# Patient Record
Sex: MALE | Race: WHITE | Employment: OTHER | URBAN - METROPOLITAN AREA
[De-identification: names, ages, dates, MRNs, and addresses within clinical notes are randomized per-mention and may not be internally consistent; named-entity substitution may affect disease eponyms.]

---

## 2019-01-18 ENCOUNTER — OFFICE VISIT (OUTPATIENT)
Dept: FAMILY MEDICINE | Facility: CLINIC | Age: 49
End: 2019-01-18
Payer: COMMERCIAL

## 2019-01-18 VITALS
OXYGEN SATURATION: 97 % | SYSTOLIC BLOOD PRESSURE: 106 MMHG | TEMPERATURE: 97.8 F | HEART RATE: 92 BPM | DIASTOLIC BLOOD PRESSURE: 58 MMHG | WEIGHT: 158 LBS

## 2019-01-18 DIAGNOSIS — Z23 NEED FOR PROPHYLACTIC VACCINATION AND INOCULATION AGAINST INFLUENZA: ICD-10-CM

## 2019-01-18 DIAGNOSIS — D64.9 ANEMIA, UNSPECIFIED TYPE: ICD-10-CM

## 2019-01-18 DIAGNOSIS — E10.69 TYPE 1 DIABETES MELLITUS WITH OTHER SPECIFIED COMPLICATION (H): Primary | ICD-10-CM

## 2019-01-18 DIAGNOSIS — I25.10 CORONARY ARTERY DISEASE INVOLVING NATIVE CORONARY ARTERY OF NATIVE HEART WITHOUT ANGINA PECTORIS: ICD-10-CM

## 2019-01-18 PROBLEM — E10.621: Status: ACTIVE | Noted: 2018-10-29

## 2019-01-18 PROBLEM — Z95.5 HISTORY OF CORONARY ARTERY STENT PLACEMENT: Status: ACTIVE | Noted: 2017-08-21

## 2019-01-18 PROBLEM — E11.8 DIABETIC FOOT (H): Status: ACTIVE | Noted: 2018-10-29

## 2019-01-18 PROBLEM — L97.529: Status: ACTIVE | Noted: 2018-10-29

## 2019-01-18 PROBLEM — E11.649 SEVERE DIABETIC HYPOGLYCEMIA (H): Status: ACTIVE | Noted: 2018-05-11

## 2019-01-18 LAB
ALBUMIN SERPL-MCNC: 3.1 G/DL (ref 3.4–5)
ALP SERPL-CCNC: 116 U/L (ref 40–150)
ALT SERPL W P-5'-P-CCNC: 22 U/L (ref 0–70)
ANION GAP SERPL CALCULATED.3IONS-SCNC: 6 MMOL/L (ref 3–14)
AST SERPL W P-5'-P-CCNC: 20 U/L (ref 0–45)
BASOPHILS # BLD AUTO: 0 10E9/L (ref 0–0.2)
BASOPHILS NFR BLD AUTO: 0.1 %
BILIRUB SERPL-MCNC: 0.5 MG/DL (ref 0.2–1.3)
BUN SERPL-MCNC: 28 MG/DL (ref 7–30)
CALCIUM SERPL-MCNC: 9.4 MG/DL (ref 8.5–10.1)
CHLORIDE SERPL-SCNC: 100 MMOL/L (ref 94–109)
CO2 SERPL-SCNC: 28 MMOL/L (ref 20–32)
CREAT SERPL-MCNC: 1.63 MG/DL (ref 0.66–1.25)
DIFFERENTIAL METHOD BLD: ABNORMAL
EOSINOPHIL # BLD AUTO: 0.2 10E9/L (ref 0–0.7)
EOSINOPHIL NFR BLD AUTO: 1.3 %
ERYTHROCYTE [DISTWIDTH] IN BLOOD BY AUTOMATED COUNT: 12.7 % (ref 10–15)
FERRITIN SERPL-MCNC: 258 NG/ML (ref 26–388)
FOLATE SERPL-MCNC: 19.2 NG/ML
GFR SERPL CREATININE-BSD FRML MDRD: 50 ML/MIN/{1.73_M2}
GLUCOSE SERPL-MCNC: 317 MG/DL (ref 70–99)
HCT VFR BLD AUTO: 33.2 % (ref 40–53)
HGB BLD-MCNC: 10.8 G/DL (ref 13.3–17.7)
IRON SATN MFR SERPL: 16 % (ref 15–46)
IRON SERPL-MCNC: 27 UG/DL (ref 35–180)
LYMPHOCYTES # BLD AUTO: 1.1 10E9/L (ref 0.8–5.3)
LYMPHOCYTES NFR BLD AUTO: 7.9 %
MCH RBC QN AUTO: 27.2 PG (ref 26.5–33)
MCHC RBC AUTO-ENTMCNC: 32.5 G/DL (ref 31.5–36.5)
MCV RBC AUTO: 84 FL (ref 78–100)
MONOCYTES # BLD AUTO: 0.8 10E9/L (ref 0–1.3)
MONOCYTES NFR BLD AUTO: 6.1 %
NEUTROPHILS # BLD AUTO: 11.6 10E9/L (ref 1.6–8.3)
NEUTROPHILS NFR BLD AUTO: 84.6 %
PLATELET # BLD AUTO: 367 10E9/L (ref 150–450)
POTASSIUM SERPL-SCNC: 4.9 MMOL/L (ref 3.4–5.3)
PROT SERPL-MCNC: 8.1 G/DL (ref 6.8–8.8)
RBC # BLD AUTO: 3.97 10E12/L (ref 4.4–5.9)
RETICS # AUTO: 30.6 10E9/L (ref 25–95)
RETICS/RBC NFR AUTO: 0.8 % (ref 0.5–2)
SODIUM SERPL-SCNC: 134 MMOL/L (ref 133–144)
T4 FREE SERPL-MCNC: 1.38 NG/DL (ref 0.76–1.46)
TIBC SERPL-MCNC: 166 UG/DL (ref 240–430)
TRANSFERRIN SERPL-MCNC: 140 MG/DL (ref 210–360)
TSH SERPL DL<=0.005 MIU/L-ACNC: 0.24 MU/L (ref 0.4–4)
VIT B12 SERPL-MCNC: 414 PG/ML (ref 193–986)
WBC # BLD AUTO: 13.7 10E9/L (ref 4–11)

## 2019-01-18 PROCEDURE — 85045 AUTOMATED RETICULOCYTE COUNT: CPT | Performed by: FAMILY MEDICINE

## 2019-01-18 PROCEDURE — 85025 COMPLETE CBC W/AUTO DIFF WBC: CPT | Performed by: FAMILY MEDICINE

## 2019-01-18 PROCEDURE — 80053 COMPREHEN METABOLIC PANEL: CPT | Performed by: FAMILY MEDICINE

## 2019-01-18 PROCEDURE — 82728 ASSAY OF FERRITIN: CPT | Performed by: FAMILY MEDICINE

## 2019-01-18 PROCEDURE — 84439 ASSAY OF FREE THYROXINE: CPT | Performed by: FAMILY MEDICINE

## 2019-01-18 PROCEDURE — 84466 ASSAY OF TRANSFERRIN: CPT | Performed by: FAMILY MEDICINE

## 2019-01-18 PROCEDURE — 90471 IMMUNIZATION ADMIN: CPT | Performed by: FAMILY MEDICINE

## 2019-01-18 PROCEDURE — 83540 ASSAY OF IRON: CPT | Performed by: FAMILY MEDICINE

## 2019-01-18 PROCEDURE — 82746 ASSAY OF FOLIC ACID SERUM: CPT | Performed by: FAMILY MEDICINE

## 2019-01-18 PROCEDURE — 90686 IIV4 VACC NO PRSV 0.5 ML IM: CPT | Performed by: FAMILY MEDICINE

## 2019-01-18 PROCEDURE — 36415 COLL VENOUS BLD VENIPUNCTURE: CPT | Performed by: FAMILY MEDICINE

## 2019-01-18 PROCEDURE — 82607 VITAMIN B-12: CPT | Performed by: FAMILY MEDICINE

## 2019-01-18 PROCEDURE — 84443 ASSAY THYROID STIM HORMONE: CPT | Performed by: FAMILY MEDICINE

## 2019-01-18 PROCEDURE — 99203 OFFICE O/P NEW LOW 30 MIN: CPT | Mod: 25 | Performed by: FAMILY MEDICINE

## 2019-01-18 RX ORDER — INSULIN DEGLUDEC 100 U/ML
28 INJECTION, SOLUTION SUBCUTANEOUS EVERY MORNING
Refills: 5 | Status: ON HOLD | COMMUNITY
Start: 2019-01-18 | End: 2019-02-23

## 2019-01-18 RX ORDER — INSULIN LISPRO 100 [IU]/ML
INJECTION, SOLUTION INTRAVENOUS; SUBCUTANEOUS
Status: ON HOLD | COMMUNITY
Start: 2018-12-31 | End: 2019-03-04

## 2019-01-18 RX ORDER — FLASH GLUCOSE SENSOR
KIT MISCELLANEOUS
Refills: 3 | COMMUNITY
Start: 2019-01-09 | End: 2019-09-06

## 2019-01-18 RX ORDER — INSULIN DEGLUDEC 100 U/ML
INJECTION, SOLUTION SUBCUTANEOUS
Refills: 5 | COMMUNITY
Start: 2018-12-26 | End: 2019-01-18

## 2019-01-18 RX ORDER — PREGABALIN 100 MG
CAPSULE ORAL
Refills: 1 | Status: ON HOLD | COMMUNITY
Start: 2018-12-26 | End: 2019-02-23

## 2019-01-24 DIAGNOSIS — E05.90 SUBCLINICAL HYPERTHYROIDISM: ICD-10-CM

## 2019-01-24 DIAGNOSIS — D50.9 IRON DEFICIENCY ANEMIA, UNSPECIFIED IRON DEFICIENCY ANEMIA TYPE: Primary | ICD-10-CM

## 2019-02-01 ENCOUNTER — OFFICE VISIT (OUTPATIENT)
Dept: PODIATRY | Facility: CLINIC | Age: 49
End: 2019-02-01
Payer: COMMERCIAL

## 2019-02-01 VITALS
BODY MASS INDEX: 23.4 KG/M2 | WEIGHT: 158 LBS | DIASTOLIC BLOOD PRESSURE: 78 MMHG | HEIGHT: 69 IN | SYSTOLIC BLOOD PRESSURE: 126 MMHG

## 2019-02-01 DIAGNOSIS — L97.522 SKIN ULCER OF LEFT GREAT TOE WITH FAT LAYER EXPOSED (H): ICD-10-CM

## 2019-02-01 DIAGNOSIS — L97.422 SKIN ULCER OF LEFT HEEL WITH FAT LAYER EXPOSED (H): ICD-10-CM

## 2019-02-01 DIAGNOSIS — E10.40 TYPE 1 DIABETES MELLITUS WITH DIABETIC NEUROPATHY (H): ICD-10-CM

## 2019-02-01 DIAGNOSIS — I73.9 PAD (PERIPHERAL ARTERY DISEASE) (H): Primary | ICD-10-CM

## 2019-02-01 PROCEDURE — 99205 OFFICE O/P NEW HI 60 MIN: CPT | Performed by: PODIATRIST

## 2019-02-01 ASSESSMENT — MIFFLIN-ST. JEOR: SCORE: 1587.06

## 2019-02-01 NOTE — LETTER
"    2/1/2019         RE: Eduardo Walton  [address] [facility] [id number]        Dear Colleague,    Thank you for referring your patient, Eduardo Walton, to the [facility]. Please see a copy of my visit note below.    PATIENT HISTORY: Eduardo Walton is a 48 year old male who presents to clinic for wounds to the left great toe and heel. Her with signficant other. Was being seen at [facility] and states that he didn't like it over there as they did not have a wound care plan for his foot. Notes he has the ulcer to his right great toe for last year. States he did 6 weeks of IV antibiotics and was told \"the infection is gone\". Notes his left heel ulceration started about 3 weeks ago. Concerned about \"the smell\". Will get pain intermittently. Has been diabetic for 40 years. Pain can be 4/10 at its worst. Has been putting betadine on the heel and silvadene cream on the toe. Wondering what can be done to heal it.     Review of Systems:  Patient denies fever, chills, rash,  stiffness, limping,  weakness, heart burn, blood in stool, chest pain with activity, calf pain when walking, shortness of breath with activity, chronic cough, easy bleeding/bruising, swelling of ankles, excessive thirst, fatigue, depression, anxiety.  Patient admits to numbness, wound.     PAST MEDICAL HISTORY: Diabetes with neuropathy, CAD     PAST SURGICAL HISTORY:   Past Surgical History:   Procedure Laterality Date     STENT 2010        MEDICATIONS:   Current Outpatient Medications:      Continuous Blood Gluc Sensor (FREESTYLE COLIN 14 DAY SENSOR) [facility], USE WITH 14 DAY COLIN SYSTEM. CHANGE SENSOR EVERY 14 DAYS., Disp: , Rfl: 3     insulin aspart (NOVOLOG PEN) 100 UNIT/ML pen, Inject 5 Units Subcutaneous, Disp: , Rfl:      insulin lispro (HUMALOG KWIKPEN) 100 UNIT/ML pen, Inject 1 unit/2 grams carb, total daily dose up to 80 units  Indications: Diabetes Mellitus, Disp: , Rfl:      LYRICA 100 MG capsule, TAKE 1 CAPSULE BY " "MOUTH THREE TIMES A DAY, Disp: , Rfl: 1     TRESIBA FLEXTOUCH 100 UNIT/ML pen, 28 Units daily, Disp: , Rfl: 5     ALLERGIES:    Allergies   Allergen Reactions     Fish-Derived Products Rash        SOCIAL HISTORY:   Social History     Socioeconomic History     Marital status:      Spouse name: Not on file     Number of children: Not on file     Years of education: Not on file     Highest education level: Not on file   Social Needs     Financial resource strain: Not on file     Food insecurity - worry: Not on file     Food insecurity - inability: Not on file     Transportation needs - medical: Not on file     Transportation needs - non-medical: Not on file   Occupational History     Not on file   Tobacco Use     Smoking status: Never Smoker     Smokeless tobacco: Never Used   Substance and Sexual Activity     Alcohol use: Yes     Comment: occ     Drug use: Not on file     Sexual activity: Yes   Other Topics Concern     Not on file   Social History Narrative     Not on file        FAMILY HISTORY:   Family History   Problem Relation Age of Onset     Ovarian Cancer Maternal Grandmother      Colon Cancer Maternal Grandmother      Prostate Cancer Maternal Grandfather      Ovarian Cancer Paternal Grandmother         EXAM:Vitals:/78   Ht 1.753 m (5' 9\")   Wt 71.7 kg (158 lb)   BMI 23.33 kg/m       A1C: 9.6 (10/2018)    General appearance: Patient is alert and fully cooperative with history & exam.  No sign of distress is noted during the visit.     Psychiatric: Affect is pleasant & appropriate.  Patient appears motivated to improve health.     Respiratory: Breathing is regular & unlabored while sitting.     HEENT: Hearing is intact to spoken word.  Speech is clear.  No gross evidence of visual impairment that would impact ambulation.     Dermatologic: large ischemic eschar to the posterior left heel. This measure 6cm x 6cm. Malodorous. No purulent drainage noted on clinical exam. eshcar to the left great toe " measuring 2cm x 1.0cm x 0.3cm after debridement.      Vascular: DP & PT pulses are not palpable to left foot or ankle.   edema but no varicosities noted.  CFT and skin temperature is normal to both lower extremities.     Neurologic: Lower extremity sensation is absent to feet.      Musculoskeletal: Patient is ambulatory without assistive device or brace.  No gross ankle deformity noted.  No foot or ankle joint effusion is noted.    Radiographs: left foot xray (11/2018) from [facility] - There is new destruction of the distal tuft of the great toe, consistent with acute osteomyelitis.    From the notes, it seems like they tried to schedule angiogram but was cancelled as the patient would not get back to him.      ASSESSMENT:    PAD (peripheral artery disease) (H)  Skin ulcer of left heel with fat layer exposed (H)  Skin ulcer of left great toe with fat layer exposed (H)  Type 1 diabetes mellitus with diabetic neuropathy (H)     PLAN:  Reviewed patient's chart in epic. Had a long discussion with patient. Discussed that I am very concerned about his foot. Discussed that there is lack of blood flow to the area and there is likely underlying bone infection. Discussed he is high risk of amputation of leg or worse, sepsis. Recommend admission to [facility] as they have a vascular service and since I can not palpate pulses, feel he needs a vascular work up as well as MRI's of the left foot and ankle to assess for bone infection. Discussed that with bone infection, it needs to be surgically removed, which usually results in amputation. Patient does not want to be admitted today. Would like to get MRI's and angiogram outpatient. Again discussed that severity of the leg with the lack of blood flow. He said he will call if it gets worse or go to the ED at [facility]. Will call with test results once we receive them. He will apply iodine to the ulcers daily with dry gauze. Tried get patient offloading shoe but states  he would not wear it cause he doesn't like them.     Over 60 minutes was spent counseling and coordinating care.        Kay Pleitez DPM, Podiatry/Foot and Ankle Surgery        Patient to follow up with Primary Care provider regarding elevated blood pressure.        Again, thank you for allowing me to participate in the care of your patient.        Sincerely,        Kay Pleitez DPM, Podiatry/Foot and Ankle Surgery

## 2019-02-01 NOTE — PATIENT INSTRUCTIONS
"3 week follow up      Please call to schedule your MRI/CT/Ultrasound/Arthrogram appointment.  The number is [phone].          Thank you for choosing [facility]!    DR. [facility] SCHEDULE  MONDAY AM - [place] TUESDAY - [place]   [address] [address] [address] [place] [address]   [phone] / [phone] / [phone]       WEDNESDAY - [place] FRIDAY AM - WOUND CENTER   [address]   [place] [address] [place] [address]   [phone] / [phone]       FRIDAY PM - [place] SCHEDULE SURGERY: [phone]   [address] BILLING QUESTIONS: [phone]   [place] [address] AFTER HOURS: [phone]   [phone] / [phone] APPOINTMENTS: [phone]     [employer] (CPL) [phone]       DIABETES AND YOUR FEET  Diabetes can result in several problems in the feet including ulcers (open sores) and amputations. Two of the most important reasons why people develop foot problems when they have diabetes is : 1. Neuropathy (loss of feeling)  2. Vascular disease (loss or decrease of blood flow).    Neuropathy is a term used to describe a loss of nerve function.  Patients with diabetes are at risk of developing neuropathy if their sugars continue to run high and are above the normal value. One theory for neuropathy is that the \"extra\" sugar in the body enters the nerves and is broken down. These by-products build up in the nerve causing it to swell and impairing nerve function. Often times, this can be prevented by controlling your sugars, dieting and exercise.    When a person develops neuropathy, they usually begin to feel numbness or tingling in their feet and sometime in their legs.  Other symptoms may include painful burning or hot feet, tingling or feeling like insects or ants are crawling on your feet or legs.  If the diabetes is sever and the " sugars run high for long periods of time, neuropathy can also occur in the hands.    Vascular disease  is a term used to describe a loss or decrease in circulation (blood flow). There is a problem in getting blood and oxygen to areas that need it. Similar to neuropathy, sugars can build up in the walls of the arteries (blood vessels) and cause them to become swollen, thickened and hardened. This decreases the amount of blood that can go to an area that needs it. Though this is common in the legs of diabetic patients, it can also affect other arteries (blood vessels) in the body such as in the heart and eyes.    In the legs, vascular disease usually results in cramping. Patients who develop leg cramps after walking the same distance every time (i.e. One block, half a mile, ect.) need to let their doctors know so that their circulation may be checked. Cramps causing severe pain in the feet and/or legs while sleeping and the cramps go away when you stand or hang your legs off the side of the bed, may also be a sign of poor blood circulation.  Occasional cramping in cold weather or on rare occasions with activity may not be due to poor circulation, but you should inform your doctor.    PREVENTION OF THESE DISEASES  The key to prevention is good blood sugar control. Poor blood sugar control is a big reason many of these problems start. Physical activity (exercise) is a very good way to help decrease your blood sugars. Exercise can lower your blood sugar, blood pressure, and cholesterol. It also reduces your risk for heart disease and stroke, relieves stress, and strengthens your heart, muscles and bones.  In addition, regular activity helps insulin work better, improves your blood circulation, and keeps your joints flexible. If you're trying to lose weight, a combination of exercise and wise food choices can help you reach your target weight and maintain it.      PAIN MANAGEMENT  1.Blood Sugar Control - Most  important  2. Medications such as:  Amytriptylline, duloxetine, gabapentin, lyrica, tramadol  3. Nutritional therapy:  Vitamin B6 (100mg daily), Vitamin B12 (75mcg daily), Vitamin D 2000 IU daily), Alpha-Lipoic Acid (600-1800mg daily), Acetyl-L-Carnitine (500-1000mg TID, L-methyl folate (1500mcg daily)    ** Metformin can block Vitamin B6 and B12 so it is important to supplement**    FOOT CARE RECOMMENDATIONS   1. Wash your feet with lukewarm water and a mild soap and then dry them thoroughly, especially between the toes.     2. Examine your feet daily looking for cuts, corns, blisters, cracks, ect, especially after wearing new shoes. Make sure to look between your toes. If you cannot see the bottom of your feet, set a mirror on the floor and hold your foot over it, or ask a spouse, friend or family member to examine your feet for you. Contact your doctor immediately if new problems are noted or if sores are not healing.     3. Immediately apply moisturizer to the tops and bottoms of your feet, avoiding areas between the toes. Hand lotion (Intesive Care, Margareth, Eucerin, Neutrogena, Curel, ect) is sufficient unless your doctor prescribes a medicated lotion. Apply sunscreen to your feet when going swimming outside.     4. Use clean comfortable shoes, wear white socks (if you have any bleeding or drainage, you will see it on white socks). Socks should not have thick seams or cut off the circulation around the leg. Break in new shoes slowly and rotate with older shoes until broken in. Check the inside of your shoes with your hand to look for areas of irritation or objects that may have fallen into your shoes.       5. Keep slippers by the side of your bed for use during the night.     6.  Shoes should be fitted by a professional and should not cause areas of irritation.  Check your feet regularly when wearing a new pair of shoes and replace them as needed.     7.  Talk to your doctor about proper exercise. Exercise and  stretching stimulate blood flow to your feet and maintain proper glucose levels.     8.  Monitor your blood glucose level as instructed by your doctor. Notify your doctor immediately if your blood sugar is abnormally high or low.    9. Cut your nails straight across, but then gently round any sharp edges with a cardboard nail file. If you have neuropathy, peripheral vascular disease or cannot see that well to trim your own toenails contact [employer] ([phone]) or [employer] ([phone]).      THINGS TO AVOID DOING   1.  Do not soak your feet if you have an open sore. Use only lukewarm water and always check the temperature with your hand as hot water can easily burn your feet.       2.  Never use a hot water bottle or heating pad on your feet. Also do not apply cold compresses to your feet. With decreased sensation, you could burn or freeze your feet.       3.  Do not apply any of these to your feet:    -  Over the counter medicine for corns or warts    -  Harsh chemicals like boric acid    -  Do not self-treat corns, cuts, blisters or infections. Always consult your doctor.       4.  Do not wear sandals, slippers or walk barefoot, especially on hot sand or concrete or other harsh surfaces.     5.  If you smoke, stop!!!          FOOT ULCER (WOUND) EDUCATION  Ulceration ofthe foot involves a break or hole in the skin. Skin is our best protection against infection. Skin is quite durable, however, the underlying tissues are fragile. For this reason, the wound is likely to deepen rapidly. Deep wounds usually get infected and require amputation. Prompt healing is therefore essential to avoid limb loss.     Foot ulcers do not heal without intervention. Walking on the foot and living your normal life is not typically compatible with healing the sore. Successful healing will require several months of significant alteration of your daily activities.   Ulcer complications frequently develop. This primarily includes  infection of skin, which then spreads deep into your joints, bones and tendons. Spreading infection may travel up your leg and into other parts of your body. Deep infection is usually treated with amputation ofpart ofyour foot or your leg. Signs of infection include fever, chills, nausea, vomiting, erratic blood sugars, local redness, pus, strong odor and localized warmth. Signs of infection should be taken seriously. Prompt evaluation in the clinic or hospital emergency room is required.   Ulcer treatment requires debridement or surgical removal of devitalized tissue. Your doctor will trim away callused, moistened, unhealthy tissue from the wound surface and margin. This helps to clean the wound and allows proper inspection. Debridement also stimulates healing even though the wound originally appears larger. Expect some bleeding with each debridement. You will be given instruction regarding wound bandaging. This often includes ointment and gauze. Avoid tape directly on the skin. Hand washing is essential since most infections will come from your fingertips. Ulcer care requires a no touch technique. Your fingers should not touch the margin or base of the wound.    HELPFUL HEALING TIPS:  1. Debridement: Getting rid of bad tissue makes way for good tissue to promote healing  2. Addressing Foot Deformities: Hammertoes and bunions can cause increased pressure  3. Pressure Reduction: If pressure remains to the wound, it won't heal  4. Good Pulses: If bloodflow is not getting to the foot, the ulcer will not heal  5. Good Nutrition: If you are not getting proper nutrition your body can't heal.Protein!  6. Infection Control: Keep the ulcer clean with wound cleanser. DO NOT SOAK IT!  7. Moisture Control: Keep edema down and make sure that drainage is getting pulled away from the ulcer    IMPORTANCE OF DEBRIDEMENT    Reduces bioburden to help control or reduce infection. Even if an ulcer is not  infected,  the bacterial  bioburden causes increased local inflammation.    Allows more accurate visualization of the wound base and edges, which allows for more precise staging.    Removes necrotic/non-viable tissue, which impedes wound healing, causes protein loss and can be a nidus for infection.    Stimulates new circulation (angiogenesis) and allows adequate oxygen delivery to the wound.    Removes undermining and tunneling, and may help reduce abscess formation.    Releases healing growth factors at the edge of the wound.    Prepares the wound bed by leaving only tissues that are capable of regenerating.        Body Mass Index (BMI)  Many things can cause foot and ankle problems. Foot structure, activity level, foot mechanics and injuries are common causes of pain.  One very important issue that often goes unmentioned, is body weight. Extra weight can cause increased stress on muscles, ligaments, bones and tendons.  Sometimes just a few extra pounds is all it takes to put one over her/his threshold. Without reducing that stress, it can be difficult to alleviate pain. Some people are uncomfortable addressing this issue, but we feel it is important for you to think about it. As Foot &  Ankle specialists, our job is addressing the lower extremity problem and possible causes. Regarding extra body weight, we encourage patients to discuss diet and weight management plans with their primary care doctors. It is this team approach that gives you the best opportunity for pain relief and getting you back on your feet.

## 2019-02-01 NOTE — PROGRESS NOTES
"PATIENT HISTORY: Eduardo Walton is a 48 year old male who presents to clinic for wounds to the left great toe and heel. Her with signficant other. Was being seen at [facility] and states that he didn't like it over there as they did not have a wound care plan for his foot. Notes he has the ulcer to his right great toe for last year. States he did 6 weeks of IV antibiotics and was told \"the infection is gone\". Notes his left heel ulceration started about 3 weeks ago. Concerned about \"the smell\". Will get pain intermittently. Has been diabetic for 40 years. Pain can be 4/10 at its worst. Has been putting betadine on the heel and silvadene cream on the toe. Wondering what can be done to heal it.     Review of Systems:  Patient denies fever, chills, rash,  stiffness, limping,  weakness, heart burn, blood in stool, chest pain with activity, calf pain when walking, shortness of breath with activity, chronic cough, easy bleeding/bruising, swelling of ankles, excessive thirst, fatigue, depression, anxiety.  Patient admits to numbness, wound.     PAST MEDICAL HISTORY: Diabetes with neuropathy, CAD     PAST SURGICAL HISTORY:   Past Surgical History:   Procedure Laterality Date     STENT 2010        MEDICATIONS:   Current Outpatient Medications:      Continuous Blood Gluc Sensor (TolerxYLE COLIN 14 DAY SENSOR) [facility] [place] [place], USE WITH 14 DAY COLIN SYSTEM. CHANGE SENSOR EVERY 14 DAYS., Disp: , Rfl: 3     insulin aspart (NOVOLOG PEN) 100 UNIT/ML pen, Inject 5 Units Subcutaneous, Disp: , Rfl:      insulin lispro (HUMALOG KWIKPEN) 100 UNIT/ML pen, Inject 1 unit/2 grams carb, total daily dose up to 80 units  Indications: Diabetes Mellitus, Disp: , Rfl:      LYRICA 100 MG capsule, TAKE 1 CAPSULE BY MOUTH THREE TIMES A DAY, Disp: , Rfl: 1     TRESIBA FLEXTOUCH 100 UNIT/ML pen, 28 Units daily, Disp: , Rfl: 5     ALLERGIES:    Allergies   Allergen Reactions     Fish-Derived Products Rash        SOCIAL HISTORY:   Social History " "    Socioeconomic History     Marital status:      Spouse name: Not on file     Number of children: Not on file     Years of education: Not on file     Highest education level: Not on file   Social Needs     Financial resource strain: Not on file     Food insecurity - worry: Not on file     Food insecurity - inability: Not on file     Transportation needs - medical: Not on file     Transportation needs - non-medical: Not on file   Occupational History     Not on file   Tobacco Use     Smoking status: Never Smoker     Smokeless tobacco: Never Used   Substance and Sexual Activity     Alcohol use: Yes     Comment: occ     Drug use: Not on file     Sexual activity: Yes   Other Topics Concern     Not on file   Social History Narrative     Not on file        FAMILY HISTORY:   Family History   Problem Relation Age of Onset     Ovarian Cancer Maternal Grandmother      Colon Cancer Maternal Grandmother      Prostate Cancer Maternal Grandfather      Ovarian Cancer Paternal Grandmother         EXAM:Vitals:/78   Ht 1.753 m (5' 9\")   Wt 71.7 kg (158 lb)   BMI 23.33 kg/m      A1C: 9.6 (10/2018)    General appearance: Patient is alert and fully cooperative with history & exam.  No sign of distress is noted during the visit.     Psychiatric: Affect is pleasant & appropriate.  Patient appears motivated to improve health.     Respiratory: Breathing is regular & unlabored while sitting.     HEENT: Hearing is intact to spoken word.  Speech is clear.  No gross evidence of visual impairment that would impact ambulation.     Dermatologic: large ischemic eschar to the posterior left heel. This measure 6cm x 6cm. Malodorous. No purulent drainage noted on clinical exam. eshcar to the left great toe measuring 2cm x 1.0cm x 0.3cm after debridement.      Vascular: DP & PT pulses are not palpable to left foot or ankle.   edema but no varicosities noted.  CFT and skin temperature is normal to both lower extremities.   "   Neurologic: Lower extremity sensation is absent to feet.      Musculoskeletal: Patient is ambulatory without assistive device or brace.  No gross ankle deformity noted.  No foot or ankle joint effusion is noted.    Radiographs: left foot xray (11/2018) from [facility] - There is new destruction of the distal tuft of the great toe, consistent with acute osteomyelitis.    From the notes, it seems like they tried to schedule angiogram but was cancelled as the patient would not get back to him.      ASSESSMENT:    PAD (peripheral artery disease) (H)  Skin ulcer of left heel with fat layer exposed (H)  Skin ulcer of left great toe with fat layer exposed (H)  Type 1 diabetes mellitus with diabetic neuropathy (H)     PLAN:  Reviewed patient's chart in epic. Had a long discussion with patient. Discussed that I am very concerned about his foot. Discussed that there is lack of blood flow to the area and there is likely underlying bone infection. Discussed he is high risk of amputation of leg or worse, sepsis. Recommend admission to [facility] as they have a vascular service and since I can not palpate pulses, feel he needs a vascular work up as well as MRI's of the left foot and ankle to assess for bone infection. Discussed that with bone infection, it needs to be surgically removed, which usually results in amputation. Patient does not want to be admitted today. Would like to get MRI's and angiogram outpatient. Again discussed that severity of the leg with the lack of blood flow. He said he will call if it gets worse or go to the ED at [facility]. Will call with test results once we receive them. He will apply iodine to the ulcers daily with dry gauze. Tried get patient offloading shoe but states he would not wear it cause he doesn't like them.     Over 60 minutes was spent counseling and coordinating care.        Kay Pleitez DPM, Podiatry/Foot and Ankle Surgery        Patient to follow up with Primary Care  provider regarding elevated blood pressure.

## 2019-02-04 ENCOUNTER — HOSPITAL ENCOUNTER (INPATIENT)
Facility: CLINIC | Age: 49
LOS: 5 days | Discharge: HOME HEALTH | End: 2019-02-10
Attending: EMERGENCY MEDICINE | Admitting: INTERNAL MEDICINE
Payer: COMMERCIAL

## 2019-02-04 ENCOUNTER — APPOINTMENT (OUTPATIENT)
Dept: GENERAL RADIOLOGY | Facility: CLINIC | Age: 49
End: 2019-02-04
Payer: COMMERCIAL

## 2019-02-04 DIAGNOSIS — E10.621 DIABETIC ULCER OF LEFT HEEL ASSOCIATED WITH TYPE 1 DIABETES MELLITUS, UNSPECIFIED ULCER STAGE (H): ICD-10-CM

## 2019-02-04 DIAGNOSIS — L08.9 TYPE 1 DIABETES MELLITUS WITH DIABETIC FOOT INFECTION (H): ICD-10-CM

## 2019-02-04 DIAGNOSIS — A41.9 SEPSIS, DUE TO UNSPECIFIED ORGANISM: ICD-10-CM

## 2019-02-04 DIAGNOSIS — L97.429 DIABETIC ULCER OF LEFT HEEL ASSOCIATED WITH TYPE 1 DIABETES MELLITUS, UNSPECIFIED ULCER STAGE (H): ICD-10-CM

## 2019-02-04 DIAGNOSIS — L97.523 DIABETIC ULCER OF TOE OF LEFT FOOT ASSOCIATED WITH TYPE 1 DIABETES MELLITUS, WITH NECROSIS OF MUSCLE (H): ICD-10-CM

## 2019-02-04 DIAGNOSIS — E10.628 TYPE 1 DIABETES MELLITUS WITH DIABETIC FOOT INFECTION (H): ICD-10-CM

## 2019-02-04 DIAGNOSIS — E10.621 DIABETIC ULCER OF TOE OF LEFT FOOT ASSOCIATED WITH TYPE 1 DIABETES MELLITUS, WITH NECROSIS OF MUSCLE (H): ICD-10-CM

## 2019-02-04 DIAGNOSIS — L08.9 LEFT FOOT INFECTION: Primary | ICD-10-CM

## 2019-02-04 LAB
ALBUMIN SERPL-MCNC: 2.3 G/DL (ref 3.4–5)
ALP SERPL-CCNC: 130 U/L (ref 40–150)
ALT SERPL W P-5'-P-CCNC: 18 U/L (ref 0–70)
ANION GAP SERPL CALCULATED.3IONS-SCNC: 7 MMOL/L (ref 3–14)
AST SERPL W P-5'-P-CCNC: 21 U/L (ref 0–45)
BASOPHILS # BLD AUTO: 0 10E9/L (ref 0–0.2)
BASOPHILS NFR BLD AUTO: 0.1 %
BILIRUB SERPL-MCNC: 0.7 MG/DL (ref 0.2–1.3)
BUN SERPL-MCNC: 35 MG/DL (ref 7–30)
CALCIUM SERPL-MCNC: 9 MG/DL (ref 8.5–10.1)
CHLORIDE SERPL-SCNC: 99 MMOL/L (ref 94–109)
CO2 SERPL-SCNC: 30 MMOL/L (ref 20–32)
CREAT SERPL-MCNC: 1.42 MG/DL (ref 0.66–1.25)
CRP SERPL-MCNC: 253 MG/L (ref 0–8)
DIFFERENTIAL METHOD BLD: ABNORMAL
EOSINOPHIL # BLD AUTO: 0 10E9/L (ref 0–0.7)
EOSINOPHIL NFR BLD AUTO: 0 %
ERYTHROCYTE [DISTWIDTH] IN BLOOD BY AUTOMATED COUNT: 13.3 % (ref 10–15)
ERYTHROCYTE [SEDIMENTATION RATE] IN BLOOD BY WESTERGREN METHOD: 78 MM/H (ref 0–15)
GFR SERPL CREATININE-BSD FRML MDRD: 59 ML/MIN/{1.73_M2}
GLUCOSE SERPL-MCNC: 165 MG/DL (ref 70–99)
HCT VFR BLD AUTO: 30.4 % (ref 40–53)
HGB BLD-MCNC: 10.1 G/DL (ref 13.3–17.7)
IMM GRANULOCYTES # BLD: 0.1 10E9/L (ref 0–0.4)
IMM GRANULOCYTES NFR BLD: 0.4 %
LACTATE BLD-SCNC: 1.6 MMOL/L (ref 0.7–2)
LYMPHOCYTES # BLD AUTO: 1.4 10E9/L (ref 0.8–5.3)
LYMPHOCYTES NFR BLD AUTO: 5.7 %
MCH RBC QN AUTO: 27 PG (ref 26.5–33)
MCHC RBC AUTO-ENTMCNC: 33.2 G/DL (ref 31.5–36.5)
MCV RBC AUTO: 81 FL (ref 78–100)
MONOCYTES # BLD AUTO: 2.1 10E9/L (ref 0–1.3)
MONOCYTES NFR BLD AUTO: 8.3 %
NEUTROPHILS # BLD AUTO: 21.2 10E9/L (ref 1.6–8.3)
NEUTROPHILS NFR BLD AUTO: 85.5 %
NRBC # BLD AUTO: 0 10*3/UL
NRBC BLD AUTO-RTO: 0 /100
PLATELET # BLD AUTO: 396 10E9/L (ref 150–450)
POTASSIUM SERPL-SCNC: 4.3 MMOL/L (ref 3.4–5.3)
PROT SERPL-MCNC: 7.4 G/DL (ref 6.8–8.8)
RBC # BLD AUTO: 3.74 10E12/L (ref 4.4–5.9)
SODIUM SERPL-SCNC: 136 MMOL/L (ref 133–144)
WBC # BLD AUTO: 24.7 10E9/L (ref 4–11)

## 2019-02-04 PROCEDURE — 86140 C-REACTIVE PROTEIN: CPT | Performed by: EMERGENCY MEDICINE

## 2019-02-04 PROCEDURE — 87040 BLOOD CULTURE FOR BACTERIA: CPT | Performed by: EMERGENCY MEDICINE

## 2019-02-04 PROCEDURE — 25000128 H RX IP 250 OP 636: Performed by: EMERGENCY MEDICINE

## 2019-02-04 PROCEDURE — 85652 RBC SED RATE AUTOMATED: CPT | Performed by: EMERGENCY MEDICINE

## 2019-02-04 PROCEDURE — 25000132 ZZH RX MED GY IP 250 OP 250 PS 637: Performed by: EMERGENCY MEDICINE

## 2019-02-04 PROCEDURE — 83605 ASSAY OF LACTIC ACID: CPT | Performed by: EMERGENCY MEDICINE

## 2019-02-04 PROCEDURE — 85025 COMPLETE CBC W/AUTO DIFF WBC: CPT | Performed by: EMERGENCY MEDICINE

## 2019-02-04 PROCEDURE — 99285 EMERGENCY DEPT VISIT HI MDM: CPT

## 2019-02-04 PROCEDURE — 80053 COMPREHEN METABOLIC PANEL: CPT | Performed by: EMERGENCY MEDICINE

## 2019-02-04 PROCEDURE — 96375 TX/PRO/DX INJ NEW DRUG ADDON: CPT

## 2019-02-04 PROCEDURE — 96365 THER/PROPH/DIAG IV INF INIT: CPT

## 2019-02-04 PROCEDURE — 96361 HYDRATE IV INFUSION ADD-ON: CPT

## 2019-02-04 PROCEDURE — 73630 X-RAY EXAM OF FOOT: CPT | Mod: LT

## 2019-02-04 RX ORDER — ERTAPENEM 1 G/1
1 INJECTION, POWDER, LYOPHILIZED, FOR SOLUTION INTRAMUSCULAR; INTRAVENOUS ONCE
Status: COMPLETED | OUTPATIENT
Start: 2019-02-04 | End: 2019-02-05

## 2019-02-04 RX ORDER — ACETAMINOPHEN 500 MG
1000 TABLET ORAL ONCE
Status: COMPLETED | OUTPATIENT
Start: 2019-02-04 | End: 2019-02-04

## 2019-02-04 RX ORDER — SODIUM CHLORIDE 9 MG/ML
1000 INJECTION, SOLUTION INTRAVENOUS CONTINUOUS
Status: DISCONTINUED | OUTPATIENT
Start: 2019-02-04 | End: 2019-02-04

## 2019-02-04 RX ORDER — ONDANSETRON 2 MG/ML
4 INJECTION INTRAMUSCULAR; INTRAVENOUS ONCE
Status: COMPLETED | OUTPATIENT
Start: 2019-02-04 | End: 2019-02-04

## 2019-02-04 RX ADMIN — ACETAMINOPHEN 1000 MG: 500 TABLET, FILM COATED ORAL at 23:31

## 2019-02-04 RX ADMIN — ONDANSETRON 4 MG: 2 INJECTION INTRAMUSCULAR; INTRAVENOUS at 21:36

## 2019-02-04 RX ADMIN — SODIUM CHLORIDE 1000 ML: 9 INJECTION, SOLUTION INTRAVENOUS at 21:36

## 2019-02-04 RX ADMIN — ERTAPENEM SODIUM 1 G: 1 INJECTION, POWDER, LYOPHILIZED, FOR SOLUTION INTRAMUSCULAR; INTRAVENOUS at 23:58

## 2019-02-04 RX ADMIN — SODIUM CHLORIDE 1000 ML: 9 INJECTION, SOLUTION INTRAVENOUS at 23:31

## 2019-02-04 ASSESSMENT — ENCOUNTER SYMPTOMS
WOUND: 1
FEVER: 1
VOMITING: 0
SORE THROAT: 0
APPETITE CHANGE: 1
DIARRHEA: 1
COLOR CHANGE: 1
CHILLS: 1
NAUSEA: 1
COUGH: 1

## 2019-02-04 ASSESSMENT — MIFFLIN-ST. JEOR: SCORE: 1573.46

## 2019-02-04 NOTE — LETTER
Transition Communication Hand-off for Care Transitions to Next Level of Care Provider    Name: Eduardo Walton  : 1970  MRN #: [id number]  Primary Care Provider: Efren Conway     Primary Clinic: [address] [place] [address]  [facility] [address]     Reason for Hospitalization:  Type 1 diabetes mellitus with diabetic foot infection (H) [E10.628, L08.9]  Sepsis, due to unspecified organism (H) [A41.9]  Diabetic ulcer of left heel associated with type 1 diabetes mellitus, unspecified ulcer stage (H) [E10.621, L97.429]  Admit Date/Time: 2019  9:40 PM  Discharge Date: 2/10/2019  Payor Source: Payor: [facility] / Plan: [employer] [place] / Product Type: HMO /     Readmission Assessment Measure (JB) Risk Score/category: low           Reason for Communication Hand-off Referral: Difficulty understanding plan of care    Discharge Plan: Discharge to home with [facility] PT/OT/RN.  Discharge with rolling scooter       Concern for non-adherence with plan of care:   Y/N Y  Discharge Needs Assessment:  Needs      Most Recent Value   # of Referrals Placed by [facility]  Homecare, Durable Medical Equipment (DME), Scheduled Follow-up appointments          Already enrolled in Tele-monitoring program and name of program:  N  Follow-up specialty is recommended: Yes    Follow-up plan:    Future Appointments   Date Time Provider Department Center   2019  1:00 PM Kay Pleitez DPM, Podiatry/Foot and Ankle Surgery [facility]       Any outstanding tests or procedures:        Referrals     Future Labs/Procedures    Home care nursing referral     Comments:    RN extended hours visit. RN to assess wound, vitals    Your provider has ordered home care nursing services. If you have not been contacted within 2 days of your discharge please call the inpatient department phone number at [phone] .    Home Care OT Referral for Hospital Discharge     Comments:    OT to eval and treat    Your provider has ordered home care - occupational therapy. If you have not  been contacted within 2 days of your discharge please call the department phone number listed on the top of this document.    Home Care PT Referral for Hospital Discharge     Comments:    PT to eval and treat    Your provider has ordered home care - physical therapy. If you have not been contacted within 2 days of your discharge please call the department phone number listed on the top of this document.            Key Recommendations:  Pt was recommended to have BKA and declined. Extensive wounds after I/D.  Homecare set up.  Poorly controlled diabetes.  Needs close follow up.  Wishes to make own PCP appointment.      Maia Elise    AVS/Discharge Summary is the source of truth; this is a helpful guide for improved communication of patient story

## 2019-02-05 ENCOUNTER — ANESTHESIA (OUTPATIENT)
Dept: SURGERY | Facility: CLINIC | Age: 49
End: 2019-02-05
Payer: COMMERCIAL

## 2019-02-05 ENCOUNTER — APPOINTMENT (OUTPATIENT)
Dept: CT IMAGING | Facility: CLINIC | Age: 49
End: 2019-02-05
Payer: COMMERCIAL

## 2019-02-05 ENCOUNTER — ANESTHESIA EVENT (OUTPATIENT)
Dept: SURGERY | Facility: CLINIC | Age: 49
End: 2019-02-05
Payer: COMMERCIAL

## 2019-02-05 PROBLEM — L08.9 LEFT FOOT INFECTION: Status: ACTIVE | Noted: 2019-02-05

## 2019-02-05 LAB
ANION GAP SERPL CALCULATED.3IONS-SCNC: 6 MMOL/L (ref 3–14)
BUN SERPL-MCNC: 33 MG/DL (ref 7–30)
CALCIUM SERPL-MCNC: 9 MG/DL (ref 8.5–10.1)
CHLORIDE SERPL-SCNC: 104 MMOL/L (ref 94–109)
CO2 SERPL-SCNC: 30 MMOL/L (ref 20–32)
CREAT SERPL-MCNC: 1.31 MG/DL (ref 0.66–1.25)
ERYTHROCYTE [DISTWIDTH] IN BLOOD BY AUTOMATED COUNT: 13.8 % (ref 10–15)
GFR SERPL CREATININE-BSD FRML MDRD: 65 ML/MIN/{1.73_M2}
GLUCOSE BLDC GLUCOMTR-MCNC: 130 MG/DL (ref 70–99)
GLUCOSE BLDC GLUCOMTR-MCNC: 89 MG/DL (ref 70–99)
GLUCOSE SERPL-MCNC: 198 MG/DL (ref 70–99)
GRAM STN SPEC: ABNORMAL
HBA1C MFR BLD: 10.9 % (ref 0–5.6)
HCT VFR BLD AUTO: 32.1 % (ref 40–53)
HGB BLD-MCNC: 10.4 G/DL (ref 13.3–17.7)
Lab: ABNORMAL
MCH RBC QN AUTO: 26.7 PG (ref 26.5–33)
MCHC RBC AUTO-ENTMCNC: 32.4 G/DL (ref 31.5–36.5)
MCV RBC AUTO: 83 FL (ref 78–100)
PLATELET # BLD AUTO: 446 10E9/L (ref 150–450)
POTASSIUM SERPL-SCNC: 4.3 MMOL/L (ref 3.4–5.3)
RBC # BLD AUTO: 3.89 10E12/L (ref 4.4–5.9)
SODIUM SERPL-SCNC: 140 MMOL/L (ref 133–144)
SPECIMEN SOURCE: ABNORMAL
SPECIMEN SOURCE: ABNORMAL
WBC # BLD AUTO: 29.3 10E9/L (ref 4–11)

## 2019-02-05 PROCEDURE — 25000125 ZZHC RX 250: Performed by: NURSE ANESTHETIST, CERTIFIED REGISTERED

## 2019-02-05 PROCEDURE — 88305 TISSUE EXAM BY PATHOLOGIST: CPT | Performed by: PODIATRIST

## 2019-02-05 PROCEDURE — 36415 COLL VENOUS BLD VENIPUNCTURE: CPT | Performed by: INTERNAL MEDICINE

## 2019-02-05 PROCEDURE — 37000008 ZZH ANESTHESIA TECHNICAL FEE, 1ST 30 MIN: Performed by: PODIATRIST

## 2019-02-05 PROCEDURE — 0JBR0ZZ EXCISION OF LEFT FOOT SUBCUTANEOUS TISSUE AND FASCIA, OPEN APPROACH: ICD-10-PCS | Performed by: PODIATRIST

## 2019-02-05 PROCEDURE — 25000128 H RX IP 250 OP 636: Performed by: INTERNAL MEDICINE

## 2019-02-05 PROCEDURE — 87186 SC STD MICRODIL/AGAR DIL: CPT | Performed by: PODIATRIST

## 2019-02-05 PROCEDURE — 99223 1ST HOSP IP/OBS HIGH 75: CPT | Mod: AI | Performed by: INTERNAL MEDICINE

## 2019-02-05 PROCEDURE — 88305 TISSUE EXAM BY PATHOLOGIST: CPT | Mod: 26 | Performed by: PODIATRIST

## 2019-02-05 PROCEDURE — 87070 CULTURE OTHR SPECIMN AEROBIC: CPT | Performed by: PODIATRIST

## 2019-02-05 PROCEDURE — 71000012 ZZH RECOVERY PHASE 1 LEVEL 1 FIRST HR: Performed by: PODIATRIST

## 2019-02-05 PROCEDURE — 12000000 ZZH R&B MED SURG/OB

## 2019-02-05 PROCEDURE — 87176 TISSUE HOMOGENIZATION CULTR: CPT | Performed by: PODIATRIST

## 2019-02-05 PROCEDURE — 83036 HEMOGLOBIN GLYCOSYLATED A1C: CPT | Performed by: INTERNAL MEDICINE

## 2019-02-05 PROCEDURE — 36000058 ZZH SURGERY LEVEL 3 EA 15 ADDTL MIN: Performed by: PODIATRIST

## 2019-02-05 PROCEDURE — 87075 CULTR BACTERIA EXCEPT BLOOD: CPT | Performed by: PODIATRIST

## 2019-02-05 PROCEDURE — 25000132 ZZH RX MED GY IP 250 OP 250 PS 637: Performed by: INTERNAL MEDICINE

## 2019-02-05 PROCEDURE — 96367 TX/PROPH/DG ADDL SEQ IV INF: CPT

## 2019-02-05 PROCEDURE — 25000132 ZZH RX MED GY IP 250 OP 250 PS 637: Performed by: PODIATRIST

## 2019-02-05 PROCEDURE — 87077 CULTURE AEROBIC IDENTIFY: CPT | Performed by: PODIATRIST

## 2019-02-05 PROCEDURE — 87205 SMEAR GRAM STAIN: CPT | Performed by: PODIATRIST

## 2019-02-05 PROCEDURE — 27210794 ZZH OR GENERAL SUPPLY STERILE: Performed by: PODIATRIST

## 2019-02-05 PROCEDURE — 25000128 H RX IP 250 OP 636: Performed by: EMERGENCY MEDICINE

## 2019-02-05 PROCEDURE — 25000128 H RX IP 250 OP 636: Performed by: PODIATRIST

## 2019-02-05 PROCEDURE — 25000128 H RX IP 250 OP 636: Performed by: NURSE ANESTHETIST, CERTIFIED REGISTERED

## 2019-02-05 PROCEDURE — 36000060 ZZH SURGERY LEVEL 3 W FLUORO 1ST 30 MIN: Performed by: PODIATRIST

## 2019-02-05 PROCEDURE — 73700 CT LOWER EXTREMITY W/O DYE: CPT | Mod: LT

## 2019-02-05 PROCEDURE — 00000146 ZZHCL STATISTIC GLUCOSE BY METER IP

## 2019-02-05 PROCEDURE — 40000169 ZZH STATISTIC PRE-PROCEDURE ASSESSMENT I: Performed by: PODIATRIST

## 2019-02-05 PROCEDURE — 85027 COMPLETE CBC AUTOMATED: CPT | Performed by: INTERNAL MEDICINE

## 2019-02-05 PROCEDURE — 96366 THER/PROPH/DIAG IV INF ADDON: CPT

## 2019-02-05 PROCEDURE — 87076 CULTURE ANAEROBE IDENT EACH: CPT | Performed by: PODIATRIST

## 2019-02-05 PROCEDURE — 80048 BASIC METABOLIC PNL TOTAL CA: CPT | Performed by: INTERNAL MEDICINE

## 2019-02-05 PROCEDURE — 37000009 ZZH ANESTHESIA TECHNICAL FEE, EACH ADDTL 15 MIN: Performed by: PODIATRIST

## 2019-02-05 RX ORDER — AMOXICILLIN 250 MG
1 CAPSULE ORAL 2 TIMES DAILY PRN
Status: DISCONTINUED | OUTPATIENT
Start: 2019-02-05 | End: 2019-02-10 | Stop reason: HOSPADM

## 2019-02-05 RX ORDER — ONDANSETRON 4 MG/1
4 TABLET, ORALLY DISINTEGRATING ORAL EVERY 30 MIN PRN
Status: DISCONTINUED | OUTPATIENT
Start: 2019-02-05 | End: 2019-02-05 | Stop reason: HOSPADM

## 2019-02-05 RX ORDER — HYDRALAZINE HYDROCHLORIDE 20 MG/ML
2.5-5 INJECTION INTRAMUSCULAR; INTRAVENOUS EVERY 10 MIN PRN
Status: DISCONTINUED | OUTPATIENT
Start: 2019-02-05 | End: 2019-02-05 | Stop reason: HOSPADM

## 2019-02-05 RX ORDER — OXYCODONE HYDROCHLORIDE 5 MG/1
5-10 TABLET ORAL
Status: DISCONTINUED | OUTPATIENT
Start: 2019-02-05 | End: 2019-02-10 | Stop reason: HOSPADM

## 2019-02-05 RX ORDER — HEPARIN SODIUM 5000 [USP'U]/.5ML
5000 INJECTION, SOLUTION INTRAVENOUS; SUBCUTANEOUS EVERY 12 HOURS
Status: DISCONTINUED | OUTPATIENT
Start: 2019-02-05 | End: 2019-02-05

## 2019-02-05 RX ORDER — ERTAPENEM 1 G/1
1 INJECTION, POWDER, LYOPHILIZED, FOR SOLUTION INTRAMUSCULAR; INTRAVENOUS EVERY 24 HOURS
Status: DISCONTINUED | OUTPATIENT
Start: 2019-02-06 | End: 2019-02-06

## 2019-02-05 RX ORDER — PREGABALIN 100 MG/1
100 CAPSULE ORAL 3 TIMES DAILY
Status: DISCONTINUED | OUTPATIENT
Start: 2019-02-05 | End: 2019-02-10 | Stop reason: HOSPADM

## 2019-02-05 RX ORDER — ONDANSETRON 4 MG/1
4 TABLET, ORALLY DISINTEGRATING ORAL EVERY 6 HOURS PRN
Status: DISCONTINUED | OUTPATIENT
Start: 2019-02-05 | End: 2019-02-10 | Stop reason: HOSPADM

## 2019-02-05 RX ORDER — AMOXICILLIN 250 MG
1 CAPSULE ORAL 2 TIMES DAILY
Status: DISCONTINUED | OUTPATIENT
Start: 2019-02-05 | End: 2019-02-10 | Stop reason: HOSPADM

## 2019-02-05 RX ORDER — ASPIRIN 81 MG/1
81 TABLET ORAL DAILY
Status: DISCONTINUED | OUTPATIENT
Start: 2019-02-05 | End: 2019-02-10 | Stop reason: HOSPADM

## 2019-02-05 RX ORDER — SODIUM CHLORIDE 9 MG/ML
INJECTION, SOLUTION INTRAVENOUS CONTINUOUS PRN
Status: DISCONTINUED | OUTPATIENT
Start: 2019-02-05 | End: 2019-02-05

## 2019-02-05 RX ORDER — AMOXICILLIN 250 MG
2 CAPSULE ORAL 2 TIMES DAILY
Status: DISCONTINUED | OUTPATIENT
Start: 2019-02-05 | End: 2019-02-05

## 2019-02-05 RX ORDER — BISACODYL 10 MG
10 SUPPOSITORY, RECTAL RECTAL DAILY PRN
Status: DISCONTINUED | OUTPATIENT
Start: 2019-02-05 | End: 2019-02-10 | Stop reason: HOSPADM

## 2019-02-05 RX ORDER — DEXTROSE MONOHYDRATE 25 G/50ML
25-50 INJECTION, SOLUTION INTRAVENOUS
Status: DISCONTINUED | OUTPATIENT
Start: 2019-02-05 | End: 2019-02-10 | Stop reason: HOSPADM

## 2019-02-05 RX ORDER — POLYETHYLENE GLYCOL 3350 17 G/17G
17 POWDER, FOR SOLUTION ORAL DAILY PRN
Status: DISCONTINUED | OUTPATIENT
Start: 2019-02-05 | End: 2019-02-10 | Stop reason: HOSPADM

## 2019-02-05 RX ORDER — NALOXONE HYDROCHLORIDE 0.4 MG/ML
.1-.4 INJECTION, SOLUTION INTRAMUSCULAR; INTRAVENOUS; SUBCUTANEOUS
Status: DISCONTINUED | OUTPATIENT
Start: 2019-02-05 | End: 2019-02-05

## 2019-02-05 RX ORDER — LIDOCAINE 40 MG/G
CREAM TOPICAL
Status: DISCONTINUED | OUTPATIENT
Start: 2019-02-05 | End: 2019-02-10 | Stop reason: HOSPADM

## 2019-02-05 RX ORDER — AMOXICILLIN 250 MG
2 CAPSULE ORAL 2 TIMES DAILY
Status: DISCONTINUED | OUTPATIENT
Start: 2019-02-05 | End: 2019-02-10 | Stop reason: HOSPADM

## 2019-02-05 RX ORDER — HYDROMORPHONE HYDROCHLORIDE 1 MG/ML
.3-.5 INJECTION, SOLUTION INTRAMUSCULAR; INTRAVENOUS; SUBCUTANEOUS
Status: DISCONTINUED | OUTPATIENT
Start: 2019-02-05 | End: 2019-02-05

## 2019-02-05 RX ORDER — ONDANSETRON 2 MG/ML
4 INJECTION INTRAMUSCULAR; INTRAVENOUS EVERY 30 MIN PRN
Status: DISCONTINUED | OUTPATIENT
Start: 2019-02-05 | End: 2019-02-05 | Stop reason: HOSPADM

## 2019-02-05 RX ORDER — ACETAMINOPHEN 325 MG/1
650 TABLET ORAL EVERY 4 HOURS PRN
Status: DISCONTINUED | OUTPATIENT
Start: 2019-02-08 | End: 2019-02-10 | Stop reason: HOSPADM

## 2019-02-05 RX ORDER — PROPOFOL 10 MG/ML
INJECTION, EMULSION INTRAVENOUS CONTINUOUS PRN
Status: DISCONTINUED | OUTPATIENT
Start: 2019-02-05 | End: 2019-02-05

## 2019-02-05 RX ORDER — ACETAMINOPHEN 325 MG/1
650 TABLET ORAL EVERY 4 HOURS PRN
Status: DISCONTINUED | OUTPATIENT
Start: 2019-02-05 | End: 2019-02-05

## 2019-02-05 RX ORDER — HYDROMORPHONE HYDROCHLORIDE 1 MG/ML
.3-.5 INJECTION, SOLUTION INTRAMUSCULAR; INTRAVENOUS; SUBCUTANEOUS EVERY 5 MIN PRN
Status: DISCONTINUED | OUTPATIENT
Start: 2019-02-05 | End: 2019-02-05 | Stop reason: HOSPADM

## 2019-02-05 RX ORDER — ONDANSETRON 2 MG/ML
4 INJECTION INTRAMUSCULAR; INTRAVENOUS EVERY 6 HOURS PRN
Status: DISCONTINUED | OUTPATIENT
Start: 2019-02-05 | End: 2019-02-10 | Stop reason: HOSPADM

## 2019-02-05 RX ORDER — NICOTINE POLACRILEX 4 MG
15-30 LOZENGE BUCCAL
Status: DISCONTINUED | OUTPATIENT
Start: 2019-02-05 | End: 2019-02-10 | Stop reason: HOSPADM

## 2019-02-05 RX ORDER — FENTANYL CITRATE 50 UG/ML
25-50 INJECTION, SOLUTION INTRAMUSCULAR; INTRAVENOUS
Status: DISCONTINUED | OUTPATIENT
Start: 2019-02-05 | End: 2019-02-05 | Stop reason: HOSPADM

## 2019-02-05 RX ORDER — ACETAMINOPHEN 325 MG/1
975 TABLET ORAL EVERY 8 HOURS
Status: COMPLETED | OUTPATIENT
Start: 2019-02-05 | End: 2019-02-08

## 2019-02-05 RX ORDER — SODIUM CHLORIDE, SODIUM LACTATE, POTASSIUM CHLORIDE, CALCIUM CHLORIDE 600; 310; 30; 20 MG/100ML; MG/100ML; MG/100ML; MG/100ML
INJECTION, SOLUTION INTRAVENOUS CONTINUOUS
Status: DISCONTINUED | OUTPATIENT
Start: 2019-02-05 | End: 2019-02-05

## 2019-02-05 RX ORDER — FENTANYL CITRATE 50 UG/ML
INJECTION, SOLUTION INTRAMUSCULAR; INTRAVENOUS PRN
Status: DISCONTINUED | OUTPATIENT
Start: 2019-02-05 | End: 2019-02-05

## 2019-02-05 RX ORDER — CEFAZOLIN SODIUM 1 G/3ML
1 INJECTION, POWDER, FOR SOLUTION INTRAMUSCULAR; INTRAVENOUS SEE ADMIN INSTRUCTIONS
Status: DISCONTINUED | OUTPATIENT
Start: 2019-02-05 | End: 2019-02-05 | Stop reason: HOSPADM

## 2019-02-05 RX ORDER — CEFAZOLIN SODIUM 2 G/100ML
2 INJECTION, SOLUTION INTRAVENOUS
Status: DISCONTINUED | OUTPATIENT
Start: 2019-02-05 | End: 2019-02-05 | Stop reason: HOSPADM

## 2019-02-05 RX ORDER — NICOTINE POLACRILEX 4 MG
15-30 LOZENGE BUCCAL
Status: DISCONTINUED | OUTPATIENT
Start: 2019-02-05 | End: 2019-02-05

## 2019-02-05 RX ORDER — HYDROMORPHONE HYDROCHLORIDE 1 MG/ML
0.2 INJECTION, SOLUTION INTRAMUSCULAR; INTRAVENOUS; SUBCUTANEOUS
Status: DISCONTINUED | OUTPATIENT
Start: 2019-02-05 | End: 2019-02-10 | Stop reason: HOSPADM

## 2019-02-05 RX ORDER — DEXTROSE MONOHYDRATE 25 G/50ML
25-50 INJECTION, SOLUTION INTRAVENOUS
Status: DISCONTINUED | OUTPATIENT
Start: 2019-02-05 | End: 2019-02-05

## 2019-02-05 RX ORDER — AMOXICILLIN 250 MG
1 CAPSULE ORAL 2 TIMES DAILY
Status: DISCONTINUED | OUTPATIENT
Start: 2019-02-05 | End: 2019-02-05

## 2019-02-05 RX ORDER — ALBUTEROL SULFATE 0.83 MG/ML
2.5 SOLUTION RESPIRATORY (INHALATION) EVERY 4 HOURS PRN
Status: DISCONTINUED | OUTPATIENT
Start: 2019-02-05 | End: 2019-02-05 | Stop reason: HOSPADM

## 2019-02-05 RX ORDER — NALOXONE HYDROCHLORIDE 0.4 MG/ML
.1-.4 INJECTION, SOLUTION INTRAMUSCULAR; INTRAVENOUS; SUBCUTANEOUS
Status: DISCONTINUED | OUTPATIENT
Start: 2019-02-05 | End: 2019-02-10 | Stop reason: HOSPADM

## 2019-02-05 RX ORDER — HYDROCODONE BITARTRATE AND ACETAMINOPHEN 5; 325 MG/1; MG/1
1-2 TABLET ORAL EVERY 4 HOURS PRN
Status: DISCONTINUED | OUTPATIENT
Start: 2019-02-05 | End: 2019-02-10 | Stop reason: HOSPADM

## 2019-02-05 RX ORDER — AMOXICILLIN 250 MG
2 CAPSULE ORAL 2 TIMES DAILY PRN
Status: DISCONTINUED | OUTPATIENT
Start: 2019-02-05 | End: 2019-02-10 | Stop reason: HOSPADM

## 2019-02-05 RX ORDER — ONDANSETRON 2 MG/ML
4 INJECTION INTRAMUSCULAR; INTRAVENOUS EVERY 6 HOURS PRN
Status: DISCONTINUED | OUTPATIENT
Start: 2019-02-05 | End: 2019-02-05

## 2019-02-05 RX ORDER — ROPIVACAINE HYDROCHLORIDE 5 MG/ML
INJECTION, SOLUTION EPIDURAL; INFILTRATION; PERINEURAL PRN
Status: DISCONTINUED | OUTPATIENT
Start: 2019-02-05 | End: 2019-02-05 | Stop reason: HOSPADM

## 2019-02-05 RX ORDER — ONDANSETRON 4 MG/1
4 TABLET, ORALLY DISINTEGRATING ORAL EVERY 6 HOURS PRN
Status: DISCONTINUED | OUTPATIENT
Start: 2019-02-05 | End: 2019-02-05

## 2019-02-05 RX ORDER — SODIUM CHLORIDE, SODIUM LACTATE, POTASSIUM CHLORIDE, CALCIUM CHLORIDE 600; 310; 30; 20 MG/100ML; MG/100ML; MG/100ML; MG/100ML
INJECTION, SOLUTION INTRAVENOUS CONTINUOUS
Status: DISCONTINUED | OUTPATIENT
Start: 2019-02-05 | End: 2019-02-05 | Stop reason: HOSPADM

## 2019-02-05 RX ADMIN — SODIUM CHLORIDE, POTASSIUM CHLORIDE, SODIUM LACTATE AND CALCIUM CHLORIDE: 600; 310; 30; 20 INJECTION, SOLUTION INTRAVENOUS at 08:25

## 2019-02-05 RX ADMIN — ONDANSETRON 4 MG: 2 INJECTION INTRAMUSCULAR; INTRAVENOUS at 18:33

## 2019-02-05 RX ADMIN — MIDAZOLAM 1 MG: 1 INJECTION INTRAMUSCULAR; INTRAVENOUS at 18:15

## 2019-02-05 RX ADMIN — VANCOMYCIN HYDROCHLORIDE 1500 MG: 5 INJECTION, POWDER, LYOPHILIZED, FOR SOLUTION INTRAVENOUS at 12:19

## 2019-02-05 RX ADMIN — PREGABALIN 100 MG: 100 CAPSULE ORAL at 16:45

## 2019-02-05 RX ADMIN — FENTANYL CITRATE 50 MCG: 50 INJECTION, SOLUTION INTRAMUSCULAR; INTRAVENOUS at 18:21

## 2019-02-05 RX ADMIN — PROPOFOL 125 MCG/KG/MIN: 10 INJECTION, EMULSION INTRAVENOUS at 18:21

## 2019-02-05 RX ADMIN — DEXTROSE AND SODIUM CHLORIDE: 5; 900 INJECTION, SOLUTION INTRAVENOUS at 15:03

## 2019-02-05 RX ADMIN — ACETAMINOPHEN 975 MG: 325 TABLET, FILM COATED ORAL at 21:53

## 2019-02-05 RX ADMIN — INSULIN DEGLUDEC INJECTION 28 UNITS: 100 INJECTION, SOLUTION SUBCUTANEOUS at 08:24

## 2019-02-05 RX ADMIN — PREGABALIN 100 MG: 100 CAPSULE ORAL at 10:20

## 2019-02-05 RX ADMIN — VANCOMYCIN HYDROCHLORIDE 1500 MG: 5 INJECTION, POWDER, LYOPHILIZED, FOR SOLUTION INTRAVENOUS at 00:31

## 2019-02-05 RX ADMIN — SODIUM CHLORIDE: 9 INJECTION, SOLUTION INTRAVENOUS at 18:17

## 2019-02-05 RX ADMIN — SODIUM CHLORIDE, POTASSIUM CHLORIDE, SODIUM LACTATE AND CALCIUM CHLORIDE: 600; 310; 30; 20 INJECTION, SOLUTION INTRAVENOUS at 12:19

## 2019-02-05 RX ADMIN — SODIUM CHLORIDE, POTASSIUM CHLORIDE, SODIUM LACTATE AND CALCIUM CHLORIDE: 600; 310; 30; 20 INJECTION, SOLUTION INTRAVENOUS at 03:19

## 2019-02-05 RX ADMIN — MIDAZOLAM 1 MG: 1 INJECTION INTRAMUSCULAR; INTRAVENOUS at 18:21

## 2019-02-05 RX ADMIN — FENTANYL CITRATE 25 MCG: 50 INJECTION, SOLUTION INTRAMUSCULAR; INTRAVENOUS at 18:51

## 2019-02-05 RX ADMIN — FENTANYL CITRATE 25 MCG: 50 INJECTION, SOLUTION INTRAMUSCULAR; INTRAVENOUS at 18:35

## 2019-02-05 RX ADMIN — PREGABALIN 100 MG: 100 CAPSULE ORAL at 21:53

## 2019-02-05 RX ADMIN — DEXTROSE AND SODIUM CHLORIDE: 5; 900 INJECTION, SOLUTION INTRAVENOUS at 21:55

## 2019-02-05 ASSESSMENT — ACTIVITIES OF DAILY LIVING (ADL)
RETIRED_COMMUNICATION: 0-->UNDERSTANDS/COMMUNICATES WITHOUT DIFFICULTY
FALL_HISTORY_WITHIN_LAST_SIX_MONTHS: NO
SWALLOWING: 0-->SWALLOWS FOODS/LIQUIDS WITHOUT DIFFICULTY
ADLS_ACUITY_SCORE: 12
AMBULATION: 0-->INDEPENDENT
ADLS_ACUITY_SCORE: 12
ADLS_ACUITY_SCORE: 10
TRANSFERRING: 0-->INDEPENDENT
COGNITION: 0 - NO COGNITION ISSUES REPORTED
DRESS: 0-->INDEPENDENT
RETIRED_EATING: 0-->INDEPENDENT
TOILETING: 0-->INDEPENDENT
BATHING: 0-->INDEPENDENT
ADLS_ACUITY_SCORE: 12
ADLS_ACUITY_SCORE: 12

## 2019-02-05 ASSESSMENT — LIFESTYLE VARIABLES: TOBACCO_USE: 0

## 2019-02-05 ASSESSMENT — ENCOUNTER SYMPTOMS
SEIZURES: 0
DYSURIA: 0
DYSRHYTHMIAS: 0

## 2019-02-05 NOTE — ANESTHESIA PREPROCEDURE EVALUATION
Anesthesia Pre-Procedure Evaluation    Patient: Eduardo Walton   MRN: [id number] : 1970          Preoperative Diagnosis: LEFT FOOT INFECTION.    Procedure(s):  PARTIAL LEFT FOOT AMPUTATION.    No past medical history on file.  Past Surgical History:   Procedure Laterality Date     2010     Allergies   Allergen Reactions     Fish-Derived Products Rash     Prior to Admission medications    Medication Sig Start Date End Date Taking? Authorizing Provider   LYRICA 100 MG capsule TAKE 1 CAPSULE BY MOUTH THREE TIMES A DAY 18  Yes Reported, Patient   TRESIBA FLEXTOUCH 100 UNIT/ML pen 28 Units daily 19  Yes Efren Conway DO   Continuous Blood Gluc Sensor (XsilonYLE COLIN 14 DAY SENSOR) MISC USE WITH 14 DAY COLIN SYSTEM. CHANGE SENSOR EVERY 14 DAYS. 19   Reported, Patient   insulin lispro (HUMALOG KWIKPEN) 100 UNIT/ML pen Inject 1 unit/2 grams carb, total daily dose up to 80 units  Indications: Diabetes Mellitus 18   Reported, Patient     RECENT LABS:   ECG:   ECHO:   CXR:        Anesthesia Evaluation     .             ROS/MED HX    ENT/Pulmonary:      (-) tobacco use, asthma and sleep apnea   Neurologic:     (+)neuropathy    (-) seizures, CVA and migraines   Cardiovascular:     (+) Dyslipidemia, hypertension--CAD, --stent,Drug Eluting Stent .. : . . . :. .      (-) RICH, arrhythmias and valvular problems/murmurs   METS/Exercise Tolerance:  >4 METS   Hematologic:        (-) history of blood clots, anemia and other hematologic disorder   Musculoskeletal:        (-) arthritis   GI/Hepatic:     (+) GERD      (-) liver disease   Renal/Genitourinary:     (+) chronic renal disease, type: CRI,    (-) nephrolithiasis   Endo:     (+) type I DM, Diabetic complications: nephropathy neuropathy retinopathy cardiac problems, .      Psychiatric:        (-) psychiatric history   Infectious Disease:         Malignancy:         Other:                          Physical Exam  Normal systems: cardiovascular,  "pulmonary and dental    Airway   Mallampati: II  Neck ROM: full    Dental     Cardiovascular   Rhythm and rate: regular and normal  (-) no murmur    Pulmonary    breath sounds clear to auscultation            Lab Results   Component Value Date    WBC 29.3 (H) 02/05/2019    HGB 10.4 (L) 02/05/2019    HCT 32.1 (L) 02/05/2019     02/05/2019    .0 (H) 02/04/2019    SED 78 (H) 02/04/2019     02/05/2019    POTASSIUM 4.3 02/05/2019    CHLORIDE 104 02/05/2019    CO2 30 02/05/2019    BUN 33 (H) 02/05/2019    CR 1.31 (H) 02/05/2019     (H) 02/05/2019    GABRIELA 9.0 02/05/2019    ALBUMIN 2.3 (L) 02/04/2019    PROTTOTAL 7.4 02/04/2019    ALT 18 02/04/2019    AST 21 02/04/2019    ALKPHOS 130 02/04/2019    BILITOTAL 0.7 02/04/2019    LIPASE 21 05/30/2010    INR 0.92 06/03/2010    TSH 0.24 (L) 01/18/2019    T4 1.38 01/18/2019       Preop Vitals  BP Readings from Last 3 Encounters:   02/05/19 129/62   02/01/19 126/78   01/18/19 106/58    Pulse Readings from Last 3 Encounters:   02/04/19 92   01/18/19 92      Resp Readings from Last 3 Encounters:   02/05/19 18    SpO2 Readings from Last 3 Encounters:   02/05/19 96%   01/18/19 97%      Temp Readings from Last 1 Encounters:   02/05/19 37.1  C (98.7  F) (Oral)    Ht Readings from Last 1 Encounters:   02/04/19 1.753 m (5' 9\")      Wt Readings from Last 1 Encounters:   02/04/19 70.3 kg (155 lb)    Estimated body mass index is 22.89 kg/m  as calculated from the following:    Height as of this encounter: 1.753 m (5' 9\").    Weight as of this encounter: 70.3 kg (155 lb).       Anesthesia Plan      History & Physical Review      ASA Status:  3 .    NPO Status:  > 8 hours    Plan for MAC Reason for MAC:  Deep or markedly invasive procedure (G8)  PONV prophylaxis:  Ondansetron (or other 5HT-3)  Propofol infusion      Postoperative Care  Postoperative pain management:  Multi-modal analgesia.      Consents  Anesthetic plan, risks, benefits and alternatives discussed with:  " Patient..                 Vivi Jimenes MD

## 2019-02-05 NOTE — PHARMACY-ADMISSION MEDICATION HISTORY
Admission medication history interview status for the 2/4/2019  admission is complete. See EPIC admission navigator for prior to admission medications     Medication history source reliability:Good    Actions taken by pharmacist (provider contacted, etc):None     Additional medication history information not noted on PTA med list :None    Medication reconciliation/reorder completed by provider prior to medication history? No    Time spent in this activity:  15 minutes    Prior to Admission medications    Medication Sig Last Dose Taking? Auth Provider   LYRICA 100 MG capsule TAKE 1 CAPSULE BY MOUTH THREE TIMES A DAY 2/4/2019 at pm Yes Reported, Patient   TRESIBA FLEXTOUCH 100 UNIT/ML pen 28 Units daily 2/4/2019 at am Yes Efren Conway, DO   Continuous Blood Gluc Sensor (TroopSwapYLE COLIN 14 DAY SENSOR) [facility] USE WITH 14 DAY COLIN SYSTEM. CHANGE SENSOR EVERY 14 DAYS. Unknown at Unknown time  Reported, Patient   insulin lispro (HUMALOG KWIKPEN) 100 UNIT/ML pen Inject 1 unit/2 grams carb, total daily dose up to 80 units  Indications: Diabetes Mellitus 2/2/2019 at Unknown time  Reported, Patient     Nadia Christianson   Pharm D. Student

## 2019-02-05 NOTE — ED PROVIDER NOTES
"  History     Chief Complaint:  Fever     The history is provided by the patient and the spouse.      Eduardo Walton is a 48 year old male with a history of type I diabetes mellitus with complications including CAD, peripheral neuropathy, retinopathy, and nephropathy who presents with wife for fever. Most notably, patient has chronic left foot ulcers. He was treated for 6 weeks for osteomyelitis of the left great toe with ertapenem and vancomycin completing in mid December. He was seen by podiatry 3 days ago and had debridement of this toe. It was recommended he be admitted given wound with concurrent PAD, but patient wanted an outpatient workup. He also has a chronic wound of the left heel. His wife changes dressings daily. However, today patient developed fever (TMax 101F at home) and chills with concurrent anorexia, nausea without vomiting, and a single episode of diarrhea. Wife reports that the heel wound has new erythema and edema that was not present at last dressing change.     His blood sugar was 212 when last checked earlier today which he attributes to recent steroids as he was seen at Urgent Care 1/28/19 and given 5 days of prednisone for \"inflammation\" (per his report) after he presented for cough. Patient denies current sore throat and URI symptoms. Denies dysuria.    Allergies:  NKDA      Medications:    insulin aspart (NOVOLOG PEN) 100 UNIT/ML pen  insulin lispro (HUMALOG KWIKPEN) 100 UNIT/ML pen  LYRICA 100 MG capsule  TRESIBA FLEXTOUCH 100 UNIT/ML pen    Past Medical History:    Type I diabetes mellitus   Diabetic ulcer of toe of left foot associated with type I diabetes mellitus   CAD   Retinopathy   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   Nephritis and nephropathy 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   Peripheral artery disease     Past Surgical History:    Cardiac stent     Family History:    History reviewed. No pertinent family history.     Social History:  Tobacco use:    Never smoker " "  Alcohol use:    Positive, occasionally   Marital status:       Accompanied to ED by:  Wife      Review of Systems   Constitutional: Positive for appetite change (reduced), chills and fever.   HENT: Negative for sore throat.    Respiratory: Positive for cough.    Gastrointestinal: Positive for diarrhea and nausea. Negative for vomiting.   Genitourinary: Negative for dysuria.   Skin: Positive for color change (redness, left foot) and wound (left foot ulcers).   All other systems reviewed and are negative.    Physical Exam     Patient Vitals for the past 24 hrs:   BP Temp Temp src Pulse Heart Rate Resp SpO2 Height Weight   02/05/19 0037 130/61 -- -- -- 85 -- 95 % -- --   02/05/19 0017 -- 100  F (37.8  C) Oral -- -- -- -- -- --   02/04/19 2324 -- 101.5  F (38.6  C) Oral -- -- -- -- -- --   02/04/19 2255 -- -- -- -- -- -- 95 % -- --   02/04/19 2202 -- -- -- -- -- -- (!) 84 % -- --   02/04/19 2114 98/43 100.6  F (38.1  C) Oral 92 -- 16 98 % 1.753 m (5' 9\") 70.3 kg (155 lb)        Physical Exam  General: Well-developed and well-nourished. Well appearing middle aged  man. Cooperative.  Head:  Atraumatic.  Eyes:  Conjunctivae, lids, and sclerae are normal.  ENT:    Normal nose. Moist mucous membranes.  Neck:  Supple. Normal range of motion.  CV:  Regular rate and rhythm. Normal heart sounds with no murmurs, rubs, or gallops detected.  Resp:  No respiratory distress. Clear to auscultation bilaterally without decreased breath sounds, wheezing, rales, or rhonchi.  GI:  Soft. Non-distended. Non-tender.    MS:  Normal ROM. Mild edema of the left distal lower leg and foot laterally.  Skin:  Warm. Non-diaphoretic. No pallor. Chronic appearing wound on the distal aspect of the left great toe without surrounding erythema or exudative discharge. Wound of the left heel with erythema, warmth, and edema extending from wound proximally most notable on the lateral aspect without troy discharge. No crepitus. Photo " below.  Neuro:  Awake. A&Ox3. Normal strength. Poor sensation to light touch to bilateral feet.  Psych: Normal mood and affect. Normal speech.  Vitals reviewed.          Emergency Department Course     Imaging:  Radiographic findings were communicated with the patient and family who voiced understanding of the findings.    Foot XR, Left:  IMPRESSION: No acute fracture or dislocation. There is destruction of the distal phalanx of the great toe consistent with osteomyelitis. There is gas in the soft tissues at the base of the heel consistent with ulcer. No bone destruction on the calcaneus to suggest Osteomyelitis.  Preliminary report per radiology.     Laboratory:  CBC: WBC 24.7 high, HGB 10.1 low, o/w WNL ()  CMP: Glucose 165 high, BUN 35 high, Creatinine 1.42 high, GFR Estimate 59 low, Albumin 2.3 low, o/w WNL    CRP inflammation: 253.0 high   Erythrocyte sedimentation rate auto: 78 high   Lactic acid: 1.6   Blood culture: Pending x2   Influenza A/B antigen: Refused     Interventions:  2136 Zofran 4 mg IV   2136 NS 1,000 mL IV   2331 NS 1,000 mL IV   2331 Tylenol 1,000 mg PO   2358 Invanz 1 g IV   0031 Vancomycin 1,500 mg IV     Emergency Department Course:  Nursing notes and vitals reviewed.  2252: I performed an exam of the patient as documented above.     The patient refused influenza swab.     0022: I spoke with Dr. Luque of the hospitalist service regarding patient's presentation, findings, and plan of care.     0056: I updated and reassessed the patient.     Findings and plan explained to the patient and spouse who consent to admission. Discussed the patient with Dr. Luque, who will admit the patient to a inpatient bed for further monitoring, evaluation, and treatment.     Impression & Plan      Medical Decision Making:  Eduardo is a 48-year-old insulin-dependent type 1 diabetic with recent history of osteomyelitis of the left great toe who presents with wife for fever.  Patient but has been off of  "antibiotics for approximately two months but did start to feel unwell recently.  He visited urgent care and was given prednisone for \"inflammation\" 1 week ago.  He was seen by podiatry 3 days ago and had great toe debridement and refused admission for further workup and treatment. In addition to fever development today, he notes anorexia, nausea, and chills.  His wife changes dressings to his chronic left great toe wound as well as left heel ulcer and reports that there is new redness and swelling of the heel wound. The remainder of the patient's exam is reassuring aside from this evidence of cellulitis extending from the left heel ulcer and a fever to 101.5  F.  Blood cultures were obtained given evidence of sepsis with fever and leukocytosis to 24.7.  Fortunately, there is no evidence of hypoperfusion or severe sepsis with a normal lactic acid.  He has baseline normocytic anemia to 10.1 which I suspect is anemia of chronic disease.  His creatinine is near his baseline at 1.42 with last known value 1.63.  As expected, CRP is significantly elevated at 253 and ESR is elevated at 78.  Foot x-ray reveals changes consistent with osteomyelitis of the distal phalanx of the great toe which is a known, chronic issue as well as gas in soft tissues of the heel consistent with ulcer without bony destruction which is an acute on chronic issue.  I do not believe this gas to represent necrotizing fasciitis as there is no severe, progressive nature to the cellulitis and there is no crepitus.  Patient was empirically treated with vancomycin and ertapenem as this was how his distal toe osteomyelitis was treated at prior hospitalization.  Patient was given IV fluids and Tylenol with appropriate defervescence.  Nausea was controlled with Zofran and patient did not require any analgesics given that he has significant diabetic peripheral neuropathy.  I did order influenza testing to rule out concurrent infection given fever, diarrhea, " and cough but patient refused testing. My suspicion is overall low and I will not start Tamiflu. Patient has not had significant URI symptoms in the ER. Clearly, patient requires admission for further IV antibiotics and treatment of this diabetic foot infection resulting in sepsis. I suspect he will require operative intervention. I have discussed the results and this plan with the patient and his wife and answered all their questions.  They verbalized understanding and are amenable.  I discussed patient's case with Dr. Luque, hospitalist, who accepts admission and has no further orders.    Diagnosis:    ICD-10-CM   1. Type 1 diabetes mellitus with diabetic foot infection (H) E10.628    L08.9   2. Sepsis, due to unspecified organism (H) A41.9   3. Diabetic ulcer of left heel associated with type 1 diabetes mellitus, unspecified ulcer stage (H) E10.621    L97.429     Disposition:  Admitted to Dr. Luque.       I, Eladio Alberto, am serving as a scribe at 10:52 PM on 2/4/2019 to document services personally performed by Dr. Jovita Nolan, based on my observations and the provider's statements to me.       EMERGENCY DEPARTMENT       Jovita Nolan MD  02/05/19 3599

## 2019-02-05 NOTE — H&P
Admitted:     02/04/2019      PRIMARY CARE PHYSICIAN:  Efren Conway DO      PRIMARY PODIATRIST:  Dr. Kay Pleitez.      HISTORY OF PRESENT ILLNESS:  Eduardo Walton is a very pleasant 48-year-old  male with type 1 diabetes, peripheral arterial disease, diabetic nephropathy and chronic diabetic ulcer of his left foot, who presented to [facility] with fever.  The patient was seen by Dr. Pleitez in Podiatry at [facility] just a few days ago.  At that time, they were not able to palpate pulses.  The patient has had this chronic wound of his heel and right great toe.  The patient was recommended to go to the hospital for vascular workup as well as MRI, looking for a bone infection evaluation.  The patient at that time preferred to have outpatient workup and was to follow up later on this week.  However, on the day of admission, the patient started having fevers, chills and nausea and 1 episode of diarrhea, temperature as high as 101.  He had poor appetite and, according to his wife who accompanies him, the left foot now is swollen and red.  Lastly, the patient did have some recent congestion, rhinorrhea and went to urgent care where they had given him some prednisone.  Of note, the patient was treated with 6 weeks of ertapenem and vancomycin for osteomyelitis of the left foot and great toe that was completed around mid December.  Due to his fever the patient came to [facility] for further assessment.      In the Emergency Department, the patient was seen by Dr. Jovita Nolan.  The patient had a fever of 101.5.  Vital signs were stable otherwise.  Blood work revealed normal electrolytes, BUN was elevated at 35, creatinine was 1.42, which is about his typical baseline.  His GFR is 59.  Calcium is 9, albumin is 2.3.  LFTs are otherwise unremarkable.  CRP is severely elevated at 253.  Glucose is 165.  White count was elevated at 24,700 with a left shift with absolute neutrophil count  21,200, hemoglobin is 10.1, platelets of 396.  Sed rate is elevated at 78.  Blood cultures were obtained.  Lactic acid was 1.6.  The patient was given 1 gram of Tylenol and 2 liters of saline boluses.  Based on his most recent antibiotic treatment course, he was continued on the same regimen which includes vancomycin and ertapenem.  The patient will be admitted inpatient for further infectious and vascular workup.      PAST MEDICAL HISTORY:   1.  Type 1 diabetes with diabetic complications including peripheral neuropathy, ophthalmic complications, diabetes related foot ulcers.   2.  ASCVD with history of stent placement.   3.  Retinopathy.   4.  History of chronic kidney disease, stage 3, with macroalbuminuria.   5.  Peripheral neuropathy.   6.  Chronic left toe and ankle wounds.   7.  Suspected peripheral arterial disease.      PAST SURGICAL HISTORY:  Coronary stent placement.      SOCIAL HISTORY:  No tobacco, occasional alcohol, is , presents with his wife.      ALLERGIES:  NO KNOWN DRUG ALLERGIES.      CURRENT MEDICATIONS:     1.  NovoLog per sliding scale.   2.  Lyrica 100 mg 3 times a day.   3.  Tresiba 28 units once a day.      FAMILY HISTORY:  Reviewed and noncontributory.      REVIEW OF SYSTEMS:  Ten-point systems reviewed.  Positive for fevers, chills, poor appetite, recent congestion and rhinorrhea and cough, 1 episode of diarrhea and nausea, increased left foot redness.  Otherwise, 10-point review of systems was reviewed and are otherwise unremarkable.      PHYSICAL EXAMINATION:   VITAL SIGNS:  Temperature 101.5, heart rate 92, respiratory 18, blood pressure 130/65, sats 98% on room air.   GENERAL:  The patient is a 48-year-old chronically ill-appearing  gentleman.   HEENT:  Unremarkable.  Head is atraumatic.  Pupils are equal.  Sclerae are anicteric.  Oropharynx without lesions.  Mucous membranes are moist.   NECK:  No cervical lymphadenopathy, no JVP elevation.   PULMONARY:  Lungs are  clear to auscultation.   CARDIOVASCULAR:  S1, S2, regular rate and rhythm, no murmurs.   ABDOMEN:  Soft, nontender, normoactive bowel sounds.   MUSCULOSKELETAL:  The patient has a chronic wound of his left great toe as well as left foot heel.  There appears to be an eschar.  The left foot is erythematous, warm and swollen.  He has got decreased sensation of both feet.     NEUROLOGIC:  Otherwise, cranial nerves appear to be grossly intact.   SKIN:  Warm, dry, well perfused.      LABORATORY DATA:  As dictated in the history of present illness.       ASSESSMENT:  Eduardo Walton is a 48-year-old who is now admitted with diabetic foot infection and possible sepsis in the setting of nonhealing diabetic foot wounds as well as suspected peripheral arterial disease being admitted for further treatment.      PLAN:   1.  Diabetic left foot infection.  The patient has blood cultures obtained.  We will obtain consultations from Podiatry and Vascular Surgery.  Meanwhile, start the patient on vancomycin and ertapenem.  will proceed with imaging studies in the form of CT looking for osteomyelitis.  The patient will also need vascular evaluations as well.  The patient will likely need an angiogram.  We will hold off on ABIs as we suspect he is at high risk for peripheral arterial disease.   2.  Atherosclerotic cardiovascular disease.  The patient has a cardiac stent.  He decided to stop taking all of his cardiac medications unfortunately.  We will continue the patient on an aspirin.  The patient at one time was on beta blockers but this diminished his hypoglycemic response and because of that the patient unfortunately has stopped all his cardiac medications.  Will hold off on beta blockers but put him on aspirin.   3.  Diabetic neuropathy.  The patient will be continued on Lyrica.   4.  Type 1 diabetes.  The patient will be continued on Tresiba 28 units.  Will also do Accu-Cheks 4 times a day and cover him with moderate sliding scale.   The patient is in the process of transferring to [facility] due to poorly controlled diabetes.  I believe his last A1c was around 9, would repeat and check an A1c again.   5.  Deep venous thrombosis prophylaxis.  The patient will be on subcutaneous heparin.      CODE STATUS:  Full.         BLAZE ROBBINS MD             D: 2019   T: 2019   MT: FADI      Name:     LILLY SAL   MRN:      [id number]        Account:      [id number]   :      1970        Admitted:     2019                   Document: [id number]       cc: [facility]        Kay Pleitez DPM

## 2019-02-05 NOTE — PROGRESS NOTES
Events of this hospitalization noted.  The patient is currently in the operating room undergoing urgent partial left foot amputation for gas gangrene of the heel.  Reportedly he had nonpalpable pedal pulses.  He is a type I diabetic greater than 30 years.  He has history of a coronary stent placement in 2010.    I will review the operative findings and attempt to see the patient tomorrow morning.    Jhonny Mclean MD  Pager [phone]

## 2019-02-05 NOTE — ED NOTES
"[facility]  ED Nurse Handoff Report    ED Chief complaint: Fever (pt. reports fever for an unknown length of time\" currently treating a diabetic foot ulcer on the left heel.)      ED Diagnosis:   Final diagnoses:   Type 1 diabetes mellitus with diabetic foot infection (H)   Sepsis, due to unspecified organism (H)   Diabetic ulcer of left heel associated with type 1 diabetes mellitus, unspecified ulcer stage (H)       Code Status: Full Code    Allergies:   Allergies   Allergen Reactions     Fish-Derived Products Rash       Activity level - Baseline/Home:  Independent    Activity Level - Current:   Unable to Assess     Needed?: No    Isolation: No  Infection: Not Applicable  Bariatric?: No    Vital Signs:   Vitals:    02/04/19 2202 02/04/19 2255 02/04/19 2324 02/05/19 0017   BP:       Pulse:       Resp:       Temp:   101.5  F (38.6  C) 100  F (37.8  C)   TempSrc:   Oral Oral   SpO2: (!) 84% 95%     Weight:       Height:           Cardiac Rhythm: ,        Pain level:      Is this patient confused?: No   Does this patient have a guardian?  No         If yes, is there guardianship documents in the Epic \"Code/ACP\" activity?  N/A         Guardian Notified?  N/A  [place] - Suicide Severity Rating Scale Completed?  Yes  If yes, what color did the patient score?  White    Patient Report: Initial Complaint: Patient presented with fever/chills and a worsening pain to his diabetic foot ulcer on the left foot.  Focused Assessment: The patient has some new redness to the diabetic foot ulcer on the left heel.  Patient reports fever/chills and nausea \"for a few days.\"  Patient pale in pallor, reports this as normal.  Patient [place]. Patient worried about his BG \"dropping over night\", provided a courtesy meal while in the ED.    Tests Performed: blood labs, blood cultures, xray  Abnormal Results:   Treatments provided: IV fluids, IV ABX, tylenol and anti nausea meds via IV    Family Comments: wife at " bedside    OBS brochure/video discussed/provided to patient/family: No              Name of person given brochure if not patient:               Relationship to patient:     ED Medications:   Medications   ertapenem (INVanz) 1 g vial to attach to  mL bag (1 g Intravenous New Bag 2/4/19 2358)   vancomycin (VANCOCIN) 1,500 mg in sodium chloride 0.9 % 250 mL intermittent infusion (not administered)   ondansetron (ZOFRAN) injection 4 mg (4 mg Intravenous Given 2/4/19 2136)   0.9% sodium chloride BOLUS (0 mLs Intravenous Stopped 2/5/19 0018)   0.9% sodium chloride BOLUS (1,000 mLs Intravenous New Bag 2/4/19 2331)   acetaminophen (TYLENOL) tablet 1,000 mg (1,000 mg Oral Given 2/4/19 2331)       Drips infusing?:  Yes    For the majority of the shift this patient was [place].   Interventions performed were .    Severe Sepsis OR Septic Shock Diagnosis Present: No    To be done/followed up on inpatient unit:      ED NURSE PHONE NUMBER: *[phone]

## 2019-02-05 NOTE — PROGRESS NOTES
"[facility]  Medicine Progress Note - Hospitalist Service       Date of Admission:  2/4/2019  Assessment & Plan   Mr. Walton is a 49 y/o male with poorly controlled DM and hx of reccurent left foot infections who presented with quickly worsening left foot swelling with fevers.  He had recently received some prednisone to outpatient to help with \"Inflammation.\"   Imaging suggests osteomyelitis and gas gangrene.    Left diabetic foot infection  Suspect osteomyelitis:  -  Ertapenem + vancomycin IV  -  Podiatry plans surgery this evening, appreciate their assistance.  -  Patient doesn't want surgery but now understands serious nature of his infection and is willing to undergo debridement today.  I did explain inadequate treatment puts him at risk for life threatening sepsis and he expressed understanding.  He does not want an amputation as he in the past \"got better with just IV antibiotics.\"  I will ask ID to see him tomorrow.    Poorly controlled DM (A1C > 10) with reported neuropathy and retinopathy:  -  Patient was in the process of transferring care to endocrinology at the [place].  For today he is NPO so I placed him on NPO sliding scale insulin and continued his Tresiba.  His glu was trending down so I started some dextrose this afternoon.  Post-op I recommend stopping dextrose quickly and moving back to more aggressive insulin dosing once eating tomorrow.  He likely needs increased tresiba, meal carb count novolog + sliding scale insulin.      CAD, prior stent:  -  Self stopped all meds.  He felt the metoprolol took away his hypoglycemic awareness and that the other meds didn't make him feel well.  He did not want an ASA though after we talked he decided an 81 mg daily ASA would be acceptable post-op.          Diet: NPO per Anesthesia Guidelines for Procedure/Surgery Except for: Meds    DVT Prophylaxis: Patient refused heparin subcut that was advised  Martinez Catheter: not present  Code Status: " Full Code      Disposition Plan   Expected discharge: 2 - 3 days, recommended to prior living arrangement once infection treated/improving.  Unclear if he will need a second surgery at this point..  Entered: Al Garg DO 02/05/2019, 4:51 PM       The patient's care was discussed with the Patient and his wife.    Al Garg DO  Hospitalist Service  [facility]    ______________________________________________________________________    Interval History   Assumed care, history reviewed.  Mr. Walton denied any major pain.  No chest pain, sob, nausea.  He was frustrated he couldn't eat yet.  He acknowledges his DM is not adequately controlled and wants to do more to improve it.    Data reviewed today: I reviewed all medications, new labs and imaging results over the last 24 hours. I personally reviewed no images or EKG's today.    Physical Exam   Vital Signs: Temp: 98.7  F (37.1  C) Temp src: Oral BP: 129/62 Pulse: 92 Heart Rate: 74 Resp: 18 SpO2: 96 % O2 Device: None (Room air) Oxygen Delivery: 2 LPM  Weight: 155 lbs 0 oz  GEN:  Alert, oriented x 3, appears ill but comfortable.  HEENT:  Normocephalic/atraumatic, no scleral icterus, no nasal discharge, mouth moist.  CV:  Regular rate and rhythm, distant.  No loud murmur or rub.  LUNGS:  Clear to auscultation bilaterally without rales/rhonchi/wheezing/retractions.  Symmetric chest rise on inhalation noted.  ABD:  Active bowel sounds, soft, non-tender/non-distended.  No rebound/guarding/rigidity.  EXT:  Left foot swollen, no spreading erythema up leg.  Trace edema elsewhere.  SKIN:  Dry to touch, no exanthems noted in the visualized areas.      Data   Recent Labs   Lab 02/05/19  0850 02/04/19  2125   WBC 29.3* 24.7*   HGB 10.4* 10.1*   MCV 83 81    396    136   POTASSIUM 4.3 4.3   CHLORIDE 104 99   CO2 30 30   BUN 33* 35*   CR 1.31* 1.42*   ANIONGAP 6 7   GABRIELA 9.0 9.0   * 165*   ALBUMIN  --  2.3*   PROTTOTAL  --  7.4    BILITOTAL  --  0.7   ALKPHOS  --  130   ALT  --  18   AST  --  21     Recent Labs   Lab 02/04/19  2345 02/04/19  2333   CULT No growth after 5 hours No growth after 5 hours     Recent Labs   Lab 02/05/19  1644 02/05/19  0850 02/04/19  2125   GLC  --  198* 165*   *  --   --        Recent Results (from the past 24 hour(s))   Foot XR, G/E 3 views, left    Narrative    XR FOOT LT G/E 3 VW  2/4/2019 11:40 PM     HISTORY: Heel wound with new erythema, chronic great toe wound without  redness. Evaluate for subcutaneous air, osteo, etc.    COMPARISON: None.       Impression    IMPRESSION: No acute fracture or dislocation. There is destruction of  the distal phalanx of the great toe consistent with osteomyelitis.  There is gas in the soft tissues at the base of the heel consistent  with ulcer. No bone destruction on the calcaneus to suggest  osteomyelitis.    MELANIE SY MD   CT Foot Left w/o Contrast    Narrative    CT LEFT FOOT WITHOUT CONTRAST  2/5/2019 4:06 AM    HISTORY: Diabetic patient with heel pad and great toe ulcers.    TECHNIQUE: Helical axial scans with sagittal and coronal  reconstructions. Radiation dose for this scan was reduced using  automated exposure control, adjustment of the mA and/or kV according  to patient size, or iterative reconstruction technique.    COMPARISON: Plain films 2/4/2019.    FINDINGS: Soft tissue swelling of the great toe consistent with known  inflammatory process. There is near complete destruction of the  terminal tuft of the distal phalanx of the great toe consistent with  osteomyelitis. This correlates with the recent plain film.    There is a relatively large amount of gas within the heel pad fat.  Some of this gas extends more superiorly and posteriorly, especially  on the lateral side. There is also subcutaneous edema throughout the  heel pad region. It is difficult to appreciate the reported ulcer  which may be very shallow. No definite fluid collections are seen.  No  destructive changes, periosteal reaction or other CT signs for  osteomyelitis in the adjacent plantar calcaneus.    The remainder of the bony structures show no acute abnormality or  evidence for neuropathic changes. Small corticated ossicle anterior  inferior to the lateral malleolus is likely related to old injury.  Vascular calcifications are seen.      Impression    IMPRESSION:  1. Osteomyelitis terminal tuft of the distal phalanx of the great toe.  Associated soft tissue swelling of the distal great toe.  2. Extensive scattered gas within the heel pad subcutaneous fat. No  definite fluid collections and no CT evidence for osteomyelitis in  this region.  3. Vascular calcifications.    MILAN LANCASTER MD     Medications     dextrose 5% and 0.9% NaCl 75 mL/hr at 02/05/19 1503     no pre procedure antibiotic needed         aspirin  81 mg Oral Daily     [START ON 2/6/2019] ertapenem (INVanz) IV  1 g Intravenous Q24H     insulin aspart  1-6 Units Subcutaneous Q4H     insulin degludec  28 Units Subcutaneous Daily     pregabalin  100 mg Oral TID     senna-docusate  1 tablet Oral BID    Or     senna-docusate  2 tablet Oral BID     vancomycin (VANCOCIN) IV  1,500 mg Intravenous Q12H

## 2019-02-05 NOTE — PROGRESS NOTES
"Foot & Ankle Surgery  February 5, 2019    Consult for left foot wound/infection.  Xrays and CT scan indicate osteomyelitis of the great toe distal phalanx, as well as gas in the soft tissue of the heel wound without underlying bony destruction.    To OR this afternoon @ 1630 for \"partial left foot amputation\", including partial great toe amputation and emergent heel wound debridement for gas gangrene.    NPO now.    Chetan Potter DPM FACFAS FACFAOM  Podiatric Foot & Ankle Surgeon  [facility]  [phone]      "

## 2019-02-05 NOTE — PLAN OF CARE
Pt A&Ox4, VSS on RA. Up with stand by assist, GB. Pt prefers to wt bear on L foot only if wrapped. NPO except meds and ice chips. CT done overnight. Denies pain. Pt has baseline neuropathy. L foot heel and big toe ulcerated, black with some serosanguineous drainage. Podiatry and vascular consulted. LR running at 100. On IV invanz and vanco. Will continue to monitor.

## 2019-02-05 NOTE — PROGRESS NOTES
"Foot & Ankle Surgery  February 5, 2019    I called and spoke with the patient.  He would like to proceed with the heel debridement, but would like us to \"leave the toe alone\" for now.  I offered biopsy of the bone to confirm osteo that is suspected by imaging, but he declined.    Chetan Potter DPM FACFAS FACFAOM  Podiatric Foot & Ankle Surgeon  [facility]  [phone]      "

## 2019-02-05 NOTE — PROGRESS NOTES
Admission    Patient arrives to room 637-1 via cart from ED.  Care plan note: completed    Inpatient nursing criteria listed below were met:    PCD's Documented: yes  Skin issues/needs documented :yes  Isolation education started/completed n/a  Patient allergies verified with patient: yes  Verified completion of [place] Risk Assessment Tool:  Yes  Verified completion of Guardianship screening tool: yes  Fall Prevention: Care plan updated, Education given and documented yes  Care Plan initiated: yes  Home medications documented in belongings flowsheet: yes  Patient belongings documented in belongings flowsheet: yes  Reminder note (belongings/ medications) placed in discharge instructions: n/a  Admission profile/ required documentation complete: yes

## 2019-02-06 ENCOUNTER — APPOINTMENT (OUTPATIENT)
Dept: MRI IMAGING | Facility: CLINIC | Age: 49
End: 2019-02-06
Attending: PODIATRIST
Payer: COMMERCIAL

## 2019-02-06 ENCOUNTER — APPOINTMENT (OUTPATIENT)
Dept: ULTRASOUND IMAGING | Facility: CLINIC | Age: 49
End: 2019-02-06
Attending: SURGERY
Payer: COMMERCIAL

## 2019-02-06 LAB
ANION GAP SERPL CALCULATED.3IONS-SCNC: 7 MMOL/L (ref 3–14)
BUN SERPL-MCNC: 26 MG/DL (ref 7–30)
CALCIUM SERPL-MCNC: 8 MG/DL (ref 8.5–10.1)
CHLORIDE SERPL-SCNC: 104 MMOL/L (ref 94–109)
CO2 SERPL-SCNC: 27 MMOL/L (ref 20–32)
CREAT SERPL-MCNC: 1.28 MG/DL (ref 0.66–1.25)
CREAT SERPL-MCNC: 1.29 MG/DL (ref 0.66–1.25)
CRP SERPL-MCNC: 209 MG/L (ref 0–8)
ERYTHROCYTE [DISTWIDTH] IN BLOOD BY AUTOMATED COUNT: 13.7 % (ref 10–15)
GFR SERPL CREATININE-BSD FRML MDRD: 66 ML/MIN/{1.73_M2}
GFR SERPL CREATININE-BSD FRML MDRD: 66 ML/MIN/{1.73_M2}
GLUCOSE BLDC GLUCOMTR-MCNC: 118 MG/DL (ref 70–99)
GLUCOSE BLDC GLUCOMTR-MCNC: 121 MG/DL (ref 70–99)
GLUCOSE BLDC GLUCOMTR-MCNC: 128 MG/DL (ref 70–99)
GLUCOSE BLDC GLUCOMTR-MCNC: 204 MG/DL (ref 70–99)
GLUCOSE SERPL-MCNC: 108 MG/DL (ref 70–99)
HCT VFR BLD AUTO: 27 % (ref 40–53)
HGB BLD-MCNC: 8.8 G/DL (ref 13.3–17.7)
MCH RBC QN AUTO: 27.1 PG (ref 26.5–33)
MCHC RBC AUTO-ENTMCNC: 32.6 G/DL (ref 31.5–36.5)
MCV RBC AUTO: 83 FL (ref 78–100)
PLATELET # BLD AUTO: 366 10E9/L (ref 150–450)
POTASSIUM SERPL-SCNC: 3.6 MMOL/L (ref 3.4–5.3)
RBC # BLD AUTO: 3.25 10E12/L (ref 4.4–5.9)
SODIUM SERPL-SCNC: 138 MMOL/L (ref 133–144)
VANCOMYCIN SERPL-MCNC: 22.5 MG/L
WBC # BLD AUTO: 22.9 10E9/L (ref 4–11)

## 2019-02-06 PROCEDURE — 99233 SBSQ HOSP IP/OBS HIGH 50: CPT | Performed by: PODIATRIST

## 2019-02-06 PROCEDURE — 82565 ASSAY OF CREATININE: CPT | Performed by: PODIATRIST

## 2019-02-06 PROCEDURE — 12000000 ZZH R&B MED SURG/OB

## 2019-02-06 PROCEDURE — 25000128 H RX IP 250 OP 636: Performed by: INTERNAL MEDICINE

## 2019-02-06 PROCEDURE — 25000132 ZZH RX MED GY IP 250 OP 250 PS 637: Performed by: PODIATRIST

## 2019-02-06 PROCEDURE — 00000146 ZZHCL STATISTIC GLUCOSE BY METER IP

## 2019-02-06 PROCEDURE — 11046 DBRDMT MUSC&/FSCA EA ADDL: CPT | Performed by: PODIATRIST

## 2019-02-06 PROCEDURE — 85027 COMPLETE CBC AUTOMATED: CPT | Performed by: SURGERY

## 2019-02-06 PROCEDURE — 36415 COLL VENOUS BLD VENIPUNCTURE: CPT | Performed by: PODIATRIST

## 2019-02-06 PROCEDURE — 86140 C-REACTIVE PROTEIN: CPT | Performed by: PODIATRIST

## 2019-02-06 PROCEDURE — 93922 UPR/L XTREMITY ART 2 LEVELS: CPT

## 2019-02-06 PROCEDURE — 25000132 ZZH RX MED GY IP 250 OP 250 PS 637: Performed by: INTERNAL MEDICINE

## 2019-02-06 PROCEDURE — 25000131 ZZH RX MED GY IP 250 OP 636 PS 637: Performed by: INTERNAL MEDICINE

## 2019-02-06 PROCEDURE — 11043 DBRDMT MUSC&/FSCA 1ST 20/<: CPT | Performed by: PODIATRIST

## 2019-02-06 PROCEDURE — 99232 SBSQ HOSP IP/OBS MODERATE 35: CPT | Performed by: INTERNAL MEDICINE

## 2019-02-06 PROCEDURE — 25000125 ZZHC RX 250: Performed by: INTERNAL MEDICINE

## 2019-02-06 PROCEDURE — 80202 ASSAY OF VANCOMYCIN: CPT | Performed by: INTERNAL MEDICINE

## 2019-02-06 PROCEDURE — 36415 COLL VENOUS BLD VENIPUNCTURE: CPT | Performed by: INTERNAL MEDICINE

## 2019-02-06 PROCEDURE — 73721 MRI JNT OF LWR EXTRE W/O DYE: CPT | Mod: LT

## 2019-02-06 PROCEDURE — 80048 BASIC METABOLIC PNL TOTAL CA: CPT | Performed by: SURGERY

## 2019-02-06 RX ORDER — PIPERACILLIN SODIUM, TAZOBACTAM SODIUM 3; .375 G/15ML; G/15ML
3.38 INJECTION, POWDER, LYOPHILIZED, FOR SOLUTION INTRAVENOUS EVERY 6 HOURS
Status: DISCONTINUED | OUTPATIENT
Start: 2019-02-06 | End: 2019-02-10 | Stop reason: HOSPADM

## 2019-02-06 RX ADMIN — PREGABALIN 100 MG: 100 CAPSULE ORAL at 16:02

## 2019-02-06 RX ADMIN — PREGABALIN 100 MG: 100 CAPSULE ORAL at 21:26

## 2019-02-06 RX ADMIN — ASPIRIN 81 MG: 81 TABLET, COATED ORAL at 09:56

## 2019-02-06 RX ADMIN — PREGABALIN 100 MG: 100 CAPSULE ORAL at 09:55

## 2019-02-06 RX ADMIN — ACETAMINOPHEN 975 MG: 325 TABLET, FILM COATED ORAL at 20:10

## 2019-02-06 RX ADMIN — ACETAMINOPHEN 975 MG: 325 TABLET, FILM COATED ORAL at 05:59

## 2019-02-06 RX ADMIN — LIDOCAINE HYDROCHLORIDE 10 ML: 20 JELLY TOPICAL at 16:03

## 2019-02-06 RX ADMIN — PIPERACILLIN SODIUM,TAZOBACTAM SODIUM 3.38 G: 3; .375 INJECTION, POWDER, FOR SOLUTION INTRAVENOUS at 21:26

## 2019-02-06 RX ADMIN — INSULIN ASPART 2 UNITS: 100 INJECTION, SOLUTION INTRAVENOUS; SUBCUTANEOUS at 01:15

## 2019-02-06 RX ADMIN — ERTAPENEM SODIUM 1 G: 1 INJECTION, POWDER, LYOPHILIZED, FOR SOLUTION INTRAMUSCULAR; INTRAVENOUS at 00:20

## 2019-02-06 RX ADMIN — VANCOMYCIN HYDROCHLORIDE 1500 MG: 5 INJECTION, POWDER, LYOPHILIZED, FOR SOLUTION INTRAVENOUS at 01:10

## 2019-02-06 RX ADMIN — ACETAMINOPHEN 975 MG: 325 TABLET, FILM COATED ORAL at 12:06

## 2019-02-06 RX ADMIN — INSULIN DEGLUDEC INJECTION 28 UNITS: 100 INJECTION, SOLUTION SUBCUTANEOUS at 10:02

## 2019-02-06 RX ADMIN — DEXTROSE AND SODIUM CHLORIDE: 5; 900 INJECTION, SOLUTION INTRAVENOUS at 07:07

## 2019-02-06 RX ADMIN — PIPERACILLIN SODIUM,TAZOBACTAM SODIUM 3.38 G: 3; .375 INJECTION, POWDER, FOR SOLUTION INTRAVENOUS at 16:26

## 2019-02-06 ASSESSMENT — ACTIVITIES OF DAILY LIVING (ADL)
ADLS_ACUITY_SCORE: 12

## 2019-02-06 ASSESSMENT — MIFFLIN-ST. JEOR: SCORE: 1570.39

## 2019-02-06 NOTE — BRIEF OP NOTE
[facility]    Brief Operative Note    Pre-operative diagnosis: LEFT FOOT INFECTION.  Post-operative diagnosis sp I&D left heel necrotic ulcer  Procedure: Procedure(s):  PARTIAL LEFT FOOT AMPUTATION.  Surgeon: Surgeon(s) and Role:     * Chetan Potter DPM - Primary  Anesthesia: General   Estimated blood loss: Less than 10 ml  Drains: None  Specimens:   ID Type Source Tests Collected by Time Destination   1 : left heel tissue Fluid Foot, Left ANAEROBIC BACTERIAL CULTURE, FLUID CULTURE AEROBIC BACTERIAL, GRAM STAIN Chetan Potter DPM 2/5/2019  6:48 PM    A : left heel tissue Tissue Foot, Left ANAEROBIC BACTERIAL CULTURE, GRAM STAIN, TISSUE CULTURE AEROBIC BACTERIAL, SURGICAL PATHOLOGY EXAM Chetan Potter DPM 2/5/2019  6:47 PM      Findings:   deep wound with necrotic tissue down to/including deep fascia.  calcaneus not exposed but soft tissue coverage was minimal. .  Complications: None.  Implants: None.

## 2019-02-06 NOTE — PLAN OF CARE
"Pt A/O#4, anxious at times, bedrest tonight, s/p Lt heel I & D-dressing D/I, CMS WNL, denies pain, T-100.0, pt c/o some chills, Tylenol given, continues on IVF/Abx, Capno-44/8, bedrest tonight, urinal at bedside, ID/Pod/vascular following, MRI Lt foot to be done tonight or tomorrow, Bg 148 at hs, pt on continues bg monitoring, will monitor.  2310. Pt refuses Capno/continious O2 monitoring, unhappy about \"all these tubings and noise\", stating he \"had enough for today\". Writer explain the risks of not having O2 level monitored. Pt agreed for spot checks overnight.  "

## 2019-02-06 NOTE — ANESTHESIA CARE TRANSFER NOTE
Patient: Eduardo Walton    Procedure(s):  PARTIAL LEFT FOOT AMPUTATION.    Diagnosis: LEFT FOOT INFECTION.  Diagnosis Additional Information: No value filed.    Anesthesia Type:   MAC     Note:  Airway :Face Mask  Patient transferred to:PACU  Comments: At end of procedure, spontaneous respirations, patient alert to voice, able to follow commands. Oxygen via facemask at 8 liters per minute to PACU. Oxygen tubing connected to wall O2 in PACU, SpO2, NiBP, and EKG monitors and alarms on and functioning, Ike Hugger warmer connected to patient gown, report on patient's clinical status given to PACU RN, RN questions answered.Handoff Report: Identifed the Patient, Identified the Reponsible Provider, Reviewed the pertinent medical history, Discussed the surgical course, Reviewed Intra-OP anesthesia mangement and issues during anesthesia, Set expectations for post-procedure period and Allowed opportunity for questions and acknowledgement of understanding      Vitals: (Last set prior to Anesthesia Care Transfer)    CRNA VITALS  2/5/2019 1826 - 2/5/2019 1901      2/5/2019             Pulse:  72    SpO2:  99 %    Resp Rate (set):  10                Electronically Signed By: NATASHA Toribio CRNA  February 5, 2019  7:01 PM

## 2019-02-06 NOTE — CONSULTS
[facility]    Infectious Disease Consultation     Date of Admission:  2/4/2019  Date of Consult (When I saw the patient): 02/06/19    Assessment & Plan   Eduardo Walton is a 48 year old male who was admitted on 2/4/2019.     Impression:  1. 48 y.o male with type 1 diabetes.   2. Neuropathy.   3. CKD stage 3.   4. History of osteo in the left big toe last year treated with 6 weeks of IV antibiotics, no surgery.   5. Admitted this occasion with left foot ulcer possible osteo on the calcaneous.   6. S/P I and D and partial left foot amputation, OR note pending.   7. Cultures positive for group B strep.   8. No history of MRSA.   9. On vanco and ertapenem currently.     Recommendations:   Iv zosyn, stop vanco and ertapenem. Group B strep in the cultures, no history of MRSA and CKD.   Follow up on podiatry`s plan.     Yeimy Chavez MD    Reason for Consult   Reason for consult: I was asked by Dr. Garg  to evaluate this patient for osteomyelitis.    Primary Care Physician   Efren Conway    Chief Complaint   Chronic non healing foot wound     History is obtained from the patient and medical records    History of Present Illness   Eduardo Walton is a 48 year old male with Type 1 Diabetes, neuropathy, CKD Stage 3, chronic left foot ulcer who presented earlier this week with worsening signs of infection.  He was noted to have fevers and chills and decreased appetite on admission.  His WBC was 25 --> 29 and patient was seen by podiatry who took the patient for a partial left foot amputation and debridement yesterday due to gas gangrene. He is telling me he was at another hospital last fall where he was treated with IV antibiotics for osteo in the great toe. He does not have any history of osteo.         Past Medical History   I have reviewed this patient's medical history and updated it with pertinent information if needed.   No past medical history on file.    Past Surgical History   I have reviewed  this patient's surgical history and updated it with pertinent information if needed.  Past Surgical History:   Procedure Laterality Date     AMPUTATE FOOT Left 2019    Procedure: PARTIAL LEFT FOOT AMPUTATION.;  Surgeon: Chetan Potter DPM;  Location: [facility] OR     [facility]         Prior to Admission Medications   Prior to Admission Medications   Prescriptions Last Dose Informant Patient Reported? Taking?   Continuous Blood Gluc Sensor (FREESTYLE COLIN 14 DAY SENSOR) MISC Unknown at Unknown time  Yes No   Sig: USE WITH 14 DAY COLIN SYSTEM. CHANGE SENSOR EVERY 14 DAYS.   LYRICA 100 MG capsule 2019 at pm  Yes Yes   Sig: TAKE 1 CAPSULE BY MOUTH THREE TIMES A DAY   TRESIBA FLEXTOUCH 100 UNIT/ML pen 2019 at am  Yes Yes   Si Units daily   insulin lispro (HUMALOG KWIKPEN) 100 UNIT/ML pen 2019 at Unknown time  Yes No   Sig: Inject 1 unit/2 grams carb, total daily dose up to 80 units  Indications: Diabetes Mellitus      Facility-Administered Medications: None     Allergies   Allergies   Allergen Reactions     Fish-Derived Products Rash       Immunization History   Immunization History   Administered Date(s) Administered     Flu, Unspecified 2009     Influenza Vaccine IM 3yrs+ 4 Valent IIV4 2019     Pneumococcal 23 valent 2002     Tdap (Adult) Unspecified Formulation 10/23/2009       Social History   I have reviewed this patient's social history and updated it with pertinent information if needed. Eduardo Walton  reports that  has never smoked. he has never used smokeless tobacco. He reports that he drinks alcohol.    Family History   I have reviewed this patient's family history and updated it with pertinent information if needed.   Family History   Problem Relation Age of Onset     Ovarian Cancer Maternal Grandmother      Colon Cancer Maternal Grandmother      Prostate Cancer Maternal Grandfather      Ovarian Cancer Paternal Grandmother        Review of Systems   The 10 point Review  of Systems is negative other than noted in the HPI or here.     Physical Exam   Temp: 98.3  F (36.8  C) Temp src: Oral BP: 146/70 Pulse: 77 Heart Rate: 77 Resp: 16 SpO2: 97 % O2 Device: None (Room air) Oxygen Delivery: 3 LPM  Vital Signs with Ranges  Temp:  [97.7  F (36.5  C)-100  F (37.8  C)] 98.3  F (36.8  C)  Pulse:  [] 77  Heart Rate:  [68-98] 77  Resp:  [12-18] 16  BP: (109-175)/(56-75) 146/70  SpO2:  [95 %-100 %] 97 %  154 lbs 5.19 oz  Body mass index is 22.79 kg/m .    GENERAL APPEARANCE:  alert and no distress  EYES: Eyes grossly normal to inspection, PERRL and conjunctivae and sclerae normal  HENT: ear canals and TM's normal and nose and mouth without ulcers or lesions  NECK: no adenopathy, no asymmetry, masses, or scars and thyroid normal to palpation  RESP: lungs clear to auscultation - no rales, rhonchi or wheezes  CV: regular rates and rhythm, normal S1 S2, no S3 or S4 and no murmur, click or rub  LYMPHATICS: normal ant/post cervical and supraclavicular nodes  ABDOMEN: soft, nontender, without hepatosplenomegaly or masses and bowel sounds normal  MS: dressing on the left foot   SKIN: no suspicious lesions or rashes      Data   Lab Results   Component Value Date    WBC 22.9 (H) 02/06/2019    HGB 8.8 (L) 02/06/2019    HCT 27.0 (L) 02/06/2019     02/06/2019     02/06/2019    POTASSIUM 3.6 02/06/2019    CHLORIDE 104 02/06/2019    CO2 27 02/06/2019    BUN 26 02/06/2019    CR 1.28 (H) 02/06/2019    CR 1.29 (H) 02/06/2019     (H) 02/06/2019    SED 78 (H) 02/04/2019    DD 0.4 05/30/2010    TROPI 0.369 (HH) 06/04/2010    AST 21 02/04/2019    ALT 18 02/04/2019    ALKPHOS 130 02/04/2019    BILITOTAL 0.7 02/04/2019    INR 0.92 06/03/2010     Recent Labs   Lab 02/05/19  1848 02/05/19  1847 02/04/19  2345 02/04/19  2333   CULT Culture in progress  Culture negative monitoring continues Culture in progress  Culture negative monitoring continues No growth after 1 day No growth after 1 day      Recent Labs   Lab Test 02/05/19  1848 02/05/19  1847 02/04/19  2345 02/04/19  2333   CULT Culture in progress  Culture negative monitoring continues Culture in progress  Culture negative monitoring continues No growth after 1 day No growth after 1 day

## 2019-02-06 NOTE — PLAN OF CARE
"PT: PT orders received, eval attempted. Long discussion with pt regarding NWB status, possible equipment needs, and assessing safe disch plan. Pt declines NWB status, poor adherence noted during transfer from BSC to bed upon arrival, pt firmly believes \"there's got to be a boot I can wear to make me non weight bearing\", despite max education regarding NWB orders and promoting wound healing, pt firmly declines participation at this time. Pt declines therapy returning until after his next I&D tomorrow, per pt report. Will continue to follow.  "

## 2019-02-06 NOTE — PHARMACY-VANCOMYCIN DOSING SERVICE
Pharmacy Vancomycin Note  Date of Service 2019  Patient's  1970   48 year old, male    Indication: Bone and Joint Infection and Osteomyelitis  Goal Trough Level: 15-20 mg/L  Day of Therapy: 2  Current Vancomycin regimen:  1500 mg IV q12h    Current estimated CrCl = Estimated Creatinine Clearance: 71.4 mL/min (A) (based on SCr of 1.28 mg/dL (H)).    Creatinine for last 3 days  2019:  9:25 PM Creatinine 1.42 mg/dL  2019:  8:50 AM Creatinine 1.31 mg/dL  2019:  9:25 AM Creatinine 1.28 mg/dL;  9:25 AM Creatinine 1.29 mg/dL    Recent Vancomycin Levels (past 3 days)  2019: 12:05 PM Vancomycin Level 22.5 mg/L    Vancomycin IV Administrations (past 72 hours)                   vancomycin (VANCOCIN) 1,500 mg in sodium chloride 0.9 % 250 mL intermittent infusion (mg) 1,500 mg New Bag 19 0110     1,500 mg New Bag 19 1219    vancomycin (VANCOCIN) 1,500 mg in sodium chloride 0.9 % 250 mL intermittent infusion (mg) 1,500 mg New Bag 19 0031                Nephrotoxins and other renal medications (From now, onward)    Start     Dose/Rate Route Frequency Ordered Stop    19 1200  vancomycin (VANCOCIN) 1,750 mg in sodium chloride 0.9 % 500 mL intermittent infusion      1,750 mg  over 2 Hours Intravenous EVERY 24 HOURS 19 1332               Contrast Orders - past 72 hours (72h ago, onward)    None          Interpretation of levels and current regimen:  Trough level is  Supratherapeutic    Has serum creatinine changed > 50% in last 72 hours: No    Urine output:  unable to determine    Renal Function: Improving    Plan:  1.  Decrease Dose to 1750mg IV Q24hr   2.  Pharmacy will check trough levels as appropriate in 1-3 Days.    3. Serum creatinine levels will be ordered daily for the first week of therapy and at least twice weekly for subsequent weeks.      Nat Roland        .

## 2019-02-06 NOTE — PLAN OF CARE
Pt A&Ox4, VSS on RA. Bedrest, with L leg elevated. Dressing CDI. Controlled carb diet. Denies pain, on scheduled Tylenol. On BG checks q4, 0100 , 2 units insulin given, 0500 . D5 0.9 NS running at 75. On IV invanz and vanco. Podiatry, ID and vascular following. MRI to be done today. Will continue to monitor.

## 2019-02-06 NOTE — PROGRESS NOTES
Pt is refusing to be straight cath'ed to drain bladder. After being unable to void, multiple attempts were given to pt for him to void on own per his request. PVR was 636, pt voided 125 ml, upon entering to perform straight cath, pt refused to have straight cath done. Pt was educated on the risks on not being able to void and potential for infection. Pt states that he understands the risks and will continue to try on his own to void. Pt states that if he feels the pressure and discomfort he will notify nursing staff to proceed with straight cath.

## 2019-02-06 NOTE — PLAN OF CARE
A&Ox4. Pt expressed heightened emotions based on today's conversation with his wife and podiatry, with further conversations with patient he shared that he is frustrated and emotional.  CMS baseline numbness and tingling in bilateral upper and lower extremities. Bowel sounds audible, +flatus. Pt unable to void adequately, PVR for 636 and is refusing straight cath-provider aware. VS-afebrile this shift. Dressing CDI to left foot, elevated on two pillows. Up with assist x2 with gb, NWB to left lower extremity. C/o mild pain in left foot, decreased with scheduled tylenol. Blood sugar checks done by pt's implanted device. Plan pending ability to void on own throughout the night, if unable provider directed pt to be straight cath'ed. No I&D scheduled for tomorrow, podiatry will follow up with patient tomorrow.

## 2019-02-06 NOTE — CONSULTS
Vascular Surgery Consult    Reason for consult: foot wound    HPI:   49 yo male with history of Type 1 DM, neuropathy, CKD Stage 3, chronic left foot ulcer who presented earlier this week with worsening signs of infection.  He was noted to have fevers and chills and decreased appetite on admission.  His WBC was 25 --> 29 and patient was seen by podiatry who took the patient for a partial left foot amputation and debridement yesterday due to gas gangrene.    Patient seen this morning, he is quite opinionated that he does not have vascular disease and is resistant to any intervention at this time.  He says his heel wound started about a month ago but has seen a few different podiatrists and a vascular surgeon from outside hospital.      PMH: as above    PSH:   Past Surgical History:   Procedure Laterality Date     STENT  2010       MEDS:   Current Facility-Administered Medications   Medication     [START ON 2/8/2019] acetaminophen (TYLENOL) tablet 650 mg     acetaminophen (TYLENOL) tablet 975 mg     aspirin EC tablet 81 mg     bisacodyl (DULCOLAX) Suppository 10 mg     dextrose 5% and 0.9% NaCl infusion     glucose gel 15-30 g    Or     dextrose 50 % injection 25-50 mL    Or     glucagon injection 1 mg     ertapenem (INVanz) 1 g vial to attach to  mL bag     HYDROcodone-acetaminophen (NORCO) 5-325 MG per tablet 1-2 tablet     HYDROmorphone (PF) (DILAUDID) injection 0.2 mg     insulin aspart (NovoLOG) inj (RAPID ACTING)     insulin degludec (TRESIBA) 100 UNIT/ML injection 28 Units     lidocaine (LMX4) cream     lidocaine 1 % 0.1-1 mL     melatonin tablet 1 mg     naloxone (NARCAN) injection 0.1-0.4 mg     ondansetron (ZOFRAN-ODT) ODT tab 4 mg    Or     ondansetron (ZOFRAN) injection 4 mg     oxyCODONE (ROXICODONE) tablet 5-10 mg     polyethylene glycol (MIRALAX/GLYCOLAX) Packet 17 g     pregabalin (LYRICA) capsule 100 mg     senna-docusate (SENOKOT-S/PERICOLACE) 8.6-50 MG per tablet 1 tablet    Or      senna-docusate (SENOKOT-S/PERICOLACE) 8.6-50 MG per tablet 2 tablet     senna-docusate (SENOKOT-S/PERICOLACE) 8.6-50 MG per tablet 1 tablet    Or     senna-docusate (SENOKOT-S/PERICOLACE) 8.6-50 MG per tablet 2 tablet     sodium chloride (PF) 0.9% PF flush 3 mL     sodium chloride (PF) 0.9% PF flush 3 mL     vancomycin (VANCOCIN) 1,500 mg in sodium chloride 0.9 % 250 mL intermittent infusion       ALLG:    Allergies   Allergen Reactions     Fish-Derived Products Rash       FH:   Family History   Problem Relation Age of Onset     Ovarian Cancer Maternal Grandmother      Colon Cancer Maternal Grandmother      Prostate Cancer Maternal Grandfather      Ovarian Cancer Paternal Grandmother        SH:   Social History     Socioeconomic History     Marital status:      Spouse name: Not on file     Number of children: Not on file     Years of education: Not on file     Highest education level: Not on file   Social Needs     Financial resource strain: Not on file     Food insecurity - worry: Not on file     Food insecurity - inability: Not on file     Transportation needs - medical: Not on file     Transportation needs - non-medical: Not on file   Occupational History     Not on file   Tobacco Use     Smoking status: Never Smoker     Smokeless tobacco: Never Used   Substance and Sexual Activity     Alcohol use: Yes     Comment: occ     Drug use: Not on file     Sexual activity: Yes   Other Topics Concern     Not on file   Social History Narrative     Not on file       REVIEW OF SYSTEMS:  GENERAL: + fevers, chills, no weight gain or loss  HEENT: No vision or hearing difficulties, no nasal symptoms  RESPIRATORY: No cough wheeze, or hemoptosis  CARDIAC: No chest pain, orthopnea, lower extremity edema, irregular heartbeat  GI: No abdominal pain, difficulty swallowing, nausea or vomiting  : no dysuria, urgency, frequency or hematuria  HEME/ONC: no abnormal bleeding or bruising, no hypercoaguable state  NEURO: + numbness,  no weakness, dizziness or loss of consciousness  MUSCULOSKELETAL: No Joint or back pain  SKIN: + left foot wound, no rashes, growths, sores, itching or hair loss    PHYSICAL EXAM:  Vitals: B/P: 146/70, T: 98.3, P: 77, R: 16  Gen: Alert oriented no acute distress  HEENT: Mucous membranes moist  Resp: Clear to auscultation bilaterally  CV: Regular rate rhythm, no murmurs, rubs or gallops  Abd: Soft, non-tender, non-distended no guarding or rigidity  Neuro:  + neuropathy with decreased pedal sensation, moves all extremities 4/4 strength equal bilaterally  Ext: Left foot dressing intact, palpable femoral pulse, right possibly slightly diminished compared to left  Palpable left popliteal pulse  Multiphasic left AT, decreased amplitude of left PT  Decreased signal right DP/PT.    Labs: Most recent labs reviewed    CBC RESULTS:   Recent Labs   Lab Test 02/05/19  0850   WBC 29.3*   RBC 3.89*   HGB 10.4*   HCT 32.1*   MCV 83   MCH 26.7   MCHC 32.4   RDW 13.8        INR   Date Value Ref Range Status   06/03/2010 0.92 0.86 - 1.14 Final      Last Comprehensive Metabolic Panel:  Sodium   Date Value Ref Range Status   02/05/2019 140 133 - 144 mmol/L Final     Potassium   Date Value Ref Range Status   02/05/2019 4.3 3.4 - 5.3 mmol/L Final     Chloride   Date Value Ref Range Status   02/05/2019 104 94 - 109 mmol/L Final     Carbon Dioxide   Date Value Ref Range Status   02/05/2019 30 20 - 32 mmol/L Final     Anion Gap   Date Value Ref Range Status   02/05/2019 6 3 - 14 mmol/L Final     Glucose   Date Value Ref Range Status   02/05/2019 198 (H) 70 - 99 mg/dL Final     Urea Nitrogen   Date Value Ref Range Status   02/05/2019 33 (H) 7 - 30 mg/dL Final     Creatinine   Date Value Ref Range Status   02/05/2019 1.31 (H) 0.66 - 1.25 mg/dL Final     GFR Estimate   Date Value Ref Range Status   02/05/2019 65 >60 mL/min/[1.73_m2] Final     Comment:     Non  GFR Calc  Starting 12/18/2018, serum creatinine based  estimated GFR (eGFR) will be   calculated using the Chronic Kidney Disease Epidemiology Collaboration   (CKD-EPI) equation.       Calcium   Date Value Ref Range Status   02/05/2019 9.0 8.5 - 10.1 mg/dL Final         IMAGING:   CT and MRI of left foot reviewed - osteomyelitis great toe and gas gangrene, no discrete fluid collection      A/P:  47 yo male with type 1 DM, CKD, and foot wound with wet gangrene s/p partial left foot amputation yesterday with podiatry.    Ordered JADEN and arterial US of LLE  Patient currently refusing angiogram a this time, extensive time spent explaining concerns and reasons for potential further vascular surgery work up depending on results of above.  Patient aware that he needs to have better control of his diabetes.  Continue antibiotics - currently vancochristianaanz  Cultures growing group B strep, ID involved, WBC improved down to 23 today.  Local care with podiatry, possible further debridement tomorrow.  Continue aspirin, recommend statin.  Further recommendations to follow.    Ale Flores MD  Vascular Surgery Fellow

## 2019-02-06 NOTE — ANESTHESIA POSTPROCEDURE EVALUATION
Patient: Eduardo Walton    Procedure(s):  PARTIAL LEFT FOOT AMPUTATION.    Diagnosis:LEFT FOOT INFECTION.  Diagnosis Additional Information: No value filed.    Anesthesia Type:  MAC    Note:  Anesthesia Post Evaluation    Patient location during evaluation: PACU  Patient participation: Able to fully participate in evaluation  Level of consciousness: awake and alert  Pain management: adequate  Airway patency: patent  Cardiovascular status: acceptable  Respiratory status: acceptable  Hydration status: acceptable  PONV: none     Anesthetic complications: None          Last vitals:  Vitals:    02/05/19 1900 02/05/19 1910 02/05/19 1920   BP: 109/58 129/64 142/73   Pulse: 68 81 91   Resp: 12 17 16   Temp: 36.6  C (97.9  F) 37  C (98.6  F) 37.3  C (99.1  F)   SpO2: 100% 97% 99%         Electronically Signed By: Vivi Jimenes MD  February 5, 2019  7:35 PM

## 2019-02-06 NOTE — PROVIDER NOTIFICATION
MD Notification    Notified Person: MD Luque     Notified Person Name:    Notification Date/Time: 2/6/19 1130    Notification Interaction: text page     Purpose of Notification:     Pt is refusing our q 4 blood sugar checks and would like those d/c'ed, he does have an implanted device please advise.     Thank you!!!     Orders Received:    Comments:

## 2019-02-06 NOTE — PROGRESS NOTES
"[facility]  Hospitalist Progress Note  Charles Luque MD  02/06/2019    Assessment & Plan   Mr. Walton is a 49 y/o male with poorly controlled DM and hx of reccurent left foot infections who presented with quickly worsening left foot swelling with fevers.  He had recently received some prednisone to outpatient to help with \"Inflammation.\"   Imaging suggests osteomyelitis and gas gangrene.     Left diabetic foot infection  Suspect osteomyelitis: s/p I and D of necrotic heel ulcer  -  Ertapenem + vancomycin IV change to zosyn with group B strep on micro  -  Podiatry following  -  currently undergoing vascular workup, JADEN or ok.     Poorly controlled DM (A1C > 10) with reported neuropathy and retinopathy:  -  continue tresiba, meal carb count novolog + sliding scale insulin.    -  Discontinue acchecks and use recordings from implant.  -  Discontinue IVF with dextrose.     CAD, prior stent:  -  Self stopped all meds.  He felt the metoprolol took away his hypoglycemic awareness and that the other meds didn't make him feel well.  He did not want an ASA though after we talked he decided an 81 mg daily ASA would be acceptable post-op.          Diet:   -  Mod CHO diet  -  SL IVF    DVT Prophylaxis: Patient refused heparin subcut that was advised    Martinez Catheter: not present    Code Status: Full Code          Disposition Plan     Expected discharge: > 2 days, recommended to prior living arrangement once infection treated/improving.          Interval History   -- chart reviewed  -- discussed with RN    -Data reviewed today: I reviewed all new labs and imaging over the last 24 hours. I personally reviewed no images or EKG's today.    Physical Exam   Heart Rate: 77, Blood pressure 146/70, pulse 77, temperature 98.3  F (36.8  C), temperature source Oral, resp. rate 16, height 1.753 m (5' 9\"), weight 70 kg (154 lb 5.2 oz), SpO2 97 %.  Vitals:    02/04/19 2114 02/06/19 0540   Weight: 70.3 kg (155 lb) 70 kg " (154 lb 5.2 oz)     Vital Signs with Ranges  Temp:  [97.7  F (36.5  C)-100  F (37.8  C)] 98.3  F (36.8  C)  Pulse:  [] 77  Heart Rate:  [68-98] 77  Resp:  [12-18] 16  BP: (109-175)/(56-75) 146/70  SpO2:  [95 %-100 %] 97 %  I/O's Last 24 hours  I/O last 3 completed shifts:  In: 400 [I.V.:400]  Out: 10 [Blood:10]    Constitutional: Awake, alert, cooperative, no apparent distress  Respiratory: Clear to auscultation bilaterally, no crackles or wheezing  Cardiovascular: Regular rate and rhythm, normal S1 and S2, and no murmur noted  GI: Normal bowel sounds, soft, non-distended, non-tender  Skin/Integumen: No rashes, no cyanosis, no edema  Other:      Medications   All medications were reviewed.      acetaminophen  975 mg Oral Q8H     aspirin  81 mg Oral Daily     ertapenem (INVanz) IV  1 g Intravenous Q24H     insulin aspart  1-6 Units Subcutaneous Q4H     insulin degludec  28 Units Subcutaneous Daily     pregabalin  100 mg Oral TID     senna-docusate  1 tablet Oral BID    Or     senna-docusate  2 tablet Oral BID     sodium chloride (PF)  3 mL Intracatheter Q8H     vancomycin (VANCOCIN) IV  1,500 mg Intravenous Q12H        Data   Recent Labs   Lab 02/06/19  0925 02/05/19  0850 02/04/19  2125   WBC 22.9* 29.3* 24.7*   HGB 8.8* 10.4* 10.1*   MCV 83 83 81    446 396    140 136   POTASSIUM 3.6 4.3 4.3   CHLORIDE 104 104 99   CO2 27 30 30   BUN 26 33* 35*   CR 1.29*  1.28* 1.31* 1.42*   ANIONGAP 7 6 7   GABRIELA 8.0* 9.0 9.0   * 198* 165*   ALBUMIN  --   --  2.3*   PROTTOTAL  --   --  7.4   BILITOTAL  --   --  0.7   ALKPHOS  --   --  130   ALT  --   --  18   AST  --   --  21       Recent Results (from the past 24 hour(s))   MR Ankle Left w/o Contrast    Narrative    MR ANKLE LEFT WITHOUT CONTRAST   2/6/2019 9:09 AM     HISTORY:  See the Clinical Information for Interpreting Provider;  large necrotic wound left heel with gas in soft tissue. CT negative  for osteo, evaluate for calcaneal  osteomyelitis.    TECHNIQUE:  Sagittal and coronal T1 and inversion recovery, and   transverse proton density and T2 weighted images.    COMPARISON: None.    FINDINGS:  Plantar Fascia:  Unremarkable, with no findings to suggest active  plantar fasciitis.     Osseous and Cartilaginous Structures:  There is a very large ulcer  extending down to the calcaneal tuberosity. Prominent bone marrow  edema in the posterolateral aspect of the calcaneal tuberosity  consistent with osteomyelitis as seen on coronal series 501 image 21.  Small amount of edema involving the navicular tuberosity of uncertain  etiology perhaps related to stress reaction. No linear fracture seen.     Posterior Tibial and Flexor Tendons:  No tear or tendinosis of the  posterior tibial tendon, flexor digitorum longus tendon, or flexor  hallucis longus tendon.     Peroneal Tendons:  No tear, tendinosis, or apparent longitudinal  splitting of the peroneus brevis tendon or peroneus longus tendon. No  tendon subluxation.     Achilles Tendon:  The Achilles tendon is immediately adjacent to the  large ulcer but appears intact with normal signal.     Extensor Tendons:  No tear tendinosis of the anterior tibial tendon  extensor hallux longus tendons or extensor digitorum longus tendons.     Lateral Ligaments:  The anterior talofibular ligament appears intact.  The calcaneofibular, posterior talofibular, and anterior tibiofibular  ligaments appear intact.     Medial Deltoid Ligamentous Complex:  Intact.     Joint space: No tibiotalar or subtalar joint effusion.    Additional Findings:  No retrocalcaneal bursitis. No mass within the  tarsal tunnel. The sinus Tarsi is unremarkable.      Impression    IMPRESSION: Large ulcer adjacent to the calcaneal tuberosity. There is  prominent focus of bone marrow edema in the posterolateral-most aspect  of the calcaneus as seen on coronal series 501 image 21 consistent  with osteomyelitis.       Charles Luque MD  Text Page   (7am to 6pm)

## 2019-02-07 LAB
ANION GAP SERPL CALCULATED.3IONS-SCNC: 4 MMOL/L (ref 3–14)
BUN SERPL-MCNC: 31 MG/DL (ref 7–30)
CALCIUM SERPL-MCNC: 8.1 MG/DL (ref 8.5–10.1)
CHLORIDE SERPL-SCNC: 105 MMOL/L (ref 94–109)
CO2 SERPL-SCNC: 29 MMOL/L (ref 20–32)
CREAT SERPL-MCNC: 1.51 MG/DL (ref 0.66–1.25)
CRP SERPL-MCNC: 213 MG/L (ref 0–8)
ERYTHROCYTE [DISTWIDTH] IN BLOOD BY AUTOMATED COUNT: 13.9 % (ref 10–15)
GFR SERPL CREATININE-BSD FRML MDRD: 54 ML/MIN/{1.73_M2}
GLUCOSE BLDC GLUCOMTR-MCNC: 120 MG/DL (ref 70–99)
GLUCOSE BLDC GLUCOMTR-MCNC: 124 MG/DL (ref 70–99)
GLUCOSE BLDC GLUCOMTR-MCNC: 138 MG/DL (ref 70–99)
GLUCOSE BLDC GLUCOMTR-MCNC: 143 MG/DL (ref 70–99)
GLUCOSE BLDC GLUCOMTR-MCNC: 160 MG/DL (ref 70–99)
GLUCOSE BLDC GLUCOMTR-MCNC: 31 MG/DL (ref 70–99)
GLUCOSE SERPL-MCNC: 123 MG/DL (ref 70–99)
HCT VFR BLD AUTO: 29.7 % (ref 40–53)
HGB BLD-MCNC: 9.5 G/DL (ref 13.3–17.7)
MCH RBC QN AUTO: 26.7 PG (ref 26.5–33)
MCHC RBC AUTO-ENTMCNC: 32 G/DL (ref 31.5–36.5)
MCV RBC AUTO: 83 FL (ref 78–100)
PLATELET # BLD AUTO: 447 10E9/L (ref 150–450)
POTASSIUM SERPL-SCNC: 4 MMOL/L (ref 3.4–5.3)
RBC # BLD AUTO: 3.56 10E12/L (ref 4.4–5.9)
SODIUM SERPL-SCNC: 138 MMOL/L (ref 133–144)
WBC # BLD AUTO: 27.7 10E9/L (ref 4–11)

## 2019-02-07 PROCEDURE — 25000132 ZZH RX MED GY IP 250 OP 250 PS 637: Performed by: PODIATRIST

## 2019-02-07 PROCEDURE — 99233 SBSQ HOSP IP/OBS HIGH 50: CPT | Performed by: INTERNAL MEDICINE

## 2019-02-07 PROCEDURE — 25000132 ZZH RX MED GY IP 250 OP 250 PS 637: Performed by: INTERNAL MEDICINE

## 2019-02-07 PROCEDURE — 00000146 ZZHCL STATISTIC GLUCOSE BY METER IP

## 2019-02-07 PROCEDURE — 99233 SBSQ HOSP IP/OBS HIGH 50: CPT | Performed by: PODIATRIST

## 2019-02-07 PROCEDURE — 25000128 H RX IP 250 OP 636: Performed by: INTERNAL MEDICINE

## 2019-02-07 PROCEDURE — 12000000 ZZH R&B MED SURG/OB

## 2019-02-07 PROCEDURE — 86140 C-REACTIVE PROTEIN: CPT | Performed by: INTERNAL MEDICINE

## 2019-02-07 PROCEDURE — 36415 COLL VENOUS BLD VENIPUNCTURE: CPT | Performed by: INTERNAL MEDICINE

## 2019-02-07 PROCEDURE — 85027 COMPLETE CBC AUTOMATED: CPT | Performed by: INTERNAL MEDICINE

## 2019-02-07 PROCEDURE — 80048 BASIC METABOLIC PNL TOTAL CA: CPT | Performed by: INTERNAL MEDICINE

## 2019-02-07 PROCEDURE — 25800025 ZZH RX 258: Performed by: INTERNAL MEDICINE

## 2019-02-07 RX ORDER — NALOXONE HYDROCHLORIDE 0.4 MG/ML
.1-.4 INJECTION, SOLUTION INTRAMUSCULAR; INTRAVENOUS; SUBCUTANEOUS
Status: DISCONTINUED | OUTPATIENT
Start: 2019-02-07 | End: 2019-02-07

## 2019-02-07 RX ADMIN — PIPERACILLIN SODIUM,TAZOBACTAM SODIUM 3.38 G: 3; .375 INJECTION, POWDER, FOR SOLUTION INTRAVENOUS at 04:23

## 2019-02-07 RX ADMIN — ACETAMINOPHEN 975 MG: 325 TABLET, FILM COATED ORAL at 20:56

## 2019-02-07 RX ADMIN — DEXTROSE MONOHYDRATE 50 ML: 500 INJECTION PARENTERAL at 07:53

## 2019-02-07 RX ADMIN — PIPERACILLIN SODIUM,TAZOBACTAM SODIUM 3.38 G: 3; .375 INJECTION, POWDER, FOR SOLUTION INTRAVENOUS at 16:23

## 2019-02-07 RX ADMIN — ACETAMINOPHEN 975 MG: 325 TABLET, FILM COATED ORAL at 04:23

## 2019-02-07 RX ADMIN — ASPIRIN 81 MG: 81 TABLET, COATED ORAL at 08:14

## 2019-02-07 RX ADMIN — ACETAMINOPHEN 975 MG: 325 TABLET, FILM COATED ORAL at 13:16

## 2019-02-07 RX ADMIN — PREGABALIN 100 MG: 100 CAPSULE ORAL at 08:14

## 2019-02-07 RX ADMIN — PREGABALIN 100 MG: 100 CAPSULE ORAL at 22:02

## 2019-02-07 RX ADMIN — PREGABALIN 100 MG: 100 CAPSULE ORAL at 16:23

## 2019-02-07 RX ADMIN — INSULIN DEGLUDEC INJECTION 10 UNITS: 100 INJECTION, SOLUTION SUBCUTANEOUS at 11:01

## 2019-02-07 RX ADMIN — PIPERACILLIN SODIUM,TAZOBACTAM SODIUM 3.38 G: 3; .375 INJECTION, POWDER, FOR SOLUTION INTRAVENOUS at 10:33

## 2019-02-07 RX ADMIN — PIPERACILLIN SODIUM,TAZOBACTAM SODIUM 3.38 G: 3; .375 INJECTION, POWDER, FOR SOLUTION INTRAVENOUS at 22:02

## 2019-02-07 RX ADMIN — OXYCODONE HYDROCHLORIDE 5 MG: 5 TABLET ORAL at 04:31

## 2019-02-07 ASSESSMENT — ACTIVITIES OF DAILY LIVING (ADL)
ADLS_ACUITY_SCORE: 14
ADLS_ACUITY_SCORE: 15
ADLS_ACUITY_SCORE: 12
ADLS_ACUITY_SCORE: 14
ADLS_ACUITY_SCORE: 12
ADLS_ACUITY_SCORE: 12

## 2019-02-07 ASSESSMENT — MIFFLIN-ST. JEOR: SCORE: 1550.38

## 2019-02-07 NOTE — PLAN OF CARE
A&Ox4. VSS on RA. Pain in L foot controlled with scheduled tylenol and PRN oxycodone x1. Dressing to L foot CDI. Numbness and tingling in L foot. NWB on L foot. Ax2 to sit on commode. Unable to void during day shift, able to void 800cc in bathroom on evening shift. Low carb diet. IV SL in between IV abx. Podiatry recommending partial amputation, pt refusing surgery.

## 2019-02-07 NOTE — PROGRESS NOTES
[facility]    Infectious Disease Progress Note    Date of Service (when I saw the patient): 02/07/2019     Assessment & Plan   Eduardo Walton is a 48 year old male who was admitted on 2/4/2019.     Impression:  1. 48 y.o male with type 1 diabetes.   2. Neuropathy.   3. CKD stage 3.   4. History of osteo in the left big toe last year treated with 6 weeks of IV antibiotics, no surgery.   5. Admitted this occasion with left foot ulcer MRI suggesting osteo on the calcaneous.   6. S/P I and D of the soft tissue.   7. Cultures positive for group B strep.   8. No history of MRSA.   9. More surgery is being recommended by podiatry with vascular work up, but patient is reluctant. He has osteomyelitis of the left hallux and gas gangrene posterior left heel with underlying osteo.       Recommendations:   He is on IV zosyn. Group B strep in the cultures.   I had a long discussion with patient today, he is very resistant to any more surgeries on the foot, I explained hospital antibiotics alone, IV or oral would not be enough to cure osteomyelitis.   Patient wants to take a second opinion with Yarsani, he tells me that last year the osteomyelitis was treated with IV antibiotics, and that he can achieve excellent wound care with more persistent care.   Will still recommend some surgical intervention. His CRP is 213 and Sedrate was 78, I explained based on MRI, Podiatry`s assessment and inflammatory markers this is osteomyelitis, but refuses to believe he has a bone infection.......        Yeimy Chavez MD    Interval History   Afebrile     Physical Exam   Temp: 97.4  F (36.3  C) Temp src: Oral BP: 107/53 Pulse: 62 Heart Rate: 66 Resp: 16 SpO2: 94 % O2 Device: None (Room air)    Vitals:    02/04/19 2114 02/06/19 0540 02/07/19 0623   Weight: 70.3 kg (155 lb) 70 kg (154 lb 5.2 oz) 68 kg (149 lb 14.6 oz)     Vital Signs with Ranges  Temp:  [97.4  F (36.3  C)-98.7  F (37.1  C)] 97.4  F (36.3  C)  Pulse:  [62]  62  Heart Rate:  [66-72] 66  Resp:  [16] 16  BP: (107-122)/(53-58) 107/53  SpO2:  [94 %] 94 %    Constitutional: Awake, alert, cooperative, no apparent distress  Lungs: Clear to auscultation bilaterally, no crackles or wheezing  Cardiovascular: Regular rate and rhythm, normal S1 and S2, and no murmur noted  Abdomen: Normal bowel sounds, soft, non-distended, non-tender  Skin: No rashes, no cyanosis, no edema  Other:    Medications       acetaminophen  975 mg Oral Q8H     aspirin  81 mg Oral Daily     insulin aspart  1-6 Units Subcutaneous TID w/meals     insulin degludec  10 Units Subcutaneous Daily     piperacillin-tazobactam  3.375 g Intravenous Q6H     pregabalin  100 mg Oral TID     senna-docusate  1 tablet Oral BID    Or     senna-docusate  2 tablet Oral BID     sodium chloride (PF)  3 mL Intracatheter Q8H       Data   All microbiology laboratory data reviewed.  Recent Labs   Lab Test 02/07/19  0945 02/06/19  0925 02/05/19  0850   WBC 27.7* 22.9* 29.3*   HGB 9.5* 8.8* 10.4*   HCT 29.7* 27.0* 32.1*   MCV 83 83 83    366 446     Recent Labs   Lab Test 02/07/19  0945 02/06/19  0925 02/05/19  0850   CR 1.51* 1.29*  1.28* 1.31*     Recent Labs   Lab Test 02/04/19  2125   SED 78*     Recent Labs   Lab Test 02/05/19  1848 02/05/19  1847 02/04/19  2345 02/04/19  2333   CULT Moderate growth  Streptococcus agalactiae sero group B  *  Culture in progress  Culture negative monitoring continues Heavy growth  Streptococcus agalactiae sero group B  Susceptibility testing in progress  *  Culture in progress  Culture negative monitoring continues No growth after 2 days No growth after 2 days

## 2019-02-07 NOTE — PROGRESS NOTES
[facility]  February 7, 2019    A/P:  48-year old male with uncontrolled type 1 DM with osteomyelitis of the left hallux and gas gangrene posterior left heel status post radical debridement of the soft tissues.  MRI consistent with calcaneal osteomyelitis.     -Discussed condition and treatment options including pros and cons.  -I agree with Dr Marsh's detailed plan/discussion yesterday.  These points were reiterated to the pt.  -I think BKA is the best option for him.  Radical partial calcanectomy and hallux amputation would at best leave him with a minimally functional foot and a large open wound, high chance of nonhealing wound/infection, need for long term bracing even if it does heal.  Discussed risk of sepsis, death.  -Concern for microvascular disease.  I would like Vascular's opinion on this before considering further foot surgery.  -Foot looks stable today, no need for urgent repeat debridement.  -Pt remains very resistant to further intervention.  He asked about skin grafting, of which there are no good options for his heel wound.  This was explained.  Loss of local tissue is extensive.  I also explained the rationale behind BKA as an optimal amputation level from a functional standpoint due to prosthetic options.  -Pt remains very resistant to any further surgery at this point, but did agree to discuss the option of BKA with Ortho.  Discussed with hospitalist Dr. Luque, who will be placing this consult.  -Dr. Pleitez will f/u on plan tomorrow.    Jorge Alberto Cornell DPM, FACFAS  Pager: [phone]    Billing based on time today (>35 minutes) with >50% spent on counseling and coordination of care, including floor time.    S:  Pt seen at bedside.  Reports some foot pain overnight, now resolved.  Pt is very reluctant to proceed with further surgery.  Remains resistant to amputation.    O:  /53 (BP Location: Left arm)   Pulse 62   Temp 97.4  F (36.3  C) (Oral)   Resp 16   " Ht 1.753 m (5' 9\")   Wt 68 kg (149 lb 14.6 oz)   SpO2 94%   BMI 22.14 kg/m    Gen:  NAD.    Vascular:  I did not readily palpate his pedal pulses, left foot.  ABIs normal and triphasic waveforms distally.     Neuro: Light touch sensation is profoundly diminished in is feet.     Derm:   1) left hallux:  The distal 1/3 of the toe is dry, hyperkeratotic eschar.  No erythema or edema.   Stable.      2) Left rearfoot:  There is an extensive open wound on the posteroplantar left heel with a large portion of the calcaneal tuber exposed with dry periosteum. No malodor. No purulence. Some residual erythema and stable  necrotic tissue around the margins of the wound.      MRI Left Ankle:  IMPRESSION: Large ulcer adjacent to the calcaneal tuberosity. There is  prominent focus of bone marrow edema in the posterolateral-most aspect  of the calcaneus as seen on coronal series 501 image 21 consistent  with osteomyelitis.              "

## 2019-02-07 NOTE — PROGRESS NOTES
"[facility] P[facility]IAT[facility]/FOOT & ANKLE SURGERY      Assessment:   48-year old male with uncontrolled type 1 DM with osteomyelitis of the left hallux and gas gangrene posterior left heel status post radical debridement of the soft tissues.  MRI consistent with calcaneal osteomyelitis.     Plan:  Vascular surgery plan noted: Patient is refusing angiography at this time.    I had a long discussion with the patient and his wife regarding his left hallux and the posterior heel area.  I explained that we recommend amputation of the hallux.  I showed him the XR images and discussed the level of amputation.  However, work at this level is really pointless unless the posterior calcaneus is addressed.    I explained that the more concerning problem is his heel.  The MRI is consistent with osteomyelitis.  He has a large portion of the posterior calcaneus exposed, perhaps covered by dry periosteum. I explained that additional debridement and partial calcanectomy is recommended. This would leave a very large open wound that will take months to heel. The surgery will likely compromise the attachment of his Achilles tendon and permanently alter the function of the foot.  If he is able to heal the wound, he will need a brace for life.  I did offer bone biopsy for more evidence of calcaneal osteomyelitis.      Given the extensive loss of soft tissue, posterior left heel, a below knee amputation is a very reasonable option.  This would likely have a better chance to heal and be most functional.  I explained this to them.      Although he did ask good questions and listen to my opinion regarding surgical intervention, he is not interested at this time. He said he has to leave the hospital within 7 days, \"I will be maxed out.\"  He does not want to wait around for a bone biopsy.  He explained that his plan is to consider removing non viable tissue and treating with IV antibiotics.  It was explained that IV antibiotics alone are not enough.  He " also has CKD and acknowledges the impact antibiotics have on his kidneys.     I encouraged him to consider the above discussion, consider a orthopedic surgery consultation to learn more about BKA, to discuss antibiotic treatment with infectious disease and his thoughts about options with the hospitalist.      Dr. Cornell will follow up for our team tomorrow and provide his opinion.  We are reluctant to perform any radical heel surgery without the patient following the advice of vascular surgery.     I feel that I informed the patient of his condition and options thoroughly, to help him make informed decisions.    Greater than 50 min was spent on floor time involving face-to-face counseling and coordination of care, review of pertinent results, the above discussion and documentation.  .      Yang Marsh, DONALD, FACFAS, MS  [place] Department of Podiatry/Foot & Ankle Surgery  [facility], and [facility]  Pager:  [phone]    _______________________________________________________________________      Subjective:  No complaints.  Is expressing wish to avoid surgical intervention.    Exam:    B/P: 146/70, T: 98.3, P: 77, R: 16    Vascular:  I did not readily palpate his pedal pulses, left foot.  ABIs normal and triphasic waveforms distally.    Neuro: light touch sensation is profoundly diminished in is feet.    Derm:   1) left hallux:  The distal 1/3 of the toe is dry, hyperkeratotic eschar.  No erythema or edema.   Stable.     2) Left rearfoot:  There is an extensive open wound on the posteroplantar left heel with a large portion of the calcaneal tuber exposed with dry periosteum. No malodor. No purulence. Some residual erythema and  necrotic tissue around the margins of the wound.     MRI Left Ankle:  IMPRESSION: Large ulcer adjacent to the calcaneal tuberosity. There is  prominent focus of bone marrow edema in the posterolateral-most aspect  of the calcaneus as seen on coronal series 501 image 21  consistent  with osteomyelitis.     LENNY FISHER MD    HgbA1C:  10.9  ESR:  78    2/4  CRP:  253.0  2/4    Lab Results   Component Value Date    WBC 22.9 02/06/2019     Lab Results   Component Value Date    RBC 3.25 02/06/2019     Lab Results   Component Value Date    HGB 8.8 02/06/2019     Lab Results   Component Value Date    HCT 27.0 02/06/2019     No components found for: MCT  Lab Results   Component Value Date    MCV 83 02/06/2019     Lab Results   Component Value Date    MCH 27.1 02/06/2019     Lab Results   Component Value Date    MCHC 32.6 02/06/2019     Lab Results   Component Value Date    RDW 13.7 02/06/2019     Lab Results   Component Value Date     02/06/2019       All cultures:  Recent Labs   Lab 02/05/19  1848 02/05/19  1847 02/04/19  2345 02/04/19  2333   CULT Moderate growth  Streptococcus agalactiae sero group B  *  Culture in progress  Culture negative monitoring continues Heavy growth  Streptococcus agalactiae sero group B  *  Culture in progress  Culture negative monitoring continues No growth after 1 day No growth after 1 day       Hemoglobin   Date Value Ref Range Status   02/06/2019 8.8 (L) 13.3 - 17.7 g/dL Final

## 2019-02-07 NOTE — PROGRESS NOTES
"[facility]  Hospitalist Progress Note  Charles Luque MD  02/07/2019    Assessment & Plan   Mr. Walton is a 47 y/o male with poorly controlled DM and hx of reccurent left foot infections who presented with quickly worsening left foot swelling with fevers.  He had recently received some prednisone to outpatient to help with \"Inflammation.\"   Imaging suggests osteomyelitis and gas gangrene.     Left diabetic foot infection  Osteomyelitis of calcaneus and 1st great toe  s/p I and D of necrotic heel ulcer  -  continue on zosyn for group B strep on micro, appreciate ID consult  -  Podiatry recommending BKA,   -  patient \"thinks\" he can heel the wound would good blood flow, wound care  - orthopedics consulted for BKA informational meeting only      Poorly controlled DM (A1C > 10) with reported neuropathy and retinopathy:  - on tresiba, meal carb count novolog + sliding scale insulin.    - hypolgycemic this AM  - decrease tresiba from 30 to 10 units     CAD, prior stent:  -  Self stopped all meds.  He felt the metoprolol took away his hypoglycemic awareness and that the other meds didn't make him feel well.  He did not want an ASA though after we talked he decided an 81 mg daily ASA would be acceptable post-op.       Diet:   -  Mod CHO diet  -  SL IVF    DVT Prophylaxis: Patient refused heparin subcut that was advised    Martinez Catheter: not present    Code Status: Full Code          Disposition Plan     Expected discharge: > 2 days, recommended to prior living arrangement once infection treated/improving.          Interval History   -- hypoglycemic this AM  -- podiatry notes reviewed extensively  -- discussed with Dr Cornell and patient    -Data reviewed today: I reviewed all new labs and imaging over the last 24 hours. I personally reviewed no images or EKG's today.    Physical Exam   Heart Rate: 66, Blood pressure 107/53, pulse 62, temperature 97.4  F (36.3  C), temperature source Oral, resp. rate " "16, height 1.753 m (5' 9\"), weight 68 kg (149 lb 14.6 oz), SpO2 94 %.  Vitals:    02/04/19 2114 02/06/19 0540 02/07/19 0623   Weight: 70.3 kg (155 lb) 70 kg (154 lb 5.2 oz) 68 kg (149 lb 14.6 oz)     Vital Signs with Ranges  Temp:  [97.4  F (36.3  C)-98.7  F (37.1  C)] 97.4  F (36.3  C)  Pulse:  [62] 62  Heart Rate:  [66-72] 66  Resp:  [16] 16  BP: (107-122)/(53-58) 107/53  SpO2:  [94 %] 94 %  I/O's Last 24 hours  I/O last 3 completed shifts:  In: 910 [I.V.:910]  Out: 935 [Urine:935]    Constitutional: Awake, alert, cooperative, no apparent distress  Respiratory: Clear to auscultation bilaterally, no crackles or wheezing  Cardiovascular: Regular rate and rhythm, normal S1 and S2, and no murmur noted  GI: Normal bowel sounds, soft, non-distended, non-tender  Skin/Integumen: No rashes, no cyanosis, no edema, foot in dressing  Other:      Medications   All medications were reviewed.      acetaminophen  975 mg Oral Q8H     aspirin  81 mg Oral Daily     insulin aspart  1-6 Units Subcutaneous TID w/meals     insulin degludec  10 Units Subcutaneous Daily     piperacillin-tazobactam  3.375 g Intravenous Q6H     pregabalin  100 mg Oral TID     senna-docusate  1 tablet Oral BID    Or     senna-docusate  2 tablet Oral BID     sodium chloride (PF)  3 mL Intracatheter Q8H        Data   Recent Labs   Lab 02/07/19  0945 02/06/19  0925 02/05/19  0850 02/04/19  2125   WBC 27.7* 22.9* 29.3* 24.7*   HGB 9.5* 8.8* 10.4* 10.1*   MCV 83 83 83 81    366 446 396    138 140 136   POTASSIUM 4.0 3.6 4.3 4.3   CHLORIDE 105 104 104 99   CO2 29 27 30 30   BUN 31* 26 33* 35*   CR 1.51* 1.29*  1.28* 1.31* 1.42*   ANIONGAP 4 7 6 7   GABRIELA 8.1* 8.0* 9.0 9.0   * 108* 198* 165*   ALBUMIN  --   --   --  2.3*   PROTTOTAL  --   --   --  7.4   BILITOTAL  --   --   --  0.7   ALKPHOS  --   --   --  130   ALT  --   --   --  18   AST  --   --   --  21       Recent Results (from the past 24 hour(s))   US JADEN Doppler No Exercise    Narrative "    US JADEN DOPPLER NO EXERCISE, 1-2 LEVELS,??? BILAT   2/6/2019 1:20 PM     HISTORY: Left foot wound    COMPARISON: None.    FINDINGS:  Right JADEN: 1.4.  Left JADEN: 1.31.    Waveforms: Triphasic bilaterally      Impression    IMPRESSION: Normal ABIs bilaterally without evidence of arterial  insufficiency.    JADEN CRITERIA:  >0.95 Normal  0.90 - 0.94 Mild  0.5 - 0.89 Moderate  0.2 - 0.49 Severe  <0.2 Critical    DO Charles GUILLEN MD  Text Page  (7am to 6pm)

## 2019-02-07 NOTE — OP NOTE
Procedure Date: 02/05/2019      SURGEON:  Chetan Potter DPM      PREOPERATIVE DIAGNOSES:   1.  Poorly controlled diabetes mellitus type 1.   2.  Peripheral arterial disease.   3.  Gas gangrene, left heel.   4.  Osteomyelitis, left hallux, distal phalanx.      POSTOPERATIVE DIAGNOSES:   1.  Poorly controlled diabetes mellitus type 1.   2.  Peripheral arterial disease.   3.  Gas gangrene, left heel.   4.  Osteomyelitis, left hallux, distal phalanx.      PROCEDURE:     1.  Irrigation and debridement, left heel, 6.5 x 7.0 cm down to and including deep fascia.   2.  Advised left hallux partial amputation, but patient refused.      PATHOLOGY:  Tissue was sent off for aerobic and anaerobic cultures and Gram stain.      ANESTHESIA:  MAC with local.      HEMOSTASIS:  None.      ESTIMATED BLOOD LOSS:  10 mL.      MATERIALS:  None.      INJECTABLES:  10 mL of 0.5% ropivacaine plain.      COMPLICATIONS:  None apparent.      INDICATIONS FOR PROCEDURE:  The patient is a 48-year-old neuropathic, poorly controlled type 1 diabetic male with peripheral arterial disease who presented with a gangrenous left heel wound and a chronic left hallux ulcer with x-ray/CT consistent with gas gangrene of the left heel and osteomyelitis of the distal phalanx.  We talked about I and D of the heel and partial left hallux amputation.  The patient indicated he had been on 6 weeks of IV antibiotics and believed the osteomyelitis was resolved and so refused intervention on the left great toe.  I offered a biopsy of the toe to assess for osteomyelitis, but he declined.      DESCRIPTION OF PROCEDURE:  After obtaining written consent, the patient was transferred to the operating room and placed in supine position on the operating table.  IV sedation was initiated.  The foot was anesthetized with preoperative local.  It was then prepped and draped in normal aseptic fashion.  No tourniquet was used.  The patient, procedure, and site were correctly  identified by OR staff.      Attention was directed to the left heel.  The patient had a very large necrotic malodorous eschar.  The incision was carried down full thickness and the eschar was excised, 6.5 x 7 cm in greatest dimensions.  The necrotic nonviable tissue was sharply debrided utilizing a #15 blade and a rongeur, excising all nonviable tissue of the above dimensions down to and including deep fascia.  The calcaneus was not exposed, but the soft tissue coverage was limited.  I also noticed that for the size of the wound, there was sparse bleeding from the surgical sites.  After adequate hemostasis was achieved, the wound was flushed with copious amounts of double antibiotic-impregnated normal saline.      A dry sterile dressing was applied to the patient's left heel.  He appeared to tolerate the procedure and anesthesia well and was transferred to the PACU with vital signs stable and vascular status intact to remaining portions of the foot.  The patient will be readmitted to the floor for IV antibiotics and monitoring.  I did order an MRI of the left ankle to assess for osteomyelitis.         PAUL GUSMAN DPM             D: 2019   T: 2019   MT: NIMCO      Name:     LILLY SAL   MRN:      [id number]        Account:        [id number]   :      1970           Procedure Date: 2019      Document: [id number]

## 2019-02-07 NOTE — PROGRESS NOTES
MD Notification    Notified Person: MD    Notified Person Name: Dr. Luque    Notification Date/Time: 2/7/19@ 1014hrs    Notification Interaction:  Web page    Purpose of Notification: BG this am, 31, D50 given, now 120. Is it ok to give Tresiba 28u.     Orders Received: Give 10u of Tresiba    Comments:

## 2019-02-07 NOTE — PLAN OF CARE
PT: Chart reviewed, discussed with pt. After long discussion and much education regarding mobility implications with being NWB, equipment options to promote IND and safety at home, and adhering to NWB to promote wound healing if able without surgical intervention, pt eventually agreeable to participate in PT prior to disch to determine most appropriate equipment needs, pt requesting therapy return tomorrow morning.

## 2019-02-07 NOTE — PROGRESS NOTES
Chart checked and discussed option of BKA  At this time patient is weighing options. He is aware that this is inevitable but would like to take time and learn as much about his options as possible.    I will have one of our prosthesis experts come out and discuss prosthesis options and course of treatment after surgery.    I will also ask orthopedic surgeon to see patient as time permits    Yodit Barron PAC

## 2019-02-07 NOTE — PLAN OF CARE
A/OX4. VSS on RA. L foot w/ drsg, CDI, elevated on pillows, NWB. Pt noncompliant at times w/ NWB.Pain managed w/ jacob Tylenol. AX1 to sit on the commode and stands to void when in the bathroom. Pt uses his Glucometer to check BG, staff checks BG at times.

## 2019-02-08 ENCOUNTER — APPOINTMENT (OUTPATIENT)
Dept: ULTRASOUND IMAGING | Facility: CLINIC | Age: 49
End: 2019-02-08
Attending: INTERNAL MEDICINE
Payer: COMMERCIAL

## 2019-02-08 LAB
ALBUMIN UR-MCNC: 10 MG/DL
ANION GAP SERPL CALCULATED.3IONS-SCNC: 4 MMOL/L (ref 3–14)
APPEARANCE UR: CLEAR
BACTERIA SPEC CULT: ABNORMAL
BILIRUB UR QL STRIP: NEGATIVE
BUN SERPL-MCNC: 35 MG/DL (ref 7–30)
CALCIUM SERPL-MCNC: 8.3 MG/DL (ref 8.5–10.1)
CHLORIDE SERPL-SCNC: 103 MMOL/L (ref 94–109)
CO2 SERPL-SCNC: 29 MMOL/L (ref 20–32)
COLOR UR AUTO: YELLOW
COPATH REPORT: NORMAL
CREAT SERPL-MCNC: 1.73 MG/DL (ref 0.66–1.25)
CREAT UR-MCNC: 109 MG/DL
CRP SERPL-MCNC: 250 MG/L (ref 0–8)
ERYTHROCYTE [DISTWIDTH] IN BLOOD BY AUTOMATED COUNT: 14.1 % (ref 10–15)
GFR SERPL CREATININE-BSD FRML MDRD: 46 ML/MIN/{1.73_M2}
GLUCOSE BLDC GLUCOMTR-MCNC: 72 MG/DL (ref 70–99)
GLUCOSE SERPL-MCNC: 78 MG/DL (ref 70–99)
GLUCOSE UR STRIP-MCNC: NEGATIVE MG/DL
HCT VFR BLD AUTO: 29.4 % (ref 40–53)
HGB BLD-MCNC: 9.6 G/DL (ref 13.3–17.7)
HGB UR QL STRIP: NEGATIVE
KETONES UR STRIP-MCNC: NEGATIVE MG/DL
LEUKOCYTE ESTERASE UR QL STRIP: NEGATIVE
MCH RBC QN AUTO: 26.8 PG (ref 26.5–33)
MCHC RBC AUTO-ENTMCNC: 32.7 G/DL (ref 31.5–36.5)
MCV RBC AUTO: 82 FL (ref 78–100)
MUCOUS THREADS #/AREA URNS LPF: PRESENT /LPF
NITRATE UR QL: NEGATIVE
PH UR STRIP: 5 PH (ref 5–7)
PLATELET # BLD AUTO: 455 10E9/L (ref 150–450)
POTASSIUM SERPL-SCNC: 4.4 MMOL/L (ref 3.4–5.3)
PROT UR-MCNC: 0.35 G/L
PROT/CREAT 24H UR: 0.32 G/G CR (ref 0–0.2)
RBC # BLD AUTO: 3.58 10E12/L (ref 4.4–5.9)
RBC #/AREA URNS AUTO: 1 /HPF (ref 0–2)
SODIUM SERPL-SCNC: 136 MMOL/L (ref 133–144)
SOURCE: ABNORMAL
SP GR UR STRIP: 1.02 (ref 1–1.03)
SPECIMEN SOURCE: ABNORMAL
URATE CRY #/AREA URNS HPF: ABNORMAL /HPF
UROBILINOGEN UR STRIP-MCNC: NORMAL MG/DL (ref 0–2)
WBC # BLD AUTO: 25.9 10E9/L (ref 4–11)
WBC #/AREA URNS AUTO: 3 /HPF (ref 0–5)

## 2019-02-08 PROCEDURE — 81001 URINALYSIS AUTO W/SCOPE: CPT | Performed by: INTERNAL MEDICINE

## 2019-02-08 PROCEDURE — 25000132 ZZH RX MED GY IP 250 OP 250 PS 637: Performed by: PODIATRIST

## 2019-02-08 PROCEDURE — 25000132 ZZH RX MED GY IP 250 OP 250 PS 637: Performed by: INTERNAL MEDICINE

## 2019-02-08 PROCEDURE — 40000902 ZZH STATISTIC WOC PT EDUCATION, 16-30 MIN

## 2019-02-08 PROCEDURE — 76770 US EXAM ABDO BACK WALL COMP: CPT

## 2019-02-08 PROCEDURE — 86140 C-REACTIVE PROTEIN: CPT | Performed by: INTERNAL MEDICINE

## 2019-02-08 PROCEDURE — 12000000 ZZH R&B MED SURG/OB

## 2019-02-08 PROCEDURE — 99233 SBSQ HOSP IP/OBS HIGH 50: CPT | Performed by: INTERNAL MEDICINE

## 2019-02-08 PROCEDURE — G0463 HOSPITAL OUTPT CLINIC VISIT: HCPCS

## 2019-02-08 PROCEDURE — 25000128 H RX IP 250 OP 636: Performed by: INTERNAL MEDICINE

## 2019-02-08 PROCEDURE — 82565 ASSAY OF CREATININE: CPT | Performed by: INTERNAL MEDICINE

## 2019-02-08 PROCEDURE — 00000146 ZZHCL STATISTIC GLUCOSE BY METER IP

## 2019-02-08 PROCEDURE — 97602 WOUND(S) CARE NON-SELECTIVE: CPT

## 2019-02-08 PROCEDURE — 40000893 ZZH STATISTIC PT IP EVAL DEFER: Performed by: PHYSICAL THERAPIST

## 2019-02-08 PROCEDURE — 85027 COMPLETE CBC AUTOMATED: CPT | Performed by: INTERNAL MEDICINE

## 2019-02-08 PROCEDURE — 84156 ASSAY OF PROTEIN URINE: CPT | Performed by: INTERNAL MEDICINE

## 2019-02-08 PROCEDURE — 36415 COLL VENOUS BLD VENIPUNCTURE: CPT | Performed by: INTERNAL MEDICINE

## 2019-02-08 PROCEDURE — 80048 BASIC METABOLIC PNL TOTAL CA: CPT | Performed by: INTERNAL MEDICINE

## 2019-02-08 RX ADMIN — ACETAMINOPHEN 975 MG: 325 TABLET, FILM COATED ORAL at 05:49

## 2019-02-08 RX ADMIN — PREGABALIN 100 MG: 100 CAPSULE ORAL at 21:30

## 2019-02-08 RX ADMIN — PIPERACILLIN SODIUM,TAZOBACTAM SODIUM 3.38 G: 3; .375 INJECTION, POWDER, FOR SOLUTION INTRAVENOUS at 05:46

## 2019-02-08 RX ADMIN — PREGABALIN 100 MG: 100 CAPSULE ORAL at 17:33

## 2019-02-08 RX ADMIN — ACETAMINOPHEN 975 MG: 325 TABLET, FILM COATED ORAL at 14:26

## 2019-02-08 RX ADMIN — INSULIN DEGLUDEC INJECTION 10 UNITS: 100 INJECTION, SOLUTION SUBCUTANEOUS at 08:37

## 2019-02-08 RX ADMIN — PIPERACILLIN SODIUM,TAZOBACTAM SODIUM 3.38 G: 3; .375 INJECTION, POWDER, FOR SOLUTION INTRAVENOUS at 10:07

## 2019-02-08 RX ADMIN — PREGABALIN 100 MG: 100 CAPSULE ORAL at 08:37

## 2019-02-08 RX ADMIN — ASPIRIN 81 MG: 81 TABLET, COATED ORAL at 08:37

## 2019-02-08 RX ADMIN — ACETAMINOPHEN 650 MG: 325 TABLET, FILM COATED ORAL at 20:11

## 2019-02-08 RX ADMIN — PIPERACILLIN SODIUM,TAZOBACTAM SODIUM 3.38 G: 3; .375 INJECTION, POWDER, FOR SOLUTION INTRAVENOUS at 17:39

## 2019-02-08 ASSESSMENT — ACTIVITIES OF DAILY LIVING (ADL)
ADLS_ACUITY_SCORE: 14
ADLS_ACUITY_SCORE: 15
ADLS_ACUITY_SCORE: 15
ADLS_ACUITY_SCORE: 14
ADLS_ACUITY_SCORE: 15
ADLS_ACUITY_SCORE: 14

## 2019-02-08 ASSESSMENT — MIFFLIN-ST. JEOR: SCORE: 1547.38

## 2019-02-08 NOTE — PROGRESS NOTES
[facility]    Infectious Disease Progress Note    Date of Service (when I saw the patient): 02/08/2019     Assessment & Plan   Eduardo Walton is a 48 year old male who was admitted on 2/4/2019.     Impression:  1. 48 y.o male with type 1 diabetes.   2. Neuropathy.   3. CKD stage 3.   4. History of osteo in the left big toe last year treated with 6 weeks of IV antibiotics, no surgery.   5. Admitted this occasion with left foot ulcer MRI suggesting osteo on the calcaneous.   6. S/P I and D of the soft tissue.   7. Cultures positive for group B strep.   8. No history of MRSA.   9. More surgery is being recommended by podiatry with vascular work up, but patient is reluctant. He has osteomyelitis of the left hallux and gas gangrene posterior left heel with underlying osteo.       Recommendations:   Bone cultures now positive for staph aureus, staph lugdunensis and bacteroids along with group b strep. Zosyn for now follow up on the staph aureus MARCO.       Yeimy Chavez MD    Interval History   Afebrile   Creat continues to rise   WBC still elevated     Physical Exam   Temp: 99.4  F (37.4  C) Temp src: Axillary BP: 119/55   Heart Rate: 72 Resp: 18 SpO2: 96 % O2 Device: None (Room air)    Vitals:    02/06/19 0540 02/07/19 0623 02/08/19 0628   Weight: 70 kg (154 lb 5.2 oz) 68 kg (149 lb 14.6 oz) 67.7 kg (149 lb 4 oz)     Vital Signs with Ranges  Temp:  [97.3  F (36.3  C)-99.4  F (37.4  C)] 99.4  F (37.4  C)  Heart Rate:  [66-76] 72  Resp:  [16-18] 18  BP: (100-134)/(52-64) 119/55  SpO2:  [94 %-96 %] 96 %    Patient not seen today, did discuss care with Dr. Luque and patient`s wife.     Medications       acetaminophen  975 mg Oral Q8H     aspirin  81 mg Oral Daily     insulin aspart  1-6 Units Subcutaneous TID w/meals     insulin degludec  10 Units Subcutaneous Daily     piperacillin-tazobactam  3.375 g Intravenous Q6H     pregabalin  100 mg Oral TID     senna-docusate  1 tablet Oral BID    Or      senna-docusate  2 tablet Oral BID     sodium chloride (PF)  3 mL Intracatheter Q8H       Data   All microbiology laboratory data reviewed.  Recent Labs   Lab Test 02/08/19  0835 02/07/19  0945 02/06/19  0925   WBC 25.9* 27.7* 22.9*   HGB 9.6* 9.5* 8.8*   HCT 29.4* 29.7* 27.0*   MCV 82 83 83   * 447 366     Recent Labs   Lab Test 02/08/19  0835 02/07/19  0945 02/06/19  0925   CR 1.73* 1.51* 1.29*  1.28*     Recent Labs   Lab Test 02/04/19  2125   SED 78*     Recent Labs   Lab Test 02/05/19  1848 02/05/19  1847 02/04/19  2345 02/04/19  2333   CULT Moderate growth  Streptococcus agalactiae sero group B  Susceptibility testing done on previous specimen  *  Moderate growth  Staphylococcus aureus  Susceptibility testing in progress  *  Light growth  Staphylococcus lugdunensis  *  Culture in progress  Moderate growth  Bacteroides fragilis  Susceptibility testing not routinely done  *  Culture in progress Heavy growth  Streptococcus agalactiae sero group B  *  Culture in progress  Heavy growth  Bacteroides fragilis  Susceptibility testing not routinely done  * No growth after 3 days No growth after 3 days

## 2019-02-08 NOTE — PROGRESS NOTES
A&Ox4. VSS on RA. Left foot w/ dressing, CDI, elevated on pillows, non-weight bearing. Pt transfers to bathroom via sit on commode Ax1 and stands to void in bathroom. Pt scheduled for PT tomorrow morning to try out walker or scooter for at home. Continue zosyn q6hrs. Pt has BG sensor in right arm and checks periodically, orders to check BG q4hrs. Bedtime B. Pt denies pain, given scheduled tylenol. Followed by ID, podiatry, and ortho.

## 2019-02-08 NOTE — PLAN OF CARE
Patient alert/orient X4, up with sba/NWB to LLE.  Pt has dressing to LLE/wound nurse to see for dressing change.  Lungs clear on RA.  Trace edema to LLE.  BGM's taken from his insulin devise, 71/124.  Vascular consulted to see pt.  Getting IV antibiotics.  Vss, denied pain.

## 2019-02-08 NOTE — PROGRESS NOTES
Vascular Surgery Progress Note    JADEN reviewed, good waveform at ankle.  Chart reviewed and case discussed with Dr. Cornell of Podiatry.  Extensive foot wounds that would likely leave the foot to be nonfunctional and with a large open wound for extended period of time.  Likely small vessel disease given lack of blood flow at wound edges.  Current recommendations for BKA.  Patient has adequate blood flow to heal a BKA.  Orthopedics involved.     Vascular surgery will sign off, case discussed with Dr. Mclean.  Please call if any further questions or concerns.    Ale Flores MD  Vascular Surgery Fellow    I agree with the above.  Jhonny Mclean MD

## 2019-02-08 NOTE — PROGRESS NOTES
A/Ox4. VSS. RA. Ax1. CHO diet.NWB left foot. Left foot wrapped: CDI; elevated. BG 76, 72, 106. IV abx. Scheduled tylenol. PT, ortho, podiatry following. Discharge in 2+ days.

## 2019-02-08 NOTE — CONSULTS
[facility]    Nephrology Consultation     Date of Admission:  2/4/2019    Assessment & Plan      Eduardo Walton is a 48 year old male who was admitted on 2/4/2019.     1) Longstanding Type 1 DM with complications.    2) Diabetic Peripheral Neuropathy.    3) Diabetic Retinopathy.    4) CKD 3 at baseline.  Likely related to DM, though very low grade proteinuria.    5) YESSICA:  Cr up from 1.28 to 1.73.  He has been in this range before, but it should be improving in hospital with hydration etc. It may well be that his kidney function has suffered due to the presence of osteomyelitis in his foot.      6) Osteomyelitis of L foot in two different places.      Discussion:    I have told him that I think his kidney function may continue to decline in presence of uncontrolled infection.  I suspect, but cannot guarantee that his kidney function will improve if this infection is cured.  It appears that there is no cure but a L BKA.  If that is the case then I think there is a reason to believe that a L BKA will help preserve kidney function.          Everardo Whaley MD  [facility] - Nephrology  [phone]    Reason for Consult      I was asked to see the patient for A/CKD.    Primary Care Physician      Efren Conway    Chief Complaint     I am scared about my creatinine.      History is obtained from the patient and chart review.      History of Present Illness   Eduardo Walton is a 48 year old male who presents with acute on chronic renal failure.      He was admitted via ED on 2/4/19 after presenting with fever.      He has been battling LLE foot infection for some time.  He has been found to have osteomyelitis of the left hallux and osteomyelitis of L calcaneum.  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       He has had both of these areas debrided but they are now considered not salvageable.  He has had L BKA recommended, but he has been reluctant.      His kidney function has declined in  the last few days.  This has him very concerned.  Cr is up from 1.3 to 1.7.      U P/C ratio is 0.32 g/g    Renal US is normal.    CRP is up 250.    Review of past kidney function tests shows cr varying in the last couple of years from 1.06 to 1.67 with most falling in 1.3-1.6 range.      His DM has not been well controlled for some time.      Past Medical History    Type 1 DM since age 8  Diabetic peripheral neuropathy  Diabetic retinopathy  Proteinuria  Diabetic foot ulcer  H/O YESSICA  CAD with stent placement      Past Surgical History   I have reviewed this patient's surgical history and updated it with pertinent information if needed.  Past Surgical History:   Procedure Laterality Date     AMPUTATE FOOT Left 2019    Procedure: PARTIAL LEFT FOOT AMPUTATION.;  Surgeon: Chetan Potter DPM;  Location: [facility] OR     STENT     pan retinal laser  photocoagulation.      Prior to Admission Medications   Prior to Admission Medications   Prescriptions Last Dose Informant Patient Reported? Taking?   Continuous Blood Gluc Sensor (The Old ReaderYLE COLIN 14 DAY SENSOR) MISC Unknown at Unknown time  Yes No   Sig: USE WITH 14 DAY COLIN SYSTEM. CHANGE SENSOR EVERY 14 DAYS.   LYRICA 100 MG capsule 2019 at pm  Yes Yes   Sig: TAKE 1 CAPSULE BY MOUTH THREE TIMES A DAY   TRESIBA FLEXTOUCH 100 UNIT/ML pen 2019 at am  Yes Yes   Si Units daily   insulin lispro (HUMALOG KWIKPEN) 100 UNIT/ML pen 2019 at Unknown time  Yes No   Sig: Inject 1 unit/2 grams carb, total daily dose up to 80 units  Indications: Diabetes Mellitus      Facility-Administered Medications: None     Allergies   Allergies   Allergen Reactions     Fish-Derived Products Rash       Social History   I have reviewed this patient's social history and updated it with pertinent information if needed. Eduardo LIANG Anton  reports that  has never smoked. he has never used smokeless tobacco. He reports that he drinks alcohol.    Family History   I have reviewed this  patient's family history and updated it with pertinent information if needed.   Family History   Problem Relation Age of Onset     Ovarian Cancer Maternal Grandmother      Colon Cancer Maternal Grandmother      Prostate Cancer Maternal Grandfather      Ovarian Cancer Paternal Grandmother        Review of Systems   The 10 point Review of Systems is negative other than noted in the HPI.      Physical Exam   Temp: 98.3  F (36.8  C) Temp src: Oral BP: 125/57   Heart Rate: 72 Resp: 18 SpO2: 94 % O2 Device: None (Room air)    Vital Signs with Ranges  Temp:  [98.3  F (36.8  C)-99.4  F (37.4  C)] 98.3  F (36.8  C)  Heart Rate:  [72-76] 72  Resp:  [16-18] 18  BP: (119-134)/(55-64) 125/57  SpO2:  [94 %-96 %] 94 %  149 lbs 4.02 oz    GENERAL:  alert, NAD  HEENT:  Normocephalic. No gross abnormalities.  Pupils equal.  MMM.  Dentition is ok.  CV: RRR, soft systole M, no clicks, gallops, or rubs, trace edema, no carotid bruits  RESP: Clear bilaterally with good efforts  GI: Abdomen soft/nt/nd, BS normal. No masses, organomegaly  MUSCULOSKELETAL: L foot wrapped.  Photos of heel wound and great toe wound reviewed.  Atrophy of thenar muscles and interosseus muscles.    SKIN: no lesions with exception of L foot.    NEURO:  Weakness and numbness peripherally related to neuropathy.    PSYCH: a little angry  LYMPH: No palpable ant/post cervical and supraclavicular adenopathy    Data   BMP  Recent Labs   Lab 02/08/19  0835 02/07/19  0945 02/06/19  0925 02/05/19  0850    138 138 140   POTASSIUM 4.4 4.0 3.6 4.3   CHLORIDE 103 105 104 104   GABRIELA 8.3* 8.1* 8.0* 9.0   CO2 29 29 27 30   BUN 35* 31* 26 33*   CR 1.73* 1.51* 1.29*  1.28* 1.31*   GLC 78 123* 108* 198*     Phos@LABRCNTIPR(phos:4)  CBC)  Recent Labs   Lab 02/08/19  0835 02/07/19  0945 02/06/19  0925 02/05/19  0850   WBC 25.9* 27.7* 22.9* 29.3*   HGB 9.6* 9.5* 8.8* 10.4*   HCT 29.4* 29.7* 27.0* 32.1*   MCV 82 83 83 83   * 447 366 446     Recent Labs   Lab 02/04/19  9973    AST 21   ALT 18   ALKPHOS 130   BILITOTAL 0.7     No lab results found in last 7 days.  No results found for: D2VIT, D3VIT, DTOT  Recent Labs   Lab 02/08/19  0835   HGB 9.6*   HCT 29.4*   MCV 82     No results for input(s): PTHI in the last 168 hours.

## 2019-02-08 NOTE — PROGRESS NOTES
Prosthesis/TCO will be by this AM between 930 and 11am  Please ensure that patient in room and available    Yodit PENA

## 2019-02-08 NOTE — PROGRESS NOTES
"[facility]  Hospitalist Progress Note  Charles Luque MD  02/08/2019    Assessment & Plan   Mr. Walton is a 49 y/o male with poorly controlled DM and hx of reccurent left foot infections who presented with quickly worsening left foot swelling with fevers.  He had recently received some prednisone to outpatient to help with \"Inflammation.\"   Imaging suggests osteomyelitis and gas gangrene.     Left diabetic foot infection  Osteomyelitis of calcaneus and 1st great toe  s/p I and D of necrotic heel ulcer  -  continue on zosyn for group B strep on micro, appreciate ID consult  -  Podiatry recommending BKA,   - JADEN normal, suspect diabetic microvascular disease, appreciate vascular surgery consultation.  -  patient resistant to surgery but has shown more willingness in the past 24 hours, big part having to do with his kidney function.  - orthopedics consulted   - white count and crp continue to be elevated although no fevers and blood cultures are negative.     Poorly controlled DM (A1C  10.9) with reported neuropathy and retinopathy:  - on tresiba, meal carb count novolog + sliding scale insulin.    - hypolgycemic past 2 days, less so today  - decreased tresiba from 30 to 10 units yesterday, will decreased to 8 units.     CAD, prior stent:  -  Self stopped all meds.  He felt the metoprolol took away his hypoglycemic awareness and that the other meds didn't make him feel well.  He did not want an ASA though after we talked he decided an 81 mg daily ASA would be acceptable post-op.       CKD- acute on chronic  -- suspect baseline stage III kidney disease   -- 3 doses of vancomycin, last dose 0100 2/6  -- will check UA for protein  -- discussed with nephrology  -- patient extremely concerned for risk of ERSD and requiring HD and seems to be willings to be more open     Diet:   -  Mod CHO diet  -  SL IVF    DVT Prophylaxis: Patient refused heparin subcut that was advised    Martinez Catheter: not " "present    Code Status: Full Code          Disposition Plan     Expected discharge: > 2 days, recommended to prior living arrangement once infection treated/improving.        Interval History   -- hypoglycemic this AM  -- podiatry notes reviewed    -- discussed with RN, wife  -- had 30 minute conversation again about his current clinical situation.    -Data reviewed today: I reviewed all new labs and imaging over the last 24 hours. I personally reviewed no images or EKG's today.    Physical Exam   Heart Rate: 72, Blood pressure 119/55, pulse 62, temperature 99.4  F (37.4  C), temperature source Axillary, resp. rate 18, height 1.753 m (5' 9\"), weight 67.7 kg (149 lb 4 oz), SpO2 96 %.  Vitals:    02/06/19 0540 02/07/19 0623 02/08/19 0628   Weight: 70 kg (154 lb 5.2 oz) 68 kg (149 lb 14.6 oz) 67.7 kg (149 lb 4 oz)     Vital Signs with Ranges  Temp:  [97.3  F (36.3  C)-99.4  F (37.4  C)] 99.4  F (37.4  C)  Heart Rate:  [66-76] 72  Resp:  [16-18] 18  BP: (100-134)/(52-64) 119/55  SpO2:  [94 %-96 %] 96 %  I/O's Last 24 hours  I/O last 3 completed shifts:  In: 1580 [P.O.:1280; I.V.:300]  Out: 900 [Urine:900]    Constitutional: Awake, alert, cooperative, no apparent distress  Respiratory: Clear to auscultation bilaterally, no crackles or wheezing  Cardiovascular: Regular rate and rhythm, normal S1 and S2   GI: Normal bowel sounds, soft, non-distended, non-tender  Skin/Integumen: No rashes, no cyanosis, no edema, foot in dressing  Other:      Medications   All medications were reviewed.      acetaminophen  975 mg Oral Q8H     aspirin  81 mg Oral Daily     insulin aspart  1-6 Units Subcutaneous TID w/meals     insulin degludec  10 Units Subcutaneous Daily     piperacillin-tazobactam  3.375 g Intravenous Q6H     pregabalin  100 mg Oral TID     senna-docusate  1 tablet Oral BID    Or     senna-docusate  2 tablet Oral BID     sodium chloride (PF)  3 mL Intracatheter Q8H        Data   Recent Labs   Lab 02/08/19  0835 " 02/07/19  0945 02/06/19  0925  02/04/19  2125   WBC 25.9* 27.7* 22.9*   < > 24.7*   HGB 9.6* 9.5* 8.8*   < > 10.1*   MCV 82 83 83   < > 81   * 447 366   < > 396    138 138   < > 136   POTASSIUM 4.4 4.0 3.6   < > 4.3   CHLORIDE 103 105 104   < > 99   CO2 29 29 27   < > 30   BUN 35* 31* 26   < > 35*   CR 1.73* 1.51* 1.29*  1.28*   < > 1.42*   ANIONGAP 4 4 7   < > 7   GABRIELA 8.3* 8.1* 8.0*   < > 9.0   GLC 78 123* 108*   < > 165*   ALBUMIN  --   --   --   --  2.3*   PROTTOTAL  --   --   --   --  7.4   BILITOTAL  --   --   --   --  0.7   ALKPHOS  --   --   --   --  130   ALT  --   --   --   --  18   AST  --   --   --   --  21    < > = values in this interval not displayed.       No results found for this or any previous visit (from the past 24 hour(s)).    Charles Luque MD  Text Page  (7am to 6pm)

## 2019-02-08 NOTE — PLAN OF CARE
"PT: Third attempt to see pt for PT/mobility assessment. Pt seen 2/7 and had requested PT return 2/8 at 0830 to trial mobility/equipment. PT arrived at 0830 this AM. Pt still requesting a \"boot\" to wear on LLE. Re-educated pt on recommendations for NWB to allow for any potential healing if able without surgical intervention (BKA currently recommended); eventually pt appears to understand that a boot will not allow him to be NWB on LLE. Further described and provided demonstration with both FWW and knee scooter, however, pt refusing trial of any equipment or mobility. Pt reports he plans to \"crawl\" on the main floor, scoot on his bottom up his 31 stairs, and use a knee scooter on the upper level. Discussed that pt will need DME order from MD for knee scooter and recommended pt trial with PT before disch, however, pt adamantly refusing any participation at this time despite max education on safety and equipment recommendations and max encouragement. Pt reports he plans to obtain any DME needs on his own. Discussed with pt that PT will sign off as pt is declining need at this time and refusing participation. Discussed with RN. PT orders completed.  "

## 2019-02-08 NOTE — PROGRESS NOTES
"Podiatry / Foot and Ankle Surgery Progress Note    February 8, 2019    Subject: Patient was seen at bedside.  Wife at bedside. Admant about no further surgery.     Objective:  Vitals: /55 (BP Location: Left arm)   Pulse 62   Temp 99.4  F (37.4  C) (Axillary)   Resp 18   Ht 1.753 m (5' 9\")   Wt 67.7 kg (149 lb 4 oz)   SpO2 96%   BMI 22.04 kg/m    BMI= Body mass index is 22.04 kg/m .    General:  Patient is alert and orientated.  NAD  Vascular:  I did not readily palpate his pedal pulses, left foot.  ABIs normal and triphasic waveforms distally.     Neuro: Light touch sensation is profoundly diminished in is feet.     Derm:   1) left hallux:  The distal 1/3 of the toe is dry, hyperkeratotic eschar.  No erythema or edema.   Stable.      2) Left rearfoot:  There is an extensive open wound on the posteroplantar left heel with a large portion of the calcaneal tuber exposed with dry periosteum. No malodor. No purulence. Some residual erythema and stable  necrotic tissue around the margins of the wound.      MRI Left Ankle:  IMPRESSION: Large ulcer adjacent to the calcaneal tuberosity. There is  prominent focus of bone marrow edema in the posterolateral-most aspect  of the calcaneus as seen on coronal series 501 image 21 consistent  with osteomyelitis.     Assessment:  48-year old male with uncontrolled type 1 DM with osteomyelitis of the left hallux and gas gangrene posterior left heel status post radical debridement of the soft tissues.  MRI consistent with calcaneal osteomyelitis.      Plan:   - Again discussed condition and treatment options including pros and cons. I also agree with previous doctors that BKA is the best option.  again reiterated risk of sepsis, death.   -Concern for microvascular disease.  I would like Vascular's opinion on this before considering further foot surgery.  -Pt remains very resistant to any further surgery at this point.    -Will put in wound care consult for dressing " recommendations.  -Did discuss possible hyperbaric oxygen and was given information in discharge instructions if he would like to see if he is a candidate.   -put order for knee roller in chart.   -Given that there is nothing else that we have to offer patient, podiatry will sign off.       Kay Pleitez DPM, Podiatry/Foot and Ankle Surgery  12:03 PM

## 2019-02-09 LAB
ANION GAP SERPL CALCULATED.3IONS-SCNC: 6 MMOL/L (ref 3–14)
BUN SERPL-MCNC: 35 MG/DL (ref 7–30)
CALCIUM SERPL-MCNC: 7.9 MG/DL (ref 8.5–10.1)
CHLORIDE SERPL-SCNC: 106 MMOL/L (ref 94–109)
CO2 SERPL-SCNC: 25 MMOL/L (ref 20–32)
CREAT SERPL-MCNC: 1.58 MG/DL (ref 0.66–1.25)
ERYTHROCYTE [DISTWIDTH] IN BLOOD BY AUTOMATED COUNT: 14 % (ref 10–15)
GFR SERPL CREATININE-BSD FRML MDRD: 51 ML/MIN/{1.73_M2}
GLUCOSE SERPL-MCNC: 155 MG/DL (ref 70–99)
HCT VFR BLD AUTO: 28.2 % (ref 40–53)
HGB BLD-MCNC: 9.3 G/DL (ref 13.3–17.7)
MCH RBC QN AUTO: 26.7 PG (ref 26.5–33)
MCHC RBC AUTO-ENTMCNC: 33 G/DL (ref 31.5–36.5)
MCV RBC AUTO: 81 FL (ref 78–100)
PLATELET # BLD AUTO: 513 10E9/L (ref 150–450)
POTASSIUM SERPL-SCNC: 5.5 MMOL/L (ref 3.4–5.3)
RBC # BLD AUTO: 3.48 10E12/L (ref 4.4–5.9)
SODIUM SERPL-SCNC: 137 MMOL/L (ref 133–144)
WBC # BLD AUTO: 26.7 10E9/L (ref 4–11)

## 2019-02-09 PROCEDURE — 25000132 ZZH RX MED GY IP 250 OP 250 PS 637: Performed by: INTERNAL MEDICINE

## 2019-02-09 PROCEDURE — 99207 ZZC CDG-MDM COMPONENT: MEETS LOW - DOWN CODED: CPT | Performed by: INTERNAL MEDICINE

## 2019-02-09 PROCEDURE — 99232 SBSQ HOSP IP/OBS MODERATE 35: CPT | Performed by: INTERNAL MEDICINE

## 2019-02-09 PROCEDURE — 80048 BASIC METABOLIC PNL TOTAL CA: CPT | Performed by: INTERNAL MEDICINE

## 2019-02-09 PROCEDURE — 12000000 ZZH R&B MED SURG/OB

## 2019-02-09 PROCEDURE — 85027 COMPLETE CBC AUTOMATED: CPT | Performed by: INTERNAL MEDICINE

## 2019-02-09 PROCEDURE — 25000128 H RX IP 250 OP 636: Performed by: INTERNAL MEDICINE

## 2019-02-09 PROCEDURE — 25000132 ZZH RX MED GY IP 250 OP 250 PS 637: Performed by: PODIATRIST

## 2019-02-09 PROCEDURE — 36415 COLL VENOUS BLD VENIPUNCTURE: CPT | Performed by: INTERNAL MEDICINE

## 2019-02-09 RX ADMIN — PREGABALIN 100 MG: 100 CAPSULE ORAL at 08:46

## 2019-02-09 RX ADMIN — PREGABALIN 100 MG: 100 CAPSULE ORAL at 15:39

## 2019-02-09 RX ADMIN — ACETAMINOPHEN 650 MG: 325 TABLET, FILM COATED ORAL at 15:41

## 2019-02-09 RX ADMIN — ACETAMINOPHEN 650 MG: 325 TABLET, FILM COATED ORAL at 12:03

## 2019-02-09 RX ADMIN — OXYCODONE HYDROCHLORIDE 5 MG: 5 TABLET ORAL at 15:41

## 2019-02-09 RX ADMIN — PIPERACILLIN SODIUM,TAZOBACTAM SODIUM 3.38 G: 3; .375 INJECTION, POWDER, FOR SOLUTION INTRAVENOUS at 17:25

## 2019-02-09 RX ADMIN — PIPERACILLIN SODIUM,TAZOBACTAM SODIUM 3.38 G: 3; .375 INJECTION, POWDER, FOR SOLUTION INTRAVENOUS at 12:03

## 2019-02-09 RX ADMIN — PIPERACILLIN SODIUM,TAZOBACTAM SODIUM 3.38 G: 3; .375 INJECTION, POWDER, FOR SOLUTION INTRAVENOUS at 00:00

## 2019-02-09 RX ADMIN — SENNOSIDES AND DOCUSATE SODIUM 2 TABLET: 8.6; 5 TABLET ORAL at 21:38

## 2019-02-09 RX ADMIN — PREGABALIN 100 MG: 100 CAPSULE ORAL at 21:38

## 2019-02-09 RX ADMIN — PIPERACILLIN SODIUM,TAZOBACTAM SODIUM 3.38 G: 3; .375 INJECTION, POWDER, FOR SOLUTION INTRAVENOUS at 06:37

## 2019-02-09 ASSESSMENT — MIFFLIN-ST. JEOR: SCORE: 1655.38

## 2019-02-09 ASSESSMENT — ACTIVITIES OF DAILY LIVING (ADL)
ADLS_ACUITY_SCORE: 14

## 2019-02-09 NOTE — PROGRESS NOTES
[facility]    Infectious Disease Progress Note    Date of Service (when I saw the patient): 02/09/2019     Assessment & Plan   Eduardo Walton is a 48 year old male who was admitted on 2/4/2019.     Impression:  1. 48 y.o male with type 1 diabetes.   2. Neuropathy.   3. CKD stage 3.   4. History of osteo in the left big toe last year treated with 6 weeks of IV antibiotics, no surgery.   5. Admitted this occasion with left foot ulcer MRI suggesting osteo on the calcaneous.   6. S/P I and D of the soft tissue.   7. Cultures are polymicrobial.   8. No history of MRSA.   9. More surgery is being recommended by podiatry with vascular work up, but patient is reluctant. He has osteomyelitis of the left hallux and gas gangrene posterior left heel with underlying osteo.       Recommendations:   Bone cultures now positive for MSSA, staph lugdunensis and bacteroids along with group b strep. Zosyn to continue.   Various surgical specialities involved in surgical decision making will follow.   Antibiotics alone very unlikely to cure the osteomyelitis at two different areas in the left foot.     Discussed with significant other, IM.       Yeimy Chavez MD    Interval History   Afebrile   Creat continues to rise   WBC still elevated     Physical Exam   Temp: 98.8  F (37.1  C) Temp src: Oral BP: 146/67   Heart Rate: 80 Resp: 18 SpO2: 93 % O2 Device: None (Room air)    Vitals:    02/07/19 0623 02/08/19 0628 02/09/19 0300   Weight: 68 kg (149 lb 14.6 oz) 67.7 kg (149 lb 4 oz) 78.5 kg (173 lb 1 oz)     Vital Signs with Ranges  Temp:  [98.2  F (36.8  C)-98.8  F (37.1  C)] 98.8  F (37.1  C)  Heart Rate:  [72-80] 80  Resp:  [16-18] 18  BP: (125-146)/(50-67) 146/67  SpO2:  [93 %-95 %] 93 %  Constitutional: Awake, alert, cooperative, no apparent distress  Lungs: Clear to auscultation bilaterally, no crackles or wheezing  Cardiovascular: Regular rate and rhythm, normal S1 and S2, and no murmur noted  Abdomen: Normal bowel  sounds, soft, non-distended, non-tender  Skin: No rashes, no cyanosis, no edema  Other:               Medications       aspirin  81 mg Oral Daily     insulin aspart  1-6 Units Subcutaneous TID w/meals     insulin degludec  8 Units Subcutaneous Daily     piperacillin-tazobactam  3.375 g Intravenous Q6H     pregabalin  100 mg Oral TID     senna-docusate  1 tablet Oral BID    Or     senna-docusate  2 tablet Oral BID     sodium chloride (PF)  3 mL Intracatheter Q8H       Data   All microbiology laboratory data reviewed.  Recent Labs   Lab Test 02/08/19  0835 02/07/19  0945 02/06/19  0925   WBC 25.9* 27.7* 22.9*   HGB 9.6* 9.5* 8.8*   HCT 29.4* 29.7* 27.0*   MCV 82 83 83   * 447 366     Recent Labs   Lab Test 02/08/19  0835 02/07/19  0945 02/06/19  0925   CR 1.73* 1.51* 1.29*  1.28*     Recent Labs   Lab Test 02/04/19  2125   SED 78*     Recent Labs   Lab Test 02/05/19  1848 02/05/19  1847 02/04/19  2345 02/04/19  2333   CULT Moderate growth  Streptococcus agalactiae sero group B  Susceptibility testing done on previous specimen  *  Moderate growth  Staphylococcus aureus  *  Light growth  Staphylococcus lugdunensis  Susceptibility testing in progress  *  Moderate growth  Bacteroides fragilis  Susceptibility testing not routinely done  *  Culture in progress Heavy growth  Streptococcus agalactiae sero group B  *  Light growth  Coagulase negative Staphylococcus  Susceptibility testing not routinely done  *  These bacteria are part of normal skin michaela, but on occasion, may be true pathogens.    Clinical correlation must be applied to interpreting this microbiology result.  *  Heavy growth  Bacteroides fragilis  Susceptibility testing not routinely done  * No growth after 4 days No growth after 4 days

## 2019-02-09 NOTE — PLAN OF CARE
A&O x4. SBA and pivot to commode. Non wt bearing on LLE. Pt foot dressing done per shift. CMS intact. VSS. Lungs clear. Tolerating diet. Long acting insulin given after pt found out tresiba dose had been cut. Voiding without difficulty. Pt having care conference with Dr. Luque right now.  PT presented with options for likely L BKA. IV zosyn for infection. Creat improved.

## 2019-02-09 NOTE — PLAN OF CARE
Patient alert/orient X4. C/o intermittent shooting pain in LLE, scheduled tylenol given. Pt refused VS overnight despite education and was agitated with interrupted sleep. Up with Ax1/NWB to LLE.  Dressing on LLE CDI.

## 2019-02-09 NOTE — CONSULTS
[facility]    Orthopedics Consultation    Date of Admission:  2/4/2019    Assessment & Plan   Eduardo Walton is a 48 year old male who was admitted on 2/4/2019 with large left heel wound, calcaneus osteomyelitis, poorly controlled Type 1 DM (Hgb A1C >10), CKD, decreased peripheral signal.  Left foot not salvagable, I have recommended an amputation.  He has met with prosthetist.    Rec:  Transcutaneous O2 measurements bilateral midfoot, bk level, ak level to help determine safe amputation level.  Amputation when Eduardo ready to move forward  Wound cares  Abx      Torsten Joseph MD    Code Status    Full Code    Reason for Consult   Reason for consult: I was asked by Dr. Luque to evaluate this patient for left calcaneous osteomyelitis and large ulcer.    Primary Care Physician   Efren Conway    History of Present Illness   Eduardo Walton is a 48 year old male who presents with left large heel ulcer and calcaneus osteomyelitis.  History of Type 1 DM poorly controlled Hgb A1C >10, neuropathy, CKD Stage 3, chronic left foot and great toe ulcers.  Presented earlier this week with fevers and elevated wbc, and inflammatory marker.  Pt quite reluctant to consider amputation.        MEDS:   Current Outpatient Medications   Medication Sig Dispense Refill     order for DME Equipment being ordered: knee roller, will need for 4 months. Non weight bearing left foot. 1 Device 0       PAST MEDICAL HISTORY: No past medical history on file.    PAST SURGICAL HISTORY:   Past Surgical History:   Procedure Laterality Date     AMPUTATE FOOT Left 2/5/2019    Procedure: PARTIAL LEFT FOOT AMPUTATION.;  Surgeon: Chetan Potter DPM;  Location:  OR     [facility]  2010       FAMILY HISTORY:   Family History   Problem Relation Age of Onset     Ovarian Cancer Maternal Grandmother      Colon Cancer Maternal Grandmother      Prostate Cancer Maternal Grandfather      Ovarian Cancer Paternal Grandmother        SOCIAL  HISTORY:   Social History     Tobacco Use     Smoking status: Never Smoker     Smokeless tobacco: Never Used   Substance Use Topics     Alcohol use: Yes     Comment: occ       ALLERGIES:    Allergies   Allergen Reactions     Fish-Derived Products Rash       ROS:  10 point ROS neg other than the symptoms noted above in the HPI.      Physical Exam   Temp: 98.8  F (37.1  C) Temp src: Oral BP: 146/67   Heart Rate: 80 Resp: 18 SpO2: 93 % O2 Device: None (Room air)    Vital Signs with Ranges  Temp:  [98.2  F (36.8  C)-98.8  F (37.1  C)] 98.8  F (37.1  C)  Heart Rate:  [72-80] 80  Resp:  [16-18] 18  BP: (125-146)/(50-67) 146/67  SpO2:  [93 %-95 %] 93 %  173 lbs .98 oz    Constitutional: Pleasant, alert, appropriate, following commands.  HEENT: Head atraumatic normocephalic. Pupils equal round and reactive to light.  Respiratory: Unlabored breathing no audible wheeze  Cardiovascular: Regular rate and rhythm  GI: Abdomen soft nontender nondistended.  Lymph/Hematologic: No lymphadenopathy in areas examined  Genitourinary:  No alcaraz  Skin: No rashes, no cyanosis, no edema.  Musculoskeletal: Left foot large ulcer of heel with exposed calcaneous.  Ulcer on great toe.  Neuropathy with decreased sensation.  Foot warm.  Palpable popliteal pulse.    Neurologic: normal without focal findings, mental status, speech normal, alert and oriented x iii, NATALIA    X-rays and MRI left foot show calcaneous osteomyelitis.        Data   Results for orders placed or performed during the hospital encounter of 02/04/19 (from the past 24 hour(s))   Nephrology IP Consult: Patient to be seen: Routine - within 24 hours; CKD, worsening creatinine, has diabetic foot wound; Consultant may enter orders: Yes    Narrative    Everardo Whaley MD     2/8/2019  5:27 PM  [facility]    Nephrology Consultation     Date of Admission:  2/4/2019    Assessment & Plan      Eduardo Walton is a 48 year old male who was admitted on   2/4/2019.     1)  Longstanding Type 1 DM with complications.    2) Diabetic Peripheral Neuropathy.    3) Diabetic Retinopathy.    4) CKD 3 at baseline.  Likely related to DM, though very low   grade proteinuria.    5) YESSICA:  Cr up from 1.28 to 1.73.  He has been in this range   before, but it should be improving in hospital with hydration   etc. It may well be that his kidney function has suffered due to   the presence of osteomyelitis in his foot.      6) Osteomyelitis of L foot in two different places.      Discussion:    I have told him that I think his kidney function may continue to   decline in presence of uncontrolled infection.  I suspect, but   cannot guarantee that his kidney function will improve if this   infection is cured.  It appears that there is no cure but a L   BKA.  If that is the case then I think there is a reason to believe   that a L BKA will help preserve kidney function.          Everardo Whaley MD  [facility] - Nephrology  [phone]    Reason for Consult      I was asked to see the patient for A/CKD.    Primary Care Physician      Efren Conway    Chief Complaint     I am scared about my creatinine.      History is obtained from the patient and chart review.      History of Present Illness   Eduardo Walton is a 48 year old male who presents with acute on   chronic renal failure.      He was admitted via ED on 2/4/19 after presenting with fever.      He has been battling LLE foot infection for some time.  He has been found to have osteomyelitis of the left hallux and   osteomyelitis of L calcaneum.  He has had gas gangrene to L heel   as well.       He has had both of these areas debrided but they are now   considered not salvageable.  He has had L BKA recommended, but he   has been reluctant.      His kidney function has declined in the last few days.  This has   him very concerned.  Cr is up from 1.3 to 1.7.      U P/C ratio is 0.32 g/g    Renal US is normal.    CRP is up 250.    Review of  past kidney function tests shows cr varying in the last   couple of years from 1.06 to 1.67 with most falling in 1.3-1.6   range.      His DM has not been well controlled for some time.      Past Medical History    Type 1 DM since age 8  Diabetic peripheral neuropathy  Diabetic retinopathy  Proteinuria  Diabetic foot ulcer  H/O YESSICA  CAD with stent placement      Past Surgical History   I have reviewed this patient's surgical history and updated it   with pertinent information if needed.  Past Surgical History:   Procedure Laterality Date     AMPUTATE FOOT Left 2019    Procedure: PARTIAL LEFT FOOT AMPUTATION.;  Surgeon: Chetan Potter DPM;  Location: [facility] OR     STENT     pan retinal laser  photocoagulation.      Prior to Admission Medications   Prior to Admission Medications   Prescriptions Last Dose Informant Patient Reported? Taking?   Continuous Blood Gluc Sensor (CiiNOWYLE COLIN 14 DAY SENSOR) MISC   Unknown at Unknown time  Yes No   Sig: USE WITH 14 DAY COLIN SYSTEM. CHANGE SENSOR EVERY 14 DAYS.   LYRICA 100 MG capsule 2019 at pm  Yes Yes   Sig: TAKE 1 CAPSULE BY MOUTH THREE TIMES A DAY   TRESIBA FLEXTOUCH 100 UNIT/ML pen 2019 at am  Yes Yes   Si Units daily   insulin lispro (HUMALOG KWIKPEN) 100 UNIT/ML pen 2019 at   Unknown time  Yes No   Sig: Inject 1 unit/2 grams carb, total daily dose up to 80 units    Indications: Diabetes Mellitus      Facility-Administered Medications: None     Allergies   Allergies   Allergen Reactions     Fish-Derived Products Rash       Social History   I have reviewed this patient's social history and updated it with   pertinent information if needed. Eduardo Franco  reports that    has never smoked. he has never used smokeless tobacco. He reports   that he drinks alcohol.    Family History   I have reviewed this patient's family history and updated it with   pertinent information if needed.   Family History   Problem Relation Age of Onset     Ovarian  Cancer Maternal Grandmother      Colon Cancer Maternal Grandmother      Prostate Cancer Maternal Grandfather      Ovarian Cancer Paternal Grandmother        Review of Systems   The 10 point Review of Systems is negative other than noted in   the HPI.      Physical Exam   Temp: 98.3  F (36.8  C) Temp src: Oral BP: 125/57   Heart Rate:   72 Resp: 18 SpO2: 94 % O2 Device: None (Room air)    Vital Signs with Ranges  Temp:  [98.3  F (36.8  C)-99.4  F (37.4  C)] 98.3  F (36.8  C)  Heart Rate:  [72-76] 72  Resp:  [16-18] 18  BP: (119-134)/(55-64) 125/57  SpO2:  [94 %-96 %] 94 %  149 lbs 4.02 oz    GENERAL:  alert, NAD  HEENT:  Normocephalic. No gross abnormalities.  Pupils equal.    MMM.  Dentition is ok.  CV: RRR, soft systole M, no clicks, gallops, or rubs, trace   edema, no carotid bruits  RESP: Clear bilaterally with good efforts  GI: Abdomen soft/nt/nd, BS normal. No masses, organomegaly  MUSCULOSKELETAL: L foot wrapped.  Photos of heel wound and great   toe wound reviewed.  Atrophy of thenar muscles and interosseus   muscles.    SKIN: no lesions with exception of L foot.    NEURO:  Weakness and numbness peripherally related to neuropathy.      PSYCH: a little angry  LYMPH: No palpable ant/post cervical and supraclavicular   adenopathy    Data   BMP  Recent Labs   Lab 02/08/19  0835 02/07/19  0945 02/06/19  0925 02/05/19  0850    138 138 140   POTASSIUM 4.4 4.0 3.6 4.3   CHLORIDE 103 105 104 104   GABRIELA 8.3* 8.1* 8.0* 9.0   CO2 29 29 27 30   BUN 35* 31* 26 33*   CR 1.73* 1.51* 1.29*  1.28* 1.31*   GLC 78 123* 108* 198*     Phos@LABRCNTIPR(phos:4)  CBC)  Recent Labs   Lab 02/08/19  0835 02/07/19  0945 02/06/19  0925 02/05/19  0850   WBC 25.9* 27.7* 22.9* 29.3*   HGB 9.6* 9.5* 8.8* 10.4*   HCT 29.4* 29.7* 27.0* 32.1*   MCV 82 83 83 83   * 447 366 446     Recent Labs   Lab 02/04/19  2125   AST 21   ALT 18   ALKPHOS 130   BILITOTAL 0.7     No lab results found in last 7 days.  No results found for: D2VIT,  D3VIT, DTOT  Recent Labs   Lab 02/08/19  0835   HGB 9.6*   HCT 29.4*   MCV 82     No results for input(s): PTHI in the last 168 hours.   US Renal Complete    Narrative    US RENAL COMPLETE  2/8/2019  3:02 PM    HISTORY: 48-year-old patient with chronic kidney disease.    COMPARISON: None    FINDINGS: The right kidney is 10.4 x 5.6 x 4.2 cm with AP cortical  thickness of 1.3 cm. The left kidney is 10.9 x 5 x 5.2 cm with AP  cortical thickness of 1.3 cm. No hydronephrosis or obvious  nephrolithiasis. Bladder is mildly distended and otherwise  unremarkable. Low-level echoes noted within the bladder, perhaps  suggesting complex urine or any number of various reasons.      Impression    IMPRESSION: Normal ultrasound appearance of both kidneys.    DELIO GUZMAN MD   UA with Microscopic reflex to Culture   Result Value Ref Range    Color Urine Yellow     Appearance Urine Clear     Glucose Urine Negative NEG^Negative mg/dL    Bilirubin Urine Negative NEG^Negative    Ketones Urine Negative NEG^Negative mg/dL    Specific Gravity Urine 1.017 1.003 - 1.035    Blood Urine Negative NEG^Negative    pH Urine 5.0 5.0 - 7.0 pH    Protein Albumin Urine 10 (A) NEG^Negative mg/dL    Urobilinogen mg/dL Normal 0.0 - 2.0 mg/dL    Nitrite Urine Negative NEG^Negative    Leukocyte Esterase Urine Negative NEG^Negative    Source Midstream Urine     WBC Urine 3 0 - 5 /HPF    RBC Urine 1 0 - 2 /HPF    Mucous Urine Present (A) NEG^Negative /LPF    Uric Acid Crystals Few (A) NEG^Negative /HPF   Protein  random urine with Creat Ratio   Result Value Ref Range    Protein Random Urine 0.35 g/L    Protein Total Urine g/gr Creatinine 0.32 (H) 0 - 0.2 g/g Cr   Creatinine urine calculation only   Result Value Ref Range    Creatinine Urine 109 mg/dL   Basic metabolic panel   Result Value Ref Range    Sodium 137 133 - 144 mmol/L    Potassium 5.5 (H) 3.4 - 5.3 mmol/L    Chloride 106 94 - 109 mmol/L    Carbon Dioxide 25 20 - 32 mmol/L    Anion Gap 6 3 - 14  mmol/L    Glucose 155 (H) 70 - 99 mg/dL    Urea Nitrogen 35 (H) 7 - 30 mg/dL    Creatinine 1.58 (H) 0.66 - 1.25 mg/dL    GFR Estimate 51 (L) >60 mL/min/[1.73_m2]    GFR Estimate If Black 60 (L) >60 mL/min/[1.73_m2]    Calcium 7.9 (L) 8.5 - 10.1 mg/dL   CBC with platelets   Result Value Ref Range    WBC 26.7 (H) 4.0 - 11.0 10e9/L    RBC Count 3.48 (L) 4.4 - 5.9 10e12/L    Hemoglobin 9.3 (L) 13.3 - 17.7 g/dL    Hematocrit 28.2 (L) 40.0 - 53.0 %    MCV 81 78 - 100 fl    MCH 26.7 26.5 - 33.0 pg    MCHC 33.0 31.5 - 36.5 g/dL    RDW 14.0 10.0 - 15.0 %    Platelet Count 513 (H) 150 - 450 10e9/L

## 2019-02-09 NOTE — PROGRESS NOTES
"[facility]  Hospitalist Progress Note  Charles Luque MD  02/09/2019    Assessment & Plan   Mr. Walton is a 47 y/o male with poorly controlled DM and hx of reccurent left foot infections who presented with quickly worsening left foot swelling with fevers.  He had recently received some prednisone to outpatient to help with \"Inflammation.\"   Imaging suggests osteomyelitis and gas gangrene.     Left diabetic foot infection  Osteomyelitis of calcaneus and 1st great toe  s/p I and D of necrotic heel ulcer  -  continue on zosyn for group B strep, staph, bacteroides on micro, appreciate ID consult  -  Podiatry recommending BKA,   - JADEN normal, suspect diabetic microvascular disease, appreciate vascular surgery consultation.  -  patient resistant to surgery but has shown more willingness, but he wants it done in a few weeks.  - family conference today  - orthopedics consulted   - white count and crp continue to be elevated although no fevers and blood cultures are negative.     Poorly controlled DM (A1C  10.9) with reported neuropathy and retinopathy:  - on tresiba, meal carb count novolog + sliding scale insulin.    - hypolgycemic episodes in AM  - decreased tresiba from 30 to 10 units      CAD, prior stent:  -  Self stopped all meds.  He felt the metoprolol took away his hypoglycemic awareness and that the other meds didn't make him feel well.  He did not want an ASA though after we talked he decided an 81 mg daily ASA would be acceptable post-op.       CKD- acute on chronic  -- suspect baseline stage III kidney disease   -- 3 doses of vancomycin, last dose 0100 2/6  -- nephrology following  -- creatinine improved from 1.7 to 1.5, suspecting vancomycin being the culprit    Diet:   -  Mod CHO diet    DVT Prophylaxis: Patient refused heparin subcut that was advised    Martinez Catheter: not present    Code Status: Full Code          Disposition Plan     Expected discharge: > 2 days, awaiting surgical " "decision by patient, to prior living arrangement once infection treated/improving.        Interval History   -- no acute events, he was thinking he can have surgery in a few weeks with interval wound care and antibiotics.  -- discussed with RN, wife  -- had 30 minute conversation again about his current clinical situation.    -Data reviewed today: I reviewed all new labs and imaging over the last 24 hours. I personally reviewed no images or EKG's today.    Physical Exam   Heart Rate: 80, Blood pressure 146/67, pulse 62, temperature 98.8  F (37.1  C), temperature source Oral, resp. rate 18, height 1.753 m (5' 9\"), weight 78.5 kg (173 lb 1 oz), SpO2 93 %.  Vitals:    02/07/19 0623 02/08/19 0628 02/09/19 0300   Weight: 68 kg (149 lb 14.6 oz) 67.7 kg (149 lb 4 oz) 78.5 kg (173 lb 1 oz)     Vital Signs with Ranges  Temp:  [98.2  F (36.8  C)-98.8  F (37.1  C)] 98.8  F (37.1  C)  Heart Rate:  [72-80] 80  Resp:  [16-18] 18  BP: (125-146)/(50-67) 146/67  SpO2:  [93 %-95 %] 93 %  I/O's Last 24 hours  I/O last 3 completed shifts:  In: 240 [P.O.:240]  Out: 600 [Urine:600]    Constitutional: Awake, alert, cooperative, no apparent distress  Respiratory: Clear to auscultation bilaterally, no crackles or wheezing  Cardiovascular: Regular rate and rhythm, normal S1 and S2   GI: Normal bowel sounds, soft, non-distended, non-tender  Skin/Integumen: No rashes, no cyanosis, no edema, foot in dressing  Other:      Medications   All medications were reviewed.      aspirin  81 mg Oral Daily     insulin aspart  1-6 Units Subcutaneous TID w/meals     insulin degludec  8 Units Subcutaneous Daily     piperacillin-tazobactam  3.375 g Intravenous Q6H     pregabalin  100 mg Oral TID     senna-docusate  1 tablet Oral BID    Or     senna-docusate  2 tablet Oral BID     sodium chloride (PF)  3 mL Intracatheter Q8H        Data   Recent Labs   Lab 02/09/19  1021 02/08/19  0835 02/07/19  0945  02/04/19  2125   WBC 26.7* 25.9* 27.7*   < > 24.7*   HGB " 9.3* 9.6* 9.5*   < > 10.1*   MCV 81 82 83   < > 81   * 455* 447   < > 396    136 138   < > 136   POTASSIUM 5.5* 4.4 4.0   < > 4.3   CHLORIDE 106 103 105   < > 99   CO2 25 29 29   < > 30   BUN 35* 35* 31*   < > 35*   CR 1.58* 1.73* 1.51*   < > 1.42*   ANIONGAP 6 4 4   < > 7   GABRIELA 7.9* 8.3* 8.1*   < > 9.0   * 78 123*   < > 165*   ALBUMIN  --   --   --   --  2.3*   PROTTOTAL  --   --   --   --  7.4   BILITOTAL  --   --   --   --  0.7   ALKPHOS  --   --   --   --  130   ALT  --   --   --   --  18   AST  --   --   --   --  21    < > = values in this interval not displayed.       Recent Results (from the past 24 hour(s))   US Renal Complete    Narrative    US RENAL COMPLETE  2/8/2019  3:02 PM    HISTORY: 48-year-old patient with chronic kidney disease.    COMPARISON: None    FINDINGS: The right kidney is 10.4 x 5.6 x 4.2 cm with AP cortical  thickness of 1.3 cm. The left kidney is 10.9 x 5 x 5.2 cm with AP  cortical thickness of 1.3 cm. No hydronephrosis or obvious  nephrolithiasis. Bladder is mildly distended and otherwise  unremarkable. Low-level echoes noted within the bladder, perhaps  suggesting complex urine or any number of various reasons.      Impression    IMPRESSION: Normal ultrasound appearance of both kidneys.    MD Charles OBRIEN MD  Text Page  (7am to 6pm)

## 2019-02-09 NOTE — PLAN OF CARE
Patient is alert and oriented x 4. NWB to LLE, up SBA. Dressing to LLE CDI, changed by WOC RN. Blood Glucose monitored from his insulin device: 195 (2 Units administered with dinner). VSS on room air. IV is saline locked. Receiving IV antibiotics. Denies pain. Will continue to monitor.

## 2019-02-09 NOTE — PROGRESS NOTES
[facility] Nurse Inpatient Wound Assessment      Assessment of wound(s) on pt's:   Lt heel and Lt         Data:   Patient History:     Eduardo Walton is a 48 year old male who was admitted on 2/4/2019.      1) Longstanding Type 1 DM with complications.     2) Diabetic Peripheral Neuropathy.     3) Diabetic Retinopathy.     4) CKD 3 at baseline.  Likely related to DM, though very low grade proteinuria.     5) YESSICA:  Cr up from 1.28 to 1.73.  He has been in this range before, but it should be improving in hospital with hydration etc. It may well be that his kidney function has suffered due to the presence of osteomyelitis in his foot.       6) Osteomyelitis of L foot in two different places.       Kane Risk Assessment  Sensory Perception: 4-->no impairment    Moisture: 4-->rarely moist   Activity: 3-->walks occasionally     Mobility: 3-->slightly limited   Nutrition: 4-->excellent   Kane Score: 21        Positioning: independent    Mattress: atmos air        Current Diet / Nutrition:           Combination Diet 0238-6250 Calories: Low Consistent CHO (3-5 CHO units/meal)      Labs:   Recent Labs   Lab Test 02/08/19  0835  02/05/19  0850 02/04/19  2125   ALBUMIN  --   --   --  2.3*   HGB 9.6*   < > 10.4* 10.1*   WBC 25.9*   < > 29.3* 24.7*   A1C  --   --  10.9*  --    .0*   < >  --  253.0*    < > = values in this interval not displayed.         Wound History  Met with patient and wife. Current tx plan @ home is Silvadene. Lengthy discussion re:  topical wound care.  Acknowledged that surgery was a difficult decision.  Attempted to explain that it is not what is put on the wound topically but what is happening inside his body heals wound: adequate circulation, nutrition  Controlled blood sugars, off loading and no infection.    Per DR HAGEN:  48-year old male with uncontrolled type 1 DM with osteomyelitis of the left hallux and gas gangrene posterior left heel status post radical debridement  of the soft tissues.  MRI consistent with calcaneal osteomyelitis.            #1:Lt hallux.     #x        Specific Dimensions (length x width x depth, in cm) :  4.0cm x 3.0cm x 100% dried eschar  Tunneling / Undermining: NA  Palpation of the wound bed: Unable to determine  Slough appearance:  None  Eschar appearance: 100% dried hard eschar    Periwound Skin:  No erythema,    Temperature  warm    Drainage:  none    Odor: none     #2: Lt heel                Specific Dimensions (length x width x depth, in cm) :  12.0cm x 8.0cm with exposed bone  Tunneling / Undermining: NA  Palpation of the wound bed:dried with exposed bone  Slough appearance:  None  Eschar appearance: 100% dried hard eschar    Periwound Skin:  erythema,    Temperature  warm    Drainage:  none    Odor: none            Intervention:     Patient's chart evaluated.      Wound(s) assessed    Wound Care: completed    Orders  In Epic    Supplies  IN pt room    Discussed plan of care with patient and wife             All patient / family questions answered:  Yes          Assessment:   Per Dr Pleitez: osteo of Lt heel and hallux. Rec Lt BKA. Pt is resistant to surgery        Plan:     Nursing to notify the Provider(s) and re-consult the WOC Nurse if wound(s) deteriorate(s) or if the wound care plan needs reevaluation.           Lt heel and Hallux: change dressings on even days and prn          1. Clean wound with MicroKlenz          2.  Swab Lt hallux with Betadine and let dry          3. Apply Iodosorb to adaptic and apply to Lt heel. Press down dressing into wound bed          4. Cover with ABD          5. Secure Kerlix and ace wrap    WOC Nurse will return: weekly if pt remains in hospital

## 2019-02-10 VITALS
WEIGHT: 171.52 LBS | HEIGHT: 69 IN | TEMPERATURE: 98.4 F | DIASTOLIC BLOOD PRESSURE: 67 MMHG | HEART RATE: 77 BPM | OXYGEN SATURATION: 96 % | RESPIRATION RATE: 16 BRPM | BODY MASS INDEX: 25.4 KG/M2 | SYSTOLIC BLOOD PRESSURE: 139 MMHG

## 2019-02-10 LAB
ANION GAP SERPL CALCULATED.3IONS-SCNC: 4 MMOL/L (ref 3–14)
BACTERIA SPEC CULT: ABNORMAL
BUN SERPL-MCNC: 35 MG/DL (ref 7–30)
CALCIUM SERPL-MCNC: 8.3 MG/DL (ref 8.5–10.1)
CHLORIDE SERPL-SCNC: 101 MMOL/L (ref 94–109)
CO2 SERPL-SCNC: 29 MMOL/L (ref 20–32)
CREAT SERPL-MCNC: 1.6 MG/DL (ref 0.66–1.25)
ERYTHROCYTE [DISTWIDTH] IN BLOOD BY AUTOMATED COUNT: 14.1 % (ref 10–15)
GFR SERPL CREATININE-BSD FRML MDRD: 51 ML/MIN/{1.73_M2}
GLUCOSE SERPL-MCNC: 277 MG/DL (ref 70–99)
HCT VFR BLD AUTO: 28.6 % (ref 40–53)
HGB BLD-MCNC: 9.2 G/DL (ref 13.3–17.7)
Lab: ABNORMAL
MCH RBC QN AUTO: 26.6 PG (ref 26.5–33)
MCHC RBC AUTO-ENTMCNC: 32.2 G/DL (ref 31.5–36.5)
MCV RBC AUTO: 83 FL (ref 78–100)
PLATELET # BLD AUTO: 516 10E9/L (ref 150–450)
POTASSIUM SERPL-SCNC: 4.9 MMOL/L (ref 3.4–5.3)
RBC # BLD AUTO: 3.46 10E12/L (ref 4.4–5.9)
SODIUM SERPL-SCNC: 134 MMOL/L (ref 133–144)
SPECIMEN SOURCE: ABNORMAL
WBC # BLD AUTO: 21 10E9/L (ref 4–11)

## 2019-02-10 PROCEDURE — 25000132 ZZH RX MED GY IP 250 OP 250 PS 637: Performed by: PODIATRIST

## 2019-02-10 PROCEDURE — 85027 COMPLETE CBC AUTOMATED: CPT | Performed by: INTERNAL MEDICINE

## 2019-02-10 PROCEDURE — 99239 HOSP IP/OBS DSCHRG MGMT >30: CPT | Performed by: INTERNAL MEDICINE

## 2019-02-10 PROCEDURE — 80048 BASIC METABOLIC PNL TOTAL CA: CPT | Performed by: INTERNAL MEDICINE

## 2019-02-10 PROCEDURE — 25000128 H RX IP 250 OP 636: Performed by: INTERNAL MEDICINE

## 2019-02-10 PROCEDURE — 25000132 ZZH RX MED GY IP 250 OP 250 PS 637: Performed by: INTERNAL MEDICINE

## 2019-02-10 PROCEDURE — 36415 COLL VENOUS BLD VENIPUNCTURE: CPT | Performed by: INTERNAL MEDICINE

## 2019-02-10 RX ADMIN — OXYCODONE HYDROCHLORIDE 5 MG: 5 TABLET ORAL at 01:23

## 2019-02-10 RX ADMIN — PREGABALIN 100 MG: 100 CAPSULE ORAL at 17:17

## 2019-02-10 RX ADMIN — PREGABALIN 100 MG: 100 CAPSULE ORAL at 08:12

## 2019-02-10 RX ADMIN — PIPERACILLIN SODIUM,TAZOBACTAM SODIUM 3.38 G: 3; .375 INJECTION, POWDER, FOR SOLUTION INTRAVENOUS at 06:46

## 2019-02-10 RX ADMIN — ACETAMINOPHEN 650 MG: 325 TABLET, FILM COATED ORAL at 01:23

## 2019-02-10 RX ADMIN — PIPERACILLIN SODIUM,TAZOBACTAM SODIUM 3.38 G: 3; .375 INJECTION, POWDER, FOR SOLUTION INTRAVENOUS at 00:46

## 2019-02-10 RX ADMIN — PIPERACILLIN SODIUM,TAZOBACTAM SODIUM 3.38 G: 3; .375 INJECTION, POWDER, FOR SOLUTION INTRAVENOUS at 12:29

## 2019-02-10 ASSESSMENT — MIFFLIN-ST. JEOR: SCORE: 1648.38

## 2019-02-10 ASSESSMENT — ACTIVITIES OF DAILY LIVING (ADL)
ADLS_ACUITY_SCORE: 12
ADLS_ACUITY_SCORE: 14
ADLS_ACUITY_SCORE: 12

## 2019-02-10 NOTE — CONSULTS
Care Transition Initial Assessment - RN        Met with: Patient and Family.  DATA   Active Problems:    Left foot infection       Cognitive Status: awake, alert and oriented.        Contact information and PCP information verified: Yes  Lives With: spouse                     Insurance concerns: No Insurance issues identified  ASSESSMENT  Patient currently receives the following services:  none        Identified issues/concerns regarding health management: Patient was recommended to have BKA, declining at this time. Plan to discharge home with Home PT/OT/RN.  Pt had declined to work with PT at hospital but now is open to working with them and would like rolling scooter.  Script done by MD.  Met with patient and family.  Wish to use [facility].  Referral sent and AVS updated.  Spoke with [facility] medical and they can get rolling scooter here this afternoon.  Information faxed to [phone].  Received return call that scooter should arrive between 5-6pm.  Pt is to follow up with PCP.  Pt wishes to make own appt.  Podiatry appts on AVS.    PLAN  Financial costs for the patient include TBD .  Patient given options and choices for discharge yes .  Patient/family is agreeable to the plan?  Yes:   Patient anticipates discharging to home .        Patient anticipates needs for home equipment: Yes  Rolling walker  Plan/Disposition: Home   Appointments: see AVS.  Pt wishes to make own PCP.      Care  (CTS) will continue to follow as needed.

## 2019-02-10 NOTE — PLAN OF CARE
A&Ox4. VSS on RA. Pivots with SB from bed to commode and back. Diabetic diet. Reports minimal pain, declines intervention. LLE dressing CDI. Baseline numbness and tingling all extremities. Plan to discharge home today with PO antibiotics.

## 2019-02-10 NOTE — DISCHARGE INSTRUCTIONS
Your doctor has ordered home care to help you after your hospital stay.  They will contact you regarding your 1st visit.  The service will be provided by [facility].  If you have not received a call within 48hrs of discharge, please call them at [phone]

## 2019-02-10 NOTE — DISCHARGE SUMMARY
"[facility]  Discharge Summary        dEuardo Walton MRN# [id number]   YOB: 1970 Age: 48 year old     Date of Admission:  2/4/2019  Date of Discharge:  2/10/2019  Admitting Physician:  Charles Luque MD  Discharge Physician: Charles Luque MD  Discharging Service: Hospitalist     Primary Provider:  Efren Conway  Primary Care Physician Phone Number: [phone]         Discharge Diagnoses/Problem Oriented Hospital Course (Providers):    Eduardo Walton was admitted on 2/4/2019 by Charles Luque MD and I would refer you to their history and physical.  The following problems were addressed during his hospitalization:    Mr. Walton is a 47 y/o male with poorly controlled DM and hx of reccurent left foot infections who presented with quickly worsening left foot swelling with fevers.  He had recently received some prednisone to outpatient to help with \"Inflammation.\"   Imaging suggests osteomyelitis and gas gangrene.     Left diabetic foot infection  Osteomyelitis of calcaneus and 1st great toe  s/p I and D of necrotic heel ulcer  -  continue on zosyn for group B strep, staph, bacteroides on micro, appreciate ID consult  -  Podiatry recommending BKA,   - JADEN normal, suspect diabetic microvascular disease, appreciate vascular surgery consultation.  -  patient resistant to surgery but has shown more willingness, but he wants it done in a few weeks.  - despite family conference and persuasion, patient unmoved   - orthopedics consulted   - white count and crp continue to be elevated although no fevers and blood cultures are negative.  - discussed with podiatry, ID, vascular medicine, everyone including family in agreement for BKA except the patient  - plan with augmentin for hopeful supprresion, local wound care.  He will return to PCP for follow up.     Poorly controlled DM (A1C  10.9) with reported neuropathy and retinopathy:  - on tresiba, meal carb count novolog + sliding " scale insulin.    - hypolgycemic episodes in AM  - decreased tresiba from 30 to 10 units, can go back cautioslu     CAD, prior stent:  -  Self stopped all meds.  He felt the metoprolol took away his hypoglycemic awareness and that the other meds didn't make him feel well.  He did not want an ASA though after we talked he decided an 81 mg daily ASA would be acceptable post-op.       CKD- acute on chronic  -- suspect baseline stage III kidney disease   -- 3 doses of vancomycin, last dose 0100 2/6  -- nephrology following  -- creatinine improved from 1.7 to 1.5, suspecting vancomycin being the culprit     Diet:   -  Mod CHO diet     DVT Prophylaxis: Patient refused heparin subcut that was advised     Martinez Catheter: not present                Code Status:      Full Code         Important Results:      NAD  LUNGS CTA  CV RRR  ABD SOFT +BS  EXT          Pending Results:        Unresulted Labs Ordered in the Past 30 Days of this Admission     Date and Time Order Name Status Description    2/5/2019 1848 Anaerobic bacterial culture Preliminary     2/5/2019 1848 Anaerobic bacterial culture Preliminary     2/4/2019 2313 Blood culture Preliminary     2/4/2019 2313 Blood culture Preliminary     1/18/2019 0911 T4 FREE In process                Discharge Instructions and Follow-Up:      Follow-up Appointments     Follow-up and recommended labs and tests       Follow up with PCP within 1 week.                  Discharge Disposition:      Discharged to home with home care and home ass         Discharge Medications:        Current Discharge Medication List      START taking these medications    Details   amoxicillin-clavulanate (AUGMENTIN) 875-125 MG tablet Take 1 tablet by mouth 2 times daily  Qty: 60 tablet, Refills: 0    Associated Diagnoses: Left foot infection      order for DME Equipment being ordered: knee roller, will need for 4 months. Non weight bearing left foot.  Qty: 1 Device, Refills: 0    Associated Diagnoses:  Diabetic ulcer of left heel associated with type 1 diabetes mellitus, unspecified ulcer stage (H); Left foot infection; Diabetic ulcer of toe of left foot associated with type 1 diabetes mellitus, with necrosis of muscle (H)         CONTINUE these medications which have NOT CHANGED    Details   LYRICA 100 MG capsule TAKE 1 CAPSULE BY MOUTH THREE TIMES A DAY  Refills: 1      TRESIBA FLEXTOUCH 100 UNIT/ML pen 28 Units daily  Refills: 5    Associated Diagnoses: Type 1 diabetes mellitus with other specified complication (H)      Continuous Blood Gluc Sensor (FREESTYLE COLIN 14 DAY SENSOR) [facility] USE WITH 14 DAY COLIN SYSTEM. CHANGE SENSOR EVERY 14 DAYS.  Refills: 3      insulin lispro (HUMALOG KWIKPEN) 100 UNIT/ML pen Inject 1 unit/2 grams carb, total daily dose up to 80 units  Indications: Diabetes Mellitus                  Allergies:         Allergies   Allergen Reactions     Fish-Derived Products Rash            Consultations This Hospital Stay:      Vascular medicine, ID, nephrology, orthopedics          Discharge Orders       After Care Instructions     Activity      Your activity upon discharge: activity as tolerated.  Not weight bearing of LLE         Diet      Follow this diet upon discharge: Orders Placed This Encounter      Combination Diet [id number] Calories: Low Consistent CHO (3-5 CHO units/meal)                    Rehab orders for Skilled Facility (from Discharge Orders):      Referrals     Future Labs/Procedures    Home care nursing referral     Comments:    RN extended hours visit. RN to assess wound, vitals    Your provider has ordered home care nursing services. If you have not been   contacted within 2 days of your discharge please call the inpatient   department phone number at [phone] .    Home Care OT Referral for Hospital Discharge     Comments:    OT to eval and treat    Your provider has ordered home care - occupational therapy. If you have   not been contacted within 2 days of your discharge  please call the   department phone number listed on the top of this document.    Home Care PT Referral for Hospital Discharge     Comments:    PT to eval and treat    Your provider has ordered home care - physical therapy. If you have not   been contacted within 2 days of your discharge please call the department   phone number listed on the top of this document.             Discharge Time:      Greater than 30 minutes.        Image Results From This Hospital Stay (For Non-EPIC Providers):        Results for orders placed or performed during the hospital encounter of 02/04/19   Foot XR, G/E 3 views, left    Narrative    XR FOOT LT G/E 3 VW  2/4/2019 11:40 PM     HISTORY: Heel wound with new erythema, chronic great toe wound without  redness. Evaluate for subcutaneous air, osteo, etc.    COMPARISON: None.       Impression    IMPRESSION: No acute fracture or dislocation. There is destruction of  the distal phalanx of the great toe consistent with osteomyelitis.  There is gas in the soft tissues at the base of the heel consistent  with ulcer. No bone destruction on the calcaneus to suggest  osteomyelitis.    MELANIE SY MD   CT Foot Left w/o Contrast    Narrative    CT LEFT FOOT WITHOUT CONTRAST  2/5/2019 4:06 AM    HISTORY: Diabetic patient with heel pad and great toe ulcers.    TECHNIQUE: Helical axial scans with sagittal and coronal  reconstructions. Radiation dose for this scan was reduced using  automated exposure control, adjustment of the mA and/or kV according  to patient size, or iterative reconstruction technique.    COMPARISON: Plain films 2/4/2019.    FINDINGS: Soft tissue swelling of the great toe consistent with known  inflammatory process. There is near complete destruction of the  terminal tuft of the distal phalanx of the great toe consistent with  osteomyelitis. This correlates with the recent plain film.    There is a relatively large amount of gas within the heel pad fat.  Some of this gas  extends more superiorly and posteriorly, especially  on the lateral side. There is also subcutaneous edema throughout the  heel pad region. It is difficult to appreciate the reported ulcer  which may be very shallow. No definite fluid collections are seen. No  destructive changes, periosteal reaction or other CT signs for  osteomyelitis in the adjacent plantar calcaneus.    The remainder of the bony structures show no acute abnormality or  evidence for neuropathic changes. Small corticated ossicle anterior  inferior to the lateral malleolus is likely related to old injury.  Vascular calcifications are seen.      Impression    IMPRESSION:  1. Osteomyelitis terminal tuft of the distal phalanx of the great toe.  Associated soft tissue swelling of the distal great toe.  2. Extensive scattered gas within the heel pad subcutaneous fat. No  definite fluid collections and no CT evidence for osteomyelitis in  this region.  3. Vascular calcifications.    MILAN LANCASTER MD   MR Ankle Left w/o Contrast    Narrative    MR ANKLE LEFT WITHOUT CONTRAST   2/6/2019 9:09 AM     HISTORY:  See the Clinical Information for Interpreting Provider;  large necrotic wound left heel with gas in soft tissue. CT negative  for osteo, evaluate for calcaneal osteomyelitis.    TECHNIQUE:  Sagittal and coronal T1 and inversion recovery, and   transverse proton density and T2 weighted images.    COMPARISON: None.    FINDINGS:  Plantar Fascia:  Unremarkable, with no findings to suggest active  plantar fasciitis.     Osseous and Cartilaginous Structures:  There is a very large ulcer  extending down to the calcaneal tuberosity. Prominent bone marrow  edema in the posterolateral aspect of the calcaneal tuberosity  consistent with osteomyelitis as seen on coronal series 501 image 21.  Small amount of edema involving the navicular tuberosity of uncertain  etiology perhaps related to stress reaction. No linear fracture seen.     Posterior Tibial and Flexor  Tendons:  No tear or tendinosis of the  posterior tibial tendon, flexor digitorum longus tendon, or flexor  hallucis longus tendon.     Peroneal Tendons:  No tear, tendinosis, or apparent longitudinal  splitting of the peroneus brevis tendon or peroneus longus tendon. No  tendon subluxation.     Achilles Tendon:  The Achilles tendon is immediately adjacent to the  large ulcer but appears intact with normal signal.     Extensor Tendons:  No tear tendinosis of the anterior tibial tendon  extensor hallux longus tendons or extensor digitorum longus tendons.     Lateral Ligaments:  The anterior talofibular ligament appears intact.  The calcaneofibular, posterior talofibular, and anterior tibiofibular  ligaments appear intact.     Medial Deltoid Ligamentous Complex:  Intact.     Joint space: No tibiotalar or subtalar joint effusion.    Additional Findings:  No retrocalcaneal bursitis. No mass within the  tarsal tunnel. The sinus Tarsi is unremarkable.      Impression    IMPRESSION: Large ulcer adjacent to the calcaneal tuberosity. There is  prominent focus of bone marrow edema in the posterolateral-most aspect  of the calcaneus as seen on coronal series 501 image 21 consistent  with osteomyelitis.    LENNY FISHER MD   US JADEN Doppler No Exercise    Narrative    US JADEN DOPPLER NO EXERCISE, 1-2 LEVELS,??? BILAT   2/6/2019 1:20 PM     HISTORY: Left foot wound    COMPARISON: None.    FINDINGS:  Right JADEN: 1.4.  Left JADEN: 1.31.    Waveforms: Triphasic bilaterally      Impression    IMPRESSION: Normal ABIs bilaterally without evidence of arterial  insufficiency.    JADEN CRITERIA:  >0.95 Normal  0.90 - 0.94 Mild  0.5 - 0.89 Moderate  0.2 - 0.49 Severe  <0.2 Critical    ИВАН SCHULTE DO   US Renal Complete    Narrative    US RENAL COMPLETE  2/8/2019  3:02 PM    HISTORY: 48-year-old patient with chronic kidney disease.    COMPARISON: None    FINDINGS: The right kidney is 10.4 x 5.6 x 4.2 cm with AP cortical  thickness of 1.3  cm. The left kidney is 10.9 x 5 x 5.2 cm with AP  cortical thickness of 1.3 cm. No hydronephrosis or obvious  nephrolithiasis. Bladder is mildly distended and otherwise  unremarkable. Low-level echoes noted within the bladder, perhaps  suggesting complex urine or any number of various reasons.      Impression    IMPRESSION: Normal ultrasound appearance of both kidneys.    DELIO GUZMAN MD           Most Recent Lab Results In Epic    Most Recent 3 CBC's:  Recent Labs   Lab Test 02/10/19  1038 02/09/19  1021 02/08/19  0835   WBC 21.0* 26.7* 25.9*   HGB 9.2* 9.3* 9.6*   MCV 83 81 82   * 513* 455*      Most Recent 3 BMP's:  Recent Labs   Lab Test 02/10/19  1038 02/09/19  1021 02/08/19  0835    137 136   POTASSIUM 4.9 5.5* 4.4   CHLORIDE 101 106 103   CO2 29 25 29   BUN 35* 35* 35*   CR 1.60* 1.58* 1.73*   ANIONGAP 4 6 4   GABRIELA 8.3* 7.9* 8.3*   * 155* 78     Most Recent 3 Troponin's:No lab results found.    Invalid input(s): TROP, TROPONINIES  Most Recent 3 INR's:No lab results found.  Most Recent 2 LFT's:  Recent Labs   Lab Test 02/04/19 2125 01/18/19  0911   AST 21 20   ALT 18 22   ALKPHOS 130 116   BILITOTAL 0.7 0.5     Most Recent Cholesterol Panel:No lab results found.  Most Recent 6 Bacteria Isolates From Any Culture (See EPIC Reports for Culture Details):  Recent Labs   Lab Test 02/05/19  1848 02/05/19  1847 02/04/19  2345 02/04/19  2333   CULT Moderate growth  Streptococcus agalactiae sero group B  Susceptibility testing done on previous specimen  *  Moderate growth  Staphylococcus aureus  *  Light growth  Staphylococcus lugdunensis  *  Moderate growth  Bacteroides fragilis  Susceptibility testing not routinely done  *  Moderate growth  Actinomyces neuii  Susceptibility testing not routinely done  * Heavy growth  Bacteroides fragilis  Susceptibility testing not routinely done  *  Moderate growth  Actinomyces neuii  Susceptibility testing not routinely done  *  Heavy  growth  Streptococcus agalactiae sero group B  *  Light growth  Coagulase negative Staphylococcus  Susceptibility testing not routinely done  *  These bacteria are part of normal skin michaela, but on occasion, may be true pathogens.    Clinical correlation must be applied to interpreting this microbiology result.  * No growth after 5 days No growth after 5 days     Most Recent TSH, T4 and HgbA1c:  Recent Labs   Lab Test 02/05/19  0850 01/18/19  0911   TSH  --  0.24*   T4  --  1.38   A1C 10.9*  --

## 2019-02-10 NOTE — PROGRESS NOTES
Patient still refusing surgery, even though has been recommended amputation.   I have had long discussion with patient and spouse every day, I have emphasized the need for surgery for osteomyelitis and told them antibiotics IV or oral are not enough to cure extensive osteomyelitis. Patient is still refusing medical advise.     He is on zosyn.     He can leave on oral Augmentin in hopes of suppression but no guarantees were given to the patient. He might still get sick on antibiotics. The infection might still progress on antibiotics.     I have no more recommendations. Please see my notes throughout this hospitalization.   I will sign off.   I discussed with Dr. Luque.     Yeimy Chavez MD   Infectious disease

## 2019-02-10 NOTE — PLAN OF CARE
A&O x4. SBA and pivot to commode. Non wt bearing on LLE. Pt foot dressing done per shift. Numbness and tingling in extremities due to neuropathy. VSS. Lungs clear. Tolerating diet. Voiding without difficulty. Had care conference with family and MD. Pt hesitant about L BKA. IV zosyn for infection in meantime. Creat improved. Awaiting surgical decision from pt.

## 2019-02-10 NOTE — PLAN OF CARE
Pt A/Ox4, Vss on RA, oxycodone for pain, numbness present bilaterally, non weight bearing, will continue to monitor

## 2019-02-11 ENCOUNTER — PATIENT OUTREACH (OUTPATIENT)
Dept: CARE COORDINATION | Facility: CLINIC | Age: 49
End: 2019-02-11

## 2019-02-11 LAB
BACTERIA SPEC CULT: NO GROWTH
BACTERIA SPEC CULT: NO GROWTH
Lab: NORMAL
Lab: NORMAL
SPECIMEN SOURCE: NORMAL
SPECIMEN SOURCE: NORMAL

## 2019-02-11 NOTE — PROGRESS NOTES
Clinic Care Coordination Contact  Care Coordination Communication    Clinical Data: Patient was hospitalized at [facility] from 02/04/2019 to 02/10/2019 with diagnosis of DM - Type 1with diabetic foot infection, sepsis and diabetic ulcer of LEFT heel associated with DM -Type 1.     Home Care Contact:              Home Care Agency: [facility]               Contact name () and phone number: Blanca Long RN CM               Care Coordination contacted home care: No - Reviewed patient's status through Homecare Advisor PROD application.                Anticipated start of care date:  02/11/2019      [facility]/Voicemail     Clinical Data: Care Coordinator Outreach -  RNCC engaged in AIDET communications during encounter.     Outreach attempted x 1.  Left message on voicemail with call back information and requested return call.    Plan:     Care Coordinator will try to reach patient again in 1-2 business days.     RN/SW Care Coordinator will await notification from home care staff informing RN/SW Care Coordinator of patients discharge plans/needs.     RN/SW Care Coordinator will review chart and outreach to home care every 4 weeks and as needed.      ENROLLMENT STATUS:   Potential    CARE COORDINATOR STATUS: Care team updated.     Melissa Mckeon, BSN, RN   [facility]  Clinic Care Coordinator - [place], [facility] Locations   Direct:  [phone] (voicemail available)   (Today's Date: 02/11/2019)

## 2019-02-11 NOTE — PROGRESS NOTES
Discharge instructions reviewed with patient and family (mother, father and wife) at the bedside; understanding verbalized.     Addendum: Pt discharged at 1900 with colleen.

## 2019-02-12 ENCOUNTER — DOCUMENTATION ONLY (OUTPATIENT)
Dept: FAMILY MEDICINE | Facility: CLINIC | Age: 49
End: 2019-02-12

## 2019-02-12 ENCOUNTER — MEDICAL CORRESPONDENCE (OUTPATIENT)
Dept: HEALTH INFORMATION MANAGEMENT | Facility: CLINIC | Age: 49
End: 2019-02-12

## 2019-02-12 LAB
BACTERIA SPEC CULT: ABNORMAL
Lab: ABNORMAL
Lab: ABNORMAL
SPECIMEN SOURCE: ABNORMAL
SPECIMEN SOURCE: ABNORMAL

## 2019-02-12 NOTE — PROGRESS NOTES
Clinic Care Coordination Contact  Care Coordination Communication    Referral Source: IP Handoff    RNCC engaged in AIDET communications during encounter.     Home Care Contact:              Home Care Agency: [facility] # [phone]              Care Coordination contacted home care: Yes - spoke with josef Daly.               Anticipated start of care date:  Patient is scheduled to have SN visit TODAY 02/12/2019.      Plan:   RN/SW Care Coordinator will await notification from home care staff informing RN/SW Care Coordinator of patients discharge plans/needs. RN/SW Care Coordinator will review chart and outreach to home care every 4 weeks and as needed.      ENROLLMENT STATUS:   Potential    CARE COORDINATOR STATUS: Care team current    Melissa Mons, BSN, RN   [facility]  Clinic Care Coordinator - [facility] Locations   Direct:  [phone] (voicemail available)   (Today's Date: 02/12/2019)

## 2019-02-12 NOTE — PROGRESS NOTES
[facility] admission completed 2/12/19  Patient is insistent on staying at home during visit  O2 88 percent  Temp 99.7 on 1000mg tylneol, and BID augmentin  /70  Pulse 81  Loose stools    Left great toe is dry with betadine  Left heel is very wet, mushy with necrotic tissue    Requesting verbal  home care orders for the following  RN 3w2, 3prn  PT/OT to eval and treat    Need wound care orders, from podietry or PMD per WOCN eval in hospital  Left toe, betadine and cover with gauze  Left heel, clean with wound cleanser, apply iodosorbe to adaptic, mold into wound, cover with abd/secure with kerlix, wrap with ace wrap.  Orders are currently e/o day, but would like daily thru Friday 2/15/19 for close assessment.    Thank you  Reema Malik RN, BSN  [email or web address]  [phone]

## 2019-02-12 NOTE — PROGRESS NOTES
Clinic Care Coordination Contact  Care Team Conversations    RNCC received a VM update from April, RN with [facility] ([phone]).    Similar updates to those documented in 02/12/2019 Documentation Only - Home Care / Hospice entry.       She reports that patient does not answer his phone.  [facility] team members have been going through his wife, Huma.   Patient with multitude of s/s including fever and hyperglycemia but refuses to go to ED for further evaluation.    She is requesting orders for daily visits this week for wound cares and monitoring (from either PCP or Podiatry), as requested in above noted entry.  She reviewed risks, including sepsis and leading up to death.   Patient is unmoved in his decision to seek emergent medical care even with these risks being reviewed with him by [facility] RN.     PLAN:  Forwarding to PCP for review.   Okay for orders as requested by [facility] RN?      Melissa Mckeon, BSN, RN   [facility]  Clinic Care Coordinator - [facility] [place] Locations   Direct:  [phone] (voicemail available)   (Today's Date: 02/12/2019)

## 2019-02-13 NOTE — PROGRESS NOTES
I called April back and approved home care orders per RN protocol. She will have nurse further assess foot today and review further with Dr. Pleitez. Aida Friend R.N.

## 2019-02-14 ENCOUNTER — TELEPHONE (OUTPATIENT)
Dept: FAMILY MEDICINE | Facility: CLINIC | Age: 49
End: 2019-02-14

## 2019-02-14 NOTE — TELEPHONE ENCOUNTER
Date Forms was received: February 14, 2019    Forms received by: Fax    Purpose of Form:  [employer]    When the form is due:  ASAP    How the form needs to be returned for patient:  Fax    Form currently placed  SW inbox

## 2019-02-16 ENCOUNTER — HOSPITAL ENCOUNTER (INPATIENT)
Facility: CLINIC | Age: 49
LOS: 9 days | Discharge: NURSING, REHAB OR LONG-TERM CARE | End: 2019-02-25
Attending: EMERGENCY MEDICINE | Admitting: INTERNAL MEDICINE
Payer: COMMERCIAL

## 2019-02-16 ENCOUNTER — APPOINTMENT (OUTPATIENT)
Dept: GENERAL RADIOLOGY | Facility: CLINIC | Age: 49
End: 2019-02-16
Attending: EMERGENCY MEDICINE
Payer: COMMERCIAL

## 2019-02-16 DIAGNOSIS — M86.9 OSTEOMYELITIS OF LEFT FOOT, UNSPECIFIED TYPE (H): ICD-10-CM

## 2019-02-16 DIAGNOSIS — S88.112A AMPUTATION OF LEFT LOWER EXTREMITY BELOW KNEE (H): Primary | ICD-10-CM

## 2019-02-16 DIAGNOSIS — I10 BENIGN ESSENTIAL HYPERTENSION: ICD-10-CM

## 2019-02-16 DIAGNOSIS — E10.621 DIABETIC ULCER OF TOE OF LEFT FOOT ASSOCIATED WITH TYPE 1 DIABETES MELLITUS, WITH NECROSIS OF BONE (H): ICD-10-CM

## 2019-02-16 DIAGNOSIS — L97.524 DIABETIC ULCER OF TOE OF LEFT FOOT ASSOCIATED WITH TYPE 1 DIABETES MELLITUS, WITH NECROSIS OF BONE (H): ICD-10-CM

## 2019-02-16 LAB
ANION GAP SERPL CALCULATED.3IONS-SCNC: 5 MMOL/L (ref 3–14)
BASOPHILS # BLD AUTO: 0 10E9/L (ref 0–0.2)
BASOPHILS NFR BLD AUTO: 0.1 %
BUN SERPL-MCNC: 30 MG/DL (ref 7–30)
CALCIUM SERPL-MCNC: 8.5 MG/DL (ref 8.5–10.1)
CHLORIDE SERPL-SCNC: 97 MMOL/L (ref 94–109)
CO2 SERPL-SCNC: 32 MMOL/L (ref 20–32)
CREAT SERPL-MCNC: 1.51 MG/DL (ref 0.66–1.25)
CRP SERPL-MCNC: 230 MG/L (ref 0–8)
DIFFERENTIAL METHOD BLD: ABNORMAL
EOSINOPHIL # BLD AUTO: 0.4 10E9/L (ref 0–0.7)
EOSINOPHIL NFR BLD AUTO: 1.6 %
ERYTHROCYTE [DISTWIDTH] IN BLOOD BY AUTOMATED COUNT: 13.7 % (ref 10–15)
GFR SERPL CREATININE-BSD FRML MDRD: 54 ML/MIN/{1.73_M2}
GLUCOSE SERPL-MCNC: 228 MG/DL (ref 70–99)
HCT VFR BLD AUTO: 27.4 % (ref 40–53)
HGB BLD-MCNC: 9 G/DL (ref 13.3–17.7)
IMM GRANULOCYTES # BLD: 0.1 10E9/L (ref 0–0.4)
IMM GRANULOCYTES NFR BLD: 0.4 %
LACTATE BLD-SCNC: 1.2 MMOL/L (ref 0.7–2)
LYMPHOCYTES # BLD AUTO: 1.6 10E9/L (ref 0.8–5.3)
LYMPHOCYTES NFR BLD AUTO: 6.7 %
MCH RBC QN AUTO: 26.9 PG (ref 26.5–33)
MCHC RBC AUTO-ENTMCNC: 32.8 G/DL (ref 31.5–36.5)
MCV RBC AUTO: 82 FL (ref 78–100)
MONOCYTES # BLD AUTO: 1.3 10E9/L (ref 0–1.3)
MONOCYTES NFR BLD AUTO: 5.6 %
NEUTROPHILS # BLD AUTO: 20.1 10E9/L (ref 1.6–8.3)
NEUTROPHILS NFR BLD AUTO: 85.6 %
NRBC # BLD AUTO: 0 10*3/UL
NRBC BLD AUTO-RTO: 0 /100
PLATELET # BLD AUTO: 747 10E9/L (ref 150–450)
POTASSIUM SERPL-SCNC: 4.4 MMOL/L (ref 3.4–5.3)
RBC # BLD AUTO: 3.35 10E12/L (ref 4.4–5.9)
SODIUM SERPL-SCNC: 134 MMOL/L (ref 133–144)
WBC # BLD AUTO: 23.5 10E9/L (ref 4–11)

## 2019-02-16 PROCEDURE — 25000131 ZZH RX MED GY IP 250 OP 636 PS 637: Performed by: INTERNAL MEDICINE

## 2019-02-16 PROCEDURE — 25000128 H RX IP 250 OP 636: Performed by: EMERGENCY MEDICINE

## 2019-02-16 PROCEDURE — 36415 COLL VENOUS BLD VENIPUNCTURE: CPT

## 2019-02-16 PROCEDURE — 25800030 ZZH RX IP 258 OP 636: Performed by: INTERNAL MEDICINE

## 2019-02-16 PROCEDURE — 25000128 H RX IP 250 OP 636: Performed by: INTERNAL MEDICINE

## 2019-02-16 PROCEDURE — 73630 X-RAY EXAM OF FOOT: CPT | Mod: LT

## 2019-02-16 PROCEDURE — 25000132 ZZH RX MED GY IP 250 OP 250 PS 637: Performed by: INTERNAL MEDICINE

## 2019-02-16 PROCEDURE — 87040 BLOOD CULTURE FOR BACTERIA: CPT | Performed by: EMERGENCY MEDICINE

## 2019-02-16 PROCEDURE — 86140 C-REACTIVE PROTEIN: CPT | Performed by: EMERGENCY MEDICINE

## 2019-02-16 PROCEDURE — 80048 BASIC METABOLIC PNL TOTAL CA: CPT | Performed by: EMERGENCY MEDICINE

## 2019-02-16 PROCEDURE — 12000000 ZZH R&B MED SURG/OB

## 2019-02-16 PROCEDURE — 25800030 ZZH RX IP 258 OP 636: Performed by: EMERGENCY MEDICINE

## 2019-02-16 PROCEDURE — 25000125 ZZHC RX 250: Performed by: EMERGENCY MEDICINE

## 2019-02-16 PROCEDURE — 83605 ASSAY OF LACTIC ACID: CPT | Performed by: EMERGENCY MEDICINE

## 2019-02-16 PROCEDURE — 99285 EMERGENCY DEPT VISIT HI MDM: CPT

## 2019-02-16 PROCEDURE — 85025 COMPLETE CBC W/AUTO DIFF WBC: CPT | Performed by: EMERGENCY MEDICINE

## 2019-02-16 PROCEDURE — 25000125 ZZHC RX 250: Performed by: INTERNAL MEDICINE

## 2019-02-16 PROCEDURE — 99223 1ST HOSP IP/OBS HIGH 75: CPT | Mod: AI | Performed by: INTERNAL MEDICINE

## 2019-02-16 RX ORDER — ONDANSETRON 4 MG/1
4 TABLET, ORALLY DISINTEGRATING ORAL EVERY 6 HOURS PRN
Status: DISCONTINUED | OUTPATIENT
Start: 2019-02-16 | End: 2019-02-18

## 2019-02-16 RX ORDER — NALOXONE HYDROCHLORIDE 0.4 MG/ML
.1-.4 INJECTION, SOLUTION INTRAMUSCULAR; INTRAVENOUS; SUBCUTANEOUS
Status: DISCONTINUED | OUTPATIENT
Start: 2019-02-16 | End: 2019-02-18

## 2019-02-16 RX ORDER — SODIUM CHLORIDE, SODIUM LACTATE, POTASSIUM CHLORIDE, CALCIUM CHLORIDE 600; 310; 30; 20 MG/100ML; MG/100ML; MG/100ML; MG/100ML
INJECTION, SOLUTION INTRAVENOUS CONTINUOUS
Status: DISCONTINUED | OUTPATIENT
Start: 2019-02-16 | End: 2019-02-16

## 2019-02-16 RX ORDER — PIPERACILLIN SODIUM, TAZOBACTAM SODIUM 4; .5 G/20ML; G/20ML
4.5 INJECTION, POWDER, LYOPHILIZED, FOR SOLUTION INTRAVENOUS ONCE
Status: COMPLETED | OUTPATIENT
Start: 2019-02-16 | End: 2019-02-16

## 2019-02-16 RX ORDER — PROCHLORPERAZINE 25 MG
25 SUPPOSITORY, RECTAL RECTAL EVERY 12 HOURS PRN
Status: DISCONTINUED | OUTPATIENT
Start: 2019-02-16 | End: 2019-02-25 | Stop reason: HOSPADM

## 2019-02-16 RX ORDER — DEXTROSE MONOHYDRATE 25 G/50ML
25-50 INJECTION, SOLUTION INTRAVENOUS
Status: DISCONTINUED | OUTPATIENT
Start: 2019-02-16 | End: 2019-02-25 | Stop reason: HOSPADM

## 2019-02-16 RX ORDER — NICOTINE POLACRILEX 4 MG
15-30 LOZENGE BUCCAL
Status: DISCONTINUED | OUTPATIENT
Start: 2019-02-16 | End: 2019-02-25 | Stop reason: HOSPADM

## 2019-02-16 RX ORDER — ACETAMINOPHEN 325 MG/1
650 TABLET ORAL EVERY 4 HOURS PRN
Status: DISCONTINUED | OUTPATIENT
Start: 2019-02-16 | End: 2019-02-18

## 2019-02-16 RX ORDER — PREGABALIN 100 MG/1
100 CAPSULE ORAL 3 TIMES DAILY
Status: DISCONTINUED | OUTPATIENT
Start: 2019-02-16 | End: 2019-02-25 | Stop reason: HOSPADM

## 2019-02-16 RX ORDER — CLINDAMYCIN PHOSPHATE 900 MG/50ML
900 INJECTION, SOLUTION INTRAVENOUS ONCE
Status: COMPLETED | OUTPATIENT
Start: 2019-02-16 | End: 2019-02-16

## 2019-02-16 RX ORDER — SODIUM CHLORIDE 9 MG/ML
INJECTION, SOLUTION INTRAVENOUS CONTINUOUS
Status: DISCONTINUED | OUTPATIENT
Start: 2019-02-16 | End: 2019-02-19

## 2019-02-16 RX ORDER — HYDROMORPHONE HYDROCHLORIDE 1 MG/ML
.3-.5 INJECTION, SOLUTION INTRAMUSCULAR; INTRAVENOUS; SUBCUTANEOUS
Status: DISCONTINUED | OUTPATIENT
Start: 2019-02-16 | End: 2019-02-18

## 2019-02-16 RX ORDER — POLYETHYLENE GLYCOL 3350 17 G/17G
17 POWDER, FOR SOLUTION ORAL DAILY PRN
Status: DISCONTINUED | OUTPATIENT
Start: 2019-02-16 | End: 2019-02-25 | Stop reason: HOSPADM

## 2019-02-16 RX ORDER — PROCHLORPERAZINE MALEATE 10 MG
10 TABLET ORAL EVERY 6 HOURS PRN
Status: DISCONTINUED | OUTPATIENT
Start: 2019-02-16 | End: 2019-02-25 | Stop reason: HOSPADM

## 2019-02-16 RX ORDER — ONDANSETRON 2 MG/ML
4 INJECTION INTRAMUSCULAR; INTRAVENOUS EVERY 6 HOURS PRN
Status: DISCONTINUED | OUTPATIENT
Start: 2019-02-16 | End: 2019-02-18

## 2019-02-16 RX ORDER — OXYCODONE HYDROCHLORIDE 5 MG/1
5-10 TABLET ORAL
Status: DISCONTINUED | OUTPATIENT
Start: 2019-02-16 | End: 2019-02-18

## 2019-02-16 RX ORDER — PIPERACILLIN SODIUM, TAZOBACTAM SODIUM 3; .375 G/15ML; G/15ML
3.38 INJECTION, POWDER, LYOPHILIZED, FOR SOLUTION INTRAVENOUS EVERY 6 HOURS
Status: DISCONTINUED | OUTPATIENT
Start: 2019-02-16 | End: 2019-02-21

## 2019-02-16 RX ORDER — BISACODYL 10 MG
10 SUPPOSITORY, RECTAL RECTAL DAILY PRN
Status: DISCONTINUED | OUTPATIENT
Start: 2019-02-16 | End: 2019-02-25 | Stop reason: HOSPADM

## 2019-02-16 RX ADMIN — FAMOTIDINE 20 MG: 10 INJECTION, SOLUTION INTRAVENOUS at 20:55

## 2019-02-16 RX ADMIN — INSULIN ASPART 1 UNITS: 100 INJECTION, SOLUTION INTRAVENOUS; SUBCUTANEOUS at 20:55

## 2019-02-16 RX ADMIN — SODIUM CHLORIDE, POTASSIUM CHLORIDE, SODIUM LACTATE AND CALCIUM CHLORIDE 1000 ML: 600; 310; 30; 20 INJECTION, SOLUTION INTRAVENOUS at 15:47

## 2019-02-16 RX ADMIN — OXYCODONE HYDROCHLORIDE 10 MG: 5 TABLET ORAL at 19:25

## 2019-02-16 RX ADMIN — SODIUM CHLORIDE, POTASSIUM CHLORIDE, SODIUM LACTATE AND CALCIUM CHLORIDE: 600; 310; 30; 20 INJECTION, SOLUTION INTRAVENOUS at 18:00

## 2019-02-16 RX ADMIN — PIPERACILLIN AND TAZOBACTAM 4.5 G: 4; .5 INJECTION, POWDER, FOR SOLUTION INTRAVENOUS at 16:26

## 2019-02-16 RX ADMIN — SODIUM CHLORIDE: 9 INJECTION, SOLUTION INTRAVENOUS at 19:28

## 2019-02-16 RX ADMIN — PIPERACILLIN SODIUM,TAZOBACTAM SODIUM 3.38 G: 3; .375 INJECTION, POWDER, FOR SOLUTION INTRAVENOUS at 22:03

## 2019-02-16 RX ADMIN — PREGABALIN 100 MG: 100 CAPSULE ORAL at 22:03

## 2019-02-16 RX ADMIN — CLINDAMYCIN PHOSPHATE 900 MG: 18 INJECTION, SOLUTION INTRAVENOUS at 17:16

## 2019-02-16 ASSESSMENT — ENCOUNTER SYMPTOMS
FEVER: 1
WOUND: 1
COLOR CHANGE: 1
VOMITING: 0
NAUSEA: 0
DIAPHORESIS: 1

## 2019-02-16 ASSESSMENT — ACTIVITIES OF DAILY LIVING (ADL)
TRANSFERRING: 1-->ASSISTIVE EQUIPMENT
ADLS_ACUITY_SCORE: 14
FALL_HISTORY_WITHIN_LAST_SIX_MONTHS: NO
SWALLOWING: 0-->SWALLOWS FOODS/LIQUIDS WITHOUT DIFFICULTY
TOILETING: 0-->INDEPENDENT
DRESS: 0-->INDEPENDENT
COGNITION: 0 - NO COGNITION ISSUES REPORTED
BATHING: 0-->INDEPENDENT
RETIRED_COMMUNICATION: 0-->UNDERSTANDS/COMMUNICATES WITHOUT DIFFICULTY
RETIRED_EATING: 0-->INDEPENDENT
AMBULATION: 1-->ASSISTIVE EQUIPMENT

## 2019-02-16 ASSESSMENT — COLUMBIA-SUICIDE SEVERITY RATING SCALE - C-SSRS
1. IN THE PAST MONTH, HAVE YOU WISHED YOU WERE DEAD OR WISHED YOU COULD GO TO SLEEP AND NOT WAKE UP?: NO
2. HAVE YOU ACTUALLY HAD ANY THOUGHTS OF KILLING YOURSELF IN THE PAST MONTH?: NO

## 2019-02-16 ASSESSMENT — MIFFLIN-ST. JEOR: SCORE: 1571.18

## 2019-02-16 NOTE — ED NOTES
"[facility]  ED Nurse Handoff Report    ED Chief complaint: Wound Check (here with an infection of the foot. Told to come through the ER when he was ready to have an amputation, would be admitted. )      ED Diagnosis:   Final diagnoses:   Osteomyelitis of left foot, unspecified type (H)       Code Status: Full Code    Allergies:   Allergies   Allergen Reactions     Fish-Derived Products Rash       Activity level - Baseline/Home:  Independent    Activity Level - Current:   Unable to Assess Pt states that he walks independently at home. Pt was wheeled from triage to ED room d/t increased left foot pain.     Needed?: No    Isolation: No  Infection: Not Applicable  Bariatric?: No    Vital Signs:   Vitals:    02/16/19 1504   BP: 118/69   Resp: 16   Temp: 97.7  F (36.5  C)   TempSrc: Oral   SpO2: 100%   Weight: 71.7 kg (158 lb)   Height: 1.727 m (5' 8\")       Cardiac Rhythm: ,        Pain level:      Is this patient confused?: No   Does this patient have a guardian?  No         If yes, is there guardianship documents in the Epic \"Code/ACP\" activity?  No         Guardian Notified?  N/A  [place] - Suicide Severity Rating Scale Completed?  Yes  If yes, what color did the patient score?  White    Patient Report: Initial Complaint: Wound check/ Left foot Pain.  Focused Assessment: Pt here with increased left foot pain since last week and intermittent fevers at home. Pt states that he was admitted here recently for wound on left foot and heel. Pt states that there was recommendation for amputation while he was admitted but he declined and was discharged. Pt states that he thinks he is ready for the procedure (Left foot amputation). Pt has a deep partial thickness wound on left foot that is draining moderate serosanguineous fluid. Pt denies any sensation to foot but has occasional tingling sensation shooting up his leg.    Tests Performed: Labs; X-ray.  Abnormal Results:   Abnormal Labs Reviewed   CBC " WITH PLATELETS DIFFERENTIAL - Abnormal; Notable for the following components:       Result Value    WBC 23.5 (*)     RBC Count 3.35 (*)     Hemoglobin 9.0 (*)     Hematocrit 27.4 (*)     Platelet Count 747 (*)     Absolute Neutrophil 20.1 (*)     All other components within normal limits   BASIC METABOLIC PANEL - Abnormal; Notable for the following components:    Glucose 228 (*)     Creatinine 1.51 (*)     GFR Estimate 54 (*)     All other components within normal limits   CRP INFLAMMATION - Abnormal; Notable for the following components:    CRP Inflammation 230.0 (*)     All other components within normal limits       Treatments provided: Please see below under ED Medications.    Family Comments: Spouse and Parent (Father) at bedside.    OBS brochure/video discussed/provided to patient/family: N/A              Name of person given brochure if not patient: N/A              Relationship to patient: N/A    ED Medications:   Medications   lactated ringers infusion (not administered)   piperacillin-tazobactam (ZOSYN) 4.5 g vial to attach to  mL bag (4.5 g Intravenous New Bag 2/16/19 1626)   clindamycin (CLEOCIN) infusion 900 mg (not administered)   lactated ringers BOLUS 2,151 mL (1,000 mLs Intravenous New Bag 2/16/19 1547)       Drips infusing?:  No    For the majority of the shift this patient was Green.   Interventions performed were Rounding for comfort.    Severe Sepsis OR Septic Shock Diagnosis Present: No    To be done/followed up on inpatient unit:  None    ED NURSE PHONE NUMBER: [phone]

## 2019-02-16 NOTE — PLAN OF CARE
.RECEIVING UNIT ED HANDOFF REVIEW    ED Nurse Handoff Report was reviewed by: Jesus Alberto Gandhi on February 16, 2019 at 5:40 PM

## 2019-02-16 NOTE — PHARMACY-ADMISSION MEDICATION HISTORY
"Admission medication history interview status for the 2/16/2019  admission is complete. See EPIC admission navigator for prior to admission medications     Medication history source reliability:Moderate    Actions taken by pharmacist (provider contacted, etc):None     Additional medication history information not noted on PTA med list :None    Medication reconciliation/reorder completed by provider prior to medication history? No    Time spent in this activity: 10 minutes    Patient requests using Humalog, not Lovolog (because \"Lovolog doesn't work for me\")    Prior to Admission medications    Medication Sig Last Dose Taking? Auth Provider   amoxicillin-clavulanate (AUGMENTIN) 875-125 MG tablet Take 1 tablet by mouth 2 times daily 2/15/2019 at pm Yes Charles Luque MD   insulin lispro (HUMALOG KWIKPEN) 100 UNIT/ML pen Inject 1 unit/2 grams carb, total daily dose up to 80 units. also sliding scale - 1 unit for BG every 50 over 200 2/16/2019 at am Yes Reported, Patient   LYRICA 100 MG capsule TAKE 1 CAPSULE BY MOUTH THREE TIMES A DAY 2/16/2019 at am Yes Reported, Patient   TRESIBA FLEXTOUCH 100 UNIT/ML pen Inject 28 Units Subcutaneous every morning  2/16/2019 at am Yes Efren Conway, DO   Continuous Blood Gluc Sensor (FREESTYLE COLIN 14 DAY SENSOR) [facility] USE WITH 14 DAY COLIN SYSTEM. CHANGE SENSOR EVERY 14 DAYS.   Reported, Patient   order for DME Equipment being ordered: knee roller, will need for 4 months. Non weight bearing left foot. Unknown at Unknown time  Kay Pleitez DPM, Podiatry/Foot and Ankle Surgery       "

## 2019-02-16 NOTE — ED PROVIDER NOTES
History     Chief Complaint:  Wound Check    HPI   Eduardo Walton is a 48 year old diabetic male who presents with his wife and father to the emergency department for evaluation of a wound check on his left foot. Patient states he was discharged from the hospital on 2/10/19 after being treated for osteomyelitis diagnosed by advanced imaging. He had the foot debrided extensively. The patient was advised on amputation of the entire foot. However he states that he was not ready to go through with this and wished to be discharged. This was completed and he was given a course of Augmentin to take as an out patient while he weighed his options. He states that since being discharged he developed low grade fevers and increased episodes of sweating. He gets occasional shooting pain through his foot that he notes is quite severe. Patient and wife note that the wound appears to be worse looking today then yesterday. He has been taking tylenol to help with hs symptoms with some relief. Patient denies vomiting, or nausea.     Allergies:  Fish-Derived Products     Medications:  amoxicillin-clavulanate (AUGMENTIN) 875-125 MG tablet  insulin lispro (HUMALOG KWIKPEN) 100 UNIT/ML pen  LYRICA 100 MG capsule    Past Medical History:    Past medical history reviewed. No pertinent history.     Past Surgical History:    STENT     Family History:    Maternal grandmother- ovarian caner, colon caner  Maternal grandfather- prostate cancer  Paternal grandmother- ovarian cancer     Social History:  Marital Status:   [2]  Social History     Tobacco Use     Smoking status: Never Smoker     Smokeless tobacco: Never Used   Substance Use Topics     Alcohol use: Yes     Comment: occ     Drug use: Not on file        Review of Systems   Constitutional: Positive for diaphoresis and fever.   Gastrointestinal: Negative for nausea and vomiting.   Skin: Positive for color change and wound.   All other systems reviewed and are negative.    Physical  "Exam   First Vitals:  BP: 118/69  Pulse: 86  Heart Rate: 80  Temp: 97.7  F (36.5  C)  Resp: 16  Height: 172.7 cm (5' 8\")  Weight: 71.7 kg (158 lb)  SpO2: 100 %      Physical Exam  VS: Reviewed per above  HENT: Mucous membranes moist  EYES: sclera anicteric  CV: Rate as noted, regular rhythm.   RESP: Effort normal. Breath sounds are normal bilaterally.  GI: no tenderness, not distended.  NEURO: Alert, moving all extremities  MSK: Debrided left calcaneal wound now appears to have purulence, there is also a necrotic appearing wound over the dorsal lateral surface of the left foot.  Chronic appearing left great toe wound.  Generalized erythema about the left foot.  SKIN: Warm and dry      Emergency Department Course   Imaging:  Radiology findings were communicated with the patient who voiced understanding of the findings.      Foot XR, G/E 3 views, left  Preliminary Result  IMPRESSION:  1. Irregularity distal aspect of the great toe is again noted and may  be slightly progressed since the prior study dated 2/4/2019. This  could represent osteomyelitis.   2. Large soft tissue defect surrounding the calcaneus is likely  postoperative. No definite cortical irregularity of the calcaneus is  seen.  3. There is new gas in the soft tissues along lateral aspects mid  forefoot concerning for extension of infection in the soft tissues in  this region.  4. Arterial calcifications are again noted corresponding with possible  history of diabetes mellitus.    Readings are preliminary at time of note     I discussed the findings of new gas in the soft tissues along lateral  aspect of the midfoot with Dr. Javier on 2/16/2019 at 4:10 PM. We  also discussed the irregularity of the distal phalanx of the great toe  and the large postoperative soft tissue defect around the calcaneus.      Laboratory:  Laboratory findings were communicated with the patient who voiced understanding of the findings.    Blood culture: pending x2  Lactic " acid: 1.2  CBC: WBC 23.5 (H), HGB 9 (L),  (H)  BMP: glucose 228 (H), GFR 54 (L), o/w WNL (Creatinine 1.51 (H))  CRP: 230 (H)    Interventions:  Lactated ringers 2151 mls IV  Zosyn 4.5 g IV     Emergency Department Course:  Nursing notes and vitals reviewed.  I performed an exam of the patient as documented above.   1609 consult with Dr. Berman  I discussed the treatment plan with the patient. They expressed understanding of this plan and consented to admission. I discussed the patient with Dr. Lueavno, who will admit the patient to a monitored bed for further evaluation and treatment.     I personally reviewed the imaging and labs results with the Patient and answered all related questions prior to admission.  Impression & Plan      Medical Decision Making:  Patient presents to the ER for evaluation of left foot wound.  Initial vital signs are unremarkable.  Exam is notable for generalized erythema about the left foot, purulent left calcaneal wound, necrotic appearing left lateral dorsal foot wound.  After discussion with family, these wounds appear to have worsened in appearance over the past day or so.  There is no evidence of severe sepsis here in the ER although he has markedly elevated white blood cell count and CRP.  I reviewed the chart and see that ID had recommended Zosyn while he was in the hospital.  This was ordered.  X-ray today revealed new area of gas in the lateral mid forefoot.  I suspect this correlates with area of necrosis on exam.  Clindamycin was also added.  I discussed the case with both podiatry and orthopedics.  Plan to admit to the hospital for further antibiotics and likely foot amputation.    Diagnosis:    ICD-10-CM    1. Osteomyelitis of left foot, unspecified type (H) M86.9 Blood culture     CRP inflammation     Disposition:   Admission    Scribe Disclosure:  Toni MEI, am serving as a scribe at 4:09 PM on 2/16/2019 to document services personally performed by Concepcion  MD Jose F, based on my observations and the provider's statements to me.     EMERGENCY DEPARTMENT       Jose F Javier MD  02/16/19 3398

## 2019-02-17 ENCOUNTER — APPOINTMENT (OUTPATIENT)
Dept: GENERAL RADIOLOGY | Facility: CLINIC | Age: 49
End: 2019-02-17
Attending: ORTHOPAEDIC SURGERY
Payer: COMMERCIAL

## 2019-02-17 LAB
ANION GAP SERPL CALCULATED.3IONS-SCNC: 7 MMOL/L (ref 3–14)
BUN SERPL-MCNC: 31 MG/DL (ref 7–30)
CALCIUM SERPL-MCNC: 7.8 MG/DL (ref 8.5–10.1)
CHLORIDE SERPL-SCNC: 100 MMOL/L (ref 94–109)
CO2 SERPL-SCNC: 29 MMOL/L (ref 20–32)
CREAT SERPL-MCNC: 1.6 MG/DL (ref 0.66–1.25)
ERYTHROCYTE [DISTWIDTH] IN BLOOD BY AUTOMATED COUNT: 14 % (ref 10–15)
GFR SERPL CREATININE-BSD FRML MDRD: 51 ML/MIN/{1.73_M2}
GLUCOSE SERPL-MCNC: 99 MG/DL (ref 70–99)
HCT VFR BLD AUTO: 24.2 % (ref 40–53)
HGB BLD-MCNC: 8 G/DL (ref 13.3–17.7)
MCH RBC QN AUTO: 26.8 PG (ref 26.5–33)
MCHC RBC AUTO-ENTMCNC: 33.1 G/DL (ref 31.5–36.5)
MCV RBC AUTO: 81 FL (ref 78–100)
PLATELET # BLD AUTO: 621 10E9/L (ref 150–450)
POTASSIUM SERPL-SCNC: 4.1 MMOL/L (ref 3.4–5.3)
RBC # BLD AUTO: 2.98 10E12/L (ref 4.4–5.9)
SODIUM SERPL-SCNC: 136 MMOL/L (ref 133–144)
WBC # BLD AUTO: 19.1 10E9/L (ref 4–11)

## 2019-02-17 PROCEDURE — 25000132 ZZH RX MED GY IP 250 OP 250 PS 637: Performed by: INTERNAL MEDICINE

## 2019-02-17 PROCEDURE — 85027 COMPLETE CBC AUTOMATED: CPT | Performed by: INTERNAL MEDICINE

## 2019-02-17 PROCEDURE — 12000000 ZZH R&B MED SURG/OB

## 2019-02-17 PROCEDURE — 25800030 ZZH RX IP 258 OP 636: Performed by: INTERNAL MEDICINE

## 2019-02-17 PROCEDURE — 99233 SBSQ HOSP IP/OBS HIGH 50: CPT | Performed by: INTERNAL MEDICINE

## 2019-02-17 PROCEDURE — 80048 BASIC METABOLIC PNL TOTAL CA: CPT | Performed by: INTERNAL MEDICINE

## 2019-02-17 PROCEDURE — 25000125 ZZHC RX 250: Performed by: INTERNAL MEDICINE

## 2019-02-17 PROCEDURE — 25000128 H RX IP 250 OP 636: Performed by: INTERNAL MEDICINE

## 2019-02-17 PROCEDURE — 36415 COLL VENOUS BLD VENIPUNCTURE: CPT | Performed by: INTERNAL MEDICINE

## 2019-02-17 PROCEDURE — 73590 X-RAY EXAM OF LOWER LEG: CPT | Mod: LT

## 2019-02-17 RX ORDER — DEXTROSE MONOHYDRATE 25 G/50ML
25-50 INJECTION, SOLUTION INTRAVENOUS
Status: DISCONTINUED | OUTPATIENT
Start: 2019-02-17 | End: 2019-02-17

## 2019-02-17 RX ORDER — ASPIRIN 81 MG/1
81 TABLET ORAL DAILY
Status: DISCONTINUED | OUTPATIENT
Start: 2019-02-18 | End: 2019-02-25 | Stop reason: HOSPADM

## 2019-02-17 RX ORDER — NICOTINE POLACRILEX 4 MG
15-30 LOZENGE BUCCAL
Status: DISCONTINUED | OUTPATIENT
Start: 2019-02-17 | End: 2019-02-17

## 2019-02-17 RX ADMIN — FAMOTIDINE 20 MG: 10 INJECTION, SOLUTION INTRAVENOUS at 22:03

## 2019-02-17 RX ADMIN — DEXTROSE 30 G: 15 GEL ORAL at 09:37

## 2019-02-17 RX ADMIN — ASPIRIN 325 MG: 325 TABLET, DELAYED RELEASE ORAL at 11:00

## 2019-02-17 RX ADMIN — OXYCODONE HYDROCHLORIDE 5 MG: 5 TABLET ORAL at 22:04

## 2019-02-17 RX ADMIN — PREGABALIN 100 MG: 100 CAPSULE ORAL at 17:43

## 2019-02-17 RX ADMIN — PREGABALIN 100 MG: 100 CAPSULE ORAL at 22:05

## 2019-02-17 RX ADMIN — FAMOTIDINE 20 MG: 10 INJECTION, SOLUTION INTRAVENOUS at 11:00

## 2019-02-17 RX ADMIN — PIPERACILLIN SODIUM,TAZOBACTAM SODIUM 3.38 G: 3; .375 INJECTION, POWDER, FOR SOLUTION INTRAVENOUS at 09:36

## 2019-02-17 RX ADMIN — PIPERACILLIN SODIUM,TAZOBACTAM SODIUM 3.38 G: 3; .375 INJECTION, POWDER, FOR SOLUTION INTRAVENOUS at 17:43

## 2019-02-17 RX ADMIN — SODIUM CHLORIDE: 9 INJECTION, SOLUTION INTRAVENOUS at 15:48

## 2019-02-17 RX ADMIN — PIPERACILLIN SODIUM,TAZOBACTAM SODIUM 3.38 G: 3; .375 INJECTION, POWDER, FOR SOLUTION INTRAVENOUS at 22:07

## 2019-02-17 RX ADMIN — PIPERACILLIN SODIUM,TAZOBACTAM SODIUM 3.38 G: 3; .375 INJECTION, POWDER, FOR SOLUTION INTRAVENOUS at 04:19

## 2019-02-17 RX ADMIN — PREGABALIN 100 MG: 100 CAPSULE ORAL at 09:37

## 2019-02-17 RX ADMIN — INSULIN DEGLUDEC INJECTION 20 UNITS: 100 INJECTION, SOLUTION SUBCUTANEOUS at 12:14

## 2019-02-17 ASSESSMENT — ACTIVITIES OF DAILY LIVING (ADL)
ADLS_ACUITY_SCORE: 14

## 2019-02-17 NOTE — H&P
Admitted:     02/16/2019      PRIMARY CARE PHYSICIAN:  Dr. Efren Conway MD.      PRIMARY PODIATRIST:  Dr. Kay Pleitez.      HISTORY OF PRESENT ILLNESS:  Mr. Eduardo Walton is a pleasant 48-year-old  male with type 1 diabetes mellitus, diabetic nephropathy and chronic diabetic ulcer of the left foot.  He was recently hospitalized with a left foot necrotic ulcer and osteomyelitis of the calcaneus and first great toe, and he underwent I and D of the necrotic heel ulcer.  JADEN studies were done and they were normal.  It was suspected that he has diabetic microvascular disease.  Vascular Surgery consulted.  Podiatry consulted and Podiatry recommended a below-knee amputation.  But during that hospitalization, the patient was not ready for the surgery, so he got discharged on oral Augmentin.  Since discharge, the patient has been having intermittent low-grade fevers up to 101 degrees Fahrenheit and his left foot is looking worse today.  Also, he was sweating a lot.  He had 3 episodes of sweating in the last 24 hours overnight, so he presented into the Emergency Room and at this time he thinks he is ready to have the surgery.  In the Emergency Room, he was evaluated by Dr. Javier.  He was given 2 liters of IV Ringer's lactate and a dose of IV Zosyn and clindamycin was given.  During the last hospitalization, the patient's creatinine got a little worse from a baseline of 1.2-1.7, thought to be secondary to vancomycin.  So vancomycin was discontinued.  So no further doses of vancomycin were given today.  He does have a history of coronary artery disease and had a stent placed in 2010.  He denies having any chest pain or shortness of breath with exertion.  No other complaints today.      PAST MEDICAL HISTORY:  Significant for:   1.  Type 1 diabetes mellitus, poorly controlled, with diabetic complications, with peripheral neuropathy, ophthalmic complications and foot ulcers.  His most recent hemoglobin A1c was 10.9  on 02/05/2019.   2.  Chronic kidney disease with baseline creatinine of 1.29 and most recently had a creatinine up to 1.5.   3.  Peripheral neuropathy.   4.  Retinopathy.   5.  Coronary artery disease with history of stent placement in 2010.  The patient was intolerant of beta blockers, which masked his hypoglycemia, so he was not taking the cardiac medications.  He did not like how the other medications made him feel.   6.  Chronic left toe and ankle wounds with recent admission from 02/04/2019 to 02/10/2019 with a left-sided necrotic ulcer with gas gangrene, status post I and D of the necrotic heel ulcer.      ALLERGIES:  NO KNOWN DRUG ALLERGIES PER PATIENT.      SOCIAL HISTORY:  Not a smoker, no alcohol use, no recreational drug use.  He lives in [place].  Currently on disability.      FAMILY HISTORY:  Both parents are alive and healthy.      REVIEW OF SYSTEMS:  Ten-point review of systems was done and is negative except as in HPI.      PHYSICAL EXAMINATION:   VITAL SIGNS:  His temperature is 97.7 degrees Fahrenheit, heart rate is 83-85, blood pressure 154/63, respiratory rate is 11-16.  He is satting 95% on room air.   GENERAL:  He is an alert and oriented, ill-looking male lying comfortably in bed, not in any acute distress.   HEENT:  Head is atraumatic, normocephalic.  Pupils equal, round and reactive to light.   HEART:  S1, S2 heard, no murmurs, rubs or gallops.   LUNGS:  Clear to auscultation.  No rales, rhonchi or wheezing.   ABDOMEN:  Soft, nontender, nondistended, no hepatosplenomegaly.     EXTREMITIES:  On left foot exam, there is extensive swelling and necrotic area on the lateral side of the left foot, consistent with gas gangrene, and necrotic ulceration.  And there is a large area of ulceration on the heel, which was debrided and active cellulitis-looking, with gas gangrene appearance.   NEUROLOGIC:  No focal weakness.   PSYCHIATRIC:  Mood and affect are normal.      LABORATORY AND RADIOLOGICAL  INVESTIGATIONS:  An x-ray of the left foot was done and it showed irregularity to the distal aspect of the great toe is again noted and may be slightly progressed since the prior study dated 02/04/2019.  This could represent osteomyelitis.  Large soft tissue defect surrounding the calcaneus is likely postoperative.  No definite cortical irregularity of the calcaneus is seen.  There is no gas in the soft tissue along the lateral aspect of the mid forefoot concerning for extension of the infection into the soft tissues in this region.  Arterial calcifications are again noted, corresponding with possible history of diabetes mellitus.        His other lab work showed the WBC count is elevated at 23.5, hemoglobin at 9, platelet count is elevated at 747.  Sodium 134, potassium 4.4, chloride 97, bicarb 32, BUN 30, creatinine at 1.51.  CRP is elevated to 30.  Lactate is at 1.2.      ASSESSMENT AND PLAN:     1.  A 48-year-old male with left foot infection with necrotic ulceration and gas gangrene which is progressively worsening and osteomyelitis.  He will be hospitalized.  We will continue with Zosyn IV and request Orthopedic and Vascular Surgery consultations regarding a below-knee amputation procedure and he will be placed on n.p.o. after midnight.  Gentle hydration will be provided to him.   2.  Peripheral neuropathy.  Will continue Lyrica.   3.  Diabetes mellitus.  Will reduce the Tresiba from 28 units subcutaneously every morning to 14 units subcutaneously every morning, as the patient is going to be n.p.o. and he will be placed on high-dose sliding scale insulin.  Will hold the prandial insulin as patient is going to be n.p.o. for now.   4.  Deep venous thrombosis prophylaxis.  PCDs in anticipation of surgery.   5.  Chronic kidney disease.  Follow BMP closely and urine output and inputs closely.  Avoid nephrotoxins.   6.  History of coronary artery disease.  The patient denies any symptoms of angina or congestive  heart failure currently.     7.  The benefit of surgery outweighs the risk, so okay to proceed with surgery.  No contraindication to surgery currently.     8.  CODE STATUS:  Full code.  Discussed with the patient.     9.  He will be admitted as an inpatient, as I am anticipating more than 2-night stay.         AMANDA PRASAD MD             D: 2019   T: 2019   MT: NANDA      Name:     LILLY SAL   MRN:      [id number]        Account:      [id number]   :      1970        Admitted:     2019                   Document: [id number]       cc: Efren Conway DO

## 2019-02-17 NOTE — PLAN OF CARE
Pt is A/Ox4. VSS. CMS intact. L foot wound dressing intact. Denies pain. SBA. NPO maintained. Ortho/Vas consult pending.

## 2019-02-17 NOTE — PLAN OF CARE
A&O x 4, VSS on RA, no C/O pain, drsg CDI, CMS intact per baseline, up independently, voiding adequately in bathroom, IV infusing, labile emotions and refuses medications and cares, continue to monitor.'

## 2019-02-17 NOTE — PLAN OF CARE
VSS. A/O. SBA. L foot wound new dressing applied per pt request, small purulent drainage. Large wound on L heel and scab on big toe. Oxy given. Tolerating diet. NPO midnight. Ortho/Vas consult pending. PCD refused.

## 2019-02-17 NOTE — PROGRESS NOTES
[facility]    Medicine Progress Note - Hospitalist Service       Date of Admission:  2/16/2019  Assessment & Plan   Eduardo Walton is a 48 year old Male with history of diabetes mellitus type 1 with complications of neuropathy, diabetic foot ulcers, nephropathy and retinopathy, chronic kidney disease stage II, recent hospitalization for diabetic ulcer with recommendation for BKA which patient declined who is admitted 2/16/2019 with progression of left lower extremity ulcer.     Diabetic foot infection with gas gangrene   Presents with low grade fevers and worsened appearance of left lower extremity ulcer. Foot XR demonstrated progression of gas in soft tissues with potentially slightly progressed findings for osteomyelitis. CRP markedly elevated at 230 with WBC of 23.5 (21.0 at time of discharge). He is not septic / toxic appearing at this time. Vascular surgery and orthopedic surgery were involved last hospitalization, with orthopedic surgery recommending BKA and vascular surgery initially recommending angiogram, which patient refused, and ultimately it was felt he had adequate blood supply to heal a BKA.   - Orthopedic surgery consulted. Discussed with orthopedic surgery who will evaluate patient, but he is presently added to schedule for 2/18/19 and okay for diet 2/17/2019. NPO at midnight ordered.   - Vascular surgery consulted on admission; discussed case with vascular surgery team and there is nothing further to add from their assessment last hospitalization, okay to proceed with BKA from vascular standpoint. Vascular surgery consult cancelled.   - Continue piperacillin-tazobactam IV  - Blood cultures NGTD  - Add vancomycin if signs of worsening infection / sepsis, or positive blood cultures  - WBC improving, afebrile  - Does not appear infection is rapidly progressing on exam     Diabetes mellitus, type 1, with complications of neuropathy, diabetic foot ulcers, nephropathy and  retinopathy  Poorly controlled with HgbA1c 10.9% 2/5/19. Prior to admission on degludec 28 units in the morning and lispro 1 unit/2 grams of carbohydrate + 1 unit for every 50 over 200 sliding scale insulin, total daily dose up to 80 units  - Continue prior to admission pregabalin  - Moderate consistent carbohydrate diet ordered   - Add aspart 1 unit per 5 grams of carbohydrate, medium sliding scale insulin   - Blood sugar borderline low 2/17/19  - Adjust degludec to 20 units daily     Chronic kidney disease, stage II  Baseline creatinine 1.5-1.6.   - Currently at baseline  - Continue IVF  - Avoid nephrotoxins including NSAIDs    Thrombocytosis  Recent Labs   Lab 02/17/19  0627 02/16/19  1549 02/10/19  1038   * 747* 516*     - Likely reactive in setting of infection   - Treat infection as above  - Monitor CBC    Coronary artery disease with history of NSTEMI s/p SUMANTH to pLAD  Stenting in 2010. Poor compliance with medications, currently off of beta-blocker, statin, aspirin.   - Discussed appropriate medical management of CAD and prior stent. He is presently refusing to resume statin or beta blocker (would not immediately re-start beta blocker in lizette-operative period regardless), although eventually agreed to see cardiology as an outpatient to discuss medical management further  - He is agreeable to resume aspirin. Aspirin 325 mg x1 and 81 mg daily starting 2/18/19  - Very troy discussion held with patient with his wife present about his increased risk of MI and death in absence of optimal medical management, especially in lizette-operative period. He expressed understanding, though did not change his mind.     Lizette-operative risk assessment   History of IDDM, CAD. High-risk surgery planned. Estimated 5.4 percent rate of cardiac death, nonfatal myocardial infarction, and nonfatal cardiac arrest according to the number of predictors.  - Would classify surgery as urgent given progression despite medical  management, risk of progression to sepsis. He is not having any active nor recent cardiopulmonary symptoms, therefore would proceed with surgery without further testing  - Optimizing CAD management as above, to the extent patient agrees to treatment    Diet: NPO per Anesthesia Guidelines for Procedure/Surgery Except for: Meds    DVT Prophylaxis: Pneumatic Compression Devices, post-operatively per surgical services  Martinez Catheter: not present  Code Status: Full Code      Disposition Plan   Expected discharge: Unclear, pending surgery and post-operative course   Entered: Gabriel Pace MD 02/17/2019, 8:32 AM       The patient's care was discussed with the Patient and Orthopedic Surgery  Team.    Time Spent on this Encounter   I spent 40 minutes on the unit/floor managing the care of Eduardo Walton. Over 50% of my time was spent counseling the patient and/or coordinating care regarding services listed in this note.      Gabriel Pace MD  Hospitalist Service  [facility]    ______________________________________________________________________    Interval History   No acute events overnight. No significant change in symptoms in foot. He denies any chest pain or shortness of breath, either now or in the recent past. He has been able to walk up stairs without chest pain or dyspnea. Denies nausea, vomiting, abdominal pain.     Data reviewed today: I reviewed all medications, new labs and imaging results over the last 24 hours. I personally reviewed no images or EKG's today.    Physical Exam   Vital Signs: Temp: 99.2  F (37.3  C) Temp src: Oral BP: 133/65 Pulse: 63 Heart Rate: 63 Resp: 16 SpO2: 98 % O2 Device: None (Room air)    Weight: 158 lbs 0 oz    Constitutional: NAD, non-toxic appearing   Respiratory: Clear to auscultation bilaterally, good air movement bilaterally  Cardiovascular: RRR, no m/r/g. No peripheral edema.  GI: Soft, non-tender, non-distended. BS normoactive.   Skin/Integumen: Warm,  dry. Left foot with necrotic ulceration of left heal with erythema lateral aspect of left foot. Erythema does not appear to be progressing above the ankle.  Other:     Data   Recent Labs   Lab 02/17/19  0627 02/16/19  1549 02/10/19  1038   WBC 19.1* 23.5* 21.0*   HGB 8.0* 9.0* 9.2*   MCV 81 82 83   * 747* 516*    134 134   POTASSIUM 4.1 4.4 4.9   CHLORIDE 100 97 101   CO2 29 32 29   BUN 31* 30 35*   CR 1.60* 1.51* 1.60*   ANIONGAP 7 5 4   GABRIELA 7.8* 8.5 8.3*   GLC 99 228* 277*       Recent Results (from the past 24 hour(s))   Foot XR, G/E 3 views, left    Narrative    LEFT FOOT THREE OR MORE VIEWS   2/16/2019 4:04 PM     HISTORY: Acute on chronic wounds left foot.    COMPARISON: Left foot x-rays dated 2/4/2019, CT dated 2/5/2019 and MRI  dated 2/6/2019.     FINDINGS: There is a large soft tissue posterior defect posteriorly in  left foot surrounding calcaneus likely postoperative in etiology.  There is now gas in the soft tissues around the lateral aspect of the  mid left foot just distal to the base of the fifth metatarsal. This  could represent gas-forming organism extending into this region. There  is destruction of the distal aspect of the distal phalanx of left  great toe which appears to have slightly progressed since the prior  study dated 2/4/2019. No acute fractures identified. No cortical  irregularity of the fifth metatarsal.      Impression    IMPRESSION:  1. Irregularity distal aspect of the great toe is again noted and may  be slightly progressed since the prior study dated 2/4/2019. This  could represent osteomyelitis.   2. Large soft tissue defect surrounding the calcaneus is likely  postoperative. No definite cortical irregularity of the calcaneus is  seen.  3. There is new gas in the soft tissues along lateral aspects mid  forefoot concerning for extension of infection in the soft tissues in  this region.  4. Arterial calcifications are again noted corresponding with possible  history of  diabetes mellitus.    I discussed the findings of new gas in the soft tissues along lateral  aspect of the midfoot with Dr. Javier on 2/16/2019 at 4:10 PM. We  also discussed the irregularity of the distal phalanx of the great toe  and the large postoperative soft tissue defect around the calcaneus.      RACHID GONZALES MD     Medications     sodium chloride 60 mL/hr at 02/16/19 1928       famotidine  20 mg Intravenous Q12H     insulin degludec  14 Units Subcutaneous QAM     insulin lispro  1 units/carb unit Subcutaneous TID AC     insulin lispro  8 Units Subcutaneous TID AC     piperacillin-tazobactam  3.375 g Intravenous Q6H     pregabalin  100 mg Oral TID

## 2019-02-18 ENCOUNTER — ANESTHESIA EVENT (OUTPATIENT)
Dept: SURGERY | Facility: CLINIC | Age: 49
End: 2019-02-18
Payer: COMMERCIAL

## 2019-02-18 ENCOUNTER — ANESTHESIA (OUTPATIENT)
Dept: SURGERY | Facility: CLINIC | Age: 49
End: 2019-02-18
Payer: COMMERCIAL

## 2019-02-18 LAB
ABO + RH BLD: NORMAL
ABO + RH BLD: NORMAL
ANION GAP SERPL CALCULATED.3IONS-SCNC: 6 MMOL/L (ref 3–14)
BLD GP AB SCN SERPL QL: NORMAL
BLOOD BANK CMNT PATIENT-IMP: NORMAL
BUN SERPL-MCNC: 26 MG/DL (ref 7–30)
CALCIUM SERPL-MCNC: 8 MG/DL (ref 8.5–10.1)
CHLORIDE SERPL-SCNC: 102 MMOL/L (ref 94–109)
CO2 SERPL-SCNC: 27 MMOL/L (ref 20–32)
CREAT SERPL-MCNC: 1.54 MG/DL (ref 0.66–1.25)
ERYTHROCYTE [DISTWIDTH] IN BLOOD BY AUTOMATED COUNT: 13.9 % (ref 10–15)
GFR SERPL CREATININE-BSD FRML MDRD: 53 ML/MIN/{1.73_M2}
GLUCOSE BLDC GLUCOMTR-MCNC: 167 MG/DL (ref 70–99)
GLUCOSE BLDC GLUCOMTR-MCNC: 197 MG/DL (ref 70–99)
GLUCOSE SERPL-MCNC: 222 MG/DL (ref 70–99)
HCT VFR BLD AUTO: 23.2 % (ref 40–53)
HGB BLD-MCNC: 7.6 G/DL (ref 13.3–17.7)
MCH RBC QN AUTO: 26.6 PG (ref 26.5–33)
MCHC RBC AUTO-ENTMCNC: 32.8 G/DL (ref 31.5–36.5)
MCV RBC AUTO: 81 FL (ref 78–100)
PLATELET # BLD AUTO: 595 10E9/L (ref 150–450)
POTASSIUM SERPL-SCNC: 4.3 MMOL/L (ref 3.4–5.3)
RBC # BLD AUTO: 2.86 10E12/L (ref 4.4–5.9)
SODIUM SERPL-SCNC: 135 MMOL/L (ref 133–144)
SPECIMEN EXP DATE BLD: NORMAL
WBC # BLD AUTO: 15.4 10E9/L (ref 4–11)

## 2019-02-18 PROCEDURE — 37000009 ZZH ANESTHESIA TECHNICAL FEE, EACH ADDTL 15 MIN: Performed by: ORTHOPAEDIC SURGERY

## 2019-02-18 PROCEDURE — 36415 COLL VENOUS BLD VENIPUNCTURE: CPT | Performed by: INTERNAL MEDICINE

## 2019-02-18 PROCEDURE — 71000013 ZZH RECOVERY PHASE 1 LEVEL 1 EA ADDTL HR: Performed by: ORTHOPAEDIC SURGERY

## 2019-02-18 PROCEDURE — 40000170 ZZH STATISTIC PRE-PROCEDURE ASSESSMENT II: Performed by: ORTHOPAEDIC SURGERY

## 2019-02-18 PROCEDURE — 25000128 H RX IP 250 OP 636: Performed by: NURSE ANESTHETIST, CERTIFIED REGISTERED

## 2019-02-18 PROCEDURE — 25800030 ZZH RX IP 258 OP 636: Performed by: ANESTHESIOLOGY

## 2019-02-18 PROCEDURE — 25000125 ZZHC RX 250: Performed by: ORTHOPAEDIC SURGERY

## 2019-02-18 PROCEDURE — 25000131 ZZH RX MED GY IP 250 OP 636 PS 637: Performed by: NURSE ANESTHETIST, CERTIFIED REGISTERED

## 2019-02-18 PROCEDURE — 25000128 H RX IP 250 OP 636: Performed by: ANESTHESIOLOGY

## 2019-02-18 PROCEDURE — 0Y6J0Z2 DETACHMENT AT LEFT LOWER LEG, MID, OPEN APPROACH: ICD-10-PCS | Performed by: ORTHOPAEDIC SURGERY

## 2019-02-18 PROCEDURE — 36000056 ZZH SURGERY LEVEL 3 1ST 30 MIN: Performed by: ORTHOPAEDIC SURGERY

## 2019-02-18 PROCEDURE — 00000146 ZZHCL STATISTIC GLUCOSE BY METER IP

## 2019-02-18 PROCEDURE — 25000132 ZZH RX MED GY IP 250 OP 250 PS 637: Performed by: PHYSICIAN ASSISTANT

## 2019-02-18 PROCEDURE — 25000125 ZZHC RX 250: Performed by: NURSE ANESTHETIST, CERTIFIED REGISTERED

## 2019-02-18 PROCEDURE — 86901 BLOOD TYPING SEROLOGIC RH(D): CPT | Performed by: ANESTHESIOLOGY

## 2019-02-18 PROCEDURE — 25000128 H RX IP 250 OP 636: Performed by: INTERNAL MEDICINE

## 2019-02-18 PROCEDURE — 80048 BASIC METABOLIC PNL TOTAL CA: CPT | Performed by: INTERNAL MEDICINE

## 2019-02-18 PROCEDURE — 12000000 ZZH R&B MED SURG/OB

## 2019-02-18 PROCEDURE — 25800030 ZZH RX IP 258 OP 636: Performed by: INTERNAL MEDICINE

## 2019-02-18 PROCEDURE — 88300 SURGICAL PATH GROSS: CPT | Performed by: ORTHOPAEDIC SURGERY

## 2019-02-18 PROCEDURE — 25800030 ZZH RX IP 258 OP 636: Performed by: NURSE ANESTHETIST, CERTIFIED REGISTERED

## 2019-02-18 PROCEDURE — 99233 SBSQ HOSP IP/OBS HIGH 50: CPT | Performed by: INTERNAL MEDICINE

## 2019-02-18 PROCEDURE — 25000128 H RX IP 250 OP 636: Performed by: ORTHOPAEDIC SURGERY

## 2019-02-18 PROCEDURE — 88300 SURGICAL PATH GROSS: CPT | Mod: 26 | Performed by: ORTHOPAEDIC SURGERY

## 2019-02-18 PROCEDURE — 25000132 ZZH RX MED GY IP 250 OP 250 PS 637: Performed by: INTERNAL MEDICINE

## 2019-02-18 PROCEDURE — 86850 RBC ANTIBODY SCREEN: CPT | Performed by: ANESTHESIOLOGY

## 2019-02-18 PROCEDURE — 25000128 H RX IP 250 OP 636: Performed by: PHYSICIAN ASSISTANT

## 2019-02-18 PROCEDURE — 85027 COMPLETE CBC AUTOMATED: CPT | Performed by: INTERNAL MEDICINE

## 2019-02-18 PROCEDURE — 71000012 ZZH RECOVERY PHASE 1 LEVEL 1 FIRST HR: Performed by: ORTHOPAEDIC SURGERY

## 2019-02-18 PROCEDURE — 86900 BLOOD TYPING SEROLOGIC ABO: CPT | Performed by: ANESTHESIOLOGY

## 2019-02-18 PROCEDURE — 27210794 ZZH OR GENERAL SUPPLY STERILE: Performed by: ORTHOPAEDIC SURGERY

## 2019-02-18 PROCEDURE — 36000058 ZZH SURGERY LEVEL 3 EA 15 ADDTL MIN: Performed by: ORTHOPAEDIC SURGERY

## 2019-02-18 PROCEDURE — 25000566 ZZH SEVOFLURANE, EA 15 MIN: Performed by: ORTHOPAEDIC SURGERY

## 2019-02-18 PROCEDURE — 37000008 ZZH ANESTHESIA TECHNICAL FEE, 1ST 30 MIN: Performed by: ORTHOPAEDIC SURGERY

## 2019-02-18 RX ORDER — ONDANSETRON 2 MG/ML
INJECTION INTRAMUSCULAR; INTRAVENOUS PRN
Status: DISCONTINUED | OUTPATIENT
Start: 2019-02-18 | End: 2019-02-18

## 2019-02-18 RX ORDER — HYDRALAZINE HYDROCHLORIDE 20 MG/ML
2.5-5 INJECTION INTRAMUSCULAR; INTRAVENOUS EVERY 10 MIN PRN
Status: CANCELLED | OUTPATIENT
Start: 2019-02-18

## 2019-02-18 RX ORDER — SODIUM CHLORIDE, SODIUM LACTATE, POTASSIUM CHLORIDE, CALCIUM CHLORIDE 600; 310; 30; 20 MG/100ML; MG/100ML; MG/100ML; MG/100ML
INJECTION, SOLUTION INTRAVENOUS CONTINUOUS
Status: CANCELLED | OUTPATIENT
Start: 2019-02-18

## 2019-02-18 RX ORDER — MEPERIDINE HYDROCHLORIDE 25 MG/ML
12.5 INJECTION INTRAMUSCULAR; INTRAVENOUS; SUBCUTANEOUS
Status: DISCONTINUED | OUTPATIENT
Start: 2019-02-18 | End: 2019-02-18 | Stop reason: HOSPADM

## 2019-02-18 RX ORDER — NALOXONE HYDROCHLORIDE 0.4 MG/ML
.1-.4 INJECTION, SOLUTION INTRAMUSCULAR; INTRAVENOUS; SUBCUTANEOUS
Status: CANCELLED | OUTPATIENT
Start: 2019-02-18 | End: 2019-02-19

## 2019-02-18 RX ORDER — ACETAMINOPHEN 325 MG/1
650 TABLET ORAL EVERY 4 HOURS PRN
Status: DISCONTINUED | OUTPATIENT
Start: 2019-02-21 | End: 2019-02-25 | Stop reason: HOSPADM

## 2019-02-18 RX ORDER — ONDANSETRON 4 MG/1
4 TABLET, ORALLY DISINTEGRATING ORAL EVERY 6 HOURS PRN
Status: DISCONTINUED | OUTPATIENT
Start: 2019-02-18 | End: 2019-02-25 | Stop reason: HOSPADM

## 2019-02-18 RX ORDER — PROPOFOL 10 MG/ML
INJECTION, EMULSION INTRAVENOUS PRN
Status: DISCONTINUED | OUTPATIENT
Start: 2019-02-18 | End: 2019-02-18

## 2019-02-18 RX ORDER — OXYCODONE HYDROCHLORIDE 5 MG/1
5-10 TABLET ORAL
Status: DISCONTINUED | OUTPATIENT
Start: 2019-02-18 | End: 2019-02-25 | Stop reason: HOSPADM

## 2019-02-18 RX ORDER — CEFAZOLIN SODIUM 2 G/100ML
2 INJECTION, SOLUTION INTRAVENOUS
Status: COMPLETED | OUTPATIENT
Start: 2019-02-18 | End: 2019-02-18

## 2019-02-18 RX ORDER — NICOTINE POLACRILEX 4 MG
15-30 LOZENGE BUCCAL
Status: DISCONTINUED | OUTPATIENT
Start: 2019-02-18 | End: 2019-02-18

## 2019-02-18 RX ORDER — ONDANSETRON 4 MG/1
4 TABLET, ORALLY DISINTEGRATING ORAL EVERY 30 MIN PRN
Status: DISCONTINUED | OUTPATIENT
Start: 2019-02-18 | End: 2019-02-18 | Stop reason: HOSPADM

## 2019-02-18 RX ORDER — DEXAMETHASONE SODIUM PHOSPHATE 4 MG/ML
4 INJECTION, SOLUTION INTRA-ARTICULAR; INTRALESIONAL; INTRAMUSCULAR; INTRAVENOUS; SOFT TISSUE
Status: CANCELLED | OUTPATIENT
Start: 2019-02-18

## 2019-02-18 RX ORDER — HYDROMORPHONE HYDROCHLORIDE 1 MG/ML
.3-.5 INJECTION, SOLUTION INTRAMUSCULAR; INTRAVENOUS; SUBCUTANEOUS EVERY 5 MIN PRN
Status: CANCELLED | OUTPATIENT
Start: 2019-02-18

## 2019-02-18 RX ORDER — GLYCOPYRROLATE 0.2 MG/ML
INJECTION, SOLUTION INTRAMUSCULAR; INTRAVENOUS PRN
Status: DISCONTINUED | OUTPATIENT
Start: 2019-02-18 | End: 2019-02-18

## 2019-02-18 RX ORDER — CEFAZOLIN SODIUM 1 G/3ML
1 INJECTION, POWDER, FOR SOLUTION INTRAMUSCULAR; INTRAVENOUS SEE ADMIN INSTRUCTIONS
Status: DISCONTINUED | OUTPATIENT
Start: 2019-02-18 | End: 2019-02-18 | Stop reason: HOSPADM

## 2019-02-18 RX ORDER — NALOXONE HYDROCHLORIDE 0.4 MG/ML
.1-.4 INJECTION, SOLUTION INTRAMUSCULAR; INTRAVENOUS; SUBCUTANEOUS
Status: DISCONTINUED | OUTPATIENT
Start: 2019-02-18 | End: 2019-02-25 | Stop reason: HOSPADM

## 2019-02-18 RX ORDER — LIDOCAINE 40 MG/G
CREAM TOPICAL
Status: DISCONTINUED | OUTPATIENT
Start: 2019-02-18 | End: 2019-02-25 | Stop reason: HOSPADM

## 2019-02-18 RX ORDER — FENTANYL CITRATE 50 UG/ML
INJECTION, SOLUTION INTRAMUSCULAR; INTRAVENOUS PRN
Status: DISCONTINUED | OUTPATIENT
Start: 2019-02-18 | End: 2019-02-18

## 2019-02-18 RX ORDER — ONDANSETRON 4 MG/1
4 TABLET, ORALLY DISINTEGRATING ORAL EVERY 30 MIN PRN
Status: CANCELLED | OUTPATIENT
Start: 2019-02-18

## 2019-02-18 RX ORDER — SODIUM CHLORIDE, SODIUM LACTATE, POTASSIUM CHLORIDE, CALCIUM CHLORIDE 600; 310; 30; 20 MG/100ML; MG/100ML; MG/100ML; MG/100ML
INJECTION, SOLUTION INTRAVENOUS CONTINUOUS
Status: DISCONTINUED | OUTPATIENT
Start: 2019-02-18 | End: 2019-02-19

## 2019-02-18 RX ORDER — SODIUM CHLORIDE, SODIUM LACTATE, POTASSIUM CHLORIDE, CALCIUM CHLORIDE 600; 310; 30; 20 MG/100ML; MG/100ML; MG/100ML; MG/100ML
INJECTION, SOLUTION INTRAVENOUS CONTINUOUS
Status: DISCONTINUED | OUTPATIENT
Start: 2019-02-18 | End: 2019-02-18 | Stop reason: HOSPADM

## 2019-02-18 RX ORDER — AMOXICILLIN 250 MG
2 CAPSULE ORAL 2 TIMES DAILY
Status: DISCONTINUED | OUTPATIENT
Start: 2019-02-18 | End: 2019-02-21

## 2019-02-18 RX ORDER — NALOXONE HYDROCHLORIDE 0.4 MG/ML
.1-.4 INJECTION, SOLUTION INTRAMUSCULAR; INTRAVENOUS; SUBCUTANEOUS
Status: DISCONTINUED | OUTPATIENT
Start: 2019-02-18 | End: 2019-02-18 | Stop reason: HOSPADM

## 2019-02-18 RX ORDER — ONDANSETRON 2 MG/ML
4 INJECTION INTRAMUSCULAR; INTRAVENOUS EVERY 6 HOURS PRN
Status: DISCONTINUED | OUTPATIENT
Start: 2019-02-18 | End: 2019-02-25 | Stop reason: HOSPADM

## 2019-02-18 RX ORDER — FENTANYL CITRATE 50 UG/ML
25-50 INJECTION, SOLUTION INTRAMUSCULAR; INTRAVENOUS
Status: CANCELLED | OUTPATIENT
Start: 2019-02-18

## 2019-02-18 RX ORDER — LABETALOL HYDROCHLORIDE 5 MG/ML
10 INJECTION, SOLUTION INTRAVENOUS
Status: CANCELLED | OUTPATIENT
Start: 2019-02-18

## 2019-02-18 RX ORDER — ONDANSETRON 2 MG/ML
4 INJECTION INTRAMUSCULAR; INTRAVENOUS EVERY 30 MIN PRN
Status: DISCONTINUED | OUTPATIENT
Start: 2019-02-18 | End: 2019-02-18 | Stop reason: HOSPADM

## 2019-02-18 RX ORDER — ONDANSETRON 2 MG/ML
4 INJECTION INTRAMUSCULAR; INTRAVENOUS EVERY 30 MIN PRN
Status: CANCELLED | OUTPATIENT
Start: 2019-02-18

## 2019-02-18 RX ORDER — ACETAMINOPHEN 325 MG/1
975 TABLET ORAL EVERY 8 HOURS
Status: DISPENSED | OUTPATIENT
Start: 2019-02-18 | End: 2019-02-21

## 2019-02-18 RX ORDER — AMOXICILLIN 250 MG
1 CAPSULE ORAL 2 TIMES DAILY
Status: DISCONTINUED | OUTPATIENT
Start: 2019-02-18 | End: 2019-02-21

## 2019-02-18 RX ORDER — HYDROMORPHONE HYDROCHLORIDE 1 MG/ML
.3-.5 INJECTION, SOLUTION INTRAMUSCULAR; INTRAVENOUS; SUBCUTANEOUS
Status: DISCONTINUED | OUTPATIENT
Start: 2019-02-18 | End: 2019-02-25 | Stop reason: HOSPADM

## 2019-02-18 RX ORDER — DEXTROSE MONOHYDRATE 25 G/50ML
25-50 INJECTION, SOLUTION INTRAVENOUS
Status: DISCONTINUED | OUTPATIENT
Start: 2019-02-18 | End: 2019-02-18

## 2019-02-18 RX ADMIN — ONDANSETRON 4 MG: 2 INJECTION INTRAMUSCULAR; INTRAVENOUS at 13:25

## 2019-02-18 RX ADMIN — INSULIN HUMAN 2 UNITS: 100 INJECTION, SOLUTION PARENTERAL at 14:03

## 2019-02-18 RX ADMIN — MIDAZOLAM 2 MG: 1 INJECTION INTRAMUSCULAR; INTRAVENOUS at 13:17

## 2019-02-18 RX ADMIN — ASPIRIN 81 MG: 81 TABLET, COATED ORAL at 09:26

## 2019-02-18 RX ADMIN — PIPERACILLIN SODIUM,TAZOBACTAM SODIUM 3.38 G: 3; .375 INJECTION, POWDER, FOR SOLUTION INTRAVENOUS at 02:11

## 2019-02-18 RX ADMIN — PREGABALIN 100 MG: 100 CAPSULE ORAL at 21:13

## 2019-02-18 RX ADMIN — PIPERACILLIN SODIUM,TAZOBACTAM SODIUM 3.38 G: 3; .375 INJECTION, POWDER, FOR SOLUTION INTRAVENOUS at 09:26

## 2019-02-18 RX ADMIN — ACETAMINOPHEN 975 MG: 325 TABLET, FILM COATED ORAL at 19:09

## 2019-02-18 RX ADMIN — PHENYLEPHRINE HYDROCHLORIDE 100 MCG: 10 INJECTION, SOLUTION INTRAMUSCULAR; INTRAVENOUS; SUBCUTANEOUS at 14:40

## 2019-02-18 RX ADMIN — PIPERACILLIN SODIUM,TAZOBACTAM SODIUM 3.38 G: 3; .375 INJECTION, POWDER, FOR SOLUTION INTRAVENOUS at 22:32

## 2019-02-18 RX ADMIN — SODIUM CHLORIDE, POTASSIUM CHLORIDE, SODIUM LACTATE AND CALCIUM CHLORIDE: 600; 310; 30; 20 INJECTION, SOLUTION INTRAVENOUS at 15:53

## 2019-02-18 RX ADMIN — SODIUM CHLORIDE, POTASSIUM CHLORIDE, SODIUM LACTATE AND CALCIUM CHLORIDE: 600; 310; 30; 20 INJECTION, SOLUTION INTRAVENOUS at 12:38

## 2019-02-18 RX ADMIN — GLYCOPYRROLATE 0.1 MG: 0.2 INJECTION, SOLUTION INTRAMUSCULAR; INTRAVENOUS at 13:50

## 2019-02-18 RX ADMIN — PHENYLEPHRINE HYDROCHLORIDE 100 MCG: 10 INJECTION, SOLUTION INTRAMUSCULAR; INTRAVENOUS; SUBCUTANEOUS at 14:24

## 2019-02-18 RX ADMIN — PHENYLEPHRINE HYDROCHLORIDE 100 MCG: 10 INJECTION, SOLUTION INTRAMUSCULAR; INTRAVENOUS; SUBCUTANEOUS at 14:35

## 2019-02-18 RX ADMIN — SODIUM CHLORIDE: 9 INJECTION, SOLUTION INTRAVENOUS at 09:26

## 2019-02-18 RX ADMIN — PIPERACILLIN SODIUM,TAZOBACTAM SODIUM 3.38 G: 3; .375 INJECTION, POWDER, FOR SOLUTION INTRAVENOUS at 17:38

## 2019-02-18 RX ADMIN — PHENYLEPHRINE HYDROCHLORIDE 100 MCG: 10 INJECTION, SOLUTION INTRAMUSCULAR; INTRAVENOUS; SUBCUTANEOUS at 14:05

## 2019-02-18 RX ADMIN — GLYCOPYRROLATE 0.1 MG: 0.2 INJECTION, SOLUTION INTRAMUSCULAR; INTRAVENOUS at 14:24

## 2019-02-18 RX ADMIN — PREGABALIN 100 MG: 100 CAPSULE ORAL at 09:26

## 2019-02-18 RX ADMIN — FENTANYL CITRATE 100 MCG: 50 INJECTION, SOLUTION INTRAMUSCULAR; INTRAVENOUS at 13:19

## 2019-02-18 RX ADMIN — PROPOFOL 120 MG: 10 INJECTION, EMULSION INTRAVENOUS at 13:19

## 2019-02-18 RX ADMIN — OXYCODONE HYDROCHLORIDE 10 MG: 5 TABLET ORAL at 19:09

## 2019-02-18 RX ADMIN — INSULIN DEGLUDEC INJECTION 20 UNITS: 100 INJECTION, SOLUTION SUBCUTANEOUS at 09:26

## 2019-02-18 RX ADMIN — HYDROMORPHONE HYDROCHLORIDE 0.5 MG: 1 INJECTION, SOLUTION INTRAMUSCULAR; INTRAVENOUS; SUBCUTANEOUS at 15:55

## 2019-02-18 RX ADMIN — PHENYLEPHRINE HYDROCHLORIDE 100 MCG: 10 INJECTION, SOLUTION INTRAMUSCULAR; INTRAVENOUS; SUBCUTANEOUS at 13:56

## 2019-02-18 RX ADMIN — CEFAZOLIN SODIUM 2 G: 2 INJECTION, SOLUTION INTRAVENOUS at 13:30

## 2019-02-18 ASSESSMENT — ACTIVITIES OF DAILY LIVING (ADL)
ADLS_ACUITY_SCORE: 14

## 2019-02-18 NOTE — PLAN OF CARE
AOx4, VSS on r/a, denies pain.  Lyrica for pain management.  To OR for left foot surgery.   Refusing short-acting insulin, despite conversations, education.  IV antibiotics given.  Up independently prior to surgery.  NPO until further notice.  , per patient's own device, chart in flow sheets.

## 2019-02-18 NOTE — PROGRESS NOTES
Cross Cover Note    Called re changed from Novolog to Humalog.  Patient would like to bring in his own supply.  Appears he may have spoke with Dr. Pace today about this. Will continue with Novolog this evening (tolerated during last admission a week ago) and can review with Dr. Pace tomorrow. Patient plans to bring in the medication in hopes he can use his home medication.      Discussed with RN Allyosn So PA-C

## 2019-02-18 NOTE — ANESTHESIA CARE TRANSFER NOTE
Patient: Eduardo Walton    Procedure(s):  LEFT BELOW THE KNEE AMPUTATION    Diagnosis: Left Necrotic Foot & Osteomyletis  Diagnosis Additional Information: No value filed.    Anesthesia Type:   General, LMA     Note:  Airway :Face Mask  Patient transferred to:PACU  Comments: At end of procedure, spontaneous respirations, adequate tidal volumes, followed commands to voice, LMA removed atraumatically, oropharynx suctioned, airway patent after LMA removal. Oxygen via facemask at 8 liters per minute to PACU. Oxygen tubing connected to wall O2 in PACU, SpO2, NiBP, and EKG monitors and alarms on and functioning, Ike Hugger warmer connected to patient gown, report on patient's clinical status given to PACU RN.  Handoff Report: Identifed the Patient, Identified the Reponsible Provider, Reviewed the pertinent medical history, Discussed the surgical course, Reviewed Intra-OP anesthesia mangement and issues during anesthesia, Set expectations for post-procedure period and Allowed opportunity for questions and acknowledgement of understanding      Vitals: (Last set prior to Anesthesia Care Transfer)    CRNA VITALS  2/18/2019 1425 - 2/18/2019 1458      2/18/2019             Pulse:  68    SpO2:  99 %    Resp Rate (observed):  11    Resp Rate (set):  10                Electronically Signed By: NATASHA Duran CRNA  February 18, 2019  2:58 PM

## 2019-02-18 NOTE — PLAN OF CARE
A&O x 4, VSS on RA, C/O pain, Taking Oxycodone,  drsg CDI, CMS intact per baseline, up independently, voiding adequately in bathroom, IV infusing, labile emotions and refuses  to take Novalog sliding scale. Patient takes Humalog at home. Called Hospitalist see note. Address the Humalog in the morning with  rounding Hospitalist. Patient will be NPO for surgery tomorrow. He wants his BGM better under control before surgery tomorrow. Parents will bring his humalog pen to the Hospital.

## 2019-02-18 NOTE — PLAN OF CARE
Pt is A+OX4. VSS on RA. CMS at baseline (no sensation L foot). NS at 60/hr. Complains of slight LLE pain but declined PRN medication overnight. Intermittent IV abx. Blood sugar 189 (per pts own glucometer). Plan for possible BKA today.

## 2019-02-18 NOTE — PROVIDER NOTIFICATION
MD Notification    Notified Person: MD    Notified Person Name:SHALA Connelly  Notification Date/Time:2/17/19 @2020    Notification Interaction:Patient will bring in his own humalog from home. He doesn't want to use the Novalog ordered.     Purpose of Notification: Patient will bring in his own Humalog tomorrow and Pharmacy said they can verify for him to use it. SHALA So  Is ok with patient bringing the medication in and have [facility] order the Humalog in the morning and discuss the Humalog Slinging scale with the patient. Patient expresses that the Novalog  is like water and doesn't work . He wants to take humalog which he takes at home and discussed this with someone on admission.    Orders Received:  No new orders received.  Comments:

## 2019-02-18 NOTE — CONSULTS
[facility]    Orthopedic Consultation    Eduardo Walton MRN# [id number]   Age: 48 year old YOB: 1970     Date of Admission:  2/16/2019    Reason for consult: L foot infection       Requesting physician: Dr. Luevano       Level of consult: Consult, follow and place orders           Assessment and Plan:   Assessment:   L foot osteomyelitis and unsalvageable foot        Plan:   Recommend L BKA. Discussed it in detail with the patient and unfortinately he has no other salvage options for his foot  - NPO after midnight  - BKA in am  - medically optimize  - cont IV abx in meantime per hospitalist/ID           Chief Complaint:   L nonsalvageable foot infection         History of Present Illness:   This patient is a 48 year old male who presents with the following condition requiring a hospital admission:  49 yo M with type 1 DM, diabetic neuropathy and a chronic diabetic foot ulcer who presented to the ED yesterday with worsening pain, swelling, drainage from his L foot. He has a foot ulcer over the calcaneus and has been debrided by podiatry. He also has a new ulcer over the base of the 5th metatarsal and over the 1st toe. He has minimal sensation over the foot itself. No injuries. He states that his ulcers started to develop in October and have progressed significantly since then to the point they are in now. He was admitted to the hospital last week and a discussion was had about a BKA, however the patient was not prepared to proceed at that time and has since been discharged. He has been complaining of fevers, pain, and new drainage in his L foot since then and is now ready for his BKA.             Past Medical History:   History reviewed. No pertinent past medical history.          Past Surgical History:     Past Surgical History:   Procedure Laterality Date     AMPUTATE FOOT Left 2/5/2019    Procedure: PARTIAL LEFT FOOT AMPUTATION.;  Surgeon: Chetan Potter DPM;  Location:  OR      SOWMYA  2010             Social History:     Social History     Tobacco Use     Smoking status: Never Smoker     Smokeless tobacco: Never Used   Substance Use Topics     Alcohol use: Yes     Comment: occ             Family History:     Family History   Problem Relation Age of Onset     Ovarian Cancer Maternal Grandmother      Colon Cancer Maternal Grandmother      Prostate Cancer Maternal Grandfather      Ovarian Cancer Paternal Grandmother              Immunizations:     VACCINE/DOSE   Diptheria   DPT   DTAP   HBIG   Hepatitis A   Hepatitis B   HIB   Influenza   Measles   Meningococcal   MMR   Mumps   Pneumococcal   Polio   Rubella   Small Pox   TDAP   Varicella   Zoster             Allergies:     Allergies   Allergen Reactions     Fish-Derived Products Rash             Medications:     Current Facility-Administered Medications   Medication     acetaminophen (TYLENOL) tablet 650 mg     [START ON 2/18/2019] aspirin EC tablet 81 mg     bisacodyl (DULCOLAX) Suppository 10 mg     glucose gel 15-30 g    Or     dextrose 50 % injection 25-50 mL    Or     glucagon injection 1 mg     famotidine (PEPCID) injection 20 mg     HYDROmorphone (PF) (DILAUDID) injection 0.3-0.5 mg     insulin aspart (NovoLOG) inj (RAPID ACTING)     insulin aspart (NovoLOG) inj (RAPID ACTING)     insulin aspart (NovoLOG) inj (RAPID ACTING)     insulin degludec (TRESIBA) 100 UNIT/ML injection 20 Units     melatonin tablet 1 mg     naloxone (NARCAN) injection 0.1-0.4 mg     ondansetron (ZOFRAN-ODT) ODT tab 4 mg    Or     ondansetron (ZOFRAN) injection 4 mg     oxyCODONE (ROXICODONE) tablet 5-10 mg     piperacillin-tazobactam (ZOSYN) 3.375 g vial to attach to  mL bag     polyethylene glycol (MIRALAX/GLYCOLAX) Packet 17 g     pregabalin (LYRICA) capsule 100 mg     prochlorperazine (COMPAZINE) injection 10 mg    Or     prochlorperazine (COMPAZINE) tablet 10 mg    Or     prochlorperazine (COMPAZINE) Suppository 25 mg     sodium chloride 0.9% infusion              Review of Systems:    All review of systems was performed and was negative except as per hpi    .       Physical Exam:   All vitals have been reviewed  Patient Vitals for the past 24 hrs:   BP Temp Temp src Pulse Heart Rate Resp SpO2   02/17/19 1917 137/60 99.9  F (37.7  C) Oral -- 77 16 97 %   02/17/19 1620 128/78 98.8  F (37.1  C) Oral -- 74 16 97 %   02/17/19 0758 133/65 99.2  F (37.3  C) -- 63 63 16 98 %   02/17/19 0424 131/64 99.2  F (37.3  C) Oral 70 69 18 93 %   02/17/19 0015 130/63 99.4  F (37.4  C) Oral -- 76 18 95 %   02/16/19 2020 -- 98.7  F (37.1  C) -- -- -- -- --       Intake/Output Summary (Last 24 hours) at 2/17/2019 2003  Last data filed at 2/17/2019 1900  Gross per 24 hour   Intake 470 ml   Output --   Net 470 ml     NAD  nonlabored breathing  No peripheral edema  Skin intact except as below  White sclera  LLE:  + large calcaneal wound down to the bone with some purulent drainage  + new developing ulcer with fluctuance over the base of the 5th metatarsal  +ulcer over tip of the great toe  No sensation in the lower extremity  Able to wiggle toes              Data:   All laboratory data reviewed  Results for orders placed or performed during the hospital encounter of 02/16/19   Foot XR, G/E 3 views, left    Narrative    LEFT FOOT THREE OR MORE VIEWS   2/16/2019 4:04 PM     HISTORY: Acute on chronic wounds left foot.    COMPARISON: Left foot x-rays dated 2/4/2019, CT dated 2/5/2019 and MRI  dated 2/6/2019.     FINDINGS: There is a large soft tissue posterior defect posteriorly in  left foot surrounding calcaneus likely postoperative in etiology.  There is now gas in the soft tissues around the lateral aspect of the  mid left foot just distal to the base of the fifth metatarsal. This  could represent gas-forming organism extending into this region. There  is destruction of the distal aspect of the distal phalanx of left  great toe which appears to have slightly progressed since the  prior  study dated 2/4/2019. No acute fractures identified. No cortical  irregularity of the fifth metatarsal.      Impression    IMPRESSION:  1. Irregularity distal aspect of the great toe is again noted and may  be slightly progressed since the prior study dated 2/4/2019. This  could represent osteomyelitis.   2. Large soft tissue defect surrounding the calcaneus is likely  postoperative. No definite cortical irregularity of the calcaneus is  seen.  3. There is new gas in the soft tissues along lateral aspects mid  forefoot concerning for extension of infection in the soft tissues in  this region.  4. Arterial calcifications are again noted corresponding with possible  history of diabetes mellitus.    I discussed the findings of new gas in the soft tissues along lateral  aspect of the midfoot with Dr. Javier on 2/16/2019 at 4:10 PM. We  also discussed the irregularity of the distal phalanx of the great toe  and the large postoperative soft tissue defect around the calcaneus.      RACHID GONZALES MD   XR Tibia & Fibula Left 2 Views    Narrative    TIBIA FIBULA LEFT  TWO-THREE  VIEWS 2/17/2019 2:44 PM     HISTORY: L foot infection    COMPARISON: None.    FINDINGS: There is normal osseous alignment.  No fractures are  identified.      Impression    IMPRESSION: Negative, osseous structures appear intact.    MERLY MOORE MD   CBC with platelets differential   Result Value Ref Range    WBC 23.5 (H) 4.0 - 11.0 10e9/L    RBC Count 3.35 (L) 4.4 - 5.9 10e12/L    Hemoglobin 9.0 (L) 13.3 - 17.7 g/dL    Hematocrit 27.4 (L) 40.0 - 53.0 %    MCV 82 78 - 100 fl    MCH 26.9 26.5 - 33.0 pg    MCHC 32.8 31.5 - 36.5 g/dL    RDW 13.7 10.0 - 15.0 %    Platelet Count 747 (H) 150 - 450 10e9/L    Diff Method Automated Method     % Neutrophils 85.6 %    % Lymphocytes 6.7 %    % Monocytes 5.6 %    % Eosinophils 1.6 %    % Basophils 0.1 %    % Immature Granulocytes 0.4 %    Nucleated RBCs 0 0 /100    Absolute Neutrophil 20.1 (H) 1.6 - 8.3  10e9/L    Absolute Lymphocytes 1.6 0.8 - 5.3 10e9/L    Absolute Monocytes 1.3 0.0 - 1.3 10e9/L    Absolute Eosinophils 0.4 0.0 - 0.7 10e9/L    Absolute Basophils 0.0 0.0 - 0.2 10e9/L    Abs Immature Granulocytes 0.1 0 - 0.4 10e9/L    Absolute Nucleated RBC 0.0    Lactic acid whole blood   Result Value Ref Range    Lactic Acid 1.2 0.7 - 2.0 mmol/L   Basic metabolic panel   Result Value Ref Range    Sodium 134 133 - 144 mmol/L    Potassium 4.4 3.4 - 5.3 mmol/L    Chloride 97 94 - 109 mmol/L    Carbon Dioxide 32 20 - 32 mmol/L    Anion Gap 5 3 - 14 mmol/L    Glucose 228 (H) 70 - 99 mg/dL    Urea Nitrogen 30 7 - 30 mg/dL    Creatinine 1.51 (H) 0.66 - 1.25 mg/dL    GFR Estimate 54 (L) >60 mL/min/[1.73_m2]    GFR Estimate If Black 63 >60 mL/min/[1.73_m2]    Calcium 8.5 8.5 - 10.1 mg/dL   CRP inflammation   Result Value Ref Range    CRP Inflammation 230.0 (H) 0.0 - 8.0 mg/L   Basic metabolic panel   Result Value Ref Range    Sodium 136 133 - 144 mmol/L    Potassium 4.1 3.4 - 5.3 mmol/L    Chloride 100 94 - 109 mmol/L    Carbon Dioxide 29 20 - 32 mmol/L    Anion Gap 7 3 - 14 mmol/L    Glucose 99 70 - 99 mg/dL    Urea Nitrogen 31 (H) 7 - 30 mg/dL    Creatinine 1.60 (H) 0.66 - 1.25 mg/dL    GFR Estimate 51 (L) >60 mL/min/[1.73_m2]    GFR Estimate If Black 59 (L) >60 mL/min/[1.73_m2]    Calcium 7.8 (L) 8.5 - 10.1 mg/dL   CBC with platelets   Result Value Ref Range    WBC 19.1 (H) 4.0 - 11.0 10e9/L    RBC Count 2.98 (L) 4.4 - 5.9 10e12/L    Hemoglobin 8.0 (L) 13.3 - 17.7 g/dL    Hematocrit 24.2 (L) 40.0 - 53.0 %    MCV 81 78 - 100 fl    MCH 26.8 26.5 - 33.0 pg    MCHC 33.1 31.5 - 36.5 g/dL    RDW 14.0 10.0 - 15.0 %    Platelet Count 621 (H) 150 - 450 10e9/L   Blood culture   Result Value Ref Range    Specimen Description Blood Left Arm     Special Requests Aerobic and anaerobic bottles received     Culture Micro No growth after 19 hours    Blood culture   Result Value Ref Range    Specimen Description Blood Left Arm     Special  Requests Aerobic and anaerobic bottles received     Culture Micro No growth after 19 hours           Attestation:  Amount of time performed on this consult: 30 minutes.    Kvng Berman MD

## 2019-02-18 NOTE — PROGRESS NOTES
[facility]    Medicine Progress Note - Hospitalist Service       Date of Admission:  2/16/2019  Assessment & Plan   Eduardo Walton is a 48 year old Male with history of diabetes mellitus type 1 with complications of neuropathy, diabetic foot ulcers, nephropathy and retinopathy, chronic kidney disease stage II, recent hospitalization for diabetic ulcer with recommendation for BKA which patient declined who is admitted 2/16/2019 with progression of left lower extremity ulcer.     Diabetic foot infection with gas gangrene   Presents with low grade fevers and worsened appearance of left lower extremity ulcer. Foot XR demonstrated progression of gas in soft tissues with potentially slightly progressed findings for osteomyelitis. CRP markedly elevated at 230 with WBC of 23.5 (21.0 at time of discharge). He is not septic / toxic appearing at this time. Vascular surgery and orthopedic surgery were involved last hospitalization, with orthopedic surgery recommending BKA and vascular surgery initially recommending angiogram, which patient refused, and ultimately it was felt he had adequate blood supply to heal a BKA.   - Orthopedic surgery consulted, plan for BKA 2/18/19  - Vascular surgery consulted on admission; discussed case with vascular surgery team and nothing further to add from last admission (okay to proceed with BKA from vascular standpoint)   - Continue piperacillin-tazobactam IV  - Blood cultures 2/16 NGTD  - Add vancomycin if signs of worsening infection / sepsis, or positive blood cultures  - WBC 2/18/19 pending, afebrile    Diabetes mellitus, type 1, with complications of neuropathy, diabetic foot ulcers, nephropathy and retinopathy  Poorly controlled with HgbA1c 10.9% 2/5/19. Prior to admission on degludec 28 units in the morning and lispro 1 unit/2 grams of carbohydrate + 1 unit for every 50 over 200 sliding scale insulin, total daily dose up to 80 units  - Continue prior to admission  pregabalin  - Continue reduced dose of degludec 20 units daily (should receive prior to surgery), increase as diet advanced post-op  - Patient refusing Novolog, okay to substitute to Humolog. Discussed with pharmacy.   - Changed sliding scale insulin to NPO protocol   - Patient refusing monitoring with hospital POCT, following with patient's needleless monitor  - Monitor blood sugars closely lizette and intra-operatively, consider insulin gtt if needed    Chronic kidney disease, stage II  Baseline creatinine 1.5-1.6.   - BMP 2/18/19 pending   - Continue IVF  - Avoid nephrotoxins including NSAIDs    Thrombocytosis  Recent Labs   Lab 02/17/19  0627 02/16/19  1549   * 747*     - Likely reactive in setting of infection   - Treat infection as above  - CBC 2/18/19 pending     Coronary artery disease with history of NSTEMI s/p SUMANTH to pLAD  Stenting in 2010. Poor compliance with medications, currently off of beta-blocker, statin, aspirin. Discussed appropriate medical management of CAD and prior stent with patient at length 2/17/19, see previous notes. He refuses to start statin, beta blocker  - Continue aspirin 81 mg daily  - He is willing to discuss medication management with cardiology as an outpatient    Lizette-operative risk assessment   History of IDDM, CAD. High-risk surgery planned. Estimated 5.4 percent rate of cardiac death, nonfatal myocardial infarction, and nonfatal cardiac arrest according to the number of predictors.  - Optimizing CAD management as above, to the extent patient agrees to treatment. Have discussed higher potential for cardiac complications lizette-operatively based on his refusal of certain treatments in presence of his wife, and he expressed understanding.   - Would classify surgery as urgent given progression despite medical management, risk of progression to sepsis. He is not having any active nor recent cardiopulmonary symptoms, therefore would proceed with surgery without further testing  (pending no new major abnormalities on BMP, CBC for 2/18/2019).    Diet: NPO per Anesthesia Guidelines for Procedure/Surgery Except for: Meds    DVT Prophylaxis: Pneumatic Compression Devices, post-operatively per surgical services  Martinez Catheter: not present  Code Status: Full Code      Disposition Plan   Expected discharge: Unclear, pending surgery and post-operative course   Entered: Gabriel Pace MD 02/18/2019, 8:19 AM       The patient's care was discussed with the Bedside Nurse, Patient and Pharmacy team.        Gabriel Pace MD  Hospitalist Service  [facility]    ______________________________________________________________________    Interval History   No acute events overnight. Slept poorly due to anticipation of surgery. Denies any chest pain or shortness of breath.  Foot feels about the same.  Had bowel movement yesterday.  Very particular about insulin given and point-of-care testing for glucose.  No other new complaints.    Data reviewed today: I reviewed all medications, new labs and imaging results over the last 24 hours. I personally reviewed no images or EKG's today.    Physical Exam   Vital Signs: Temp: 99  F (37.2  C) Temp src: Oral BP: 146/65 Pulse: 69 Heart Rate: 68 Resp: 18 SpO2: 94 % O2 Device: None (Room air)    Weight: 158 lbs 0 oz    Constitutional: NAD, non-toxic appearing   Respiratory: Clear to auscultation bilaterally, good air movement bilaterally  Cardiovascular: RRR, no m/r/g.   GI: Soft, non-tender, non-distended. BS normoactive.   Skin/Integumen: Warm, dry.  Left foot wrapped, not re-examined given pending surgery.  Other:     Data   Recent Labs   Lab 02/17/19  0627 02/16/19  1549   WBC 19.1* 23.5*   HGB 8.0* 9.0*   MCV 81 82   * 747*    134   POTASSIUM 4.1 4.4   CHLORIDE 100 97   CO2 29 32   BUN 31* 30   CR 1.60* 1.51*   ANIONGAP 7 5   GABRIELA 7.8* 8.5   GLC 99 228*       Recent Results (from the past 24 hour(s))   XR Tibia & Fibula Left 2 Views     Narrative    TIBIA FIBULA LEFT  TWO-THREE  VIEWS 2/17/2019 2:44 PM     HISTORY: L foot infection    COMPARISON: None.    FINDINGS: There is normal osseous alignment.  No fractures are  identified.      Impression    IMPRESSION: Negative, osseous structures appear intact.    MERLY MOORE MD     Medications     sodium chloride 60 mL/hr at 02/17/19 2213       aspirin  81 mg Oral Daily     famotidine  20 mg Intravenous Q12H     insulin degludec  20 Units Subcutaneous QAM     insulin lispro  1-15 Units Subcutaneous TID AC     piperacillin-tazobactam  3.375 g Intravenous Q6H     pregabalin  100 mg Oral TID

## 2019-02-18 NOTE — BRIEF OP NOTE
[facility]    Brief Operative Note    Pre-operative diagnosis: Left Necrotic Foot & Osteomyletis  Post-operative diagnosis same  Procedure: Procedure(s):  LEFT BELOW THE KNEE AMPUTATION  Surgeon: Surgeon(s) and Role:     * Kvng Berman MD - Primary     * Jeanna Cornell PA-C - Assisting  Anesthesia: General   Estimated blood loss: 30 mL  Drains: Hemovac  Specimens:   ID Type Source Tests Collected by Time Destination   A : LEFT BELOW THE KNEE AMPUTATION Tissue Leg Lower, Left SURGICAL PATHOLOGY EXAM Kvng Berman MD 2/18/2019  2:09 PM      Findings:   see full operative note.  Complications: None.  Implants: None.    Plan:  NWB to LLE   Elevate LLE  DVT prophylaxis - SCDs & Lovenox   Ancef x 24 hours  PT/OT  Keep splint C/D/I   Remove drain POD#1  Stump protector on POD#2-3      F/U at clinic in 2 weeks

## 2019-02-18 NOTE — ANESTHESIA POSTPROCEDURE EVALUATION
Patient: Eduardo Walton    Procedure(s):  LEFT BELOW THE KNEE AMPUTATION    Diagnosis:Left Necrotic Foot & Osteomyletis  Diagnosis Additional Information: No value filed.    Anesthesia Type:  General, LMA    Note:  Anesthesia Post Evaluation    Patient location during evaluation: PACU  Patient participation: Able to fully participate in evaluation  Level of consciousness: awake and alert  Pain management: adequate  Airway patency: patent  Cardiovascular status: acceptable  Respiratory status: acceptable and unassisted  Hydration status: acceptable  PONV: none             Last vitals:  Vitals:    02/18/19 1645 02/18/19 1700 02/18/19 1715   BP: 148/69  138/70   Pulse: 61  60   Resp: 14 14 17   Temp: 36.6  C (97.8  F)     SpO2: 97%  98%         Electronically Signed By: Avinash Makcey MD  February 18, 2019  5:30 PM

## 2019-02-19 ENCOUNTER — APPOINTMENT (OUTPATIENT)
Dept: OCCUPATIONAL THERAPY | Facility: CLINIC | Age: 49
End: 2019-02-19
Attending: PHYSICIAN ASSISTANT
Payer: COMMERCIAL

## 2019-02-19 ENCOUNTER — APPOINTMENT (OUTPATIENT)
Dept: PHYSICAL THERAPY | Facility: CLINIC | Age: 49
End: 2019-02-19
Attending: PHYSICIAN ASSISTANT
Payer: COMMERCIAL

## 2019-02-19 LAB
COPATH REPORT: NORMAL
GLUCOSE SERPL-MCNC: 131 MG/DL (ref 70–99)
HGB BLD-MCNC: 7.7 G/DL (ref 13.3–17.7)
WBC # BLD AUTO: 14.8 10E9/L (ref 4–11)

## 2019-02-19 PROCEDURE — 25800030 ZZH RX IP 258 OP 636: Performed by: PHYSICIAN ASSISTANT

## 2019-02-19 PROCEDURE — 82947 ASSAY GLUCOSE BLOOD QUANT: CPT | Performed by: PHYSICIAN ASSISTANT

## 2019-02-19 PROCEDURE — 99232 SBSQ HOSP IP/OBS MODERATE 35: CPT | Performed by: INTERNAL MEDICINE

## 2019-02-19 PROCEDURE — 97165 OT EVAL LOW COMPLEX 30 MIN: CPT | Mod: GO

## 2019-02-19 PROCEDURE — 85018 HEMOGLOBIN: CPT | Performed by: PHYSICIAN ASSISTANT

## 2019-02-19 PROCEDURE — 25000132 ZZH RX MED GY IP 250 OP 250 PS 637: Performed by: INTERNAL MEDICINE

## 2019-02-19 PROCEDURE — 25000128 H RX IP 250 OP 636: Performed by: PHYSICIAN ASSISTANT

## 2019-02-19 PROCEDURE — 25000132 ZZH RX MED GY IP 250 OP 250 PS 637: Performed by: PHYSICIAN ASSISTANT

## 2019-02-19 PROCEDURE — 97530 THERAPEUTIC ACTIVITIES: CPT | Mod: GP | Performed by: PHYSICAL THERAPIST

## 2019-02-19 PROCEDURE — 12000000 ZZH R&B MED SURG/OB

## 2019-02-19 PROCEDURE — 25000128 H RX IP 250 OP 636: Performed by: INTERNAL MEDICINE

## 2019-02-19 PROCEDURE — 36415 COLL VENOUS BLD VENIPUNCTURE: CPT | Performed by: PHYSICIAN ASSISTANT

## 2019-02-19 PROCEDURE — 97535 SELF CARE MNGMENT TRAINING: CPT | Mod: GO

## 2019-02-19 PROCEDURE — 97162 PT EVAL MOD COMPLEX 30 MIN: CPT | Mod: GP | Performed by: PHYSICAL THERAPIST

## 2019-02-19 PROCEDURE — 85048 AUTOMATED LEUKOCYTE COUNT: CPT | Performed by: PHYSICIAN ASSISTANT

## 2019-02-19 RX ADMIN — ASPIRIN 81 MG: 81 TABLET, COATED ORAL at 08:41

## 2019-02-19 RX ADMIN — ACETAMINOPHEN 975 MG: 325 TABLET, FILM COATED ORAL at 08:40

## 2019-02-19 RX ADMIN — PIPERACILLIN SODIUM,TAZOBACTAM SODIUM 3.38 G: 3; .375 INJECTION, POWDER, FOR SOLUTION INTRAVENOUS at 16:38

## 2019-02-19 RX ADMIN — ACETAMINOPHEN 975 MG: 325 TABLET, FILM COATED ORAL at 16:38

## 2019-02-19 RX ADMIN — PREGABALIN 100 MG: 100 CAPSULE ORAL at 15:29

## 2019-02-19 RX ADMIN — ACETAMINOPHEN 975 MG: 325 TABLET, FILM COATED ORAL at 00:11

## 2019-02-19 RX ADMIN — SODIUM CHLORIDE, POTASSIUM CHLORIDE, SODIUM LACTATE AND CALCIUM CHLORIDE: 600; 310; 30; 20 INJECTION, SOLUTION INTRAVENOUS at 03:28

## 2019-02-19 RX ADMIN — PIPERACILLIN SODIUM,TAZOBACTAM SODIUM 3.38 G: 3; .375 INJECTION, POWDER, FOR SOLUTION INTRAVENOUS at 09:48

## 2019-02-19 RX ADMIN — OXYCODONE HYDROCHLORIDE 10 MG: 5 TABLET ORAL at 00:11

## 2019-02-19 RX ADMIN — OXYCODONE HYDROCHLORIDE 10 MG: 5 TABLET ORAL at 18:38

## 2019-02-19 RX ADMIN — OXYCODONE HYDROCHLORIDE 10 MG: 5 TABLET ORAL at 03:28

## 2019-02-19 RX ADMIN — PIPERACILLIN SODIUM,TAZOBACTAM SODIUM 3.38 G: 3; .375 INJECTION, POWDER, FOR SOLUTION INTRAVENOUS at 03:33

## 2019-02-19 RX ADMIN — PREGABALIN 100 MG: 100 CAPSULE ORAL at 08:41

## 2019-02-19 RX ADMIN — OXYCODONE HYDROCHLORIDE 10 MG: 5 TABLET ORAL at 21:28

## 2019-02-19 RX ADMIN — ENOXAPARIN SODIUM 40 MG: 40 INJECTION SUBCUTANEOUS at 08:43

## 2019-02-19 RX ADMIN — PREGABALIN 100 MG: 100 CAPSULE ORAL at 21:28

## 2019-02-19 RX ADMIN — SENNOSIDES AND DOCUSATE SODIUM 1 TABLET: 8.6; 5 TABLET ORAL at 08:42

## 2019-02-19 RX ADMIN — PIPERACILLIN SODIUM,TAZOBACTAM SODIUM 3.38 G: 3; .375 INJECTION, POWDER, FOR SOLUTION INTRAVENOUS at 21:34

## 2019-02-19 RX ADMIN — INSULIN DEGLUDEC INJECTION 22 UNITS: 100 INJECTION, SOLUTION SUBCUTANEOUS at 09:49

## 2019-02-19 ASSESSMENT — ACTIVITIES OF DAILY LIVING (ADL)
ADLS_ACUITY_SCORE: 14
ADLS_ACUITY_SCORE: 12
ADLS_ACUITY_SCORE: 14
ADLS_ACUITY_SCORE: 14

## 2019-02-19 NOTE — PLAN OF CARE
"A&Ox4. VSS on RA. CMS intact. Dressing CDI. Pt is due to void, but refusing to have bladder scan. Stating, \"I know my body best and I have not been drinking much water since yesterday. I probably won't urine until tomorrow.\" Hemovac in place. Pt self blood sugar check via his BGM on his RUE. Taking oxycodone for pain. Continue to monitor.   "

## 2019-02-19 NOTE — PLAN OF CARE
Came from PACU at 1645.  A&O.  VSS, RA. CMS intact.  Left stump dressing CDI, elevated on pillow.  Pain managed with oxy and lyrica.  BG check 106 and 134, pt checks his own BG.  Insulin per order.  Dangle on side of bed with assist of 1.  Sat on side of bed to void, DTV, refused bladder scanned.  Pt will call when he is ready.  Refused capno.  Continue to monitor.

## 2019-02-19 NOTE — PLAN OF CARE
"Discharge Planner PT   Patient plan for discharge: agreeable to rehab  Current status: PT eval completed and treatment initiated.  Pt s/p L BKA, NWB. Pt preiously I at home with spouse with multiple stairs to enter town home.  Pt very talkative and difficult to get pertinent information from, reports he feels [place] today.  Has difficulty answering questions, but continues on about his concerns for the future.  Needing frequent redirect.  Pt able to complete bed mob with use of rail and SBA, sit to stand with min A x 2 and FWW.  Able to maintain NWB but not effectively clear R foot, small hopping/scooting steps to turn to chair with FWW and A x 2 to stabilze.    Barriers to return to prior living situation: level of A for transfers, unable to amb, NWB, fall risk, cog concerns  Recommendations for discharge: ARU  Rationale for recommendations: Pt will need multi discipline intensive therapy to improve strength, balance and progress with BKA rehab to improve functional mob I and safety.  Pt previously I with mob and ADL's.       Entered by: EVARISTO HANKINS 02/19/2019 12:15 PM     PT PM pt declines PT this pm despite encouragment to participate,  Stating he had been through enough today and his brain was \"fried\"  "

## 2019-02-19 NOTE — PROGRESS NOTES
[facility]    Medicine Progress Note - Hospitalist Service       Date of Admission:  2/16/2019  Assessment & Plan   Eduardo Walton is a 48 year old Male with history of diabetes mellitus type 1 with complications of neuropathy, diabetic foot ulcers, nephropathy and retinopathy, chronic kidney disease stage II, recent hospitalization for diabetic ulcer with recommendation for BKA which patient declined who is admitted 2/16/2019 with progression of left lower extremity ulcer.     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 Foot XR demonstrated progression of gas in soft tissues with potentially slightly progressed findings for osteomyelitis. CRP markedly elevated at 230 with WBC of 23.5 (21.0 at time of discharge). Not septic / toxic appearing on admission. Vascular surgery involved last hospitalization, at which time it was felt he had adequate blood supply to heal a BKA.   - Routine postoperative cares per orthopedic surgery service  - Blood cultures 2/16 NGTD  - Continue piperacillin-tazobactam IV through 2/19, consider discontinuation 2/20 if cultures negative and no concerns regarding incision from surgery as source now controlled  - WBC trending down, afebrile    Diabetes mellitus, type 1, with complications of neuropathy, diabetic foot ulcers, nephropathy and retinopathy  Poorly controlled with HgbA1c 10.9% 2/5/19. Prior to admission on degludec 28 units in the morning and lispro 1 unit/2 grams of carbohydrate + 1 unit for every 50 over 200 sliding scale insulin, total daily dose up to 80 units  - Continue prior to admission pregabalin  - Increase degludec to 20 units daily (should receive prior to surgery), increase as diet advanced post-op  - Patient on Humolog as refused Novolog, changed sliding scale to PO protocol. Continue carbohydrate counting insulin 1 unit per 5 grams of carbohydrate    - Patient refusing monitoring with hospital POCT, following with patient's needleless monitor    Chronic kidney disease, stage II  Baseline creatinine 1.5-1.6.   - Stable and at baseline  - Discontinue IVF as diet advancing   - Avoid nephrotoxins including NSAIDs  - BMP 2/20/19     Thrombocytosis  Recent Labs   Lab 02/18/19  1021 02/17/19  0627 02/16/19  1549   * 621* 747*     - Likely reactive in setting of infection   - Treat infection as above  - CBC in AM    Coronary artery disease with history of NSTEMI s/p SUMANTH to pLAD  Stenting in 2010. Poor compliance with medications, currently off of beta-blocker, statin, aspirin. Discussed appropriate medical management of CAD and prior stent with patient at length 2/17/19, see previous notes. He refuses to start statin, beta blocker.  - Continue aspirin 81 mg daily  - He is willing to discuss medication management with cardiology as an outpatient    Normocytic anemia  Baseline hemoglobin around 10 g/dl. Has declined since and including last hospitalization in early 2/2019, likely due to infection, blood draws and no surgery.   - Monitor CBC    Diet: Moderate Consistent CHO Diet    DVT Prophylaxis: Enoxaparin subcutaneous per orthopedic surgery   Martinez Catheter: not present  Code Status: Full Code      Disposition Plan   Expected discharge: Pending post-operative course, orthopedic surgery clearance   Entered: Gabriel Pace MD 02/19/2019, 8:37 AM       The patient's care was discussed with the Bedside Nurse and Patient.        Gabriel Pace MD  Hospitalist Service  [facility]    ______________________________________________________________________    Interval History   No acute events overnight. Feeling some stiffness after surgery.  Denies any chest pain or shortness of breath.  Eating and drinking well.    Data reviewed today: I reviewed all medications, new labs and imaging results over the last 24 hours. I personally reviewed no images  or EKG's today.    Physical Exam   Vital Signs: Temp: 98.3  F (36.8  C) Temp src: Oral BP: 129/65 Pulse: 68 Heart Rate: 63 Resp: 16 SpO2: 98 % O2 Device: None (Room air) Oxygen Delivery: 2 LPM  Weight: 158 lbs 0 oz    Constitutional: NAD, non-toxic appearing   Respiratory: Clear to auscultation bilaterally, good air movement bilaterally  Cardiovascular: RRR, no m/r/g.   GI: Soft, non-tender, non-distended. BS normoactive.   Skin/Integumen: Warm, dry.  Left leg wrapped, defer exam to surgery team   Other:     Data   Recent Labs   Lab 02/19/19  0640 02/18/19  1021 02/17/19  0627 02/16/19  1549   WBC 14.8* 15.4* 19.1* 23.5*   HGB 7.7* 7.6* 8.0* 9.0*   MCV  --  81 81 82   PLT  --  595* 621* 747*   NA  --  135 136 134   POTASSIUM  --  4.3 4.1 4.4   CHLORIDE  --  102 100 97   CO2  --  27 29 32   BUN  --  26 31* 30   CR  --  1.54* 1.60* 1.51*   ANIONGAP  --  6 7 5   GABRIELA  --  8.0* 7.8* 8.5   * 222* 99 228*       No results found for this or any previous visit (from the past 24 hour(s)).  Medications     sodium chloride 60 mL/hr at 02/18/19 0926       acetaminophen  975 mg Oral Q8H     aspirin  81 mg Oral Daily     enoxaparin  40 mg Subcutaneous Q24H     insulin degludec  20 Units Subcutaneous QAM     insulin lispro  1-6 Units Subcutaneous TID AC     piperacillin-tazobactam  3.375 g Intravenous Q6H     pregabalin  100 mg Oral TID     senna-docusate  1 tablet Oral BID    Or     senna-docusate  2 tablet Oral BID     sodium chloride (PF)  3 mL Intracatheter Q8H

## 2019-02-19 NOTE — PLAN OF CARE
Discharge Planner OT   Patient plan for discharge: reports he is open to rehab  Current status: OT eval/tx initiated today. Pt is a 48 yr old male admitted s/p L BKA. Pt lives w/ his wife in a home w/ multiple stairs to enter. Pt reports he was indep w/ BADL's and his wife assists w/ driving, cooking. cleaning, laundry. Pt indep w/ setting up medications. Pt w/ tangential speech, required frequent redirection. Pt reports he feels [place] today, but this is normally not a problem for him.     Pt completed SLUMS cognitive screen, which assesses orientation, memory, executive function, and attention. Pt scored 25/30 on SLUMS, indicating pt may have a mild cognitive impairment. Pt had the most difficulty w/ the executive function categories.     Pt w/ tangential speech and hard to redirect when asking subjective info questions. Despite lengthy discussion regarding importance of participating in OOB mobility, pt refusing to complete any bed mobility or functional transfers. Pt's biggest reasoning was the hard surface of the floors, reporting he would try if it was carpet and wants his family to bring in rugs to practice on. Extensive education on pt needing to work w/ both OT/PT daily and importance of getting to the chair multiple times throughout the day regardless of the floor surface. Pt did agree that he would be willing to try tomorrow morning, but not today.  Barriers to return to prior living situation: Level of A, fall risk, cognition/psych concerns  Recommendations for discharge: ARU  Rationale for recommendations: Pt will require multi disciplinary intensive therapy to improve strength, balance and progress with BKA rehab to improve functional mob I and safety.  Pt previously I with mob and ADL's       Entered by: Sakina Muro 02/19/2019 4:24 PM

## 2019-02-19 NOTE — OP NOTE
Procedure Date: 02/18/2019      PREOPERATIVE DIAGNOSIS:  Left necrotic foot and osteomyelitis.         POSTOPERATIVE DIAGNOSIS:  Left necrotic foot and osteomyelitis.         PROCEDURES PERFORMED:     1.   Left below-the-knee amputation.      2.  Application of long leg splint.         ANESTHESIA:  General.         ESTIMATED BLOOD LOSS:  30 mL.      DRAINS:   One.         TOURNIQUET TIME:  25 minutes.         COMPLICATIONS:  None apparent.         INDICATIONS FOR PROCEDURE:  The patient is a 48-year-old male with type 1 diabetes who in October developed an ulcer on his left foot.  This was progressively treated with antibiotics as well as all Podiatry debridements.  He was in and out of the hospital several times, most recently approximately a week ago at which point he developed a severe calcaneal wound that was debrided by Podiatry and he was offered a below-knee amputation.  At that time, he was not ready for that.  He was discharged and subsequently redeveloped a new ulcer over the fifth metatarsal and was readmitted to the hospital with fevers and a newly found osteomyelitis as well as a necrosing foot.  At this time, he was ready for a below-knee amputation and Orthopedics was consulted for further evaluation.  After discussion of the treatment options with the patient, we did decide that a below-knee amputation would be the most reasonable and best salvage procedure for him and he agreed to proceed.         DESCRIPTION OF PROCEDURE:  On the date of the procedure, the patient was met in the preoperative area by the surgeon and anesthesia team.  His left lower extremity was marked by the operative surgeon and informed consent was obtained.  Risks and benefits of the surgery were discussed with the patient including risk of bleeding, infection, damage to neurovascular structures, stiffness, pain and neuroma formation as well as wound healing issues.  He understood and agreed to proceed.  He was then brought to  the operating room and placed on the operating room table in supine position and general anesthesia was administered.  A thigh tourniquet was placed.  The left lower extremity was then prepped and draped in the usual sterile fashion.  Preoperative antibiotics were given and preoperative timeout was performed.  We began the procedure by elevating the extremity and inflating the tourniquet to 250 mmHg.  We then made a posterior flap type of outline over his tibia approximately 12 cm distal to the tibial tubercle using the 1/3-2/3 rule, and making a large healthy posterior myocutaneous flap.  We then proceeded with dissection.  Sharp dissection was carried down through the skin and subcutaneous tissues.  The bone was immediately visible, and we elevated the periosteum.  We made a circumferential skin incision around the whole leg and then used electrocautery in order to Bovie through the muscles and muscle compartments.  The gastrocsoleus complex was protected posteriorly and the deep posterior compartment as well as the lateral compartment and the anterior compartment were incised.  Neurovascular structures were noted.  Vessels were tied and major nerves were put on stretch and cut proximally to prevent a neuroma formation.  The bone was then cut approximately 1.5 to 2 cm proximal to the skin incision on the tibia and then 1 cm proximal to that on the fibula.  We then bevelled the bone anteriorly to prevent any sort of sharp edges and used a rasp in order to get down all of the sharp edges and make a nice smooth transition.  The posterior myocutaneous flap was well preserved and was adjusted in order to make a nice very well fitting posterior flap for repair.  The leg was then discarded and sent to Pathology.  The tourniquet was deflated and we had excellent hemostasis.  We then turned our attention to closure.  We used 0 Vicryls in order to reapproximate the gastrocsoleus complex back to the anterior fascia and  periosteum and then used 2-0 Vicryl for deep dermals, followed by 2-0 nylons in mattress fashion for the skin.        A sterile dressing was then applied.  The patient was then placed into a long leg splint in order to keep the knee in full extension and prevent a flexion contracture.  He was then awakened from anesthesia and brought to the PACU in stable condition.         SERG FLEMING MD             D: 2019   T: 2019   MT: MICHELA      Name:     LILLY SAL   MRN:      [id number]        Account:        [id number]   :      1970           Procedure Date: 2019      Document: [id number]

## 2019-02-19 NOTE — PROGRESS NOTES
[facility]  Patient is currently open to home care services with [facility]. The patient is currently receiving Skilled Nursing services. [facility]  and team have been notified of patient admission. [facility] liaison will continue to follow patient during stay. If appropriate provide orders to resume home care at time of discharge.

## 2019-02-19 NOTE — PROGRESS NOTES
Orthopedic Surgery  Eduardo Walton  2019  Admit Date:  2019  POD 1 Day Post-Op  S/P Procedure(s):  LEFT BELOW THE KNEE AMPUTATION    Patient resting comfortably in bed.  Appears very pale, ashen  Pain controlled.  Denies any phantom pain  Tolerating oral intake.    Denies nausea or vomiting  Denies chest pain or shortness of breath  No events overnight.     Alert and orient to person, place, and time.  Vital Sign Ranges  Temperature Temp  Av.1  F (36.2  C)  Min: 96.4  F (35.8  C)  Max: 98.6  F (37  C)   Blood pressure Systolic (24hrs), Av , Min:117 , Max:159        Diastolic (24hrs), Av, Min:53, Max:84      Pulse Pulse  Av.9  Min: 58  Max: 68   Respirations Resp  Av  Min: 12  Max: 28   Pulse oximetry SpO2  Av.6 %  Min: 96 %  Max: 100 %       Left LE dressing clean, dry and intact  Sensation intact in distal stump    Drain intact, minimal output     Labs:  Recent Labs   Lab Test 19  1021 19  0627 19  1549   POTASSIUM 4.3 4.1 4.4     Recent Labs   Lab Test 19  0640 19  1021 19  0627   HGB 7.7* 7.6* 8.0*     No results for input(s): INR in the last 86243 hours.  Recent Labs   Lab Test 19  1021 19  0627 19  1549   * 621* 747*       A/P  1. Plan:   Continue Lovenox for DVT prophylaxis.     Mobilize with PT/OT    Non-WB Left LE.     Continue current pain regiment.   Leave dressing intact - Dressing removal tomorrow with drain and Flotech fitting    2. Disposition   Anticipate d/c to TCU/ARU vs home in 2-3 more days - pending progress with PT and placement as well as medical clearance    Yodit Barron PA-C

## 2019-02-19 NOTE — PROGRESS NOTES
02/19/19 1300   Quick Adds   Type of Visit Initial Occupational Therapy Evaluation   Living Environment   Lives With spouse   Living Arrangements house   Living Environment Comment Pt reports 31 stairs from garage all the way to bed/bathroom.    Functional Level   Ambulation 0-->independent   Transferring 0-->independent   Toileting 0-->independent   Bathing 0-->independent   Dressing 0-->independent   Eating 0-->independent   Communication 0-->understands/communicates without difficulty   Swallowing 0-->swallows foods/liquids without difficulty   Cognition 0 - no cognition issues reported   Fall history within last six months no   Prior Functional Level Comment Pt's wife assists w/ driving, cooking. cleaning, laundry. Pt indep w/ setting up medications.    General Information   Onset of Illness/Injury or Date of Surgery - Date 02/16/19   Referring Physician Jeanna Cornell PA-C   Additional Occupational Profile Info/Pertinent History of Current Problem Pt s/p L BKA, NWB   Precautions/Limitations fall precautions   General Info Comments Pt is Sam[place], hears better on R side   Cognitive Status Examination   Orientation orientation to person, place and time   Level of Consciousness alert   Visual Perception   Visual Perception Comments WFL   Pain Assessment   Patient Currently in Pain No   Range of Motion (ROM)   ROM Comment BUE AROM WNL   Strength   Strength Comments 4/5    Hand Strength   Hand Strength Comments Decreased in L hand, espec thenar eminence   Coordination   Coordination Comments slightly impaired in R hand, pt reports he is in the beginning stages of carpal tunnel synrome   Mobility   Bed Mobility Comments SBA   Transfer Skill: Bed to Chair/Chair to Bed   Level of [place]: Bed to Chair minimum assist (75% patients effort)   Physical Assist/Nonphysical Assist: Bed to Chair 2 persons   Transfer Skill: Sit to Stand   Level of [place]: Sit/Stand minimum assist (75% patients effort)  "  Physical Assist/Nonphysical Assist: Sit/Stand 2 persons   Bathing   Level of [place] moderate assist (50% patients effort)   Upper Body Dressing   Physical Assist/Nonphysical Assist: Dress Upper Body set-up required   Lower Body Dressing   Level of [place]: Dress Lower Body moderate assist (50% patients effort)   Toileting   Level of [place]: Toilet moderate assist (50% patients effort)   Grooming   Physical Assist/Nonphysical Assist: Grooming set-up required  (seated)   Eating/Self Feeding   Level of [place]: Eating independent   General Therapy Interventions   Planned Therapy Interventions ADL retraining;strengthening;transfer training;progressive activity/exercise   Clinical Impression   Criteria for Skilled Therapeutic Interventions Met yes, treatment indicated   OT Diagnosis Decreased ADL's and IADL's   Influenced by the following impairments s/p L BKA, decreased balance, act tolerance, functional transfers/mobility   Assessment of Occupational Performance 1-3 Performance Deficits   Identified Performance Deficits Decreased ADL's and IADL's   Clinical Decision Making (Complexity) Low complexity   Therapy Frequency daily   Predicted Duration of Therapy Intervention (days/wks) 7 days   Anticipated Discharge Disposition Acute Rehabilitation Facility   Risks and Benefits of Treatment have been explained. Yes   Patient, Family & other staff in agreement with plan of care Yes   [facility] TM \"6 Clicks\"   2016, Trustees of [facility], under license to [employer].  All rights reserved.   6 Clicks Short Forms Daily Activity Inpatient Short Form   [facility]  \"6 Clicks\" Daily Activity Inpatient Short Form   1. Putting on and taking off regular lower body clothing? 2 - A Lot   2. Bathing (including washing, rinsing, drying)? 2 - A Lot   3. Toileting, which includes using toilet, bedpan or urinal? 2 - A Lot   4. Putting on and taking off regular upper body " clothing? 3 - A Little   5. Taking care of personal grooming such as brushing teeth? 3 - A Little   6. Eating meals? 4 - None   Daily Activity Raw Score (Score out of 24.Lower scores equate to lower levels of function) 16   Total Evaluation Time   Total Evaluation Time (Minutes) 8

## 2019-02-19 NOTE — PLAN OF CARE
Alert and oriented x4. VSS. Denies pain and has only had scheduled tylenol for pain. Blood glucose 113, 102, 98. Hemovac not putting out anything. Voided a little bit but did not make it into the hat. Refusing bladder scan.  Up wih one to two. Refused 2nd PT. Md notified of OT and PT request for psych consult, but MD thinks this is unnecessary, and pt would likely become more uncooperative. Dressing CDI, to be changed tomorrow. Will continue to monitor.

## 2019-02-19 NOTE — PROGRESS NOTES
"   02/19/19 1100   Quick Adds   Type of Visit Initial PT Evaluation   Living Environment   Lives With spouse   Living Arrangements house  (town house)   Home Accessibility stairs to enter home;stairs within home   Number of Stairs, Main Entrance other (see comments)  (26 steps to get to bed and bath)   Stair Railings, Main Entrance railing on right side (ascending);railings safe and in good condition   Number of Stairs, Within Home, Primary other (see comments)   Living Environment Comment pt difficult to get accurate information from, tangental but also stating [place].  Does not answer questions directly   Functional Level Prior   Ambulation 0-->independent   Transferring 0-->independent   Toileting 0-->independent   Bathing 0-->independent   Communication 0-->understands/communicates without difficulty   Swallowing 0-->swallows foods/liquids without difficulty   Cognition 0 - no cognition issues reported   Fall history within last six months no   Which of the above functional risks had a recent onset or change? none   General Information   Onset of Illness/Injury or Date of Surgery - Date 02/18/19   Referring Physician Kvng Berman   Patient/Family Goals Statement uncertain of dischsarge plans, open to rehab   Pertinent History of Current Problem (include personal factors and/or comorbidities that impact the POC) s/p BKA   Precautions/Limitations fall precautions   Weight-Bearing Status - LUE full weight-bearing   Weight-Bearing Status - RUE full weight-bearing   Weight-Bearing Status - LLE nonweight-bearing   Weight-Bearing Status - RLE full weight-bearing   General Info Comments Appears anxious with progression of activity, wants to abort activity \"stating It's not going to work\" but not listening to commands   Cognitive Status Examination   Orientation orientation to person, place and time   Level of Consciousness alert   Follows Commands and Answers Questions other (see comments)  (does not answer questions " "directly)   Cognitive Comment Appears to have cog deficits, difficutly following conversation and answering questions, provides long irrelavent anwers to simple questions, c/o [place] also but does not explain extra info pt provides   Pain Assessment   Patient Currently in Pain Yes, see Vital Sign flowsheet   Integumentary/Edema   Integumentary/Edema Comments L LE per surgery, stump wrapped   Range of Motion (ROM)   ROM Comment limited L LE s/p surgery   Strength   Strength Comments decreased strength B UE, unable to lift self in FWW. decreased mm endurance B LE   Bed Mobility   Bed Mobility Comments bed mob with use of rail and SBA   Transfer Skills   Transfer Comments sit to stand with min A x 2 and FWW   Gait   Gait Comments unable   Balance   Balance Comments impaired standing static and dynamic balance requires B UE support and A   General Therapy Interventions   Planned Therapy Interventions bed mobility training;gait training;ROM;strengthening;stretching;transfer training   Clinical Impression   Criteria for Skilled Therapeutic Intervention yes, treatment indicated   PT Diagnosis difficulty walking   Influenced by the following impairments post op pain, decreased ROM, strength, balance, [place], possible cog deficits, anxiety   Functional limitations due to impairments impaired functional mob I and safety   Clinical Presentation Evolving/Changing   Clinical Presentation Rationale 3-5 deficits   Clinical Decision Making (Complexity) Moderate complexity   Therapy Frequency` 2 times/day   Predicted Duration of Therapy Intervention (days/wks) 3 days   Anticipated Discharge Disposition Acute Rehabilitation Facility   Risk & Benefits of therapy have been explained Yes   Patient, Family & other staff in agreement with plan of care Yes   [facility] AM-PAC TM \"6 Clicks\"   2016, Trustees of [facility], under license to [employer].  All rights reserved.   6 Clicks Short Forms Basic Mobility Inpatient Short Form " "  [facility] AM-[facility]AC  \"6 Clicks\" V.2 Basic Mobility Inpatient Short Form   1. Turning from your back to your side while in a flat bed without using bedrails? 4 - None   2. Moving from lying on your back to sitting on the side of a flat bed without using bedrails? 3 - A Little   3. Moving to and from a bed to a chair (including a wheelchair)? 2 - A Lot   4. Standing up from a chair using your arms (e.g., wheelchair, or bedside chair)? 2 - A Lot   5. To walk in hospital room? 1 - Total   6. Climbing 3-5 steps with a railing? 1 - Total   Basic Mobility Raw Score (Score out of 24.Lower scores equate to lower levels of function) 13   Total Evaluation Time   Total Evaluation Time (Minutes) 15     "

## 2019-02-20 ENCOUNTER — APPOINTMENT (OUTPATIENT)
Dept: PHYSICAL THERAPY | Facility: CLINIC | Age: 49
End: 2019-02-20
Payer: COMMERCIAL

## 2019-02-20 ENCOUNTER — APPOINTMENT (OUTPATIENT)
Dept: OCCUPATIONAL THERAPY | Facility: CLINIC | Age: 49
End: 2019-02-20
Payer: COMMERCIAL

## 2019-02-20 LAB
ANION GAP SERPL CALCULATED.3IONS-SCNC: 8 MMOL/L (ref 3–14)
BUN SERPL-MCNC: 25 MG/DL (ref 7–30)
CALCIUM SERPL-MCNC: 8.1 MG/DL (ref 8.5–10.1)
CHLORIDE SERPL-SCNC: 103 MMOL/L (ref 94–109)
CO2 SERPL-SCNC: 28 MMOL/L (ref 20–32)
CREAT SERPL-MCNC: 1.77 MG/DL (ref 0.66–1.25)
ERYTHROCYTE [DISTWIDTH] IN BLOOD BY AUTOMATED COUNT: 14.4 % (ref 10–15)
GFR SERPL CREATININE-BSD FRML MDRD: 45 ML/MIN/{1.73_M2}
GLUCOSE BLDC GLUCOMTR-MCNC: 106 MG/DL (ref 70–99)
GLUCOSE BLDC GLUCOMTR-MCNC: 153 MG/DL (ref 70–99)
GLUCOSE BLDC GLUCOMTR-MCNC: 161 MG/DL (ref 70–99)
GLUCOSE BLDC GLUCOMTR-MCNC: 187 MG/DL (ref 70–99)
GLUCOSE BLDC GLUCOMTR-MCNC: 66 MG/DL (ref 70–99)
GLUCOSE BLDC GLUCOMTR-MCNC: 75 MG/DL (ref 70–99)
GLUCOSE BLDC GLUCOMTR-MCNC: 76 MG/DL (ref 70–99)
GLUCOSE BLDC GLUCOMTR-MCNC: 85 MG/DL (ref 70–99)
GLUCOSE SERPL-MCNC: 104 MG/DL (ref 70–99)
HCT VFR BLD AUTO: 26 % (ref 40–53)
HGB BLD-MCNC: 8.2 G/DL (ref 13.3–17.7)
MCH RBC QN AUTO: 26.5 PG (ref 26.5–33)
MCHC RBC AUTO-ENTMCNC: 31.5 G/DL (ref 31.5–36.5)
MCV RBC AUTO: 84 FL (ref 78–100)
PLATELET # BLD AUTO: 809 10E9/L (ref 150–450)
POTASSIUM SERPL-SCNC: 4 MMOL/L (ref 3.4–5.3)
RBC # BLD AUTO: 3.09 10E12/L (ref 4.4–5.9)
SODIUM SERPL-SCNC: 139 MMOL/L (ref 133–144)
WBC # BLD AUTO: 14.7 10E9/L (ref 4–11)

## 2019-02-20 PROCEDURE — 97530 THERAPEUTIC ACTIVITIES: CPT | Mod: GP | Performed by: PHYSICAL THERAPIST

## 2019-02-20 PROCEDURE — L5688 BK WAIST BELT WEBBING: HCPCS

## 2019-02-20 PROCEDURE — 25800030 ZZH RX IP 258 OP 636: Performed by: INTERNAL MEDICINE

## 2019-02-20 PROCEDURE — 25000132 ZZH RX MED GY IP 250 OP 250 PS 637: Performed by: INTERNAL MEDICINE

## 2019-02-20 PROCEDURE — 25000128 H RX IP 250 OP 636: Performed by: PHYSICIAN ASSISTANT

## 2019-02-20 PROCEDURE — 97110 THERAPEUTIC EXERCISES: CPT | Mod: GO | Performed by: OCCUPATIONAL THERAPY ASSISTANT

## 2019-02-20 PROCEDURE — L8440 SHRINKER BELOW KNEE: HCPCS

## 2019-02-20 PROCEDURE — 99233 SBSQ HOSP IP/OBS HIGH 50: CPT | Performed by: INTERNAL MEDICINE

## 2019-02-20 PROCEDURE — 97110 THERAPEUTIC EXERCISES: CPT | Mod: GP | Performed by: PHYSICAL THERAPIST

## 2019-02-20 PROCEDURE — 12000000 ZZH R&B MED SURG/OB

## 2019-02-20 PROCEDURE — 25000128 H RX IP 250 OP 636: Performed by: INTERNAL MEDICINE

## 2019-02-20 PROCEDURE — 25000132 ZZH RX MED GY IP 250 OP 250 PS 637: Performed by: PHYSICIAN ASSISTANT

## 2019-02-20 PROCEDURE — 99207 ZZC NON-BILLABLE SERV PER CHARTING: CPT | Performed by: NURSE PRACTITIONER

## 2019-02-20 PROCEDURE — L8420 PROSTHETIC SOCK MULTI PLY BK: HCPCS

## 2019-02-20 PROCEDURE — 85027 COMPLETE CBC AUTOMATED: CPT | Performed by: INTERNAL MEDICINE

## 2019-02-20 PROCEDURE — 36415 COLL VENOUS BLD VENIPUNCTURE: CPT | Performed by: INTERNAL MEDICINE

## 2019-02-20 PROCEDURE — L5999 LOWR EXTREMITY PROSTHES NOS: HCPCS

## 2019-02-20 PROCEDURE — L5450 POSTOP APP NON-WGT BEAR DSG: HCPCS

## 2019-02-20 PROCEDURE — 80048 BASIC METABOLIC PNL TOTAL CA: CPT | Performed by: INTERNAL MEDICINE

## 2019-02-20 PROCEDURE — 00000146 ZZHCL STATISTIC GLUCOSE BY METER IP

## 2019-02-20 RX ORDER — ACETAMINOPHEN 325 MG/1
650 TABLET ORAL EVERY 4 HOURS PRN
Qty: 40 TABLET | Refills: 0 | Status: SHIPPED | OUTPATIENT
Start: 2019-02-21 | End: 2019-04-01

## 2019-02-20 RX ORDER — SODIUM CHLORIDE 9 MG/ML
INJECTION, SOLUTION INTRAVENOUS CONTINUOUS
Status: DISCONTINUED | OUTPATIENT
Start: 2019-02-20 | End: 2019-02-21

## 2019-02-20 RX ORDER — ONDANSETRON 4 MG/1
4 TABLET, ORALLY DISINTEGRATING ORAL EVERY 6 HOURS PRN
Qty: 10 TABLET | Refills: 0 | Status: ON HOLD | OUTPATIENT
Start: 2019-02-20 | End: 2019-03-04

## 2019-02-20 RX ORDER — AMOXICILLIN 250 MG
2 CAPSULE ORAL 2 TIMES DAILY
Qty: 30 TABLET | Refills: 0 | Status: ON HOLD | OUTPATIENT
Start: 2019-02-20 | End: 2019-03-04

## 2019-02-20 RX ORDER — OXYCODONE HYDROCHLORIDE 5 MG/1
5-10 TABLET ORAL EVERY 4 HOURS PRN
Qty: 30 TABLET | Refills: 0 | Status: ON HOLD | OUTPATIENT
Start: 2019-02-20 | End: 2019-03-04

## 2019-02-20 RX ADMIN — PIPERACILLIN SODIUM,TAZOBACTAM SODIUM 3.38 G: 3; .375 INJECTION, POWDER, FOR SOLUTION INTRAVENOUS at 10:16

## 2019-02-20 RX ADMIN — PREGABALIN 100 MG: 100 CAPSULE ORAL at 21:36

## 2019-02-20 RX ADMIN — PREGABALIN 100 MG: 100 CAPSULE ORAL at 17:03

## 2019-02-20 RX ADMIN — PREGABALIN 100 MG: 100 CAPSULE ORAL at 08:37

## 2019-02-20 RX ADMIN — OXYCODONE HYDROCHLORIDE 10 MG: 5 TABLET ORAL at 08:37

## 2019-02-20 RX ADMIN — ACETAMINOPHEN 975 MG: 325 TABLET, FILM COATED ORAL at 17:03

## 2019-02-20 RX ADMIN — PIPERACILLIN SODIUM,TAZOBACTAM SODIUM 3.38 G: 3; .375 INJECTION, POWDER, FOR SOLUTION INTRAVENOUS at 17:03

## 2019-02-20 RX ADMIN — ENOXAPARIN SODIUM 40 MG: 40 INJECTION SUBCUTANEOUS at 08:38

## 2019-02-20 RX ADMIN — ASPIRIN 81 MG: 81 TABLET, COATED ORAL at 08:37

## 2019-02-20 RX ADMIN — ACETAMINOPHEN 975 MG: 325 TABLET, FILM COATED ORAL at 23:43

## 2019-02-20 RX ADMIN — PIPERACILLIN SODIUM,TAZOBACTAM SODIUM 3.38 G: 3; .375 INJECTION, POWDER, FOR SOLUTION INTRAVENOUS at 21:57

## 2019-02-20 RX ADMIN — SODIUM CHLORIDE: 9 INJECTION, SOLUTION INTRAVENOUS at 10:15

## 2019-02-20 RX ADMIN — ACETAMINOPHEN 975 MG: 325 TABLET, FILM COATED ORAL at 08:37

## 2019-02-20 RX ADMIN — SODIUM CHLORIDE: 9 INJECTION, SOLUTION INTRAVENOUS at 21:35

## 2019-02-20 RX ADMIN — PIPERACILLIN SODIUM,TAZOBACTAM SODIUM 3.38 G: 3; .375 INJECTION, POWDER, FOR SOLUTION INTRAVENOUS at 05:44

## 2019-02-20 RX ADMIN — OXYCODONE HYDROCHLORIDE 10 MG: 5 TABLET ORAL at 14:20

## 2019-02-20 ASSESSMENT — ACTIVITIES OF DAILY LIVING (ADL)
ADLS_ACUITY_SCORE: 12

## 2019-02-20 NOTE — PROGRESS NOTES
Orthopedic Surgery  Eduardo Walton  2019  Admit Date:  2019  POD 2 Days Post-Op  S/P Procedure(s):  LEFT BELOW THE KNEE AMPUTATION    Patient resting comfortably in chair on my arrival - sleepy but awakens. Appearance is still ashen    Pain controlled.  Tolerating oral intake.    Denies nausea or vomiting  Denies chest pain or shortness of breath  No events overnight.     Alert and orient to person, place, and time.  Vital Sign Ranges  Temperature Temp  Av.3  F (36.8  C)  Min: 97.9  F (36.6  C)  Max: 98.8  F (37.1  C)   Blood pressure Systolic (24hrs), Av , Min:107 , Max:144        Diastolic (24hrs), Av, Min:54, Max:67      Pulse No Data Recorded   Respirations Resp  Av  Min: 16  Max: 16   Pulse oximetry SpO2  Av.3 %  Min: 95 %  Max: 98 %         Left LE dressing clean, dry and intact  Sensation intact in distal stump     Drain intact, minimal output       Labs:  Recent Labs   Lab Test 19  0656 19  1021 19  0627   POTASSIUM 4.0 4.3 4.1     Recent Labs   Lab Test 19  0656 19  0640 19  1021   HGB 8.2* 7.7* 7.6*     No results for input(s): INR in the last 68334 hours.  Recent Labs   Lab Test 19  0656 19  1021 19  0627   * 595* 621*       A/P  1. Plan:   Continue Lovenox for DVT prophylaxis.     Mobilize with PT/OT    Non-WB Left stump.     Continue current pain regiment.   Dressing/Splint to be removed today and flotech placed   Ok to discontinue drain today    2. Disposition   Anticipate d/c to TCU vs ARU based on progress with PT as well as medical clearance.    Yodit Barron PA-C

## 2019-02-20 NOTE — PROGRESS NOTES
Pt dressing removed from left BKA- site  Stitches intact,  Incision approximated, no noted drainage. No noted s/s of infection. Hemovac removed, no drainage. Covered w/ gauze and tape.   Fitted and applied prosthetic protective dressing.

## 2019-02-20 NOTE — PROGRESS NOTES
S: Pt seen bedside nurse and aid present. O: I see the EPIC Order for the Manju marissa post op RT BKA. A: The post op original dressing was removed and the shape looked very good. I applied the interface sock and the rigid Manju marissa BK post op protector ( size 18-21) to help reduce knee flexion contracture and to help protect in case of a fall. The  socks are for shaping the residual limb once OK'd by the surgeon. He was given 3 post op socks to use as interface between his skin and the plastic Manju Marissa. They should be hand washed and air dried when blood stained. ( They should not be thrown away). He was also given two size 2 BK  socks and donning tube to use when Surgeon OK's use. They can be worn next to the skin with interface sock over the top inside the Manju marissa. The [employer] information teal bag was given. P: FU PRN. G: The goal is to aid in maintaining reduced knee flexion contracture and help protect incase of a fall.   Electronically signed Srinivasa Esquivel CPO, LPO.

## 2019-02-20 NOTE — PLAN OF CARE
Pt / co of in in LLE improved w/ oxycodone. Pt tolerating ortho brace well. Pt doing tranfers pivot to commode w/ A-2. Refusing carb coverage insulin. Blood sugars stable. Following creatinine, elevated at 1.77,  NS infusing.  Possible discharge tomorrow. IS done at bedside.  Continue to monitor.

## 2019-02-20 NOTE — PLAN OF CARE
Pt A&O. Baseline numbness. VSS. Up w/ A1-2 to BSC. Taking oxy for pain. Pt reports normal low urine output, refuses scan, MD aware. Continue to monitor.

## 2019-02-20 NOTE — PLAN OF CARE
Discharge Planner PT   Patient plan for discharge: Pt is open to rehab.   Current status: Pt is supine upon arrival of therapist with report of L limb pain of 8/10 at rest. Pt performed supine strengthening/ROM exercises with continuous cues for technique and to focus attention on exercises as pt is very tangential during session. Patient performed supine-sit with SBA. Patient required max encouragement to trial OOB mobility as pt initially refusing due to patient's room not having carpet. Educated pt several times that it is safe to trail transfers/mobility in room with use of walker and assist of 2. Pt eventually agreeable to complete sit <> stand with use of FWW and CGA. Pt amb a couple steps towards bedside recliner with use of FWW and Bekah. Pt required continuous 1-step cues for technique.   Barriers to return to prior living situation: NWB restriction, Level of assist, Fall risk, Anxiety/Hesitancy with OOB mobility   Recommendations for discharge: ARC  Rationale for recommendations: Pt was independence prior to current hospitalization and will benefit from intensive therapy in order to increase independence and safety with mobility.        Entered by: Huma Thomson 02/20/2019 12:19 PM

## 2019-02-20 NOTE — PROGRESS NOTES
[facility]    Medicine Progress Note - Hospitalist Service       Date of Admission:  2/16/2019  Assessment & Plan   Eduardo Walton is a 48 year old Male with history of diabetes mellitus type 1 with complications of neuropathy, diabetic foot ulcers, nephropathy and retinopathy, chronic kidney disease stage II, recent hospitalization for diabetic ulcer with recommendation for BKA which patient declined who is admitted 2/16/2019 with progression of left lower extremity ulcer.     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 Foot XR demonstrated progression of gas in soft tissues with potentially slightly progressed findings for osteomyelitis. CRP markedly elevated at 230 with WBC of 23.5 (21.0 at time of discharge). Not septic / toxic appearing on admission. Vascular surgery involved last hospitalization, at which time it was felt he had adequate blood supply to heal a BKA.   - Routine postoperative cares per orthopedic surgery service  - Blood cultures 2/16 NGTD  - Continues piperacillin-tazobactam IV; if no concerns about wound on dressing change / orthopedic surgery exam plan to discontinue  - WBC trending down (slightly), afebrile    Diabetes mellitus, type 1, with complications of neuropathy, diabetic foot ulcers, nephropathy and retinopathy  Poorly controlled with HgbA1c 10.9% 2/5/19. Prior to admission on degludec 28 units in the morning and lispro 1 unit/2 grams of carbohydrate + 1 unit for every 50 over 200 sliding scale insulin, total daily dose up to 80 units.  - Continue prior to admission pregabalin  - Mild hypoglycemia 2/19/19, has not been receiving any mealtime insulin (he has been refusing, discussed with patient reasoning for ordering this). Decreased degludec to 18 units. Carbohydrate counting insulin remains ordered as well as sliding scale insulin. Insulin ordered as Humolog as he  has been refusing to receive any Novolog.   - Patient refusing monitoring with hospital POCT, following with patient's needleless monitor    Chronic kidney disease, stage II  Baseline creatinine 1.5-1.6.   - Creatinine increasing, UOP has been low, will restart IVF  - He has been refusing bladder scans. Have discussed with him risks of bladder and kidney injury with unrecognized retention, he has expressed understanding and continues to refuse  - Plan to discontinue antibiotics if orthopedic surgery agrees, as above  - Avoid nephrotoxins including NSAIDs  - BMP 2/21/19     Oppositional behavior  Very particular about cares, medications with refusal of some therapies/interventions. Therapy services inquired to RN about psychiatry consult. Unlikely this would be of benefit, he has been able to state risks of non-compliance and would not be able to force patient to comply with any therapies or non-psychiatric medications.  - Continue to encourage cares, offer patient choices and control over situation as best as able     Thrombocytosis  Recent Labs   Lab 02/20/19  0656 02/18/19  1021 02/17/19  0627 02/16/19  1549   * 595* 621* 747*     - Suspected reactive in setting of infection, although now increasing despite amputation   - CBC in AM  - Consider hematology/oncology consult if continues to increase    Coronary artery disease with history of NSTEMI s/p SUMANTH to pLAD  Stenting in 2010. Poor compliance with medications, currently off of beta-blocker, statin, aspirin. Discussed appropriate medical management of CAD and prior stent with patient at length 2/17/19, see previous notes. He refuses to start statin, beta blocker.  - Continue aspirin 81 mg daily  - He is willing to discuss medication management with cardiology as an outpatient    Normocytic anemia  Baseline hemoglobin around 10 g/dl. Has declined since and including last hospitalization in early 2/2019, likely due to infection, blood draws and no surgery.    Recent Labs   Lab 02/20/19  0656 02/19/19  0640 02/18/19  1021 02/17/19  0627 02/16/19  1549   HGB 8.2* 7.7* 7.6* 8.0* 9.0*      - Monitor CBC    Diet: Moderate Consistent CHO Diet    DVT Prophylaxis: Enoxaparin subcutaneous per orthopedic surgery   Martinez Catheter: not present  Code Status: Full Code      Disposition Plan   Expected discharge: Pending post-operative course, orthopedic surgery clearance   Entered: Gabriel Pace MD 02/20/2019, 8:37 AM       The patient's care was discussed with the Bedside Nurse and Patient.        Gabriel Pace MD  Hospitalist Service  [facility]    ______________________________________________________________________    Interval History   No acute events overnight. Feeling stiffness in his amputated leg. Denies any chest pain or shortness of breath. Was able to urinate this morning, denies feeling any pressure with urination. He feels he is eating and drinking normally.     Data reviewed today: I reviewed all medications, new labs and imaging results over the last 24 hours. I personally reviewed no images or EKG's today.    Physical Exam   Vital Signs: Temp: 98.8  F (37.1  C) Temp src: Oral BP: 159/72   Heart Rate: 90 Resp: 16 SpO2: 93 % O2 Device: None (Room air)    Weight: 158 lbs 0 oz    Constitutional: NAD, non-toxic appearing   Respiratory: Clear to auscultation bilaterally, good air movement bilaterally  Cardiovascular: RRR, no m/r/g.   GI: Soft, non-tender, non-distended. BS normoactive.   Skin/Integumen: Warm, dry.  Left leg wrapped, defer exam to surgery team   Other:     Data   Recent Labs   Lab 02/20/19  0656 02/19/19  0640 02/18/19  1021 02/17/19  0627   WBC 14.7* 14.8* 15.4* 19.1*   HGB 8.2* 7.7* 7.6* 8.0*   MCV 84  --  81 81   *  --  595* 621*     --  135 136   POTASSIUM 4.0  --  4.3 4.1   CHLORIDE 103  --  102 100   CO2 28  --  27 29   BUN 25  --  26 31*   CR 1.77*  --  1.54* 1.60*   ANIONGAP 8  --  6 7   GABRIELA 8.1*  --  8.0* 7.8*    * 131* 222* 99       No results found for this or any previous visit (from the past 24 hour(s)).  Medications     sodium chloride         acetaminophen  975 mg Oral Q8H     aspirin  81 mg Oral Daily     enoxaparin  40 mg Subcutaneous Q24H     insulin degludec  20 Units Subcutaneous QAM     insulin lispro  1-10 Units Subcutaneous TID AC     insulin lispro  1-6 Units Subcutaneous 4x Daily w/meals     piperacillin-tazobactam  3.375 g Intravenous Q6H     pregabalin  100 mg Oral TID     senna-docusate  1 tablet Oral BID    Or     senna-docusate  2 tablet Oral BID     sodium chloride (PF)  3 mL Intracatheter Q8H

## 2019-02-21 ENCOUNTER — APPOINTMENT (OUTPATIENT)
Dept: PHYSICAL THERAPY | Facility: CLINIC | Age: 49
End: 2019-02-21
Payer: COMMERCIAL

## 2019-02-21 LAB
ANION GAP SERPL CALCULATED.3IONS-SCNC: 5 MMOL/L (ref 3–14)
BUN SERPL-MCNC: 27 MG/DL (ref 7–30)
CALCIUM SERPL-MCNC: 8 MG/DL (ref 8.5–10.1)
CHLORIDE SERPL-SCNC: 104 MMOL/L (ref 94–109)
CO2 SERPL-SCNC: 29 MMOL/L (ref 20–32)
CREAT SERPL-MCNC: 1.68 MG/DL (ref 0.66–1.25)
ERYTHROCYTE [DISTWIDTH] IN BLOOD BY AUTOMATED COUNT: 14.5 % (ref 10–15)
GFR SERPL CREATININE-BSD FRML MDRD: 48 ML/MIN/{1.73_M2}
GLUCOSE BLDC GLUCOMTR-MCNC: 185 MG/DL (ref 70–99)
GLUCOSE SERPL-MCNC: 194 MG/DL (ref 70–99)
HCT VFR BLD AUTO: 22 % (ref 40–53)
HGB BLD-MCNC: 7.1 G/DL (ref 13.3–17.7)
MCH RBC QN AUTO: 27.1 PG (ref 26.5–33)
MCHC RBC AUTO-ENTMCNC: 32.3 G/DL (ref 31.5–36.5)
MCV RBC AUTO: 84 FL (ref 78–100)
PLATELET # BLD AUTO: 690 10E9/L (ref 150–450)
POTASSIUM SERPL-SCNC: 4.3 MMOL/L (ref 3.4–5.3)
RBC # BLD AUTO: 2.62 10E12/L (ref 4.4–5.9)
SODIUM SERPL-SCNC: 138 MMOL/L (ref 133–144)
WBC # BLD AUTO: 12 10E9/L (ref 4–11)

## 2019-02-21 PROCEDURE — 12000000 ZZH R&B MED SURG/OB

## 2019-02-21 PROCEDURE — 25000132 ZZH RX MED GY IP 250 OP 250 PS 637: Performed by: INTERNAL MEDICINE

## 2019-02-21 PROCEDURE — 99233 SBSQ HOSP IP/OBS HIGH 50: CPT | Performed by: INTERNAL MEDICINE

## 2019-02-21 PROCEDURE — 97530 THERAPEUTIC ACTIVITIES: CPT | Mod: GP

## 2019-02-21 PROCEDURE — 80048 BASIC METABOLIC PNL TOTAL CA: CPT | Performed by: INTERNAL MEDICINE

## 2019-02-21 PROCEDURE — 25000132 ZZH RX MED GY IP 250 OP 250 PS 637: Performed by: PHYSICIAN ASSISTANT

## 2019-02-21 PROCEDURE — 25000128 H RX IP 250 OP 636: Performed by: PHYSICIAN ASSISTANT

## 2019-02-21 PROCEDURE — 25000131 ZZH RX MED GY IP 250 OP 636 PS 637: Performed by: INTERNAL MEDICINE

## 2019-02-21 PROCEDURE — 36415 COLL VENOUS BLD VENIPUNCTURE: CPT | Performed by: INTERNAL MEDICINE

## 2019-02-21 PROCEDURE — 85027 COMPLETE CBC AUTOMATED: CPT | Performed by: INTERNAL MEDICINE

## 2019-02-21 PROCEDURE — 97110 THERAPEUTIC EXERCISES: CPT | Mod: GP

## 2019-02-21 PROCEDURE — 00000146 ZZHCL STATISTIC GLUCOSE BY METER IP

## 2019-02-21 PROCEDURE — 25000128 H RX IP 250 OP 636: Performed by: INTERNAL MEDICINE

## 2019-02-21 RX ORDER — LOPERAMIDE HCL 2 MG
2 CAPSULE ORAL 4 TIMES DAILY PRN
Status: DISCONTINUED | OUTPATIENT
Start: 2019-02-21 | End: 2019-02-25 | Stop reason: HOSPADM

## 2019-02-21 RX ADMIN — LOPERAMIDE HYDROCHLORIDE 2 MG: 2 CAPSULE ORAL at 15:32

## 2019-02-21 RX ADMIN — ASPIRIN 81 MG: 81 TABLET, COATED ORAL at 08:00

## 2019-02-21 RX ADMIN — INSULIN LISPRO 4 UNITS: 100 INJECTION, SOLUTION INTRAVENOUS; SUBCUTANEOUS at 13:28

## 2019-02-21 RX ADMIN — LOPERAMIDE HYDROCHLORIDE 2 MG: 2 CAPSULE ORAL at 09:26

## 2019-02-21 RX ADMIN — PREGABALIN 100 MG: 100 CAPSULE ORAL at 21:02

## 2019-02-21 RX ADMIN — PREGABALIN 100 MG: 100 CAPSULE ORAL at 18:08

## 2019-02-21 RX ADMIN — INSULIN LISPRO 3 UNITS: 100 INJECTION, SOLUTION INTRAVENOUS; SUBCUTANEOUS at 17:56

## 2019-02-21 RX ADMIN — PIPERACILLIN SODIUM,TAZOBACTAM SODIUM 3.38 G: 3; .375 INJECTION, POWDER, FOR SOLUTION INTRAVENOUS at 03:54

## 2019-02-21 RX ADMIN — OXYCODONE HYDROCHLORIDE 10 MG: 5 TABLET ORAL at 09:26

## 2019-02-21 RX ADMIN — INSULIN LISPRO 1 UNITS: 100 INJECTION, SOLUTION INTRAVENOUS; SUBCUTANEOUS at 08:06

## 2019-02-21 RX ADMIN — OXYCODONE HYDROCHLORIDE 10 MG: 5 TABLET ORAL at 15:35

## 2019-02-21 RX ADMIN — PREGABALIN 100 MG: 100 CAPSULE ORAL at 08:01

## 2019-02-21 RX ADMIN — ENOXAPARIN SODIUM 40 MG: 40 INJECTION SUBCUTANEOUS at 08:00

## 2019-02-21 RX ADMIN — ACETAMINOPHEN 975 MG: 325 TABLET, FILM COATED ORAL at 08:00

## 2019-02-21 ASSESSMENT — ACTIVITIES OF DAILY LIVING (ADL)
ADLS_ACUITY_SCORE: 14
ADLS_ACUITY_SCORE: 13
ADLS_ACUITY_SCORE: 15
ADLS_ACUITY_SCORE: 13
ADLS_ACUITY_SCORE: 13
ADLS_ACUITY_SCORE: 15

## 2019-02-21 NOTE — PLAN OF CARE
Discharge Planner PT   Patient plan for discharge: Home or Rehab  Current status: Pt refused any transfers or ambulation with therapy due to brock. Pt stated that he feels like the floor is very slippery and that he won't ambulate unless its a carpeted surface. Pt also reported not wanting to use a RW or crutches. Attempted to assure pt on safety, wearing hospital socks to prevent slipping, and using a RW for support. However pt continues to refuse. Pt did agreed to sitting at EOB and participating in LE there ex with PT' guidance.  Barriers to return to prior living situation: Fear of slipping and falling, anxiety, multiple steps to enter home, Level of assistance needed, decreased strength, Amputee.   Recommendations for discharge: ARU  Rationale for recommendations: Pt was independence prior to current hospitalization and will benefit from intensive therapy in order to increase independence and safety with mobility       Entered by: Luna Hsieh 02/21/2019 1:04 PM

## 2019-02-21 NOTE — PROGRESS NOTES
Orthopedic Surgery  Eduardo Walton  2019  Admit Date:  2019  POD 3 Days Post-Op  S/P Procedure(s):  LEFT BELOW THE KNEE AMPUTATION    Patient resting comfortably in bed.    Pain controlled.  Tolerating oral intake.    Denies nausea or vomiting  Denies chest pain or shortness of breath  No events overnight.     Alert and orient to person, place, and time.  Vital Sign Ranges  Temperature Temp  Av.1  F (36.7  C)  Min: 97.4  F (36.3  C)  Max: 98.9  F (37.2  C)   Blood pressure Systolic (24hrs), Av , Min:123 , Max:157        Diastolic (24hrs), Av, Min:60, Max:68      Pulse Pulse  Av  Min: 77  Max: 77   Respirations Resp  Av.3  Min: 16  Max: 18   Pulse oximetry SpO2  Av.9 %  Min: 91 %  Max: 99 %       Flotech brace in place  Able to SLR    Labs:  Recent Labs   Lab Test 19  0654 19  0656 19  1021   POTASSIUM 4.3 4.0 4.3     Recent Labs   Lab Test 19  0654 19  0656 19  0640   HGB 7.1* 8.2* 7.7*     No results for input(s): INR in the last 91489 hours.  Recent Labs   Lab Test 19  0654 19  0656 19  1021   * 809* 595*       A/P  1. Plan:   Continue lovenox for DVT prophylaxis.     Mobilize with PT/OT    Non-WB left Stump.     Continue current pain regiment.   Dressing change every other day     2. Disposition   Anticipate discharge TCU vs ARU based on progress and medical clearance    Yodit Barron PA-C

## 2019-02-21 NOTE — PLAN OF CARE
"OT: pt refused all attempts to engage pt in OOB activity. Educated on importance of working with therapy to progress strength/independence with ADLS. Pt continues to refuse participation stating \"I will do anything in bed but I will not get up\". Nursing informed of pt refusal.   "

## 2019-02-21 NOTE — PLAN OF CARE
A&O.  VSS, RA.  CMS intact.  Left stump dressing CDI.  Pain managed with oxy and lyrica.  Up with 2 and walker.  Voiding adequate amount in BSC.  1 large BM this shift.  RRT called due to hypoglycemia and lethargy, see note.  BG drop to 50, stable now, encourage oral intake.  Continue to monitor.

## 2019-02-21 NOTE — PLAN OF CARE
Pt A&O. CMS intact. VSS. Blood sugar 159 and 189 overnight. Up w/ 1 to BSC. Taking oxy and lyrica for pain. Voiding adequately on BSC. Continue to monitor.

## 2019-02-21 NOTE — PLAN OF CARE
A&Ox4. VSS. Pt. c/o 8/10 left leg phantom pain. Oxycodone given x1 for relief. Pt. denies nausea. CMS intact. Left BKA, dressing CDI. Good appetite. BG's covered with sliding scale insulin. Pt. frustrated at the beginning of shift about PIV and diarrhea caused from diarrhea. Imodium ordered and given x1. Two loose BM's this shift. Continuous IV fluids discontinued. Voiding spontaneously. Up with assist of 2 to bedside commode. No acute events. Pt. rested between cares. Will continue POC and notify MD with changes.

## 2019-02-21 NOTE — PROGRESS NOTES
MD Notification    Notified Person: MD    Notified Person Name: Katelynn    Notification Date/Time: 2/21 0756    Notification Interaction: page    Purpose of Notification: Can we dc IVF? Pt refusing them.    Orders Received:    Comments:

## 2019-02-21 NOTE — CODE/RAPID RESPONSE
Brief house [facility] note:    Responded to rapid response called for altered mental status.  Patient has type 1 diabetes, and was hypoglycemic with a blood glucose of 66.  Patient refused glucose administration, nursing staff gave patient juice to correct blood glucose.  When nursing staff reevaluated patient, he was lethargic and diaphoretic.  On my initial exam patient was alert and oriented, slightly pale, and slightly diaphoretic.  Patient endorses this is common after he is hypoglycemic when he rebounds he becomes diaphoretic and his lethargy improves.  Patient was complaining of urge to have bowel movement and was more fixated on this. Repeat blood glucose was > 150.    Nursing staff to monitor glucose frequently, and intervene if necessary.  Encourage p.o. intake of complex carbohydrates.    No acute interventions needed.  Please page me with additional concerns tonight.    NATASHA Guido, CNP  Hospitalist - House [facility]  Text Page  [phone])

## 2019-02-21 NOTE — PROGRESS NOTES
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 Foot XR demonstrated progression of gas in soft tissues with potentially slightly progressed findings for osteomyelitis. CRP markedly elevated at 230 with WBC of 23.5 (21.0 at time of discharge). Not septic / toxic appearing on admission. Vascular surgery involved last hospitalization, at which time it was felt he had adequate blood supply to heal a BKA.   - Routine postoperative cares per orthopedic surgery service  - Blood cultures 2/16 NGTD  - Discontinue piperacillin-tazobactam IV  - WBC trending down, afebrile    Diarrhea  Likely antibiotic associated from piperacillin-tazobactam IV. No fevers, abdominal pain and reports typically one bowel movement daily so C difficile less likely.   - Discontinue piperacillin-tazobactam IV   - Loperamide PRN  - Check C difficile PCR if stools increasing in frequency, abdominal pain or fevers  - Discontinued scheduled senna (had not been receiving)     Diabetes mellitus, type 1, with complications of neuropathy, diabetic foot ulcers, nephropathy and retinopathy  Poorly controlled with HgbA1c 10.9% 2/5/19. Prior to admission on degludec 28 units in the morning and lispro 1 unit/2 grams of carbohydrate + 1 unit for every 50 over 200 sliding scale insulin, total daily dose up to 80 units.  - Continue prior to admission pregabalin  - Mild  hypoglycemia 2/20/19, will reduce degludec to 14 units daily. Continued to encourage patient to utilize short-acting insulin to achieve better balance between long and short-acting insulin to improve glycemic control. Insulin ordered as Humolog as he has been refusing to receive any Novolog.   - Patient refusing monitoring with hospital POCT, following with patient's needleless monitor    Chronic kidney disease, stage II  Baseline creatinine 1.5-1.6.   - Creatinine improving, will discontinue IVF. Encourage PO intake  - Avoid nephrotoxins including NSAIDs  - BMP 2/22/19     Oppositional behavior  Very particular about cares, medications with refusal of some therapies/interventions. Therapy services inquired to RN about psychiatry consult. Unlikely this would be of benefit, he has been able to state risks of non-compliance and would not be able to force patient to comply with any therapies or non-psychiatric medications.  - Continue to encourage cares, offer patient choices and control over situation as best as able     Thrombocytosis  Recent Labs   Lab 02/21/19  0654 02/20/19  0656 02/18/19  1021 02/17/19  0627 02/16/19  1549   * 809* 595* 621* 747*     - Suspected reactive in setting of infection, although remains elevated despite amputation   - CBC in AM  - Consider hematology/oncology consult if continues to increase    Coronary artery disease with history of NSTEMI s/p SUMANTH to pLAD  Stenting in 2010. Poor compliance with medications, currently off of beta-blocker, statin, aspirin. Discussed appropriate medical management of CAD and prior stent with patient at length 2/17/19, see previous notes. He refuses to start statin, beta blocker.  - Continue aspirin 81 mg daily  - He is willing to discuss medication management with cardiology as an outpatient    Normocytic anemia  Baseline hemoglobin around 10 g/dl. Has declined since and including last hospitalization in early 2/2019, likely due to infection, blood  draws and now surgery.   Recent Labs   Lab 02/21/19  0654 02/20/19  0656 02/19/19  0640 02/18/19  1021 02/17/19  0627   HGB 7.1* 8.2* 7.7* 7.6* 8.0*      - No signs of blood loss   - No symptoms at rest, unable to assess for exertional symptoms as has been refusing to work with therapies. Consider transfusion if symptomatic with activity given underlying CAD otherwise if Hgb < 7 g/dl  - CBC in AM    Physical deconditioning  - PT/OT consulted. ARU has been recommended if he is more participatory with therapies. Discussed options for discharge with patient at length 2/21/19. Encouraged participation with therapies, if not participating, will not be a candidate for TCU or ARU and will therefore discharge home.     Diet: Moderate Consistent CHO Diet  Advance Diet as Tolerated    DVT Prophylaxis: Enoxaparin subcutaneous per orthopedic surgery   Martinez Catheter: not present  Code Status: Full Code      Disposition Plan   Expected discharge: Expected discharge tomorrow pending stable hemoglobin; location TBD, ARU versus TCU versus home pending his participation with therapies  Entered: Gabriel Pace MD 02/21/2019, 9:02 AM       The patient's care was discussed with the Care Coordinator/ and Patient.        Gabriel Pace MD  Hospitalist Service  [facility]    ______________________________________________________________________    Interval History   Noted to have mild hypoglycemia, although apparently symptomatic. Reports watery stools occurring once nightly, associated with some urgency. Denies any abdominal pain, f/c. Denies any chest pain or shortness of breath. He continues to refuse to work with therapies as he feels more comfortable walking on carpet. No chest pain, shortness of breath or light-headedness with limited activity he is doing.     Data reviewed today: I reviewed all medications, new labs and imaging results over the last 24 hours. I personally reviewed no images or  EKG's today.    Physical Exam   Vital Signs: Temp: 98.2  F (36.8  C) Temp src: Oral BP: 157/66 Pulse: 77 Heart Rate: 78 Resp: 16 SpO2: 97 % O2 Device: None (Room air) Oxygen Delivery: 2 LPM  Weight: 158 lbs 0 oz    Constitutional: NAD, non-toxic appearing   Respiratory: Clear to auscultation bilaterally, good air movement bilaterally  Cardiovascular: RRR, no m/r/g.   GI: Soft, non-tender, non-distended. BS normoactive.   Skin/Integumen: Warm, dry.  Left leg braced, defer exam to surgery team   Other:     Data   Recent Labs   Lab 02/21/19  0654 02/20/19  0656 02/19/19  0640 02/18/19  1021   WBC 12.0* 14.7* 14.8* 15.4*   HGB 7.1* 8.2* 7.7* 7.6*   MCV 84 84  --  81   * 809*  --  595*    139  --  135   POTASSIUM 4.3 4.0  --  4.3   CHLORIDE 104 103  --  102   CO2 29 28  --  27   BUN 27 25  --  26   CR 1.68* 1.77*  --  1.54*   ANIONGAP 5 8  --  6   GABRIELA 8.0* 8.1*  --  8.0*   * 104* 131* 222*       No results found for this or any previous visit (from the past 24 hour(s)).  Medications       acetaminophen  975 mg Oral Q8H     aspirin  81 mg Oral Daily     enoxaparin  40 mg Subcutaneous Q24H     insulin degludec  14 Units Subcutaneous QAM     insulin lispro  1-10 Units Subcutaneous TID AC     insulin lispro  1-6 Units Subcutaneous 4x Daily w/meals     pregabalin  100 mg Oral TID     senna-docusate  1 tablet Oral BID    Or     senna-docusate  2 tablet Oral BID     sodium chloride (PF)  3 mL Intracatheter Q8H

## 2019-02-22 ENCOUNTER — APPOINTMENT (OUTPATIENT)
Dept: CARDIOLOGY | Facility: CLINIC | Age: 49
End: 2019-02-22
Attending: INTERNAL MEDICINE
Payer: COMMERCIAL

## 2019-02-22 ENCOUNTER — APPOINTMENT (OUTPATIENT)
Dept: PHYSICAL THERAPY | Facility: CLINIC | Age: 49
End: 2019-02-22
Payer: COMMERCIAL

## 2019-02-22 LAB
ABO + RH BLD: NORMAL
ABO + RH BLD: NORMAL
ANION GAP SERPL CALCULATED.3IONS-SCNC: 2 MMOL/L (ref 3–14)
BACTERIA SPEC CULT: NO GROWTH
BACTERIA SPEC CULT: NO GROWTH
BLD GP AB SCN SERPL QL: NORMAL
BLD PROD TYP BPU: NORMAL
BLD UNIT ID BPU: 0
BLD UNIT ID BPU: 0
BLOOD BANK CMNT PATIENT-IMP: NORMAL
BLOOD PRODUCT CODE: NORMAL
BLOOD PRODUCT CODE: NORMAL
BPU ID: NORMAL
BPU ID: NORMAL
BUN SERPL-MCNC: 30 MG/DL (ref 7–30)
CALCIUM SERPL-MCNC: 8.3 MG/DL (ref 8.5–10.1)
CHLORIDE SERPL-SCNC: 104 MMOL/L (ref 94–109)
CO2 SERPL-SCNC: 32 MMOL/L (ref 20–32)
CREAT SERPL-MCNC: 1.43 MG/DL (ref 0.66–1.25)
ERYTHROCYTE [DISTWIDTH] IN BLOOD BY AUTOMATED COUNT: 14.6 % (ref 10–15)
GFR SERPL CREATININE-BSD FRML MDRD: 58 ML/MIN/{1.73_M2}
GLUCOSE SERPL-MCNC: 167 MG/DL (ref 70–99)
HCT VFR BLD AUTO: 21.8 % (ref 40–53)
HGB BLD-MCNC: 6.9 G/DL (ref 13.3–17.7)
Lab: NORMAL
Lab: NORMAL
MCH RBC QN AUTO: 26.4 PG (ref 26.5–33)
MCHC RBC AUTO-ENTMCNC: 31.7 G/DL (ref 31.5–36.5)
MCV RBC AUTO: 84 FL (ref 78–100)
NUM BPU REQUESTED: 2
PLATELET # BLD AUTO: 785 10E9/L (ref 150–450)
POTASSIUM SERPL-SCNC: 4.4 MMOL/L (ref 3.4–5.3)
RBC # BLD AUTO: 2.61 10E12/L (ref 4.4–5.9)
SODIUM SERPL-SCNC: 138 MMOL/L (ref 133–144)
SPECIMEN EXP DATE BLD: NORMAL
SPECIMEN SOURCE: NORMAL
SPECIMEN SOURCE: NORMAL
TRANSFUSION STATUS PATIENT QL: NORMAL
WBC # BLD AUTO: 12.4 10E9/L (ref 4–11)

## 2019-02-22 PROCEDURE — 86850 RBC ANTIBODY SCREEN: CPT | Performed by: INTERNAL MEDICINE

## 2019-02-22 PROCEDURE — 80048 BASIC METABOLIC PNL TOTAL CA: CPT | Performed by: INTERNAL MEDICINE

## 2019-02-22 PROCEDURE — 25000132 ZZH RX MED GY IP 250 OP 250 PS 637: Performed by: INTERNAL MEDICINE

## 2019-02-22 PROCEDURE — 97530 THERAPEUTIC ACTIVITIES: CPT | Mod: GP | Performed by: PHYSICAL THERAPIST

## 2019-02-22 PROCEDURE — 93306 TTE W/DOPPLER COMPLETE: CPT

## 2019-02-22 PROCEDURE — 86923 COMPATIBILITY TEST ELECTRIC: CPT | Performed by: INTERNAL MEDICINE

## 2019-02-22 PROCEDURE — 36415 COLL VENOUS BLD VENIPUNCTURE: CPT | Performed by: INTERNAL MEDICINE

## 2019-02-22 PROCEDURE — 85027 COMPLETE CBC AUTOMATED: CPT | Performed by: INTERNAL MEDICINE

## 2019-02-22 PROCEDURE — 25000132 ZZH RX MED GY IP 250 OP 250 PS 637: Performed by: PHYSICIAN ASSISTANT

## 2019-02-22 PROCEDURE — P9016 RBC LEUKOCYTES REDUCED: HCPCS | Performed by: INTERNAL MEDICINE

## 2019-02-22 PROCEDURE — 97110 THERAPEUTIC EXERCISES: CPT | Mod: GP

## 2019-02-22 PROCEDURE — 97110 THERAPEUTIC EXERCISES: CPT | Mod: GP | Performed by: PHYSICAL THERAPIST

## 2019-02-22 PROCEDURE — 12000000 ZZH R&B MED SURG/OB

## 2019-02-22 PROCEDURE — 86900 BLOOD TYPING SEROLOGIC ABO: CPT | Performed by: INTERNAL MEDICINE

## 2019-02-22 PROCEDURE — 86901 BLOOD TYPING SEROLOGIC RH(D): CPT | Performed by: INTERNAL MEDICINE

## 2019-02-22 PROCEDURE — 25000128 H RX IP 250 OP 636: Performed by: PHYSICIAN ASSISTANT

## 2019-02-22 PROCEDURE — 93306 TTE W/DOPPLER COMPLETE: CPT | Mod: 26 | Performed by: INTERNAL MEDICINE

## 2019-02-22 PROCEDURE — 99233 SBSQ HOSP IP/OBS HIGH 50: CPT | Performed by: INTERNAL MEDICINE

## 2019-02-22 RX ORDER — LISINOPRIL 10 MG/1
10 TABLET ORAL DAILY
Status: DISCONTINUED | OUTPATIENT
Start: 2019-02-22 | End: 2019-02-22

## 2019-02-22 RX ORDER — MULTIPLE VITAMINS W/ MINERALS TAB 9MG-400MCG
1 TAB ORAL DAILY
Status: DISCONTINUED | OUTPATIENT
Start: 2019-02-22 | End: 2019-02-25 | Stop reason: HOSPADM

## 2019-02-22 RX ORDER — LABETALOL 20 MG/4 ML (5 MG/ML) INTRAVENOUS SYRINGE
10
Status: DISCONTINUED | OUTPATIENT
Start: 2019-02-22 | End: 2019-02-25 | Stop reason: HOSPADM

## 2019-02-22 RX ORDER — LABETALOL 100 MG/1
100 TABLET, FILM COATED ORAL EVERY 12 HOURS SCHEDULED
Status: DISCONTINUED | OUTPATIENT
Start: 2019-02-22 | End: 2019-02-22

## 2019-02-22 RX ADMIN — OXYCODONE HYDROCHLORIDE 10 MG: 5 TABLET ORAL at 15:32

## 2019-02-22 RX ADMIN — PREGABALIN 100 MG: 100 CAPSULE ORAL at 21:03

## 2019-02-22 RX ADMIN — OXYCODONE HYDROCHLORIDE 10 MG: 5 TABLET ORAL at 18:59

## 2019-02-22 RX ADMIN — ENOXAPARIN SODIUM 40 MG: 40 INJECTION SUBCUTANEOUS at 08:38

## 2019-02-22 RX ADMIN — INSULIN LISPRO 6 UNITS: 100 INJECTION, SOLUTION INTRAVENOUS; SUBCUTANEOUS at 19:39

## 2019-02-22 RX ADMIN — LOPERAMIDE HYDROCHLORIDE 2 MG: 2 CAPSULE ORAL at 21:03

## 2019-02-22 RX ADMIN — ASPIRIN 81 MG: 81 TABLET, COATED ORAL at 08:38

## 2019-02-22 RX ADMIN — LOPERAMIDE HYDROCHLORIDE 2 MG: 2 CAPSULE ORAL at 08:38

## 2019-02-22 RX ADMIN — INSULIN LISPRO 3 UNITS: 100 INJECTION, SOLUTION INTRAVENOUS; SUBCUTANEOUS at 14:42

## 2019-02-22 RX ADMIN — INSULIN LISPRO 3 UNITS: 100 INJECTION, SOLUTION INTRAVENOUS; SUBCUTANEOUS at 14:40

## 2019-02-22 RX ADMIN — LOPERAMIDE HYDROCHLORIDE 2 MG: 2 CAPSULE ORAL at 13:29

## 2019-02-22 RX ADMIN — INSULIN LISPRO 3 UNITS: 100 INJECTION, SOLUTION INTRAVENOUS; SUBCUTANEOUS at 08:21

## 2019-02-22 RX ADMIN — PREGABALIN 100 MG: 100 CAPSULE ORAL at 08:38

## 2019-02-22 RX ADMIN — OXYCODONE HYDROCHLORIDE 10 MG: 5 TABLET ORAL at 22:23

## 2019-02-22 ASSESSMENT — ACTIVITIES OF DAILY LIVING (ADL)
ADLS_ACUITY_SCORE: 14
ADLS_ACUITY_SCORE: 13
ADLS_ACUITY_SCORE: 14
ADLS_ACUITY_SCORE: 14
ADLS_ACUITY_SCORE: 13
ADLS_ACUITY_SCORE: 14

## 2019-02-22 NOTE — PROGRESS NOTES
X cover [id number]    Called for Hb 6.9 down from 7.1 yesterday    - I put in conditional transfusion order to transfuse for Hb <7

## 2019-02-22 NOTE — PROGRESS NOTES
"CLINICAL NUTRITION SERVICES  -  ASSESSMENT NOTE      Recommendations Ordered by Registered Dietitian (RD):   Medical Food Supplement - Will send Boost Glucose Control between meals.  Add Multivitamin   Malnutrition:   % Weight Loss:  Unable to assess - requesting daily weight checks  % Intake:  <75% for > 7 days (non-severe malnutrition)  Subcutaneous Fat Loss:  None observed  Muscle Loss:  None observed  Fluid Retention:  None noted    Malnutrition Diagnosis: Patient does not meet two of the above criteria necessary for diagnosing malnutrition     REASON FOR ASSESSMENT  Eduardo Walton is a 48 year old male seen by Registered Dietitian for LOS    NUTRITION HISTORY  - Unable to obtain nutrition history - Attempted to obtain history from pt, but pt very \"out of it\".  RN confirms pt has been groggy all morning.       2/18: BKA    CURRENT NUTRITION ORDERS  Diet Order:     Mod Consistent Carbohydrate     Current Intake/Tolerance:  Per Flowsheet, pt intake of % of 1-2 meals per day.  RN states pt received breakfast but barely ate a few bites. Per RN notes, pt denies N/V, but noted some diarrhea.     PHYSICAL FINDINGS  Observed  No nutrition-related physical findings observed  Obtained from Chart/Interdisciplinary Team  Per MD note, pale appearing and fatigued    ANTHROPOMETRICS  Height: 5' 8\"  Weight: 158 lbs 0 oz (71.7 kg)  Body mass index is 24.02 kg/m .  Weight Status:  Normal BMI  IBW: 154# (70 kg)  % IBW: 102%  Weight History: Unable to determine wt trend based on wt data. Wt appears to be self reported Requesting daily wts.   Wt Readings from Last 10 Encounters:   02/16/19 71.7 kg (158 lb)   02/10/19 77.8 kg (171 lb 8.3 oz)   02/01/19 71.7 kg (158 lb)   01/18/19 71.7 kg (158 lb)     LABS  2/5: A1C = 10.9  BG=     MEDICATIONS  Medications reviewed    ASSESSED NUTRITION NEEDS PER APPROVED PRACTICE GUIDELINES:    Dosing Weight 71.7 kg  Estimated Energy Needs: 9104-6930 kcals (25-30 Kcal/Kg)  Justification: " maintenance  Estimated Protein Needs:  grams protein (1.2-1.5 g pro/Kg)  Justification: post-op and wound healing    MALNUTRITION:  % Weight Loss:  Unable to assess - requesting daily weight checks  % Intake:  <75% for > 7 days (non-severe malnutrition)  Subcutaneous Fat Loss:  None observed  Muscle Loss:  None observed  Fluid Retention:  None noted    Malnutrition Diagnosis: Patient does not meet two of the above criteria necessary for diagnosing malnutrition    NUTRITION DIAGNOSIS:  Altered nutrition-related lab value related to poorly managed T1DM as evidenced by A1C = 10.9 (2/5).    NUTRITION INTERVENTIONS  Recommendations / Nutrition Prescription  Continue Mod CHO diet    Implementation  Nutrition education: Per Provider order if indicated   Medical Food Supplement - Will send Boost Glucose Control between meals.  Add Multivitamin    Nutrition Goals  Pt to consume 75% of meals and supplements.     MONITORING AND EVALUATION:  Progress towards goals will be monitored and evaluated per protocol and Practice Guidelines    Keara Maxwell  Dietetic Intern

## 2019-02-22 NOTE — PLAN OF CARE
Vitals stable. BP elevated but refusing lisinopril. Denies pain. Voiding on commode. Has had 2 diarrheal stools. Upset about the continual diarrhea even though antibx dc'd. Wants things done his way. Stable.

## 2019-02-22 NOTE — PLAN OF CARE
Pain managed with Oxycodone, patient requested Imodium for diarrhea, up with assist 1/walker/GB to [facility], patient refused the ordered amount of insulin.

## 2019-02-22 NOTE — PROGRESS NOTES
[facility]    Medicine Progress Note - Hospitalist Service       Date of Admission:  2/16/2019  Assessment & Plan   Eduardo Walton is a 48 year old Male with history of diabetes mellitus type 1 with complications of neuropathy, diabetic foot ulcers, nephropathy and retinopathy, chronic kidney disease stage II, recent hospitalization for diabetic ulcer with recommendation for BKA which patient declined who is admitted 2/16/2019 with progression of left lower extremity ulcer.     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 Foot XR demonstrated progression of gas in soft tissues with potentially slightly progressed findings for osteomyelitis. CRP markedly elevated at 230 with WBC of 23.5 (21.0 at time of discharge). Not septic / toxic appearing on admission. Vascular surgery involved last hospitalization, at which time it was felt he had adequate blood supply to heal a BKA.   - Routine postoperative cares per orthopedic surgery service  - Blood cultures 2/16 NGTD  Stopped  Zosyn on February 21 of 2019  - WBC trending down (slightly), afebrile    Diabetes mellitus, type 1, with complications of neuropathy, diabetic foot ulcers, nephropathy and retinopathy  Poorly controlled with HgbA1c 10.9% 2/5/19. Prior to admission on degludec 28 units in the morning and lispro 1 unit/2 grams of carbohydrate + 1 unit for every 50 over 200 sliding scale insulin, total daily dose up to 80 units.  - Continue prior to admission pregabalin  - Mild hypoglycemia 2/19/19, has not been receiving any mealtime insulin (he has been refusing, discussed with patient reasoning for ordering this). Decreased degludec to 18 units. Carbohydrate counting insulin remains ordered as well as sliding scale insulin. Insulin ordered as Humolog as he has been refusing to receive any Novolog.   - Patient refusing monitoring with hospital POCT,  following with patient's needleless monitor    Chronic kidney disease, stage II  Baseline creatinine 1.5-1.6.   - Creatinine increasing, UOP has been low, will restart IVF  - He has been refusing bladder scans. Have discussed with him risks of bladder and kidney injury with unrecognized retention, he has expressed understanding and continues to refuse  - Plan to discontinue antibiotics if orthopedic surgery agrees, as above  - Avoid nephrotoxins including NSAIDs  - BMP 2/21/19   Acute blood loss anemia secondary to surgery;  Patient's hemoglobin is down to 6.9 today  He is very symptomatic with fatigue and inability to participate with therapy so we will transfuse 2 units of PRBCs today  Also has 2+ murmur on exam .  will order transthoracic echocardiogram to assess valve function    Oppositional behavior  Very particular about cares, medications with refusal of some therapies/interventions. Therapy services inquired to RN about psychiatry consult. Unlikely this would be of benefit, he has been able to state risks of non-compliance and would not be able to force patient to comply with any therapies or non-psychiatric medications.  - Continue to encourage cares, offer patient choices and control over situation as best as able     Thrombocytosis  Recent Labs   Lab 02/22/19  0608 02/21/19  0654 02/20/19  0656 02/18/19  1021 02/17/19  0627   * 690* 809* 595* 621*     - Suspected reactive in setting of infection, although now increasing despite amputation   - CBC in AM  - Consider hematology/oncology consult if continues to increase    Coronary artery disease with history of NSTEMI s/p SUMANTH to pLAD  Stenting in 2010. Poor compliance with medications, currently off of beta-blocker, statin, aspirin. Discussed appropriate medical management of CAD and prior stent with patient at length 2/17/19, see previous notes. He refuses to start statin, beta blocker.  - Continue aspirin 81 mg daily  - He is willing to discuss  medication management with cardiology as an outpatient  2+ murmur on exam so an echocardiogram is ordered    Normocytic anemia  Baseline hemoglobin around 10 g/dl. Has declined since and including last hospitalization in early 2/2019, likely due to infection, blood draws and no surgery.   Recent Labs   Lab 02/22/19  0608 02/21/19  0654 02/20/19  0656 02/19/19  0640 02/18/19  1021   HGB 6.9* 7.1* 8.2* 7.7* 7.6*      - Monitor CBC    Diet: Moderate Consistent CHO Diet  Advance Diet as Tolerated    DVT Prophylaxis: Enoxaparin subcutaneous per orthopedic surgery   Martinez Catheter: not present  Code Status: Full Code      Disposition Plan   Expected discharge: Pending post-operative course, orthopedic surgery clearance   Entered: Codi Luevano MD 02/22/2019, 11:04 AM       The patient's care was discussed with the Bedside Nurse and Patient.        Codi Luevano MD  Hospitalist Service  [facility]    ______________________________________________________________________    Interval History   Hemoglobin is down to 6.9 today.  He complains of feeling fatigued and the inability to participate with therapy effectively.  2 units PRBCs transfusion will be given today.  denies any chest pain or shortness of breath.Data reviewed today: I reviewed all medications, new labs and imaging results over the last 24 hours.   I personally reviewed no images or EKG's today.    Physical Exam   Vital Signs: Temp: 98.5  F (36.9  C) Temp src: Oral BP: 155/68 Pulse: 76 Heart Rate: 76 Resp: 16 SpO2: 93 % O2 Device: None (Room air)    Weight: 158 lbs 0 oz    Constitutional: NAD, non-toxic appearing, pale appearing and fatigued  Respiratory: Clear to auscultation bilaterally, good air movement bilaterally  Cardiovascular: RRR, 2+ murmur present  GI: Soft, non-tender, non-distended. BS normoactive.   Skin/Integumen: Warm, dry.  Left leg wrapped, defer exam to surgery team   Other:     Data   Recent Labs   Lab 02/22/19  0608  02/21/19  0654 02/20/19  0656   WBC 12.4* 12.0* 14.7*   HGB 6.9* 7.1* 8.2*   MCV 84 84 84   * 690* 809*    138 139   POTASSIUM 4.4 4.3 4.0   CHLORIDE 104 104 103   CO2 32 29 28   BUN 30 27 25   CR 1.43* 1.68* 1.77*   ANIONGAP 2* 5 8   GABRIELA 8.3* 8.0* 8.1*   * 194* 104*       No results found for this or any previous visit (from the past 24 hour(s)).  Medications       aspirin  81 mg Oral Daily     enoxaparin  40 mg Subcutaneous Q24H     insulin degludec  14 Units Subcutaneous QAM     insulin lispro  1-10 Units Subcutaneous TID AC     insulin lispro  1-6 Units Subcutaneous 4x Daily w/meals     pregabalin  100 mg Oral TID     sodium chloride (PF)  3 mL Intracatheter Q8H

## 2019-02-22 NOTE — PROGRESS NOTES
Orthopedic Surgery  Eduardo Walton  2019  Admit Date:  2019  POD 4 Days Post-Op  S/P Procedure(s):  LEFT BELOW THE KNEE AMPUTATION    Patient resting comfortably in bed.  Still appearing ashen and pale  Pain controlled.  Tolerating oral intake.    Denies nausea or vomiting  Denies chest pain or shortness of breath  Hgb dropped from 7.1 to 6.9, awaiting transfusion    Alert and orient to person, place, and time.  Vital Sign Ranges  Temperature Temp  Av.2  F (36.8  C)  Min: 98.1  F (36.7  C)  Max: 98.2  F (36.8  C)   Blood pressure Systolic (24hrs), Av , Min:145 , Max:170        Diastolic (24hrs), Av, Min:67, Max:74      Pulse Pulse  Av  Min: 83  Max: 83   Respirations Resp  Avg: 15.8  Min: 15  Max: 16   Pulse oximetry SpO2  Av %  Min: 94 %  Max: 98 %       Flotech brace in place and appropriately worn  Able to SLR   Sensation intact  Denies phantom pain    Labs:  Recent Labs   Lab Test 19  0608 19  0654 19  0656   POTASSIUM 4.4 4.3 4.0     Recent Labs   Lab Test 19  0608 19  0654 19  0656   HGB 6.9* 7.1* 8.2*     No results for input(s): INR in the last 98274 hours.  Recent Labs   Lab Test 19  0608 19  0654 19  0656   * 690* 809*       A/P  1. Plan:   Continue Lovenox for DVT prophylaxis.     Mobilize with PT/OT    Non-WB left LE.     Continue current pain regiment.   Continue dressing changes and brace   Transfusion per hospitalist    2. Disposition   Anticipate d/c to ARU based on bed placement and progress with PT as well as medical clearance.    Yodit Barron PA-C

## 2019-02-22 NOTE — PROGRESS NOTES
Pt's echo showed LVH mild. With Dm and HTN systolics in the 160s offered him to be started on lisinopril 10mg Po daily and Pt refused to be started on BP medication. Said his BP usually in the 120s PTA    Codi Luevano MD

## 2019-02-22 NOTE — PLAN OF CARE
OT: Attempted treatment session. Pt reporting increase in fatigue with lower hemoglobin and requesting to hold session until after transfusion.

## 2019-02-22 NOTE — PLAN OF CARE
Discharge Planner PT   Patient plan for discharge: None stated this session.  Current status: Pt is supine upon arrival of therapist. Deferred OOB mobility d/t hemoglobin of 6.9 and pt report of fatigue. Pt agreeable to supine strengthening exercises after much encouragement. Provided much encouragement and education on role of PT during hospitalization, pt initially resistant to therapist's discussion of progression of PT and goals, although patient eventually agreeable to discuss.   Barriers to return to prior living situation: Level of assist, Decreased activity tolerance, Fall risk  Recommendations for discharge: ARC  Rationale for recommendations: Pt will benefit from continued skilled PT intervention at [facility] in order to improve activity tolerance, strength, and independence with mobility.       Entered by: Huma Thomson 02/22/2019 12:28 PM

## 2019-02-23 ENCOUNTER — APPOINTMENT (OUTPATIENT)
Dept: PHYSICAL THERAPY | Facility: CLINIC | Age: 49
End: 2019-02-23
Payer: COMMERCIAL

## 2019-02-23 ENCOUNTER — APPOINTMENT (OUTPATIENT)
Dept: OCCUPATIONAL THERAPY | Facility: CLINIC | Age: 49
End: 2019-02-23
Payer: COMMERCIAL

## 2019-02-23 LAB
ANION GAP SERPL CALCULATED.3IONS-SCNC: 6 MMOL/L (ref 3–14)
BUN SERPL-MCNC: 27 MG/DL (ref 7–30)
CALCIUM SERPL-MCNC: 8.5 MG/DL (ref 8.5–10.1)
CHLORIDE SERPL-SCNC: 104 MMOL/L (ref 94–109)
CO2 SERPL-SCNC: 28 MMOL/L (ref 20–32)
CREAT SERPL-MCNC: 1.18 MG/DL (ref 0.66–1.25)
ERYTHROCYTE [DISTWIDTH] IN BLOOD BY AUTOMATED COUNT: 15.3 % (ref 10–15)
GFR SERPL CREATININE-BSD FRML MDRD: 73 ML/MIN/{1.73_M2}
GLUCOSE BLDC GLUCOMTR-MCNC: 100 MG/DL (ref 70–99)
GLUCOSE BLDC GLUCOMTR-MCNC: 61 MG/DL (ref 70–99)
GLUCOSE SERPL-MCNC: 167 MG/DL (ref 70–99)
HCT VFR BLD AUTO: 28.8 % (ref 40–53)
HGB BLD-MCNC: 9.3 G/DL (ref 13.3–17.7)
MCH RBC QN AUTO: 27.4 PG (ref 26.5–33)
MCHC RBC AUTO-ENTMCNC: 32.3 G/DL (ref 31.5–36.5)
MCV RBC AUTO: 85 FL (ref 78–100)
PLATELET # BLD AUTO: 749 10E9/L (ref 150–450)
POTASSIUM SERPL-SCNC: 4.6 MMOL/L (ref 3.4–5.3)
RBC # BLD AUTO: 3.39 10E12/L (ref 4.4–5.9)
SODIUM SERPL-SCNC: 138 MMOL/L (ref 133–144)
WBC # BLD AUTO: 10.1 10E9/L (ref 4–11)

## 2019-02-23 PROCEDURE — 12000000 ZZH R&B MED SURG/OB

## 2019-02-23 PROCEDURE — 97530 THERAPEUTIC ACTIVITIES: CPT | Mod: GP | Performed by: PHYSICAL THERAPIST

## 2019-02-23 PROCEDURE — 25000132 ZZH RX MED GY IP 250 OP 250 PS 637: Performed by: INTERNAL MEDICINE

## 2019-02-23 PROCEDURE — 99232 SBSQ HOSP IP/OBS MODERATE 35: CPT | Performed by: INTERNAL MEDICINE

## 2019-02-23 PROCEDURE — 00000146 ZZHCL STATISTIC GLUCOSE BY METER IP

## 2019-02-23 PROCEDURE — 97110 THERAPEUTIC EXERCISES: CPT | Mod: GP | Performed by: PHYSICAL THERAPIST

## 2019-02-23 PROCEDURE — 85027 COMPLETE CBC AUTOMATED: CPT | Performed by: INTERNAL MEDICINE

## 2019-02-23 PROCEDURE — 25000132 ZZH RX MED GY IP 250 OP 250 PS 637: Performed by: PHYSICIAN ASSISTANT

## 2019-02-23 PROCEDURE — 97535 SELF CARE MNGMENT TRAINING: CPT | Mod: GO

## 2019-02-23 PROCEDURE — 36415 COLL VENOUS BLD VENIPUNCTURE: CPT | Performed by: INTERNAL MEDICINE

## 2019-02-23 PROCEDURE — 97116 GAIT TRAINING THERAPY: CPT | Mod: GP | Performed by: PHYSICAL THERAPIST

## 2019-02-23 PROCEDURE — 25000128 H RX IP 250 OP 636: Performed by: PHYSICIAN ASSISTANT

## 2019-02-23 PROCEDURE — 97110 THERAPEUTIC EXERCISES: CPT | Mod: GO

## 2019-02-23 PROCEDURE — 80048 BASIC METABOLIC PNL TOTAL CA: CPT | Performed by: INTERNAL MEDICINE

## 2019-02-23 PROCEDURE — 99207 ZZC CDG-MDM COMPONENT: MEETS LOW - DOWN CODED: CPT | Performed by: INTERNAL MEDICINE

## 2019-02-23 RX ORDER — PREGABALIN 100 MG/1
100 CAPSULE ORAL 3 TIMES DAILY
Qty: 90 CAPSULE | Refills: 0 | Status: SHIPPED | DISCHARGE
Start: 2019-02-23 | End: 2019-10-21

## 2019-02-23 RX ORDER — LISINOPRIL 10 MG/1
10 TABLET ORAL DAILY
Qty: 30 TABLET | Refills: 0 | DISCHARGE
Start: 2019-02-24 | End: 2019-02-25

## 2019-02-23 RX ORDER — LOPERAMIDE HCL 2 MG
2 CAPSULE ORAL 4 TIMES DAILY PRN
DISCHARGE
Start: 2019-02-23 | End: 2019-04-01

## 2019-02-23 RX ORDER — LISINOPRIL 10 MG/1
10 TABLET ORAL DAILY
Status: DISCONTINUED | OUTPATIENT
Start: 2019-02-23 | End: 2019-02-24

## 2019-02-23 RX ADMIN — PREGABALIN 100 MG: 100 CAPSULE ORAL at 09:14

## 2019-02-23 RX ADMIN — OXYCODONE HYDROCHLORIDE 10 MG: 5 TABLET ORAL at 09:14

## 2019-02-23 RX ADMIN — OXYCODONE HYDROCHLORIDE 10 MG: 5 TABLET ORAL at 23:24

## 2019-02-23 RX ADMIN — ASPIRIN 81 MG: 81 TABLET, COATED ORAL at 09:14

## 2019-02-23 RX ADMIN — LOPERAMIDE HYDROCHLORIDE 2 MG: 2 CAPSULE ORAL at 09:14

## 2019-02-23 RX ADMIN — OXYCODONE HYDROCHLORIDE 10 MG: 5 TABLET ORAL at 02:37

## 2019-02-23 RX ADMIN — PREGABALIN 100 MG: 100 CAPSULE ORAL at 23:17

## 2019-02-23 RX ADMIN — LISINOPRIL 10 MG: 10 TABLET ORAL at 10:21

## 2019-02-23 RX ADMIN — LOPERAMIDE HYDROCHLORIDE 2 MG: 2 CAPSULE ORAL at 23:24

## 2019-02-23 RX ADMIN — ENOXAPARIN SODIUM 40 MG: 40 INJECTION SUBCUTANEOUS at 09:14

## 2019-02-23 RX ADMIN — PREGABALIN 100 MG: 100 CAPSULE ORAL at 16:08

## 2019-02-23 RX ADMIN — LOPERAMIDE HYDROCHLORIDE 2 MG: 2 CAPSULE ORAL at 16:29

## 2019-02-23 RX ADMIN — OXYCODONE HYDROCHLORIDE 10 MG: 5 TABLET ORAL at 16:16

## 2019-02-23 RX ADMIN — MULTIPLE VITAMINS W/ MINERALS TAB 1 TABLET: TAB at 09:14

## 2019-02-23 RX ADMIN — INSULIN LISPRO 7 UNITS: 100 INJECTION, SOLUTION INTRAVENOUS; SUBCUTANEOUS at 09:13

## 2019-02-23 ASSESSMENT — ACTIVITIES OF DAILY LIVING (ADL)
ADLS_ACUITY_SCORE: 13

## 2019-02-23 NOTE — PLAN OF CARE
Patient A&Ox4, VSS on RA. Except BP slightly elevated. Baseline neuropathy. Up with 1 & walker to BSC. Dressing CDI. Void & had x1 Loose stool. Oxycodone for pain. Blood glucose self check. No sliding coverage needed for tonight. Iv saline locked.  Will continue monitoring.

## 2019-02-23 NOTE — PROGRESS NOTES
Orthopedic Surgery  Eduardo Walton  2019  Admit Date:  2019  POD 5 Days Post-Op  S/P Procedure(s):  LEFT BELOW THE KNEE AMPUTATION    Patient resting comfortably in bed. Has multiple questions about stump protector and the plan for prosthesis fitting in the future. Pain is controlled, mild phantom pain. 2 units PRBC transfused overnight, Hgb up to 9.3 this AM. No acute concerns from ortho standpoint.     Alert and orient to person, place, and time.  Vital Sign Ranges  Temperature Temp  Av.2  F (36.8  C)  Min: 98.1  F (36.7  C)  Max: 98.2  F (36.8  C)   Blood pressure Systolic (24hrs), Av , Min:145 , Max:170        Diastolic (24hrs), Av, Min:67, Max:74      Pulse Pulse  Av  Min: 83  Max: 83   Respirations Resp  Avg: 15.8  Min: 15  Max: 16   Pulse oximetry SpO2  Av %  Min: 94 %  Max: 98 %       Flotech brace in place and appropriately worn  Able to SLR   Sensation intact    Labs:  Recent Labs   Lab Test 19  0608 19  0654 19  0656   POTASSIUM 4.4 4.3 4.0     Recent Labs   Lab Test 19  0608 19  0654 19  0656   HGB 6.9* 7.1* 8.2*     No results for input(s): INR in the last 00943 hours.  Recent Labs   Lab Test 19  0608 19  0654 19  0656   * 690* 809*       A/P  1. Plan:   Continue Lovenox for DVT prophylaxis.     Mobilize with PT/OT    Non-WB left LE.     Continue current pain regimen.   Continue dressing changes and brace   I will reach out to [facility] to see patient for further discussion of flotech use and other questions.     2. Disposition   Anticipate d/c to ARU based on bed placement and progress with PT as well as medical clearance. Ortho discontinue orders have been placed.     Milagros Soliz 9:49 AM 19

## 2019-02-23 NOTE — CONSULTS
Care Transition Initial Assessment -      Met with: Patient and Family  (wife)  Active Problems:    Left foot infection       DATA  Lives With: spouse   Living Arrangements: house  Quality of Family Relationships: supportive, involved  Description of Support System: Supportive, Involved  Who is your support system?: Wife  Support Assessment: Adequate family and caregiver support.   Identified issues/concerns regarding health management:       Quality of Family Relationships: supportive, involved     Per social work consult for discharge planning.  Patient was admitted on 2-16-19 with a left foot infection.  The tentative date of discharge is 2-25-19.  Reviewed chart and spoke with patient and wife regarding discharge plans.  Per patient and family report, they live in a townhouse with 31 steps inside to get to the bedroom and bathroom.  Patient is independent at baseline.  Patient's wife assists as needed.  Reviewed the therapy discharge recommendations of acute rehab on discharge and patient and wife are in agreement.  Referral sent, via discharge on the double, to check bed availability.  Received a call back from [facility].  They state that patient would be unable to come until Monday as there need to get pre-auth from his insurance.  Patient has also been refusing meds and therapy.  He would need to be participating before they can accept him.  They will continue to follow patient's progress before they pursue pre-auth from insurance on Monday.  Patient is asking for transport to be arranged when discharge is known.    ASSESSMENT  Cognitive Status:  awake, alert and oriented  Concerns to be addressed: discharge planning, acute rehab on discharge.     PLAN  Financial costs for the patient includes N/A.  Patient given options and choices for discharge acute rehab on discharge.  Patient is in agreement with a referral to be made to [facility].  Patient/family is agreeable to the plan?  Yes  Patient Goals and  Preferences: [facility].  Patient anticipates discharging to:  [facility].    Will confirm a bed, continue to follow, and assist with a safe discharge plan.    ANTONIO Cho, [facility]  [phone]

## 2019-02-23 NOTE — PROGRESS NOTES
Vitals stable. Tolerated 2 units of blood. Up to commode to void. Has had 3 diarrheal stools today. Taking oxycodone for pain. Refused lisinopril and multivitamin. Stable.

## 2019-02-23 NOTE — PLAN OF CARE
Discharge Planner OT   Patient plan for discharge: ARU  Current status: Pt completed BSC transfer with CGA and verbal cues. Pt needing increased edu on strategies for clothing management while standing with UE support. Pt exhibiting anxiety throughout session, needing things to be very particular for set-up; edu on importance of OT and PT and how they work together to help regain ind.   Pt compelted 5 UBE sitting EOB with green theraband. 10 reps of each: tricep punches, tricep push down, bicep curls, external rotation. Pt edu to complete 12-15 reps 3x/day. Demonstrated understanding, handout left in room.   Barriers to return to prior living situation: Level of assist, Decreased activity tolerance, Fall risk  Recommendations for discharge: ARC  Rationale for recommendations: pt needing increased asst for ADLs and functional mobility, will benefit from intensive OT/PT to regain strength and ind prior to disch home       Entered by: Ailyn Meza 02/23/2019 11:19 AM

## 2019-02-23 NOTE — PROGRESS NOTES
[facility]    Medicine Progress Note - Hospitalist Service       Date of Admission:  2/16/2019  Assessment & Plan   Eduardo Walton is a 48 year old Male with history of diabetes mellitus type 1 with complications of neuropathy, diabetic foot ulcers, nephropathy and retinopathy, chronic kidney disease stage II, recent hospitalization for diabetic ulcer with recommendation for BKA which patient declined who is admitted 2/16/2019 with progression of left lower extremity ulcer.     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 Foot XR demonstrated progression of gas in soft tissues with potentially slightly progressed findings for osteomyelitis. CRP markedly elevated at 230 with WBC of 23.5 (21.0 at time of discharge). Not septic / toxic appearing on admission. Vascular surgery involved last hospitalization, at which time it was felt he had adequate blood supply to heal a BKA.   - Routine postoperative cares per orthopedic surgery service  - Blood cultures 2/16 NGTD  Stopped  Zosyn on February 21 of 2019      Diabetes mellitus, type 1, with complications of neuropathy, diabetic foot ulcers, nephropathy and retinopathy  Poorly controlled with HgbA1c 10.9% 2/5/19. Prior to admission on degludec 28 units in the morning and lispro 1 unit/2 grams of carbohydrate + 1 unit for every 50 over 200 sliding scale insulin, total daily dose up to 80 units.  - Continue prior to admission pregabalin  - Mild hypoglycemia 2/19/19, has not been receiving any mealtime insulin (he has been refusing, discussed with patient reasoning for ordering this). Decreased degludec to 18 units. Carbohydrate counting insulin remains ordered as well as sliding scale insulin. Insulin ordered as Humolog as he has been refusing to receive any Novolog.   - Patient refusing monitoring with hospital POCT, following with patient's needleless  monitor    Chronic kidney disease, stage II  Baseline creatinine 1.5-1.6.   - Creatinine increasing, UOP has been low, will restart IVF  - He has been refusing bladder scans. Have discussed with him risks of bladder and kidney injury with unrecognized retention, he has expressed understanding and continues to refuse  - Plan to discontinue antibiotics if orthopedic surgery agrees, as above  - Avoid nephrotoxins including NSAIDs  - BMP 2/21/19   Acute blood loss anemia secondary to surgery;  Patient's hemoglobin is down to 6.9 today  He is very symptomatic with fatigue and inability to participate with therapy so was transfused 2 units of PRBCs on 2/22  hgb stable at 9.9 today   Mild LVH on echo with HTN:  Started on lisinopril 10mg PO daily       Oppositional behavior  Very particular about cares, medications with refusal of some therapies/interventions. Therapy services inquired to RN about psychiatry consult. Unlikely this would be of benefit, he has been able to state risks of non-compliance and would not be able to force patient to comply with any therapies or non-psychiatric medications.  - Continue to encourage cares, offer patient choices and control over situation as best as able     Thrombocytosis  Recent Labs   Lab 02/23/19  0628 02/22/19  0608 02/21/19  0654 02/20/19  0656 02/18/19  1021   * 785* 690* 809* 595*     - Suspected reactive in setting of infection, although now increasing despite amputation   - CBC in AM  - Consider hematology/oncology consult if continues to increase    Coronary artery disease with history of NSTEMI s/p SUMANTH to pLAD  Stenting in 2010. Poor compliance with medications, currently off of beta-blocker, statin, aspirin. Discussed appropriate medical management of CAD and prior stent with patient at length 2/17/19, see previous notes. He refuses to start statin, beta blocker.  - Continue aspirin 81 mg daily  - He is willing to discuss medication management with cardiology as  an outpatient  Echo showed mild LVH EF 60%   Started on lisinopril as above     Normocytic anemia  Baseline hemoglobin around 10 g/dl. Has declined since and including last hospitalization in early 2/2019, likely due to infection, blood draws and no surgery.   Recent Labs   Lab 02/23/19  0628 02/22/19  0608 02/21/19  0654 02/20/19  0656 02/19/19  0640   HGB 9.3* 6.9* 7.1* 8.2* 7.7*      - Monitor CBC    Diet: Moderate Consistent CHO Diet  Advance Diet as Tolerated  Snacks/Supplements Adult: Boost Glucose Control; Between Meals    DVT Prophylaxis: Enoxaparin subcutaneous per orthopedic surgery   Martinez Catheter: not present  Code Status: Full Code      Disposition Plan   Expected discharge: to ARU when bed is available   Entered: Codi Luevano MD 02/23/2019, 1:35 PM       The patient's care was discussed with the Bedside Nurse and Patient.        Codi Luevano MD  Hospitalist Service  [facility]    ______________________________________________________________________    Interval History   Feels well. Pain well controlled   I personally reviewed no images or EKG's today.    Physical Exam   Vital Signs: Temp: 97.8  F (36.6  C) Temp src: Oral BP: 166/77 Pulse: 82 Heart Rate: 70 Resp: 16 SpO2: 96 % O2 Device: None (Room air)    Weight: 158 lbs 0 oz    Constitutional: NAD, non-toxic appearing, pale appearing and fatigued  Respiratory: Clear to auscultation bilaterally, good air movement bilaterally  Cardiovascular: RRR, 2+ murmur present  GI: Soft, non-tender, non-distended. BS normoactive.   Skin/Integumen: Warm, dry.  Left leg wrapped, defer exam to surgery team   Other:     Data   Recent Labs   Lab 02/23/19  0628 02/22/19  0608 02/21/19  0654   WBC 10.1 12.4* 12.0*   HGB 9.3* 6.9* 7.1*   MCV 85 84 84   * 785* 690*    138 138   POTASSIUM 4.6 4.4 4.3   CHLORIDE 104 104 104   CO2 28 32 29   BUN 27 30 27   CR 1.18 1.43* 1.68*   ANIONGAP 6 2* 5   GABRIELA 8.5 8.3* 8.0*   * 167* 194*       No  results found for this or any previous visit (from the past 24 hour(s)).  Medications       aspirin  81 mg Oral Daily     enoxaparin  40 mg Subcutaneous Q24H     insulin degludec  14 Units Subcutaneous QAM     insulin lispro  1-10 Units Subcutaneous TID AC     insulin lispro  1-6 Units Subcutaneous 4x Daily w/meals     lisinopril  10 mg Oral Daily     multivitamin w/minerals  1 tablet Oral Daily     pregabalin  100 mg Oral TID     sodium chloride (PF)  3 mL Intracatheter Q8H

## 2019-02-23 NOTE — PLAN OF CARE
Discharge Planner PT   Patient plan for discharge: None stated this session.   Current status: Pt in supine upon arrival of therapist, noted patient in better spirits and motivated to participate in PT session. Patient performed supine-sit, SBA. Sit <> stand and ambulation of 5 feet forward/backwards x 2 repetitions with FWW and Bekah. Pt sitting up in chair at end of session.   Barriers to return to prior living situation: Level of assist, Decreased activity tolerance, Pain  Recommendations for discharge: ARC  Rationale for recommendations: Pt will benefit from continued skilled PT intervention at [facility] in order to increase independence and safety with mobility. Pt is motivated to participate and return to prior living environment.        Entered by: Huma Thomson 02/23/2019 1:23 PM

## 2019-02-23 NOTE — PLAN OF CARE
A&Ox4. VSS ex hypertensive, started lisinopril, bp decreased.  Room air.  C/o phantom/LLE pain, utilizing PRN oxycodone.  Has chronic pain, comfortable at 4/10.  Numbness/tingling to extremities, baseline.  Swelling to right hand, scrotum and penis.  Instructed and demonstrated patient and spouse to use towel to elevated scrotum and penis when in bed with rolled towel, monitor swelling, hospitalist aware.  Patient does self blood sugar scans, sliding scale and CHO coverage utilized.  Bgm did drop to 61 at lunch time, patient refused all interventions and requested to eat chocolate spouse had provided and orange juice that was requested, now @ 100.  Hgb 9.3.  Dressing to left BKA changed.  NWB to LLE, stump protector on.  Had BM today.  Plan to discharge tomorrow to ARU.

## 2019-02-24 ENCOUNTER — APPOINTMENT (OUTPATIENT)
Dept: PHYSICAL THERAPY | Facility: CLINIC | Age: 49
End: 2019-02-24
Payer: COMMERCIAL

## 2019-02-24 ENCOUNTER — APPOINTMENT (OUTPATIENT)
Dept: OCCUPATIONAL THERAPY | Facility: CLINIC | Age: 49
End: 2019-02-24
Payer: COMMERCIAL

## 2019-02-24 LAB
CREAT SERPL-MCNC: 1.23 MG/DL (ref 0.66–1.25)
GFR SERPL CREATININE-BSD FRML MDRD: 70 ML/MIN/{1.73_M2}
GLUCOSE BLDC GLUCOMTR-MCNC: 244 MG/DL (ref 70–99)
GLUCOSE BLDC GLUCOMTR-MCNC: 368 MG/DL (ref 70–99)
PLATELET # BLD AUTO: 753 10E9/L (ref 150–450)

## 2019-02-24 PROCEDURE — 25000132 ZZH RX MED GY IP 250 OP 250 PS 637: Performed by: INTERNAL MEDICINE

## 2019-02-24 PROCEDURE — 97530 THERAPEUTIC ACTIVITIES: CPT | Mod: GO

## 2019-02-24 PROCEDURE — 97116 GAIT TRAINING THERAPY: CPT | Mod: GP | Performed by: PHYSICAL THERAPIST

## 2019-02-24 PROCEDURE — 36415 COLL VENOUS BLD VENIPUNCTURE: CPT | Performed by: PHYSICIAN ASSISTANT

## 2019-02-24 PROCEDURE — 97110 THERAPEUTIC EXERCISES: CPT | Mod: GP | Performed by: PHYSICAL THERAPIST

## 2019-02-24 PROCEDURE — 99232 SBSQ HOSP IP/OBS MODERATE 35: CPT | Performed by: INTERNAL MEDICINE

## 2019-02-24 PROCEDURE — 82565 ASSAY OF CREATININE: CPT | Performed by: PHYSICIAN ASSISTANT

## 2019-02-24 PROCEDURE — 12000000 ZZH R&B MED SURG/OB

## 2019-02-24 PROCEDURE — 85049 AUTOMATED PLATELET COUNT: CPT | Performed by: PHYSICIAN ASSISTANT

## 2019-02-24 PROCEDURE — 25000128 H RX IP 250 OP 636: Performed by: PHYSICIAN ASSISTANT

## 2019-02-24 PROCEDURE — 97110 THERAPEUTIC EXERCISES: CPT | Mod: GO

## 2019-02-24 PROCEDURE — 99207 ZZC CDG-MDM COMPONENT: MEETS LOW - DOWN CODED: CPT | Performed by: INTERNAL MEDICINE

## 2019-02-24 PROCEDURE — 00000146 ZZHCL STATISTIC GLUCOSE BY METER IP

## 2019-02-24 PROCEDURE — 97530 THERAPEUTIC ACTIVITIES: CPT | Mod: GP | Performed by: PHYSICAL THERAPIST

## 2019-02-24 PROCEDURE — 25000132 ZZH RX MED GY IP 250 OP 250 PS 637: Performed by: PHYSICIAN ASSISTANT

## 2019-02-24 RX ORDER — LISINOPRIL 10 MG/1
10 TABLET ORAL ONCE
Status: COMPLETED | OUTPATIENT
Start: 2019-02-24 | End: 2019-02-24

## 2019-02-24 RX ORDER — LISINOPRIL 20 MG/1
20 TABLET ORAL DAILY
Status: DISCONTINUED | OUTPATIENT
Start: 2019-02-25 | End: 2019-02-25

## 2019-02-24 RX ADMIN — INSULIN LISPRO 2 UNITS: 100 INJECTION, SOLUTION INTRAVENOUS; SUBCUTANEOUS at 19:00

## 2019-02-24 RX ADMIN — OXYCODONE HYDROCHLORIDE 10 MG: 5 TABLET ORAL at 04:42

## 2019-02-24 RX ADMIN — PREGABALIN 100 MG: 100 CAPSULE ORAL at 21:44

## 2019-02-24 RX ADMIN — ASPIRIN 81 MG: 81 TABLET, COATED ORAL at 08:17

## 2019-02-24 RX ADMIN — PREGABALIN 100 MG: 100 CAPSULE ORAL at 08:16

## 2019-02-24 RX ADMIN — PREGABALIN 100 MG: 100 CAPSULE ORAL at 16:14

## 2019-02-24 RX ADMIN — INSULIN LISPRO 1 UNITS: 100 INJECTION, SOLUTION INTRAVENOUS; SUBCUTANEOUS at 08:23

## 2019-02-24 RX ADMIN — LISINOPRIL 10 MG: 10 TABLET ORAL at 11:15

## 2019-02-24 RX ADMIN — OXYCODONE HYDROCHLORIDE 5 MG: 5 TABLET ORAL at 14:23

## 2019-02-24 RX ADMIN — INSULIN LISPRO 1 UNITS: 100 INJECTION, SOLUTION INTRAVENOUS; SUBCUTANEOUS at 11:48

## 2019-02-24 RX ADMIN — OXYCODONE HYDROCHLORIDE 10 MG: 5 TABLET ORAL at 18:01

## 2019-02-24 RX ADMIN — OXYCODONE HYDROCHLORIDE 10 MG: 5 TABLET ORAL at 08:17

## 2019-02-24 RX ADMIN — ENOXAPARIN SODIUM 40 MG: 40 INJECTION SUBCUTANEOUS at 08:17

## 2019-02-24 RX ADMIN — MULTIPLE VITAMINS W/ MINERALS TAB 1 TABLET: TAB at 08:16

## 2019-02-24 RX ADMIN — LISINOPRIL 10 MG: 10 TABLET ORAL at 08:16

## 2019-02-24 ASSESSMENT — ACTIVITIES OF DAILY LIVING (ADL)
ADLS_ACUITY_SCORE: 13

## 2019-02-24 NOTE — PLAN OF CARE
Patient progressing per plan of care, oxycodone for pain, voids adequate, scrotum edema improving.

## 2019-02-24 NOTE — PLAN OF CARE
Alert and oriented x 4. Up with assist of 1. Dressing cdi, cms unchanged. Voids in urinal. Oxycodone for pain.  Towel under scrotum and penis for swelling.

## 2019-02-24 NOTE — PLAN OF CARE
Pt remains A/O. Up with A2 walker GB. IvSL. Oxycodone for pain. +2 scrotal edema. Mod cho diet-type I diabetes. Continue to monitor.

## 2019-02-24 NOTE — PROGRESS NOTES
[facility]    Medicine Progress Note - Hospitalist Service       Date of Admission:  2/16/2019  Assessment & Plan   Eduardo Walton is a 48 year old Male with history of diabetes mellitus type 1 with complications of neuropathy, diabetic foot ulcers, nephropathy and retinopathy, chronic kidney disease stage II, recent hospitalization for diabetic ulcer with recommendation for BKA which patient declined who is admitted 2/16/2019 with progression of left lower extremity ulcer.     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 Foot XR demonstrated progression of gas in soft tissues with potentially slightly progressed findings for osteomyelitis. CRP markedly elevated at 230 with WBC of 23.5 (21.0 at time of discharge). Not septic / toxic appearing on admission. Vascular surgery involved last hospitalization, at which time it was felt he had adequate blood supply to heal a BKA.   - Routine postoperative cares per orthopedic surgery service  - Blood cultures 2/16 NGTD  Stopped  Zosyn on February 21 of 2019      Diabetes mellitus, type 1, with complications of neuropathy, diabetic foot ulcers, nephropathy and retinopathy  Poorly controlled with HgbA1c 10.9% 2/5/19. Prior to admission on degludec 28 units in the morning and lispro 1 unit/2 grams of carbohydrate + 1 unit for every 50 over 200 sliding scale insulin, total daily dose up to 80 units.  - Continue prior to admission pregabalin  - Mild hypoglycemia 2/19/19, has not been receiving any mealtime insulin (he has been refusing, discussed with patient reasoning for ordering this). Decreased degludec to 18 units. Carbohydrate counting insulin remains ordered as well as sliding scale insulin. Insulin ordered as Humolog as he has been refusing to receive any Novolog.   - Patient refusing monitoring with hospital POCT, following with patient's needleless  monitor    Chronic kidney disease, stage II  Baseline creatinine 1.5-1.6.   - Creatinine increasing, UOP has been low, will restart IVF  - He has been refusing bladder scans. Have discussed with him risks of bladder and kidney injury with unrecognized retention, he has expressed understanding and continues to refuse  - Plan to discontinue antibiotics if orthopedic surgery agrees, as above  - Avoid nephrotoxins including NSAIDs    Acute blood loss anemia secondary to surgery;  Patient's hemoglobin is down to 6.9 today  He is very symptomatic with fatigue and inability to participate with therapy so was transfused 2 units of PRBCs on 2/22  hgb stable at 9.9    Mild LVH on echo with HTN:  Started on lisinopril 10mg PO daily   bp still high in the 150s today increase lsinopril to 20mg PO daily       Oppositional behavior  Very particular about cares, medications with refusal of some therapies/interventions. Therapy services inquired to RN about psychiatry consult. Unlikely this would be of benefit, he has been able to state risks of non-compliance and would not be able to force patient to comply with any therapies or non-psychiatric medications.  - Continue to encourage cares, offer patient choices and control over situation as best as able     Thrombocytosis  Recent Labs   Lab 02/24/19  0650 02/23/19  0628 02/22/19  0608 02/21/19  0654 02/20/19  0656   * 749* 785* 690* 809*     - Suspected reactive in setting of infection, although now increasing despite amputation   - CBC in AM  - Consider hematology/oncology consult if continues to increase    Coronary artery disease with history of NSTEMI s/p SUMANTH to pLAD  Stenting in 2010. Poor compliance with medications, currently off of beta-blocker, statin, aspirin. Discussed appropriate medical management of CAD and prior stent with patient at length 2/17/19, see previous notes. He refuses to start statin, beta blocker.  - Continue aspirin 81 mg daily  - He is willing to  discuss medication management with cardiology as an outpatient  Echo showed mild LVH EF 60%   Started on lisinopril as above     Normocytic anemia  Baseline hemoglobin around 10 g/dl. Has declined since and including last hospitalization in early 2/2019, likely due to infection, blood draws and no surgery.   Recent Labs   Lab 02/23/19  0628 02/22/19  0608 02/21/19  0654 02/20/19  0656 02/19/19  0640   HGB 9.3* 6.9* 7.1* 8.2* 7.7*      - Monitor CBC    Diet: Moderate Consistent CHO Diet  Advance Diet as Tolerated  Snacks/Supplements Adult: Boost Glucose Control; Between Meals    DVT Prophylaxis: Enoxaparin subcutaneous per orthopedic surgery   Martinez Catheter: not present  Code Status: Full Code      Disposition Plan   Expected discharge: to ARU when bed is available   Entered: Codi Luevano MD 02/24/2019, 12:49 PM       The patient's care was discussed with the Bedside Nurse and Patient.        Codi Luevano MD  Hospitalist Service  [facility]    ______________________________________________________________________    Interval History   Feels well. Pain well controlled   I personally reviewed no images or EKG's today.    Physical Exam   Vital Signs: Temp: 98  F (36.7  C) Temp src: Oral BP: 158/75 Pulse: 82 Heart Rate: 75 Resp: 16 SpO2: 95 % O2 Device: None (Room air)    Weight: 158 lbs 0 oz    Constitutional: NAD, non-toxic appearing  Respiratory: Clear to auscultation bilaterally, good air movement bilaterally  Cardiovascular: RRR, 2+ murmur present  GI: Soft, non-tender, non-distended. BS normoactive.   Skin/Integumen: Warm, dry.  Left leg wrapped, defer exam to surgery team   Other:     Data   Recent Labs   Lab 02/24/19  0650 02/23/19  0628 02/22/19  0608 02/21/19  0654   WBC  --  10.1 12.4* 12.0*   HGB  --  9.3* 6.9* 7.1*   MCV  --  85 84 84   * 749* 785* 690*   NA  --  138 138 138   POTASSIUM  --  4.6 4.4 4.3   CHLORIDE  --  104 104 104   CO2  --  28 32 29   BUN  --  27 30 27   CR 1.23  1.18 1.43* 1.68*   ANIONGAP  --  6 2* 5   GABRIELA  --  8.5 8.3* 8.0*   GLC  --  167* 167* 194*       No results found for this or any previous visit (from the past 24 hour(s)).  Medications       aspirin  81 mg Oral Daily     enoxaparin  40 mg Subcutaneous Q24H     insulin degludec  14 Units Subcutaneous QAM     insulin lispro  1-10 Units Subcutaneous TID AC     insulin lispro  1-6 Units Subcutaneous 4x Daily w/meals     [START ON 2/25/2019] lisinopril  20 mg Oral Daily     multivitamin w/minerals  1 tablet Oral Daily     pregabalin  100 mg Oral TID     sodium chloride (PF)  3 mL Intracatheter Q8H

## 2019-02-24 NOTE — PLAN OF CARE
Discharge Planner OT   Patient plan for discharge: ARF  Current status: Pt supine in bed at start of session on phone with mother, agreeable to OT session. Requiring extensive edu on role of Ot, POC, and goals of therapy. Pt needing a lot of encouragement to engage with therapy; refusing OOB activity this date as pt was tired and sore in RLE and wanting to save energy for PT appt.   Pt completed R LB exercises with gait belt for support, single leg raises and abduction to help stretch hamstring and improve mobility in RLE. Completed 4 UB exercises with green theraband, 10 reps each. Pt with moderate carry over from last session, requiring verbal and visual cues for proper form.  Barriers to return to prior living situation: decreased ind, pain, decreased functional mobility, level of asst  Recommendations for discharge: ARF  Rationale for recommendations: Pt is below baseline in I/ADLs and functional mobility and will benefit from intensive therapy to regain strength and mobility for eventual return home.       Entered by: Ailyn Meza 02/24/2019 9:49 AM

## 2019-02-24 NOTE — PLAN OF CARE
Discharge Planner PT   Patient plan for discharge: ARC  Current status: Pt in supine upon arrival of therapist, pt continues to demonstrate motivation to participate in PT session and progress towards independence. Pt performed supine-sit with SBA. Sit <> stand with FWW and CGA-Bekah, noted pt demonstrates increased difficulty standing from lower surfaces. Pt ambulated 8 feet with FWW and CGA, hop-to gait pattern with frequent standing rest breaks secondary to fatigue. Pt sitting up in chair at end of session.   Barriers to return to prior living situation: Decreased activity tolerance, Level of assist  Recommendations for discharge: ARC  Rationale for recommendations: Pt will benefit from continued therapy in order to increase independence and safety with mobility.        Entered by: Huma Thomson 02/24/2019 1:11 PM

## 2019-02-25 ENCOUNTER — APPOINTMENT (OUTPATIENT)
Dept: OCCUPATIONAL THERAPY | Facility: CLINIC | Age: 49
End: 2019-02-25
Payer: COMMERCIAL

## 2019-02-25 ENCOUNTER — APPOINTMENT (OUTPATIENT)
Dept: PHYSICAL THERAPY | Facility: CLINIC | Age: 49
End: 2019-02-25
Payer: COMMERCIAL

## 2019-02-25 ENCOUNTER — HOSPITAL ENCOUNTER (INPATIENT)
Facility: CLINIC | Age: 49
LOS: 7 days | Discharge: HOME HEALTH | End: 2019-03-04
Attending: PHYSICAL MEDICINE & REHABILITATION | Admitting: PHYSICAL MEDICINE & REHABILITATION
Payer: COMMERCIAL

## 2019-02-25 VITALS
BODY MASS INDEX: 23.95 KG/M2 | WEIGHT: 158 LBS | HEART RATE: 75 BPM | OXYGEN SATURATION: 94 % | SYSTOLIC BLOOD PRESSURE: 155 MMHG | RESPIRATION RATE: 16 BRPM | DIASTOLIC BLOOD PRESSURE: 71 MMHG | TEMPERATURE: 98.7 F | HEIGHT: 68 IN

## 2019-02-25 DIAGNOSIS — S88.112A AMPUTATION OF LEFT LOWER EXTREMITY BELOW KNEE (H): ICD-10-CM

## 2019-02-25 DIAGNOSIS — M86.9 OSTEOMYELITIS OF LEFT FOOT, UNSPECIFIED TYPE (H): ICD-10-CM

## 2019-02-25 DIAGNOSIS — Z89.512 HX OF BKA, LEFT (H): Primary | ICD-10-CM

## 2019-02-25 DIAGNOSIS — E10.42 TYPE 1 DIABETES MELLITUS WITH DIABETIC POLYNEUROPATHY (H): ICD-10-CM

## 2019-02-25 LAB
ANION GAP SERPL CALCULATED.3IONS-SCNC: 7 MMOL/L (ref 3–14)
BUN SERPL-MCNC: 28 MG/DL (ref 7–30)
CALCIUM SERPL-MCNC: 9 MG/DL (ref 8.5–10.1)
CHLORIDE SERPL-SCNC: 101 MMOL/L (ref 94–109)
CO2 SERPL-SCNC: 30 MMOL/L (ref 20–32)
CREAT SERPL-MCNC: 1.23 MG/DL (ref 0.66–1.25)
ERYTHROCYTE [DISTWIDTH] IN BLOOD BY AUTOMATED COUNT: 15.7 % (ref 10–15)
GFR SERPL CREATININE-BSD FRML MDRD: 70 ML/MIN/{1.73_M2}
GLUCOSE BLDC GLUCOMTR-MCNC: 109 MG/DL (ref 70–99)
GLUCOSE BLDC GLUCOMTR-MCNC: 118 MG/DL (ref 70–99)
GLUCOSE BLDC GLUCOMTR-MCNC: 193 MG/DL (ref 70–99)
GLUCOSE SERPL-MCNC: 246 MG/DL (ref 70–99)
HCT VFR BLD AUTO: 30.4 % (ref 40–53)
HGB BLD-MCNC: 9.6 G/DL (ref 13.3–17.7)
MCH RBC QN AUTO: 27.4 PG (ref 26.5–33)
MCHC RBC AUTO-ENTMCNC: 31.6 G/DL (ref 31.5–36.5)
MCV RBC AUTO: 87 FL (ref 78–100)
PLATELET # BLD AUTO: 847 10E9/L (ref 150–450)
POTASSIUM SERPL-SCNC: 4.4 MMOL/L (ref 3.4–5.3)
RBC # BLD AUTO: 3.5 10E12/L (ref 4.4–5.9)
SODIUM SERPL-SCNC: 138 MMOL/L (ref 133–144)
WBC # BLD AUTO: 12.4 10E9/L (ref 4–11)

## 2019-02-25 PROCEDURE — 12800006 ZZH R&B REHAB

## 2019-02-25 PROCEDURE — 25000132 ZZH RX MED GY IP 250 OP 250 PS 637: Performed by: INTERNAL MEDICINE

## 2019-02-25 PROCEDURE — 97116 GAIT TRAINING THERAPY: CPT | Mod: GP | Performed by: PHYSICAL THERAPIST

## 2019-02-25 PROCEDURE — 25000131 ZZH RX MED GY IP 250 OP 636 PS 637: Performed by: STUDENT IN AN ORGANIZED HEALTH CARE EDUCATION/TRAINING PROGRAM

## 2019-02-25 PROCEDURE — 25000132 ZZH RX MED GY IP 250 OP 250 PS 637: Performed by: PHYSICAL MEDICINE & REHABILITATION

## 2019-02-25 PROCEDURE — 80048 BASIC METABOLIC PNL TOTAL CA: CPT | Performed by: INTERNAL MEDICINE

## 2019-02-25 PROCEDURE — 25000128 H RX IP 250 OP 636: Performed by: PHYSICIAN ASSISTANT

## 2019-02-25 PROCEDURE — 00000146 ZZHCL STATISTIC GLUCOSE BY METER IP

## 2019-02-25 PROCEDURE — 36415 COLL VENOUS BLD VENIPUNCTURE: CPT | Performed by: INTERNAL MEDICINE

## 2019-02-25 PROCEDURE — 85027 COMPLETE CBC AUTOMATED: CPT | Performed by: INTERNAL MEDICINE

## 2019-02-25 PROCEDURE — 25000132 ZZH RX MED GY IP 250 OP 250 PS 637: Performed by: PHYSICIAN ASSISTANT

## 2019-02-25 PROCEDURE — 99239 HOSP IP/OBS DSCHRG MGMT >30: CPT | Performed by: INTERNAL MEDICINE

## 2019-02-25 PROCEDURE — 97110 THERAPEUTIC EXERCISES: CPT | Mod: GO | Performed by: OCCUPATIONAL THERAPY ASSISTANT

## 2019-02-25 RX ORDER — NALOXONE HYDROCHLORIDE 0.4 MG/ML
.1-.4 INJECTION, SOLUTION INTRAMUSCULAR; INTRAVENOUS; SUBCUTANEOUS
Status: DISCONTINUED | OUTPATIENT
Start: 2019-02-25 | End: 2019-03-04 | Stop reason: HOSPADM

## 2019-02-25 RX ORDER — DEXTROSE MONOHYDRATE 25 G/50ML
25-50 INJECTION, SOLUTION INTRAVENOUS
Status: DISCONTINUED | OUTPATIENT
Start: 2019-02-25 | End: 2019-02-28

## 2019-02-25 RX ORDER — DOCUSATE SODIUM 100 MG/1
100 CAPSULE, LIQUID FILLED ORAL 2 TIMES DAILY PRN
Status: DISCONTINUED | OUTPATIENT
Start: 2019-02-25 | End: 2019-03-04 | Stop reason: HOSPADM

## 2019-02-25 RX ORDER — AMLODIPINE BESYLATE 5 MG/1
5 TABLET ORAL DAILY
Qty: 30 TABLET | Refills: 0 | Status: ON HOLD | DISCHARGE
Start: 2019-02-25 | End: 2019-03-04

## 2019-02-25 RX ORDER — DEXTROSE MONOHYDRATE 25 G/50ML
25-50 INJECTION, SOLUTION INTRAVENOUS
Status: DISCONTINUED | OUTPATIENT
Start: 2019-02-25 | End: 2019-03-04 | Stop reason: HOSPADM

## 2019-02-25 RX ORDER — LISINOPRIL 5 MG/1
5 TABLET ORAL DAILY
Qty: 30 TABLET | Refills: 0 | Status: ON HOLD | DISCHARGE
Start: 2019-02-25 | End: 2019-03-04

## 2019-02-25 RX ORDER — NICOTINE POLACRILEX 4 MG
15-30 LOZENGE BUCCAL
Status: DISCONTINUED | OUTPATIENT
Start: 2019-02-25 | End: 2019-02-28

## 2019-02-25 RX ORDER — NICOTINE POLACRILEX 4 MG
15-30 LOZENGE BUCCAL
Status: DISCONTINUED | OUTPATIENT
Start: 2019-02-25 | End: 2019-03-04 | Stop reason: HOSPADM

## 2019-02-25 RX ORDER — ACETAMINOPHEN 325 MG/1
650 TABLET ORAL EVERY 4 HOURS PRN
Status: DISCONTINUED | OUTPATIENT
Start: 2019-02-25 | End: 2019-03-04 | Stop reason: HOSPADM

## 2019-02-25 RX ORDER — ASPIRIN 81 MG/1
81 TABLET ORAL DAILY
Status: DISCONTINUED | OUTPATIENT
Start: 2019-02-26 | End: 2019-03-04 | Stop reason: HOSPADM

## 2019-02-25 RX ORDER — BISACODYL 10 MG
10 SUPPOSITORY, RECTAL RECTAL DAILY PRN
Status: DISCONTINUED | OUTPATIENT
Start: 2019-02-25 | End: 2019-03-04 | Stop reason: HOSPADM

## 2019-02-25 RX ORDER — ONDANSETRON 4 MG/1
4 TABLET, ORALLY DISINTEGRATING ORAL EVERY 6 HOURS PRN
Status: DISCONTINUED | OUTPATIENT
Start: 2019-02-25 | End: 2019-03-04 | Stop reason: HOSPADM

## 2019-02-25 RX ORDER — LISINOPRIL 5 MG/1
5 TABLET ORAL DAILY
Status: DISCONTINUED | OUTPATIENT
Start: 2019-02-26 | End: 2019-03-04 | Stop reason: HOSPADM

## 2019-02-25 RX ORDER — LISINOPRIL 5 MG/1
5 TABLET ORAL DAILY
Status: DISCONTINUED | OUTPATIENT
Start: 2019-02-25 | End: 2019-02-25 | Stop reason: HOSPADM

## 2019-02-25 RX ORDER — AMLODIPINE BESYLATE 5 MG/1
5 TABLET ORAL DAILY
Status: DISCONTINUED | OUTPATIENT
Start: 2019-02-25 | End: 2019-02-25 | Stop reason: HOSPADM

## 2019-02-25 RX ORDER — LOPERAMIDE HCL 2 MG
2 CAPSULE ORAL 4 TIMES DAILY PRN
Status: DISCONTINUED | OUTPATIENT
Start: 2019-02-25 | End: 2019-03-04 | Stop reason: HOSPADM

## 2019-02-25 RX ORDER — PREGABALIN 100 MG/1
100 CAPSULE ORAL 3 TIMES DAILY
Status: DISCONTINUED | OUTPATIENT
Start: 2019-02-25 | End: 2019-03-04 | Stop reason: HOSPADM

## 2019-02-25 RX ORDER — OXYCODONE HYDROCHLORIDE 5 MG/1
5-10 TABLET ORAL EVERY 4 HOURS PRN
Status: DISCONTINUED | OUTPATIENT
Start: 2019-02-25 | End: 2019-03-04 | Stop reason: HOSPADM

## 2019-02-25 RX ORDER — AMLODIPINE BESYLATE 5 MG/1
5 TABLET ORAL DAILY
Status: DISCONTINUED | OUTPATIENT
Start: 2019-02-26 | End: 2019-02-26

## 2019-02-25 RX ORDER — SENNOSIDES 8.6 MG
2 TABLET ORAL DAILY PRN
Status: DISCONTINUED | OUTPATIENT
Start: 2019-02-25 | End: 2019-03-04 | Stop reason: HOSPADM

## 2019-02-25 RX ADMIN — MULTIPLE VITAMINS W/ MINERALS TAB 1 TABLET: TAB at 08:37

## 2019-02-25 RX ADMIN — OXYCODONE HYDROCHLORIDE 10 MG: 5 TABLET ORAL at 08:37

## 2019-02-25 RX ADMIN — INSULIN LISPRO 6 UNITS: 100 INJECTION, SOLUTION INTRAVENOUS; SUBCUTANEOUS at 08:45

## 2019-02-25 RX ADMIN — OXYCODONE HYDROCHLORIDE 10 MG: 5 TABLET ORAL at 13:54

## 2019-02-25 RX ADMIN — PREGABALIN 100 MG: 100 CAPSULE ORAL at 08:37

## 2019-02-25 RX ADMIN — LISINOPRIL 5 MG: 5 TABLET ORAL at 13:18

## 2019-02-25 RX ADMIN — PREGABALIN 100 MG: 100 CAPSULE ORAL at 21:42

## 2019-02-25 RX ADMIN — INSULIN LISPRO 8 UNITS: 100 INJECTION, SOLUTION INTRAVENOUS; SUBCUTANEOUS at 19:46

## 2019-02-25 RX ADMIN — LOPERAMIDE HYDROCHLORIDE 2 MG: 2 CAPSULE ORAL at 08:48

## 2019-02-25 RX ADMIN — PREGABALIN 100 MG: 100 CAPSULE ORAL at 18:13

## 2019-02-25 RX ADMIN — ASPIRIN 81 MG: 81 TABLET, COATED ORAL at 08:37

## 2019-02-25 RX ADMIN — ENOXAPARIN SODIUM 40 MG: 40 INJECTION SUBCUTANEOUS at 08:40

## 2019-02-25 RX ADMIN — INSULIN LISPRO 5 UNITS: 100 INJECTION, SOLUTION INTRAVENOUS; SUBCUTANEOUS at 12:17

## 2019-02-25 RX ADMIN — OXYCODONE HYDROCHLORIDE 10 MG: 5 TABLET ORAL at 21:42

## 2019-02-25 RX ADMIN — INSULIN DEGLUDEC INJECTION 6 UNITS: 100 INJECTION, SOLUTION SUBCUTANEOUS at 12:12

## 2019-02-25 ASSESSMENT — ACTIVITIES OF DAILY LIVING (ADL)
ADLS_ACUITY_SCORE: 13
FALL_HISTORY_WITHIN_LAST_SIX_MONTHS: NO
COGNITION: 0 - NO COGNITION ISSUES REPORTED
SWALLOWING: 0-->SWALLOWS FOODS/LIQUIDS WITHOUT DIFFICULTY
TOILETING: 0-->INDEPENDENT
AMBULATION: 0-->INDEPENDENT
BATHING: 0-->INDEPENDENT
ADLS_ACUITY_SCORE: 13
WHICH_OF_THE_ABOVE_FUNCTIONAL_RISKS_HAD_A_RECENT_ONSET_OR_CHANGE?: AMBULATION;TRANSFERRING;TOILETING;BATHING;DRESSING
DRESS: 0-->INDEPENDENT
ADLS_ACUITY_SCORE: 13
ADLS_ACUITY_SCORE: 13
RETIRED_COMMUNICATION: 0-->UNDERSTANDS/COMMUNICATES WITHOUT DIFFICULTY
TRANSFERRING: 0-->INDEPENDENT
RETIRED_EATING: 0-->INDEPENDENT

## 2019-02-25 ASSESSMENT — MIFFLIN-ST. JEOR: SCORE: 1668.71

## 2019-02-25 NOTE — PROGRESS NOTES
KATIANA  D: Received call from [facility]U-they were able to get insurance auth. They are requesting ride be arranged in hopes they can take him today. KATIANA called [facility] and set-up transport via w/c at 1400. KATIANA will wait to hear if pt is cleared for discharge today.     Mala ALVAREZ, [facility]     ADDENDUM @[id number]: ARU can take pt for today. Pt will be leaving via  w/c at 1400. Discharge orders are complete. Pt aware and in agreement with discharge plan.

## 2019-02-25 NOTE — DISCHARGE SUMMARY
[facility]    Discharge Summary  Hospitalist    Date of Admission:  2/16/2019  Date of Discharge:  2/25/2019  Discharging Provider: Jorge Headley MD    Discharge Diagnoses      Diabetic foot infection with gas gangrene   S/P Procedure(s):  LEFT BELOW THE KNEE AMPUTATION      History of Present Illness   Mr. Eduardo Walton is a pleasant 48-year-old  male with type 1 diabetes mellitus, diabetic nephropathy and chronic diabetic ulcer of the left foot.  He was recently hospitalized with a left foot necrotic ulcer and osteomyelitis of the calcaneus and first great toe, and he underwent I and D of the necrotic heel ulcer.  JADEN studies were done and they were normal.  It was suspected that he has diabetic microvascular disease.  Vascular Surgery consulted.  Podiatry consulted and Podiatry recommended a below-knee amputation.  But during that hospitalization, the patient was not ready for the surgery, so he got discharged on oral Augmentin.  Since discharge, the patient has been having intermittent low-grade fevers up to 101 degrees Fahrenheit and his left foot is looking worse today.  Also, he was sweating a lot.  He had 3 episodes of sweating in the last 24 hours overnight, so he presented into the Emergency Room and at this time he thinks he is ready to have the surgery.  In the Emergency Room, he was evaluated by Dr. Javier.  He was given 2 liters of IV Ringer's lactate and a dose of IV Zosyn and clindamycin was given.  During the last hospitalization, the patient's creatinine got a little worse from a baseline of 1.2-1.7, thought to be secondary to vancomycin.  So vancomycin was discontinued.  So no further doses of vancomycin were given today.  He does have a history of coronary artery disease and had a stent placed in 2010.  He denies having any chest pain or shortness of breath with exertion.  No other complaints today.         Hospital Course   Eduardo Walton was admitted on  2/16/2019.  The following problems were addressed during his hospitalization:    Active Problems:    Left foot infection    Eduardo Walton is a 48 year old Male with history of diabetes mellitus type 1 with complications of neuropathy, diabetic foot ulcers, nephropathy and retinopathy, chronic kidney disease stage II, recent hospitalization for diabetic ulcer with recommendation for BKA which patient declined who is admitted 2/16/2019 with progression of left lower extremity ulcer.      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 Foot XR demonstrated progression of gas in soft tissues with potentially slightly progressed findings for osteomyelitis. CRP markedly elevated at 230 with WBC of 23.5 (21.0 at time of discharge). Not septic / toxic appearing on admission. Vascular surgery involved last hospitalization, at which time it was felt he had adequate blood supply to heal a BKA.   - Routine postoperative cares per orthopedic surgery service  - Blood cultures 2/16 NGTD  Stopped  Zosyn on February 21 of 2019  -doing well per surgery.   - surgery follow up  - discharge to ARU  -per ortho surgery on day of discharge:   Continue Lovenox for DVT prophylaxis.                Mobilize with PT/OT               Non-WB left LE with flotech on at all times.                Continue current pain regimen              Change dressing every other day            Diabetes mellitus, type 1, with complications of neuropathy, diabetic foot ulcers, nephropathy and retinopathy  Poorly controlled with HgbA1c 10.9% 2/5/19. Prior to admission on degludec 28 units in the morning and lispro 1 unit/2 grams of carbohydrate + 1 unit for every 50 over 200 sliding scale insulin, total daily dose up to 80 units.  - Continue prior to admission pregabalin  - Mild hypoglycemia 2/19/19, has not been receiving any mealtime insulin (he has been refusing,  discussed with patient reasoning for ordering this). Decreased degludec to 18 units. Carbohydrate counting insulin remains ordered as well as sliding scale insulin. Insulin ordered as Humolog as he has been refusing to receive any Novolog.   - Patient refusing monitoring with hospital POCT, following with patient's needleless monitor  -at time of discharge pt to discharge on ISS with 1 unit per 2 grams and ISS along with Tresiba 20 units qam.  range on 14 units tresiba. Increased to 20 units day of discharge.  Adjust at ARU     Chronic kidney disease, stage II  Baseline creatinine 1.5-1.6.   - Creatinine increasing, UOP has been low, will restart IVF  - He has been refusing bladder scans. Have discussed with him risks of bladder and kidney injury with unrecognized retention, he has expressed understanding and continues to refuse  - Plan to discontinue antibiotics if orthopedic surgery agrees, as above  - Avoid nephrotoxins including NSAIDs     Acute blood loss anemia secondary to surgery;  Patient's hemoglobin is down to 6.9  He is very symptomatic with fatigue and inability to participate with therapy so was transfused 2 units of PRBCs on 2/22  hgb stable in 9 range since blood transfusion 2/22  No bleeding       Thrombocytopenia  Elevated with infection and surgery.  Iron defic/blood loss anemia may be contributing factor as well.   Non focal neuro exam. No blurry vision day of discharge- suspect 2/2 oxycodone      Mild LVH on echo with HTN:       Pt started on lisinopril in hospital. Not taking at home since 2011. Thought to need 20mg mg every day at least but pt thinking may be causing sense of mild blurry vision- oxycodone more likely.   - pt agrees to lisinopril 5mg every day and reassess daily at ARU. Will start norvasc 5mg every day. To be given prior to discharge. Denies dizziness       Oppositional behavior  Very particular about cares, medications with refusal of some therapies/interventions.  Therapy services inquired to RN about psychiatry consult. Unlikely this would be of benefit, he has been able to state risks of non-compliance and would not be able to force patient to comply with any therapies or non-psychiatric medications.  - Continue to encourage cares, offer patient choices and control over situation as best as able      Thrombocytosis          Recent Labs   Lab 02/24/19  0650 02/23/19  0628 02/22/19  0608 02/21/19  0654 02/20/19  0656   * 749* 785* 690* 809*      - Suspected reactive in setting of infection, although now increasing despite amputation   - CBC in AM  - Consider hematology/oncology consult if continues to increase.  Follow cbc with plat count at ARU.  Likely reactionary     Coronary artery disease with history of NSTEMI s/p SUMANTH to pLAD  Stenting in 2010. Poor compliance with medications, currently off of beta-blocker, statin, aspirin. Discussed appropriate medical management of CAD and prior stent with patient at length 2/17/19, see previous notes. He refuses to start statin, beta blocker.  - Continue aspirin 81 mg daily  -no cardiopulmonary symptoms.   - He is willing to discuss medication management with cardiology as an outpatient  Echo showed mild LVH EF 60%   Pt started on lisinopril in hospital. Not taking at home since 2011. Thought to need 20mg mg every day at least but pt thinking may be causing sense of mild blurry vision- oxycodone more likely.   - pt agrees to lisinopril 5mg every day and reassess daily at ARU. Will start norvasc 5mg every day. To be given prior to discharge. Denies dizziness     Normocytic anemia  Baseline hemoglobin around 10 g/dl. Has declined since and including last hospitalization in early 2/2019, likely due to infection, blood draws and no surgery.           Recent Labs   Lab 02/23/19  0628 02/22/19  0608 02/21/19  0654 02/20/19  0656 02/19/19  0640   HGB 9.3* 6.9* 7.1* 8.2* 7.7*      - Monitor CBC, wbc 12 day of discharge. Pt  afebrile     Diet: Moderate Consistent CHO Diet  Advance Diet as Tolerated  Snacks/Supplements Adult: Boost Glucose Control; Between Meals    DVT Prophylaxis: Enoxaparin subcutaneous per orthopedic surgery. Cont at ARU  Martinez Catheter: not present  Code Status: Full Code         Disposition Plan. Discharge to ARU     Jorge Headley MD    Significant Results and Procedures   See hospital course    Pending Results   These results will be followed up by hospitalist  Unresulted Labs Ordered in the Past 30 Days of this Admission     Date and Time Order Name Status Description    1/18/2019 0911 T4 FREE In process           Code Status   Full Code       Primary Care Physician   Efren Conway    Physical Exam   Temp: 98.7  F (37.1  C) Temp src: Oral BP: 161/79 Pulse: 75 Heart Rate: 74 Resp: 16 SpO2: 94 % O2 Device: None (Room air)    Vitals:    02/16/19 1504   Weight: 71.7 kg (158 lb)     Vital Signs with Ranges  Temp:  [98  F (36.7  C)-98.7  F (37.1  C)] 98.7  F (37.1  C)  Pulse:  [68-75] 75  Heart Rate:  [68-75] 74  Resp:  [16] 16  BP: (136-161)/(63-79) 161/79  SpO2:  [93 %-97 %] 94 %  I/O last 3 completed shifts:  In: -   Out: 875.5 [Urine:875.5]    Constitutional: nad, nontoxic, appears comfortable  Eyes: nl sclera, eomi, perrla  ENT: nl op  Respiratory: CTAB  Cardiovascular: RRR no r/g/m  GI: nl BS, soft, nt, nd  Skin: no rash or edema  Musculoskeletal: left below the knee amputation  Neurologic: nl speech and mentation, extrem strength 5/5 extrem X 3, LLE not assessed, CN 2-12 grossly intact, face symmetric tongue midline  Neuropsychiatric: nl affect    Discharge Disposition   Discharged to ARU  Condition at discharge: Good    Consultations This Hospital Stay   PHARMACY TO DOSE VANCO  ORTHOPEDIC SURGERY IP CONSULT  VASCULAR SURGERY IP CONSULT  OCCUPATIONAL THERAPY ADULT IP CONSULT  PHYSICAL THERAPY ADULT IP CONSULT  PROSTHETICS IP CONSULT  PHYSICAL THERAPY ADULT IP CONSULT  OCCUPATIONAL THERAPY ADULT IP  CONSULT  SOCIAL WORK IP CONSULT    Time Spent on this Encounter   IJorge, personally saw the patient today and spent greater than 30 minutes discharging this patient.    Discharge Orders      General info for SNF    Length of Stay Estimate: Short Term Care: Estimated # of Days <30  Condition at Discharge: Improving  Level of care:skilled   Rehabilitation Potential: Good  Admission H&P remains valid and up-to-date: Yes  Recent Chemotherapy: N/A  Use Nursing Home Standing Orders: Yes     Mantoux instructions    Give two-step Mantoux (PPD) Per Facility Policy Yes     Reason for your hospital stay    Left below the knee amputation     Wound care    Site:   Left below the knee amputation stump  Instructions:  Change daily and as needed     Activity - Up with assistive device     Weight bearing status    Non-WB on the left stump     Follow Up and recommended labs and tests    -Follow up with Dr. Berman in 2 weeks.  No follow up labs or test are needed prior to visit.  Call [facility] with any questions and also for appointment [phone] - bmp in 3 days  - when checking blood pressure to ensure accurate reading please have patient sitting calmly for 5 minutes before checking. Check 2 in a row.  -check CBC with platelet count in 3 days     Physical Therapy Adult Consult    Evaluate and treat as clinically indicated.    Reason:  Left below the knee amputation     Occupational Therapy Adult Consult    Evaluate and treat as clinically indicated.    Reason:  Left below the knee amputation     Fall precautions     Advance Diet as Tolerated    Follow this diet upon discharge: Orders Placed This Encounter      Moderate Consistent CHO Diet     Discharge Medications   Current Discharge Medication List      START taking these medications    Details   acetaminophen (TYLENOL) 325 MG tablet Take 2 tablets (650 mg) by mouth every 4 hours as needed for other (multimodal surgical pain management along with NSAIDS and opioid  medication as indicated based on pain control and physical function.)  Qty: 40 tablet, Refills: 0    Associated Diagnoses: Osteomyelitis of left foot, unspecified type (H); Amputation of left lower extremity below knee (H)      amLODIPine (NORVASC) 5 MG tablet Take 1 tablet (5 mg) by mouth daily  Qty: 30 tablet, Refills: 0    Associated Diagnoses: Benign essential hypertension      aspirin (ASA) 81 MG EC tablet Take 1 tablet (81 mg) by mouth daily  Qty: 30 tablet, Refills: 0    Associated Diagnoses: Osteomyelitis of left foot, unspecified type (H); Amputation of left lower extremity below knee (H)      enoxaparin (LOVENOX) 40 MG/0.4ML syringe Inject 0.4 mLs (40 mg) Subcutaneous every 24 hours for 10 days  Qty: 4 mL, Refills: 0    Associated Diagnoses: Osteomyelitis of left foot, unspecified type (H); Amputation of left lower extremity below knee (H)      !! insulin lispro (HUMALOG PEN) 100 UNIT/ML pen Inject 1-6 Units Subcutaneous 4 times daily (with meals and nightly)  Qty: 7.2 mL, Refills: 0    Associated Diagnoses: Diabetic ulcer of toe of left foot associated with type 1 diabetes mellitus, with necrosis of bone (H)      lisinopril (PRINIVIL/ZESTRIL) 5 MG tablet Take 1 tablet (5 mg) by mouth daily  Qty: 30 tablet, Refills: 0    Associated Diagnoses: Benign essential hypertension      loperamide (IMODIUM) 2 MG capsule Take 1 capsule (2 mg) by mouth 4 times daily as needed for diarrhea    Associated Diagnoses: Diabetic ulcer of toe of left foot associated with type 1 diabetes mellitus, with necrosis of bone (H)      ondansetron (ZOFRAN-ODT) 4 MG ODT tab Take 1 tablet (4 mg) by mouth every 6 hours as needed for nausea or vomiting  Qty: 10 tablet, Refills: 0    Associated Diagnoses: Osteomyelitis of left foot, unspecified type (H); Amputation of left lower extremity below knee (H)      oxyCODONE (ROXICODONE) 5 MG tablet Take 1-2 tablets (5-10 mg) by mouth every 4 hours as needed for moderate to severe pain (One tab  for moderate pain, two tabs for severe pain)  Qty: 30 tablet, Refills: 0    Associated Diagnoses: Osteomyelitis of left foot, unspecified type (H); Amputation of left lower extremity below knee (H)      senna-docusate (SENOKOT-S/PERICOLACE) 8.6-50 MG tablet Take 2 tablets by mouth 2 times daily for 8 days  Qty: 30 tablet, Refills: 0    Associated Diagnoses: Osteomyelitis of left foot, unspecified type (H); Amputation of left lower extremity below knee (H)       !! - Potential duplicate medications found. Please discuss with provider.      CONTINUE these medications which have CHANGED    Details   insulin degludec (TRESIBA FLEXTOUCH) 100 UNIT/ML pen Inject 20 Units Subcutaneous every morning  Qty: 4.2 mL, Refills: 0    Associated Diagnoses: Diabetic ulcer of toe of left foot associated with type 1 diabetes mellitus, with necrosis of bone (H)      pregabalin (LYRICA) 100 MG capsule Take 1 capsule (100 mg) by mouth 3 times daily  Qty: 90 capsule, Refills: 0    Associated Diagnoses: Diabetic ulcer of toe of left foot associated with type 1 diabetes mellitus, with necrosis of bone (H)         CONTINUE these medications which have NOT CHANGED    Details   !! insulin lispro (HUMALOG KWIKPEN) 100 UNIT/ML pen Inject 1 unit/2 grams carb, total daily dose up to 80 units. also sliding scale - 1 unit for BG every 50 over 200      Continuous Blood Gluc Sensor (ActivehoursYLE COLIN 14 DAY SENSOR) [facility] [place] WITH 14 DAY COLIN SYSTEM. CHANGE SENSOR EVERY 14 DAYS.  Refills: 3      order for DME Equipment being ordered: knee roller, will need for 4 months. Non weight bearing left foot.  Qty: 1 Device, Refills: 0    Associated Diagnoses: Diabetic ulcer of left heel associated with type 1 diabetes mellitus, unspecified ulcer stage (H); Left foot infection; Diabetic ulcer of toe of left foot associated with type 1 diabetes mellitus, with necrosis of muscle (H)       !! - Potential duplicate medications found. Please discuss with provider.       STOP taking these medications       amoxicillin-clavulanate (AUGMENTIN) 875-125 MG tablet Comments:   Reason for Stopping:             Allergies   No Known Allergies  Data   Most Recent 3 CBC's:  Recent Labs   Lab Test 02/25/19  0644 02/24/19  0650 02/23/19  0628 02/22/19  0608   WBC 12.4*  --  10.1 12.4*   HGB 9.6*  --  9.3* 6.9*   MCV 87  --  85 84   * 753* 749* 785*      Most Recent 3 BMP's:  Recent Labs   Lab Test 02/25/19  0644 02/24/19  0650 02/23/19  0628 02/22/19  0608     --  138 138   POTASSIUM 4.4  --  4.6 4.4   CHLORIDE 101  --  104 104   CO2 30  --  28 32   BUN 28  --  27 30   CR 1.23 1.23 1.18 1.43*   ANIONGAP 7  --  6 2*   GABRIELA 9.0  --  8.5 8.3*   *  --  167* 167*     Most Recent 2 LFT's:  Recent Labs   Lab Test 02/04/19  2125 01/18/19  0911   AST 21 20   ALT 18 22   ALKPHOS 130 116   BILITOTAL 0.7 0.5     Most Recent INR's and Anticoagulation Dosing History:  Anticoagulation Dose History     Recent Dosing and Labs Latest Ref Rng & Units 6/1/2010 6/3/2010    INR 0.86 - 1.14 0.95 0.92        Most Recent 3 Troponin's:No lab results found.  Most Recent Cholesterol Panel:No lab results found.  Most Recent 6 Bacteria Isolates From Any Culture (See EPIC Reports for Culture Details):  Recent Labs   Lab Test 02/16/19  1641 02/16/19  1549 02/05/19  1848 02/05/19  1847 02/04/19  2345 02/04/19  2333   CULT No growth No growth Moderate growth  Bacteroides fragilis  Susceptibility testing not routinely done  *  Moderate growth  Actinomyces neuii  Susceptibility testing not routinely done  *  Moderate growth  Streptococcus agalactiae sero group B  Susceptibility testing done on previous specimen  *  Moderate growth  Staphylococcus aureus  *  Light growth  Staphylococcus lugdunensis  * Heavy growth  Bacteroides fragilis  Susceptibility testing not routinely done  *  Moderate growth  Actinomyces neuii  Susceptibility testing not routinely done  *  Heavy growth  Streptococcus agalactiae sero  group B  *  Light growth  Coagulase negative Staphylococcus  Susceptibility testing not routinely done  *  These bacteria are part of normal skin michaela, but on occasion, may be true pathogens.    Clinical correlation must be applied to interpreting this microbiology result.  * No growth No growth     Most Recent TSH, T4 and A1c Labs:  Recent Labs   Lab Test 02/05/19  0850 01/18/19  0911   TSH  --  0.24*   T4  --  1.38   A1C 10.9*  --      Results for orders placed or performed during the hospital encounter of 02/16/19   Foot XR, G/E 3 views, left    Narrative    LEFT FOOT THREE OR MORE VIEWS   2/16/2019 4:04 PM     HISTORY: Acute on chronic wounds left foot.    COMPARISON: Left foot x-rays dated 2/4/2019, CT dated 2/5/2019 and MRI  dated 2/6/2019.     FINDINGS: There is a large soft tissue posterior defect posteriorly in  left foot surrounding calcaneus likely postoperative in etiology.  There is now gas in the soft tissues around the lateral aspect of the  mid left foot just distal to the base of the fifth metatarsal. This  could represent gas-forming organism extending into this region. There  is destruction of the distal aspect of the distal phalanx of left  great toe which appears to have slightly progressed since the prior  study dated 2/4/2019. No acute fractures identified. No cortical  irregularity of the fifth metatarsal.      Impression    IMPRESSION:  1. Irregularity distal aspect of the great toe is again noted and may  be slightly progressed since the prior study dated 2/4/2019. This  could represent osteomyelitis.   2. Large soft tissue defect surrounding the calcaneus is likely  postoperative. No definite cortical irregularity of the calcaneus is  seen.  3. There is new gas in the soft tissues along lateral aspects mid  forefoot concerning for extension of infection in the soft tissues in  this region.  4. Arterial calcifications are again noted corresponding with possible  history of diabetes  mellitus.    I discussed the findings of new gas in the soft tissues along lateral  aspect of the midfoot with Dr. Javier on 2/16/2019 at 4:10 PM. We  also discussed the irregularity of the distal phalanx of the great toe  and the large postoperative soft tissue defect around the calcaneus.      RACHID GONZALES MD   XR Tibia & Fibula Left 2 Views    Narrative    TIBIA FIBULA LEFT  TWO-THREE  VIEWS 2/17/2019 2:44 PM     HISTORY: L foot infection    COMPARISON: None.    FINDINGS: There is normal osseous alignment.  No fractures are  identified.      Impression    IMPRESSION: Negative, osseous structures appear intact.    MERLY MOORE MD

## 2019-02-25 NOTE — PLAN OF CARE
Patient Discharging to TCU.  Packet for TCU staff sent with patient.  Insulin pens sent with patient.  Oxycodone given right before discharge.

## 2019-02-25 NOTE — PLAN OF CARE
Discharge Planner OT   Patient plan for discharge: ARU  Current status: pt agreeable to working with BUE HEP with green theraband, educated on proper form throughout session and cues to redirect back to task as pt talkative and losing track of task participating in.   Barriers to return to prior living situation: decreased ind, pain, decreased functional mobility, level of asst  Recommendations for discharge: ARU per plan established by the Occupational Therapist  Rationale for recommendations: Pt is below baseline in I/ADLs and functional mobility and will benefit from intensive therapy to regain strength and mobility for eventual return home.         Entered by: Carmen Noel 02/25/2019 11:16 AM

## 2019-02-25 NOTE — PROGRESS NOTES
Orthopedic Surgery  Eduardo Walton  2019  Admit Date:  2019  POD 7 Days Post-Op  S/P Procedure(s):  LEFT BELOW THE KNEE AMPUTATION    Patient resting comfortably in bed.  Color and demeanor much improved today   Pain controlled.  Tolerating oral intake.    Denies nausea or vomiting  Denies chest pain or shortness of breath  No events overnight.     Alert and orient to person, place, and time.  Vital Sign Ranges  Temperature Temp  Av.3  F (36.8  C)  Min: 98  F (36.7  C)  Max: 98.7  F (37.1  C)   Blood pressure Systolic (24hrs), Av , Min:136 , Max:161        Diastolic (24hrs), Av, Min:63, Max:79      Pulse Pulse  Av.5  Min: 68  Max: 75   Respirations Resp  Av  Min: 16  Max: 16   Pulse oximetry SpO2  Av.5 %  Min: 93 %  Max: 97 %       Dressings removed  Incision c/d/i with sutures intact  Pink healthy skin at stump, well perfused and mild swelling  Sensation intact    Labs:  Recent Labs   Lab Test 19  0644 19  0628 19  0608   POTASSIUM 4.4 4.6 4.4     Recent Labs   Lab Test 19  0644 19  0628 19  0608   HGB 9.6* 9.3* 6.9*     No results for input(s): INR in the last 32145 hours.  Recent Labs   Lab Test 19  0644 19  0650 19  0628   * 753* 749*       A/P  1. Plan:   Continue Lovenox for DVT prophylaxis.     Mobilize with PT/OT    Non-WB left LE with flotech on at all times.     Continue current pain regiment.   Change dressing every other day    2. Disposition   Anticipate d/c to ARU based on bed placement. Ok to discharge per ortho    Yodit Barron PA-C

## 2019-02-25 NOTE — H&P
[facility]   Acute Rehabilitation Unit  Admission History and Physical    chief complaint   Phantom sensation    REHAB DIAGNOSIS  Left transtibial amputation    History of Present illness  Eduardo Walton is a 48 year old right hand dominant male with a PMH including but not limited to IDDM (HbA1c 10.9), CKD II, CAD s/p NSTEMI and SUMANTH (2010)  who is admitted to the acute inpatient rehabilitation unit s/p left transtibial amputation.  He initially presented to the hospital on 2/4/19 with left foot swelling and fever, and imaging was suggestive of osteomyelitis and gas gangrene.  Amputation was recommended however the patient declined at that time and he was discharged home on Augmentin.  He then returned to the emergency room on 2/16/19 with ongoing fevers and worsening foot pain pain and appearance, and he ultimately proceeded with the amputation on 2/18/19 by Dr. Berman.  No intra-operative complications noted.  Post-operatively the patient had anemia requiring 2 units PRBCs.  Further complications include mild hypoglycemia needing a decrease in Degludec, YESSICA, and thrombocytosis thought to be reactive in nature.  Lisinopril and Norvasc added for BP control, but of note the patient has been very particular about his care and medications and refusing multiple recommendations (oppositional behavior).    Currently the patient endorses phantom sensation, but otherwise has no concerns or complaints.  He endorses willingness to participate in therapy so he can go home and ultimately use a prosthesis.  Denies chest pain or shortness of breath.  Was previously having loose stools, but has improved and now almost normal.  Denies problems with urination.      Glucose  Mild LVH, Lisinoprol and Norvasc started  Oppositional  Thrombocytosis      FUNCTIONAL HISTORY  Current Functional Status:  PT:  Session limited d/t patient requesting to order lunch and discussing discharge plan with [facility]  liaison. Patient agreeable and motivated to participate in session. Noted pt continues to have improved participation in therapy sessions when therapist is firm/directive although provides patient with a couple options of what to focus on during session to allow patient to have input in session. Patient performed supine-sit, SBA. Sit <> stand and ambulation of 15 feet with FWW and Bekah. Pt declined further ambulation distance secondary to fatigue. Pt agreeable to sit up in chair at end of session to eat lunch.     OT:  pt agreeable to working with REY HEP with green theraband, educated on proper form throughout session and cues to redirect back to task as pt talkative and losing track of task participating in      Prior Functional Status:  Independent with ambulation, ADLs, and IADLs    PAST Medical History  IDDM  CKD II  CAD s/p NSTEMI and SUMANTH (2010)      Surgical History  Reviewed and updated in Epic.  Past Surgical History:   Procedure Laterality Date     AMPUTATE FOOT Left 2/5/2019    Procedure: PARTIAL LEFT FOOT AMPUTATION.;  Surgeon: Chetan Potter DPM;  Location:  OR     AMPUTATE LEG BELOW KNEE Left 2/18/2019    Procedure: LEFT BELOW THE KNEE AMPUTATION;  Surgeon: Kvng Berman MD;  Location:  OR     STENT  2010       SOCIAL HISTORY  Reviewed and updated in Epic.  Marital Status:   [facility] up to the bedroom/bathroom.  Family support: Wife works and therefore would not be available 24 hrs if needed.  He states his parents may be able to assist after discharge    Social History     Socioeconomic History     Marital status:      Spouse name: Not on file     Number of children: Not on file     Years of education: Not on file     Highest education level: Not on file   Occupational History     Not on file   Social Needs     Financial resource strain: Not on file     Food insecurity:     Worry: Not on file     Inability: Not on file      Transportation needs:     Medical: Not on file     Non-medical: Not on file   Tobacco Use     Smoking status: Never Smoker     Smokeless tobacco: Never Used   Substance and Sexual Activity     Alcohol use: Yes     Comment: occ     Drug use: Not on file     Sexual activity: Yes   Lifestyle     Physical activity:     Days per week: Not on file     Minutes per session: Not on file     Stress: Not on file   Relationships     Social connections:     Talks on phone: Not on file     Gets together: Not on file     Attends Orthodox service: Not on file     Active member of club or organization: Not on file     Attends meetings of clubs or organizations: Not on file     Relationship status: Not on file     Intimate partner violence:     Fear of current or ex partner: Not on file     Emotionally abused: Not on file     Physically abused: Not on file     Forced sexual activity: Not on file   Other Topics Concern     Not on file   Social History Narrative     Not on file       FAMILY HISTORY  Reviewed and updated in Epic.  Family History   Problem Relation Age of Onset     Ovarian Cancer Maternal Grandmother      Colon Cancer Maternal Grandmother      Prostate Cancer Maternal Grandfather      Ovarian Cancer Paternal Grandmother            Medications  Scheduled meds  Medications Prior to Admission   Medication Sig Dispense Refill Last Dose     insulin lispro (HUMALOG KWIKPEN) 100 UNIT/ML pen Inject 1 unit/2 grams carb, total daily dose up to 80 units. also sliding scale - 1 unit for BG every 50 over 200   2/25/2019 at Unknown time     insulin lispro (HUMALOG PEN) 100 UNIT/ML pen Inject 1-6 Units Subcutaneous 4 times daily (with meals and nightly) 7.2 mL 0 2/25/2019 at Unknown time     acetaminophen (TYLENOL) 325 MG tablet Take 2 tablets (650 mg) by mouth every 4 hours as needed for other (multimodal surgical pain management along with NSAIDS and opioid medication as indicated based on pain control and physical function.) 40  tablet 0 Unknown at Unknown time     amLODIPine (NORVASC) 5 MG tablet Take 1 tablet (5 mg) by mouth daily 30 tablet 0 Unknown at Unknown time     aspirin (ASA) 81 MG EC tablet Take 1 tablet (81 mg) by mouth daily 30 tablet 0 Unknown at Unknown time     Continuous Blood Gluc Sensor (FREESTYLE COLIN 14 DAY SENSOR) [facility] USE WITH 14 DAY COLIN SYSTEM. CHANGE SENSOR EVERY 14 DAYS.  3 Unknown at Unknown time     enoxaparin (LOVENOX) 40 MG/0.4ML syringe Inject 0.4 mLs (40 mg) Subcutaneous every 24 hours for 10 days 4 mL 0      insulin degludec (TRESIBA FLEXTOUCH) 100 UNIT/ML pen Inject 20 Units Subcutaneous every morning 4.2 mL 0      lisinopril (PRINIVIL/ZESTRIL) 5 MG tablet Take 1 tablet (5 mg) by mouth daily 30 tablet 0      loperamide (IMODIUM) 2 MG capsule Take 1 capsule (2 mg) by mouth 4 times daily as needed for diarrhea        ondansetron (ZOFRAN-ODT) 4 MG ODT tab Take 1 tablet (4 mg) by mouth every 6 hours as needed for nausea or vomiting 10 tablet 0      order for DME Equipment being ordered: knee roller, will need for 4 months. Non weight bearing left foot. 1 Device 0 Unknown at Unknown time     [] oxyCODONE (ROXICODONE) 5 MG tablet Take 1-2 tablets (5-10 mg) by mouth every 4 hours as needed for moderate to severe pain (One tab for moderate pain, two tabs for severe pain) 30 tablet 0      pregabalin (LYRICA) 100 MG capsule Take 1 capsule (100 mg) by mouth 3 times daily 90 capsule 0      senna-docusate (SENOKOT-S/PERICOLACE) 8.6-50 MG tablet Take 2 tablets by mouth 2 times daily for 8 days 30 tablet 0        ALLERGIES   No Known Allergies      Review of Systems  A 10 point ROS was performed and negative unless otherwise noted in HPI.     Constitutional: Negative for fevers, chills  Eyes: Prior blurry vision, now resolved  Ears, Nose, Throat: Negative  Cardiovascular: Negative for chest pain or shortness of breath  Respiratory: Negative for shortness of breath  Gastrointestinal: Loose stools  Genitourinary:  "Negative for dysuria or urgency  Musculoskeletal: Left TTA  Neurologic: Phantom sensation  Psychiatric: +Oppositional behavior      Physical Exam  VITAL SIGNS:  /76   Pulse 78   Temp 97  F (36.1  C) (Oral)   Resp 20   Ht 1.753 m (5' 9\")   Wt 79.8 kg (176 lb)   SpO2 99%   BMI 25.99 kg/m    BMI:  Estimated body mass index is 25.99 kg/m  as calculated from the following:    Height as of this encounter: 1.753 m (5' 9\").    Weight as of this encounter: 79.8 kg (176 lb).     General: NAD, pleasant, tangential speech  HEENT: NC/AT   Pulmonary: Non-labored breathing, CTA b/l, no w/r/r   Cardiovascular: RRR, S1+S2, no m/r/g  Abdominal: Soft, NT/ND, BS+  Upper Extremities: FDI atrophy bilaterally  RLE: +Edema and dry skin  L Residual Limb: John tech brace in place, not removed at this time.  Intend to examine limb in the morning.  MSK/neuro:   Mental Status:  alert and oriented x3   Cranial Nerves: grossly normal  1. 2nd CN: Pupils equal, round, reactive to light and accomodation. and visual fields intact to confrontation.   2. 3rd,4th,6th CN:  EOMI, appropriate pupillary responses  3. 5th CN: facial sensation intact   4. 7th CN: face symmetrical   5. 8th CN: functional hearing bilaterally  6. 9th, 10th CN: palate elevates symmetrically   7. 11th CN: sternocleidomastoids and trapezii strong   8. 12th CN: tongue midline and without fasciculations    Strength:   Shoulder abd  EF  WE  EE    Finger abd  R 5 5 4 4- 4 4  L 5 5 4 4+ 4 4     HF  KE  DF  EHL  PF   R  4 4 4 4 5  L  4+  NT NA NA NA     Reflexes:    Patella Achilles  R 0 0     L NT NA         Labs  Lab Results   Component Value Date    WBC 12.4 02/25/2019     Lab Results   Component Value Date    RBC 3.50 02/25/2019     Lab Results   Component Value Date    HGB 9.6 02/25/2019     Lab Results   Component Value Date    HCT 30.4 02/25/2019     Lab Results   Component Value Date    MCV 87 02/25/2019     Lab Results   Component Value Date    MCH 27.4 " 02/25/2019     Lab Results   Component Value Date    MCHC 31.6 02/25/2019     Lab Results   Component Value Date    RDW 15.7 02/25/2019     Lab Results   Component Value Date     02/25/2019     Last Comprehensive Metabolic Panel:  Sodium   Date Value Ref Range Status   02/25/2019 138 133 - 144 mmol/L Final     Potassium   Date Value Ref Range Status   02/25/2019 4.4 3.4 - 5.3 mmol/L Final     Chloride   Date Value Ref Range Status   02/25/2019 101 94 - 109 mmol/L Final     Carbon Dioxide   Date Value Ref Range Status   02/25/2019 30 20 - 32 mmol/L Final     Anion Gap   Date Value Ref Range Status   02/25/2019 7 3 - 14 mmol/L Final     Glucose   Date Value Ref Range Status   02/25/2019 246 (H) 70 - 99 mg/dL Final     Urea Nitrogen   Date Value Ref Range Status   02/25/2019 28 7 - 30 mg/dL Final     Creatinine   Date Value Ref Range Status   02/25/2019 1.23 0.66 - 1.25 mg/dL Final     GFR Estimate   Date Value Ref Range Status   02/25/2019 70 >60 mL/min/[1.73_m2] Final     Comment:     Non  GFR Calc  Starting 12/18/2018, serum creatinine based estimated GFR (eGFR) will be   calculated using the Chronic Kidney Disease Epidemiology Collaboration   (CKD-EPI) equation.       Calcium   Date Value Ref Range Status   02/25/2019 9.0 8.5 - 10.1 mg/dL Final       IMPRESSION  Eduardo Walton is a 48 year old right hand dominant male who is s/p a L TTA for osteomyelitis and gas gangrene.      Impairment group code: 05.4 Unilateral below knee amputation    PLAN  1)Rehabiliation  -The patient has impairments in strength, balance, endurance, and coordination, which are leading to activity limitations in ambulation, mobility, and ADLs..  He will be admitted into a comprehensive, interdisciplinary, inpatient rehabilitation program including  PT and OT for 90 minutes of each on a daily basis.  The patient requires close supervision by a physiatrist for management and monitoring of active and chronic medical  co-morbidities, and to ensure the patient's ability to fully participate and benefit from the rigorous program.  Additionally, the patient requires specialized rehabilitation nursing for monitoring of vital signs, medication administration, patient training and education, monitoring of bowel and bladder, and wound care.  The assistance of the dietary and social work teams are also needed to optimize nutritional status and to collaborate and achieve the identified discharge goals.   A comprehensive individualized Care Plan will be formulated by the attending Physiatrist after initial evaluations by each Rehabilitation Team member at the initial team conference within four days of admission and updated/modified at subsequent team conferences.   This level of care and multidisciplinary approach is medically necessary and only available at the inpatient rehabilitation facility level of care.  The patient is willing to participate in 3 hours of therapy per day and has the potential for improvement.       2)Ortho  -s/p L TTA due to osteomyelitis and gas gangrene   -NWB to LLE   -To wear RRD at all times   -Dressing changes every other day   -Will examine limb tomorrow   -Provide ongoing education including prevention of contractures and protecting the limb    2)CVS  -HTN: Lisinopril 5 mg daily and Norvasc 5 mg daily, started during this hospitalization.  Higher doses recommended, however patient has been refusing.  Mild LVH noted on echo.  -CAD with NSTEMI and stents (2010)   -ASA 81 mg daily    3)Pulm  -No acute issues  -Encourage IS    4)FENGI  -Diet: Consistent carbohydrates; Regular consistency solids, thin liquids.  -PRN bowel meds for constipation: Colace, senna, Dulcolax suppository  -Imodium PRN if having loose stools.  -BMP 2/25 WNL except hyperglycemia    5)  -Check PVRs x3, IC if >300 ml  -Acute on chronic kidney injury: Peak creatinine 1.77.  Re-check on 2/25 1.23.  Monitor    6)DVT prophylaxis  -Lovenox 40  "mg daily    7)Pain  -Tylenol and Oxycodone 5-10 mg q4h PRN.  Wean as tolerated  -Continue PTA Lyrica 100 mg TID    8)Endo  -IDDM (HbA1c 10.9)   -At home patient is on Tresiba 28 units daily, 1 unit Novolog per 2g carb, and 1 unit per 50 units above 200.     -Tresiba was decreased due to mild hypoglycemia, now 20 units daily.  Titrate as needed.   -Continue home regimen sliding scale insulin and carb counting.  He insists on Humalog only (not Novalog)   -Patient refusing hospital glucose checks, has his own monitoring/continuous glucose monitor   -Follow up with his endocrinologist, however per the patient he states him and his diabetes team are \"on different sides of a coin\".  This is similarly reflected in the last office visit from endocrine which outlines that he is distrustful and at times has high or unrealistic expectations about his testing and treatments.    9)Heme  -Acute blood loss anemia:  S/p 2 units PRBCs with appropriate incrementation.  Monitor  -Thrombocytosis: Platelets 847 on 2/25, felt to be reactive.  Monitor closely, re-check CBC tomorrow.    10)Psych  -Monitor mood, consult health psychology if needed  -Ongoing education and encouragement.  Patient has demonstrated distrust, frequent refusals, and is very particular about his cares.    11)Social/Dispo  -Anticipate discharge home with a walker for household distances, and wheelchair for community distances.  Modified independent for ADLs, and ability to perform 2 flights of stairs  -ELOS 2 weeks  -Rehab prognosis: Good  -Follow up discharge appointments: PCP, endocrine, orthopedics, PM&R (prosthetics)    Code status: Full    Micheal Chris MD  Department of Rehabilitation Medicine  Pager: [phone]    Time Spent on this Encounter   I, Michael Chris, spent a total of 80 minutes bedside and on the inpatient unit today managing the care of Eduardo Walton.  Over 50% of my time on the unit was spent counseling the patient and /or coordinating care. " See note for details.

## 2019-02-25 NOTE — PROGRESS NOTES
Paged hospitalist to inform them patient is requesting to see them in person to discuss increasing his basal insulin.  Also to try a different B/P medication due to lisinopril causing him to feel like he is in a fog.  Patient refuses to have staff check his blood pressures and uses his own machine.  Also refusing to have his brace removed to assess the dressing and stated the surgeon is suppose to come today to change it and that he doesn't want the brace messed with.

## 2019-02-25 NOTE — PLAN OF CARE
Alert and oriented x 4. Up with assist of 1-2. Voids in urinal. IV saline lock. Oxycodone for pain. Still reports scrotal and penile swelling. .

## 2019-02-25 NOTE — PLAN OF CARE
Patient complained of blurred vision, stable vitals, voids adequately per urinal, blood sugar was 98 at 0200, oxycodone for pain, will continue to monitor. Possible discharge today.

## 2019-02-25 NOTE — PLAN OF CARE
Discharge Planner PT   Patient plan for discharge: [facility], possible today  Current status: Session limited d/t patient requesting to order lunch and discussing discharge plan with [facility] liaison. Patient agreeable and motivated to participate in session. Noted pt continues to have improved participation in therapy sessions when therapist is firm/directive although provides patient with a couple options of what to focus on during session to allow patient to have input in session. Patient performed supine-sit, SBA. Sit <> stand and ambulation of 15 feet with FWW and Bekah. Pt declined further ambulation distance secondary to fatigue. Pt agreeable to sit up in chair at end of session to eat lunch.   Barriers to return to prior living situation: Level of assist, L NWB, Stairs-not yet assessed, Decreased activity tolerance  Recommendations for discharge: [facility]  Rationale for recommendations: Pt is below baseline in regards to mobility and will benefit from continued skilled PT intervention in order for patient to be able to return to prior living environment.        Entered by: Huma Thomson 02/25/2019 12:15 PM     Physical Therapy Discharge Summary    Reason for therapy discharge:    Discharged to acute rehabilitation facility.    Progress towards therapy goal(s). See goals on Care Plan in [facility] electronic health record for goal details.  Goals not met.  Barriers to achieving goals:   limited tolerance for therapy and discharge from facility.    Therapy recommendation(s):    Continued therapy is recommended.  Rationale/Recommendations:  Pt will benefit from continued skilled PT intervention in order to improve activity tolerance and independence with functional mobility..

## 2019-02-25 NOTE — PROGRESS NOTES
SPIRITUAL HEALTH SERVICES Progress Note  [facility]    Visited per LOS.    Pt sahred briefly his story of coming terms with having his foot amputated. Visit was interrupted by PT. Pt appreciated for SH stopping in and extension of support, but has no further needs from SH. Pt is anticipating to discharge later today.        SH provided a kind and listening presence, acknowledgment of Pt's story, extension of support.     SH has no plans to follow, but is available if needs arise.     Peter Izaguirre  Chaplain Resident

## 2019-02-26 LAB
ANION GAP SERPL CALCULATED.3IONS-SCNC: 4 MMOL/L (ref 3–14)
BUN SERPL-MCNC: 28 MG/DL (ref 7–30)
CALCIUM SERPL-MCNC: 8.7 MG/DL (ref 8.5–10.1)
CHLORIDE SERPL-SCNC: 102 MMOL/L (ref 94–109)
CO2 SERPL-SCNC: 33 MMOL/L (ref 20–32)
CREAT SERPL-MCNC: 1.23 MG/DL (ref 0.66–1.25)
ERYTHROCYTE [DISTWIDTH] IN BLOOD BY AUTOMATED COUNT: 15.7 % (ref 10–15)
GFR SERPL CREATININE-BSD FRML MDRD: 70 ML/MIN/{1.73_M2}
GLUCOSE BLDC GLUCOMTR-MCNC: 113 MG/DL (ref 70–99)
GLUCOSE BLDC GLUCOMTR-MCNC: 119 MG/DL (ref 70–99)
GLUCOSE BLDC GLUCOMTR-MCNC: 127 MG/DL (ref 70–99)
GLUCOSE BLDC GLUCOMTR-MCNC: 262 MG/DL (ref 70–99)
GLUCOSE BLDC GLUCOMTR-MCNC: 66 MG/DL (ref 70–99)
GLUCOSE BLDC GLUCOMTR-MCNC: 73 MG/DL (ref 70–99)
GLUCOSE BLDC GLUCOMTR-MCNC: 75 MG/DL (ref 70–99)
GLUCOSE BLDC GLUCOMTR-MCNC: 78 MG/DL (ref 70–99)
GLUCOSE BLDC GLUCOMTR-MCNC: 88 MG/DL (ref 70–99)
GLUCOSE BLDC GLUCOMTR-MCNC: 98 MG/DL (ref 70–99)
GLUCOSE SERPL-MCNC: 263 MG/DL (ref 70–99)
HCT VFR BLD AUTO: 29.9 % (ref 40–53)
HGB BLD-MCNC: 9.1 G/DL (ref 13.3–17.7)
MCH RBC QN AUTO: 27.1 PG (ref 26.5–33)
MCHC RBC AUTO-ENTMCNC: 30.4 G/DL (ref 31.5–36.5)
MCV RBC AUTO: 89 FL (ref 78–100)
PLATELET # BLD AUTO: 741 10E9/L (ref 150–450)
POTASSIUM SERPL-SCNC: 5.2 MMOL/L (ref 3.4–5.3)
RBC # BLD AUTO: 3.36 10E12/L (ref 4.4–5.9)
SODIUM SERPL-SCNC: 139 MMOL/L (ref 133–144)
WBC # BLD AUTO: 10.8 10E9/L (ref 4–11)

## 2019-02-26 PROCEDURE — 97166 OT EVAL MOD COMPLEX 45 MIN: CPT | Mod: GO

## 2019-02-26 PROCEDURE — 97535 SELF CARE MNGMENT TRAINING: CPT | Mod: GO

## 2019-02-26 PROCEDURE — 12800006 ZZH R&B REHAB

## 2019-02-26 PROCEDURE — 25000128 H RX IP 250 OP 636: Performed by: PHYSICAL MEDICINE & REHABILITATION

## 2019-02-26 PROCEDURE — 80048 BASIC METABOLIC PNL TOTAL CA: CPT | Performed by: PHYSICAL MEDICINE & REHABILITATION

## 2019-02-26 PROCEDURE — 97110 THERAPEUTIC EXERCISES: CPT | Mod: GP

## 2019-02-26 PROCEDURE — 97530 THERAPEUTIC ACTIVITIES: CPT | Mod: GP

## 2019-02-26 PROCEDURE — 00000146 ZZHCL STATISTIC GLUCOSE BY METER IP

## 2019-02-26 PROCEDURE — 40000133 ZZH STATISTIC OT WARD VISIT

## 2019-02-26 PROCEDURE — 97116 GAIT TRAINING THERAPY: CPT | Mod: GP

## 2019-02-26 PROCEDURE — 25000132 ZZH RX MED GY IP 250 OP 250 PS 637: Performed by: PHYSICAL MEDICINE & REHABILITATION

## 2019-02-26 PROCEDURE — 97542 WHEELCHAIR MNGMENT TRAINING: CPT | Mod: GP

## 2019-02-26 PROCEDURE — 97162 PT EVAL MOD COMPLEX 30 MIN: CPT | Mod: GP

## 2019-02-26 PROCEDURE — 85027 COMPLETE CBC AUTOMATED: CPT | Performed by: PHYSICAL MEDICINE & REHABILITATION

## 2019-02-26 PROCEDURE — 40000193 ZZH STATISTIC PT WARD VISIT

## 2019-02-26 PROCEDURE — 25000131 ZZH RX MED GY IP 250 OP 636 PS 637: Performed by: PHYSICAL MEDICINE & REHABILITATION

## 2019-02-26 PROCEDURE — 36415 COLL VENOUS BLD VENIPUNCTURE: CPT | Performed by: PHYSICAL MEDICINE & REHABILITATION

## 2019-02-26 RX ADMIN — PREGABALIN 100 MG: 100 CAPSULE ORAL at 14:37

## 2019-02-26 RX ADMIN — PREGABALIN 100 MG: 100 CAPSULE ORAL at 08:03

## 2019-02-26 RX ADMIN — OXYCODONE HYDROCHLORIDE 10 MG: 5 TABLET ORAL at 16:34

## 2019-02-26 RX ADMIN — ASPIRIN 81 MG: 81 TABLET, COATED ORAL at 07:48

## 2019-02-26 RX ADMIN — OXYCODONE HYDROCHLORIDE 10 MG: 5 TABLET ORAL at 08:07

## 2019-02-26 RX ADMIN — INSULIN LISPRO 0.5 UNITS/CARB UNIT: 100 INJECTION, SOLUTION INTRAVENOUS; SUBCUTANEOUS at 09:08

## 2019-02-26 RX ADMIN — LISINOPRIL 5 MG: 5 TABLET ORAL at 07:48

## 2019-02-26 RX ADMIN — PREGABALIN 100 MG: 100 CAPSULE ORAL at 21:33

## 2019-02-26 RX ADMIN — INSULIN DEGLUDEC INJECTION 20 UNITS: 100 INJECTION, SOLUTION SUBCUTANEOUS at 09:06

## 2019-02-26 RX ADMIN — ENOXAPARIN SODIUM 40 MG: 40 INJECTION SUBCUTANEOUS at 07:48

## 2019-02-26 RX ADMIN — INSULIN LISPRO 2 UNITS: 100 INJECTION, SOLUTION INTRAVENOUS; SUBCUTANEOUS at 09:07

## 2019-02-26 NOTE — PLAN OF CARE
Acute Rehab Care Conference/Team Rounds      Type: Team Rounds    Present: Dr. Evangelina Chris, Mitali Velazquez PA, Sowmya Chang RN, Catrachita Rose SW, Kasey Day OT, George Murray PT, Srinivasa Haley SLP, Samantha De Leon , JagdishNIKO Joy, Radha Torres Dietician          Discharge Barriers/Treatment/Education    Rehab Diagnosis: L TTA    Active Medical Co-morbidities/Prognosis: DM I, HTN, phantom pain, neuropathy, CAD, ABLA, thrombocytosis    Safety: Ao1 for stand pivot transfers, walker. Fall precautions, alarms on. Alert and oriented, calls appropriately.     Pain: C/O pain to LLE, using oxycodone 5-10 mg PRN for pain management and reports pain as tolerable.     Medications: Take pills whole. IDDM, manages with Humalog and Tresiba. Alert and oriented, appears to have good understanding of diabetic management at this time. Nursing will continue to monitor for possible education needs re: diabetic management.     Skin: Incision to left leg, wound care provided per plan of care and flotech on at all times.     Tubes/Lines: None.     Swallowing/Nutrition:    Bowel/Bladder: Continent of B/B using BSC, LBM on 2/25. Elevated PVR noted on 2/25 evening shift of 653ml, pt declined ISC or attempting to double void at that time. Continuing education and bladder program.    Psychosocial: Pending assessment.     ADLs/IADLs: Initial evaluation started today. Initially, CGA/ min A for functional transfers and significant verbal cues and education to trial new/ modified tasks. Will aim for w/c based goals first to plan to live on main level and continue to progress with walker based ADLs as able and as safe. Will need to work with pt on final equipment needs and progressing towards safe modified independent level.      Mobility: Eval initiated. Pt limited by BLE and BUE neuropathy and deconditioning. Demo sup<>sit at Bekah for trunk support, STS with FWW SBA with assist to hold FWW in place, and amb 16  "ft with FWW Bekah, limited by UE fatigue, had to abruptly sit. Pt also limited by anxiety and oppositional personality. Needs to be able to navigate 30x6\" steps to access upper level of home. Unsure if pt would be able to stay on his main level. Will likely be w/c based d/t fatigue amb with FWW.    Cognition/Language:    Community Re-Entry: w/c based    Transportation: requires assist    Decision maker: self    Plan of Care and goals reviewed and updated.    Discharge Plan/Recommendations    Fall Precautions: continue    Overall plan for the patient:   Very particular about his care and distrustful, frequent refusals of recommendations despite education and encouragement.  Nursing - Patient is aware of his medications and frequently oppositional to recommendations.  PT - Decreased strength and endurance.  Biggest barrier is stairs, has 2 flights up to bedroom and bathroom.  Not tested ambulation as of yet.  OT - Needs cues and reassurance for transfers, CGA.  Independent with wheelchair mobility.  Anticipate stay of 2 weeks, tentative discharge date of 3/11.  Parents may be able to assist after discharge but wife works.       Utilization Review and Continued Stay Justification    Medical Necessity Criteria:    For any criteria that is not met, please document reason and plan for discharge, transfer, or modification of plan of care to address.    Requires intensive rehabilitation program to treat functional deficits?: Yes    Requires 3x per week or greater involvement of rehabilitation physician to oversee rehabilitation program?: Yes    Requires rehabilitation nursing interventions?: Yes    Patient is making functional progress?: Yes    There is a potential for additional functional progress? Yes    Patient is participating in therapy 3 hours per day a minimum of 5 days per week or 15 hours per week in 7 day period?:Yes    Has discharge needs that require coordinated discharge planning " approach?:Yes      Barriers/Concerns related to meeting medical necessity criteria:  None    Team Plan to Address Concern:  N/A      Final Physician Sign off    Statement of Approval: I have reviewed and agree with the recommendations and documentation in this care conference note.       Patient Goals     1. Pt will discharge home/community setting upon completion of therapy/RN goals.  2. Pt and family will be involved in discharge planning and made aware of services available to meet pts needs.         OT Frequency:  minutes  OT goal: lower body dressing: Modified independent, including orthotic, including set-up/clothing retrieval  OT goal: lower body bathing: Modified independent, using adaptive equipment  OT Goal: transfer: Modified independent, with assistive device  OT goal: toilet transfer/toileting: Modified independent, toilet transfer, using adaptive equipment  OT goal: meal preparation: Modified independent(w/c based)  OT goal 1: Patient will demonstrate tub transfer with use of tub transfer bench with modified independence  OT goal 2: Patient will demonstrate short distance transfer with FWW modified independence with use walker into bathroom     PT Frequency: 90 min daily  PT goal: bed mobility: Independent, Supine to/from sit, Rolling  PT goal: transfers: Modified independent, Bed to/from chair, Sit to/from stand, Assistive device(FWW)  PT goal: gait: Supervision/stand-by assist, 25 feet, Rolling walker  PT goal: stairs: Supervision/stand-by assist, Greater than 10 stairs, Rail on right(30 steps, R rail)  PT goal: perform aerobic activity with stable cardiovascular response: continuous activity, 15 minutes, NuStep  PT goal 1: Car transfer, FWW, SBA  PT Goal 2: Floor transfer, furniture assist, modA  PT Goal 3: HEP - independent with handout for LLE strengthening, stretching, and positioning  PT Goal 4: Complete 6 min wheel test      Patient Goal:  Pain Management: Patient will advocate for own  pain management by verbalizing to staff when in pain and requesting appropraite interventions  Goal: Wound Management: Patient will demonstrate how to properly dress L. BKA dressing by discharge from ARU  Goal: Medical Management: Patient will participate in MAP to understand medications, indications for use, frequency and dose by end of stay at ARU

## 2019-02-26 NOTE — H&P
Post Admission Physician Evaluation:    I have compared Eduardo Walton's condition on admission to acute rehabilitation to that outlined in the preadmission screen. History and physical exam performed by me.  No significant differences are identified and the patient remains appropriate for an inpatient rehabilitation facility level of care to manage medical issues and address functional impairments due to left transtibial amputation.    Comorbid medical conditions being managed: DM I, HTN, phantom pain, neuropathy, CAD, ABLA, thrombocytosis    Prior functional level:   Independent with ambulation, ADLs, and IADLs    Present function:   PT:  Session limited d/t patient requesting to order lunch and discussing discharge plan with ARC liaison. Patient agreeable and motivated to participate in session. Noted pt continues to have improved participation in therapy sessions when therapist is firm/directive although provides patient with a couple options of what to focus on during session to allow patient to have input in session. Patient performed supine-sit, SBA. Sit <> stand and ambulation of 15 feet with FWW and Bekah. Pt declined further ambulation distance secondary to fatigue. Pt agreeable to sit up in chair at end of session to eat lunch.      OT:  pt agreeable to working with BUE HEP with green theraband, educated on proper form throughout session and cues to redirect back to task as pt talkative and losing track of task participating in       Anticipated rehabilitation course:   Anticipate discharge home with a walker for household distances, and wheelchair for community distances.  Modified independent for ADLs, and ability to perform 2 flights of stairs  90 minutes each of PT and OT  Rehabilitation nursing  Close management by physiatry    Prognosis: Good    Estimated length of stay: 2 weeks    Michael Chris MD  Department of Rehabilitation Medicine  Pager: [phone]

## 2019-02-26 NOTE — PROVIDER NOTIFICATION
Social Work: Initial Assessment with Discharge Plan    Patient Name: Eduardo Walton  : 1970  Age: 48 year old  MRN: [id number]  Completed assessment with: patient  Admitted to ARU: 19     Presenting Information   Date of SW assessment: 2019  Health Care Directive: Patient considering completing  Primary Health Care Agent: default to next of kin  Secondary Health Care Agent: NA  Living Situation: resides with his wife  Previous Functional Status: previously independent of cares  DME available: see therapy notes  Patient and family understanding of hospitalization: Yes  Cultural/Language/Spiritual Considerations: English speaking      Physical Health  Reason for admission: Left BKA    Provider Information   Primary Care Physician:Efren Conway MD  : none    Mental Health/Chemical Dependency:   Diagnosis: pt shared that he has been feeling more depression following this amputation, however has noticed improvement with time. Pt is open to healthpsych  Alcohol/Tobacco/Narcotis: denied  Support/Services in Place: none  Services Needed/Recommended: none  Sexuality/Intimacy: denied concerns    Support System  Marital Status: , wife Huma  Family support: pt also reported additional support from his parents. Parents reside in [place]. They are planning to come and stay with patient upon discharge.  Other support available: none    Community Resources  Current in home services: none  Previous services: none    Financial/Employment/Education  Employment Status: employed; remote computer support  Income Source: wife's wages   Education: unknown  Financial Concerns:  Denied concerns  Insurance: Lam SANTAMARIA      Discharge Plan   Patient and family discharge goal: Goal to return home with continued support from family  Provided Education on discharge plan: YES  Patient agreeable to discharge plan:  YES  Provided education and attained signature for Medicare IM and IRF  Patient Rights and Privacy Information provided to patient : Yes  Provided patient with [place] [facility] [place] Resources: No  Barriers to discharge: Medically stable and progress with therapies    Discharge Recommendations   Disposition: Goal to return home with continued family support.  Transportation Needs: family will provide  Name of Transportation Company and Phone: NA    Additional comments   Pt is a 48 yr old male admitted for an acute rehab stay following a left BKA. Pt resides with his wife. Pt reported additional support from his parents. Team working towards a safe discharge plan.    Please invite to Care Conference:  Huma (spouse) - [phone]      ANTONIO Sheldon, [facility]  ARU   Phone: [phone]  Pager: [phone]         02/26/19 1253   Living Arrangements   Lives With spouse   Living Arrangements house   Able to Return to Prior Arrangements yes   Home Safety   Patient Feels Safe Living in Home? yes   Discharge Planning   Patient/Family Anticipates Transition to home with family   Discharge Needs Assessment   Transportation Anticipated family or friend will provide   Values Beliefs and Spiritual Care   C: Community: In support of your spiritual health, is there someone we may contact for you? (identify all that apply) no thank you

## 2019-02-26 NOTE — PLAN OF CARE
FOCUS/GOAL  Bladder management, Medical management and Safety management    ASSESSMENT, INTERVENTIONS AND CONTINUING PLAN FOR GOAL:  Discussed HS BG of 109 with pt at start of shift, and suggested pt eat a snack. Pt initially declined need for a snack, but agreed to having BG checked again and reported that if it was under 100 he would eat a snack. BG @ 2342 was 73. Pt ate x2 packages of tra crackers with one peanut butter and one container of apple juice. BG at 0200 was 113. Education also provided re: urinary retention and the importance of trying to empty his bladder. Pt declined x2 offers for toileting and bladder scanning, reported that he had heard all of the same information at [facility]. He agreed to getting up at 0630 to attempt to void. Pain medication offered overnight, pt declined and reported his pain as tolerable. Bed alarms on, no attempts at self-transfer noted. Pt rested quietly in bed between cares, flotech on at all times. Will continue to monitor.     Pt was able to void in BSC at end of shift but declined PVR, told staff that it should not be the focus. Nursing to continue to provide education.

## 2019-02-26 NOTE — PROGRESS NOTES
02/26/19 0823   Quick Adds   Type of Visit Initial PT Evaluation   Living Environment   Lives With spouse   Living Arrangements house   Home Accessibility stairs to enter home;stairs within home   Number of Stairs, Main Entrance one  (one step into house from garage, no rails)   Stair Railings, Main Entrance other (see comments)   Number of Stairs, Within Home, Primary other (see comments)  (30 steps)   Stair Railings, Within Home, Primary railing on right side (ascending);railings safe and in good condition   Transportation Anticipated family or friend will provide   Self-Care   Usual Activity Tolerance good   Current Activity Tolerance fair   Regular Exercise Other (see comments)   Equipment Currently Used at Home none   Activity/Exercise/Self-Care Comment PT: pt self-employed, works in company analysis and assessments. Pt enjoys hanging out with family and wants to be able to be outside by his birthday in June. Pt enjoys model Lego-ing, enjoys photography a bit.   Functional Level Prior   Ambulation 0-->independent   Transferring 0-->independent   Toileting 0-->independent   Bathing 0-->independent   Communication 0-->understands/communicates without difficulty   Swallowing 0-->swallows foods/liquids without difficulty   Cognition 0 - no cognition issues reported   Fall history within last six months no   Which of the above functional risks had a recent onset or change? ambulation;transferring;toileting;bathing;dressing   Prior Functional Level Comment none   General Information   Onset of Illness/Injury or Date of Surgery - Date 02/18/19  (date of BKA)   Referring Physician Aries   Patient/Family Goals Statement To be able to be outside with his family and play with his nieces/nephews by his birthday in June. To walk again on my own. To get back to work.   Pertinent History of Current Problem (include personal factors and/or comorbidities that impact the POC) CMH: L BKA with phantom sensations 2/18/19, IDDM,  CKD stage 2. PMH: CAD s/p NSTEMI and SUMANTH 2010   Precautions/Limitations fall precautions;other (see comments)   Weight-Bearing Status - LLE nonweight-bearing   Heart Disease Risk Factors Diabetes;Medical history;Gender   Cognitive Status Examination   Level of Consciousness alert   Follows Commands and Answers Questions 75% of the time   Personal Safety and Judgment intact   Memory intact   Cognitive Comment PT: grossly, no memory deficits noted.    Pain Assessment   Patient Currently in Pain Yes, see Vital Sign flowsheet  (LLE phantom pain, neuropathic pain in RLE.)   Integumentary/Edema   Integumentary/Edema other (describe)   Integumentary/Edema Comments PT: did not visualize incision, but pt reporting that he did not notice any signs of infection.   Strength   Manual Muscle Testing Quick Adds MMT: Shoulder;MMT: Elbow/Forearm;MMT: Wrist;MMT: Hand (General);MMT: Hip;MMT: Knee;MMT: Ankle   MMT: Shoulder   Shoulder Flexion - Left Side (4/5) good, left   Shoulder ABduction - Left Side (4/5) good, left   Shoulder Flexion - Right Side (4/5) good, right   Shoulder ABduction - Right Side (4/5) good, right   MMT: Elbow/Forearm, Rehab Eval   Elbow Flexion - Left Side (4/5) good, left   Elbow Extension - Left Side (4/5) good, left   Elbow Flexion - Right Side (4/5) good, right   Elbow Extension - Right Side (4/5) good, right   MMT: Hand   Hand Muscle Testing Results : L 3/5, R 4/5   MMT: Hip, Rehab Eval   Hip Flexion - Left Side (4/5) good, left   Hip Extension - Left Side (4/5) good, left   Hip Flexion - Right Side (4/5) good, right   Hip Extension - Right Side (4/5) good, right   MMT: Knee, Rehab Eval   Knee Flexion - Right Side (4/5) good, right   Knee Extension - Right Side (4/5) good, right   MMT: Ankle, Rehab Eval   Ankle Dorsiflexion - Right Side (1/5) trace, left   Ankle Plantarflexion - Right Side (3/5) fair, left   ARC Assessment Only   Acute Rehab FIM See FIM scores for Mobility/ADL Assessment   Balance    Balance other (describe)   Sitting Balance: Static good balance  (no sway sitting EOB)   Sitting Balance: Dynamic good balance  (fairly good pelvic/trunk dissociation reaching)   Sit-to-Stand Balance fair balance  (bracing RLE against bed into standing)   Standing Balance: Static poor balance  (Bekah to maintain R SLS balance)   Standing Balance: Dynamic poor balance  (poor control during amb d/t neuropathy)   Systems Impairment Contributing to Balance Disturbance somatosensory;neuromuscular   Identified Impairments Contributing to Balance Disturbance pain;impaired postural control;decreased sensation;impaired sensory feedback;decreased strength   Balance Comments PT: limited by neuropathy in RLE and fingers digits 2-5 in each hand.   Balance Quick Add Sitting balance: Static;Sitting balance: dynamic;Sit to stand balance;Standing balance: static;Standing balance: dynamic;Systems impairment contributing to balance disturbance;Identified impairments contributing to balance disturbance   Sensory Examination   Sensory Perception other (describe)   Sensory Perception Quick Adds Light touch;Proprioception   Sensation Light Touch   LUE mild impairment  (N&T in fingers MTP and distally)   LLE moderate impairment  (reports phantom pain/sensation in LLE intermittently)   RUE mild impairment  (N&T in fingers MTP and distally)   RLE mild impairment   Proprioception    LUE w/i normal limits   RUE w/i normal limits   RLE mild impairment  (impaired at forefoot/great toe, intact at ankle)   Modality Interventions   Planned Modality Interventions TENS   Planned Modality Interventions Comments PT: possibly TENS to manage pain   General Therapy Interventions   Planned Therapy Interventions balance training;bed mobility training;gait training;groups;manual therapy;neuromuscular re-education;prosthetic fitting/training;ROM;strengthening;stretching;transfer training;wheelchair management/propulsion training;risk factor  education;progressive activity/exercise;home program guidelines   Clinical Impression   Criteria for Skilled Therapeutic Intervention yes, treatment indicated   PT Diagnosis Deficits with strength, endurance, standing static and dynamic balance 2/2 L BKA   Functional limitations due to impairments Bed mob, transfers, gait, stairs, functional endurance   Clinical Presentation Evolving/Changing   Clinical Presentation Rationale PT: L BKA, CAD s/p NSTEMI with poor functional endurance, poorly controlled DM, home setup with ~30 stairs, limited support from family d/t wife working, parents may be able to assist as needed.   Clinical Decision Making (Complexity) Moderate complexity   Therapy Frequency` daily  (90 min)   Predicted Duration of Therapy Intervention (days/wks) 14-17 days   Anticipated Equipment Needs at Discharge wheelchair;front wheeled walker   Anticipated Discharge Disposition Home with Assist;Home with Home Therapy   Risk & Benefits of therapy have been explained Yes   Patient, Family & other staff in agreement with plan of care Yes   Clinical Impression Comments PT: see care plan note for details   Total Evaluation Time   Total Evaluation Time (Minutes) 45

## 2019-02-26 NOTE — PLAN OF CARE
Eduardo will need 14-17 days in order to return home w/c based with intermittent assist and support and  PT.    Mobility Status: modA squat pivot transfers w/c<>bed going to R side as able, w/c based in room.    Primary Barriers: stairs; pain, balance, strength, anxiety, and assist needed with transfers and gait.    DME Needs: rental manual w/c with amputee support rest, FWW

## 2019-02-26 NOTE — PLAN OF CARE
FOCUS/GOAL  Bowel management, Bladder management, Medical management and Skin integrity    ASSESSMENT, INTERVENTIONS AND CONTINUING PLAN FOR GOAL:  Alert & oriented, speech clear, uses call light to make needs known. Needs assist of one with walker for transfers and toileting. Continent of bladder and bowel with use of toilet, declined bladder scans. Declined skin check. Declined Norvasc this AM, stated that only takes Lisinopril, MD updated. Blood glucose 262 at 0730, insulin given per sliding scale and coverage, requested to get 1 unit per 5 grams of CHO. Pt reported blood glucose of 68 before lunch, ate entire portion of quesadilla and 1/3 portion of french fries, requested insulin to cover carbs consumed. Blood glucose 66 at 1440, pt was asymptomatic and requested writer to use facility's glucometer. Four ounces of orange juice given, recheck blood glucose of 75 @ 1503, another four ounces of orange juice given. Blood glucose recheck of 78 at 1523, declined interventions, stated that blood glucose will get very high if continues drinking or eating carbs, requested to get check again at 1600, oncoming nurse aware. Oxycodone PRN given for residual limb pain with effective results.

## 2019-02-26 NOTE — PROGRESS NOTES
FOCUS/GOAL  Bowel management, Bladder management, Pain management and Wound care management    ASSESSMENT, INTERVENTIONS AND CONTINUING PLAN FOR GOAL:    Patient is Alert and oriented x4, able to make needs known. A1 pivot transfer. Using BSC for void and BM. Patient stated he did not want to use urinal for void, because of edema on the penile shaft. Voided x2 this shift. Obtained PVR after void, PVR was 653. Explained that the orders are to straight catheterize above 300, and that he was at risk for UTI, and overdistension of bladder. Patient declined saying he would not let anyone cath him. Encouraged him to double void instead, but stated he would not be able to void properly without standing. Offered to then use W/c to transfer for toilet in bathroom, and then stand using walker at toilet, patient declined. Continue to monitor for urinary retention. BG were 118 at dinner, and 109 at HS. Patient was concerned about amount of carb coverage he would get. On call MD alerted about concern. Gave 1 time order for 1 unit of humalog per 5 grams of carbs. 8 units given for 41 grams of carbs consumed. Continue with POC.

## 2019-02-26 NOTE — PROGRESS NOTES
"  [facility]   Acute Rehabilitation Unit  Daily progress note    INTERVAL HISTORY  No acute events overnight, but continues to be very particular about his care.  He insisted on taking 1 unit of Humalog last night per 5 g carbs, despite the fact that I had confirmed his home dose of 1 unit/2 g carbs with him.  He states \"the way the hospital counts carbs and the way I do it are polar opposites\".  He also refused to take Norvasc this morning.  He says his OT session went very well today, but was having difficulty with PT as he felt he was getting pushed.  Denies chest pain or shortness of breath.       MEDICATIONS  Scheduled:    aspirin  81 mg Oral Daily     enoxaparin  40 mg Subcutaneous Q24H     insulin degludec  20 Units Subcutaneous QAM     insulin lispro  0.2 units/carb unit Subcutaneous TID AC     insulin lispro  1-5 Units Subcutaneous 4x Daily AC & HS     lisinopril  5 mg Oral Daily     pregabalin  100 mg Oral TID        PRN:  acetaminophen, bisacodyl, glucose **OR** dextrose **OR** glucagon, glucose **OR** dextrose **OR** glucagon, docusate sodium, loperamide, naloxone, ondansetron, oxyCODONE, sennosides       PHYSICAL EXAM  Patient Vitals for the past 24 hrs:   BP Temp Temp src Pulse Resp SpO2   02/26/19 1541 145/66 97.3  F (36.3  C) Oral 73 16 94 %   02/26/19 0727 152/62 96.6  F (35.9  C) Oral 86 16 92 %   02/25/19 2346 158/76 97.3  F (36.3  C) Oral 78 16 96 %   02/25/19 2232 159/72 98.2  F (36.8  C) Oral 76 18 94 %       GEN: NAD, pleasant  HEENT: NC/AT  CVS: RRR, S1+S2, no m/r/g  PULM: CTA b/l, no w/r/r  ABD: Soft, NT, ND, bowel sounds present  RLE: +edema and dry skin, no calf tenderness  L Residual limb: Cylindrical shape, incision is closed with sutures and appears to be healing appropriately.  No erythema or drainage noted.  Neuro: Answers appropriately, follows commands    LABS  CBC RESULTS:   Recent Labs   Lab Test 02/26/19  0643   WBC 10.8   RBC 3.36*   HGB 9.1* "   HCT 29.9*   MCV 89   MCH 27.1   MCHC 30.4*   RDW 15.7*   *       Last Comprehensive Metabolic Panel:  Sodium   Date Value Ref Range Status   02/26/2019 139 133 - 144 mmol/L Final     Potassium   Date Value Ref Range Status   02/26/2019 5.2 3.4 - 5.3 mmol/L Final     Chloride   Date Value Ref Range Status   02/26/2019 102 94 - 109 mmol/L Final     Carbon Dioxide   Date Value Ref Range Status   02/26/2019 33 (H) 20 - 32 mmol/L Final     Anion Gap   Date Value Ref Range Status   02/26/2019 4 3 - 14 mmol/L Final     Glucose   Date Value Ref Range Status   02/26/2019 263 (H) 70 - 99 mg/dL Final     Urea Nitrogen   Date Value Ref Range Status   02/26/2019 28 7 - 30 mg/dL Final     Creatinine   Date Value Ref Range Status   02/26/2019 1.23 0.66 - 1.25 mg/dL Final     GFR Estimate   Date Value Ref Range Status   02/26/2019 70 >60 mL/min/[1.73_m2] Final     Comment:     Non  GFR Calc  Starting 12/18/2018, serum creatinine based estimated GFR (eGFR) will be   calculated using the Chronic Kidney Disease Epidemiology Collaboration   (CKD-EPI) equation.       Calcium   Date Value Ref Range Status   02/26/2019 8.7 8.5 - 10.1 mg/dL Final       Recent Labs   Lab 02/26/19  1637 02/26/19  1605 02/26/19  1523 02/26/19  1503 02/26/19  1440 02/26/19  0722 02/26/19  0643  02/25/19  0644  02/23/19  0628 02/22/19  0608 02/21/19  0654  02/20/19  0656   GLC  --   --   --   --   --   --  263*  --  246*  --  167* 167* 194*  --  104*   BGM 98 88 78 75 66* 262*  --    < >  --    < >  --   --   --    < >  --     < > = values in this interval not displayed.       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      Impairment group code: 05.4 Unilateral below knee amputation    PLAN  Rehabilitation  -Continue with inpatient rehabilitation including PT and OT, rehab nursing, social work, and close monitoring by physiatry.  -The patient has  impairments in strength, balance, endurance, and coordination, which are leading to activity limitations in ambulation, mobility, and ADLs    Medical  1)Ortho  -s/p L TTA due to osteomyelitis and gas gangrene               -NWB to LLE               -To wear RRD at all times               -Dressing changes every other day               -Provide ongoing education including prevention of contractures and protecting the limb     2)CVS  -HTN: Lisinopril 5 mg daily.  Patient is refusing Norvasc 5 mg daily.  Will discontinue.  If further titration needed, will plan to discuss increase in Lisinopril.   Higher doses recommended, however patient has been refusing.  Mild LVH noted on echo.  -CAD with NSTEMI and stents (2010)               -ASA 81 mg daily     3)Pulm  -No acute issues  -Encourage IS     4)FENGI  -Diet: Consistent carbohydrates; Regular consistency solids, thin liquids.  -PRN bowel meds for constipation: Colace, senna, Dulcolax suppository  -Imodium PRN if having loose stools.  -BMP 2/25 WNL except hyperglycemia     5)  -Pt having elevated PVRs but refusing caths.  Educated on risks, pt accepts.  -Acute on chronic kidney injury: Peak creatinine 1.77.  Re-check on 2/25 1.23.  Monitor     6)DVT prophylaxis  -Lovenox 40 mg daily     7)Pain  -Tylenol and Oxycodone 5-10 mg q4h PRN.  Wean as tolerated  -Continue PTA Lyrica 100 mg TID     8)Endo  -IDDM (HbA1c 10.9)               -At home patient is on Tresiba 28 units daily, 1 unit Humalog per 2g carb, and 1 unit per 50 units above 200.  Pt insists on 1 unit per 5g carbs while admitted due to perceived difference in carb counting from home compared to hospital.                -Tresiba was decreased due to mild hypoglycemia, now 20 units daily.  Titrate as needed.               -Continue home regimen sliding scale insulin and carb counting.  He insists on Humalog only (not Novalog)               -Patient refusing hospital glucose checks, has his own  "monitoring/continuous glucose monitor               -Follow up with his endocrinologist, however per the patient he states him and his diabetes team are \"on different sides of a coin\".  This is similarly reflected in the last office visit from endocrine which outlines that he is distrustful and at times has high or unrealistic expectations about his testing and treatments.     9)Heme  -Acute blood loss anemia:  S/p 2 units PRBCs with appropriate incrementation.  Hgb 9.1 on 2/26, stable.   -Thrombocytosis: Platelets 847 on 2/25, felt to be reactive.  Decreased to 741 on 2/26    10)Psych  -Monitor mood, consult health psychology if needed  -Ongoing education and encouragement.  Patient has demonstrated distrust, frequent refusals, and is very particular about his cares.     11)Social/Dispo  -Anticipate discharge home with a walker for household distances, and wheelchair for community distances.  Modified independent for ADLs, and ability to perform 2 flights of stairs  -ELOS 2 weeks  -Rehab prognosis: Good  -Follow up discharge appointments: PCP, endocrine, orthopedics, PM&R (prosthetics)     Code status: Full     Michael Chris MD  Department of Rehabilitation Medicine  Pager: [phone]      Time Spent on this Encounter   I, Michael Chris, spent a total of 35 minutes bedside and on the inpatient unit today managing the care of Eduardo Walton.  Over 50% of my time on the unit was spent counseling the patient and /or coordinating care regarding diabetes regimen, medications, and medical and functional progress including team rounds. See note for details.      "

## 2019-02-27 LAB
GLUCOSE BLDC GLUCOMTR-MCNC: 84 MG/DL (ref 70–99)
GLUCOSE BLDC GLUCOMTR-MCNC: 96 MG/DL (ref 70–99)

## 2019-02-27 PROCEDURE — 40000193 ZZH STATISTIC PT WARD VISIT

## 2019-02-27 PROCEDURE — 97530 THERAPEUTIC ACTIVITIES: CPT | Mod: GP

## 2019-02-27 PROCEDURE — 00000146 ZZHCL STATISTIC GLUCOSE BY METER IP

## 2019-02-27 PROCEDURE — 97116 GAIT TRAINING THERAPY: CPT | Mod: GP

## 2019-02-27 PROCEDURE — 25000132 ZZH RX MED GY IP 250 OP 250 PS 637: Performed by: PHYSICAL MEDICINE & REHABILITATION

## 2019-02-27 PROCEDURE — L8420 PROSTHETIC SOCK MULTI PLY BK: HCPCS

## 2019-02-27 PROCEDURE — 97110 THERAPEUTIC EXERCISES: CPT | Mod: GP

## 2019-02-27 PROCEDURE — 25000128 H RX IP 250 OP 636: Performed by: PHYSICAL MEDICINE & REHABILITATION

## 2019-02-27 PROCEDURE — 97535 SELF CARE MNGMENT TRAINING: CPT | Mod: GO

## 2019-02-27 PROCEDURE — 12800006 ZZH R&B REHAB

## 2019-02-27 PROCEDURE — 97112 NEUROMUSCULAR REEDUCATION: CPT | Mod: GP

## 2019-02-27 PROCEDURE — 40000133 ZZH STATISTIC OT WARD VISIT

## 2019-02-27 RX ORDER — MINERAL OIL/HYDROPHIL PETROLAT
OINTMENT (GRAM) TOPICAL 2 TIMES DAILY PRN
Status: DISCONTINUED | OUTPATIENT
Start: 2019-02-27 | End: 2019-03-04 | Stop reason: HOSPADM

## 2019-02-27 RX ADMIN — OXYCODONE HYDROCHLORIDE 10 MG: 5 TABLET ORAL at 07:41

## 2019-02-27 RX ADMIN — ENOXAPARIN SODIUM 40 MG: 40 INJECTION SUBCUTANEOUS at 07:41

## 2019-02-27 RX ADMIN — PREGABALIN 100 MG: 100 CAPSULE ORAL at 21:13

## 2019-02-27 RX ADMIN — PREGABALIN 100 MG: 100 CAPSULE ORAL at 14:01

## 2019-02-27 RX ADMIN — LISINOPRIL 5 MG: 5 TABLET ORAL at 07:41

## 2019-02-27 RX ADMIN — ASPIRIN 81 MG: 81 TABLET, COATED ORAL at 07:41

## 2019-02-27 RX ADMIN — PREGABALIN 100 MG: 100 CAPSULE ORAL at 07:41

## 2019-02-27 RX ADMIN — INSULIN DEGLUDEC INJECTION 20 UNITS: 100 INJECTION, SOLUTION SUBCUTANEOUS at 08:00

## 2019-02-27 NOTE — CONSULTS
"Diabetes/Hyperglycemia Management Consult    Chief Complaint type 1 diabetic with poorly controlled diabetes.  Consult requested by: Mitali Velazquez PA-C  History of Present Illness Eduardo Walton is a 48 year old male with history of type 1 diabetic, hypertension, CKD ( baseline Cr. 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 Aditted to ARU on  (2019).    Information as obtained from review of medical records and discussion with patient.    Mr. Walton reports he was dx with type 1 diabetes 1979. Believes his diabetes was from taking \"isoprel\" that was prescribed to him from an allergy physician. Once week after taking had polyuria and polydipsia, dx with type 1 diabetic. \" I was told my pancrease  out.\"  Home diabetes regime as listed below. Uses a FreeStyle Drew for monitoring his glucose.   Prefers to keep his glucose in the 80 to 135 range and later reported 150-150 range.  Has some hypoglycemia unawareness, especially at night, therefore does not have and bedtime correction. Will have an Honeycrisp Apple at night to prevent lows. Counts his carbohydrates in carb units, but refers to it as \"carbs\". When clarified, it was 1 unit of insulin per 2 carb units or 30 gram of CHO.    Was hospitalized at [facility] from (-2019) for diabetic foot infection with gas gangrene s/p left below the knee amputation. during the hospitalization, degludec was decreased to 20 units in the morning.    On presentation to ARU, continued on degludec 20 units am,  1 unit of Humalog per 2 grams carbohydrate, and 1 unit per 50 > 200. Per documentation, \" Pt insists on 1 unit per 5g carbs while admitted due to perceived difference in carb counting from home compared to hospital.\"  He continues to wear his FreeStyle Drew and had been declining finger sticks to test glucose.  Blood sugars have been variable and have ranged from 84 to > 200    Currently, " denies fever, chills, chest pain, shortness of breath, abdominal pain, nausea and or vomiting, bowel or bladder issues.    Recent Labs   Lab 02/26/19  2111 02/26/19  1735 02/26/19  1637 02/26/19  1605 02/26/19  1523 02/26/19  1503  02/26/19  0643  02/25/19  0644  02/23/19  0628 02/22/19  0608 02/21/19  0654   GLC  --   --   --   --   --   --   --  263*  --  246*  --  167* 167* 194*   * 127* 98 88 78 75   < >  --    < >  --    < >  --   --   --     < > = values in this interval not displayed.         Diabetes Type:   Type 1 Diabetes  Diabetes Duration:  4/20/1979    Usual Diabetes Regimen:   degludec 28   Humalog 1 unit per 2 CHO choices ( or 30 grams)  Humalog 1 unit per 50 > 150 before meals  No HS correction    Ability to [place] Prescribed Regimen: yes  Diabetes Control:   Lab Results   Component Value Date    A1C 10.9 02/05/2019     Diabetes Complications: neuropathy, chronic diabetic foot ulcer, retinopathy  Able to Detect Hypoglycemia: has some hypoglycemia unawareness  Usual Diabetes Care Provider: [facility], planning on changing to [facility]  Factors Impacting Glucose Control: infection      Review of Systems  10 point ROS completed with pertinent positives and negatives noted in the HPI    Past medical, family and social histories are reviewed and updated.    Past Medical History  Type 1 diabetic  Hypertension  CKD    Family History  No family hx specific for diabetes  Family History   Problem Relation Age of Onset     Ovarian Cancer Maternal Grandmother      Colon Cancer Maternal Grandmother      Prostate Cancer Maternal Grandfather      Ovarian Cancer Paternal Grandmother        Social History  Social History     Socioeconomic History     Marital status:      Spouse name: Not on file     Number of children: Not on file     Years of education: Not on file     Highest education level: Not on file   Occupational History     Not on file   Social Needs     Financial resource strain: Not  "on file     Food insecurity:     Worry: Not on file     Inability: Not on file     Transportation needs:     Medical: Not on file     Non-medical: Not on file   Tobacco Use     Smoking status: Never Smoker     Smokeless tobacco: Never Used   Substance and Sexual Activity     Alcohol use: Yes     Comment: occ     Drug use: Not on file     Sexual activity: Yes   Lifestyle     Physical activity:     Days per week: Not on file     Minutes per session: Not on file     Stress: Not on file   Relationships     Social connections:     Talks on phone: Not on file     Gets together: Not on file     Attends [employer] service: Not on file     Active member of club or organization: Not on file     Attends meetings of clubs or organizations: Not on file     Relationship status: Not on file     Intimate partner violence:     Fear of current or ex partner: Not on file     Emotionally abused: Not on file     Physically abused: Not on file     Forced sexual activity: Not on file   Other Topics Concern     Not on file   Social History Narrative     Not on file         Physical Exam  BP (P) 148/65 (BP Location: Right arm)   Pulse 73   Temp 98.1  F (36.7  C) (Oral)   Resp 16   Ht 1.753 m (5' 9\")   Wt 79.8 kg (176 lb)   SpO2 91%   BMI 25.99 kg/m      General:  pleasant  resting in bed, in no distress.   HEENT: PER and anicteric, non-injected, oral mucous membranes moist.   Lungs:  Non-labored  ABD: soft  Skin: warm and dry, no obvious lesions  MSK:  fluid movement of all extremities, LBKA  Mental status:  alert, oriented x3, communicating clearly  Psych:  calm, even mood    Laboratory  Recent Labs   Lab Test 02/26/19  0643 02/25/19  0644    138   POTASSIUM 5.2 4.4   CHLORIDE 102 101   CO2 33* 30   ANIONGAP 4 7   * 246*   BUN 28 28   CR 1.23 1.23   GABRIELA 8.7 9.0     CBC RESULTS:   Recent Labs   Lab Test 02/26/19  0643   WBC 10.8   RBC 3.36*   HGB 9.1*   HCT 29.9*   MCV 89   MCH 27.1   MCHC 30.4*   RDW 15.7*   * " "      Liver Function Studies -   Recent Labs   Lab Test 02/04/19 2125   PROTTOTAL 7.4   ALBUMIN 2.3*   BILITOTAL 0.7   ALKPHOS 130   AST 21   ALT 18       Active Medications  Current Facility-Administered Medications   Medication     acetaminophen (TYLENOL) tablet 650 mg     aspirin EC tablet 81 mg     bisacodyl (DULCOLAX) Suppository 10 mg     glucose gel 15-30 g    Or     dextrose 50 % injection 25-50 mL    Or     glucagon injection 1 mg     glucose gel 15-30 g    Or     dextrose 50 % injection 25-50 mL    Or     glucagon injection 1 mg     docusate sodium (COLACE) capsule 100 mg     enoxaparin (LOVENOX) injection 40 mg     insulin degludec (TRESIBA) 100 UNIT/ML injection 20 Units     insulin lispro (HumaLOG PEN) injection 1-5 Units     insulin lispro (HumaLOG PEN) injection 3 units/carb unit     lisinopril (PRINIVIL/ZESTRIL) tablet 5 mg     loperamide (IMODIUM) capsule 2 mg     mineral oil-hydrophilic petrolatum (AQUAPHOR)     naloxone (NARCAN) injection 0.1-0.4 mg     ondansetron (ZOFRAN-ODT) ODT tab 4 mg     oxyCODONE (ROXICODONE) tablet 5-10 mg     pregabalin (LYRICA) capsule 100 mg     sennosides (SENOKOT) tablet 2 tablet     No current outpatient medications on file.       Current Diet  Orders Placed This Encounter      Combination Diet [id number] Calories: Moderate Consistent CHO (4-6 CHO units/meal)        Assessment: Eduardo Walton is a 48 year old male with history of type 1 diabetic, hypertension, CKD ( baseline Cr. 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 Aditted to ARU on  (2/26/2019).      Type 1 diabetic: with FreeStrob Russell, discussed the importance of  capillary glucose versus using interstitial fluid.  Mr. Walton is willing to have POC testing and will use his CGM at 0200.  Hopefully,  what I understood during the interview is how Mr. Walton is \"counting carbohydrates\" because this seems to be different when other providers interview patient.   "   Plan  - continue with tresiba 20 units ( 0800)  -change to Novolog 1 unit per 30 grams of CHO for meals and snacks  -Novolog 1 unit per 50 > 150 before meals, no HS coverage  -test glucose before meals, Hs and 0200  -may use Freestyle Drew for glucose testing at 0200, if glucose < 100 will need POC testing  -will continue to follow     Lyndsay Pierce, [phone]  Patient Seen and examined on 2/28, note started on 2/27/2019    Diabetes Management Team job code: [id number]  Time Spent on this Encounter   I spent 80 minutes on the unit/floor managing the care of Eduardo Walton. Over 50% of my time was spent counseling the patient and/or coordinating care regarding services listed in this note.

## 2019-02-27 NOTE — PROGRESS NOTES
FOCUS/GOAL  Bowel management, Bladder management, Nutrition/Feeding/Swallowing precautions, Pain management, Medical management, Mobility and Cognition/Memory/Judgment/Problem solving    ASSESSMENT, INTERVENTIONS AND CONTINUING PLAN FOR GOAL:    A&O, Ax1 pivot transfer w/c based. Last BM 2/27, patient retaining urine tonight bladder scan went up to 955 patient agreed to get up to toilet and voided 100, refused pvr/straight cath. Educated patient on risk factors of not emptying bladder. Prn oxy given for residual limb pain.  at bed time will continue to follow POC.

## 2019-02-27 NOTE — PLAN OF CARE
OT: Patient continues to participate well in therapy sessions, requires extra time and increased cueing for participation in simple tasks but overall performing well at a w/c based level. Patient completed a shower and ongoing training with transfers in room.

## 2019-02-27 NOTE — PLAN OF CARE
FOCUS/GOAL  Bowel management, Bladder management, Pain management and Medical management    ASSESSMENT, INTERVENTIONS AND CONTINUING PLAN FOR GOAL:  Alert & oriented, uses call light to make needs known. Needs assist of one for transfers and toileting. Continent of bladder and bowel, used the toilet. Drsg intact to left stump, John-tech in place. Blood glucose 168 and 128 per CGM, insulin given per carb coverage. Oxycodone PRN given for residual pain with effective results. B/P 169/69 at beginning of shift, Lisinopril schedule given, MD paged. Recheck B/P 141/61. Up in w/c for therapy.

## 2019-02-27 NOTE — PLAN OF CARE
PT: pt advances to ambulation in parallel bars 2x20' + 2x40' with CGA throughout and gait cues, did experience x1 LOB with fatigue needing PT assist to correct. Session also spent on transfer training and standing balance exercises. Pt responds/interacts best with ongoing education/explanation about purpose of activities. IND bed mobility, close SBA bed<>wc squat pivot, SBA sit<> //.

## 2019-02-27 NOTE — PROGRESS NOTES
"02/27/19, 3PM, [facility], [facility] ACUTE [facility] [id number], male, Height: 5' 9\", Weight: 176 lbs 0 oz, Ordered by: Mitali LAST, Dx: Jie RÍOS, MRN #: [id number].    S:  - Patient was seen today, for the delivery of extra post-op consumables.  - Patient is a left below knee amputation and is still recovering post-op.   - All aspects of the initial evaluation were completed including inspection of the condition of the residual limb and measurements.  The suture line appears healthy and vascularized.  - Patient requires new post op socks to aid in the rehabilitation post amputation.  - Cause of Amputation: Diabetic infection.    O:  - The hospital had just dressed the wound with fresh ACE bandages prior to the fitting of the device.  There was no undue heat, pain, redness, swelling, discharge at this time.  - No ROM or MMT's were done on this delivery.    A:  - The consumables delivered today are consistent with the detailed prescription for the prosthetic device that the patient will utilize for rehabilitation. Patient states that the prosthetic device is comfortable and has met the expectations in terms of protection.  - The new post op socks were applied on the patient's residuum with no complications.  - A rigid removable dressing with post op socks was applied to patient's residual limb with suspension belt.  The application procedure was explained in detail to the patient, including the importance of maintaining the proper position of the residual limb in relaxed extension throughout the entire application process.  All supplies were provided to the patient including:  o 2 Post-op socks  - Verbal and written instructions were given to the patient on how to don/doff/care for the devices provided.  - Patient signed the delivery ticket and was given the [facility] receipt information sheet.    Goal:  - Provide an environment for protecting the newly amputated residuum " post-op with minimal compression until the wound is healed.    P:  - Will follow-up with patient as PRN. Patient will be discharged from the hospital at a later time as determined by hospital staff. Hospital staff was made aware of the successful application of the ORD and all of their questions were answered as well.    Electronically marked for review by URSZULA Cespedes, MSPO, Board Eligible Prosthetist.

## 2019-02-27 NOTE — PHARMACY-MEDICATION REGIMEN REVIEW
Pharmacy Medication Regimen Review  Eduardo Walton is a 48 year old male who is currently in the Acute Rehab Unit.    Assessment: All medications have appropriate indications, durations and no unnecessary use was found    Plan:   Attending provider will be sent this note for review.  If there are any emergent issues noted above, pharmacist will contact provider directly by phone.      Pharmacy will periodically review the resident's medication regimen for any PRN medications not administered in > 72 hours and discontinue them. The pharmacist will discuss gradual dose reductions of psychopharmacologic medications with interdisciplinary team on a regular basis.    Please contact pharmacy if the above does not answer specific medication questions/concerns.    Background:  A pharmacist has reviewed all medications and pertinent medical history today.  Medications were reviewed for appropriate use and any irregularities found are listed with recommendations.      Current Facility-Administered Medications:      acetaminophen (TYLENOL) tablet 650 mg, 650 mg, Oral, Q4H PRN, Evangelina Chris MD     aspirin EC tablet 81 mg, 81 mg, Oral, Daily, Evangelina Chris MD, 81 mg at 02/27/19 0741     bisacodyl (DULCOLAX) Suppository 10 mg, 10 mg, Rectal, Daily PRN, Evangelina Chris MD     glucose gel 15-30 g, 15-30 g, Oral, Q15 Min PRN **OR** dextrose 50 % injection 25-50 mL, 25-50 mL, Intravenous, Q15 Min PRN **OR** glucagon injection 1 mg, 1 mg, Subcutaneous, Q15 Min PRN, Evangelina Chris MD     glucose gel 15-30 g, 15-30 g, Oral, Q15 Min PRN **OR** dextrose 50 % injection 25-50 mL, 25-50 mL, Intravenous, Q15 Min PRN **OR** glucagon injection 1 mg, 1 mg, Subcutaneous, Q15 Min PRN, Evangelina Chris MD     docusate sodium (COLACE) capsule 100 mg, 100 mg, Oral, BID PRN, Evangelina Chris MD     enoxaparin (LOVENOX) injection 40 mg, 40 mg, Subcutaneous, Q24H, Evangelina Chris MD, 40 mg at 02/27/19  0741     insulin degludec (TRESIBA) 100 UNIT/ML injection 20 Units, 20 Units, Subcutaneous, QAM, Evangelina Chris MD, 20 Units at 02/26/19 0906     insulin lispro (HumaLOG PEN) injection 0.2 units/carb unit, 0.2 units/carb unit, Subcutaneous, TID AC, Evangelina Chris MD, 0.2 units/carb unit at 02/26/19 1844     insulin lispro (HumaLOG PEN) injection 1-5 Units, 1-5 Units, Subcutaneous, 4x Daily AC & HS, Evangelina Chris MD, 2 Units at 02/26/19 0907     lisinopril (PRINIVIL/ZESTRIL) tablet 5 mg, 5 mg, Oral, Daily, Evangelina Chris MD, 5 mg at 02/27/19 0741     loperamide (IMODIUM) capsule 2 mg, 2 mg, Oral, 4x Daily PRN, Evangelina Chrsi MD     naloxone (NARCAN) injection 0.1-0.4 mg, 0.1-0.4 mg, Intravenous, Q2 Min PRN, Evangelina Chris MD     ondansetron (ZOFRAN-ODT) ODT tab 4 mg, 4 mg, Oral, Q6H PRN, Evangelina Chris MD     oxyCODONE (ROXICODONE) tablet 5-10 mg, 5-10 mg, Oral, Q4H PRN, Evangelina Chris MD, 10 mg at 02/27/19 0741     pregabalin (LYRICA) capsule 100 mg, 100 mg, Oral, TID, Evangelina Chris MD, 100 mg at 02/27/19 0741     sennosides (SENOKOT) tablet 2 tablet, 2 tablet, Oral, Daily PRN, Evangelina Chris MD  No current outpatient prescriptions on file.   Es Estevez  PharmD,BCPS  February 27, 2019

## 2019-02-27 NOTE — PROGRESS NOTES
"  [facility]   Acute Rehabilitation Unit  Daily progress note    INTERVAL HISTORY  Seen resting in bed, reports PT/OT is going well overall feels this is intensity and rehabilitation he needs prior to next steps.  He is sleeping ok, some interruptions keeping him up at times.  Pain reported as overall tolerable on current regimen.  No n/v/d, typical for him to have BM once weekly now having every other day which is unusual for him and he does not like it.  He is trying to eat though finds menu unappealing.  He is urinating dislikes emphasis made on retention and states understanding as to reason but not interested in monitoring.  He is interested in talking to endocrinology, feels his diabetes has been poorly managed \"for last 9 years\" and would like to transition to [facility] endocrinology dept as outpatient.        MEDICATIONS  Scheduled:    aspirin  81 mg Oral Daily     enoxaparin  40 mg Subcutaneous Q24H     insulin degludec  20 Units Subcutaneous QAM     insulin lispro  1-5 Units Subcutaneous 4x Daily AC & HS     insulin lispro  3 units/carb unit Subcutaneous TID AC     lisinopril  5 mg Oral Daily     pregabalin  100 mg Oral TID        PRN:  acetaminophen, bisacodyl, glucose **OR** dextrose **OR** glucagon, glucose **OR** dextrose **OR** glucagon, docusate sodium, loperamide, mineral oil-hydrophilic petrolatum, naloxone, ondansetron, oxyCODONE, sennosides       PHYSICAL EXAM  Patient Vitals for the past 24 hrs:   BP Temp Temp src Pulse Resp SpO2   02/27/19 1556 145/62 96.8  F (36  C) Oral 74 16 96 %   02/27/19 0930 141/61 -- -- -- -- --   02/27/19 0900 158/49 -- -- 86 -- --   02/27/19 0836 176/60 -- -- -- -- --   02/27/19 0741 169/69 96.7  F (35.9  C) Oral 83 -- 92 %       GEN: NAD  HEENT: NC/AT  PULM: non labored on room air.   ABD: Soft, NT, ND, bowel sounds present  RLE: +edema and dry skin, no calf tenderness  L Residual limb: in flotech not examined today  Neuro: Answers " appropriately, follows commands    LABS  CBC RESULTS:   Recent Labs   Lab Test 02/26/19  0643   WBC 10.8   RBC 3.36*   HGB 9.1*   HCT 29.9*   MCV 89   MCH 27.1   MCHC 30.4*   RDW 15.7*   *       Last Comprehensive Metabolic Panel:  Sodium   Date Value Ref Range Status   02/26/2019 139 133 - 144 mmol/L Final     Potassium   Date Value Ref Range Status   02/26/2019 5.2 3.4 - 5.3 mmol/L Final     Chloride   Date Value Ref Range Status   02/26/2019 102 94 - 109 mmol/L Final     Carbon Dioxide   Date Value Ref Range Status   02/26/2019 33 (H) 20 - 32 mmol/L Final     Anion Gap   Date Value Ref Range Status   02/26/2019 4 3 - 14 mmol/L Final     Glucose   Date Value Ref Range Status   02/26/2019 263 (H) 70 - 99 mg/dL Final     Urea Nitrogen   Date Value Ref Range Status   02/26/2019 28 7 - 30 mg/dL Final     Creatinine   Date Value Ref Range Status   02/26/2019 1.23 0.66 - 1.25 mg/dL Final     GFR Estimate   Date Value Ref Range Status   02/26/2019 70 >60 mL/min/[1.73_m2] Final     Comment:     Non  GFR Calc  Starting 12/18/2018, serum creatinine based estimated GFR (eGFR) will be   calculated using the Chronic Kidney Disease Epidemiology Collaboration   (CKD-EPI) equation.       Calcium   Date Value Ref Range Status   02/26/2019 8.7 8.5 - 10.1 mg/dL Final       Recent Labs   Lab 02/26/19  2111 02/26/19  1735 02/26/19  1637 02/26/19  1605 02/26/19  1523 02/26/19  1503  02/26/19  0643  02/25/19  0644  02/23/19  0628 02/22/19  0608 02/21/19  0654   GLC  --   --   --   --   --   --   --  263*  --  246*  --  167* 167* 194*   * 127* 98 88 78 75   < >  --    < >  --    < >  --   --   --     < > = values in this interval not displayed.       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      Impairment group code: 05.4 Unilateral below knee amputation    PLAN  Rehabilitation  -Continue with inpatient rehabilitation  including PT and OT, rehab nursing, social work, and close monitoring by physiatry.  -The patient has impairments in strength, balance, endurance, and coordination, which are leading to activity limitations in ambulation, mobility, and ADLs    Medical  1)Ortho  -s/p L TTA due to osteomyelitis and gas gangrene               -NWB to LLE               -To wear RRD at all times               -Dressing changes every other day               -Provide ongoing education including prevention of contractures and protecting the limb     2)CVS  -HTN: Lisinopril 5 mg daily.  Patient is refusing Norvasc 5 mg daily.  Will discontinue.  If further titration needed, will plan to discuss increase in Lisinopril.   Higher doses recommended, however patient has been refusing.  Mild LVH noted on echo.  -CAD with NSTEMI and stents (2010)               -ASA 81 mg daily     3)Pulm  -No acute issues  -Encourage IS     4)FENGI  -Diet: Consistent carbohydrates; Regular consistency solids, thin liquids.  -PRN bowel meds for constipation: Colace, senna, Dulcolax suppository  -Imodium PRN if having loose stools.  -BMP 2/26 CO2 33, Cr 1.23 stable.      5)  -Pt having elevated PVRs but refusing caths.  Educated on risks, pt accepts.  -Acute on chronic kidney injury: Peak creatinine 1.77.  Re-check on 2/26 1.23.  Monitor     6)DVT prophylaxis  -Lovenox 40 mg daily     7)Pain  -Tylenol and Oxycodone 5-10 mg q4h PRN.  Wean as tolerated  -Continue PTA Lyrica 100 mg TID     8)Endo  -IDDM (HbA1c 10.9)               -At home patient is on Tresiba 28 units daily, 1 unit Humalog per 2g carb, and 1 unit per 50 units above 200.  Pt insists on 1 unit per 5g carbs while admitted due to perceived difference in carb counting from home compared to hospital.                -Tresiba was decreased due to hypoglycemia, now 20 units daily.  Titrate as needed.               -Continue home regimen sliding scale insulin and carb counting.  He insists on Humalog only (not  Novalog)               -Patient refusing hospital glucose checks, has his own monitoring/continuous glucose monitor               -will consult endocrinology for assistance with management.     9)Heme  -Acute blood loss anemia:  S/p 2 units PRBCs with appropriate incrementation.  Hgb 9.1 on 2/26, stable.   -Thrombocytosis: Platelets 847 on 2/25, felt to be reactive.  Decreased to 741 on 2/26    10)Psych  -Monitor mood, consult health psychology if needed  -Ongoing education and encouragement.  Patient has demonstrated distrust, frequent refusals, and is very particular about his cares.     11)Social/Dispo  -Anticipate discharge home with a walker for household distances, and wheelchair for community distances.  Modified independent for ADLs, and ability to perform 2 flights of stairs  -ELOS 2 weeks  -Rehab prognosis: Good  -Follow up discharge appointments: PCP, endocrine, orthopedics, PM&R (prosthetics)     Code status: Full     Mitali Velazquez PA-C  PM&R

## 2019-02-27 NOTE — PLAN OF CARE
FOCUS/GOAL  Bladder management    ASSESSMENT, INTERVENTIONS AND CONTINUING PLAN FOR GOAL:  Pt denied pain during shift.  at bedtime. OJ given at start of shift to maintain BG within normal range overnight. Writer had discussion with pt regarding plan for bladder program overnight and to call for assistance with toileting. Pt was able to void significant amount per NA approx 0330 and again approx 0545 a large amount of urine. Pt declined PVR after both episodes. Continue to encourage pt to participate in PVRs. Pt using call light appropriately to express needs. Alarms on for safety purposes. Call light within reach. Pt slept well between toileting sessions overnight. Continue with POC.

## 2019-02-28 LAB
CREAT SERPL-MCNC: 1.05 MG/DL (ref 0.66–1.25)
GFR SERPL CREATININE-BSD FRML MDRD: 84 ML/MIN/{1.73_M2}
GLUCOSE BLDC GLUCOMTR-MCNC: 239 MG/DL (ref 70–99)
PLATELET # BLD AUTO: 679 10E9/L (ref 150–450)

## 2019-02-28 PROCEDURE — 12800006 ZZH R&B REHAB

## 2019-02-28 PROCEDURE — 82565 ASSAY OF CREATININE: CPT | Performed by: PHYSICAL MEDICINE & REHABILITATION

## 2019-02-28 PROCEDURE — 36415 COLL VENOUS BLD VENIPUNCTURE: CPT | Performed by: PHYSICAL MEDICINE & REHABILITATION

## 2019-02-28 PROCEDURE — 25000132 ZZH RX MED GY IP 250 OP 250 PS 637: Performed by: PHYSICAL MEDICINE & REHABILITATION

## 2019-02-28 PROCEDURE — 25000128 H RX IP 250 OP 636: Performed by: PHYSICAL MEDICINE & REHABILITATION

## 2019-02-28 PROCEDURE — 00000146 ZZHCL STATISTIC GLUCOSE BY METER IP

## 2019-02-28 PROCEDURE — 97116 GAIT TRAINING THERAPY: CPT | Mod: GP

## 2019-02-28 PROCEDURE — 40000133 ZZH STATISTIC OT WARD VISIT

## 2019-02-28 PROCEDURE — 85049 AUTOMATED PLATELET COUNT: CPT | Performed by: PHYSICAL MEDICINE & REHABILITATION

## 2019-02-28 PROCEDURE — 40000193 ZZH STATISTIC PT WARD VISIT

## 2019-02-28 PROCEDURE — 97110 THERAPEUTIC EXERCISES: CPT | Mod: GP

## 2019-02-28 PROCEDURE — 97535 SELF CARE MNGMENT TRAINING: CPT | Mod: GO

## 2019-02-28 PROCEDURE — 97530 THERAPEUTIC ACTIVITIES: CPT | Mod: GP

## 2019-02-28 RX ADMIN — OXYCODONE HYDROCHLORIDE 10 MG: 5 TABLET ORAL at 21:15

## 2019-02-28 RX ADMIN — PREGABALIN 100 MG: 100 CAPSULE ORAL at 13:08

## 2019-02-28 RX ADMIN — PREGABALIN 100 MG: 100 CAPSULE ORAL at 21:06

## 2019-02-28 RX ADMIN — INSULIN DEGLUDEC INJECTION 20 UNITS: 100 INJECTION, SOLUTION SUBCUTANEOUS at 08:05

## 2019-02-28 RX ADMIN — PREGABALIN 100 MG: 100 CAPSULE ORAL at 08:01

## 2019-02-28 RX ADMIN — LOPERAMIDE HYDROCHLORIDE 2 MG: 2 CAPSULE ORAL at 17:11

## 2019-02-28 RX ADMIN — ENOXAPARIN SODIUM 40 MG: 40 INJECTION SUBCUTANEOUS at 08:01

## 2019-02-28 RX ADMIN — ASPIRIN 81 MG: 81 TABLET, COATED ORAL at 08:01

## 2019-02-28 RX ADMIN — LISINOPRIL 5 MG: 5 TABLET ORAL at 08:01

## 2019-02-28 RX ADMIN — OXYCODONE HYDROCHLORIDE 10 MG: 5 TABLET ORAL at 17:11

## 2019-02-28 RX ADMIN — INSULIN LISPRO 2 UNITS: 100 INJECTION, SOLUTION INTRAVENOUS; SUBCUTANEOUS at 13:06

## 2019-02-28 NOTE — PLAN OF CARE
Pt limited in AM by mood - reporting feeling very depressed after finding out how long it would take to obtain his prosthesis. Better mood in PM.    Per OT, pt reporting he wants to return home Saturday - based on amount of assist needed for stairs, inconsistency with safe transfers, and functional endurance and strength deficits, I do not think this will be a safe option. Will need more time to improve his BUE strength, standing balance, transfers, and stair negotiation before returning home.    OT started manual w/c order today. Will complete tomorrow with [place].

## 2019-02-28 NOTE — PLAN OF CARE
FOCUS/GOAL  Bladder management, Nutrition/Feeding/Swallowing precautions, Pain management, Wound care management and Medical management    ASSESSMENT, INTERVENTIONS AND CONTINUING PLAN FOR GOAL:  Alert and oriented, forgetfulness noted. Needs assist of one with transfers and toileting. Continent of bladder with use of toilet. Appetite 100% for breakfast and lunch, blood glucose 239  at lunch time, insulin given per sliding scale and carb count. Drsg changed to left BKA, incision approximated with sutures. Left posterior thigh has areas of blistering from pot-op socks, area cleansed and covered with adaptic drsg and secured with kerlix. Pt declined roseann-tech removal x2, education provided. Denies pain or discomfort. Pt self-transferred from toilet to bed x1, safety reminders provided.

## 2019-02-28 NOTE — PROGRESS NOTES
"  [facility]   Acute Rehabilitation Unit  Daily progress note    INTERVAL HISTORY  Seen resting in bed.  Reports he has had better days and describes frustrations being here has caused.  Denies n/v/d, sob, headaches, dizziness, and fevers.  Discussed hypertension and recommended increase of lisinopril patietn declined, states hypertension is currently caused by stressors at our facility and will go back to \"118/50\" when he leaves here.  Agreed that stress can increase blood pressure and discussed possible complications related to uncontrolled blood pressure, when asked to verbalize risks of poor blood pressure management patient  stated \"stroke, heart attack, even death\".  States given bp will improve when he leaves and he is leaving Saturday, this won't be an issue.      Asked about safety for home this early in rehab, asked about equipment, plans for wound care, patient is able to state that he has not made plans for either of these.  Encouraged to work towards safe discharge plan.      Also examined incision today, which is healing nicely though when post op socks (wearing 2 layers) removed patient did have open abrasion on proximal edge, below stocking,  just below knee.  Seeming to be from compression, patient wearing these garments and flotech since 4pm yesterday and has not removed, cleansed and adaptic and gauze applied per nursing.       MEDICATIONS  Scheduled:    aspirin  81 mg Oral Daily     enoxaparin  40 mg Subcutaneous Q24H     insulin degludec  20 Units Subcutaneous QAM     insulin lispro  1-10 Units Subcutaneous TID w/meals     insulin lispro  1-5 Units Subcutaneous TID AC     lisinopril  5 mg Oral Daily     pregabalin  100 mg Oral TID        PRN:  acetaminophen, bisacodyl, glucose **OR** dextrose **OR** glucagon, docusate sodium, insulin lispro, loperamide, mineral oil-hydrophilic petrolatum, naloxone, ondansetron, oxyCODONE, sennosides       PHYSICAL " EXAM  Patient Vitals for the past 24 hrs:   BP Temp Temp src Pulse Resp SpO2   02/28/19 1707 167/73 97.4  F (36.3  C) Oral 71 -- --   02/28/19 0907 148/65 -- -- -- -- --   02/28/19 0759 168/74 98.1  F (36.7  C) Oral 73 16 91 %       GEN: NAD  HEENT: NC/AT  PULM: non labored clear on room air.   CV: rrr   ABD: Soft, NT, ND, bowel sounds present  RLE: +edema and dry skin, no calf tenderness  L Residual limb: in flotech not examined today  Neuro: Answers appropriately, follows commands    LABS  CBC RESULTS:   Recent Labs   Lab Test 02/26/19  0643   WBC 10.8   RBC 3.36*   HGB 9.1*   HCT 29.9*   MCV 89   MCH 27.1   MCHC 30.4*   RDW 15.7*   *       Last Comprehensive Metabolic Panel:  Sodium   Date Value Ref Range Status   02/26/2019 139 133 - 144 mmol/L Final     Potassium   Date Value Ref Range Status   02/26/2019 5.2 3.4 - 5.3 mmol/L Final     Chloride   Date Value Ref Range Status   02/26/2019 102 94 - 109 mmol/L Final     Carbon Dioxide   Date Value Ref Range Status   02/26/2019 33 (H) 20 - 32 mmol/L Final     Anion Gap   Date Value Ref Range Status   02/26/2019 4 3 - 14 mmol/L Final     Glucose   Date Value Ref Range Status   02/26/2019 263 (H) 70 - 99 mg/dL Final     Urea Nitrogen   Date Value Ref Range Status   02/26/2019 28 7 - 30 mg/dL Final     Creatinine   Date Value Ref Range Status   02/28/2019 1.05 0.66 - 1.25 mg/dL Final     GFR Estimate   Date Value Ref Range Status   02/28/2019 84 >60 mL/min/[1.73_m2] Final     Comment:     Non  GFR Calc  Starting 12/18/2018, serum creatinine based estimated GFR (eGFR) will be   calculated using the Chronic Kidney Disease Epidemiology Collaboration   (CKD-EPI) equation.       Calcium   Date Value Ref Range Status   02/26/2019 8.7 8.5 - 10.1 mg/dL Final       Recent Labs   Lab 02/28/19  1218 02/27/19  2226 02/27/19  2200 02/26/19  2111 02/26/19  1735 02/26/19  1637  02/26/19  0643  02/25/19  0644  02/23/19  0628 02/22/19  0608   GLC  --   --   --    --   --   --   --  263*  --  246*  --  167* 167*   * 96 84 119* 127* 98   < >  --    < >  --    < >  --   --     < > = values in this interval not displayed.       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      Impairment group code: 05.4 Unilateral below knee amputation    PLAN  Rehabilitation  -Continue with inpatient rehabilitation including PT and OT, rehab nursing, social work, and close monitoring by physiatry.  -The patient has impairments in strength, balance, endurance, and coordination, which are leading to activity limitations in ambulation, mobility, and ADLs    Medical  1)Ortho  -s/p L TTA due to osteomyelitis and gas gangrene               -NWB to LLE               -To wear RRD at all times               -Dressing changes every other day-                -Provide ongoing education including prevention of contractures and protecting the limb     2)CVS  -HTN: Lisinopril 5 mg daily- declines increase in dose 2/28 able to verbalize understanding of risks of possible longterm htn.  Mild LVH noted on echo.  -CAD with NSTEMI and stents (2010)               -ASA 81 mg daily     3)Pulm  -No acute issues  -Encourage IS     4)FENGI  -Diet: Consistent carbohydrates; Regular consistency solids, thin liquids.  -PRN bowel meds for constipation: Colace, senna, Dulcolax suppository  -Imodium PRN if having loose stools.  -BMP 2/26 CO2 33, Cr 1.23 stable.      5)  -Pt having elevated PVRs but refusing caths.  Educated on risks, pt accepts.  -Acute on chronic kidney injury: Peak creatinine 1.77.  Re-check on 2/28 Cr. 1.05.  Monitor     6)DVT prophylaxis  -Lovenox 40 mg daily     7)Pain  -Tylenol and Oxycodone 5-10 mg q4h PRN.  Wean as tolerated  -Continue PTA Lyrica 100 mg TID     8)Endo  -IDDM (HbA1c 10.9)               -Per endocrinology discussion with patient: At home patient is on Tresiba 28 units daily, 1 unit Humalog per 30g  carb, and 1 unit per 50 units above 200.                 -Tresiba was decreased due to hypoglycemia, now 20 units daily.  Titrate as needed.               -Continue home regimen sliding scale insulin and carb counting.  He insists on Humalog only (not Novalog)               -bg checks qid- patient ok to use own meter for 2 am check but please check on unit glucometer if <100               -appreciate endocrinology consult for assistance with management.(see note by PRANAV Pierce NP for details)      9)Heme  -Acute blood loss anemia:  S/p 2 units PRBCs with appropriate incrementation.  Hgb 9.1 on 2/26, stable.   -Thrombocytosis: Platelets 679 2/28.     10)Psych  -Monitor mood, consult health psychology if needed  -Ongoing education and encouragement.  Patient has demonstrated distrust, frequent refusals, and is very particular about his cares.     11)Social/Dispo  -Anticipate discharge home with a walker for household distances, and wheelchair for community distances.  Modified independent for ADLs, and ability to perform 2 flights of stairs  -ELOS 2 weeks- patient stating discharge will occur 3/2- will work on safe discharge plan  -Rehab prognosis: Good  -Follow up discharge appointments: PCP, endocrine, orthopedics, PM&R (prosthetics)     Code status: Full     Mitali Velazquez PA-C  PM&R    Case discussed with Endocrinology PRANAV Pierce NP, Dr. Chris PMR    I spent a total of 40 minutes face-to-face or managing the care of Eduardo Walton Over 50% of my time on the unit was spent counseling the patient and coordinating care. See note for details.

## 2019-02-28 NOTE — PLAN OF CARE
FOCUS/GOAL  Medication management and Medical management    ASSESSMENT, INTERVENTIONS AND CONTINUING PLAN FOR GOAL:  A&Ox4, A1 pivot to WC.  Makes needs known, no alarms on.  Pt denied pain.  Continent of B/B, LBM 2/27.

## 2019-02-28 NOTE — PROGRESS NOTES
Rehabilitation Medicine Wheelchair Face to Face      Diagnosis: L Left transtibial amputation   With co-morbidities of DM, CKD, CAD s/p NSTEMI    Currently has limited mobility due to recent amputation, decreased sensation, decreased activity tolerance and strength.    Current transfer status: squat pivot transfer modified independence between close surfaces.    Distance patient currently able to walk: pt is currently able to ambulate less than 10 feet with FWW with minimal assistance, limited primarily by fatigue, pain, and anxiety.     Therapy team has ruled out the following less costly options:              Cane due to amputation, decreased strength, impaired balance.               Walker due to neuropathy, decreased strength, impaired balance.     Patient will use wheelchair to complete mobility related ADL's in the home including toileting, dressing, self cares, meal prep.    Without a wheelchair patient will be unable to safely move about their home.      This equipment will allow Eduardo to be out of bed, participate in home activities with his family, and it will be part of a fall prevention plan for safe mobility.    Patient's home will accommodate use of wheelchair once he is on the main level. He will have assistance from his family to bring the wheelchair up to the main level.    Patient has a family member willing and able to assist as necessary with wheelchair in and out of the home and car. Planning on having his parents temporarily stay with him, and his wife can assist when she is home from work.        Arm and leg strength: 4/5 in B UEs and R LE      Length of need: 99 Lifetime      Current height: 5'9''  Current weight:175.5  :1970    Signature:        Michael Chris MD  3/1/19  NPI #[id number]

## 2019-02-28 NOTE — PLAN OF CARE
OT: Patient participated in PM session; long discussion about access to his bathroom on main level. Patient unwilling to even attempt with a walker, but able to demonstrate with use of grab bars with SBA.  Does not currently have grab bars at home but insists that his Dad will install them this weekend. Discussed additional options for home safety, but continues to deny wanting to purchase.   Patient also expressing desire to discharge Saturday, pt educated on current barriers and will continue to work towards safe discharge plan.

## 2019-03-01 LAB
GLUCOSE BLDC GLUCOMTR-MCNC: 137 MG/DL (ref 70–99)
GLUCOSE BLDC GLUCOMTR-MCNC: 201 MG/DL (ref 70–99)
GLUCOSE BLDC GLUCOMTR-MCNC: 223 MG/DL (ref 70–99)

## 2019-03-01 PROCEDURE — 25000128 H RX IP 250 OP 636: Performed by: PHYSICAL MEDICINE & REHABILITATION

## 2019-03-01 PROCEDURE — 97530 THERAPEUTIC ACTIVITIES: CPT | Mod: GP

## 2019-03-01 PROCEDURE — 97535 SELF CARE MNGMENT TRAINING: CPT | Mod: GO

## 2019-03-01 PROCEDURE — 25000132 ZZH RX MED GY IP 250 OP 250 PS 637: Performed by: PHYSICAL MEDICINE & REHABILITATION

## 2019-03-01 PROCEDURE — 40000894 ZZH STATISTIC OT IP EVAL DEFER

## 2019-03-01 PROCEDURE — 12800006 ZZH R&B REHAB

## 2019-03-01 PROCEDURE — 97116 GAIT TRAINING THERAPY: CPT | Mod: GP

## 2019-03-01 PROCEDURE — 00000146 ZZHCL STATISTIC GLUCOSE BY METER IP

## 2019-03-01 PROCEDURE — G0463 HOSPITAL OUTPT CLINIC VISIT: HCPCS

## 2019-03-01 RX ADMIN — INSULIN LISPRO 1 UNITS: 100 INJECTION, SOLUTION INTRAVENOUS; SUBCUTANEOUS at 07:42

## 2019-03-01 RX ADMIN — ASPIRIN 81 MG: 81 TABLET, COATED ORAL at 07:42

## 2019-03-01 RX ADMIN — ENOXAPARIN SODIUM 40 MG: 40 INJECTION SUBCUTANEOUS at 07:42

## 2019-03-01 RX ADMIN — OXYCODONE HYDROCHLORIDE 10 MG: 5 TABLET ORAL at 12:43

## 2019-03-01 RX ADMIN — OXYCODONE HYDROCHLORIDE 10 MG: 5 TABLET ORAL at 07:46

## 2019-03-01 RX ADMIN — OXYCODONE HYDROCHLORIDE 10 MG: 5 TABLET ORAL at 18:57

## 2019-03-01 RX ADMIN — INSULIN DEGLUDEC INJECTION 20 UNITS: 100 INJECTION, SOLUTION SUBCUTANEOUS at 07:47

## 2019-03-01 RX ADMIN — PREGABALIN 100 MG: 100 CAPSULE ORAL at 21:57

## 2019-03-01 RX ADMIN — PREGABALIN 100 MG: 100 CAPSULE ORAL at 07:42

## 2019-03-01 RX ADMIN — PREGABALIN 100 MG: 100 CAPSULE ORAL at 13:57

## 2019-03-01 RX ADMIN — OXYCODONE HYDROCHLORIDE 10 MG: 5 TABLET ORAL at 23:19

## 2019-03-01 RX ADMIN — INSULIN LISPRO 2 UNITS: 100 INJECTION, SOLUTION INTRAVENOUS; SUBCUTANEOUS at 12:37

## 2019-03-01 NOTE — CONSULTS
"  Health Psychology                  Clinic    Department of Medicine  Teri Disla, Ph.D., L.P. [phone]                          [facility]  [facility] Gen Crabtree, Ph.D.,  L.P. [phone]                 3rd Floor  [place] [address]   Berkley Angel, Ph.D., L.P. [phone]     [address] Ana Velasquez, Ph.D., L.P. [phone]            [place] [address]          Perry Dumas, Ph.D., A.B.P.P., L.P. [phone]      Carrie Hsieh, Ph.D., L.P. [phone]      Inpatient Health Psychology Consultation*    DOS: 3/1/19    Met with Mr Walton per his request from yesterday to have someone to talk with about his low mood. He was feeling actively suicidal yesterday.  Describes history of depression that has increased over the past 3-4 years as his health issues have increased. Also describes more longstanding history of anxious thinking that affects his sleep and appears to affect him more generally as well. In addition, he describes himself as \"stubborn and bull-headed\" and acknowledges that this may be part of the difficulties that he can encounter with healthcare providers.     While he is not open to a trial of an antidepressant, he stated his belief that it would potentially be helpful to have a consistent mental health provider for support. Open to possible options for providers in his home area. He was very interested in information that I gave him re mind-body skills and fMRI data showing that it can decreased the stress response. I will provide him with options for mental health referral on Monday prior to discharge.    Full dictation to follow.    Teri Disla, PhD, LP  [phone]    *In accordance with the Rules of the [place] [employer], it is noted that psychological descriptions and scientific procedures underlying psychological evaluations have limitations.  Absolute " predictions cannot be made based on information in this report.

## 2019-03-01 NOTE — PLAN OF CARE
"FOCUS/GOAL  Medical management    ASSESSMENT, INTERVENTIONS AND CONTINUING PLAN FOR GOAL:  Patient slept well. He called for assistance around 0600 to go to the bathroom. He declined to wear transfer belt. He transferred to w/c, to toilet and back with SBA after setting up the w/c. He directs his cares. He needed total assistance to wheel in and out of bathroom. No c/o pain. He talked about a blister he has posterior left thigh, he declined assessment said: \" They look at it during the day\". No c/o pain. He is wearing a stump protector on LBKA stump.   He has a continuous BG monitoring patch on left arm.    "

## 2019-03-01 NOTE — PLAN OF CARE
FOCUS/GOAL  Bladder management, Medication management, Pain management, Wound care management and Mobility    ASSESSMENT, INTERVENTIONS AND CONTINUING PLAN FOR GOAL:  Pt. Is alert and oriented x4. Cooperative throughout the shift except for when trying to take blood sugar. Pt. Refuses to have us check his blood sugar. Pt continue to use his CGM even after being advised that we check his glucose level during his hospital stay. Per patient blood glucose levels of 109 before dinner and 188 prior to bed. Pt up to bathroom with assist of 1 and squat pivot. Pt refuses use of gait belt during transfer. Pt requested imodium and later had a formed soft stool. Pt had high blood pressure throughout shift with a SBP in the 160's. Educated on management of hypertension. Verbalized understanding. Pt reported phantom limb pain, but denied increase in pain meds or trying other medications to help treat the pain. Bed in low position. Call light within patients reach.

## 2019-03-01 NOTE — PROGRESS NOTES
[facility]  Patient is currently open to home care services with [facility].  The patient is currently receiving RN PT and OT       services.  [facility], [facility]  and team have been notified of patient admission.  [facility] liaison will continue to follow patient during stay.  If appropriate provide orders to resume home care at time of discharge.

## 2019-03-01 NOTE — PLAN OF CARE
FOCUS/GOAL  Mobility    ASSESSMENT, INTERVENTIONS AND CONTINUING PLAN FOR GOAL:  Pt is A&Ox4, VSS and reports pain to hands that is relieved w/ scheduled lyrica and incision pain that is relieved by oxycodone PRN. Pt is a 1 staff assist to transfer to  w/ gait belt. Pt was reminded to use hospital GB machine rather than home machine this shift and will start checking BG at 0200 in addition to already scheduled glucose checks. Pt has 2 non blanchable red areas on posterior L leg above knee that were assessed by WOC this shift, she advised wash every other day and cover w/ meplex.

## 2019-03-01 NOTE — PROGRESS NOTES
"                          Diabetes Consult Daily  Progress Note          Assessment/Plan:     Eduardo Walton is a 48 year old male with history of type 1 diabetic, hypertension, CKD ( baseline Cr. 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 Admitted to ARU on  (2/26/2019).    Scanty glucose data from past 24h, and pt missing carb coverage at times.    Plan:- continue current doses of insulin but pt is encouraged to get more consistent glucose checks.  He agrees with this plan.  - continue with tresiba 20 units ( 0800)  -Novolog for meals and snacks: 1 unit per 30 grams of CHO   -Novolog for correction: 1 unit per 50 > 150 before meals, no HS coverage  -test glucose before meals, Hs and 0200  -may use Freestyle Drew for glucose testing at 0200, if glucose < 100 will need POC testing  -will continue to follow          Outpatient diabetes follow up: Pt would like switch to [facility]- will provide phone number for clinic coordinator in [facility] for pt to schedule follow up appointment in 1-2 weeks.  Plan discussed with patient, bedside RN, and primary team.           Interval History:     The last 24 hours progress and nursing notes reviewed.  Eduardo offered a detailed history of his foot infection and diabetes.  He wanted to discuss what it would take to get on a pump (frustrated that [facility] \"wanted me to have a perfect A1c before I could get a pump!\"). Discussed with pt that success on the pump usually only occurs if pt is motivated to give insulin for meals and correction every time they eat.  He admits that he often misses carb coverage at home.  We reviewed that we need glucose checks on hospital glucometer per policy.  He can check at 2am on his Freestyle Drew, as he discussed with our team yesterday (Brooke Pierce).  He did not get BG checks presupper or HS yesterday.  He had eggs and sausage for supper, so figured he didn't need carb coverage " "(appropriate decision- but still needs BG check premeal).  He had an apple at HS- no carb coverage. Currently we do not have enough glucose data to determine if doses need to be adjusted.  Basal bolus insulin doses remain unbalanced- suspect he will need either more carb coverage or less basal insulin or both.  We may not have time to determine this- he plans to discharge tomorrow by 12pm.    PTA Regimen:  degludec 28   Humalog 1 unit per 2 CHO choices ( or 30 grams)  Humalog 1 unit per 50 > 150 before meals  No HS correction  Uses Freestyle Drew          Recent Labs   Lab 02/28/19  1218 02/27/19  2226 02/27/19  2200 02/26/19  2111 02/26/19  1735 02/26/19  1637  02/26/19  0643  02/25/19  0644  02/23/19  0628   GLC  --   --   --   --   --   --   --  263*  --  246*  --  167*   * 96 84 119* 127* 98   < >  --    < >  --    < >  --     < > = values in this interval not displayed.               Review of Systems:   See interval hx          Medications:     Orders Placed This Encounter      Combination Diet 0971-4054 Calories: Moderate Consistent CHO (4-6 CHO units/meal)    Physical Exam:  Gen: Alert, resting in bed, in NAD   HEENT: NC/AT, mucous membranes are moist  Resp: Unlabored  Ext: L BKA.  Neuro:oriented x3, communicating clearly  /63 (BP Location: Right arm)   Pulse 74   Temp 98.6  F (37  C) (Oral)   Resp 16   Ht 1.753 m (5' 9\")   Wt 79.8 kg (176 lb)   SpO2 93%   BMI 25.99 kg/m             Data:     Lab Results   Component Value Date    A1C 10.9 02/05/2019              CBC RESULTS:   Recent Labs   Lab Test 02/28/19  0754 02/26/19  0643   WBC  --  10.8   RBC  --  3.36*   HGB  --  9.1*   HCT  --  29.9*   MCV  --  89   MCH  --  27.1   MCHC  --  30.4*   RDW  --  15.7*   * 741*     Recent Labs   Lab Test 02/28/19  1137 02/26/19  0643 02/25/19  0644   NA  --  139 138   POTASSIUM  --  5.2 4.4   CHLORIDE  --  102 101   CO2  --  33* 30   ANIONGAP  --  4 7   GLC  --  263* 246*   BUN  --  28 28 "   CR 1.05 1.23 1.23   GABRIELA  --  8.7 9.0     Liver Function Studies -   Recent Labs   Lab Test 02/04/19  2125   PROTTOTAL 7.4   ALBUMIN 2.3*   BILITOTAL 0.7   ALKPHOS 130   AST 21   ALT 18     Lab Results   Component Value Date    INR 0.92 06/03/2010    INR 0.95 06/01/2010         I spent a total of 35 minutes bedside and on the inpatient unit managing the glycemic care of Eduardo Walton. Over 50% of my time on the unit was spent counseling the patient and/or coordinating care regarding glucose management in the context of labile glucoses, missed glucose checks, reviewing detailed history of foot injections and diabetes.  See note for details.      Ella Malik PA-C [phone]  Diabetes Management job code [id number]

## 2019-03-01 NOTE — PROGRESS NOTES
[facility]   Acute Rehabilitation Unit  Daily progress note    INTERVAL HISTORY  No acute events overnight.  Today Mr. Walton has no new concerns or complaints, and insists he is discharging home tomorrow.  He is aware that he needs a wheelchair in order to discharge home, and one has not been delivered as of yet.  He also insists he will be able to perform stairs, despite struggling with them here thus far.  He insists his home is carpeted and set up better than the building here, therefore he will be able to do it once he gets home.  His parents will be staying with him, but they are unable to come for any sort of training.  He also says they will not be able to assist physically.       MEDICATIONS  Scheduled:    aspirin  81 mg Oral Daily     enoxaparin  40 mg Subcutaneous Q24H     insulin degludec  20 Units Subcutaneous QAM     insulin lispro  1-10 Units Subcutaneous TID w/meals     insulin lispro  1-5 Units Subcutaneous TID AC     lisinopril  5 mg Oral Daily     pregabalin  100 mg Oral TID        PRN:  acetaminophen, bisacodyl, glucose **OR** dextrose **OR** glucagon, docusate sodium, insulin lispro, loperamide, mineral oil-hydrophilic petrolatum, naloxone, ondansetron, oxyCODONE, sennosides       PHYSICAL EXAM  Patient Vitals for the past 24 hrs:   BP Temp Temp src Pulse Resp SpO2   03/01/19 1002 155/63 98.6  F (37  C) Oral 74 16 93 %   02/28/19 2114 177/66 -- -- 76 -- --   02/28/19 2100 163/77 -- -- 78 -- --   02/28/19 1707 167/73 97.4  F (36.3  C) Oral 71 16 --       GEN: NAD  HEENT: NC/AT  PULM: non labored clear on room air.   CV: RRR, S1+S2, no m/r/g  ABD: Soft, NT, ND, bowel sounds present  RLE: +edema and dry skin, no calf tenderness  L Residual limb: in flotech not examined today  Neuro: Answers appropriately, follows commands    LABS  CBC RESULTS:   Recent Labs   Lab Test 02/26/19  0643   WBC 10.8   RBC 3.36*   HGB 9.1*   HCT 29.9*   MCV 89   MCH 27.1   MCHC 30.4*    RDW 15.7*   *       Last Comprehensive Metabolic Panel:  Sodium   Date Value Ref Range Status   02/26/2019 139 133 - 144 mmol/L Final     Potassium   Date Value Ref Range Status   02/26/2019 5.2 3.4 - 5.3 mmol/L Final     Chloride   Date Value Ref Range Status   02/26/2019 102 94 - 109 mmol/L Final     Carbon Dioxide   Date Value Ref Range Status   02/26/2019 33 (H) 20 - 32 mmol/L Final     Anion Gap   Date Value Ref Range Status   02/26/2019 4 3 - 14 mmol/L Final     Glucose   Date Value Ref Range Status   02/26/2019 263 (H) 70 - 99 mg/dL Final     Urea Nitrogen   Date Value Ref Range Status   02/26/2019 28 7 - 30 mg/dL Final     Creatinine   Date Value Ref Range Status   02/28/2019 1.05 0.66 - 1.25 mg/dL Final     GFR Estimate   Date Value Ref Range Status   02/28/2019 84 >60 mL/min/[1.73_m2] Final     Comment:     Non  GFR Calc  Starting 12/18/2018, serum creatinine based estimated GFR (eGFR) will be   calculated using the Chronic Kidney Disease Epidemiology Collaboration   (CKD-EPI) equation.       Calcium   Date Value Ref Range Status   02/26/2019 8.7 8.5 - 10.1 mg/dL Final       Recent Labs   Lab 03/01/19  1235 02/28/19  1218 02/27/19  2226 02/27/19  2200 02/26/19  2111 02/26/19  1735  02/26/19  0643  02/25/19  0644  02/23/19  0628   GLC  --   --   --   --   --   --   --  263*  --  246*  --  167*   * 239* 96 84 119* 127*   < >  --    < >  --    < >  --     < > = values in this interval not displayed.       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      Impairment group code: 05.4 Unilateral below knee amputation    PLAN  Rehabilitation  -Continue with inpatient rehabilitation including PT and OT, rehab nursing, social work, and close monitoring by physiatry.  -The patient has impairments in strength, balance, endurance, and coordination, which are leading to activity limitations in ambulation,  mobility, and ADLs    Medical  1)Ortho  -s/p L TTA due to osteomyelitis and gas gangrene               -NWB to LLE               -To wear RRD at all times               -Dressing changes every other day-                -Provide ongoing education including prevention of contractures and protecting the limb     2)CVS  -HTN: Lisinopril 5 mg daily- declines increase in dose 2/28 able to verbalize understanding of risks of possible longterm htn.  Mild LVH noted on echo.  -CAD with NSTEMI and stents (2010)               -ASA 81 mg daily     3)Pulm  -No acute issues  -Encourage IS     4)FENGI  -Diet: Consistent carbohydrates; Regular consistency solids, thin liquids.  -PRN bowel meds for constipation: Colace, senna, Dulcolax suppository  -Imodium PRN if having loose stools.  -BMP 2/26 CO2 33, Cr 1.23 stable.      5)  -Pt having elevated PVRs but refusing caths.  Educated on risks, pt accepts.  -Acute on chronic kidney injury: Peak creatinine 1.77.  Re-check on 2/28 Cr. 1.05.  Monitor     6)DVT prophylaxis  -Lovenox 40 mg daily     7)Pain  -Tylenol and Oxycodone 5-10 mg q4h PRN.  Wean as tolerated  -Continue PTA Lyrica 100 mg TID     8)Endo  -IDDM (HbA1c 10.9)               -Per endocrinology discussion with patient: At home patient is on Tresiba 28 units daily, 1 unit Humalog per 30g carb, and 1 unit per 50 units above 200.                 -Tresiba was decreased due to hypoglycemia, now 20 units daily.  Titrate as needed.               -Continue home regimen sliding scale insulin and carb counting.  He insists on Humalog only (not Novalog)               -bg checks qid- patient ok to use own meter for 2 am check but please check on unit glucometer if <100               -appreciate endocrinology consult for assistance with management     9)Heme  -Acute blood loss anemia:  S/p 2 units PRBCs with appropriate incrementation.  Hgb 9.1 on 2/26, stable.   -Thrombocytosis: Platelets 679 2/28.     10)Psych  -Monitor mood, consult  health psychology if needed  -Ongoing education and encouragement.  Patient has demonstrated distrust, frequent refusals, and is very particular about his cares.     11)Social/Dispo  -Anticipate discharge home with a walker for household distances, and wheelchair for community distances.  Modified independent for ADLs, and ability to perform 2 flights of stairs  -ELOS 2 weeks - However patient is insisting on discharging as soon as possible.  He is aware that he can not discharge safely without a wheelchair and the orders have been done but has not been delivered yet.  Furthermore, discussed with PT and thus far has not been able to perform stairs safely as of yet.  He has 16 to get to the main living area.  Will continue to work with PT for navigating stairs over the weekend, and WC may be delivered Sunday or Monday.  -Rehab prognosis: Good  -Follow up discharge appointments: PCP, endocrine, orthopedics, PM&R (prosthetics)     Code status: Full    Michael Chris MD  Department of Rehabilitation Medicine  Pager: [phone]    Time Spent on this Encounter   I, Michael Chris, spent a total of 30 minutes bedside and on the inpatient unit today managing the care of Eduardo GARTH Walton.  Over 50% of my time on the unit was spent counseling the patient and /or coordinating care regarding wheelchair face to face, discharge planning, and functional progress. See note for details.

## 2019-03-01 NOTE — PROGRESS NOTES
[facility] Nurse Inpatient Pressure Injury Assessment   Reason for consultation: Evaluate and treat left posterior thigh      ASSESSMENT  Pressure Injury: on left posterior thigh, hospital acquired   This is a Medical Device Related Pressure Injury (MDRPI) due to Flowtec leg brace  Pressure Injury is Stage 2   Contributing factor of the pressure injury: pressure  Status: initial assessment     TREATMENT PLAN  Left posterior thigh wound: Every other day: wash wound with saline and gauze. Pat dry. Cover with Mepilex 4x4.  Orders Written  WO Nurse follow-up plan:weekly  Nursing to notify the Provider(s) and re-consult the [facility] Nurse if wound(s) deteriorates or new skin concern.    Patient History  According to provider note(s): Eduardo Walton is a 48 year old right hand dominant male who is s/p a L TTA for osteomyelitis and gas gangrene.     Impairment group code: 05.4 Unilateral below knee amputation.    Patient transferred to ARU on 2/25/19 for continued rehab.    Objective Data  Containment of urine/stool: Continent of bowel and Continent of bladder    Current Diet/ Nutrition:  Orders Placed This Encounter      Combination Diet 5321-3661 Calories: Moderate Consistent CHO (4-6 CHO units/meal)      Output:   I/O last 3 completed shifts:  In: 240 [P.O.:240]  Out: -     Risk Assessment:   Sensory Perception: 3-->slightly limited  Moisture: 4-->rarely moist  Activity: 2-->chairfast  Mobility: 3-->slightly limited  Nutrition: 3-->adequate  Friction and Shear: 2-->potential problem  Kane Score: 17      Labs:   Recent Labs   Lab 02/26/19  0643   HGB 9.1*   WBC 10.8       Physical Exam  Skin inspection: focused left posterior thigh    Wound Location:  Left posterior thigh    3/1/2019    Date of last Photo 3/1/2019  Wound History: Patient has Flowtec brace on. Prior to today, patient had two compression socks in place to assist with fit of brace. Patient complained of pain on 2/28/19 and upon inspection, wound noted on posterior  side of thigh, along the line where the edge of the plastic brace.  Measurements (length x width x depth, in cm) 6 cm x 4 cm  x  0.1 cm   Wound Base: linear open areas on the posterior thigh, blanchable dermis at base  Palpation of the wound bed: normal   Periwound skin: intact  Color: normal and consistent with surrounding tissue  Temperature: normal   Drainage:, scant  Description of drainage: serous  Odor: none  Pain: score 3, burning    Interventions  Current support surface: Standard  Atmos Air mattress  Current off-loading measures: Foam padding  Repositioning aid: Pillows  Visual inspection of wound(s) completed   Tube Securement: n/a  Wound Care: was done per plan of care.  Supplies: floor stock  Educated provided: plan of care and wound progress  Education provided to: patient   Discussed importance of:repositioning every 2 hours, off-loading pressure to wound, their role in pressure injury prevention and dressing change frequency  Discussed plan of care with Patient and Nurse    Raisa Kennedy RN, CWOCN

## 2019-03-01 NOTE — PROGRESS NOTES
Writer set up transport for the pt for Monday March 4th at 11 am. Pt requested that they bring their own wheelchair, and [facility] was good with that. Pt will be going home with home care. Writer sent HC referral to [facility].  Updated unit SW on this and HC liaison. SW will continue to follow and assist as needed.    IRVIN Polanco  Covering Acute Rehab  [phone]

## 2019-03-02 LAB
GLUCOSE BLDC GLUCOMTR-MCNC: 138 MG/DL (ref 70–99)
GLUCOSE BLDC GLUCOMTR-MCNC: 196 MG/DL (ref 70–99)
GLUCOSE BLDC GLUCOMTR-MCNC: 252 MG/DL (ref 70–99)
GLUCOSE BLDC GLUCOMTR-MCNC: 313 MG/DL (ref 70–99)

## 2019-03-02 PROCEDURE — 25000132 ZZH RX MED GY IP 250 OP 250 PS 637: Performed by: PHYSICAL MEDICINE & REHABILITATION

## 2019-03-02 PROCEDURE — 12800006 ZZH R&B REHAB

## 2019-03-02 PROCEDURE — 00000146 ZZHCL STATISTIC GLUCOSE BY METER IP

## 2019-03-02 PROCEDURE — 97530 THERAPEUTIC ACTIVITIES: CPT | Mod: GP | Performed by: PHYSICAL THERAPIST

## 2019-03-02 PROCEDURE — 97110 THERAPEUTIC EXERCISES: CPT | Mod: GP | Performed by: PHYSICAL THERAPIST

## 2019-03-02 PROCEDURE — 25000128 H RX IP 250 OP 636: Performed by: PHYSICAL MEDICINE & REHABILITATION

## 2019-03-02 PROCEDURE — 97530 THERAPEUTIC ACTIVITIES: CPT | Mod: GP

## 2019-03-02 PROCEDURE — 97535 SELF CARE MNGMENT TRAINING: CPT | Mod: GO

## 2019-03-02 PROCEDURE — 40000894 ZZH STATISTIC OT IP EVAL DEFER

## 2019-03-02 RX ADMIN — ASPIRIN 81 MG: 81 TABLET, COATED ORAL at 08:32

## 2019-03-02 RX ADMIN — INSULIN DEGLUDEC INJECTION 20 UNITS: 100 INJECTION, SOLUTION SUBCUTANEOUS at 08:32

## 2019-03-02 RX ADMIN — LOPERAMIDE HYDROCHLORIDE 2 MG: 2 CAPSULE ORAL at 20:11

## 2019-03-02 RX ADMIN — PREGABALIN 100 MG: 100 CAPSULE ORAL at 20:09

## 2019-03-02 RX ADMIN — PREGABALIN 100 MG: 100 CAPSULE ORAL at 13:48

## 2019-03-02 RX ADMIN — OXYCODONE HYDROCHLORIDE 10 MG: 5 TABLET ORAL at 18:04

## 2019-03-02 RX ADMIN — INSULIN LISPRO 4 UNITS: 100 INJECTION, SOLUTION INTRAVENOUS; SUBCUTANEOUS at 08:34

## 2019-03-02 RX ADMIN — OXYCODONE HYDROCHLORIDE 10 MG: 5 TABLET ORAL at 22:38

## 2019-03-02 RX ADMIN — OXYCODONE HYDROCHLORIDE 10 MG: 5 TABLET ORAL at 06:44

## 2019-03-02 RX ADMIN — PREGABALIN 100 MG: 100 CAPSULE ORAL at 08:32

## 2019-03-02 RX ADMIN — LISINOPRIL 5 MG: 5 TABLET ORAL at 08:32

## 2019-03-02 RX ADMIN — ENOXAPARIN SODIUM 40 MG: 40 INJECTION SUBCUTANEOUS at 08:32

## 2019-03-02 NOTE — PROGRESS NOTES
Mr. Walton was not willing to perform stairs this PM PT session stating he would like to conserve his energy for his independence day tomorrow. He is able to utilize his wheelchair independently  for primary means of transportation with no cues for safety and great safety awareness. Refusing to attempt FWW utilization for ambulation due to fear of falling and energy conservation for tomorrow. He also is refusing to attempt floor transfer due to not anticipating any falls or need for it at home. Revisited HEP and importance of maintaining flexibility and strength for future prothesis and functional strength. Overall he states he is ready for discharge and tomorrow is planned IND activities. Continue per POC.

## 2019-03-02 NOTE — PLAN OF CARE
FOCUS/GOAL  Medication management and Medical management    ASSESSMENT, INTERVENTIONS AND CONTINUING PLAN FOR GOAL:  A&O, A1 Pivot to WC.  No alarms, makes needs known.  Pt reported pain in LBKA incision, PRN oxy given.  Continent of B/B, LBM 2/28.

## 2019-03-02 NOTE — PROGRESS NOTES
"  [facility]   Acute Rehabilitation Unit  Daily progress note    interval history  Eduardo Walton was seen and examined at bedside.     Doing well today.  Had some residual limb pain that he attributed to his work with physical therapy yesterday.  This is improving however and he is agreeable to trial stairs later today with physical therapy.  Feels ready to go home but willing to wait through the weekend .  Will have a wheelchair delivered tomorrow.      Functionally, standby assist in room with set up assist from wheelchair level        physical exam  /69 (BP Location: Right arm)   Pulse 73   Temp 97.5  F (36.4  C) (Oral)   Resp 16   Ht 1.753 m (5' 9\")   Wt 79.8 kg (176 lb)   SpO2 94%   BMI 25.99 kg/m    Gen: Young man, no acute distress, sitting in wheelchair  HEENT: Normocephalic, atraumatic  Cardio: No edema in right lower extremity  Pulm: No respiratory distress on room air  Abd: Nondistended  Ext: Left transtibial amputation with knee immobilizer and residual limb protector in place  Neuro/MSK: Moves bilateral upper extremities with at least 3/5 strength    labs  Blood glucose 313 today, generally in the 200s yesterday    assessment and plan    Oj Walton  48 year old yo right-hand-dominant M who is status post left transtibial amputation for osteomyelitis and gas gangrene.  He is planning to discharge on Monday, 3/4/2019.    Seen by Endocrinology today.  Per their note, patient would like to switch to an health endocrinology clinic, he is to schedule an appointment in 1-2 weeks for follow-up.    Patient is medically stable, no interval changes to medical plan. Please refer to Dr. Chris note dated 3/1/2019 for full assessment and plan.        Patient seen and discussed with Dr. Clayton  PM&R Staff Physician    Carlyn Lantigua MD  PM&R  Pager: [phone]    "

## 2019-03-02 NOTE — PROGRESS NOTES
Diabetes Consult Daily  Progress Note          Assessment/Plan:     Eduardo Walton is a 48 year old male with history of type 1 diabetic, hypertension, CKD ( baseline Cr. 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 Admitted to ARU on  (2/26/2019).    BG down to 130s yesterday presupper after getting closer to 1unit/20g CHO for lunch.  Otherwise BG in the 200s-300s (missed aspart for HS snack).    Plan:-   - continue with tresiba 20 units ( 0800)  -Novolog for meals and snacks: incresed from 1 unit per 30 g to 1unit/25g of CHO   -Novolog for correction: 1 unit per 50 > 150 before meals, no HS coverage  -test glucose before meals, Hs and 0200  -may use Freestyle Drew for glucose testing at 0200, if glucose < 100 will need POC testing (all insulin decisions need to be based on hospital glucometer reading).  -will continue to follow          Outpatient diabetes follow up: Pt would like switch to [facility]- will provide phone number for clinic coordinator in [facility] for pt to schedule follow up appointment in 1-2 weeks.  Plan discussed with patient, bedside RN.           Interval History:     The last 24 hours progress and nursing notes reviewed.  Eduardo has no complaints.  Per notes, he will discharge 3/4 instead of today (when wheelchair if available).  BG generally running high.  BG came down to 137 presupper yesterday (on Drew BG was showing as 58), after carb coverage was rounded up with lunch (he got closer to 1unit/20g CHO).  When he gets 1unit/30g or less BG tend to go up to the 200s.  He had apple sauce at HS, he thinks there was only 12g CHO so no aspart given. But BG this morning is now up to low 300s (no po intake during the night).    PTA Regimen:  degludec 28   Humalog 1 unit per 2 CHO choices ( or 30 grams)  Humalog 1 unit per 50 > 150 before meals  No HS correction  Uses Freestyle Drew          Recent Labs   Lab  "03/02/19  0808 03/02/19  0204 03/01/19  2129 03/01/19  1841 03/01/19  1235 02/28/19  1218  02/26/19  0643  02/25/19  0644   GLC  --   --   --   --   --   --   --  263*  --  246*   * 252* 201* 137* 223* 239*   < >  --    < >  --     < > = values in this interval not displayed.               Review of Systems:   See interval hx          Medications:     Orders Placed This Encounter      Combination Diet 0380-6395 Calories: Moderate Consistent CHO (4-6 CHO units/meal)    Physical Exam:  Gen: Alert, resting in chair, in NAD   HEENT: NC/AT, mucous membranes are moist  Resp: Unlabored  Ext: L BKA.  Neuro:oriented x3, communicating clearly  /69 (BP Location: Right arm)   Pulse 73   Temp 97.5  F (36.4  C) (Oral)   Resp 16   Ht 1.753 m (5' 9\")   Wt 79.8 kg (176 lb)   SpO2 94%   BMI 25.99 kg/m             Data:     Lab Results   Component Value Date    A1C 10.9 02/05/2019            Recent Labs   Lab Test 02/28/19  1137 02/26/19  0643 02/25/19  0644   NA  --  139 138   POTASSIUM  --  5.2 4.4   CHLORIDE  --  102 101   CO2  --  33* 30   ANIONGAP  --  4 7   GLC  --  263* 246*   BUN  --  28 28   CR 1.05 1.23 1.23   GABRIELA  --  8.7 9.0     CBC RESULTS:   Recent Labs   Lab Test 02/28/19  0754 02/26/19  0643   WBC  --  10.8   RBC  --  3.36*   HGB  --  9.1*   HCT  --  29.9*   MCV  --  89   MCH  --  27.1   MCHC  --  30.4*   RDW  --  15.7*   * 741*       Ella Malik PA-C [phone]  Diabetes Management job code [id number]            "

## 2019-03-02 NOTE — CONSULTS
"    Health Psychology                  Clinic    Department of Medicine  Teri Disla, Ph.D., L.P. [phone]                          Clinics and Surgery Center  [facility] Gen Crabtree, Ph.D.,  L.P. [phone]                 3rd Floor  Ponder Mail Code 744   Berkley Angel, Ph.D., L.P. [phone]    [address] Ana Velasquez, Ph.D., L.P. [phone]            [place] [address]          Perry Dumas, Ph.D., A.B.OMAR.P., L.P. [phone]      Carrie Hsieh, Ph.D., L.P. [phone]      Inpatient Health Psychology Consultaiton*      DOS:  03/01/2019      REFERRAL SOURCE:  SHALA Puente, [facility], [facility].      REASON FOR REFERRAL:  Eduardo Walton is a 48-year-old man currently on the [facility] following a left below-knee amputation in the context of osteomyelitis and gas gangrene secondary to complications of type 1 diabetes.  Mr. Walton has been exhibiting some high levels of emotional distress since his admission to the [facility] on 2/25/2019.  He was particularly distressed yesterday, and requesting contact with mental health support.      HISTORY OF PRESENT ILLNESS:  Mr. Walton describes a significant level of depression that he states had onset in approximately 2016 when his health began to deteriorate.  He describes that he has periods of time where he feels hopeless and actively suicidal.  He tells me that yesterday, he was so distressed that \"if I had been able to walk out into traffic it would have been my last day alive.\"  He denies having sought mental health support in the past.  He describes strong feelings of hopelessness and difficulty motivating himself to engage in appropriate care of his diabetes.  However, he also expresses some differences of opinion with recommendations from medical providers, with unclear sources for " his beliefs.  For example, when offered a lesser amputation of his great toe at an earlier point, he refused that option, with the current result of a larger amputation.  Additional examples of his difficulty collaborating with health care providers have been very apparent during this [facility]C admission, including his refusal to work with a walker for transfers and stability, and difficulty accepting the requests for use of a gait belt and stump protector for safety.  He has been engaged in a high level of conflict with nursing staff around issues related to urinary retention and the most effective and safe ways to treat this.      Mr. Walton reports a significant level of anxious rumination that is quite consistent.  He describes that it affects his ability to fall asleep on a very consistent basis, and that he has no insight into strategies that might allow him to learn to shift his attention away from these anxious thoughts.      Mr. Walton was very focused on describing his distress over his current medical situation and the course of his foot infection and amputation.  He spent a significant amount of time focused on expressing his distress about the beliefs he has of how he has been poorly served by the healthcare system without any apparent insight into his part of the dynamics.  I note, as an example, his conversation earlier today with a provider from the Endocrinology service with whom he appears to have been debating the recommendations from the provider about the importance of checking his blood sugar prior to each meal and providing coverage for carbohydrates.      Mr. Walton was interested in the information that I provided about research data reflecting the benefits of meditation as a tool to assist with anxious thinking.  I encouraged him to look up some of this information to get more familiar with it.  He also noted his belief that it could be helpful for him to have a mental health provider to meet  with on a regular basis in his home area, and requested that I provide him with that information prior to discharge.      SOCIAL HISTORY:  Mr. Walton describes a high level of positive social and emotional support from his friends and family.  He notes that this includes his parents, sister, wife and many friends.  Mr. Walton grew up in [place], [place], where his parents still live.  He and his wife now live in [place].  He notes that his parents plan to stay with him and his wife to provide assistance and support on his discharge.      MEDICAL HISTORY:  Mr. Walton was diagnosed with type 1 diabetes as a child under 12.  He expresses some interesting believes about the source of his type 1 diabetes.  His past medical history as reflected in his [facility] [place], electronic medical record indicates chronic kidney disease 2, and CAD status post NSTEMI stenting in 2010.  Please see his complete [facility], electronic medical record for more information on his medical history, current admission and status, and all medications.      PSYCHIATRIC HISTORY:  As above in the HPI.  Mr. Walton has never sought care from a mental health provider.  He states his belief that his mood prior to 2016 was positive and unaffected by depression or anxiety.  No other significant psychiatric history was available at the time of this evaluation.      BEHAVIORAL IMPRESSIONS:  Mr. Walton presented for this evaluation sitting up in his wheelchair in his room on the ARC between scheduled rehabilitation therapies.  He was very open and welcoming of this contact.  At the same time, he appeared to be interested only in focusing on the topics that he wanted to discuss and tolerated a minimal level of redirecting and questions about other issues.  He reported his current mood as depressed and hopeless.  However, he seemed to differentiate between how he felt yesterday and the fact that he was not  "feeling actively suicidal today.  He also acknowledged a very high level of anxious thinking.  He exhibited a fairly euthymic affect with some variation.  Speech was clear, coherent and goal oriented.  Insight and judgment appear to be grossly intact.  He exhibited no evidence of distortions of thought or perception.      IMPRESSIONS AND PLAN:  Eduardo Walton is a 48-year-old man currently on the [facility] following a left below-knee amputation in the context of osteomyelitis and gas gangrene related to type 1 diabetes.  Mr. Walton reports that the high levels of depression that he is experiencing now were not present prior to 2016, and describes an increasing level of emotional distress as his health issues have become more complicated and affected his day-to-day functioning.  He also reports a significant level of anxious rumination that affects his sleep consistently.      Mr. Walton also has exhibited during this ARC admission a high level of personality traits that fit with his own self-assessment today that he can be \"stubborn and bull headed.\"  He has engaged in a high level of argumentative conversations with multiple health care providers in relation to his diabetes management, bladder management, and basic physical rehabilitation in a safe manner following lower extremity amputation.  He appears to exhibit a tendency to look only outside of himself for factors that affect his health in a negative way.  He appears to have little insight into the impact of his own behavioral choices on his diabetes management and other health issues.  It is promising that Mr. Walton was interested in the information that I provided today about research data reflecting positive results of meditation on the impact of stress.  He also volunteered his belief that having a mental health provider to speak with on a regular basis could potentially be helpful.  I will provide these possible referral options prior to " discharge on .      DIAGNOSES:   1.  Major depressive disorder, recurrent, severe (F33.2).   2.  Anxiety disorder, not otherwise specified (F41.9).   3.  Psychological and behavioral factors affecting medical condition (F54).         TATIANA KATZ, PHD, LP             *In accordance with the Rules of the [place] [employer], it is noted that psychological descriptions and scientific procedures underlying psychological evaluations have limitations.  Absolute predictions cannot be made based on information in this report.      D: 2019   T: 2019   MT: LATA      Name:     LILLY SAL   MRN:      [id number]        Account:       [id number]   :      1970           Consult Date:  2019      Document: [id number]

## 2019-03-02 NOTE — PROGRESS NOTES
Spoke with charge nurse/rehab team.  Patient wants to go home sooner, but wheelchair won't be delivered to unit until Monday morning 3/4.  Anticipate discharge 3/4; ride arranged through [facility] for  that day at 11:00.  [facility] was notified.

## 2019-03-02 NOTE — PLAN OF CARE
Dressing to posterior left thigh is intact. John-tech brace is on while in bed. Pt used own glucometer to check his BG, reported that the result was 58. No hypoglycemic symptoms was noted. Writer re-checked BG with the unit's glucometer, result was 137. Consumed 100% of dinner, sliding scale insulin provided per order. Medicated with Oxycodone every 4 hours. No bowel movement this shift.

## 2019-03-02 NOTE — PLAN OF CARE
Discharge Planner PT   Patient plan for discharge: home, w/c based with assist from parents temporarily,  PT.  Current status: SBA in room with setup assist from w/c level.  Barriers to return to prior living situation: consistency navigating stairs (has to complete 16 steps to access upper level bumping up bwd), w/c order (will be delivered on Sunday to rehab tech office).  Recommendations for discharge: continue to progress strength, endurance, and standing balance.  Rationale for recommendations: to safely and consistently navigate home from w/c level       Entered by: Francis Murray 03/01/2019 8:36 PM

## 2019-03-02 NOTE — PLAN OF CARE
"FOCUS/GOAL  Bowel management, Bladder management and Pain management    ASSESSMENT, INTERVENTIONS AND CONTINUING PLAN FOR GOAL:  AOX4 uses call light and able to make needs known. Mod A of 1 with stand-pivot transfers. C of B&B and using the BR with mod A of 1 with clothing management. BG of 315 at breakfast; obtained with hospital glucometer. Declined finger stick for lunch BG check saying \" the pain would be unbearable if I have to have a fingerstick every time BG is check, provided a blood glucose reading of 158. CO pain in LLEs managed with scheduled lyrica with some relief. Denies SOB and pleasant. Continue with POC.     "

## 2019-03-03 LAB
CREAT SERPL-MCNC: 1.15 MG/DL (ref 0.66–1.25)
GFR SERPL CREATININE-BSD FRML MDRD: 76 ML/MIN/{1.73_M2}
GLUCOSE BLDC GLUCOMTR-MCNC: 118 MG/DL (ref 70–99)
GLUCOSE BLDC GLUCOMTR-MCNC: 184 MG/DL (ref 70–99)
GLUCOSE BLDC GLUCOMTR-MCNC: 263 MG/DL (ref 70–99)
PLATELET # BLD AUTO: 592 10E9/L (ref 150–450)

## 2019-03-03 PROCEDURE — 97530 THERAPEUTIC ACTIVITIES: CPT | Mod: GO

## 2019-03-03 PROCEDURE — 82565 ASSAY OF CREATININE: CPT | Performed by: PHYSICAL MEDICINE & REHABILITATION

## 2019-03-03 PROCEDURE — 36415 COLL VENOUS BLD VENIPUNCTURE: CPT | Performed by: PHYSICAL MEDICINE & REHABILITATION

## 2019-03-03 PROCEDURE — 25000132 ZZH RX MED GY IP 250 OP 250 PS 637: Performed by: PHYSICAL MEDICINE & REHABILITATION

## 2019-03-03 PROCEDURE — 00000146 ZZHCL STATISTIC GLUCOSE BY METER IP

## 2019-03-03 PROCEDURE — 25000131 ZZH RX MED GY IP 250 OP 636 PS 637: Performed by: NURSE PRACTITIONER

## 2019-03-03 PROCEDURE — 12800006 ZZH R&B REHAB

## 2019-03-03 PROCEDURE — 97530 THERAPEUTIC ACTIVITIES: CPT | Mod: GP | Performed by: PHYSICAL THERAPIST

## 2019-03-03 PROCEDURE — 85049 AUTOMATED PLATELET COUNT: CPT | Performed by: PHYSICAL MEDICINE & REHABILITATION

## 2019-03-03 PROCEDURE — 25000128 H RX IP 250 OP 636: Performed by: PHYSICAL MEDICINE & REHABILITATION

## 2019-03-03 RX ADMIN — PREGABALIN 100 MG: 100 CAPSULE ORAL at 14:57

## 2019-03-03 RX ADMIN — INSULIN LISPRO 3 UNITS: 100 INJECTION, SOLUTION INTRAVENOUS; SUBCUTANEOUS at 09:37

## 2019-03-03 RX ADMIN — PREGABALIN 100 MG: 100 CAPSULE ORAL at 09:04

## 2019-03-03 RX ADMIN — OXYCODONE HYDROCHLORIDE 10 MG: 5 TABLET ORAL at 18:39

## 2019-03-03 RX ADMIN — INSULIN DEGLUDEC INJECTION 20 UNITS: 100 INJECTION, SOLUTION SUBCUTANEOUS at 09:03

## 2019-03-03 RX ADMIN — ENOXAPARIN SODIUM 40 MG: 40 INJECTION SUBCUTANEOUS at 09:03

## 2019-03-03 RX ADMIN — OXYCODONE HYDROCHLORIDE 10 MG: 5 TABLET ORAL at 09:46

## 2019-03-03 RX ADMIN — PREGABALIN 100 MG: 100 CAPSULE ORAL at 21:15

## 2019-03-03 RX ADMIN — ASPIRIN 81 MG: 81 TABLET, COATED ORAL at 09:03

## 2019-03-03 NOTE — PLAN OF CARE
FOCUS/GOAL  Medication management and Medical management    ASSESSMENT, INTERVENTIONS AND CONTINUING PLAN FOR GOAL:  A&O, A1 Pivot to WC.  No alarms, makes needs known.  Pt denied pain.  Continent of B/B, LBM 3/3.  Denied BG check at 2AM.

## 2019-03-03 NOTE — PROGRESS NOTES
[facility] [place]   Physical Medicine and Rehabilitation Daily Note           Assessment and Plan of Care:   Mr. Eduardo Walton is a 48 year old right-hand-dominant male with a history of DM I who is status post left transtibial amputation for osteomyelitis and gas gangrene.  He is planning to discharge on Monday, 3/4/2019.    --Patient refusing lisinopril today - states that it causes vision changes for him. Last two BP readings have been 168/70 and 177/73. Patient feels his BP is falsely elevated due to white coat syndrome. He is requesting to not receive the lisinopril at this time. He will follow his BP closely at home and make an appointment with his PCP upon discharge to discuss need for anti-hypertensives if needed. I believe this plan is reasonable at this time given that prior to today, his BP had been ok. Declining any prn hydralazine as well. Will continue to monitor. On track for home discharge 3/4/19. He received his manual wheelchair today.            Interval history:   Patient seen and examined at bedside. He is feeling well today. Refusing his lisinopril because he feels it makes his vision blurry. Otherwise, denies fever, chills, CP, SOB, N/V, abdominal pain, new pain or weakness/numbness/tingling. He received his manual wheelchair today.            Physical Exam:   VS:   Vitals:    03/02/19 1352 03/02/19 1600 03/03/19 0840 03/03/19 0908   BP: 140/62 138/57 177/73 168/70   BP Location: Right arm Right arm Right arm Right arm   Pulse:  76 73    Resp:  16 16    Temp:  96.9  F (36.1  C) 97.2  F (36.2  C)    TempSrc:  Oral Oral    SpO2:  95% 96%    Weight:       Height:         Gen: NAD, resting comfortably in bed  Heart: RRR  Lungs: breathing unlabored on room air  Abd: soft and non-tender  Ext: no edema in BLE, L BKA in knee immobilizer and stump protector  MSK/neuro: Alert and oriented, speech fluent, moves all four extremities volitionally.          Data:   Scheduled  meds    aspirin  81 mg Oral Daily     enoxaparin  40 mg Subcutaneous Q24H     insulin degludec  20 Units Subcutaneous QAM     insulin lispro  1-10 Units Subcutaneous TID w/meals     insulin lispro  1-5 Units Subcutaneous TID AC     lisinopril  5 mg Oral Daily     pregabalin  100 mg Oral TID       PRN meds:  acetaminophen, bisacodyl, glucose **OR** dextrose **OR** glucagon, docusate sodium, insulin lispro, loperamide, mineral oil-hydrophilic petrolatum, naloxone, ondansetron, oxyCODONE, sennosides      Ann Clayton  Physical Medicine and Rehabilitation     I spent a total of 15 minutes face-to-face and managing the care of Eduardo Walton. Over 50% of my time on the unit was spent counseling the patient and coordinating care. Please see note for details.

## 2019-03-03 NOTE — PLAN OF CARE
VSS. A/Ox's 4. Pain rated as tolerable. Oxycodone for pain control. Numbness/tingly in all extremities.  Pt has pain in fingertips.  Shooting neuropathic pain in LBKA. Tolerated consistent carb diet. Denied any nausea, CP, SOB, lightheadedness or dizziness. Voiding without pain or difficulty. Passing flatus. Pt reports a BM today and requested immodium x1 which he usually takes daily.  Resting in bed at this time with call light in reach and able to make needs known.

## 2019-03-03 NOTE — PLAN OF CARE
Pt. Refused OT this am. States he wasn't supposed to be here today and shouldn't have any therapy. Declines completing shower, or a sponge bath. Therapist educated on I day and completing tasks prior to going home, and pt. Reports he would be agreeable to working on the stairs with PT this pm session, but that is all he will do. Notified nursing that he refused shower, and notified PT of refusal as well as his willingness to complete stairs this pm.

## 2019-03-03 NOTE — PROGRESS NOTES
Diabetes Consult Daily  Progress Note          Assessment/Plan:     Eduardo Walton is a 48 year old male with history of type 1 diabetic, hypertension, CKD ( baseline Cr. 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 Admitted to ARU on  (2/26/2019).    BG trending down during the day, then running higher again at HS and overnight.  Pt thinks this is due to the apple sauce in PM and requests the order for this be discontinued.    Plan:-   -order for apple sauce at HS discontinue  - continue with tresiba 20 units ( 0800)  -Novolog for meals and snacks: continue 1unit/25g of CHO -round up on doses in the evening especially.  -Novolog for correction: 1 unit per 50 > 150 before meals, no HS coverage  -test glucose before meals, Hs and 0200  -may use Freestyle Drew for glucose testing at 0200, if glucose < 100 will need POC testing (all insulin decisions need to be based on hospital glucometer reading).  -will continue to follow          Outpatient diabetes follow up: Pt would like switch to [facility]- will provide phone number for clinic coordinator in [facility] for pt to schedule follow up appointment in 1-2 weeks.  Pt would like to get back on a CGM and he is really interested in pursuing an ambulatory pump.  Plan discussed with patient, bedside RN.           Interval History:     The last 24 hours progress and nursing notes reviewed.  Eduardo continues to feel well, hoping to discharge tomorrow.  HIs glucose continues to run high overnight and he thinks it is due to the applesauce he is having in the evenings- consumed around 7pm last night.  It is 13g CHO- so he has not been getting aspart for this.  I suggested he get 1 unit, Eduardo said he would rather just have the order for apple sauce discontinued- which I did.  BG trending down during the day otherwise, prior to spike overnight.    Eduardo is very motivated to get better control of  "glucoses.  He is admitting that there are things he could do that may improve control (ie bolus for carbs more consistently), but I suspect a lot of his seeming lack of consistency with insulin dosing at home is due to feeling discouraged b/c of unpredictable glucoses.  Missing insulin doses will make glucose control more unpredictable, no doubt.  But based on pt's report, he may also have some gastroparesis that is making this more challenging and unpredictable.  He seems excited about the prospect of getting back on a CGM, and he would really like to try a pump.  He had questions about the different models.  We discussed the importance of getting follow up appointment with endo first to review insurance coverage and pump/CGM eligibility.  I think if pt feels like his options are not limited (references many times that past endocrine providers told him he couldn't get a pump) than he is more motivated to get better control of glucoses.    PTA Regimen:  degludec 28   Humalog 1 unit per 2 CHO choices ( or 30 grams)  Humalog 1 unit per 50 > 150 before meals  No HS correction  Uses Freestyle Drew          Recent Labs   Lab 03/03/19  0735 03/02/19  2235 03/02/19  1814 03/02/19  0808 03/02/19  0204 03/01/19  2129  02/26/19  0643  02/25/19  0644   GLC  --   --   --   --   --   --   --  263*  --  246*   * 196* 138* 313* 252* 201*   < >  --    < >  --     < > = values in this interval not displayed.               Review of Systems:   See interval hx          Medications:     Orders Placed This Encounter      Combination Diet [id number] Calories: Moderate Consistent CHO (4-6 CHO units/meal)    Physical Exam:  Gen: Alert, resting in bed, in NAD   HEENT: NC/AT, mucous membranes are moist  Resp: Unlabored  Ext: L BKA.  Neuro:oriented x3, communicating clearly  /70 (BP Location: Right arm)   Pulse 73   Temp 97.2  F (36.2  C) (Oral)   Resp 16   Ht 1.753 m (5' 9\")   Wt 79.8 kg (176 lb)   SpO2 96%   BMI 25.99 " kg/m             Data:     Lab Results   Component Value Date    A1C 10.9 02/05/2019            Recent Labs   Lab Test 03/03/19  0746 02/28/19  1137 02/26/19  0643 02/25/19  0644   NA  --   --  139 138   POTASSIUM  --   --  5.2 4.4   CHLORIDE  --   --  102 101   CO2  --   --  33* 30   ANIONGAP  --   --  4 7   GLC  --   --  263* 246*   BUN  --   --  28 28   CR 1.15 1.05 1.23 1.23   GABRIELA  --   --  8.7 9.0     CBC RESULTS:   Recent Labs   Lab Test 03/03/19  0746  02/26/19  0643   WBC  --   --  10.8   RBC  --   --  3.36*   HGB  --   --  9.1*   HCT  --   --  29.9*   MCV  --   --  89   MCH  --   --  27.1   MCHC  --   --  30.4*   RDW  --   --  15.7*   *   < > 741*    < > = values in this interval not displayed.     I spent a total of 35 minutes bedside and on the inpatient unit managing the glycemic care of Eduardo GARTH Franco. Over 50% of my time on the unit was spent counseling the patient and/or coordinating care regarding glucose management with ongoing overnight hyperglycemia, long discussion on CGMs and pumps- pt with many questions/thoughts.  See note for details.    Ella Malik PA-C [phone]  Diabetes Management job code [id number]

## 2019-03-03 NOTE — PLAN OF CARE
Occupational Therapy Discharge Summary    Reason for therapy discharge:    Discharged to home with home therapy.  Pt. Declined all OT therapy on I day- refused shower with therapy and nursing. Also refused sponge bath.     Progress towards therapy goal(s). See goals on Care Plan in [facility] electronic health record for goal details.  Goals partially met.  Barriers to achieving goals:   Pt. Refusal to participate .    Therapy recommendation(s):    Recommenda home care therapies upon discontinue home.

## 2019-03-03 NOTE — PLAN OF CARE
discharge completed this date. Pt. Refused to complete I day activities. Refused shower or sponge bath. Notified nursing to follow up, he refused for them as well. Refused to practice wc transfer with new wc that was delivered today. Reviewed HEP with him- he is Mod I.

## 2019-03-03 NOTE — PLAN OF CARE
"FOCUS/GOAL  Bowel management, Bladder management and Pain management    ASSESSMENT, INTERVENTIONS AND CONTINUING PLAN FOR GOAL:  AOX4, uses call light and able to make needs known. Mod A of 1 with stand-pivot transfers. Cotinent of B&b and using the BR with min A of 1 with clothing management. Declined lisinopril this morning saying \" it make me have blurry eyes\" , MD informed. CO pin in LLE managed with PRN oxycodone with relief. Denies SOB, pleasant and cooperative with care. Continue to monitor BP and with POC    "

## 2019-03-03 NOTE — PLAN OF CARE
Physical Therapy Discharge Summary    Reason for therapy discharge:    Discharged to home with home therapy.    Progress towards therapy goal(s). See goals on Care Plan in [facility] electronic health record for goal details.  Goals partially met.  Barriers to achieving goals:   patient refused floor transfer and car transfer; declines need to complete at this time and will focus on areas at home. Patient issued BKA HEP and mirror therapy handout; not independent due to limited time for performance; would benefit from follow up in HH PT. Patient resistant to many of the therapy recommendations/techniques, however patient demonstrates independence with all basic functional mobility and will have 24/7 supervision/assist from wife and parents at home .    Therapy recommendation(s):    Continued therapy is recommended.  Rationale/Recommendations:  recommend  PT for home safety evaluation and continued progression of independence on HEP and higher level functional mobility tasks.

## 2019-03-04 ENCOUNTER — DOCUMENTATION ONLY (OUTPATIENT)
Dept: CARE COORDINATION | Facility: CLINIC | Age: 49
End: 2019-03-04

## 2019-03-04 VITALS
WEIGHT: 176 LBS | DIASTOLIC BLOOD PRESSURE: 72 MMHG | RESPIRATION RATE: 16 BRPM | HEART RATE: 78 BPM | OXYGEN SATURATION: 94 % | BODY MASS INDEX: 26.07 KG/M2 | TEMPERATURE: 97.2 F | HEIGHT: 69 IN | SYSTOLIC BLOOD PRESSURE: 172 MMHG

## 2019-03-04 PROCEDURE — 25000132 ZZH RX MED GY IP 250 OP 250 PS 637: Performed by: PHYSICAL MEDICINE & REHABILITATION

## 2019-03-04 RX ORDER — ASPIRIN 325 MG
325 TABLET, DELAYED RELEASE (ENTERIC COATED) ORAL DAILY
Qty: 30 TABLET | Refills: 0 | COMMUNITY
Start: 2019-03-04 | End: 2019-04-26

## 2019-03-04 RX ADMIN — LOPERAMIDE HYDROCHLORIDE 2 MG: 2 CAPSULE ORAL at 08:32

## 2019-03-04 RX ADMIN — INSULIN DEGLUDEC INJECTION 20 UNITS: 100 INJECTION, SOLUTION SUBCUTANEOUS at 09:14

## 2019-03-04 RX ADMIN — INSULIN LISPRO 1 UNITS: 100 INJECTION, SOLUTION INTRAVENOUS; SUBCUTANEOUS at 09:15

## 2019-03-04 RX ADMIN — OXYCODONE HYDROCHLORIDE 10 MG: 5 TABLET ORAL at 08:32

## 2019-03-04 RX ADMIN — ASPIRIN 81 MG: 81 TABLET, COATED ORAL at 08:19

## 2019-03-04 RX ADMIN — PREGABALIN 100 MG: 100 CAPSULE ORAL at 08:32

## 2019-03-04 NOTE — PROGRESS NOTES
Diabetes Consult Daily  Progress Note          Assessment/Plan:     Eduardo Walton is a 48 year old male with history of type 1 diabetic, hypertension, CKD ( baseline Cr. 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 Admitted to ARU on  (2/26/2019).    BG improved with pt skipping HS snack.  He is pleased with current glucose control and plans to continue following same insulin regimen at home.    Plan for hospital and home:  - continue with tresiba 20 units ( 0800)  -Novolog for meals and snacks: continue 1unit/25g of CHO -round up on doses in the evening especially.  -Novolog for correction: 1 unit per 50 > 150 before meals, no HS coverage  -test glucose before meals, Hs and 0200  -may use Freestyle Drew for glucose testing at 0200, if glucose < 100 will need POC testing (all insulin decisions need to be based on hospital glucometer reading).    Insulin plan and phone number for Endo clinic included in AVS for pt-- RN to let pt know that appointment was scheduled with Ashlie Pedraza PA-C on 3/11 at 10:45am.         Outpatient diabetes follow up: Pt would like switch to [facility]- Pt would like to get back on a CGM and he is really interested in pursuing an ambulatory pump.  Plan discussed with patient, bedside RN.           Interval History:     The last 24 hours progress and nursing notes reviewed.  Eduardo is discharging to home today.  He is pleased with recent glucose values in past 24h- and had noted that his morning glucose was much improved after he skipped HS snack last night.  He continues to feel motivated to make changes, but it should be noted that despite this motivation he did refuse glucose checks yesterday midday and this morning (he checked on his Drew instead).  He informed me that he and his wife are already contacting his insurance company to see what pumps he may have coverage for, and he plans to set  "up appointment at [facility] soon (later I noticed on discharge papers that he is already scheduled at the clinic mid March- RN said he would point this out to pt).    PTA Regimen:  degludec 28   Humalog 1 unit per 2 CHO choices ( or 30 grams)  Humalog 1 unit per 50 > 150 before meals  No HS correction  Uses Freestyle Drew          Recent Labs   Lab 03/03/19  2114 03/03/19  1709 03/03/19  0735 03/02/19  2235 03/02/19  1814 03/02/19  0808  02/26/19  0643   GLC  --   --   --   --   --   --   --  263*   * 118* 263* 196* 138* 313*   < >  --     < > = values in this interval not displayed.               Review of Systems:   See interval hx          Medications:     Orders Placed This Encounter      Combination Diet 8105-0806 Calories: Moderate Consistent CHO (4-6 CHO units/meal)      Diet    Physical Exam:  Gen: Alert, sitting, in NAD   HEENT: NC/AT, mucous membranes are moist  Resp: Unlabored  Ext: L BKA.  Neuro:oriented x3, communicating clearly  /72 (BP Location: Right arm)   Pulse 78   Temp 97.2  F (36.2  C) (Oral)   Resp 16   Ht 1.753 m (5' 9\")   Wt 79.8 kg (176 lb)   SpO2 94%   BMI 25.99 kg/m             Data:     Lab Results   Component Value Date    A1C 10.9 02/05/2019            Recent Labs   Lab Test 03/03/19  0746 02/28/19  1137 02/26/19  0643 02/25/19  0644   NA  --   --  139 138   POTASSIUM  --   --  5.2 4.4   CHLORIDE  --   --  102 101   CO2  --   --  33* 30   ANIONGAP  --   --  4 7   GLC  --   --  263* 246*   BUN  --   --  28 28   CR 1.15 1.05 1.23 1.23   GABRIELA  --   --  8.7 9.0     CBC RESULTS:   Recent Labs   Lab Test 03/03/19  0746  02/26/19  0643   WBC  --   --  10.8   RBC  --   --  3.36*   HGB  --   --  9.1*   HCT  --   --  29.9*   MCV  --   --  89   MCH  --   --  27.1   MCHC  --   --  30.4*   RDW  --   --  15.7*   *   < > 741*    < > = values in this interval not displayed.     Ella Malik PA-C [phone]  Diabetes Management job code [id number]            "

## 2019-03-04 NOTE — DISCHARGE SUMMARY
[facility]   Acute Rehabilitation Unit  Discharge summary     Date of Admission: 2/25/2019  Date of Discharge: 3/4/2019  Disposition: Home  Primary Care Physician: Efren Conway  Attending physician: Evangelina Chris MD  Other significant physician provider(s): Diabetes team      discharge diagnosis      Left transtibial amputation    DM I, HTN, phantom pain, neuropathy, CAD, ABLA, thrombocytosis, medical device related pressure injury       brief summary (per hpi)  Eduardo Walton is a 48 year old right hand dominant male with a PMH including but not limited to IDDM (HbA1c 10.9), CKD II, CAD s/p NSTEMI and SUMANTH (2010)  who is admitted to the acute inpatient rehabilitation unit s/p left transtibial amputation.  He initially presented to the hospital on 2/4/19 with left foot swelling and fever, and imaging was suggestive of osteomyelitis and gas gangrene.  Amputation was recommended however the patient declined at that time and he was discharged home on Augmentin.  He then returned to the emergency room on 2/16/19 with ongoing fevers and worsening foot pain pain and appearance, and he ultimately proceeded with the amputation on 2/18/19 by Dr. Berman.  No intra-operative complications noted.  Post-operatively the patient had anemia requiring 2 units PRBCs.  Further complications include mild hypoglycemia needing a decrease in Degludec, YESSICA, and thrombocytosis thought to be reactive in nature.  Lisinopril and Norvasc added for BP control, but of note the patient has been very particular about his care and medications and refusing multiple recommendations (oppositional behavior).    REHABILITATION SUMMARY  Mr. Walton was admitted to the [facility] acute inpatient rehabilitation unit on 2/25 where he participated in PT and OT for 3 hrs/day.  From a medical standpoint his blood pressure was consistently on the higher side, however he refused to take Norvasc and towards the end  of his stay also refused to take Lisinopril as he felt it caused blurry vision, and that his high BPs are a result of being in the hospital and white coat syndrome.  He developed a medical device related pressure injury on the posterior thigh due to his [employer] brace, and was managed with Mepilex and the assistance of the wound care nurse.  The endocrinology team was consulted to assist with management of his diabetes.  At the time of discharge his wheelchair had been delivered and he was independent with wheelchair mobility, and only required setup assistance with ADLs He was able to bump up/down stairs to be able to stay on the main floor of his home.  His parents will be staying with him upon discharge to assist with IADLs.     MEDICAL COURSE  1)Ortho  -s/p L TTA due to osteomyelitis and gas gangrene               -NWB to LLE               -To wear RRD at all times               -Dressing changes every other day-                -Provide ongoing education including prevention of contractures and protecting the limb   -MDPRI - Mepilex daily.     2)CVS  -HTN:  Pt refusing Lisinopril and will follow up with his PCP.  He was able to understand and verbalize the possible risks of longterm HTN.  Mild LVH noted on echo.  -CAD with NSTEMI and stents (2010)  -Discussed with orthopedic team, will increase ASA to 325 mg daily for DVT prophylaxis until follow up with them.     3)Pulm  -No acute issues  -Encourage IS     4)FENGI  -Diet: Consistent carbohydrates; Regular consistency solids, thin liquids.  -PRN bowel meds for constipation: Colace, senna, Dulcolax suppository  -Cr 1.15 on 3/3, stable.      5)  -Pt having elevated PVRs but refusing caths.  Educated on risks, pt accepts.  -Acute on chronic kidney injury: Improved.  Creatinine 1.15 on 3/3, Peak creatinine 1.77.     6)DVT prophylaxis  -Increase ASA to 325 mg daily upon discharge for DVT ppx     7)Pain  -Pt did not want a prescription for Oxycodone upon discharge,  will use Tylenol only for pain management  -Continue PTA Lyrica 100 mg TID     8)Endo  -IDDM (HbA1c 10.9)                   -appreciate endocrinology consult for assistance with management              -Discharging regimen:                     -Tresiba 20 units daily.                              -Novolog for meals and snacks: continue 1unit/25g of CHO -round up on doses in the evening especially.                             -Novolog for correction: 1 unit per 50 > 150 before meals, no HS coverage                           -test glucose before meals, Hs and 0200                           -may use Freestyle Drew for glucose testing at 0200, if glucose < 100 will need POC testing (all insulin decisions need to be based on hospital glucometer reading).    9)Heme  -Acute blood loss anemia:  S/p 2 units PRBCs with appropriate incrementation.  Hgb 9.1 on 2/26, stable.   -Thrombocytosis: Platelets trending down, 592 on 3/3.      10)Psych  -Health psych consulted and followed along during stay    Follow up appointments: PCP, orthopedic surgery, endocrinology      dISCHARGE MEDICATIONS  Current Discharge Medication List      CONTINUE these medications which have CHANGED    Details   aspirin (ASA) 325 MG EC tablet Take 1 tablet (325 mg) by mouth daily  Qty: 30 tablet, Refills: 0    Comments: Take 325 mg dose until cleared by orthopedic surgeon to decrease back down to 81 mg  Associated Diagnoses: Hx of BKA, left (H)      !! insulin lispro (HUMALOG PEN) 100 UNIT/ML pen Inject 1-5 Units Subcutaneous 3 times daily (before meals)    Associated Diagnoses: Type 1 diabetes mellitus with diabetic polyneuropathy (H)      !! insulin lispro (HUMALOG PEN) 100 UNIT/ML pen Inject 1-10 Units Subcutaneous 3 times daily (with meals)    Associated Diagnoses: Type 1 diabetes mellitus with diabetic polyneuropathy (H)      !! insulin lispro (HUMALOG PEN) 100 UNIT/ML pen Inject 1-10 Units Subcutaneous Take with snacks or supplements for high  blood sugar    Associated Diagnoses: Type 1 diabetes mellitus with diabetic polyneuropathy (H)       !! - Potential duplicate medications found. Please discuss with provider.      CONTINUE these medications which have NOT CHANGED    Details   acetaminophen (TYLENOL) 325 MG tablet Take 2 tablets (650 mg) by mouth every 4 hours as needed for other (multimodal surgical pain management along with NSAIDS and opioid medication as indicated based on pain control and physical function.)  Qty: 40 tablet, Refills: 0    Associated Diagnoses: Osteomyelitis of left foot, unspecified type (H); Amputation of left lower extremity below knee (H)      Continuous Blood Gluc Sensor (FREESTYLE COLIN 14 DAY SENSOR) [facility] USE WITH 14 DAY COLIN SYSTEM. CHANGE SENSOR EVERY 14 DAYS.  Refills: 3      insulin degludec (TRESIBA FLEXTOUCH) 100 UNIT/ML pen Inject 20 Units Subcutaneous every morning  Qty: 4.2 mL, Refills: 0    Associated Diagnoses: Diabetic ulcer of toe of left foot associated with type 1 diabetes mellitus, with necrosis of bone (H)      loperamide (IMODIUM) 2 MG capsule Take 1 capsule (2 mg) by mouth 4 times daily as needed for diarrhea    Associated Diagnoses: Diabetic ulcer of toe of left foot associated with type 1 diabetes mellitus, with necrosis of bone (H)      order for DME Equipment being ordered: knee roller, will need for 4 months. Non weight bearing left foot.  Qty: 1 Device, Refills: 0    Associated Diagnoses: Diabetic ulcer of left heel associated with type 1 diabetes mellitus, unspecified ulcer stage (H); Left foot infection; Diabetic ulcer of toe of left foot associated with type 1 diabetes mellitus, with necrosis of muscle (H)      pregabalin (LYRICA) 100 MG capsule Take 1 capsule (100 mg) by mouth 3 times daily  Qty: 90 capsule, Refills: 0    Associated Diagnoses: Diabetic ulcer of toe of left foot associated with type 1 diabetes mellitus, with necrosis of bone (H)         STOP taking these medications        amLODIPine (NORVASC) 5 MG tablet Comments:   Reason for Stopping:         enoxaparin (LOVENOX) 40 MG/0.4ML syringe Comments:   Reason for Stopping:         lisinopril (PRINIVIL/ZESTRIL) 5 MG tablet Comments:   Reason for Stopping:         ondansetron (ZOFRAN-ODT) 4 MG ODT tab Comments:   Reason for Stopping:         oxyCODONE (ROXICODONE) 5 MG tablet Comments:   Reason for Stopping:         senna-docusate (SENOKOT-S/PERICOLACE) 8.6-50 MG tablet Comments:   Reason for Stopping:                 DISCHARGE INSTRUCTIONS AND FOLLOW UP  Discharge Procedure Orders   Home care nursing referral   Referral Type: Home Health Therapies & Aides   Number of Visits Requested: 1     Home Care PT Referral for Hospital Discharge   Referral Type: Home Health Therapies & Aides   Number of Visits Requested: 1     Home Care OT Referral for Hospital Discharge   Number of Visits Requested: 1     Reason for your hospital stay   Order Comments: Rehabilitation after left transtibial amputation     Adult [facility] Follow-up and recommended labs and tests   Order Comments: Follow up with primary care provider, Efren Conway, within 7 days to evaluate medication change.  The following labs/tests are recommended: Check blood pressure.      Appointments on [place] and/or [facility] (with [facility] or [facility] provider or service). Call [phone] if you haven't heard regarding these appointments within 7 days of discharge.     Activity   Order Comments: Your activity upon discharge: activity as tolerated with wheelchair     Order Specific Question Answer Comments   Is discharge order? Yes      When to contact your care team   Order Comments: Call your primary doctor if you have any of the following: Increased drainage or redness of the surgical incision, opening of incision, chest pain, shortness of breath, fevers, chills, or any other symptom may find concerning.     Wound care and dressings   Order Comments: Instructions to care for your wound  at home: as directed.     MD face to face encounter   Order Comments: Documentation of Face to Face and Certification for Home Health Services    I certify that patient: Eduardo Walton is under my care and that I, or a nurse practitioner or physician's assistant working with me, had a face-to-face encounter that meets the physician face-to-face encounter requirements with this patient on: 3/4/2019.    This encounter with the patient was in whole, or in part, for the following medical condition, which is the primary reason for home health care: Left below knee amputation.    I certify that, based on my findings, the following services are medically necessary home health services: Nursing, Occupational Therapy and Physical Therapy.    My clinical findings support the need for the above services because: Nurse is needed: To assess vital signs and evaluate surgical incision after left below knee amputation.., Occupational Therapy Services are needed to assess and address ADL safety due to impairment in mobility due to L below knee amputation. and Physical Therapy Services are needed to assess and treat the following functional impairments: mobility due to left below knee amputation.    Further, I certify that my clinical findings support that this patient is homebound (i.e. absences from home require considerable and taxing effort and are for medical reasons or [employer] services or infrequently or of short duration when for other reasons) because: Requires assistance of another person or specialized equipment to access medical services because patient: Is unable to exit home safely on own due to: Left below knee amputation, multiple stairs in home    Based on the above findings. I certify that this patient is confined to the home and needs intermittent skilled nursing care, physical therapy and/or speech therapy.  The patient is under my care, and I have initiated the establishment of the plan of care.  This patient  will be followed by a physician who will periodically review the plan of care.  Physician/Provider to provide follow up care: Efren Conway    Attending hospital physician (the Medicare certified PECOS provider): Evangelina Chris  Physician Signature: See electronic signature associated with these discharge orders.  Date: 3/4/2019     Diet   Order Comments: Follow this diet upon discharge: Orders Placed This Encounter      Room Service      Snacks/Supplements Adult: Gelatein Plus; With Meals      Combination Diet 5634-4315 Calories: Moderate Consistent CHO (4-6 CHO units/meal)     Order Specific Question Answer Comments   Is discharge order? Yes           physical examination    Most recent Vital Signs:   Vitals:    03/02/19 1600 03/03/19 0840 03/03/19 0908 03/03/19 1837   BP: 138/57 177/73 168/70 172/72   BP Location: Right arm Right arm Right arm Right arm   Pulse: 76 73  78   Resp: 16 16  16   Temp: 96.9  F (36.1  C) 97.2  F (36.2  C)  97.2  F (36.2  C)   TempSrc: Oral Oral  Oral   SpO2: 95% 96%  94%   Weight:       Height:           GEN: NAD  HEENT: NC/AT  PULM: non labored clear on room air.   CV: RRR, S1+S2, no m/r/g  ABD: Soft, NT, ND, bowel sounds present  RLE: +edema and dry skin, no calf tenderness  L Residual limb: Incision closed with sutures, c/d/i.  No drainage or erythema seen.  On the posterior aspect of the thigh there is erythema from MDPRI and a small area of skin breakage (<3-4 mm) on the medial aspect.    Neuro: Answers appropriately, follows commands      40 minutes spent in discharge, including >50% in counseling and coordination of care, medication review and plan of care recommended on follow up.     Discharge summary was forwarded to Efren Conway (PCP) at the time of discharge, so as to bridge from hospital to outpatient care.     Please do not hesitate to contact me should there be questions regarding the hospital course or discharge plan.        Michael Chris MD  Department of  Rehabilitation Medicine  Pager: [phone]

## 2019-03-04 NOTE — PLAN OF CARE
FOCUS/GOAL  Medication management and Medical management     ASSESSMENT, INTERVENTIONS AND CONTINUING PLAN FOR GOAL:  A&O, A1 Pivot to WC.  No alarms, makes needs known.  Pt denied pain.  Continent of B/B, LBM 3/4.  Denied BG check at 2AM.

## 2019-03-04 NOTE — CONSULTS
"    Health Psychology                  Clinic    Department of Medicine  Teri Disla, Ph.D., L.P. [phone]                          Clinics and Surgery Center  [facility] Gen Crabtree, Ph.D.,  L.P. [phone]                 3rd Floor  [place] Mail Code [address]08   Berkley Angel, Ph.D., L.P. [phone]    [address] Ana Velasquez, Ph.D., L.P. [phone]            [place] [address]          Perry Dumas, Ph.D., A.B.OMAR.P., L.P. [phone]      Carrie Hsieh, Ph.D., L.P. [phone]      Inpatient Health Psychology Consultation*    DOS: 3/4/19    Clinical Information:  Mr Walton was preparing for discharge, noted that many people have been meeting with him this morning and joked that he was \"feeling special.\" Affect positive. Talked about his confusion re why so much emphasis is put on his ability to navigate stairs, given that he will \"only need to go up 15 to get in\" and than will be staying home with home-based rehab therapies and other home care.Does some venting about his frustration that nursing continues to want him to participate in allowing them to check vitals, stating that he \"is done with that.\" Also states his belief that he will have no need to worry about blood pressure once home as he will be \"stress free\" given his beliefs that impact of hospitalization is the only source of his stress. When I expressed some mild skepticism that learning to navigate his home now that he is post-amputation may not be truly stress free, he did acknowledge that he will have a learning curve.  He recalled having asked me for options for mental health providers in his home area, and was appreciative of the options that I provided. Reminded him of the interest that he expressed last week in skills that draw on neuroscience research and indicated the providers that may be more likely to work from that perspective. Strong " encouragement to follow through with this plan.  Affect euthymic.  Assessment: Mr Walton continues to express beliefs that are inconsistent with standards of care regarding management of his diabetes and blood pressure, as well as expressing skepticism about the guidelines for safe progress in some situations.  Diagnosis:     1.  Major depressive disorder, recurrent, severe (F33.2).   2.  Anxiety disorder, not otherwise specified (F41.9).   3.  Psychological and behavioral factors affecting medical condition (F54).  Recommendations/Plan: Strong encouragement to follow up with intention to establish care with a mental health provider who has experience with chronic illness.  Time Spent with Patient: 16 minutes (In: 1038 Out: 1054)  Service Provided: Individual psychotherapy   Provider: Teri Disla, Ph.D,    Provider Pager: [phone]   Provider Phone: [phone]    *In accordance with the Rules of the [place] [employer], it is noted that psychological descriptions and scientific procedures underlying psychological evaluations have limitations.  Absolute predictions cannot be made based on information in this report.

## 2019-03-04 NOTE — DISCHARGE INSTRUCTIONS
PCP 1-2 weeks  Orthopedics (Follow up with Dr. Berman in 1-2 weeks) [facility] ortho [address] [place] [address] second floor. [phone]. YOU ARE SCHEDULED MARCH 7TH AT 8:30 AM.      DIABETES  -Tresiba 20 units every morning  -Humalog for meals: 1unit per 25grams carbohydrate-- try to take this with ALL carb intake!  -Humalog for correction based on the following sliding scale:    For blood sugars checked BEFORE meals:  Do Not give Correction Insulin if Pre-Meal BG less than 150  For Pre-Meal  to 199 give 1 units.  For Pre-Meal  to 249 give 2 units.  For Pre-Meal  to 299 give 3 units.  For Pre-Meal  to 349 give 4 units.  For Pre-Meal  or greater  give 5 units.    -check blood sugar before meals and bedtime.    Diabetes follow-up: Call [facility] coordinator: [phone], to schedule your outpatient diabetes appointment.  Recommend you be seen 1-3 weeks from discharge.

## 2019-03-04 NOTE — PLAN OF CARE
Patient decline vital signs checked this morning, also declined his lisinopril, lovenox and [place] BG check. Use the number form his BG machine which was not accurate when compared to [place] glucometers, risks and benefit of declining his meds and treatment plan explain to patient but he proceeded to declined, PMR notified. Patient at this time discharged from the unit att 1115, transported by [facility], [facility] at the time, discharge summary explained to patient.

## 2019-03-04 NOTE — PLAN OF CARE
Patient alert and oriented. Using call light appropriately to make needs known. Patient had elevated BP of 172/72, Patient state BP is high due to him being in the hospital, was seen this AM for elevated BP, please see doctor's note. Blood glucose this shift were 118 prior to dinner and 184 at bedtime. PRN oxy given for pain at incision site. Patient refusing BKA dressing change, stating it was done yesterday, therefore it doesn't need to be done until tomorrow. Continent of bladder in toilet. No BM this shift. Patient plans to discharge tomorrow. Bed in low position and call light within patient reach.

## 2019-03-04 NOTE — PROGRESS NOTES
[facility]  Met with pt to discuss plans to resume home care.  Pt to be discharged home 03 04 19   and has agreed to have [facility] resume services.  Patient care support center processing referral.  Pt verbalized understanding that resumption of care visit is scheduled for 03 05 19 or 03 06 19.  Pt has 24 hour phone number for [facility] for any questions or concerns.

## 2019-03-06 ENCOUNTER — DOCUMENTATION ONLY (OUTPATIENT)
Dept: CARE COORDINATION | Facility: CLINIC | Age: 49
End: 2019-03-06

## 2019-03-06 NOTE — PROGRESS NOTES
[facility] now requests orders and shares plan of care/discharge summaries for some patients through [email or web address].  Please REPLY TO THIS MESSAGE OR ROUTE BACK TO THE AUTHOR in order to give authorization for orders when needed.  This is considered a verbal order, you will still receive a faxed copy of orders for signature.  Thank you for your assistance in improving collaboration for our patients.    ORDER  PT and OT to eval and treat for adaptive equipment assessment, home exercise programming, transfer education.   SW to assist with transportation concerns.   Skilled Nursing  every other day for 2 weeks, then 2x/week for 3wks, 1x/week for 1 wk to assess and educate on DM managment, incisional care and wound care management as well as medication education and reconciliation.     MD SUMMARY/PLAN OF CARE  JAME summary  Pt is being reassessed for continued home care services following a hospital and TCU stay after a Left transtibial amputation. Pt is coping well so far, he makes statements of hope and is motivated to improve and become independent again. He is particular with care and needs to agree with plan of care before proceding with cares. His incision is CDI and covered with some adaptic and wrapped in kerlix and secured with paper tape. He has a pressure sore to his posterior thigh that is covered with a silicone dressing. He has been trying to be independent but is concerned about ability and accessability in the home. He parents, who are retired, are currently staying with him in his home to provide 24 hour care and supervision. Pt BP has been high however he declined any additional medications due to feeling that is was due to being in the facilities but it remains high at home today at 162/84. He has several f/u appointments made but is concerned about getting to them, he declined working on car transfers in the TCU but is willing to try at home. His BG levels continue to fluctuate and he states  he is planning to look into a insulin pump to help better regulate this. 250 BG average the past couple of days.   BACKGROUND  Dx include:DM I, HTN, phantom pain, neuropathy, CAD, ABLA, thrombocytosis, medical device related pressure injury. PMH including but not limited to IDDM (HbA1c 10.9), CKD II, CAD s/p NSTEMI and SUMANTH (2010)  who is admitted to the acute inpatient rehabilitation unit s/p left transtibial amputation.  He initially presented to the hospital on 2/4/19 with left foot swelling and fever, and imaging was suggestive of osteomyelitis and gas gangrene.  Amputation was recommended however the patient declined at that time and he was discharged home on Augmentin.  He then returned to the emergency room on 2/16/19 with ongoing fevers and worsening foot pain pain and appearance, and he ultimately proceeded with the amputation on 2/18/19 by Dr. Berman.      Thank you,   Blanca [facility]  [email or web address]   Office: [phone]  Cell: [phone]

## 2019-03-08 NOTE — PROGRESS NOTES
Efren Conway DO Tessmer, Samantha 3 hours ago (1:09 PM)      Orders approved as described.     Efren Conway DO   3/8/2019 1:09 PM    Routing Comment      Above routed to caller. Aida Friend R.N.

## 2019-03-08 NOTE — PROGRESS NOTES
HAMZAH DEE home care nurse is calling again to get the orders approved that she send on Wed. 3-6-2019.   Please get back to here as soon as you can.

## 2019-03-11 ENCOUNTER — TELEPHONE (OUTPATIENT)
Dept: FAMILY MEDICINE | Facility: CLINIC | Age: 49
End: 2019-03-11

## 2019-03-11 NOTE — TELEPHONE ENCOUNTER
Date Forms was received: March 11, 2019    Forms received by: Fax    Purpose of Form:  [employer]    When the form is due:  ASAP    How the form needs to be returned for patient:  Fax    Form currently placed  SW inbox

## 2019-03-12 ENCOUNTER — TELEPHONE (OUTPATIENT)
Dept: FAMILY MEDICINE | Facility: CLINIC | Age: 49
End: 2019-03-12

## 2019-03-12 ENCOUNTER — PATIENT OUTREACH (OUTPATIENT)
Dept: CARE COORDINATION | Facility: CLINIC | Age: 49
End: 2019-03-12

## 2019-03-12 NOTE — TELEPHONE ENCOUNTER
Spoke with Sejal and gave verbal orders as listed in encounter. Sejal verbalized understanding and agrees with plan.    Will call back if further questions or concerns.    Jhoana Dsouza, RN, BSN

## 2019-03-12 NOTE — PROGRESS NOTES
Clinic Care Coordination Contact  Care Coordination Communication    Home Care Contact:              Home Care Agency: [facility]               Care Coordination contacted home care: No - RNCC verified patient's status via Homecare Advisor PROD application              Start of care date: 02/12/2019    Plan:     RN/SW Care Coordinator will await notification from home care staff informing RN/SW Care Coordinator of patients discharge plans/needs. RN/SW Care Coordinator will review chart and outreach to home care every 4 weeks and as needed.      ENROLLMENT STATUS:   POTENTIAL    CARE COORDINATOR STATUS: Care team current.     Melissa Mons, BSN, RN, PHN   [facility]  Clinic Care Coordinator - [facility] Locations   Direct:  [phone] (voicemail available)   (Today's Date: 03/12/2019)

## 2019-03-12 NOTE — TELEPHONE ENCOUNTER
Reason for Call: Request for an order or referral:    Order or referral being requested: Orders for PT one time a for one week, two times a wk for four weeks.  For strenghting transfers and stairs and range of motion and education.    Date needed: As soon as possible    Has the patient been seen by the PCP for this problem? YES    Additional comments: please call on secure line for lee when verbal orders are placed    Phone number Homecare Nurse can be reached at:  [phone]    Best Time:  anytime    Can we leave a detailed message on this number?  YES    Call taken on 3/12/2019 at 1:12 PM by Shanna Cortes

## 2019-03-13 ENCOUNTER — TELEPHONE (OUTPATIENT)
Dept: FAMILY MEDICINE | Facility: CLINIC | Age: 49
End: 2019-03-13

## 2019-03-13 NOTE — TELEPHONE ENCOUNTER
Date Forms was received: March 13, 2019    Forms received by: Fax    Purpose of Form:  Nursing Home Orders [facility]    When the form is due:  ASAP    How the form needs to be returned for patient:  Fax    Form currently placed  SW inbox

## 2019-03-14 NOTE — TELEPHONE ENCOUNTER
Patient's form signed, dated, and will be placed in TC basket.    Efren Conway DO  3/13/2019 9:15 PM

## 2019-03-15 ENCOUNTER — TELEPHONE (OUTPATIENT)
Dept: FAMILY MEDICINE | Facility: CLINIC | Age: 49
End: 2019-03-15

## 2019-03-15 NOTE — TELEPHONE ENCOUNTER
Date Forms was received: March 15, 2019    Forms received by: Fax    Purpose of Form:  Nursing Home Orders [facility]    When the form is due:  ASAP    How the form needs to be returned for patient:  Fax    Form currently placed  SW inbox

## 2019-03-18 ENCOUNTER — DOCUMENTATION ONLY (OUTPATIENT)
Dept: CARE COORDINATION | Facility: CLINIC | Age: 49
End: 2019-03-18

## 2019-03-18 NOTE — PROGRESS NOTES
Just an FYI.  Eduardo had a fall on Saturday at home.  He was trying to use the walker, alone.  He had been instructed by PT to NOT walk alone.  He fell, no injuries.   Call me with questions.     Sejal Mccoy, PT  [facility]  [phone]  [email or web address]

## 2019-03-20 ENCOUNTER — TELEPHONE (OUTPATIENT)
Dept: FAMILY MEDICINE | Facility: CLINIC | Age: 49
End: 2019-03-20

## 2019-03-20 NOTE — TELEPHONE ENCOUNTER
Date Forms was received: March 20, 2019    Forms received by: Fax    Purpose of Form:  [facility] orders    When the form is due:  ASAP    How the form needs to be returned for patient:  Fax    Form currently placed  SW inbox

## 2019-03-21 ENCOUNTER — DOCUMENTATION ONLY (OUTPATIENT)
Dept: CARE COORDINATION | Facility: CLINIC | Age: 49
End: 2019-03-21

## 2019-03-21 NOTE — PROGRESS NOTES
[facility] now requests orders and shares plan of care/discharge summaries for some patients through [employer].  Please REPLY TO THIS MESSAGE OR ROUTE BACK TO THE AUTHOR in order to give authorization for orders when needed.  This is considered a verbal order, you will still receive a faxed copy of orders for signature.  Thank you for your assistance in improving collaboration for our patients.    ORDER  Change dressing to stump twice weekly and as needed.  Remove old dressing and discard per policy  Cleanse incision and lizette-incision with normal saline or water.  Dry carefully with gauze.  Apply nonadherent contact layer ie adaptic or vaseline gauze.  Wrap with stretch gauze or kerlix. Secure as needed with paper tape. Pt and family to complete on non nursing days      Thank you.   Blanca Long RN  Case Asaf  [facility]  [email or web address]   Office: [phone]  Cell: [phone]

## 2019-03-22 ENCOUNTER — DOCUMENTATION ONLY (OUTPATIENT)
Dept: CARE COORDINATION | Facility: CLINIC | Age: 49
End: 2019-03-22

## 2019-03-22 NOTE — PROGRESS NOTES
FYI message:  Patient had a fall last night. He thought he had both brakes locked on WC, but didn't, and when he was going to get up to transfer to the stairs, he fell.  Landed on stump.  Doesn't feel he injured anything.     Sejal Mccoy, PT  [facility]  [phone]  [email or web address]

## 2019-03-26 ENCOUNTER — TELEPHONE (OUTPATIENT)
Dept: FAMILY MEDICINE | Facility: CLINIC | Age: 49
End: 2019-03-26

## 2019-03-26 NOTE — TELEPHONE ENCOUNTER
Date Forms was received: March 26, 2019    Forms received by: Fax    Purpose of Form:  Nursing Home Orders [facility] orders    When the form is due:  ASAP    How the form needs to be returned for patient:  Fax    Form currently placed  SW inbox

## 2019-03-28 ENCOUNTER — TELEPHONE (OUTPATIENT)
Dept: FAMILY MEDICINE | Facility: CLINIC | Age: 49
End: 2019-03-28

## 2019-03-28 DIAGNOSIS — E10.42 TYPE 1 DIABETES MELLITUS WITH DIABETIC POLYNEUROPATHY (H): ICD-10-CM

## 2019-03-28 DIAGNOSIS — E10.621 DIABETIC ULCER OF TOE OF LEFT FOOT ASSOCIATED WITH TYPE 1 DIABETES MELLITUS, WITH NECROSIS OF BONE (H): ICD-10-CM

## 2019-03-28 DIAGNOSIS — L97.524 DIABETIC ULCER OF TOE OF LEFT FOOT ASSOCIATED WITH TYPE 1 DIABETES MELLITUS, WITH NECROSIS OF BONE (H): ICD-10-CM

## 2019-03-28 NOTE — TELEPHONE ENCOUNTER
MED REC DONE     Discrepancies:      Tresiba           Epic: 20 units subcutaneous every morning           Form:  28 units subcutaneous daily       insulin lispro   Epic: 1-5 units subcutaneous TID before meals, 1-10 units subcutaneous TID with meals, and 1-10 units subcutaneous with snacks or supplements for high blood sugar    Form: 1u/2g carb subcutaneous before meals, total daily dose up to 80 units    Meds on Epic but NOT  on Form:       Aspirin 325mg, 1 tab PO daily   Imodium 2mg cap, QID PRN for diarrhea   Acetaminophen 325mg tab, take 2 tabs PO every 4 hours PRN for multimodal surgical pain management along with NSAIDS and opioid medication as indicated based on pain control and physical function      Meds on Form but NOT on Epic :     Augmentin 875-125mg tab take 1 tab PO every 12 hours      Routing to PCP for further review/recommendations/orders  GEOVANNY Robertson, RN  [facility]

## 2019-03-28 NOTE — TELEPHONE ENCOUNTER
Reason for Call:  Form, our goal is to have forms completed with 72 hours, however, some forms may require a visit or additional information.    Type of letter, form or note:  Home Health Certification    Who is the form from?: Home care    Where did the form come from: form was faxed in    What clinic location was the form placed at?: [place]    Where the form was placed: Given to Jhoana DENIS RN    What number is listed as a contact on the form?: NA       Additional comments:     Call taken on 3/28/2019 at 3:07 PM by Azra De

## 2019-03-29 ENCOUNTER — MEDICAL CORRESPONDENCE (OUTPATIENT)
Dept: HEALTH INFORMATION MANAGEMENT | Facility: CLINIC | Age: 49
End: 2019-03-29

## 2019-04-02 NOTE — TELEPHONE ENCOUNTER
Epic med list updated per discharge summary from acute rehab stay. Per last endocrine consult note, he was planning on following up with [facility]. I don't see follow up scheduled.    Efren Conway DO  4/1/2019 11:46 PM

## 2019-04-03 ENCOUNTER — PATIENT OUTREACH (OUTPATIENT)
Dept: CARE COORDINATION | Facility: CLINIC | Age: 49
End: 2019-04-03

## 2019-04-03 NOTE — PROGRESS NOTES
Clinic Care Coordination Contact  Care Coordination Communication    Home Care Contact:              Home Care Agency: [facility]              Care Coordination contacted home care: No - status verification via Homecare Advisor PROD application.               Start of care date: 02/12/2019   Patient is actively receiving [facility] services including SN, PT and SW.     Plan:     Care Coordinator will do no further outreaches at this time.    Please note, writer will no longer be the RNCC for this office effective 04/17/2019.   Should patient have any future needs, he/caregiver can work with assigned clinic care coordination staff.      ENROLLMENT STATUS: Not a Candidate    CARE COORDINATOR STATUS: Care team updated    Melissa Mckeon, BSN, RN, PHN   [facility]  Clinic Care Coordinator - [place], [facility] Locations   Direct:  [phone] (voicemail available)   (Today's Date: 04/03/2019)

## 2019-04-26 ENCOUNTER — OFFICE VISIT (OUTPATIENT)
Dept: FAMILY MEDICINE | Facility: CLINIC | Age: 49
End: 2019-04-26
Payer: COMMERCIAL

## 2019-04-26 VITALS
WEIGHT: 164 LBS | SYSTOLIC BLOOD PRESSURE: 142 MMHG | OXYGEN SATURATION: 100 % | TEMPERATURE: 98 F | BODY MASS INDEX: 24.22 KG/M2 | DIASTOLIC BLOOD PRESSURE: 76 MMHG | HEART RATE: 71 BPM

## 2019-04-26 DIAGNOSIS — E10.42 TYPE 1 DIABETES MELLITUS WITH DIABETIC POLYNEUROPATHY (H): Primary | ICD-10-CM

## 2019-04-26 DIAGNOSIS — T14.8XXA BLISTER: ICD-10-CM

## 2019-04-26 PROCEDURE — 99214 OFFICE O/P EST MOD 30 MIN: CPT | Performed by: FAMILY MEDICINE

## 2019-04-26 RX ORDER — LOSARTAN POTASSIUM 25 MG/1
25 TABLET ORAL DAILY
Qty: 90 TABLET | Refills: 1 | Status: SHIPPED | OUTPATIENT
Start: 2019-04-26 | End: 2019-05-03

## 2019-04-26 NOTE — PROGRESS NOTES
SUBJECTIVE:   Eduardo Walton is a 48 year old male who presents to clinic today for the following   health issues:          Diabetes Follow-up    Patient is checking blood sugars: twice daily.    Blood sugar testing frequency justification: Uncontrolled diabetes  Results are as follows: usual fasting blood sugar in the low 100s. Highest has been in the 160s    Diabetic concerns: None      Symptoms of hypoglycemia (low blood sugar): none     Paresthesias (numbness or burning in feet) or sores: Yes     Date of last diabetic eye exam: [facility] - within the past year    BP Readings from Last 2 Encounters:   05/03/19 150/70   04/26/19 142/76     Hemoglobin A1C (%)   Date Value   02/05/2019 10.9 (H)     LDL Cholesterol Calculated (mg/dL)   Date Value   06/01/2010 93       Diabetes Management Resources      He was recently hospitalized for BKA in [facility] due to infected diabetic foot ulcer. Afterwards, was in rehab and did physical therapy/OT. Has temporary below-the-knee prosthetic but getting a new one soon as current one is causing a blister. He is following with [facility] and [facility]. He has been dealing with amputation well. Joined an amputee group on Facebook which has been helpful. Also followed  on Bernal 9 who had amputation and she has been inspiring.    He has follow up with endocrinology on 6/7/19.    Blister  First noticed last night, thought it might be an ingrown hair. Denies any pain but right at waistline.        Additional history: as documented    Reviewed  and updated as needed this visit by clinical staff  Tobacco  Allergies  Meds  Problems  Med Hx  Surg Hx  Fam Hx         Reviewed and updated as needed this visit by Provider  Tobacco  Allergies  Meds  Problems  Med Hx  Surg Hx  Fam Hx         Patient Active Problem List   Diagnosis     Coronary artery disease involving native coronary artery of native heart without angina pectoris     Diabetic ulcer of toe  of left foot associated with type 1 diabetes mellitus (H)     Hereditary and idiopathic peripheral neuropathy     History of coronary artery stent placement     Nephritis and nephropathy, 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     Positive for macroalbuminuria     Retinopathy due to secondary diabetes mellitus (H)     Severe diabetic hypoglycemia (H)     Diabetic foot (H)     Type 1 diabetes mellitus (H)     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)     Left foot infection     Hx of BKA, left (H)     Past Surgical History:   Procedure Laterality Date     AMPUTATE FOOT Left 2/5/2019    Procedure: PARTIAL LEFT FOOT AMPUTATION.;  Surgeon: Chetan Potter DPM;  Location: [facility] OR     AMPUTATE LEG BELOW KNEE Left 2/18/2019    Procedure: LEFT BELOW THE KNEE AMPUTATION;  Surgeon: Kvng Berman MD;  Location: [facility] OR     STENT  2010       Social History     Tobacco Use     Smoking status: Never Smoker     Smokeless tobacco: Never Used   Substance Use Topics     Alcohol use: Yes     Comment: occ     Family History   Problem Relation Age of Onset     Ovarian Cancer Maternal Grandmother      Colon Cancer Maternal Grandmother      Prostate Cancer Maternal Grandfather      Ovarian Cancer Paternal Grandmother            ROS:  Constitutional, HEENT, cardiovascular, pulmonary, gi and gu systems are negative, except as otherwise noted.    OBJECTIVE:     /76   Pulse 71   Temp 98  F (36.7  C) (Oral)   Wt 74.4 kg (164 lb)   SpO2 100%   BMI 24.22 kg/m    Body mass index is 24.22 kg/m .  GENERAL: healthy, alert and no distress  RESP: lungs clear to auscultation - no rales, rhonchi or wheezes  CV: regular rate and rhythm, normal S1 S2, no S3 or S4, no murmur, click or rub, no peripheral edema and peripheral pulses strong  MS: left BKA in prosthetic  SKIN: small closed blister without surrounding erythema or warmth.    Diagnostic Test Results:  none     ASSESSMENT/PLAN:   1. Type 1 diabetes mellitus with diabetic  polyneuropathy (H): historically poorly controlled. May need adjustment of insulin doses. Recheck A1c in a couple weeks along with albumin and lipids. Follow up with endocrinology as scheduled.  - insulin degludec (TRESIBA FLEXTOUCH) 100 UNIT/ML pen; Inject 28 Units Subcutaneous every morning  - insulin lispro (HUMALOG PEN) 100 UNIT/ML pen; 1 unit / 15 g carb subcutaneous before meals  - Albumin Random Urine Quantitative with Creat Ratio; Future  - Lipid panel reflex to direct LDL Fasting; Future  - EYE EXAM (SIMPLE-NONBILLABLE)    3. Blister: appears to be a simple blister. Recommend keeping clean, apply antibiotics ointment, cover with bandaid for protection      Efren Conway,   [facility]

## 2019-05-03 ENCOUNTER — OFFICE VISIT (OUTPATIENT)
Dept: FAMILY MEDICINE | Facility: CLINIC | Age: 49
End: 2019-05-03
Payer: COMMERCIAL

## 2019-05-03 VITALS
SYSTOLIC BLOOD PRESSURE: 150 MMHG | WEIGHT: 164 LBS | TEMPERATURE: 98.1 F | DIASTOLIC BLOOD PRESSURE: 70 MMHG | BODY MASS INDEX: 24.22 KG/M2 | OXYGEN SATURATION: 100 % | HEART RATE: 79 BPM

## 2019-05-03 DIAGNOSIS — I10 ESSENTIAL HYPERTENSION: ICD-10-CM

## 2019-05-03 DIAGNOSIS — L08.9 LOCAL INFECTION OF SKIN AND SUBCUTANEOUS TISSUE: ICD-10-CM

## 2019-05-03 DIAGNOSIS — E10.42 TYPE 1 DIABETES MELLITUS WITH DIABETIC POLYNEUROPATHY (H): Primary | ICD-10-CM

## 2019-05-03 PROCEDURE — 87070 CULTURE OTHR SPECIMN AEROBIC: CPT | Performed by: FAMILY MEDICINE

## 2019-05-03 PROCEDURE — 87186 SC STD MICRODIL/AGAR DIL: CPT | Performed by: FAMILY MEDICINE

## 2019-05-03 PROCEDURE — 99214 OFFICE O/P EST MOD 30 MIN: CPT | Performed by: FAMILY MEDICINE

## 2019-05-03 PROCEDURE — 87077 CULTURE AEROBIC IDENTIFY: CPT | Performed by: FAMILY MEDICINE

## 2019-05-03 RX ORDER — SULFAMETHOXAZOLE/TRIMETHOPRIM 800-160 MG
1 TABLET ORAL 2 TIMES DAILY
Qty: 20 TABLET | Refills: 0 | Status: SHIPPED | OUTPATIENT
Start: 2019-05-03 | End: 2019-05-24

## 2019-05-03 RX ORDER — LOSARTAN POTASSIUM 50 MG/1
50 TABLET ORAL DAILY
COMMUNITY
Start: 2019-05-03 | End: 2019-10-21

## 2019-05-03 NOTE — PROGRESS NOTES
SUBJECTIVE:   Eduardo Walton is a 48 year old male who presents to clinic today for the following   health issues:      Concern - blister on waistline  Onset: 1 week    Description:   Blister at waistline    Intensity: moderate    Progression of Symptoms:  Worsening, but denies any fevers, chills, nausea, or vomiting.    Accompanying Signs & Symptoms:  New spot on base of amputated limb, red with green in the middle, draining, painful, hard in center    Previous history of similar problem:   none    Precipitating factors:   Worsened by: things rubbing on it    Alleviating factors:  Improved by: none    Therapies Tried and outcome: keeping it covered, triple antibiotic ointment    Blood pressure elevated today. Denies any headaches, lightheadedness, dizziness, vision changes, chest pain, palpitations, shortness of breath, dyspnea, numbness/tingling.        Additional history: as documented    Reviewed  and updated as needed this visit by clinical staff  Tobacco  Allergies  Meds  Problems  Med Hx  Surg Hx  Fam Hx         Reviewed and updated as needed this visit by Provider  Tobacco  Allergies  Meds  Problems  Med Hx  Surg Hx  Fam Hx         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)     Left foot infection     Hx of BKA, left (H)     Past Surgical History:   Procedure Laterality Date     AMPUTATE FOOT Left 2/5/2019    Procedure:  PARTIAL LEFT FOOT AMPUTATION.;  Surgeon: Chetan Potter DPM;  Location:  OR     AMPUTATE LEG BELOW KNEE Left 2/18/2019    Procedure: LEFT BELOW THE KNEE AMPUTATION;  Surgeon: Kvng Berman MD;  Location:  OR     STENT  2010       Social History     Tobacco Use     Smoking status: Never Smoker     Smokeless tobacco: Never Used   Substance Use Topics     Alcohol use: Yes     Comment: occ     Family History   Problem Relation Age of Onset     Ovarian Cancer Maternal Grandmother      Colon Cancer Maternal Grandmother      Prostate Cancer Maternal Grandfather      Ovarian Cancer Paternal Grandmother            ROS:  Constitutional, HEENT, cardiovascular, pulmonary, gi and gu systems are negative, except as otherwise noted.    OBJECTIVE:     /70   Pulse 79   Temp 98.1  F (36.7  C) (Oral)   Wt 74.4 kg (164 lb)   SpO2 100%   BMI 24.22 kg/m    Body mass index is 24.22 kg/m .  GENERAL: healthy, alert and no distress  SKIN: small dime sized circular area of eythema with greenish center, some erythema surrounding. At end of left BKA, another erythematous area with greenish discharge with some surrounding warmth/erythema    Diagnostic Test Results:  none     ASSESSMENT/PLAN:   1. Type 1 diabetes mellitus with diabetic polyneuropathy (H): poorly controlled, has appointment scheduled with endocrinology. BP elevated, will restart losartan.   - losartan (COZAAR) 50 MG tablet; Take 1 tablet (50 mg) by mouth daily    2. Local infection of skin and subcutaneous tissue: worsening wound on abdomen and left stump - appears infected. Start antibiotic. Also took wound cultures. Will refer to wound care as well for follow up, particularly of left BKA wound given his history of poorly healing wounds. Follow up in 1 week if not improving or sooner if continuing to worsen.  - sulfamethoxazole-trimethoprim (BACTRIM DS/SEPTRA DS) 800-160 MG tablet; Take 1 tablet by mouth 2 times daily for 10 days  Dispense: 20 tablet; Refill:  0  - WOUND CARE REFERRAL  - Wound Culture Aerobic Bacterial; Future    3. Essential hypertension: start losartan today. Recheck BP in 1 month    Efren Conway DO  [facility]

## 2019-05-05 LAB
BACTERIA SPEC CULT: ABNORMAL
Lab: ABNORMAL
SPECIMEN SOURCE: ABNORMAL

## 2019-05-23 NOTE — PROGRESS NOTES
Subjective     Eduardo Walton is a 48 year old male who presents to clinic today for the following health issues:    NICOLAS Clark is here following up on abdominal wound and left BKA wound. He states that the wound on his left BKA stump is basically healed at this time - declines to take off prosthetic as he is comfortable that it is healed. Abdominal wound is still draining some clear-whitish fluid. Denies any fevers, chills, nausea,or vomiting. He is covering wound with gauze and changing dressing daily. Finished antibiotic. Wound culture from antibiotic was sensitive to Bactrim.    Feels like wound on left  BKA stump has almost healed up.      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)     Left foot infection     Hx of BKA, left (H)     Past Surgical History:   Procedure Laterality Date     AMPUTATE FOOT Left 2/5/2019    Procedure: PARTIAL LEFT FOOT AMPUTATION.;  Surgeon: Chetan Potter DPM;  Location:  OR     AMPUTATE LEG BELOW KNEE Left 2/18/2019    Procedure: LEFT BELOW THE KNEE AMPUTATION;  Surgeon: Kvng Berman MD;  Location:  OR     STENT 2010       Social History     Tobacco Use     Smoking status: Never Smoker     Smokeless tobacco: Never Used   Substance Use Topics     Alcohol use: Yes     Comment: occ     Family History   Problem Relation Age of Onset     Ovarian Cancer Maternal Grandmother      Colon Cancer Maternal  Grandmother      Prostate Cancer Maternal Grandfather      Ovarian Cancer Paternal Grandmother            Reviewed and updated as needed this visit by Provider  Tobacco  Allergies  Meds  Problems  Med Hx  Surg Hx  Fam Hx         Review of Systems   ROS COMP: Constitutional, HEENT, cardiovascular, pulmonary, gi and gu systems are negative, except as otherwise noted.      Objective    /82   Pulse 74   Temp 98  F (36.7  C) (Oral)   Wt 73 kg (161 lb)   SpO2 100%   BMI 23.78 kg/m    Body mass index is 23.78 kg/m .  Physical Exam   GENERAL: healthy, alert and no distress  RESP: lungs clear to auscultation - no rales, rhonchi or wheezes  CV: regular rate and rhythm, normal S1 S2, no S3 or S4, no murmur, click or rub, no peripheral edema and peripheral pulses strong  SKIN: ~dime sized lesion with scabbed lesion with underlying pink tissue. No drainage. NO surrounding erythema or warmth.    Diagnostic Test Results:  Labs reviewed in Epic        Assessment & Plan     1. Local infection of skin and subcutaneous tissue: debrided some of necrotic appearing tissue. Will treat with extended course of Bactrim. Continue to keep area clean and continue dressings. Advised keeping wound care appointment given history of poorly healing wounds.  - sulfamethoxazole-trimethoprim (BACTRIM DS/SEPTRA DS) 800-160 MG tablet; Take 1 tablet by mouth 2 times daily  Dispense: 20 tablet; Refill: 0         Return if symptoms worsen or fail to improve.    Efren Conway,   [facility]

## 2019-05-24 ENCOUNTER — OFFICE VISIT (OUTPATIENT)
Dept: FAMILY MEDICINE | Facility: CLINIC | Age: 49
End: 2019-05-24
Payer: COMMERCIAL

## 2019-05-24 VITALS
HEART RATE: 74 BPM | DIASTOLIC BLOOD PRESSURE: 82 MMHG | TEMPERATURE: 98 F | BODY MASS INDEX: 23.78 KG/M2 | WEIGHT: 161 LBS | OXYGEN SATURATION: 100 % | SYSTOLIC BLOOD PRESSURE: 134 MMHG

## 2019-05-24 DIAGNOSIS — L08.9 LOCAL INFECTION OF SKIN AND SUBCUTANEOUS TISSUE: Primary | ICD-10-CM

## 2019-05-24 PROCEDURE — 99214 OFFICE O/P EST MOD 30 MIN: CPT | Performed by: FAMILY MEDICINE

## 2019-05-24 RX ORDER — SULFAMETHOXAZOLE/TRIMETHOPRIM 800-160 MG
1 TABLET ORAL 2 TIMES DAILY
Qty: 20 TABLET | Refills: 0 | Status: SHIPPED | OUTPATIENT
Start: 2019-05-24 | End: 2019-07-26

## 2019-05-31 ENCOUNTER — TRANSFERRED RECORDS (OUTPATIENT)
Dept: HEALTH INFORMATION MANAGEMENT | Facility: CLINIC | Age: 49
End: 2019-05-31

## 2019-05-31 LAB — RETINOPATHY: NORMAL

## 2019-06-07 ENCOUNTER — OFFICE VISIT (OUTPATIENT)
Dept: ENDOCRINOLOGY | Facility: CLINIC | Age: 49
End: 2019-06-07
Payer: COMMERCIAL

## 2019-06-07 ENCOUNTER — TELEPHONE (OUTPATIENT)
Dept: ENDOCRINOLOGY | Facility: CLINIC | Age: 49
End: 2019-06-07

## 2019-06-07 VITALS
HEART RATE: 71 BPM | SYSTOLIC BLOOD PRESSURE: 138 MMHG | HEIGHT: 69 IN | DIASTOLIC BLOOD PRESSURE: 70 MMHG | BODY MASS INDEX: 24.62 KG/M2 | WEIGHT: 166.2 LBS

## 2019-06-07 DIAGNOSIS — E10.42 TYPE 1 DIABETES MELLITUS WITH DIABETIC POLYNEUROPATHY (H): Primary | ICD-10-CM

## 2019-06-07 LAB — HBA1C MFR BLD: 8.1 % (ref 4.3–6)

## 2019-06-07 ASSESSMENT — PAIN SCALES - GENERAL: PAINLEVEL: MODERATE PAIN (5)

## 2019-06-07 ASSESSMENT — MIFFLIN-ST. JEOR: SCORE: 1624.26

## 2019-06-07 NOTE — TELEPHONE ENCOUNTER
Prior Authorization Specialty Medication Request    Medication/Dose: insulin lispro (HUMALOG PEN) 100 UNIT/ML pen/approx 65 units in 24 hrs  ICD code (if different than what is on RX):    Previously Tried and Failed:  novolog    Important Lab Values:   Hemoglobin A1C 8.1  Abnormal   4.3 - 6.0   Rationale: I saw this patient in clinic today.  He is new to our clinic.  He is currently taking Humalog and Tresiba.   He states that his insurance sent him a letter stating that he will need to switch to NovoLog starting July 1, 2019.   Patient states that he does NOT tolerate NovoLog stating that his blood sugar control was very poor when he used  NovoLog and he is  requesting that he remains on Humalog.  Could you please initiate a prior authorization for the Humalog KwikPen    Insurance Name:   Insurance ID:   Insurance Phone Number:     Pharmacy Information (if different than what is on RX)  Name:    Phone:

## 2019-06-07 NOTE — LETTER
"6/7/2019       RE: Eduardo Walton  [address] [facility] [address]     Dear Colleague,    Thank you for referring your patient, Eduardo Walton, to the [facility] ENDOCRINOLOGY at [facility]. Please see a copy of my visit note below.    HPI  Eduardo Walton is a 48 year old male with type 1 diabetes mellitus here today for follow-up visit.  His wife Huma is present.  Eduardo was recently seen in the hospital by the inpatient diabetes team in March 2019.  This is the first time I have seen Eduardo.  He would like to establish care here in our clinic.  Patient is a history of type 1 diabetes mellitus diagnosed at age 8.  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  For his diabetes, he states he is currently taking Tresiba 26 units each am and Humalog 1 unit / 20 gms CHO with meals and snacks with correction insulin ( 1 unit/50 for BG > 150 ).  He tells me he does NOT tolerate Novolog- \"blood sugar control was poor with use of Novolog\".  Pt's A1C is 8.1 % today.    His A1C was 10.9 % in 2/2019.  Eduardo is currently using the Freestyle Drew device and sensor.  His average glucose is 184 with SD 87.  Eduardo's blood sugar was in target 46 % of the time and above target glucose was 47 %.  His blood sugar values are good from 5 AM to 7 PM.  He often eats his evening meal late 8 or 9 PM and he has been injecting his insulin 1/2 hour after eating.  He reports less hypoglycemia since his hospital discharge.  On ROS today, he has blurred vision and states that he has not driven since 2016.  He has had multiple laser treatments in both eyes.  He tells me that he has not seen an eye doctor for several years.  Patient also has chronic intermittent diarrhea.  Eduardo denies frequent headaches nausea, vomiting,hx of gastroparesis, shortness " of breath at rest, chronic cough or tobacco use.  He denies chest pain, abdominal pain, blood in his stools or melena.  He denies dysuria or hematuria.  Patient is status post left BKA on 2/18/2019.  He has pain and neuropathy symptoms in his right foot.  There are no right foot ulcer.    Diabetes Care  Retinopathy: yes; pt referred to Oph here.  Nephropathy:yes; urine protein + in 2/2019. He has been given a RX for Cozaar, but states he is not taking this medication because it affected his concentration.  Neuropathy: yes.  Foot Exam: S/P left BKA; no right foot ulcers.  Taking aspirin: no.  Lipids: LDL 93 in 6/2010. He is not taking a statin.    ROS  Please see under history of present illness.    Allergies  No Known Allergies    Medications  Current Outpatient Medications   Medication Sig Dispense Refill     blood glucose (CONTOUR NEXT TEST) test strip Use to test blood sugar. 1 Box 11     Continuous Blood Gluc Sensor (SchoolEdge MobileYLE COLIN 14 DAY SENSOR) MISC USE WITH 14 DAY COLIN SYSTEM. CHANGE SENSOR EVERY 14 DAYS.  3     insulin degludec (TRESIBA FLEXTOUCH) 100 UNIT/ML pen Inject 24 units subcutaneous each am. 15 mL 3     insulin lispro (HUMALOG PEN) 100 UNIT/ML pen 1 unit / 20 gms carb subcutaneous with meals and snacks; pt uses approx 65 units in 24 hrs. 15 mL 3     order for DME Equipment being ordered: knee roller, will need for 4 months. Non weight bearing left foot. 1 Device 0     losartan (COZAAR) 50 MG tablet Take 1 tablet (50 mg) by mouth daily       pregabalin (LYRICA) 100 MG capsule Take 1 capsule (100 mg) by mouth 3 times daily 90 capsule 0     sulfamethoxazole-trimethoprim (BACTRIM DS/SEPTRA DS) 800-160 MG tablet Take 1 tablet by mouth 2 times daily (Patient not taking: Reported on 6/7/2019) 20 tablet 0       Family History  family history includes Colon Cancer in his maternal grandmother; Ovarian Cancer in his maternal grandmother and paternal grandmother; Prostate Cancer in his maternal  "grandfather.    Social History   reports that he has never smoked. He has never used smokeless tobacco. He reports that he drinks alcohol.     Past Medical History  No past medical history on file.    Past Surgical History:   Procedure Laterality Date     AMPUTATE FOOT Left 2/5/2019    Procedure: PARTIAL LEFT FOOT AMPUTATION.;  Surgeon: Chetan Potter DPM;  Location: [facility] OR     AMPUTATE LEG BELOW KNEE Left 2/18/2019    Procedure: LEFT BELOW THE KNEE AMPUTATION;  Surgeon: Kvng Berman MD;  Location:  OR     STENT  2010       Physical Exam  /70   Pulse 71   Ht 1.753 m (5' 9\")   Wt 75.4 kg (166 lb 3.2 oz)   BMI 24.54 kg/m     Body mass index is 24.54 kg/m .    GENERAL : In no apparent distress  EXTREMITIES: S/P left BKA.  No right foot ulcer.    RESULTS  Creatinine   Date Value Ref Range Status   03/03/2019 1.15 0.66 - 1.25 mg/dL Final     GFR Estimate   Date Value Ref Range Status   03/03/2019 76 >60 mL/min/[1.73_m2] Final     Comment:     Non  GFR Calc  Starting 12/18/2018, serum creatinine based estimated GFR (eGFR) will be   calculated using the Chronic Kidney Disease Epidemiology Collaboration   (CKD-EPI) equation.       Hemoglobin A1C   Date Value Ref Range Status   02/05/2019 10.9 (H) 0 - 5.6 % Final     Comment:     Normal <5.7% Prediabetes 5.7-6.4%  Diabetes 6.5% or higher - adopted from ADA   consensus guidelines.       Potassium   Date Value Ref Range Status   02/26/2019 5.2 3.4 - 5.3 mmol/L Final     ALT   Date Value Ref Range Status   02/04/2019 18 0 - 70 U/L Final     AST   Date Value Ref Range Status   02/04/2019 21 0 - 45 U/L Final     TSH   Date Value Ref Range Status   01/18/2019 0.24 (L) 0.40 - 4.00 mU/L Final     T4 Free   Date Value Ref Range Status   01/18/2019 1.38 0.76 - 1.46 ng/dL Final       Cholesterol   Date Value Ref Range Status   06/01/2010 166 0 - 200 mg/dL Final     Comment:     LDL Cholesterol is the primary guide to therapy.     The NCEP recommends " further evaluation of: patients with cholesterol <200   mg/dL   if additional risk factors are present, cholesterol >240 mg/dL, triglycerides   >150 mg/dL, or HDL <40 mg/dL.     HDL Cholesterol   Date Value Ref Range Status   06/01/2010 40 40 - 110 mg/dL Final     LDL Cholesterol Calculated   Date Value Ref Range Status   06/01/2010 93 0 - 129 mg/dL Final     Comment:     LDL Cholesterol is the primary guide to therapy: LDL-cholesterol goal in high   risk patients is <100 mg/dL and in very high risk patients is <70 mg/dL.     Triglycerides   Date Value Ref Range Status   06/01/2010 167 (H) 0 - 150 mg/dL Final     Cholesterol/HDL Ratio   Date Value Ref Range Status   06/01/2010 4.2 0.0 - 5.0 Final       Lab Results   Component Value Date    A1C 10.9 02/05/2019     A1C   8.1 % today.      ASSESSMENT/PLAN:      1. TYPE 1 DIABETES MELLITUS: Uncontrolled type 1 diabetes mellitus complicated by retinopathy, nephropathy, neuropathy -S/P left BKA, CAD and hypoglycemia.  I asked Eduardo to decrease his Tresiba dose to 24 units SQ each am and to continue NovoLog 1 unit / 20 gms CHO with meals and snacks with current correction scale.  I placed a referral for Eduardo to see our diabetes educator to discuss use of an insulin pump/sensor and for diabetes ed update.  Check fasting lipid panel next visit.  Pt's TSH was normal in Jan 2019.    2.  RETINOPATHY: History of severe retinopathy.  I placed a referral for patient to be seen by ophthalmology here.    3.  NEUROPATHY: S/P left BKA.     4.  NEPHROPATHY: He discontinued taking Cozaar stating that the medication affected his concentration.    5.  HTN: Stabe at this time.    6.  CAD: S/P stent placement in 2010. Pt should be taking a statin.    7.  LIPIDS: Check fasting lipid panel next visit.  Patient is not fasting and he is not taking a statin at this time.  He should be on a statin given his hx of type 1 diabetes with known CAD.  Will discuss in further detail next visit.    8.   Return to Endocrine Clinic to see me in 2 months.      Again, thank you for allowing me to participate in the care of your patient.      Sincerely,    Shanna Urbina PA-C

## 2019-06-07 NOTE — PROGRESS NOTES
"HPI  Eduarod Walotn is a 48 year old male with type 1 diabetes mellitus here today for follow-up visit.  His wife Huma is present.  Eduardo was recently seen in the hospital by the inpatient diabetes team in March 2019.  This is the first time I have seen Eduardo.  He would like to establish care here in our clinic.  Patient is a history of type 1 diabetes mellitus diagnosed at age 8.  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  For his diabetes, he states he is currently taking Tresiba 26 units each am and Humalog 1 unit / 20 gms CHO with meals and snacks with correction insulin ( 1 unit/50 for BG > 150 ).  He tells me he does NOT tolerate Novolog- \"blood sugar control was poor with use of Novolog\".  Pt's A1C is 8.1 % today.    His A1C was 10.9 % in 2/2019.  Eduardo is currently using the Freestyle Drew device and sensor.  His average glucose is 184 with SD 87.  Eduardo's blood sugar was in target 46 % of the time and above target glucose was 47 %.  His blood sugar values are good from 5 AM to 7 PM.  He often eats his evening meal late 8 or 9 PM and he has been injecting his insulin 1/2 hour after eating.  He reports less hypoglycemia since his hospital discharge.  On ROS today, he has blurred vision and states that he has not driven since 2016.  He has had multiple laser treatments in both eyes.  He tells me that he has not seen an eye doctor for several years.  Patient also has chronic intermittent diarrhea.  Eduardo denies frequent headaches nausea, vomiting,hx of gastroparesis, shortness of breath at rest, chronic cough or tobacco use.  He denies chest pain, abdominal pain, blood in his stools or melena.  He denies dysuria or hematuria.  Patient is status post left BKA on 2/18/2019.  He has pain and neuropathy symptoms in his right foot.  There are no right foot " ulcer.    Diabetes Care  Retinopathy: yes; pt referred to Oph here.  Nephropathy:yes; urine protein + in 2/2019. He has been given a RX for Cozaar, but states he is not taking this medication because it affected his concentration.  Neuropathy: yes.  Foot Exam: S/P left BKA; no right foot ulcers.  Taking aspirin: no.  Lipids: LDL 93 in 6/2010. He is not taking a statin.    ROS  Please see under history of present illness.    Allergies  No Known Allergies    Medications  Current Outpatient Medications   Medication Sig Dispense Refill     blood glucose (CONTOUR NEXT TEST) test strip Use to test blood sugar. 1 Box 11     Continuous Blood Gluc Sensor (PacketzoomYLE COLIN 14 DAY SENSOR) MISC USE WITH 14 DAY COLIN SYSTEM. CHANGE SENSOR EVERY 14 DAYS.  3     insulin degludec (TRESIBA FLEXTOUCH) 100 UNIT/ML pen Inject 24 units subcutaneous each am. 15 mL 3     insulin lispro (HUMALOG PEN) 100 UNIT/ML pen 1 unit / 20 gms carb subcutaneous with meals and snacks; pt uses approx 65 units in 24 hrs. 15 mL 3     order for DME Equipment being ordered: knee roller, will need for 4 months. Non weight bearing left foot. 1 Device 0     losartan (COZAAR) 50 MG tablet Take 1 tablet (50 mg) by mouth daily       pregabalin (LYRICA) 100 MG capsule Take 1 capsule (100 mg) by mouth 3 times daily 90 capsule 0     sulfamethoxazole-trimethoprim (BACTRIM DS/SEPTRA DS) 800-160 MG tablet Take 1 tablet by mouth 2 times daily (Patient not taking: Reported on 6/7/2019) 20 tablet 0       Family History  family history includes Colon Cancer in his maternal grandmother; Ovarian Cancer in his maternal grandmother and paternal grandmother; Prostate Cancer in his maternal grandfather.    Social History   reports that he has never smoked. He has never used smokeless tobacco. He reports that he drinks alcohol.     Past Medical History  No past medical history on file.    Past Surgical History:   Procedure Laterality Date     AMPUTATE FOOT Left 2/5/2019     "Procedure: PARTIAL LEFT FOOT AMPUTATION.;  Surgeon: Chetan Potter DPM;  Location: [facility] OR     AMPUTATE LEG BELOW KNEE Left 2/18/2019    Procedure: LEFT BELOW THE KNEE AMPUTATION;  Surgeon: Kvng Berman MD;  Location:  OR     STENT  2010       Physical Exam  /70   Pulse 71   Ht 1.753 m (5' 9\")   Wt 75.4 kg (166 lb 3.2 oz)   BMI 24.54 kg/m    Body mass index is 24.54 kg/m .    GENERAL : In no apparent distress  EXTREMITIES: S/P left BKA.  No right foot ulcer.    RESULTS  Creatinine   Date Value Ref Range Status   03/03/2019 1.15 0.66 - 1.25 mg/dL Final     GFR Estimate   Date Value Ref Range Status   03/03/2019 76 >60 mL/min/[1.73_m2] Final     Comment:     Non  GFR Calc  Starting 12/18/2018, serum creatinine based estimated GFR (eGFR) will be   calculated using the Chronic Kidney Disease Epidemiology Collaboration   (CKD-EPI) equation.       Hemoglobin A1C   Date Value Ref Range Status   02/05/2019 10.9 (H) 0 - 5.6 % Final     Comment:     Normal <5.7% Prediabetes 5.7-6.4%  Diabetes 6.5% or higher - adopted from ADA   consensus guidelines.       Potassium   Date Value Ref Range Status   02/26/2019 5.2 3.4 - 5.3 mmol/L Final     ALT   Date Value Ref Range Status   02/04/2019 18 0 - 70 U/L Final     AST   Date Value Ref Range Status   02/04/2019 21 0 - 45 U/L Final     TSH   Date Value Ref Range Status   01/18/2019 0.24 (L) 0.40 - 4.00 mU/L Final     T4 Free   Date Value Ref Range Status   01/18/2019 1.38 0.76 - 1.46 ng/dL Final       Cholesterol   Date Value Ref Range Status   06/01/2010 166 0 - 200 mg/dL Final     Comment:     LDL Cholesterol is the primary guide to therapy.     The NCEP recommends further evaluation of: patients with cholesterol <200   mg/dL   if additional risk factors are present, cholesterol >240 mg/dL, triglycerides   >150 mg/dL, or HDL <40 mg/dL.     HDL Cholesterol   Date Value Ref Range Status   06/01/2010 40 40 - 110 mg/dL Final     LDL Cholesterol " Calculated   Date Value Ref Range Status   06/01/2010 93 0 - 129 mg/dL Final     Comment:     LDL Cholesterol is the primary guide to therapy: LDL-cholesterol goal in high   risk patients is <100 mg/dL and in very high risk patients is <70 mg/dL.     Triglycerides   Date Value Ref Range Status   06/01/2010 167 (H) 0 - 150 mg/dL Final     Cholesterol/HDL Ratio   Date Value Ref Range Status   06/01/2010 4.2 0.0 - 5.0 Final       Lab Results   Component Value Date    A1C 10.9 02/05/2019     A1C   8.1 % today.      ASSESSMENT/PLAN:      1. TYPE 1 DIABETES MELLITUS: Uncontrolled type 1 diabetes mellitus complicated by retinopathy, nephropathy, neuropathy -S/P left BKA, CAD and hypoglycemia.  I asked Eduardo to decrease his Tresiba dose to 24 units SQ each am and to continue NovoLog 1 unit / 20 gms CHO with meals and snacks with current correction scale.  I placed a referral for Eduardo to see our diabetes educator to discuss use of an insulin pump/sensor and for diabetes ed update.  Check fasting lipid panel next visit.  Pt's TSH was normal in Jan 2019.    2.  RETINOPATHY: History of severe retinopathy.  I placed a referral for patient to be seen by ophthalmology here.    3.  NEUROPATHY: S/P left BKA.     4.  NEPHROPATHY: He discontinued taking Cozaar stating that the medication affected his concentration.    5.  HTN: Stabe at this time.    6.  CAD: S/P stent placement in 2010. Pt should be taking a statin.    7.  LIPIDS: Check fasting lipid panel next visit.  Patient is not fasting and he is not taking a statin at this time.  He should be on a statin given his hx of type 1 diabetes with known CAD.  Will discuss in further detail next visit.    8.  Return to Endocrine Clinic to see me in 2 months.

## 2019-06-07 NOTE — TELEPHONE ENCOUNTER
----- Message from Shanna Urbina PA-C sent at 6/7/2019  3:29 PM CDT -----  I saw this patient in clinic today.  He is new to our clinic.  He is currently taking Humalog and Tresiba.  He states that his insurance sent him a letter stating that he will need to switch to NovoLog starting July 1, 2019.  Patient states that he does NOT tolerate NovoLog stating that his blood sugar control was very poor when he used  NovoLog and he is  requesting that he remains on Humalog.  Could you please initiate a prior authorization for the Humalog KwikPen.  Thank you,  Deborah

## 2019-06-07 NOTE — PATIENT INSTRUCTIONS
Decrease Tresiba 24 units each am.  Continue Humalog 1 unit/20 gms carbs with meals and snacks, plus correction insulin.  See our diabetes educator to discuss use of an insulin pump/sensor.  See the eye doctor.  See me in 2 months.  Shanna Urbina PA-C

## 2019-06-10 NOTE — TELEPHONE ENCOUNTER
Patient's insurance does not allow a proactive PA to be submitted. This will have to wait until then. Will let clinic know and postpone until 7/1 and submit at that time.

## 2019-06-28 ENCOUNTER — ALLIED HEALTH/NURSE VISIT (OUTPATIENT)
Dept: EDUCATION SERVICES | Facility: CLINIC | Age: 49
End: 2019-06-28
Payer: COMMERCIAL

## 2019-06-28 DIAGNOSIS — E10.42 TYPE 1 DIABETES MELLITUS WITH DIABETIC POLYNEUROPATHY (H): Primary | ICD-10-CM

## 2019-06-28 NOTE — PROGRESS NOTES
"Diabetes Self-Management Education & Support      Diabetes Self-Management Education & Support - Insulin Pump Pre-Start    SUBJECTIVE/OBJECTIVE     Cultural Influences/Ethnic Background:  American    Healthy Eating   knows how to carb count but has often not done it    Being Active  Knows how to adjust insulin for more activity    Monitoring  A barrier to his blood sugar control has been checking his blood sugar, the Drew has taken that away and he has had his best a1c in years     Taking Medications  Diabetes Medication(s)     Insulin       insulin degludec (TRESIBA FLEXTOUCH) 100 UNIT/ML pen    Inject 24 units subcutaneous each am.     insulin lispro (HUMALOG PEN) 100 UNIT/ML pen    1 unit / 20 gms carb subcutaneous with meals and snacks; pt uses approx 65 units in 24 hrs.        Healthy Coping     Patient Activation Measure Survey Score:  No flowsheet data found.      ASSESSMENT  Longstanding type 1 diabetes    INTERVENTION:     Education provided today on:     Reviewed and demonstrated operation of the following pumps:  The Omnipod Dash, Medtronic 670G with Guardian 3 sensor, and Tandem X2 with Dexcom G6 sensor.     Discussed unique features of each pump and what qualities are most important to patient.  Discussion included the operation of the 670G Hybrid Closed Loop system and the particular behaviors around that pump that need to be adhered to, including using the bolus calculator, counting carbohydrates accurately, calibrating the sensor appropriately.  Discussed that the results seen in the studies of the system that were done were a result of staying in \"auto\" mode > 85% of the time.      Explained the upgrade process, which includes making sure that warranty has  on the current pump, completing the CMN, processing through the pump company and approval by insurance company.  Also reviewed training that would be necessary to ensure safe operation of the pump.      He is interested in moving forward " with the Omnipod and the DexCom G5    Opportunities for ongoing education and support in diabetes-self management were discussed.    Pt verbalized understanding of concepts discussed and recommendations provided today.       Informational packets for Omnipod and DexCom insulin pumps    Thinking About Using an Insulin Pump? handout      PLAN  See Patient Instructions for co-developed, patient-stated behavior change goals.  AVS printed and provided to patient today. See Follow-Up section for recommended follow-up.    Time Spent: 60 minutes  Encounter Type: Individual    Any diabetes medication dose changes were made via the CDE Protocol and Collaborative Practice Agreement with the patient's referring provider. A copy of this encounter was shared with the provider.

## 2019-06-28 NOTE — PATIENT INSTRUCTIONS
1.  Keep using your Drew.      2.  Expect contact from Omn[employer].    3.  Call when you hear the pump is going to ship    Mira Heath RN,CDE  [address] [address]  Phone: [phone]  [email or web address]

## 2019-07-01 NOTE — TELEPHONE ENCOUNTER
Provided additional information to Sourav at [employer] via phone. He is faxing approval. Will update when received.

## 2019-07-01 NOTE — TELEPHONE ENCOUNTER
Central Prior Authorization Team   Phone: [phone]      PA Initiation    Medication: Humalog-PA Initiated  Insurance Company: [employer] - Phone [phone] Fax [phone]  Pharmacy Filling the Rx: [id number] [place] [facility] [address] [facility] [address]  Filling Pharmacy Phone: [phone]  Filling Pharmacy Fax:    Start Date: 7/1/2019

## 2019-07-02 ENCOUNTER — TELEPHONE (OUTPATIENT)
Dept: ENDOCRINOLOGY | Facility: CLINIC | Age: 49
End: 2019-07-02

## 2019-07-02 DIAGNOSIS — E10.9 TYPE 1 DIABETES MELLITUS (H): Primary | ICD-10-CM

## 2019-07-02 RX ORDER — INSULIN ASPART 100 [IU]/ML
INJECTION, SOLUTION INTRAVENOUS; SUBCUTANEOUS
Qty: 70 ML | Refills: 3 | Status: CANCELLED | OUTPATIENT
Start: 2019-07-02

## 2019-07-02 NOTE — TELEPHONE ENCOUNTER
Prior Authorization Approval    Authorization Effective Date: 6/1/2019  Authorization Expiration Date: 6/30/2020  Medication: Humalog-PA approved  Approved Dose/Quantity:   Reference #: [id number]   Insurance Company: [employer] - Phone [phone] Fax [phone]  Expected CoPay:       CoPay Card Available:      Foundation Assistance Needed:    Which Pharmacy is filling the prescription (Not needed for infusion/clinic administered): [id number] [address] [address]  Pharmacy Notified:  Yes  Patient Notified:  No-Pharmacy will contact

## 2019-07-02 NOTE — TELEPHONE ENCOUNTER
Forms received from: [employer]     Forms were for CMN for insulin pump     Faxed Date:7/5/19

## 2019-07-05 ENCOUNTER — MEDICAL CORRESPONDENCE (OUTPATIENT)
Dept: HEALTH INFORMATION MANAGEMENT | Facility: CLINIC | Age: 49
End: 2019-07-05

## 2019-07-08 ENCOUNTER — TELEPHONE (OUTPATIENT)
Dept: FAMILY MEDICINE | Facility: CLINIC | Age: 49
End: 2019-07-08

## 2019-07-08 NOTE — TELEPHONE ENCOUNTER
"  Reason for Call:  Eduardo stopped by the clinic today requesting Home Care orders for physical therapy. He said he talked to the home care office and they told him he will need a \"face to face\" with his PCP and then the PCP can place the orders.     He is unable to come in for a visit because he doesn't drive and his wife works. His parents were in town today and drove him to the clinic, but they are leaving. I advised pt to check with [facility] regarding their requirements for home health care and that I would send a request to Dr Conway for orders.    Best phone number to reach pt at is: [phone]  Ok to leave a message with medical info? yes    Dafne Ho  Patient Representative            "

## 2019-07-09 NOTE — TELEPHONE ENCOUNTER
Reason for Call: Request for an order or referral:    Order or referral being requested: Home Care Orders- Clarification    Date needed: as soon as possible    Has the patient been seen by the PCP for this problem? YES    Additional comments: Pt's home care nurse called this afternoon needing to speak with a nurse directly working with Dr. Conway today. Please give home care a call back at the number listed below. Thank you.    Phone number Patient can be reached at:  Other phone number:  [facility]/ April- [phone]    Best Time:      Can we leave a detailed message on this number?  YES    Call taken on 7/9/2019 at 1:02 PM by Joslyn Ahmadi

## 2019-07-09 NOTE — TELEPHONE ENCOUNTER
April calling back. They are going to go ahead and put in a verbal order for PT eval and treat only. PT with then contact us if they need anything further once they have met with patient.  CLAIRE RobertsonN, RN  [facility]

## 2019-07-10 ENCOUNTER — DOCUMENTATION ONLY (OUTPATIENT)
Dept: FAMILY MEDICINE | Facility: CLINIC | Age: 49
End: 2019-07-10

## 2019-07-10 ENCOUNTER — MEDICAL CORRESPONDENCE (OUTPATIENT)
Dept: HEALTH INFORMATION MANAGEMENT | Facility: CLINIC | Age: 49
End: 2019-07-10

## 2019-07-10 NOTE — PROGRESS NOTES
[facility] now requests orders and shares plan of care/discharge summaries for some patients through [employer].  Please REPLY TO THIS MESSAGE OR ROUTE BACK TO THE AUTHOR in order to give authorization for orders when needed.  This is considered a verbal order, you will still receive a faxed copy of orders for signature.  Thank you for your assistance in improving collaboration for our patients.    ORDER    Continue home care PT 2x/week for 4 weeks for functional ROM, strengthening, mobility/balance training s/p L MICHOACANO.

## 2019-07-15 ENCOUNTER — TELEPHONE (OUTPATIENT)
Dept: ENDOCRINOLOGY | Facility: CLINIC | Age: 49
End: 2019-07-15

## 2019-07-15 DIAGNOSIS — E10.42 TYPE 1 DIABETES MELLITUS WITH DIABETIC POLYNEUROPATHY (H): Primary | ICD-10-CM

## 2019-07-15 NOTE — TELEPHONE ENCOUNTER
The pharmacy called to check on the status of the PA request for Omnipod. They stated that they faxed the forms that needed to be completed and faxed back to the insurance, to the clinic. This PA request was not routed to the Central PA team  For completion and so we were unable to provide an update. In addition, there is documentation on 07/10/2019 that the patient is in Hospice. The pharmacy is requesting a follow up call in regards to the medication; if the patient is in [place] then the medication must be billed through medical benefit.

## 2019-07-15 NOTE — TELEPHONE ENCOUNTER
Prior Authorization Retail Medication Request    Medication/Dose: Omnipod Insulin pump     ICD code E  10.42on     Rationale:  Uncontrolled type 1 diabetes mellitus complicated by retinopathy, nephropathy, neuropathy -S/P left BKA, CAD and hypoglycemia    Insurance Name:  GAL [place]   Insurance ID:  [id number]       Pharmacy Information   [facility]  Phone:  [phone]

## 2019-07-16 ENCOUNTER — TELEPHONE (OUTPATIENT)
Dept: FAMILY MEDICINE | Facility: CLINIC | Age: 49
End: 2019-07-16

## 2019-07-16 NOTE — TELEPHONE ENCOUNTER
Date Forms was received: July 16, 2019    Forms received by: Fax    Purpose of Form:  Home care face to face    When the form is due:  ASAP    How the form needs to be returned for patient:  Fax    Form currently placed  SW inbox

## 2019-07-17 ENCOUNTER — TELEPHONE (OUTPATIENT)
Dept: FAMILY MEDICINE | Facility: CLINIC | Age: 49
End: 2019-07-17

## 2019-07-17 NOTE — TELEPHONE ENCOUNTER
Date Forms was received: July 17, 2019    Forms received by: Fax    Purpose of Form:  Home care orders    When the form is due:  ASAP    How the form needs to be returned for patient:  Fax    Form currently placed  SW inbox

## 2019-07-18 ENCOUNTER — TELEPHONE (OUTPATIENT)
Dept: FAMILY MEDICINE | Facility: CLINIC | Age: 49
End: 2019-07-18

## 2019-07-18 NOTE — TELEPHONE ENCOUNTER
I spoke with Sejal - verbal OK given to proceed with social work eval.    Sejal verbalized understanding and agrees with plan.    Will call back if further questions or concerns.    Jhoana Dsouza, CLAIREN, RN

## 2019-07-18 NOTE — TELEPHONE ENCOUNTER
Reason for Call:  Other call back    Detailed comments: Verbal ok for an appt with Social  worker - home care to visit for  this pt     Phone Number Patient can be reached at: Other phone number: [phone]    Best Time: any    Can we leave a detailed message on this number? Yes      Call taken on 7/18/2019 at 1:56 PM by Berkley Villafuerte

## 2019-07-23 ENCOUNTER — TELEPHONE (OUTPATIENT)
Dept: FAMILY MEDICINE | Facility: CLINIC | Age: 49
End: 2019-07-23

## 2019-07-23 ENCOUNTER — TELEPHONE (OUTPATIENT)
Dept: ENDOCRINOLOGY | Facility: CLINIC | Age: 49
End: 2019-07-23

## 2019-07-23 DIAGNOSIS — E10.42 TYPE 1 DIABETES MELLITUS WITH DIABETIC POLYNEUROPATHY (H): Primary | ICD-10-CM

## 2019-07-23 RX ORDER — INSULIN PUMP CONTROLLER
1 EACH MISCELLANEOUS
Qty: 6 EACH | Refills: 3 | Status: SHIPPED | OUTPATIENT
Start: 2019-07-23 | End: 2020-10-30

## 2019-07-23 NOTE — TELEPHONE ENCOUNTER
Orders for the Omnipod Dash were sent to [facility] pharmacy as requested. Mira Heath RN,CDE

## 2019-07-23 NOTE — TELEPHONE ENCOUNTER
Date Forms was received: July 23, 2019    Forms received by: Fax    Purpose of Form:  Home care orders    When the form is due:  ASAP    How the form needs to be returned for patient:  Fax    Form currently placed  SW inbox

## 2019-07-23 NOTE — TELEPHONE ENCOUNTER
Health Call Center    Phone Message    May a detailed message be left on voicemail: yes    Reason for Call: Medication Question or concern regarding medication   Prescription Clarification  Name of Medication: Insulin Disposable Pump [place]OMNIPOD DASH 5 PACK[place] [facility] [place] Provider: Shanna Urbina   Pharmacy: [id number] - [place], [place] - [address]   What on the order needs clarification? Pharmacist is wanting to know what is going on with medications.           Action Taken: Message routed to:  [facility] ([facility]INOLOG[facility]

## 2019-07-26 ENCOUNTER — OFFICE VISIT (OUTPATIENT)
Dept: ENDOCRINOLOGY | Facility: CLINIC | Age: 49
End: 2019-07-26
Payer: COMMERCIAL

## 2019-07-26 VITALS
DIASTOLIC BLOOD PRESSURE: 70 MMHG | BODY MASS INDEX: 23.96 KG/M2 | WEIGHT: 161.8 LBS | HEIGHT: 69 IN | SYSTOLIC BLOOD PRESSURE: 143 MMHG | HEART RATE: 66 BPM

## 2019-07-26 DIAGNOSIS — E10.29 TYPE 1 DIABETES MELLITUS WITH MICROALBUMINURIA (H): ICD-10-CM

## 2019-07-26 DIAGNOSIS — E10.40 TYPE 1 DIABETES MELLITUS WITH DIABETIC NEUROPATHY (H): Primary | ICD-10-CM

## 2019-07-26 DIAGNOSIS — E10.59 TYPE 1 DIABETES MELLITUS WITH OTHER CIRCULATORY COMPLICATION (H): ICD-10-CM

## 2019-07-26 DIAGNOSIS — E10.319 TYPE 1 DIABETES MELLITUS WITH RETINOPATHY, MACULAR EDEMA PRESENCE UNSPECIFIED, UNSPECIFIED LATERALITY, UNSPECIFIED RETINOPATHY SEVERITY (H): ICD-10-CM

## 2019-07-26 DIAGNOSIS — Z89.512 STATUS POST BELOW KNEE AMPUTATION OF LEFT LOWER EXTREMITY: ICD-10-CM

## 2019-07-26 DIAGNOSIS — R80.9 TYPE 1 DIABETES MELLITUS WITH MICROALBUMINURIA (H): ICD-10-CM

## 2019-07-26 PROCEDURE — 99205 OFFICE O/P NEW HI 60 MIN: CPT | Performed by: INTERNAL MEDICINE

## 2019-07-26 ASSESSMENT — MIFFLIN-ST. JEOR: SCORE: 1599.3
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
## 2019-07-26 NOTE — PATIENT INSTRUCTIONS
Continue diabetes medications as:   Humalog Kwikpen  5U with each meal       Sscale 1U per 50 mg/dl over 200 mg/dl   Tresiba Flextouch U100  20U each morning    No lab tests ordered today  Check with [facility] pharmacy on status of OmniPod order  Plan to see CRISTOFER Gutierrez diabetes educator to review:   Insulin dosing   DexcomG6 and OmniPod pump options   Change from Humalog pen to vial (for pump)    See Dr. Palma for followup evaluation in approx 2 months
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
## 2019-07-29 ENCOUNTER — TELEPHONE (OUTPATIENT)
Dept: ENDOCRINOLOGY | Facility: CLINIC | Age: 49
End: 2019-07-29

## 2019-07-29 NOTE — TELEPHONE ENCOUNTER
Diabetes Education Scheduling Outreach #1:    Call to patient to schedule. Left message with phone number to call to schedule.    Plan for 2nd outreach attempt within 2 business days.    Kaylee Marquez OnCall  Diabetes and Nutrition Scheduling

## 2019-07-29 NOTE — LETTER
August 1, 2019      Eduardo Walton  [address] [facility] [address]        Dear Eduardo,     I enjoyed meeting you at your recent endocrinology evaluation this week.  We reviewed the evaluation and management of the Type 1 diabetes mellitus.  I recommended:    Continue diabetes medications as:              Humalog Kwikpen                   5U with each meal                                                              Sscale 1U per 50 mg/dl over 200 mg/dl              Tresiba Flextouch U100         20U each morning     Check with [facility] pharmacy on status of OmniPod order  See a CRISTOFER Gutierrez diabetes educator to review insulin dosing, DexcomG6 and OmniPod pump options.    The Diabetes Education  has not been able to reach you by phone.  Please call their office at [phone] to make the appointment.  Also plan to see me for a followup evaluation in approx 2 months    Sincerely,        Jori Palma MD  [facility]

## 2019-07-31 ENCOUNTER — TELEPHONE (OUTPATIENT)
Dept: FAMILY MEDICINE | Facility: CLINIC | Age: 49
End: 2019-07-31

## 2019-07-31 NOTE — TELEPHONE ENCOUNTER
Diabetes Education Scheduling Outreach #2:    Call to patient to schedule. Left message with phone number to call to schedule.    Kaylee Salcedo  [place] OnCall  Diabetes and Nutrition Scheduling

## 2019-07-31 NOTE — TELEPHONE ENCOUNTER
Reason for Call:  Form, our goal is to have forms completed with 72 hours, however, some forms may require a visit or additional information.    Type of letter, form or note:  Home Health Certification    Who is the form from?: Home care    Where did the form come from: form was faxed in    What clinic location was the form placed at?: [place]    Where the form was placed: Given to Aida MAN RN    What number is listed as a contact on the form?:        Additional comments:     Call taken on 7/31/2019 at 3:37 PM by Azra De

## 2019-08-01 NOTE — TELEPHONE ENCOUNTER
MED REC DONE     Discrepancies:      Tresiba           Epic: inject 24 units subcutaneous each morning           Form:  Take 20 units subcutaneous daily       Insulin lispro   Epic: 1 unit / 20gm carb subcutaneous with meals and snacks    Form: 1 unit / 25gm carb subcutaneous before meals and at bedtime, sliding scale is on form as well    Meds on Epic but NOT  on Form:       Losartan 50mg, take 1 tablet PO daily      Meds on Form but NOT on Epic :     Tylenol 325mg, take 2 tabs PO Q4H PRN for pain     Aspirin 325mg, take 1 tab PO daily for thrombosis prevention       Routing to PCP for further review/recommendations/orders    GEOVANNY Robertson, RN  [facility]

## 2019-08-01 NOTE — TELEPHONE ENCOUNTER
Message noted.  I will mail patient a summary (and CDE appt reminder) letter today.    Jori Palma MD  [facility]

## 2019-08-08 ENCOUNTER — TELEPHONE (OUTPATIENT)
Dept: FAMILY MEDICINE | Facility: CLINIC | Age: 49
End: 2019-08-08

## 2019-08-08 DIAGNOSIS — Z53.9 DIAGNOSIS NOT YET DEFINED: Primary | ICD-10-CM

## 2019-08-08 PROCEDURE — 99207 C MD CERTIFICATION HHA PATIENT: CPT | Performed by: FAMILY MEDICINE

## 2019-08-08 RX ORDER — ASPIRIN 325 MG
325 TABLET, DELAYED RELEASE (ENTERIC COATED) ORAL DAILY
COMMUNITY
Start: 2019-08-08 | End: 2023-11-06

## 2019-08-08 RX ORDER — ACETAMINOPHEN 325 MG/1
650 TABLET ORAL EVERY 4 HOURS PRN
COMMUNITY
Start: 2019-08-08 | End: 2020-10-30

## 2019-08-08 NOTE — TELEPHONE ENCOUNTER
[facility] calling requesting orders for continued physical therapy. They would like visits two times per week for three weeks for continued balance and gait training. Verbal OK given to proceed with these visits.   CLAIRE RobertsonN, RN  [facility]

## 2019-08-09 ENCOUNTER — DOCUMENTATION ONLY (OUTPATIENT)
Dept: FAMILY MEDICINE | Facility: CLINIC | Age: 49
End: 2019-08-09

## 2019-08-09 NOTE — PROGRESS NOTES
[facility] now requests orders and shares plan of care/discharge summaries for some patients through [employer].  Please REPLY TO THIS MESSAGE OR ROUTE BACK TO THE AUTHOR in order to give authorization for orders when needed.  This is considered a verbal order, you will still receive a faxed copy of orders for signature.  Thank you for your assistance in improving collaboration for our patients.    Eduardo has decided that he no longer wants PT at this time.  He reports to PT that he has a lot of personal issues going on in his life, and his mind is not on the therapy.    PT will discharge him today from homecare.  Discharge summary will be sent.     Sejal Mccoy, PT  [facility]  [phone]  [email or web address]

## 2019-09-06 ENCOUNTER — TRANSFERRED RECORDS (OUTPATIENT)
Dept: HEALTH INFORMATION MANAGEMENT | Facility: CLINIC | Age: 49
End: 2019-09-06

## 2019-09-06 DIAGNOSIS — E10.40 TYPE 1 DIABETES MELLITUS WITH DIABETIC NEUROPATHY (H): Primary | ICD-10-CM

## 2019-09-12 RX ORDER — FLASH GLUCOSE SENSOR
KIT MISCELLANEOUS
Qty: 6 EACH | Refills: 3 | Status: SHIPPED | OUTPATIENT
Start: 2019-09-12 | End: 2020-12-01 | Stop reason: ALTCHOICE

## 2019-09-12 NOTE — TELEPHONE ENCOUNTER
CGM sensor  Last Written Prescription Date:  Pt reported    Last Office Visit : 6/7/19  Future Office visit:  No future appt    Routing refill request to provider for review/approval because:  Medication is reported/historical

## 2019-09-24 ENCOUNTER — TELEPHONE (OUTPATIENT)
Dept: ENDOCRINOLOGY | Facility: CLINIC | Age: 49
End: 2019-09-24

## 2019-09-24 NOTE — TELEPHONE ENCOUNTER
Prior Authorization Retail Medication Request    Medication/Dose: Humalog 100 unit   ICD code (if different than what is on RX):E10.42   Previously Tried and Failed:Novolog  Rationale:patient body rejects alternative needs to manage type 1 diabetes    Insurance Name:[facility]  Insurance ID:[id number]       Pharmacy Information (if different than what is on RX)  [facility]   Phone:[phone]

## 2019-09-26 NOTE — TELEPHONE ENCOUNTER
CENTRAL PRIOR AUTHORIZATION  [phone]    PA Initiation    Medication: Humalog 100 unit   Insurance Company: [place] Medicaid ([employer]) - Phone [phone] Fax [phone]  [id number] [place] [facility] [address] [address]  Filling Pharmacy Phone: [phone]  Filling Pharmacy Fax:    Start Date: 9/26/2019

## 2019-09-27 DIAGNOSIS — E10.319 TYPE 1 DIABETES MELLITUS WITH RETINOPATHY, MACULAR EDEMA PRESENCE UNSPECIFIED, UNSPECIFIED LATERALITY, UNSPECIFIED RETINOPATHY SEVERITY (H): ICD-10-CM

## 2019-09-27 DIAGNOSIS — E10.29 TYPE 1 DIABETES MELLITUS WITH MICROALBUMINURIA (H): ICD-10-CM

## 2019-09-27 DIAGNOSIS — E10.40 TYPE 1 DIABETES MELLITUS WITH DIABETIC NEUROPATHY (H): Primary | ICD-10-CM

## 2019-09-27 DIAGNOSIS — R80.9 TYPE 1 DIABETES MELLITUS WITH MICROALBUMINURIA (H): ICD-10-CM

## 2019-09-27 NOTE — TELEPHONE ENCOUNTER
Prior Authorization Approval :  APPROVED FOR ONE FILL. PATIENT WILL BE ENROLLED IN A MANAGED CARE PROGRAM ON 10/01/2019    Authorization Effective Date: 9/1/2019  Authorization Expiration Date: 9/30/2019  Medication: Humalog 100 unit  - APPROVED   Approved Dose/Quantity: APPROVED FOR ONE FILL. PATIENT WILL BE ENROLLED IN A MANAGED CARE PROGRAM ON 10/01/2019  Reference #: [id number]   Insurance Company: [place] Medicaid ([facility]) - Phone [phone] Fax [phone]  Expected CoPay:       CoPay Card Available: No    Foundation Assistance Needed:    Which Pharmacy is filling the prescription (Not needed for infusion/clinic administered): [id number] [place] [id number]  Pharmacy Notified: Yes  Patient Notified: Yes

## 2019-09-28 NOTE — TELEPHONE ENCOUNTER
Requested Prescriptions      insulin glargine (LANTUS SOLOSTAR) 100 UNIT/ML pen              Sig: Inject 24 units subcutaneous each morning.    Disp:  15 mL    Refills:  3    Start: 9/27/2019    Class: E-Prescribe    Non-formulary    To pharmacy: If Lantus is not covered by insurance, may substitute Basaglar at same dose and frequency.           Long Acting Insulin Protocol Failed9/27 6:05 PM  x Blood pressure less than 140/90 in past 6 months   x LDL on file in past 12 months   x Microalbumin on file in past 12 months   x HgbA1C in past 3 or 6 months   x Medication is active on med list    Serum creatinine on file in past 12 months    Patient is age 18 or older    Recent (6 mo) or future (30 days) visit within the authorizing provider's specialty                 Per Epic review, patient has transferred care to Dr. Palma and Jimmy Gutierrez. Last office visit with Dr. Palma on 7/26/19 and follow-up visit scheduled for 10/21/19.    Will send request to Dr. Plama.        Monica Garcia RN  Endocrine Care Coordinator  [facility]

## 2019-10-01 ENCOUNTER — TELEPHONE (OUTPATIENT)
Dept: ENDOCRINOLOGY | Facility: CLINIC | Age: 49
End: 2019-10-01

## 2019-10-03 NOTE — TELEPHONE ENCOUNTER
PA Initiation    Medication: insulin degludec (TRESIBA FLEXTOUCH) 100 UNIT/ML pen -   Insurance Company: [employer] - Phone [phone] Fax [phone]  [id number] [place] [facility] [address] [address]  Filling Pharmacy Phone: [phone]  Filling Pharmacy Fax: [phone]  Start Date: 10/3/2019

## 2019-10-04 NOTE — TELEPHONE ENCOUNTER
Prior Authorization Approval    Medication: insulin degludec (TRESIBA FLEXTOUCH) 100 UNIT/ML pen - APPROVED was approved on 10/3/2019  Effective: 9/3/2019 to 10/2/2020  Reference #: [id number]  Approved Dose/Quantity:   Insurance Company: [employer] - Phone [phone] Fax [phone]  Expected CoPay:    [id number] [place] [address]  Pharmacy Notified: Yes  Patient Notified: Comment:  **Instructed pharmacy to notify patient when script is ready to /ship.**

## 2019-10-21 ENCOUNTER — MYC MEDICAL ADVICE (OUTPATIENT)
Dept: ENDOCRINOLOGY | Facility: CLINIC | Age: 49
End: 2019-10-21

## 2019-10-21 ENCOUNTER — OFFICE VISIT (OUTPATIENT)
Dept: ENDOCRINOLOGY | Facility: CLINIC | Age: 49
End: 2019-10-21
Payer: COMMERCIAL

## 2019-10-21 VITALS
DIASTOLIC BLOOD PRESSURE: 62 MMHG | HEIGHT: 69 IN | WEIGHT: 158.1 LBS | SYSTOLIC BLOOD PRESSURE: 109 MMHG | HEART RATE: 78 BPM | BODY MASS INDEX: 23.42 KG/M2

## 2019-10-21 DIAGNOSIS — E10.29 TYPE 1 DIABETES MELLITUS WITH MICROALBUMINURIA (H): ICD-10-CM

## 2019-10-21 DIAGNOSIS — E10.40 TYPE 1 DIABETES MELLITUS WITH DIABETIC NEUROPATHY (H): Primary | ICD-10-CM

## 2019-10-21 DIAGNOSIS — E10.319 TYPE 1 DIABETES MELLITUS WITH RETINOPATHY, MACULAR EDEMA PRESENCE UNSPECIFIED, UNSPECIFIED LATERALITY, UNSPECIFIED RETINOPATHY SEVERITY (H): ICD-10-CM

## 2019-10-21 DIAGNOSIS — R80.9 TYPE 1 DIABETES MELLITUS WITH MICROALBUMINURIA (H): ICD-10-CM

## 2019-10-21 PROCEDURE — 99214 OFFICE O/P EST MOD 30 MIN: CPT | Performed by: INTERNAL MEDICINE

## 2019-10-21 PROCEDURE — 95251 CONT GLUC MNTR ANALYSIS I&R: CPT | Performed by: INTERNAL MEDICINE

## 2019-10-21 RX ORDER — PROCHLORPERAZINE 25 MG/1
1 SUPPOSITORY RECTAL CONTINUOUS PRN
Qty: 1 EACH | Refills: 4 | Status: SHIPPED | OUTPATIENT
Start: 2019-10-21 | End: 2020-01-19

## 2019-10-21 RX ORDER — LOSARTAN POTASSIUM 25 MG/1
25 TABLET ORAL DAILY
Qty: 30 TABLET | Refills: 11 | Status: SHIPPED | OUTPATIENT
Start: 2019-10-21 | End: 2020-10-30

## 2019-10-21 RX ORDER — PEN NEEDLE, DIABETIC 31 GX5/16"
NEEDLE, DISPOSABLE MISCELLANEOUS
Refills: 3 | COMMUNITY
Start: 2019-08-24 | End: 2020-08-18

## 2019-10-21 RX ORDER — PROCHLORPERAZINE 25 MG/1
1 SUPPOSITORY RECTAL CONTINUOUS PRN
Qty: 1 DEVICE | Refills: 0 | Status: SHIPPED | OUTPATIENT
Start: 2019-10-21 | End: 2020-10-20

## 2019-10-21 RX ORDER — PREGABALIN 200 MG/1
200 CAPSULE ORAL 3 TIMES DAILY
Qty: 90 CAPSULE | Refills: 5 | Status: SHIPPED | OUTPATIENT
Start: 2019-10-21 | End: 2019-11-25

## 2019-10-21 RX ORDER — PROCHLORPERAZINE 25 MG/1
1 SUPPOSITORY RECTAL CONTINUOUS PRN
Qty: 3 EACH | Refills: 4 | Status: SHIPPED | OUTPATIENT
Start: 2019-10-21 | End: 2020-01-19

## 2019-10-21 ASSESSMENT — MIFFLIN-ST. JEOR: SCORE: 1582.52

## 2019-10-21 NOTE — PATIENT INSTRUCTIONS
Advise higher dose mealtime Humalog and lower dose Tresiba insulin  Current DM medications:   Humalog Kwikpen  6U with each meal       Sscale 1U per 50 mg/dl over 200 mg/dl   Tresiba Flextouch U100  20U each morning    Count the number of insulin pens at home, send Dr. CARPENTER a [employer] message  [employer] will ship the OmniPod PDM device to you soon   See  Diabetes Educator for instruction on OmniPod Dash PDM   OK to use Humalog pens (w/ syringe) to fill pods  Restart the losartan 25 mg daily dose  Increase the pregabalin (Lyrica) 200 mg capsule 3x/day  Use LibrePersonal glucose sensors until new DexcomG6 arrives   Then, OK to use either Drew or the G6   Dr. CARPENTER sent new DexcomG6 Rx to [facility]   Plan to see  Diab Educator Yajaira (or other)    Call [phone] to make appt    Next appt with Dr. Palma 12/2019
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
## 2019-10-22 ENCOUNTER — TELEPHONE (OUTPATIENT)
Dept: ENDOCRINOLOGY | Facility: CLINIC | Age: 49
End: 2019-10-22

## 2019-10-22 NOTE — TELEPHONE ENCOUNTER
Prior Authorization Retail Medication Request    Medication/Dose: Pregabalin 200 mg  ICD code (if different than what is on RX):  E10.40, E10.319, E10.29, R80.9  Previously Tried and Failed:  Pregabalin 100 mg  Rationale:  Patient with diabetic neuropathy not well controlled on previous dosing of Pregabalin    Insurance Name:  [employer]  Insurance ID:  [id number]      Pharmacy Information (if different than what is on RX)  Name:  [id number] [place]  Phone:  [phone]

## 2019-10-23 NOTE — TELEPHONE ENCOUNTER
Central Prior Authorization Team   Phone: [phone]      It appears a prior authorization for pregabalin (LYRICA) 100 MG capsule was initiated on 08/14/2019 and denied on 08/27/2019 at the clinic level.  Josephine from [facility] stated a prior authorization for pregabalin (LYRICA) 200 MG capsule cannot be done because the medication was already denied for a different strength.  An appeal must be done since it is within the 90 day time frame from the denial date.  Due to documentation limitations the central prior authorization team will not be able to do the appeal.

## 2019-11-18 ENCOUNTER — MYC MEDICAL ADVICE (OUTPATIENT)
Dept: ENDOCRINOLOGY | Facility: CLINIC | Age: 49
End: 2019-11-18

## 2019-11-18 DIAGNOSIS — E10.40 TYPE 1 DIABETES MELLITUS WITH DIABETIC NEUROPATHY (H): ICD-10-CM

## 2019-11-18 DIAGNOSIS — E10.319 TYPE 1 DIABETES MELLITUS WITH RETINOPATHY, MACULAR EDEMA PRESENCE UNSPECIFIED, UNSPECIFIED LATERALITY, UNSPECIFIED RETINOPATHY SEVERITY (H): ICD-10-CM

## 2019-11-18 DIAGNOSIS — R80.9 TYPE 1 DIABETES MELLITUS WITH MICROALBUMINURIA (H): ICD-10-CM

## 2019-11-18 DIAGNOSIS — E10.29 TYPE 1 DIABETES MELLITUS WITH MICROALBUMINURIA (H): ICD-10-CM

## 2019-11-19 RX ORDER — PREGABALIN 200 MG/1
200 CAPSULE ORAL 3 TIMES DAILY
Qty: 90 CAPSULE | Refills: 5 | Status: CANCELLED | OUTPATIENT
Start: 2019-11-19

## 2019-11-19 NOTE — TELEPHONE ENCOUNTER
*See below update - pt asking for Rx for BATSHEVA-Lyrica brand     [facility] is [facility], [facility]     Please advise     Kaylee SAUCEDO RN

## 2019-11-19 NOTE — TELEPHONE ENCOUNTER
Dr. De La Rosa - see below,     Please advise - noted PA was already denied for medication, 2 different strengths denied (see encounter from 10/22/19)     Would you recommend pt try alternative med?     Please advise     Kaylee SAUCEDO RN

## 2019-11-22 ENCOUNTER — DOCUMENTATION ONLY (OUTPATIENT)
Dept: OTHER | Facility: CLINIC | Age: 49
End: 2019-11-22

## 2019-11-22 ENCOUNTER — OFFICE VISIT (OUTPATIENT)
Dept: PODIATRY | Facility: CLINIC | Age: 49
End: 2019-11-22
Payer: COMMERCIAL

## 2019-11-22 VITALS
SYSTOLIC BLOOD PRESSURE: 124 MMHG | BODY MASS INDEX: 23.4 KG/M2 | DIASTOLIC BLOOD PRESSURE: 68 MMHG | HEIGHT: 69 IN | WEIGHT: 158 LBS

## 2019-11-22 DIAGNOSIS — E10.40 TYPE 1 DIABETES MELLITUS WITH DIABETIC NEUROPATHY (H): ICD-10-CM

## 2019-11-22 DIAGNOSIS — R60.0 LEG EDEMA, RIGHT: ICD-10-CM

## 2019-11-22 DIAGNOSIS — Z89.512 HX OF BKA, LEFT (H): ICD-10-CM

## 2019-11-22 DIAGNOSIS — Q66.70 PES CAVUS: ICD-10-CM

## 2019-11-22 DIAGNOSIS — M79.2 NEUROPATHIC PAIN OF RIGHT FOOT: Primary | ICD-10-CM

## 2019-11-22 PROCEDURE — 99213 OFFICE O/P EST LOW 20 MIN: CPT | Performed by: PODIATRIST

## 2019-11-22 ASSESSMENT — MIFFLIN-ST. JEOR: SCORE: 1582.06

## 2019-11-22 NOTE — LETTER
11/22/2019         RE: Eduardo Walton  [address] [facility] [address]        Dear Colleague,    Thank you for referring your patient, Eduardo Walton, to the [facility]. Please see a copy of my visit note below.    PATIENT HISTORY:   Eduardo Walton is a 49 year old male who presents to clinic for diabetic foot check and right foot assessment. Underwent below the knee amputation on the left a year ago. Wants to make sure right foot is okay.     Review of Systems:  Patient denies fever, chills, rash, wound, stiffness, limping, weakness, heart burn, blood in stool, chest pain with activity, calf pain when walking, shortness of breath with activity, chronic cough, easy bleeding/bruising, swelling of ankles, excessive thirst, fatigue, depression, anxiety.  Patient admits to numbness.     PAST MEDICAL HISTORY:   Past Medical History:   Diagnosis Date     CAD (coronary artery disease)     previous coronary stent placement (2010?)     Diabetic retinopathy associated with type 1 diabetes mellitus (H)      Foot ulcer, left (H)      Osteomyelitis (H)     left foot     S/P BKA (below knee amputation), left (H) 2019     Type 1 diabetes mellitus with complications (H)     retinopathy, neuropathy, nephropathy, macrovascular disease        PAST SURGICAL HISTORY:   Past Surgical History:   Procedure Laterality Date     AMPUTATE FOOT Left 2/5/2019    Procedure: PARTIAL LEFT FOOT AMPUTATION.;  Surgeon: Chetan Potter DPM;  Location:  OR     AMPUTATE LEG BELOW KNEE Left 2/18/2019    Procedure: LEFT BELOW THE KNEE AMPUTATION;  Surgeon: Kvng Berman MD;  Location:  OR     STENT  2010        MEDICATIONS:   Current Outpatient Medications:      acetaminophen (TYLENOL) 325 MG tablet, Take 2 tablets (650 mg) by mouth every 4 hours as needed for mild pain, Disp: , Rfl:      aspirin (ASA) 325 MG EC tablet, Take 1 tablet (325 mg) by mouth daily, Disp: , Rfl:      B-D U/F 31G X 8 MM insulin pen needle,  INJECT SUBCUTANEOUSLY 5 TIMES DAILY., Disp: , Rfl: 3     blood glucose (CONTOUR NEXT TEST) test strip, Use to test blood sugar., Disp: 1 Box, Rfl: 11     Continuous Blood Gluc  (DEXCOM G6 ) BRANDY, 1 Device continuous prn (Use for continuous glucose testing), Disp: 1 Device, Rfl: 0     Continuous Blood Gluc Sensor (DEXCOM G6 SENSOR) MISC, 1 Box continuous prn (Use for continuous glucose testing) Dispense 3 box (of 3 sensors) per 3 months, Disp: 3 each, Rfl: 4     Continuous Blood Gluc Sensor (FREESTYLE COLIN 14 DAY SENSOR) MISC, USE WITH 14 DAY COLIN SYSTEM. CHANGE SENSOR EVERY 14 DAYS., Disp: 6 each, Rfl: 3     Continuous Blood Gluc Transmit (DEXCOM G6 TRANSMITTER) MISC, 1 Device continuous prn (use for continuous glucose testing), Disp: 1 each, Rfl: 4     insulin degludec (TRESIBA FLEXTOUCH) 100 UNIT/ML pen, Inject 24 units subcutaneous each am. (Patient taking differently: 22 Units Inject 24 units subcutaneous each am.), Disp: 15 mL, Rfl: 3     Insulin Disposable Pump (OMNIPOD DASH 5 PACK) MISC, 1 each every 3 days (Patient not taking: Reported on 10/21/2019), Disp: 6 each, Rfl: 3     Insulin Disposable Pump (OMNIPOD DASH 5 PACK) MISC, 1 each every 3 days (Patient not taking: Reported on 10/21/2019), Disp: 6 each, Rfl: 3     insulin glargine (LANTUS SOLOSTAR) 100 UNIT/ML pen, Inject 24 units subcutaneous daily as directed (Patient not taking: Reported on 10/21/2019), Disp: 15 mL, Rfl: 3     insulin lispro (HUMALOG PEN) 100 UNIT/ML pen, 1 unit / 20 gms carb subcutaneous with meals and snacks; pt uses approx 65 units in 24 hrs., Disp: 15 mL, Rfl: 3     losartan (COZAAR) 25 MG tablet, Take 1 tablet (25 mg) by mouth daily, Disp: 30 tablet, Rfl: 11     order for DME, Equipment being ordered: knee roller, will need for 4 months. Non weight bearing left foot. (Patient not taking: Reported on 10/21/2019), Disp: 1 Device, Rfl: 0     Pregabalin (LYRICA) 200 MG capsule, Take 1 capsule (200 mg) by mouth 3 times  "daily, Disp: 90 capsule, Rfl: 5     ALLERGIES:  No Known Allergies     SOCIAL HISTORY:   Social History     Socioeconomic History     Marital status: Single     Spouse name: Not on file     Number of children: Not on file     Years of education: Not on file     Highest education level: Not on file   Occupational History     Not on file   Social Needs     Financial resource strain: Not on file     Food insecurity:     Worry: Not on file     Inability: Not on file     Transportation needs:     Medical: Not on file     Non-medical: Not on file   Tobacco Use     Smoking status: Never Smoker     Smokeless tobacco: Never Used   Substance and Sexual Activity     Alcohol use: Yes     Comment: occ     Drug use: Not on file     Sexual activity: Yes   Lifestyle     Physical activity:     Days per week: Not on file     Minutes per session: Not on file     Stress: Not on file   Relationships     Social connections:     Talks on phone: Not on file     Gets together: Not on file     Attends Congregational service: Not on file     Active member of club or organization: Not on file     Attends meetings of clubs or organizations: Not on file     Relationship status: Not on file     Intimate partner violence:     Fear of current or ex partner: Not on file     Emotionally abused: Not on file     Physically abused: Not on file     Forced sexual activity: Not on file   Other Topics Concern     Not on file   Social History Narrative     Not on file        FAMILY HISTORY:   Family History   Problem Relation Age of Onset     Ovarian Cancer Maternal Grandmother      Colon Cancer Maternal Grandmother      Prostate Cancer Maternal Grandfather      Ovarian Cancer Paternal Grandmother         EXAM:Vitals: /68   Ht 1.753 m (5' 9\")   Wt 71.7 kg (158 lb)   BMI 23.33 kg/m       General appearance: Patient is alert and fully cooperative with history & exam.  No sign of distress is noted during the visit.     Psychiatric: Affect is pleasant & " appropriate.  Patient appears motivated to improve health.     Respiratory: Breathing is regular & unlabored while sitting.     HEENT: Hearing is intact to spoken word.  Speech is clear.  No gross evidence of visual impairment that would impact ambulation.     Dermatologic: Skin is intact to both lower extremities without significant lesions, rash or abrasion.  No paronychia or evidence of soft tissue infection is noted.     Vascular: DP & PT pulses are intact & regular bilaterally. edema with varicosities right leg noted.  CFT and skin temperature is normal to both lower extremities.     Neurologic: Lower extremity sensation is diminished. .     Musculoskeletal: Patient is ambulatory without assistive device or brace. BKA on the left side.  Increase arch height.     A1C: 10.9 (2/2019)     ASSESSMENT:    Neuropathic pain of right foot  Leg edema, right  Type 1 diabetes mellitus with diabetic neuropathy (H)  Pes cavus  Hx of BKA, left (H)     PLAN:  Reviewed patient's chart in epic. Talked about diabetes and the feet. Recommend lotion daily. Talked about compression socks for swelling. Notes he has them. Talked about inserts. Currently no pre ulcerative calluses noted and he would like to hold off which I feel is reasonable. Recommend checking feet daily. Call if he starts to notice callus buildup or has any other issues.        Kay Pleitez DPM, Podiatry/Foot and Ankle Surgery          Again, thank you for allowing me to participate in the care of your patient.        Sincerely,        Kay Pleitez DPM, Podiatry/Foot and Ankle Surgery

## 2019-11-22 NOTE — PROGRESS NOTES
PATIENT HISTORY:   Eduardo Walton is a 49 year old male who presents to clinic for diabetic foot check and right foot assessment. Underwent below the knee amputation on the left a year ago. Wants to make sure right foot is okay.     Review of Systems:  Patient denies fever, chills, rash, wound, stiffness, limping, weakness, heart burn, blood in stool, chest pain with activity, calf pain when walking, shortness of breath with activity, chronic cough, easy bleeding/bruising, swelling of ankles, excessive thirst, fatigue, depression, anxiety.  Patient admits to numbness.     PAST MEDICAL HISTORY:   Past Medical History:   Diagnosis Date     CAD (coronary artery disease)     previous coronary stent placement (2010?)     Diabetic retinopathy associated with type 1 diabetes mellitus (H)      Foot ulcer, left (H)      Osteomyelitis (H)     left foot     S/P BKA (below knee amputation), left (H) 2019     Type 1 diabetes mellitus with complications (H)     retinopathy, neuropathy, nephropathy, macrovascular disease        PAST SURGICAL HISTORY:   Past Surgical History:   Procedure Laterality Date     AMPUTATE FOOT Left 2/5/2019    Procedure: PARTIAL LEFT FOOT AMPUTATION.;  Surgeon: hCetan Potter DPM;  Location: [facility] OR     AMPUTATE LEG BELOW KNEE Left 2/18/2019    Procedure: LEFT BELOW THE KNEE AMPUTATION;  Surgeon: Kvng Berman MD;  Location:  OR     STENT  2010        MEDICATIONS:   Current Outpatient Medications:      acetaminophen (TYLENOL) 325 MG tablet, Take 2 tablets (650 mg) by mouth every 4 hours as needed for mild pain, Disp: , Rfl:      aspirin (ASA) 325 MG EC tablet, Take 1 tablet (325 mg) by mouth daily, Disp: , Rfl:      B-D U/F 31G X 8 MM insulin pen needle, INJECT SUBCUTANEOUSLY 5 TIMES DAILY., Disp: , Rfl: 3     blood glucose (CONTOUR NEXT TEST) test strip, Use to test blood sugar., Disp: 1 Box, Rfl: 11     Continuous Blood Gluc  (DEXCOM G6 ) BRANDY, 1 Device continuous prn (Use for  continuous glucose testing), Disp: 1 Device, Rfl: 0     Continuous Blood Gluc Sensor (DEXCOM G6 SENSOR) MISC, 1 Box continuous prn (Use for continuous glucose testing) Dispense 3 box (of 3 sensors) per 3 months, Disp: 3 each, Rfl: 4     Continuous Blood Gluc Sensor (FREESTYLE COLIN 14 DAY SENSOR) MIS, USE WITH 14 DAY COLIN SYSTEM. CHANGE SENSOR EVERY 14 DAYS., Disp: 6 each, Rfl: 3     Continuous Blood Gluc Transmit (DEXCOM G6 TRANSMITTER) MISC, 1 Device continuous prn (use for continuous glucose testing), Disp: 1 each, Rfl: 4     insulin degludec (TRESIBA FLEXTOUCH) 100 UNIT/ML pen, Inject 24 units subcutaneous each am. (Patient taking differently: 22 Units Inject 24 units subcutaneous each am.), Disp: 15 mL, Rfl: 3     Insulin Disposable Pump (OMNIPOD DASH 5 PACK) MISC, 1 each every 3 days (Patient not taking: Reported on 10/21/2019), Disp: 6 each, Rfl: 3     Insulin Disposable Pump (OMNIPOD DASH 5 PACK) MISC, 1 each every 3 days (Patient not taking: Reported on 10/21/2019), Disp: 6 each, Rfl: 3     insulin glargine (LANTUS SOLOSTAR) 100 UNIT/ML pen, Inject 24 units subcutaneous daily as directed (Patient not taking: Reported on 10/21/2019), Disp: 15 mL, Rfl: 3     insulin lispro (HUMALOG PEN) 100 UNIT/ML pen, 1 unit / 20 gms carb subcutaneous with meals and snacks; pt uses approx 65 units in 24 hrs., Disp: 15 mL, Rfl: 3     losartan (COZAAR) 25 MG tablet, Take 1 tablet (25 mg) by mouth daily, Disp: 30 tablet, Rfl: 11     order for DME, Equipment being ordered: knee roller, will need for 4 months. Non weight bearing left foot. (Patient not taking: Reported on 10/21/2019), Disp: 1 Device, Rfl: 0     Pregabalin (LYRICA) 200 MG capsule, Take 1 capsule (200 mg) by mouth 3 times daily, Disp: 90 capsule, Rfl: 5     ALLERGIES:  No Known Allergies     SOCIAL HISTORY:   Social History     Socioeconomic History     Marital status: Single     Spouse name: Not on file     Number of children: Not on file     Years of  "education: Not on file     Highest education level: Not on file   Occupational History     Not on file   Social Needs     Financial resource strain: Not on file     Food insecurity:     Worry: Not on file     Inability: Not on file     Transportation needs:     Medical: Not on file     Non-medical: Not on file   Tobacco Use     Smoking status: Never Smoker     Smokeless tobacco: Never Used   Substance and Sexual Activity     Alcohol use: Yes     Comment: occ     Drug use: Not on file     Sexual activity: Yes   Lifestyle     Physical activity:     Days per week: Not on file     Minutes per session: Not on file     Stress: Not on file   Relationships     Social connections:     Talks on phone: Not on file     Gets together: Not on file     Attends Sabianism service: Not on file     Active member of club or organization: Not on file     Attends meetings of clubs or organizations: Not on file     Relationship status: Not on file     Intimate partner violence:     Fear of current or ex partner: Not on file     Emotionally abused: Not on file     Physically abused: Not on file     Forced sexual activity: Not on file   Other Topics Concern     Not on file   Social History Narrative     Not on file        FAMILY HISTORY:   Family History   Problem Relation Age of Onset     Ovarian Cancer Maternal Grandmother      Colon Cancer Maternal Grandmother      Prostate Cancer Maternal Grandfather      Ovarian Cancer Paternal Grandmother         EXAM:Vitals: /68   Ht 1.753 m (5' 9\")   Wt 71.7 kg (158 lb)   BMI 23.33 kg/m      General appearance: Patient is alert and fully cooperative with history & exam.  No sign of distress is noted during the visit.     Psychiatric: Affect is pleasant & appropriate.  Patient appears motivated to improve health.     Respiratory: Breathing is regular & unlabored while sitting.     HEENT: Hearing is intact to spoken word.  Speech is clear.  No gross evidence of visual impairment that would " impact ambulation.     Dermatologic: Skin is intact to both lower extremities without significant lesions, rash or abrasion.  No paronychia or evidence of soft tissue infection is noted.     Vascular: DP & PT pulses are intact & regular bilaterally. edema with varicosities right leg noted.  CFT and skin temperature is normal to both lower extremities.     Neurologic: Lower extremity sensation is diminished. .     Musculoskeletal: Patient is ambulatory without assistive device or brace. BKA on the left side.  Increase arch height.     A1C: 10.9 (2/2019)     ASSESSMENT:    Neuropathic pain of right foot  Leg edema, right  Type 1 diabetes mellitus with diabetic neuropathy (H)  Pes cavus  Hx of BKA, left (H)     PLAN:  Reviewed patient's chart in epic. Talked about diabetes and the feet. Recommend lotion daily. Talked about compression socks for swelling. Notes he has them. Talked about inserts. Currently no pre ulcerative calluses noted and he would like to hold off which I feel is reasonable. Recommend checking feet daily. Call if he starts to notice callus buildup or has any other issues.        Kay Pleitez DPM, Podiatry/Foot and Ankle Surgery

## 2019-11-22 NOTE — PATIENT INSTRUCTIONS
"Thank you for choosing [facility]!    DR. [facility]S CLINIC SCHEDULE  MONDAY AM - [place] TUESDAY - [place]   [address] [address] [address] [address] [place] [address]   [phone] / [phone] / [phone]       WEDNESDAY - [place] FRIDAY AM - [facility]   [address]   [address] [address] [address] [place] [address]   [phone] / [phone]       FRIDAY PM - [place] SCHEDULE SURGERY: [phone]   [address] [facility] BILLING QUESTIONS: [phone]   [address] [address] AFTER HOURS: [phone] / [phone] APPOINTMENTS: [phone]     Consumer Price Line (CPL) [phone]       DIABETES AND YOUR FEET  Diabetes can result in several problems in the feet including ulcers (open sores) and amputations. Two of the most important reasons why people develop foot problems when they have diabetes is : 1. Neuropathy (loss of feeling)  2. Vascular disease (loss or decrease of blood flow).    Neuropathy is a term used to describe a loss of nerve function.  Patients with diabetes are at risk of developing neuropathy if their sugars continue to run high and are above the normal value. One theory for neuropathy is that the \"extra\" sugar in the body enters the nerves and is broken down. These by-products build up in the nerve causing it to swell and impairing nerve function. Often times, this can be prevented by controlling your sugars, dieting and exercise.    When a person develops neuropathy, they usually begin to feel numbness or tingling in their feet and sometime in their legs.  Other symptoms may include painful burning or hot feet, tingling or feeling like insects or ants are crawling on your feet or legs.  If the diabetes is sever and the sugars run high for long periods of time, neuropathy can also occur in the hands.    Vascular disease  is a term used to describe a " loss or decrease in circulation (blood flow). There is a problem in getting blood and oxygen to areas that need it. Similar to neuropathy, sugars can build up in the walls of the arteries (blood vessels) and cause them to become swollen, thickened and hardened. This decreases the amount of blood that can go to an area that needs it. Though this is common in the legs of diabetic patients, it can also affect other arteries (blood vessels) in the body such as in the heart and eyes.    In the legs, vascular disease usually results in cramping. Patients who develop leg cramps after walking the same distance every time (i.e. One block, half a mile, ect.) need to let their doctors know so that their circulation may be checked. Cramps causing severe pain in the feet and/or legs while sleeping and the cramps go away when you stand or hang your legs off the side of the bed, may also be a sign of poor blood circulation.  Occasional cramping in cold weather or on rare occasions with activity may not be due to poor circulation, but you should inform your doctor.    PREVENTION OF THESE DISEASES  The key to prevention is good blood sugar control. Poor blood sugar control is a big reason many of these problems start. Physical activity (exercise) is a very good way to help decrease your blood sugars. Exercise can lower your blood sugar, blood pressure, and cholesterol. It also reduces your risk for heart disease and stroke, relieves stress, and strengthens your heart, muscles and bones.  In addition, regular activity helps insulin work better, improves your blood circulation, and keeps your joints flexible. If you're trying to lose weight, a combination of exercise and wise food choices can help you reach your target weight and maintain it.      PAIN MANAGEMENT  1.Blood Sugar Control - Most important  2. Medications such as:  Amytriptylline, duloxetine, gabapentin, lyrica, tramadol  3. Nutritional therapy:  Vitamin B6 (100mg daily),  Vitamin B12 (75mcg daily), Vitamin D 2000 IU daily), Alpha-Lipoic Acid (600-1800mg daily), Acetyl-L-Carnitine (500-1000mg TID, L-methyl folate (1500mcg daily)    ** Metformin can block Vitamin B6 and B12 so it is important to supplement**    FOOT CARE RECOMMENDATIONS   1. Wash your feet with lukewarm water and a mild soap and then dry them thoroughly, especially between the toes.     2. Examine your feet daily looking for cuts, corns, blisters, cracks, ect, especially after wearing new shoes. Make sure to look between your toes. If you cannot see the bottom of your feet, set a mirror on the floor and hold your foot over it, or ask a spouse, friend or family member to examine your feet for you. Contact your doctor immediately if new problems are noted or if sores are not healing.     3. Immediately apply moisturizer to the tops and bottoms of your feet, avoiding areas between the toes. Hand lotion (Intesive Care, Margareth, Eucerin, Neutrogena, Curel, ect) is sufficient unless your doctor prescribes a medicated lotion. Apply sunscreen to your feet when going swimming outside.     4. Use clean comfortable shoes, wear white socks (if you have any bleeding or drainage, you will see it on white socks). Socks should not have thick seams or cut off the circulation around the leg. Break in new shoes slowly and rotate with older shoes until broken in. Check the inside of your shoes with your hand to look for areas of irritation or objects that may have fallen into your shoes.       5. Keep slippers by the side of your bed for use during the night.     6.  Shoes should be fitted by a professional and should not cause areas of irritation.  Check your feet regularly when wearing a new pair of shoes and replace them as needed.     7.  Talk to your doctor about proper exercise. Exercise and stretching stimulate blood flow to your feet and maintain proper glucose levels.     8.  Monitor your blood glucose level as instructed by your  doctor. Notify your doctor immediately if your blood sugar is abnormally high or low.    9. Cut your nails straight across, but then gently round any sharp edges with a cardboard nail file. If you have neuropathy, peripheral vascular disease or cannot see that well to trim your own toenails contact [facility] ([phone]) or [facility] ([phone]).      THINGS TO AVOID DOING   1.  Do not soak your feet if you have an open sore. Use only lukewarm water and always check the temperature with your hand as hot water can easily burn your feet.       2.  Never use a hot water bottle or heating pad on your feet. Also do not apply cold compresses to your feet. With decreased sensation, you could burn or freeze your feet.       3.  Do not apply any of these to your feet:    -  Over the counter medicine for corns or warts    -  Harsh chemicals like boric acid    -  Do not self-treat corns, cuts, blisters or infections. Always consult your doctor.       4.  Do not wear sandals, slippers or walk barefoot, especially on hot sand or concrete or other harsh surfaces.     5.  If you smoke, stop!!!              BODY WEIGHT AND YOUR FEET  The following information is included in the after visit summary for all patients. Body weight can be a sensitive issue to discuss in clinic, but we think the following information is very important. Although we focus on the feet and ankles, we do support the overall health of our patients.     Many things can cause foot and ankle problems. Foot structure, activity level, foot mechanics and injuries are common causes of pain. One very important issue that often goes unmentioned, is body weight. Extra weight can cause increased stress on muscles, ligaments, bones and tendons. Sometimes just a few extra pounds is all it takes to put one over her/his threshold. Without reducing that stress, it can be difficult to alleviate pain. As Foot & Ankle specialists, our job is addressing the lower  extremity problem and possible causes. Regarding extra body weight, we encourage patients to discuss diet and weight management plans with their primary care doctors. It is this team approach that gives you the best opportunity for pain relief and getting you back on your feet.      [facility] has a Comprehensive Weight Management Program. This program includes counseling, education, non-surgical and surgical approaches to weight loss. If you are interested in learning more either talk to you primary care provider or call [phone].

## 2019-11-25 RX ORDER — PREGABALIN 200 MG/1
200 CAPSULE ORAL 3 TIMES DAILY
Qty: 90 CAPSULE | Refills: 5 | Status: SHIPPED | OUTPATIENT
Start: 2019-11-25 | End: 2020-06-17

## 2019-11-25 NOTE — TELEPHONE ENCOUNTER
Messages noted, also called patient today.  It is difficult to understand why the previous Lyrica (vs pregabalin) Rx was denied by his insurance.  He has been on Lyrica for many months and the previously used gabapentin not effective... even at 900 mg TID dosing.    We agreed to retry the Lyrica 200 mg DAW1 Rx to his [facility] in [facility] pharmacy, new Rx sent today.    LEVI Palma MD, MS  Endocrinology  [facility]

## 2019-11-26 NOTE — TELEPHONE ENCOUNTER
We have resubmitted the Lyrica Rx yesterday, as noted in separate message yesterday.    LEVI Palma MD, MS  [facility]

## 2019-11-27 ENCOUNTER — TELEPHONE (OUTPATIENT)
Dept: ENDOCRINOLOGY | Facility: CLINIC | Age: 49
End: 2019-11-27

## 2019-11-27 ENCOUNTER — MEDICAL CORRESPONDENCE (OUTPATIENT)
Dept: HEALTH INFORMATION MANAGEMENT | Facility: CLINIC | Age: 49
End: 2019-11-27

## 2019-11-27 DIAGNOSIS — E10.40 TYPE 1 DIABETES MELLITUS WITH DIABETIC NEUROPATHY (H): ICD-10-CM

## 2019-11-27 NOTE — LETTER
2019      Eduardo Walton  [address] [address]        To Whom It May Concern,     I have previously seen Mr. Eduardo Walton ( 74) for evaluation of Type 1 diabetes mellitus.  He has previously taken Lyrica (pregabalin) for treatment of his lower extremity diabetic neuropathy pain.  This medication was denied by his insurance plan and I am writing this appeal for this medication.    The neuropathy pain is severe and not effectively treated with high dose gabapentin medication.  He has used Lyrica 200 mg three times per day (TID) with effective treatment and no significant side effects.    Please authorize the Lyrica (pregabalin) medication for this patient and contact me if questions.        LEVI Palma MD, MS  Endocrinology  [facility]

## 2019-11-27 NOTE — Clinical Note
Please assist with the letter of appeal, thanks.    LEVI Palma MD, MS  Endocrinology  [facility]

## 2019-11-29 RX ORDER — PREGABALIN 200 MG/1
200 CAPSULE ORAL 3 TIMES DAILY
Qty: 90 CAPSULE | Refills: 5 | OUTPATIENT
Start: 2019-11-29

## 2019-11-29 NOTE — TELEPHONE ENCOUNTER
This is a duplicate Lyrica 200 mg Rx... medication Rx already sent to his pharmacy a few days ago.    LEVI Palma MD, MS  Endocrinology  [facility]

## 2019-12-06 ENCOUNTER — TELEPHONE (OUTPATIENT)
Dept: PODIATRY | Facility: CLINIC | Age: 49
End: 2019-12-06

## 2019-12-06 NOTE — TELEPHONE ENCOUNTER
diclofenac (VOLTAREN) 1 % topical gel 100 g 3 11/22/2019  --   Sig - Route: Place 2 g onto the skin 4 times daily - Transdermal   Sent to pharmacy as: diclofenac (VOLTAREN) 1 % topical gel   Class: E-Prescribe   Notes to Pharmacy: Apply 1-2g to the feet 3-4 times a day as needed for pain.   Order: [id number]   E-Prescribing Status: Receipt confirmed by pharmacy (11/22/2019  2:18 PM CST)   Printout Tracking     External Result Report   Pharmacy     [facility] [id number] [place] [facility] - [address]         Coverage information:     Subscriber: [id number] LILLY SAL     Rel to sub: 01 - Self     Member ID: [id number]     Payor: MARILYN Ph: [phone]     Benefit plan: [facility] Ph: [phone]     Group number: [id number]     Member effective dates: from 05/01/17              Jamal Manzo on 12/6/2019 at 2:29 PM

## 2019-12-10 NOTE — TELEPHONE ENCOUNTER
Prior Authorization Approval    Authorization Effective Date: 11/25/2019  Authorization Expiration Date: 12/9/2020  Medication: DICLOFENAC-APPROVED  Approved Dose/Quantity:    Reference #:     Insurance Company: [employer] - Phone [phone] Fax [phone]  Expected CoPay:       CoPay Card Available:      Foundation Assistance Needed:    Which Pharmacy is filling the prescription (Not needed for infusion/clinic administered): [facility] [id number] [address] [facility] [address]  Pharmacy Notified: Yes  Patient Notified: Yes  **Instructed pharmacy to notify patient when script is ready to /ship.**

## 2019-12-16 NOTE — TELEPHONE ENCOUNTER
Letter of medical necessity is needed in order to appeal to insurance company as it is still within the 90 day window.    Chetan Magana, CMA on 12/16/2019 at 10:51 AM

## 2019-12-16 NOTE — TELEPHONE ENCOUNTER
Reason for Call:  Other prescription    Detailed comments: Pharmacy called requesting to expedite PA. Please call with an update     Phone Number Patient can be reached at: Home number on file [phone] (home)    Best Time: Anytime     Can we leave a detailed message on this number? YES    Call taken on 12/16/2019 at 10:02 AM by Rory Ramon

## 2019-12-18 NOTE — TELEPHONE ENCOUNTER
Letter of medical necessity written. Please file for appeal.    Chetan Magana CMA on 12/18/2019 at 11:28 AM

## 2019-12-18 NOTE — TELEPHONE ENCOUNTER
Central Prior Authorization Team   Phone: [phone]      PA initiated and denied at clinic level.    Due to documentation limitations the central prior authorization team will not be able to do the appeal.

## 2019-12-19 ENCOUNTER — MYC MEDICAL ADVICE (OUTPATIENT)
Dept: ENDOCRINOLOGY | Facility: CLINIC | Age: 49
End: 2019-12-19

## 2019-12-19 ENCOUNTER — OFFICE VISIT (OUTPATIENT)
Dept: ENDOCRINOLOGY | Facility: CLINIC | Age: 49
End: 2019-12-19
Payer: COMMERCIAL

## 2019-12-19 VITALS
HEART RATE: 71 BPM | HEIGHT: 69 IN | BODY MASS INDEX: 23.7 KG/M2 | SYSTOLIC BLOOD PRESSURE: 138 MMHG | DIASTOLIC BLOOD PRESSURE: 68 MMHG | WEIGHT: 160 LBS

## 2019-12-19 DIAGNOSIS — E10.40 TYPE 1 DIABETES MELLITUS WITH DIABETIC NEUROPATHY (H): Primary | ICD-10-CM

## 2019-12-19 DIAGNOSIS — E10.29 TYPE 1 DIABETES MELLITUS WITH MICROALBUMINURIA (H): ICD-10-CM

## 2019-12-19 DIAGNOSIS — R80.9 TYPE 1 DIABETES MELLITUS WITH MICROALBUMINURIA (H): ICD-10-CM

## 2019-12-19 DIAGNOSIS — E10.319 TYPE 1 DIABETES MELLITUS WITH RETINOPATHY, MACULAR EDEMA PRESENCE UNSPECIFIED, UNSPECIFIED LATERALITY, UNSPECIFIED RETINOPATHY SEVERITY (H): ICD-10-CM

## 2019-12-19 DIAGNOSIS — E10.59 TYPE 1 DIABETES MELLITUS WITH OTHER CIRCULATORY COMPLICATION (H): ICD-10-CM

## 2019-12-19 PROCEDURE — 95251 CONT GLUC MNTR ANALYSIS I&R: CPT | Performed by: INTERNAL MEDICINE

## 2019-12-19 PROCEDURE — 99214 OFFICE O/P EST MOD 30 MIN: CPT | Performed by: INTERNAL MEDICINE

## 2019-12-19 ASSESSMENT — MIFFLIN-ST. JEOR: SCORE: 1591.14
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
## 2019-12-26 NOTE — TELEPHONE ENCOUNTER
Pharmacy called and stated that patient called to check on PA however, back in October, notes stated that previous PA has already been denied and will need to appeal within 90 days of denial. Patient stated that MD had already written an appeal letter per pharmacy. Letter has been in the chart since 12/17/19. No communication was sent back to PA team to submit letter.

## 2019-12-26 NOTE — TELEPHONE ENCOUNTER
Medication Appeal Initiation    We have initiated an appeal for the requested medication:  Medication: Pregabalin 200 mg Rx - APPEAL INITIATED  Appeal Start Date:  12/26/2019  Insurance Company: [employer] - Phone [phone] Fax [phone]  Comments:  Sent appeal to [facility] appeals dept  Fax# [phone]  Phone# [phone]

## 2020-01-06 NOTE — TELEPHONE ENCOUNTER
MEDICATION APPEAL APPROVED    Medication: Pregabalin 200 mg Rx - APPEAL APPROVED  Authorization Effective Date: 11/26/2019  Authorization Expiration Date: 12/26/2020  Approved Dose/Quantity: 90 for 30 days  Reference #: [id number]   Insurance Company: [employer] - Phone [phone] Fax [phone]  Which Pharmacy is filling the prescription (Not needed for infusion/clinic administered): [facility] [id number] [place] [address]

## 2020-01-25 DIAGNOSIS — E10.40 TYPE 1 DIABETES MELLITUS WITH DIABETIC NEUROPATHY (H): ICD-10-CM

## 2020-01-25 DIAGNOSIS — E10.29 TYPE 1 DIABETES MELLITUS WITH MICROALBUMINURIA (H): ICD-10-CM

## 2020-01-25 DIAGNOSIS — R80.9 TYPE 1 DIABETES MELLITUS WITH MICROALBUMINURIA (H): ICD-10-CM

## 2020-01-25 DIAGNOSIS — E10.319 TYPE 1 DIABETES MELLITUS WITH RETINOPATHY, MACULAR EDEMA PRESENCE UNSPECIFIED, UNSPECIFIED LATERALITY, UNSPECIFIED RETINOPATHY SEVERITY (H): ICD-10-CM

## 2020-01-27 DIAGNOSIS — E10.40 TYPE 1 DIABETES MELLITUS WITH DIABETIC NEUROPATHY (H): Primary | ICD-10-CM

## 2020-01-27 RX ORDER — INSULIN LISPRO 100 [IU]/ML
INJECTION, SOLUTION INTRAVENOUS; SUBCUTANEOUS
Qty: 15 ML | Refills: 1 | Status: SHIPPED | OUTPATIENT
Start: 2020-01-27 | End: 2020-03-12

## 2020-01-27 NOTE — TELEPHONE ENCOUNTER
Note that pt needs urgent humalog pen refill, but humalog pen not avail in to even order in epic. Discontinued?     Pended Novolog pen for same dosing     Last OV 12/19/19    Lab Results   Component Value Date    A1C 10.9 02/05/2019

## 2020-01-27 NOTE — TELEPHONE ENCOUNTER
Left vm asking to return call.    We need to verify med request.    TL - sent in Rx for insulin lispro (HUMALOG KWIKPEN) 100 UNIT/ML (1 unit dial) pen.    Please let me know when pt returns call.    Sue Guerrero MA

## 2020-02-14 ENCOUNTER — TELEPHONE (OUTPATIENT)
Dept: FAMILY MEDICINE | Facility: CLINIC | Age: 50
End: 2020-02-14

## 2020-02-14 DIAGNOSIS — E10.42 TYPE 1 DIABETES MELLITUS WITH DIABETIC POLYNEUROPATHY (H): Primary | ICD-10-CM

## 2020-02-14 NOTE — TELEPHONE ENCOUNTER
Message noted.  I will not have a workin appt that soon.  At his last visit with me, I had advised seeing one of our CDE's but this was not done.  Let's encourage him to see one of our CRISTOFER Gutierrez CDE's soon and they can review the diabetes management and his insulin pump start questions.  I placed another Diabetes Education Referral today and he can call [phone] to make the Diab Ed appt soon.    Thanks for relaying gameplan.    LEVI Palma MD, MS  Endocrinology  [facility]

## 2020-02-14 NOTE — TELEPHONE ENCOUNTER
Reason for Call:  Other   Detailed comments: Pt is moving and would like to be seen either on 2/21 or 2/28.  He is moving and would donal to see him before he moves at the end of the month.  He needs to discuss changing to a pump    Phone Number Patient can be reached at: Home number on file [phone] (home)    Best Time: any    Can we leave a detailed message on this number? YES    Call taken on 2/14/2020 at 9:08 AM by Kelley Edwards

## 2020-02-14 NOTE — TELEPHONE ENCOUNTER
Dr. De La Rosa,    Pt is moving and needs to discuss changing his insulin pump. Are you able to fit him in or can he see diabetic education?    Thank you,  Morro LIANG RN

## 2020-02-17 ENCOUNTER — TELEPHONE (OUTPATIENT)
Dept: ENDOCRINOLOGY | Facility: CLINIC | Age: 50
End: 2020-02-17

## 2020-02-19 NOTE — TELEPHONE ENCOUNTER
Diabetes Education Scheduling Outreach #2:    Call to patient to schedule. Left message with phone number to call to schedule.    Kaylee Salcedo  [facility]  Diabetes and Nutrition Scheduling

## 2020-03-01 ENCOUNTER — HEALTH MAINTENANCE LETTER (OUTPATIENT)
Age: 50
End: 2020-03-01

## 2020-04-13 RX ORDER — PEN NEEDLE, DIABETIC 31 GX5/16"
NEEDLE, DISPOSABLE MISCELLANEOUS
Refills: 3 | Status: CANCELLED | OUTPATIENT
Start: 2020-04-13

## 2020-04-13 NOTE — TELEPHONE ENCOUNTER
"Pending Prescriptions:                       Disp   Refills    B-D U/F 31G X 8 MM insulin pen needle            3            Sig: Use 5 pen needles daily or as directed.      Last Written Prescription Date:  Patient reported  Last Fill Quantity: na,  # refills: na   Last office visit: 12/19/2019 with prescribing provider:     Future Office Visit:    Requested Prescriptions   Pending Prescriptions Disp Refills     B-D U/F 31G X 8 MM insulin pen needle  3     Sig: Use 5 pen needles daily or as directed.       Diabetic Supplies Protocol Passed - 4/13/2020  1:09 PM        Passed - Medication is active on med list        Passed - Patient is 18 years of age or older        Passed - Recent (6 mo) or future (30 days) visit within the authorizing provider's specialty     Patient had office visit in the last 6 months or has a visit in the next 30 days with authorizing provider.  See \"Patient Info\" tab in inbasket, or \"Choose Columns\" in Meds & Orders section of the refill encounter.                 "

## 2020-06-15 DIAGNOSIS — E10.40 TYPE 1 DIABETES MELLITUS WITH DIABETIC NEUROPATHY (H): ICD-10-CM

## 2020-06-16 NOTE — TELEPHONE ENCOUNTER
Routing refill request to provider for review/approval because:  Drug not on the [facility] refill protocol       Please review and authorize if appropriate,     Thank you,   Amy FLORES RN

## 2020-06-17 RX ORDER — PREGABALIN 200 MG/1
CAPSULE ORAL
Qty: 90 CAPSULE | Refills: 5 | Status: SHIPPED | OUTPATIENT
Start: 2020-06-17 | End: 2020-08-12

## 2020-08-10 ENCOUNTER — MYC MEDICAL ADVICE (OUTPATIENT)
Dept: ENDOCRINOLOGY | Facility: CLINIC | Age: 50
End: 2020-08-10

## 2020-08-12 ENCOUNTER — TELEPHONE (OUTPATIENT)
Dept: FAMILY MEDICINE | Facility: CLINIC | Age: 50
End: 2020-08-12

## 2020-08-12 DIAGNOSIS — E10.40 TYPE 1 DIABETES MELLITUS WITH DIABETIC NEUROPATHY (H): ICD-10-CM

## 2020-08-12 RX ORDER — PREGABALIN 200 MG/1
CAPSULE ORAL
Qty: 90 CAPSULE | Refills: 5 | Status: SHIPPED | OUTPATIENT
Start: 2020-08-12 | End: 2021-12-21

## 2020-08-12 NOTE — TELEPHONE ENCOUNTER
Fax from [facility] #[id number] sent to Dr VIVIANA Palma requesting Rx for Lyrica brand name only.  Insurance requires brand.    Dr Palma does not write this Rx, PCP does.    I called out to pharmacy to direct request to Dr Conway.    This would be a change in pharmacy for patient, last Rx June 2020 to [facility] - of note patient did not request that Rx, pharmacy initiated it per [id number] message 8/11/20.    Ro Flowers, RT (R)  CRISTOFER Gutierrez

## 2020-08-16 DIAGNOSIS — E10.40 TYPE 1 DIABETES MELLITUS WITH DIABETIC NEUROPATHY (H): Primary | ICD-10-CM

## 2020-08-18 ENCOUNTER — VIRTUAL VISIT (OUTPATIENT)
Dept: ENDOCRINOLOGY | Facility: CLINIC | Age: 50
End: 2020-08-18
Payer: COMMERCIAL

## 2020-08-18 DIAGNOSIS — E10.319 TYPE 1 DIABETES MELLITUS WITH RETINOPATHY, MACULAR EDEMA PRESENCE UNSPECIFIED, UNSPECIFIED LATERALITY, UNSPECIFIED RETINOPATHY SEVERITY (H): ICD-10-CM

## 2020-08-18 DIAGNOSIS — E10.59 TYPE 1 DIABETES MELLITUS WITH OTHER CIRCULATORY COMPLICATION (H): ICD-10-CM

## 2020-08-18 DIAGNOSIS — R80.9 TYPE 1 DIABETES MELLITUS WITH MICROALBUMINURIA (H): ICD-10-CM

## 2020-08-18 DIAGNOSIS — E10.40 TYPE 1 DIABETES MELLITUS WITH DIABETIC NEUROPATHY (H): Primary | ICD-10-CM

## 2020-08-18 DIAGNOSIS — E10.29 TYPE 1 DIABETES MELLITUS WITH MICROALBUMINURIA (H): ICD-10-CM

## 2020-08-18 PROCEDURE — 99214 OFFICE O/P EST MOD 30 MIN: CPT | Mod: 95 | Performed by: INTERNAL MEDICINE

## 2020-08-18 RX ORDER — PEN NEEDLE, DIABETIC 31 GX5/16"
NEEDLE, DISPOSABLE MISCELLANEOUS
Qty: 500 EACH | Refills: 1 | Status: SHIPPED | OUTPATIENT
Start: 2020-08-18 | End: 2021-01-18
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
## 2020-08-20 DIAGNOSIS — R80.9 TYPE 1 DIABETES MELLITUS WITH MICROALBUMINURIA (H): ICD-10-CM

## 2020-08-20 DIAGNOSIS — E10.319 TYPE 1 DIABETES MELLITUS WITH RETINOPATHY, MACULAR EDEMA PRESENCE UNSPECIFIED, UNSPECIFIED LATERALITY, UNSPECIFIED RETINOPATHY SEVERITY (H): ICD-10-CM

## 2020-08-20 DIAGNOSIS — E10.29 TYPE 1 DIABETES MELLITUS WITH MICROALBUMINURIA (H): ICD-10-CM

## 2020-08-20 DIAGNOSIS — E10.59 TYPE 1 DIABETES MELLITUS WITH OTHER CIRCULATORY COMPLICATION (H): ICD-10-CM

## 2020-08-20 DIAGNOSIS — E10.40 TYPE 1 DIABETES MELLITUS WITH DIABETIC NEUROPATHY (H): ICD-10-CM

## 2020-08-20 LAB
ANION GAP SERPL CALCULATED.3IONS-SCNC: 3 MMOL/L (ref 3–14)
BUN SERPL-MCNC: 31 MG/DL (ref 7–30)
CALCIUM SERPL-MCNC: 9.4 MG/DL (ref 8.5–10.1)
CHLORIDE SERPL-SCNC: 107 MMOL/L (ref 94–109)
CHOLEST SERPL-MCNC: 145 MG/DL
CO2 SERPL-SCNC: 32 MMOL/L (ref 20–32)
CREAT SERPL-MCNC: 1.43 MG/DL (ref 0.66–1.25)
CREAT UR-MCNC: 72 MG/DL
GFR SERPL CREATININE-BSD FRML MDRD: 57 ML/MIN/{1.73_M2}
GLUCOSE SERPL-MCNC: 140 MG/DL (ref 70–99)
HBA1C MFR BLD: 9 % (ref 0–5.6)
HDLC SERPL-MCNC: 39 MG/DL
LDLC SERPL CALC-MCNC: 78 MG/DL
MICROALBUMIN UR-MCNC: 26 MG/L
MICROALBUMIN/CREAT UR: 35.17 MG/G CR (ref 0–17)
NONHDLC SERPL-MCNC: 106 MG/DL
POTASSIUM SERPL-SCNC: 4.9 MMOL/L (ref 3.4–5.3)
SODIUM SERPL-SCNC: 142 MMOL/L (ref 133–144)
TRIGL SERPL-MCNC: 138 MG/DL

## 2020-08-20 PROCEDURE — 80048 BASIC METABOLIC PNL TOTAL CA: CPT | Performed by: INTERNAL MEDICINE

## 2020-08-20 PROCEDURE — 80061 LIPID PANEL: CPT | Performed by: INTERNAL MEDICINE

## 2020-08-20 PROCEDURE — 83036 HEMOGLOBIN GLYCOSYLATED A1C: CPT | Performed by: INTERNAL MEDICINE

## 2020-08-20 PROCEDURE — 36415 COLL VENOUS BLD VENIPUNCTURE: CPT | Performed by: INTERNAL MEDICINE

## 2020-08-20 PROCEDURE — 82043 UR ALBUMIN QUANTITATIVE: CPT | Performed by: INTERNAL MEDICINE

## 2020-09-03 ENCOUNTER — TELEPHONE (OUTPATIENT)
Dept: ENDOCRINOLOGY | Facility: CLINIC | Age: 50
End: 2020-09-03

## 2020-09-03 NOTE — TELEPHONE ENCOUNTER
PA Initiation    Medication: insulin degludec (TRESIBA FLEXTOUCH) 100 UNIT/ML pen   Insurance Company: [employer] - Phone [phone] Fax [phone]  Pharmacy Filling the Rx: [facility] [address] [place] [address] [facility]  Filling Pharmacy Phone: [phone]  Filling Pharmacy Fax: [phone]  Start Date: 9/3/2020

## 2020-09-03 NOTE — TELEPHONE ENCOUNTER
Prior Authorization Approval    Authorization Effective Date: 8/4/2020  Authorization Expiration Date: 9/3/2021  Medication: insulin degludec (TRESIBA FLEXTOUCH) 100 UNIT/ML pen   Approved Dose/Quantity:   Reference #: [id number]   Insurance Company: [employer] - Phone [phone] Fax [phone]  Expected CoPay:       CoPay Card Available:      Foundation Assistance Needed:    Which Pharmacy is filling the prescription (Not needed for infusion/clinic administered): [id number] [place] [facility] [place] - [address] [address] [place] [address]  Pharmacy Notified: Yes  Patient Notified: Yes, **Instructed pharmacy to notify patient when script is ready to /ship.**

## 2020-09-16 ENCOUNTER — MYC MEDICAL ADVICE (OUTPATIENT)
Dept: ENDOCRINOLOGY | Facility: CLINIC | Age: 50
End: 2020-09-16

## 2020-09-17 DIAGNOSIS — E10.319 TYPE 1 DIABETES MELLITUS WITH RETINOPATHY, MACULAR EDEMA PRESENCE UNSPECIFIED, UNSPECIFIED LATERALITY, UNSPECIFIED RETINOPATHY SEVERITY (H): ICD-10-CM

## 2020-09-17 DIAGNOSIS — E10.40 TYPE 1 DIABETES MELLITUS WITH DIABETIC NEUROPATHY (H): Primary | ICD-10-CM

## 2020-09-17 DIAGNOSIS — E10.59 TYPE 1 DIABETES MELLITUS WITH OTHER CIRCULATORY COMPLICATION (H): ICD-10-CM

## 2020-09-17 DIAGNOSIS — R80.9 TYPE 1 DIABETES MELLITUS WITH MICROALBUMINURIA (H): ICD-10-CM

## 2020-09-17 DIAGNOSIS — E10.29 TYPE 1 DIABETES MELLITUS WITH MICROALBUMINURIA (H): ICD-10-CM

## 2020-09-17 NOTE — TELEPHONE ENCOUNTER
Dr Palma - see below message from patient     Reports recent drops in blood sugars /ER visits for this    Should pt adjust insulin dosage?      Please advise  Kaylee SAUCEDO RN

## 2020-10-16 ENCOUNTER — TELEPHONE (OUTPATIENT)
Dept: ENDOCRINOLOGY | Facility: CLINIC | Age: 50
End: 2020-10-16

## 2020-10-16 DIAGNOSIS — E10.40 TYPE 1 DIABETES MELLITUS WITH DIABETIC NEUROPATHY (H): Primary | ICD-10-CM

## 2020-10-16 NOTE — TELEPHONE ENCOUNTER
insulin lispro (HUMALOG KWIKPEN) 100 UNIT/ML (1 unit dial) KWIKPEN 15 mL 11 3/13/2020  No   Sig: Inject 4- 12 units subcutaneous with meals as directed, total daily dose approx 50 units     Fax from pharmacy - Humalog Kwikpen is no longer on formulary    Alternatives (typed verbatim)    Humulin N 100 unit/mL vial  Relion Novolin N 100 unit/mL  Relion Novolin R 100 unit/mL  Humulin R 100 unit/mL vial  Humalog 100 unit/mL vial  Novolog 100 unit/mL cartridge    Please send alternative or start PA    LOV 8-18-20 TL   Next OV 10-20-20 diabetic ed     Ro Flowers, RT (R)

## 2020-10-17 ENCOUNTER — MYC MEDICAL ADVICE (OUTPATIENT)
Dept: ENDOCRINOLOGY | Facility: CLINIC | Age: 50
End: 2020-10-17

## 2020-10-17 NOTE — TELEPHONE ENCOUNTER
Message noted.  If Humalog Kwikpen not covered (and Novolog cartridge is covered), would change to Novolog Flexpen insulin.  I have sent this new Rx to his pharmacy.    LEVI Palma MD, MS  Endocrinology  [facility]

## 2020-10-19 NOTE — TELEPHONE ENCOUNTER
"See below - routing to CS Prior Auth    Pt does not want NovoLog - wants HumaLog instead, asking for \"pre-authorization\"     Kaylee SAUCEDO RN    "

## 2020-10-20 ENCOUNTER — ALLIED HEALTH/NURSE VISIT (OUTPATIENT)
Dept: EDUCATION SERVICES | Facility: CLINIC | Age: 50
End: 2020-10-20
Payer: COMMERCIAL

## 2020-10-20 ENCOUNTER — TELEPHONE (OUTPATIENT)
Dept: ENDOCRINOLOGY | Facility: CLINIC | Age: 50
End: 2020-10-20

## 2020-10-20 DIAGNOSIS — E10.42 TYPE 1 DIABETES MELLITUS WITH DIABETIC POLYNEUROPATHY (H): Primary | ICD-10-CM

## 2020-10-20 PROCEDURE — G0108 DIAB MANAGE TRN  PER INDIV: HCPCS

## 2020-10-20 NOTE — PROGRESS NOTES
Diabetes Self-Management Education & Support    Presents for: Individual review    SUBJECTIVE/OBJECTIVE:  Presents for: Individual review  Accompanied by: Self  Diabetes education in the past 24mo: Yes  Focus of Visit: CGM  Type of CGM visit: Personal CGM Follow-up  Diabetes type: Type 1  Date of diagnosis: 43 years (1988)  Disease course: Getting harder to manage  Diabetes management related comments/concerns: CGM related diabetic management DEXCOM overall!  Want to Drew to Dexcom G6 that not like.  Has had failed sensor and transmitters. Not use Clarity since has too many blue tooth access.    Says was told through [facility], needs to wait 6 months to change between brands.  Has numbness in fingers so cannot check blood glucose with meter.      Trouble with food and CGM- says has not been able to wear sensor for 10 days without failure or loss of signal, sometimes issues with transmitters as well. Likes double arrows up and down. Did not have same issues with Freestyle Drew but changed last year due to wanting alarms for hypoglycemia.  Had a few episodes prior to Dexcom about passing out from hypoglycemia (hypoglycemia unawareness).     Would like to bring reader to clinic to have it uploaded. Needs simpler way of life for diabetes. Says issues with insulin is slowed rate of absorption- not see Novolog working well so needs Humalog.  Had to fight to keep Tresiba instead of Lantus.  Had checked out insulin Fiasp but feels too similar to Novolog, which does not work well for him anymore.      Feels better 180-220 mg/dL.  Says if above or below feels bad. On Dexcom since last summer. Says would correct 1-1.5 hours after eating.  Correction: Using 1 unit per 50 mg/dL his blood glucose is high. Carbohydrate counting in 2001. Found this not work well since blood glucose would drop into the 50's before going up high.     Admits to more stress lately. Hit 20 months of being amputee. Wife walked out on him. Says still trying  "to accept this.    Transportation concerns: Yes - relies on family  Difficulty affording diabetes medication?: Yes  Difficulty affording diabetes testing supplies?: No  Cultural Influences/Ethnic Background:  American    Diabetes Symptoms & Complications:  Fatigue: Sometimes  Neuropathy: Sometimes  Polydipsia: No  Polyphagia: No  Polyuria: No  Visual change: Sometimes  Slow healing wounds: Yes  Complications assessed today?: Yes  Autonomic neuropathy: No  CVA: No  Heart disease: Yes  Nephropathy: Yes  Peripheral neuropathy: Yes  Peripheral Vascular Disease: Yes  Retinopathy: Yes  Sexual dysfunction: No    Patient Problem List and Family Medical History reviewed for relevant medical history, current medical status, and diabetes risk factors.    Vitals:  There were no vitals taken for this visit.  Estimated body mass index is 23.63 kg/m  as calculated from the following:    Height as of 12/19/19: 1.753 m (5' 9\").    Weight as of 12/19/19: 72.6 kg (160 lb).   Last 3 BP:   BP Readings from Last 3 Encounters:   12/19/19 138/68   11/22/19 124/68   10/21/19 109/62       History   Smoking Status     Never Smoker   Smokeless Tobacco     Never Used       Labs:  Lab Results   Component Value Date    A1C 9.0 08/20/2020     Lab Results   Component Value Date     08/20/2020     Lab Results   Component Value Date    LDL 78 08/20/2020     HDL Cholesterol   Date Value Ref Range Status   08/20/2020 39 (L) >39 mg/dL Final   ]  GFR Estimate   Date Value Ref Range Status   08/20/2020 57 (L) >60 mL/min/[1.73_m2] Final     Comment:     Non  GFR Calc  Starting 12/18/2018, serum creatinine based estimated GFR (eGFR) will be   calculated using the Chronic Kidney Disease Epidemiology Collaboration   (CKD-EPI) equation.       GFR Estimate If Black   Date Value Ref Range Status   08/20/2020 67 >60 mL/min/[1.73_m2] Final     Comment:      GFR Calc  Starting 12/18/2018, serum creatinine based estimated GFR " (eGFR) will be   calculated using the Chronic Kidney Disease Epidemiology Collaboration   (CKD-EPI) equation.       Lab Results   Component Value Date    CR 1.43 08/20/2020     No results found for: MICROALBUMIN    Healthy Eating:  Healthy Eating Assessed Today: No  Cultural/Mandaen diet restrictions?: No  Meal planning/habits: Carb counting  How many times a week on average do you eat food made away from home (restaurant/take-out)?: 1  Meals include: Breakfast, Lunch, Dinner, Evening Snack  Beverages: Diet soda, Sports drinks  Has patient met with a dietitian in the past?: Yes    Being Active:  Being Active Assessed Today: Yes  Exercise:: Yes  Days per week of moderate to strenuous exercise (like a brisk walk): 4  On average, minutes per day of exercise at this level: 20  How intense was your typical exercise? : Moderate (like brisk walking)  Exercise Minutes per Week: 80  Barrier to exercise: Physical limitation    Monitoring:  Monitoring Assessed Today: Yes  Blood Glucose Meter: CGM  Times checking blood sugar at home (number): Never  Times checking blood sugar at home (per): Month  Blood glucose trend: Fluctuating    Taking Medications:  Diabetes Medication(s)     Insulin       insulin aspart (NOVOLOG PEN) 100 UNIT/ML pen    Inject 4-12 units subcutaneous with meals as directed, total daily dose approx 50 units     insulin degludec (TRESIBA FLEXTOUCH) 100 UNIT/ML pen    Inject 24 units subcutaneous daily as directed     Patient taking differently: Inject 20 units subcutaneous daily as directed          Taking Medication Assessed Today: Yes  Current Treatments: Diet, Insulin Injections    Problem Solving:  Problem Solving Assessed Today: No  Is the patient at risk for hypoglycemia?: Yes              Reducing Risks:  Reducing Risks Assessed Today: No  CAD Risks: Diabetes Mellitus  Has dilated eye exam at least once a year?: Yes  Sees dentist every 6 months?: Yes  Feet checked by healthcare provider in the last  year?: Yes    Healthy Coping:  Healthy Coping Assessed Today: Yes  Informal Support system:: Crystal based, Family, Neighbors, Parent  Support resources: Support Groups(On Facebook)  Patient Activation Measure Survey Score:  No flowsheet data found.    Diabetes knowledge and skills assessment:   Patient is knowledgeable in diabetes management concepts related to: Healthy Eating, Being Active, Monitoring, Taking Medication, Problem Solving, Reducing Risks and Healthy Coping    Patient needs further education on the following diabetes management concepts: Taking Medication and Problem Solving    Based on learning assessment above, most appropriate setting for further diabetes education would be: Group class or Individual setting.      INTERVENTIONS:    REPORTS:              Education provided today on:  AADE Self-Care Behaviors:  Healthy Eating: carbohydrate counting  Being Active: relationship to blood glucose  Taking Medication: action of prescribed medication, drawing up, administering and storing injectable diabetes medications, proper site selection and rotation for injections and when to take medications  Problem Solving: high blood glucose - causes, signs/symptoms, treatment and prevention, low blood glucose - causes, signs/symptoms, treatment and prevention and when to call health care provider  Healthy Coping: recognize feelings about diagnosis, benefits of making appropriate lifestyle changes, utilize support systems and methods for coping with stress    Pt verbalized understanding of concepts discussed and recommendations provided today.       Education Materials Provided:  No new materials provided today    ASSESSMENT:  Addressed patient's concerns with Dexcom.  Sounds like mostly changed from Drew to Dexcom for alarm and hypoglycemia unawareness.  Discussed the Libre2 and how it can now alarm for both hyper and hypoglycemia.  Informed Eduardo may need to wait until January if not on formulary.  He will check  it out and says since needs to be approved by Dr. Palma, will call him if decides to change.  Discussed how can upload sensor to Dexcom Clarity from home computer if using Dexcom  and provided passcode to share with the clinic. Otherwise, will upload at next appointment.     Noticing high blood glucose after eating at noon. Revisited carbohydrate counting and taking Humalog 15 minutes before meals but patient says this was tried in the past with hypoglycemia resulting sometimes early on in the meal.  Wondering if he may be struggling with gastroparesis so will have him continue to take with meal and watch for food related elevations in blood glucose.  Reviewed how may be able to wait partly into meal if higher fat but may need right at start if simple carbs and low fat/fiber since may digest right away.  Also discussed how may need a higher carbohydrate ratio of 1:15 not seem to work; suggested he could try 1:10 instead for Humalog.    As far as hypoglycemia overnight, if his correction seems to cause hypoglycemia, recommended increasing the target to 1:50>180 instead of 1:50>160. May even needs 1:50>200 if still a problem.      It may be a challenge right now to lower blood glucose mostly <200 mg/dL as Eduardo says he feels his best between 180-220 mg/dL.  He wants to prevent blood glucose from going much above 250 mg/dL so will change his alarm to alert him when blood glucose >250 mg/dL instead of 400 mg/dL so able to address blood glucose sooner.  Pt verbalized understanding of concepts discussed and recommendations provided.   Encouraged him to record meals for 3 days before next appointment to talk more about food and he is agreeable.       Glucose Patterns & Trends:  Hyperglycemia, weekend- postmeal and nocturnal and weekday- postmeal and nocturnal      PLAN  See Patient Instructions for co-developed, patient-stated behavior change goals.  AVS printed and provided to patient today. See Follow-Up  section for recommended follow-up (11/24/20).    Leida Steve RDN, MADONNA, DYANES   Time Spent: 95 minutes  Encounter Type: Individual    Any diabetes medication dose changes were made via the CDE Protocol and Collaborative Practice Agreement with the patient's referring provider. A copy of this encounter was shared with the provider.

## 2020-10-20 NOTE — Clinical Note
Hi Dr. Palma,   Saw Eduardo in clinic today to upload his CGM in case you want to review his data.  Since he feels best when blood glucose 180-220, he is agreeable to adjusting his carbohydrate ratio if rising >250 after meals (1:15 to 1:10) and says he will change his high alarm from 400 to 250 so correcting hyperglycemia sooner.  He plans to follow up in 1 month and recommended he record food x 3d to review with cgm.  He may reach out to you if he wants to change to Drew 2 (now has alarms and having issues with dexcom sensors lasting 10 days).    Thanks,  Leida Steve,  ARTISN, LD, [facility]

## 2020-10-20 NOTE — TELEPHONE ENCOUNTER
PA Initiation    Medication: HUMALOG KWIKPEN  Insurance Company: [employer] - Phone [phone] Fax [phone]  [id number] [place] [facility] [place] [facility] [address] [address] [place] [address]  Filling Pharmacy Phone: [phone]  Filling Pharmacy Fax: [phone]  Start Date: 10/20/2020

## 2020-10-20 NOTE — TELEPHONE ENCOUNTER
Prior Authorization Approval    Authorization Effective Date: 9/20/2020  Authorization Expiration Date: 10/20/2021  Medication: HUMALOG KWIKPEN  Approved Dose/Quantity:   Reference #: [id number]   Insurance Company: [employer] - Phone [phone] Fax [phone]  Expected CoPay:       CoPay Card Available:      Foundation Assistance Needed:    Which Pharmacy is filling the prescription (Not needed for infusion/clinic administered): [facility] [id number] [place] [place] [facility] [address] [address] [place] [address]  Pharmacy Notified: Yes  Patient Notified: Yes, **Instructed pharmacy to notify patient when script is ready to /ship.**

## 2020-10-20 NOTE — PATIENT INSTRUCTIONS
1. Freestyle Libre2 now has alarms for low blood glucose but no phone rere yet.     2. If you are dropping too much overnight after correcting with Humalog, raise the target blood glucose (ex: if you correct to 160 mg/dL, try 180 mg/dL and if still going too low, correct to 200 mg/dL).    3. Set alarm for high blood glucose to 250 mg/dL so you can treat it faster and bring it down faster (less crowded to allow insulin work better).    4. If you noticed that 1 unit Humalog per 15 gm carbohydrate not working, try 1 unit per 10 gm and see if this works better.     5. Try the Ninja air fryer to see blood glucose do better with less fat.      FOLLOW UP:  In person on Tuesday, November 24th at 1:30pm at [facility]    Leida Steve RDN, LD, [facility]   [phone]

## 2020-10-20 NOTE — TELEPHONE ENCOUNTER
Prior authorization started in separate encounter.    Chetan Magana CMA on 10/20/2020 at 8:05 AM

## 2020-10-20 NOTE — TELEPHONE ENCOUNTER
Prior Authorization Retail Medication Request     Medication/Dose: Humalog 100 unit kwikpens  ICD code (if different than what is on RX):E10.40  Previously Tried and Failed:Novolog  Rationale:patient body rejects alternative needs to manage type 1 diabetes     Insurance Name:[employer]TS  Insurance ID: [id number]        Pharmacy Information (if different than what is on RX)  Name: [id number]  Phone: [phone]

## 2020-10-29 ENCOUNTER — MYC MEDICAL ADVICE (OUTPATIENT)
Dept: ENDOCRINOLOGY | Facility: CLINIC | Age: 50
End: 2020-10-29

## 2020-10-29 ASSESSMENT — ENCOUNTER SYMPTOMS
EYE PAIN: 0
CONSTIPATION: 0
FEVER: 0
ABDOMINAL PAIN: 0
NAUSEA: 0
CHILLS: 0
SHORTNESS OF BREATH: 0
JOINT SWELLING: 0
PALPITATIONS: 0
PARESTHESIAS: 0
ARTHRALGIAS: 0
DIARRHEA: 0
HEADACHES: 0
WEAKNESS: 0
COUGH: 0
HEMATOCHEZIA: 0
MYALGIAS: 0
DYSURIA: 0
FREQUENCY: 0
HEARTBURN: 0
NERVOUS/ANXIOUS: 0
DIZZINESS: 0
SORE THROAT: 0
HEMATURIA: 0

## 2020-10-29 NOTE — PROGRESS NOTES
3  SUBJECTIVE:   CC: Eduardo Walton is an 50 year old male who presents for preventive health visit.       Patient has been advised of split billing requirements and indicates understanding: Yes  Healthy Habits:    Do you get at least three servings of calcium containing foods daily (dairy, green leafy vegetables, etc.)? yes    Amount of exercise or daily activities, outside of work: every other day(s) per week    Problems taking medications regularly No    Medication side effects: No    Have you had an eye exam in the past two years? yes    Do you see a dentist twice per year? yes    Do you have sleep apnea, excessive snoring or daytime drowsiness?no      Moved to [place] in March     Right ear has cerumen impacted. Needs ear cleaning. Feels a little lopsided with walking due to amputee on the other side. Has tried mineral oil and peroxide. Tries not to lay on either side to prevent being plugged.     Neuropathy in the upper extremities and right lower extremities  BKA- Left.     Does not take a statin, does not take this as he feels it gave him side effects and he can manage his cholesterol with diet and blood sugar control. Aware of risks. Has not seen cardiology since 2010. Was on metoprolol and it caused him to sense his DM was low during this time and he has a frustration towards cardiology due to this. Declines cardiology at this time. Would like to see a neurologist. Followed by a doctor for his amputation. Dr. Reid. Stopped Asprin 325, stopped post surgery (BKA).     Humalog- 2:1 for carb ratio  Typically 5 for meal and 3 for snacks.   Correction 1 unit for every 50mg.     [facility] for eye exam.     Today's PHQ-2 Score:   PHQ-2 ( 1999 Pfizer) 10/29/2020 6/7/2019   Q1: Little interest or pleasure in doing things 0 0   Q2: Feeling down, depressed or hopeless 0 0   PHQ-2 Score 0 0   Q1: Little interest or pleasure in doing things Not at all -   Q2: Feeling down, depressed or hopeless Not at all -   PHQ-2  Score 0 -       Abuse: Current or Past(Physical, Sexual or Emotional)- No  Do you feel safe in your environment? Yes        Social History     Tobacco Use     Smoking status: Never Smoker     Smokeless tobacco: Never Used   Substance Use Topics     Alcohol use: Yes     Comment: occ     If you drink alcohol do you typically have >3 drinks per day or >7 drinks per week? No                      Last PSA: No results found for: PSA    Reviewed orders with patient. Reviewed health maintenance and updated orders accordingly - Yes  Lab work is in process    Reviewed and updated as needed this visit by clinical staff  Tobacco  Allergies  Meds   Med Hx  Surg Hx  Fam Hx  Soc Hx        Reviewed and updated as needed this visit by Provider                Past Medical History:   Diagnosis Date     CAD (coronary artery disease)     previous coronary stent placement (2010?)     Diabetic retinopathy associated with type 1 diabetes mellitus (H)      Foot ulcer, left (H)      Osteomyelitis (H)     left foot     S/P BKA (below knee amputation), left (H) 2019     Type 1 diabetes mellitus with complications (H)     retinopathy, neuropathy, nephropathy, macrovascular disease      Past Surgical History:   Procedure Laterality Date     AMPUTATE FOOT Left 2/5/2019    Procedure: PARTIAL LEFT FOOT AMPUTATION.;  Surgeon: Chetan Potter DPM;  Location:  OR     AMPUTATE LEG BELOW KNEE Left 2/18/2019    Procedure: LEFT BELOW THE KNEE AMPUTATION;  Surgeon: Kvng Berman MD;  Location: [facility] OR     STENT  2010       ROS:  Review Of Systems  Skin: positive for scab on right knee   Eyes: positive DM retinopathy.   Ears/Nose/Throat: positive bilateral ear impaction with cerumen.   Respiratory: No shortness of breath, dyspnea on exertion, cough, or hemoptysis  Cardiovascular: positive history of CAD with stenting. HTN.   Gastrointestinal: negative  Genitourinary: negative  Musculoskeletal: positive for BKA   Neurologic: numbness or tingling  "of hands and Right foot.   Psychiatric: negative  Hematologic/Lymphatic/Immunologic: negative  Endocrine: positive for DM 1       OBJECTIVE:   /62   Pulse 69   Temp 97.5  F (36.4  C) (Temporal)   Resp 16   Ht 1.738 m (5' 8.43\")   Wt 76.2 kg (168 lb)   SpO2 98%   BMI 25.23 kg/m    EXAM:  Physical Exam    Diagnostic Test Results:  Labs reviewed in Epic    ASSESSMENT/PLAN:   1. Routine general medical examination at a health care facility  -  Updated HM  - Declined influenza, will return for TDAP      2. Type 1 diabetes mellitus with diabetic polyneuropathy (H)  - Followed by Endocrinology  - Labs completed per endocrinology  - Recommend eye exam.   - Discussed would recommend statin, patient declined due to side effects in the past. Strongly recommend this due to CAD history and DM. Recommend cardiology referral, declined at this time as well.   - Recommend Asprin, he will restart this.   - Patient also notes he does not take the losartan, encouraged this.   - CARDIOLOGY EVAL ADULT REFERRAL; Future  - NEUROLOGY ADULT REFERRAL    3. Coronary artery disease involving native coronary artery of native heart without angina pectoris    - CARDIOLOGY EVAL ADULT REFERRAL; Future    4. Nephritis and nephropathy, 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  - Encourage Losartan     5. Stage 3a chronic kidney disease      6. History of coronary artery stent placement  - Encourage close monitoring with cardiology.     7. Hx of BKA, left (H)      8. Positive for macroalbuminuria      9. Need for hepatitis C screening test  - Declined, will do at follow up.     10. Screening for HIV (human immunodeficiency virus)      11. Retinopathy due to secondary diabetes mellitus (H)  - Due for eye exam, will have forms faxed.     12. Bilateral ear impaction with cerumen  - Ear wax successful.   - Ok for PRN ear wash on float schedule X1 year.       Patient has been advised of split billing requirements and indicates understanding: " "Yes  COUNSELING:       Immunizations    Declined: Influenza due to Concerns about side effects/safety               Aspirin Prophylaxsis    Estimated body mass index is 25.23 kg/m  as calculated from the following:    Height as of this encounter: 1.738 m (5' 8.43\").    Weight as of this encounter: 76.2 kg (168 lb).    Weight management plan: Discussed healthy diet and exercise guidelines    He reports that he has never smoked. He has never used smokeless tobacco.      Counseling Resources:  ATP IV Guidelines  Pooled Cohorts Equation Calculator  FRAX Risk Assessment  ICSI Preventive Guidelines  Dietary Guidelines for Americans, 2010  [employer]'s MyPlate  ASA Prophylaxis  Lung CA Screening    Deepti Vega, NATASHA CNP  [facility]    Answers for HPI/ROS submitted by the patient on 10/29/2020   Annual Exam:  Frequency of exercise:: 2-3 days/week  Getting at least 3 servings of Calcium per day:: Yes  Diet:: Diabetic  Taking medications regularly:: Yes  Medication side effects:: None  Bi-annual eye exam:: Yes  Dental care twice a year:: Yes  Sleep apnea or symptoms of sleep apnea:: None  abdominal pain: No  Blood in stool: No  Blood in urine: No  chest pain: No  chills: No  congestion: No  constipation: No  cough: No  diarrhea: No  dizziness: No  ear pain: Yes  eye pain: No  nervous/anxious: No  fever: No  frequency: No  genital sores: No  headaches: No  hearing loss: No  heartburn: No  arthralgias: No  joint swelling: No  peripheral edema: No  mood changes: No  myalgias: No  nausea: No  dysuria: No  palpitations: No  Skin sensation changes: No  sore throat: No  urgency: No  rash: No  shortness of breath: No  visual disturbance: No  weakness: No  impotence: No  penile discharge: No  Additional concerns today:: No  Duration of exercise:: 15-30 minutes    "

## 2020-10-30 ENCOUNTER — OFFICE VISIT (OUTPATIENT)
Dept: FAMILY MEDICINE | Facility: OTHER | Age: 50
End: 2020-10-30
Payer: COMMERCIAL

## 2020-10-30 VITALS
DIASTOLIC BLOOD PRESSURE: 62 MMHG | BODY MASS INDEX: 25.46 KG/M2 | TEMPERATURE: 97.5 F | SYSTOLIC BLOOD PRESSURE: 112 MMHG | HEART RATE: 69 BPM | OXYGEN SATURATION: 98 % | HEIGHT: 68 IN | RESPIRATION RATE: 16 BRPM | WEIGHT: 168 LBS

## 2020-10-30 DIAGNOSIS — R80.9 POSITIVE FOR MACROALBUMINURIA: ICD-10-CM

## 2020-10-30 DIAGNOSIS — H61.23 BILATERAL IMPACTED CERUMEN: ICD-10-CM

## 2020-10-30 DIAGNOSIS — Z11.59 NEED FOR HEPATITIS C SCREENING TEST: ICD-10-CM

## 2020-10-30 DIAGNOSIS — E13.319 RETINOPATHY DUE TO SECONDARY DIABETES MELLITUS (H): ICD-10-CM

## 2020-10-30 DIAGNOSIS — E10.42 TYPE 1 DIABETES MELLITUS WITH DIABETIC POLYNEUROPATHY (H): ICD-10-CM

## 2020-10-30 DIAGNOSIS — Z11.4 SCREENING FOR HIV (HUMAN IMMUNODEFICIENCY VIRUS): ICD-10-CM

## 2020-10-30 DIAGNOSIS — I25.10 CORONARY ARTERY DISEASE INVOLVING NATIVE CORONARY ARTERY OF NATIVE HEART WITHOUT ANGINA PECTORIS: ICD-10-CM

## 2020-10-30 DIAGNOSIS — Z00.00 ROUTINE GENERAL MEDICAL EXAMINATION AT A HEALTH CARE FACILITY: Primary | ICD-10-CM

## 2020-10-30 DIAGNOSIS — N18.31 STAGE 3A CHRONIC KIDNEY DISEASE (H): ICD-10-CM

## 2020-10-30 DIAGNOSIS — Z95.5 HISTORY OF CORONARY ARTERY STENT PLACEMENT: ICD-10-CM

## 2020-10-30 DIAGNOSIS — Z89.512 HX OF BKA, LEFT (H): ICD-10-CM

## 2020-10-30 DIAGNOSIS — N05.9 NEPHRITIS AND NEPHROPATHY, WITH PATHOLOGICAL LESION IN KIDNEY: ICD-10-CM

## 2020-10-30 PROCEDURE — 99213 OFFICE O/P EST LOW 20 MIN: CPT | Mod: 25 | Performed by: NURSE PRACTITIONER

## 2020-10-30 PROCEDURE — 99396 PREV VISIT EST AGE 40-64: CPT | Mod: 25 | Performed by: NURSE PRACTITIONER

## 2020-10-30 PROCEDURE — 69209 REMOVE IMPACTED EAR WAX UNI: CPT | Mod: 50 | Performed by: NURSE PRACTITIONER

## 2020-10-30 RX ORDER — ATORVASTATIN CALCIUM 40 MG/1
40 TABLET, FILM COATED ORAL DAILY
COMMUNITY
End: 2021-03-22

## 2020-10-30 RX ORDER — PROCHLORPERAZINE 25 MG/1
1 SUPPOSITORY RECTAL DAILY
COMMUNITY
Start: 2020-08-28 | End: 2020-12-01

## 2020-10-30 ASSESSMENT — MIFFLIN-ST. JEOR: SCORE: 1613.29

## 2020-11-30 ENCOUNTER — TELEPHONE (OUTPATIENT)
Dept: ENDOCRINOLOGY | Facility: CLINIC | Age: 50
End: 2020-11-30

## 2020-11-30 RX ORDER — PROCHLORPERAZINE 25 MG/1
SUPPOSITORY RECTAL
Qty: 3 EACH | Status: CANCELLED | OUTPATIENT
Start: 2020-11-30

## 2020-11-30 NOTE — TELEPHONE ENCOUNTER
Dexcom G6 Sensor not active in chart    LOV:  10- with PCP for routine physical  10- diabetic educator  8- TL    Future OV: 12- Diabetic Educator    Please review notes from 10- diab ed visit    Pended Dexcom G6 sensors - but needs completion and diagnosis.    Ro Flowers, RT (R)

## 2020-12-01 DIAGNOSIS — E10.40 TYPE 1 DIABETES MELLITUS WITH DIABETIC NEUROPATHY (H): Primary | ICD-10-CM

## 2020-12-01 RX ORDER — PROCHLORPERAZINE 25 MG/1
1 SUPPOSITORY RECTAL CONTINUOUS PRN
Qty: 3 EACH | Refills: 5 | Status: SHIPPED | OUTPATIENT
Start: 2020-12-01 | End: 2021-03-01

## 2020-12-01 RX ORDER — PROCHLORPERAZINE 25 MG/1
1 SUPPOSITORY RECTAL CONTINUOUS PRN
Qty: 1 EACH | Refills: 3 | Status: SHIPPED | OUTPATIENT
Start: 2020-12-01 | End: 2021-03-01

## 2020-12-02 NOTE — TELEPHONE ENCOUNTER
Message noted about the DexcomG6 Rx's from the [facility].  I have now re-ordered the DexcomG6 sensors and transmitter with Phillip Palma MD, MS  [facility]

## 2020-12-22 ENCOUNTER — ALLIED HEALTH/NURSE VISIT (OUTPATIENT)
Dept: EDUCATION SERVICES | Facility: CLINIC | Age: 50
End: 2020-12-22
Payer: COMMERCIAL

## 2020-12-22 DIAGNOSIS — E10.42 TYPE 1 DIABETES MELLITUS WITH DIABETIC POLYNEUROPATHY (H): Primary | ICD-10-CM

## 2020-12-22 PROCEDURE — G0108 DIAB MANAGE TRN  PER INDIV: HCPCS

## 2020-12-22 RX ORDER — INSULIN LISPRO 100 [IU]/ML
INJECTION, SOLUTION INTRAVENOUS; SUBCUTANEOUS
COMMUNITY
End: 2021-03-24

## 2020-12-22 NOTE — PROGRESS NOTES
Diabetes Self-Management Education & Support    Presents for: CGM Review    SUBJECTIVE/OBJECTIVE:  Presents for: CGM Review  Accompanied by: Self  Diabetes education in the past 24mo: Yes  Focus of Visit: CGM  Type of CGM visit: Personal CGM Follow-up  Diabetes type: Type 1  Date of diagnosis: 43 years (1988)  Disease course: Getting harder to manage  Diabetes management related comments/concerns: Says having trouble with sensors - most recent one stopped after 6-8 days.  Says skinnet at home but still not working well right now. Considering trying the Drew again.  Syas not sure if correction has to be updated- not always lowering to target.  Says sometimes it works and sometimes it dose not.    Says blood glucose of 150-200 mg/dL is decent for him.      Says feeling wound and easily stressed and not sure what to do.  Belongs to support groups for amputation and divorces/single men.  Thinks he may need more help with this.    Humalog dose: 0 units at breakfast, 3-4 units Humalog with lunch, 4-5 with dinner.  Usually corrects with 7 units daily (toatl about 16 units/day)  Tresiba 20 units/day    Transportation concerns: Yes(Does not drive due to amputation below knee.  Relies on family.)  Difficulty affording diabetes medication?: Yes  Difficulty affording diabetes testing supplies?: No  Cultural Influences/Ethnic Background:  American    Diabetes Symptoms & Complications:  Fatigue: Sometimes  Neuropathy: Sometimes  Polydipsia: No  Polyphagia: No  Polyuria: No  Visual change: Sometimes  Slow healing wounds: Yes  Complications assessed today?: Yes  Autonomic neuropathy: No  CVA: No  Heart disease: Yes  Nephropathy: Yes  Peripheral neuropathy: Yes  Peripheral Vascular Disease: Yes  Retinopathy: Yes  Sexual dysfunction: No    Patient Problem List and Family Medical History reviewed for relevant medical history, current medical status, and diabetes risk factors.    Vitals:  There were no vitals taken for this  "visit.  Estimated body mass index is 25.23 kg/m  as calculated from the following:    Height as of 10/30/20: 1.738 m (5' 8.43\").    Weight as of 10/30/20: 76.2 kg (168 lb).   Last 3 BP:   BP Readings from Last 3 Encounters:   10/30/20 112/62   12/19/19 138/68   11/22/19 124/68       History   Smoking Status     Never Smoker   Smokeless Tobacco     Never Used       Labs:  Lab Results   Component Value Date    A1C 9.0 08/20/2020     Lab Results   Component Value Date     08/20/2020     Lab Results   Component Value Date    LDL 78 08/20/2020     HDL Cholesterol   Date Value Ref Range Status   08/20/2020 39 (L) >39 mg/dL Final   ]  GFR Estimate   Date Value Ref Range Status   08/20/2020 57 (L) >60 mL/min/[1.73_m2] Final     Comment:     Non  GFR Calc  Starting 12/18/2018, serum creatinine based estimated GFR (eGFR) will be   calculated using the Chronic Kidney Disease Epidemiology Collaboration   (CKD-EPI) equation.       GFR Estimate If Black   Date Value Ref Range Status   08/20/2020 67 >60 mL/min/[1.73_m2] Final     Comment:      GFR Calc  Starting 12/18/2018, serum creatinine based estimated GFR (eGFR) will be   calculated using the Chronic Kidney Disease Epidemiology Collaboration   (CKD-EPI) equation.       Lab Results   Component Value Date    CR 1.43 08/20/2020     No results found for: MICROALBUMIN    Healthy Eating:  Healthy Eating Assessed Today: No  Cultural/Holiness diet restrictions?: No  Meal planning/habits: Carb counting, None  How many times a week on average do you eat food made away from home (restaurant/take-out)?: 1  Meals include: Breakfast, Lunch, Dinner, Evening Snack  Breakfast: eggs and sausage  Lunch: sandwich (white bread, turkey, cheese) and sour cream and onion chips (5 carbs).  Usually take 3-4 units to cover this meal.  Dinner: Beef stew (Dinty more 16 oz can) OR Hot dogs and beans (8 pk) and 16oz bean (treat with 1:50).  Usually take 4-5 units to " cover meal.  Other: HS: Honeycrisp apple, no insulin since not see rise in BG.  Will do a shot if more than 350 before bed.  Beverages: Diet soda, Sports drinks  Has patient met with a dietitian in the past?: Yes    Being Active:  Being Active Assessed Today: Yes  Exercise:: Yes  Days per week of moderate to strenuous exercise (like a brisk walk): 4  On average, minutes per day of exercise at this level: 20  How intense was your typical exercise? : Moderate (like brisk walking)  Exercise Minutes per Week: 80  Barrier to exercise: Physical limitation    Monitoring:  Monitoring Assessed Today: Yes  Blood Glucose Meter: CGM  Times checking blood sugar at home (number): Other  Blood glucose trend: Fluctuating      Taking Medications:  Diabetes Medication(s)     Insulin       insulin lispro (HUMALOG KWIKPEN) 100 UNIT/ML (1 unit dial) KWIKPEN    Inject Subcutaneous 3 times daily (before meals) Take 1u:15g breakfast, 1u:15g lunch, 1u:15g dinner+ 1 u/50mg/dL>150mg/dL +2u prime before each dose-total 25u/day     insulin degludec (TRESIBA FLEXTOUCH) 100 UNIT/ML pen    Inject 24 units subcutaneous daily as directed     Patient taking differently: Inject 20 units subcutaneous daily as directed          Taking Medication Assessed Today: Yes  Current Treatments: Diet, Insulin Injections  Dose schedule: Pre-lunch, Pre-dinner, At bedtime  Given by: Patient  Injection/Infusion sites: Abdomen    Problem Solving:  Problem Solving Assessed Today: Yes  Is the patient at risk for hypoglycemia?: Yes  Hypoglycemia Frequency: Weekly  Hypoglycemia Treatment: Other food              Reducing Risks:  Reducing Risks Assessed Today: No  CAD Risks: Diabetes Mellitus  Has dilated eye exam at least once a year?: Yes  Sees dentist every 6 months?: Yes  Feet checked by healthcare provider in the last year?: Yes    Healthy Coping:  Healthy Coping Assessed Today: Yes  Informal Support system:: [place] based, Family, Neighbors, Parent  Support resources:  Support Groups(On Facebook)  Patient Activation Measure Survey Score:  No flowsheet data found.    Diabetes knowledge and skills assessment:   Patient is knowledgeable in diabetes management concepts related to: Healthy Eating, Being Active, Monitoring, Taking Medication, Problem Solving, Reducing Risks and Healthy Coping    Patient needs further education on the following diabetes management concepts: Healthy Eating, Taking Medication, Problem Solving and Healthy Coping    Based on learning assessment above, most appropriate setting for further diabetes education would be: Group class or Individual setting.      INTERVENTIONS:    REPORTS:              Education provided today on:  AADE Self-Care Behaviors:  Healthy Eating: carbohydrate counting and consistency in amount, composition, and timing of food intake  Monitoring: purpose, log and interpret results and individual blood glucose targets  Taking Medication: action of prescribed medication, when to take medications and priming the insulin pen  Problem Solving: high blood glucose - causes, signs/symptoms, treatment and prevention, low blood glucose - causes, signs/symptoms, treatment and prevention, carrying a carbohydrate source at all times and when to call health care provider  Healthy Coping: recognize feelings about diagnosis, benefits of making appropriate lifestyle changes, utilize support systems, methods for coping with stress and when to seek professional counseling    Pt verbalized understanding of concepts discussed and recommendations provided today.       Education Materials Provided:  No new materials provided today    ASSESSMENT:  Started by addressing Eduardo's concerns about the CGM and different ideas to try to help it adhere better.  Looked at Eduardo's current placement and it is on the upper part of the arm.  Discussed trying to keep it on the lower side since it may both read and stick better since more fat tissue to work with.  He will try  this change and see if it helps.    Addressed Eduardo's concerns about how management feels harder and reviewed that other factors such as stress can also impact blood glucose, not just food and activity.  Eduardo identifies with being more stressed after his divorce, Covid, frustration over blood glucose and identifies with feeling stressed and easily getting upset over things.  He agrees this may be impacting his blood glucose and food choices.  Says he will check into counseling options.    Lastly, addressed Eduardo's concerns about his correction scale and when to use.  His TDD of insulin is about 36 units a day (20 units Tresiba and 16 units Humalog), which still estimates correction of 1:50>150.  This is to be used when correcting for blood glucose before a meal but did discuss how insulin may stack if corrects again after 1-2 hours and that less insulin is needed.  Informed Eduardo better to work with adding more insulin to a meal when seeing blood glucose high than correcting once blood glucose very high from the meal.      Reviewed his diet recall and insulin amounts.  Suggested he try carbohydrate ratio of 1:15 and see how this works. He currently is closer to 1:20-25 since he may only take 3 units of Humalog to cover 5 carbohydrate choices (75 gm).  At dinner, takes about 4-5 units to cover meals that provide 30-90 gm carbohydrate.  He seems agreeable to this.     Also notice a rise in blood glucose with breakfast so even though low carbohydrate, recommended he try taking 1 unit Humalog plus correction for elevated blood glucose.  He does dose after the meal due to gastroparesis and would not have to change this and could still take both meal time insulin and correction at once; did suggest he try using the pre-meal blood glucose if possible to base his correction dose. Also reviewed priming the pen since Eduardo not feel like only 1-2 units does anything for him; could be missing part of the dose if air pocket and  from insulin going through pen needle.  Pt verbalized understanding of concepts discussed and recommendations provided.       Glucose Patterns & Trends:  Hyperglycemia, weekend- postmeal and nocturnal and weekday- postmeal and nocturnal      PLAN  See Patient Instructions for co-developed, patient-stated behavior change goals.  AVS printed and provided to patient today. See Follow-Up section for recommended follow-up.    Leida Steve RDN, LD, [facility]   Time Spent: 75 minutes  Encounter Type: Individual    Any diabetes medication dose changes were made via the [facility] Protocol and Collaborative Practice Agreement with the patient's referring provider. A copy of this encounter was shared with the provider.          T: 75 minutes

## 2020-12-22 NOTE — PATIENT INSTRUCTIONS
1. Place the Dexcom on the under side of your arms.  Tends to wear better since muscle does not interfere with the readings or sensor itself.   Ask Dexcom about their adhesive product.     2. Prime the insulin pen before taking at a meal    3. Correction (try 180 if 150 is dropping too much), use the blood glucose from before you eat and take the Humalog to cover the meal (1 unit per carbohydrate or 15 gm) and take all at the same time.    4. Take some insulin with breakfast- take correction and 1 unit of Humalog for breakfast to cover the sausage and egg since seeing a rise in blood glucose.     5. Pay attention to correction- do you need more or less?     FOLLOW UP: Upload your Dexcom in 2 weeks and send a [place] message for us to review.    Leida Steve RDN, LD, [facility]   [phone]

## 2021-01-18 DIAGNOSIS — E10.40 TYPE 1 DIABETES MELLITUS WITH DIABETIC NEUROPATHY (H): ICD-10-CM

## 2021-01-18 RX ORDER — PEN NEEDLE, DIABETIC 31 GX5/16"
NEEDLE, DISPOSABLE MISCELLANEOUS
Qty: 500 EACH | Refills: 1 | Status: SHIPPED | OUTPATIENT
Start: 2021-01-18 | End: 2021-06-11

## 2021-01-20 NOTE — PROGRESS NOTES
Assessment & Plan     S/P BKA (below knee amputation) unilateral, left (H)  - Would like to get at home PT so that he can learn how to better use new prosthesis and eventually drive as he is currently not driving.   - He also is in need of new supplies for his current prosthesis, DME completed today.   - Continues to be monitored by Podiatry Dr. Pleitez.out of [place].   - Orthotics, Prosthetics and Custom Compression Order for DME - ONLY FOR DME  - PHYSICAL THERAPY REFERRAL; Future    Type 1 diabetes mellitus with diabetic polyneuropathy (H)  Followed by Endocrinology Dr. Grove.  Last A1C in August was 9.0   Recommendations for diabetic education as well and DM checks every 3 months so he is due. Encouraged this.       Stage 3a chronic kidney disease  Recommend recheck.     History of coronary artery stent placement  - Advised cardiology monitoring and recheck. Patient states he will not see cardiology and he feels fine. I advised him that this should be monitored by cardiology at least annually.   Last echocardiogram was in 2019   Stent placement 2010 - CAD s/p NSTEMI and  SUMANTH   - Not on any blood pressure medications at this time. Mild LVH has been noted on previous ECHOCARDIOGRAM.   - Recommend continuing with lipitor and asprin daily  -   BP Readings from Last 3 Encounters:   02/18/21 138/72   01/22/21 132/78   10/30/20 112/62     Recommend ACE as well.     Counseling about travel  - Looking to travel in the future and discuss vaccine recommendations.   - TRAVEL CLINIC REFERRAL       The patient indicates understanding of these issues and agrees with the plan.    Patient Instructions     Patient Education     Hepatitis B Vaccine, Recombinant injection  Brand Names: Engerix-B, Recombivax HB  What is this medicine?  HEPATITIS B VACCINE (hep uh LUDWIN springer) is a vaccine. It is used to prevent an infection with the hepatitis B virus.  How should I use this medicine?  This vaccine is for injection into a  muscle. It is given by a health care professional.  A copy of Vaccine Information Statements will be given before each vaccination. Read this sheet carefully each time. The sheet may change frequently.  Talk to your pediatrician regarding the use of this medicine in children. While this drug may be prescribed for children as young as  for selected conditions, precautions do apply.  What side effects may I notice from receiving this medicine?  Side effects that you should report to your doctor or health care professional as soon as possible:    allergic reactions like skin rash, itching or hives, swelling of the face, lips, or tongue    breathing problems    confused, irritated    fast, irregular heartbeat    flu-like syndrome    numb, tingling pain    seizures    unusually weak or tired  Side effects that usually do not require medical attention (report to your doctor or health care professional if they continue or are bothersome):    diarrhea    fever    headache    loss of appetite    muscle pain    nausea    pain, redness, swelling, or irritation at site where injected    tiredness  What may interact with this medicine?    medicines that suppress your immune function like adalimumab, anakinra, infliximab    medicines to treat cancer    steroid medicines like prednisone or cortisone    What if I miss a dose?  It is important not to miss your dose. Call your doctor or health care professional if you are unable to keep an appointment.  Where should I keep my medicine?  This drug is given in a hospital or clinic and will not be stored at home.  What should I tell my health care provider before I take this medicine?  They need to know if you have any of these conditions:    fever, infection    heart disease    hepatitis B infection    immune system problems    kidney disease    an unusual or allergic reaction to vaccines, yeast, other medicines, foods, dyes, or preservatives    pregnant or trying to get  "pregnant    breast-feeding  What should I watch for while using this medicine?  See your health care provider for all shots of this vaccine as directed. You must have 3 shots of this vaccine for protection from hepatitis B infection. Tell your doctor right away if you have any serious or unusual side effects after getting this vaccine.  NOTE:This sheet is a summary. It may not cover all possible information. If you have questions about this medicine, talk to your doctor, pharmacist, or health care provider. Copyright  2020 [employer]               Return in about 3 months (around 4/22/2021) for Physical Exam.    Deepti Vega, NATASHA CNP  [facility] CALISTA Clark is a 50 year old who presents to clinic today for the following health issues     HPI      Has a prosthetic leg and needs a referral for home base PT. Needs a form filled out to get a discount on renewing license. Would like to get UTD on Vaccinations. Inquiring about a passport?       Canceled Echocardiogram writer ordered.     Ankle ability to move more with the previous prosthetic. Hopes with this one he can do push ups and more mobility.       Review of Systems   Constitutional: Negative.    Respiratory: Negative.    Cardiovascular: Negative.             Objective    /78 (BP Location: Right arm, Patient Position: Chair, Cuff Size: Adult Regular)   Pulse 78   Temp 98.4  F (36.9  C) (Temporal)   Resp 20   Ht 1.753 m (5' 9\")   Wt 73 kg (161 lb)   SpO2 99%   BMI 23.78 kg/m    Body mass index is 23.78 kg/m .  Physical Exam  Cardiovascular:      Rate and Rhythm: Normal rate and regular rhythm.      Pulses: Normal pulses.      Heart sounds: Normal heart sounds.   Musculoskeletal:      Comments: Left BKA   Neurological:      Mental Status: He is alert.            Diagnostic: None             "

## 2021-01-22 ENCOUNTER — OFFICE VISIT (OUTPATIENT)
Dept: FAMILY MEDICINE | Facility: OTHER | Age: 51
End: 2021-01-22
Payer: COMMERCIAL

## 2021-01-22 VITALS
HEART RATE: 78 BPM | SYSTOLIC BLOOD PRESSURE: 132 MMHG | BODY MASS INDEX: 23.85 KG/M2 | TEMPERATURE: 98.4 F | HEIGHT: 69 IN | DIASTOLIC BLOOD PRESSURE: 78 MMHG | RESPIRATION RATE: 20 BRPM | WEIGHT: 161 LBS | OXYGEN SATURATION: 99 %

## 2021-01-22 DIAGNOSIS — Z95.5 HISTORY OF CORONARY ARTERY STENT PLACEMENT: ICD-10-CM

## 2021-01-22 DIAGNOSIS — E10.42 TYPE 1 DIABETES MELLITUS WITH DIABETIC POLYNEUROPATHY (H): ICD-10-CM

## 2021-01-22 DIAGNOSIS — N18.31 STAGE 3A CHRONIC KIDNEY DISEASE (H): ICD-10-CM

## 2021-01-22 DIAGNOSIS — Z71.84 COUNSELING ABOUT TRAVEL: ICD-10-CM

## 2021-01-22 DIAGNOSIS — Z89.512 S/P BKA (BELOW KNEE AMPUTATION) UNILATERAL, LEFT (H): Primary | ICD-10-CM

## 2021-01-22 PROCEDURE — 99214 OFFICE O/P EST MOD 30 MIN: CPT | Performed by: NURSE PRACTITIONER

## 2021-01-22 ASSESSMENT — MIFFLIN-ST. JEOR: SCORE: 1590.67

## 2021-01-22 NOTE — PATIENT INSTRUCTIONS
Patient Education     Hepatitis B Vaccine, Recombinant injection  Brand Names: Engerix-B, Recombivax HB  What is this medicine?  HEPATITIS B VACCINE (hep uh JULIETHHY tis B MARIA VICTORIA seen) is a vaccine. It is used to prevent an infection with the hepatitis B virus.  How should I use this medicine?  This vaccine is for injection into a muscle. It is given by a health care professional.  A copy of Vaccine Information Statements will be given before each vaccination. Read this sheet carefully each time. The sheet may change frequently.  Talk to your pediatrician regarding the use of this medicine in children. While this drug may be prescribed for children as young as  for selected conditions, precautions do apply.  What side effects may I notice from receiving this medicine?  Side effects that you should report to your doctor or health care professional as soon as possible:    allergic reactions like skin rash, itching or hives, swelling of the face, lips, or tongue    breathing problems    confused, irritated    fast, irregular heartbeat    flu-like syndrome    numb, tingling pain    seizures    unusually weak or tired  Side effects that usually do not require medical attention (report to your doctor or health care professional if they continue or are bothersome):    diarrhea    fever    headache    loss of appetite    muscle pain    nausea    pain, redness, swelling, or irritation at site where injected    tiredness  What may interact with this medicine?    medicines that suppress your immune function like adalimumab, anakinra, infliximab    medicines to treat cancer    steroid medicines like prednisone or cortisone    What if I miss a dose?  It is important not to miss your dose. Call your doctor or health care professional if you are unable to keep an appointment.  Where should I keep my medicine?  This drug is given in a hospital or clinic and will not be stored at home.  What should I tell my health care provider  before I take this medicine?  They need to know if you have any of these conditions:    fever, infection    heart disease    hepatitis B infection    immune system problems    kidney disease    an unusual or allergic reaction to vaccines, yeast, other medicines, foods, dyes, or preservatives    pregnant or trying to get pregnant    breast-feeding  What should I watch for while using this medicine?  See your health care provider for all shots of this vaccine as directed. You must have 3 shots of this vaccine for protection from hepatitis B infection. Tell your doctor right away if you have any serious or unusual side effects after getting this vaccine.  NOTE:This sheet is a summary. It may not cover all possible information. If you have questions about this medicine, talk to your doctor, pharmacist, or health care provider. Copyright  2020 [employer]

## 2021-01-27 ENCOUNTER — MYC MEDICAL ADVICE (OUTPATIENT)
Dept: FAMILY MEDICINE | Facility: OTHER | Age: 51
End: 2021-01-27

## 2021-01-27 DIAGNOSIS — Z89.512 S/P BKA (BELOW KNEE AMPUTATION) UNILATERAL, LEFT (H): Primary | ICD-10-CM

## 2021-01-27 NOTE — TELEPHONE ENCOUNTER
Deepti, please review and change PT order.     Montana Tapia RN, BSN  [facility]  January 27, 2021

## 2021-01-27 NOTE — TELEPHONE ENCOUNTER
Is there a reason he cant go to have this done at a facility? Homecare is usually people who are homebound.     NATASHA Queen CNP  Questions or concerns please feel free to send me a [employer] message or call me  Phone : [phone]

## 2021-01-29 NOTE — TELEPHONE ENCOUNTER
Orders placed and requesting Co-sign review from SHELLI.     NATASHA Queen CNP  Questions or concerns please feel free to send me a [employer] message or call me  Phone : [phone]

## 2021-02-01 ENCOUNTER — TELEPHONE (OUTPATIENT)
Dept: CARE COORDINATION | Facility: CLINIC | Age: 51
End: 2021-02-01

## 2021-02-01 NOTE — TELEPHONE ENCOUNTER
Dear NATASHA Madrigal CNP  Medicare Home Health regulations requires [facility] to provide an initial assessment visit either within 48 hours of the patient's return home, or on the physician ordered Start of Care date.    There will be a delay in the Initial Assessment for Eduardo Walton; MRN [id number] due to difficulty reaching the patient due to incorrect phone number.        Thank you  [facility]  Omaira Allen RN CC  [phone]

## 2021-02-09 ENCOUNTER — MYC MEDICAL ADVICE (OUTPATIENT)
Dept: FAMILY MEDICINE | Facility: OTHER | Age: 51
End: 2021-02-09

## 2021-02-18 ENCOUNTER — OFFICE VISIT (OUTPATIENT)
Dept: PODIATRY | Facility: CLINIC | Age: 51
End: 2021-02-18
Payer: COMMERCIAL

## 2021-02-18 VITALS
SYSTOLIC BLOOD PRESSURE: 138 MMHG | HEIGHT: 69 IN | DIASTOLIC BLOOD PRESSURE: 72 MMHG | WEIGHT: 161 LBS | BODY MASS INDEX: 23.85 KG/M2

## 2021-02-18 DIAGNOSIS — E11.42 DIABETIC POLYNEUROPATHY ASSOCIATED WITH TYPE 2 DIABETES MELLITUS (H): Primary | ICD-10-CM

## 2021-02-18 DIAGNOSIS — L60.3 DYSTROPHIC NAIL: ICD-10-CM

## 2021-02-18 DIAGNOSIS — M62.549 ATROPHY OF MUSCLE OF HAND, UNSPECIFIED LATERALITY: ICD-10-CM

## 2021-02-18 DIAGNOSIS — M62.561 ATROPHY OF CALF MUSCLES ON RIGHT: ICD-10-CM

## 2021-02-18 DIAGNOSIS — Z89.512 HX OF BKA, LEFT (H): ICD-10-CM

## 2021-02-18 PROCEDURE — 99213 OFFICE O/P EST LOW 20 MIN: CPT | Performed by: PODIATRIST

## 2021-02-18 ASSESSMENT — MIFFLIN-ST. JEOR: SCORE: 1590.67

## 2021-02-18 NOTE — LETTER
"    2/18/2021         RE: Eduardo Walton  [address] [place] [address]        Dear Colleague,    Thank you for referring your patient, Eduardo Walton, to the [facility] PODI[facility]RY. Please see a copy of my visit note below.    Podiatry / Foot and Ankle Surgery Progress Note    February 18, 2021    Subject: Patient was seen for follow up on diabetic foot check.  Patient is concerned about the weakness in the his leg.  He has fallen a few times because the leg has given out.  Also notes atrophy in his hand muscles and weakness.  He is wondering what can be done for this.  Denies any open areas on the foot.  Has been motioning it daily.    Objective:  Vitals: /72   Ht 1.753 m (5' 9\")   Wt 73 kg (161 lb)   BMI 23.78 kg/m      A1C: 9.0 (8/2020)    General:  Patient is alert and orientated.  NAD.    Dermatologic: Right great toenail is thickened and dystrophic.  Distal end of the nail is curving into the skin.  No open lesions or signs of infection noted.     Vascular: DP & PT pulses are intact & regular bilaterally. edema with varicosities right leg noted.  CFT and skin temperature is normal to both lower extremities.     Neurologic: Lower extremity sensation is diminished. .     Musculoskeletal: Patient is ambulatory without assistive device or brace. BKA on the left side.  Increase arch height right foot.     Assessment:    Diabetic polyneuropathy associated with type 2 diabetes mellitus (H)  Hx of BKA, left (H)  Dystrophic nail  Atrophy of calf muscles on right  Atrophy of muscle of hand, unspecified laterality    Medical Decision Making/Plan: Discussed that unfortunately I am only able to treat ankle and below.  Given the progressive weakness and atrophy of the right leg and to the hands I would recommend follow-up with neurology for possible EMG to assess if the nerves are getting worse or if there is any electrical nerve stimulation physical therapy that can be done.     He was " given the referral for this.    Discussed causes and treatments of nail fungus.  Explained that even if a culture comes back negative, a patient could still have nail fungus.  Discussed treatment options with patient and explained that there isn't one treatment that is 100% effective.  Discussed oral lamisil which is the most effective at about 70% but which can have liver effects.  Explained that if she wanted to try this that she would need serial blood draws to test her liver function.  Discussed over the counter antifungal creams.  Explained that these are about 50% effective and need to be applied once a day for about 6-8months.  Also talked about prescription penlac which is a nail laquer.  Again this is also only 50% effective.  Also discussed that if there was damage to the nail and the nail is now dystrophic that non of the above is going to change the nail.  If there was damage, there is note anything that can be done for the nail to correct it.  Discussed that if it becomes painful, we can remove the nail in clinic.        The nail was cut back today.  No charge.  Talked about removal but patient declines this at this time.  All questions were answered to patient satisfaction and he will call further questions or concerns.      Patient Risk Factor:  Patient is a  high risk factor for ulceration and infection.     Kay Pleitez DPM, Podiatry/Foot and Ankle Surgery    Recommended to Eduardo Walton to follow up with Primary Care provider regarding elevated blood pressure.        Again, thank you for allowing me to participate in the care of your patient.        Sincerely,        Kay Pleitez DPM, Podiatry/Foot and Ankle Surgery

## 2021-02-18 NOTE — PROGRESS NOTES
"Podiatry / Foot and Ankle Surgery Progress Note    February 18, 2021    Subject: Patient was seen for follow up on diabetic foot check.  Patient is concerned about the weakness in the his leg.  He has fallen a few times because the leg has given out.  Also notes atrophy in his hand muscles and weakness.  He is wondering what can be done for this.  Denies any open areas on the foot.  Has been motioning it daily.    Objective:  Vitals: /72   Ht 1.753 m (5' 9\")   Wt 73 kg (161 lb)   BMI 23.78 kg/m      A1C: 9.0 (8/2020)    General:  Patient is alert and orientated.  NAD.    Dermatologic: Right great toenail is thickened and dystrophic.  Distal end of the nail is curving into the skin.  No open lesions or signs of infection noted.     Vascular: DP & PT pulses are intact & regular bilaterally. edema with varicosities right leg noted.  CFT and skin temperature is normal to both lower extremities.     Neurologic: Lower extremity sensation is diminished. .     Musculoskeletal: Patient is ambulatory without assistive device or brace. BKA on the left side.  Increase arch height right foot.     Assessment:    Diabetic polyneuropathy associated with type 2 diabetes mellitus (H)  Hx of BKA, left (H)  Dystrophic nail  Atrophy of calf muscles on right  Atrophy of muscle of hand, unspecified laterality    Medical Decision Making/Plan: Discussed that unfortunately I am only able to treat ankle and below.  Given the progressive weakness and atrophy of the right leg and to the hands I would recommend follow-up with neurology for possible EMG to assess if the nerves are getting worse or if there is any electrical nerve stimulation physical therapy that can be done.     He was given the referral for this.    Discussed causes and treatments of nail fungus.  Explained that even if a culture comes back negative, a patient could still have nail fungus.  Discussed treatment options with patient and explained that there isn't one " treatment that is 100% effective.  Discussed oral lamisil which is the most effective at about 70% but which can have liver effects.  Explained that if she wanted to try this that she would need serial blood draws to test her liver function.  Discussed over the counter antifungal creams.  Explained that these are about 50% effective and need to be applied once a day for about 6-8months.  Also talked about prescription penlac which is a nail laquer.  Again this is also only 50% effective.  Also discussed that if there was damage to the nail and the nail is now dystrophic that non of the above is going to change the nail.  If there was damage, there is note anything that can be done for the nail to correct it.  Discussed that if it becomes painful, we can remove the nail in clinic.        The nail was cut back today.  No charge.  Talked about removal but patient declines this at this time.  All questions were answered to patient satisfaction and he will call further questions or concerns.      Patient Risk Factor:  Patient is a  high risk factor for ulceration and infection.     Kay Pleitez DPM, Podiatry/Foot and Ankle Surgery    Recommended to Eduardo Walton to follow up with Primary Care provider regarding elevated blood pressure.

## 2021-02-18 NOTE — PATIENT INSTRUCTIONS
"Thank you for choosing [facility] / [facility] & Ankle Surgery!    DR. HAGEN'S CLINIC:  [facility] SCHEDULE SURGERY: [phone]   [address] BILLING QUESTIONS: [phone]   [place] [address] AFTER HOURS: [phone]   PH: [phone] CONSUMER PRICE LINE:[phone]   FAX: [phone] APPOINTMENTS: [phone]       Next steps: A Referral to Neurology was placed today, they should be contacting you to schedule and appointment.    DIABETES AND YOUR FEET  Diabetes can result in several problems in the feet including ulcers (open sores) and amputations. Two of the most important reasons why people develop foot problems when they have diabetes is : 1. Neuropathy (loss of feeling)  2. Vascular disease (loss or decrease of blood flow).    Neuropathy is a term used to describe a loss of nerve function.  Patients with diabetes are at risk of developing neuropathy if their sugars continue to run high and are above the normal value. One theory for neuropathy is that the \"extra\" sugar in the body enters the nerves and is broken down. These by-products build up in the nerve causing it to swell and impairing nerve function. Often times, this can be prevented by controlling your sugars, dieting and exercise.    When a person develops neuropathy, they usually begin to feel numbness or tingling in their feet and sometime in their legs.  Other symptoms may include painful burning or hot feet, tingling or feeling like insects or ants are crawling on your feet or legs.  If the diabetes is sever and the sugars run high for long periods of time, neuropathy can also occur in the hands.    Vascular disease  is a term used to describe a loss or decrease in circulation (blood flow). There is a problem in getting blood and oxygen to areas that need it. Similar to neuropathy, sugars can build up in the walls of the arteries (blood vessels) and cause them to become swollen, thickened and " hardened. This decreases the amount of blood that can go to an area that needs it. Though this is common in the legs of diabetic patients, it can also affect other arteries (blood vessels) in the body such as in the heart and eyes.    In the legs, vascular disease usually results in cramping. Patients who develop leg cramps after walking the same distance every time (i.e. One block, half a mile, ect.) need to let their doctors know so that their circulation may be checked. Cramps causing severe pain in the feet and/or legs while sleeping and the cramps go away when you stand or hang your legs off the side of the bed, may also be a sign of poor blood circulation.  Occasional cramping in cold weather or on rare occasions with activity may not be due to poor circulation, but you should inform your doctor.    PREVENTION OF THESE DISEASES  The key to prevention is good blood sugar control. Poor blood sugar control is a big reason many of these problems start. Physical activity (exercise) is a very good way to help decrease your blood sugars. Exercise can lower your blood sugar, blood pressure, and cholesterol. It also reduces your risk for heart disease and stroke, relieves stress, and strengthens your heart, muscles and bones.  In addition, regular activity helps insulin work better, improves your blood circulation, and keeps your joints flexible. If you're trying to lose weight, a combination of exercise and wise food choices can help you reach your target weight and maintain it.      PAIN MANAGEMENT (**Please speak with your primary doctor about any medications**)  1.Blood Sugar Control - Most important  2. Medications such as:  Amytriptylline, duloxetine, gabapentin, lyrica, tramadol (talk with your primary care doctor about this).     NUTRITION:  Nutrition is also important to help with healing. If your body does not have what it needs, it can't heal.   1. Increasing your protein intake is important.  With wounds  "you need 60-90gm of protein a day to help with healing. Over the counter protein shakes such as Jelani, Glucerna, Ensure, ect... can help to supplement your daily protein intake.   2. It is also important to take Vitamins to help with healing.  Vitamins such as B12, B6 and Vitamin D3 are important for healing. These can be gotten over the counter at pharmacies or at stores like [facility] or the [employer].    I can also prescribe a dietary supplement called \"Rheumate\" that has a lot of essential vitamins in one capsule.  This may not be covered by insurance though.     FOOT CARE RECOMMENDATIONS   1. Wash your feet with lukewarm water and a mild soap and then dry them thoroughly, especially between the toes.     2. Examine your feet daily looking for cuts, corns, blisters, cracks, ect, especially after wearing new shoes. Make sure to look between your toes. If you cannot see the bottom of your feet, set a mirror on the floor and hold your foot over it, or ask a spouse, friend or family member to examine your feet for you. Contact your doctor immediately if new problems are noted or if sores are not healing.     3. Immediately apply moisturizer to the tops and bottoms of your feet, avoiding areas between the toes. Hand lotion (Intesive Care, Margareth, Eucerin, Neutrogena, Curel, ect) is sufficient unless your doctor prescribes a medicated lotion. Apply sunscreen to your feet when going swimming outside.     4. Use clean comfortable shoes, wear white socks (if you have any bleeding or drainage, you will see it on white socks). Socks should not have thick seams or cut off the circulation around the leg. Break in new shoes slowly and rotate with older shoes until broken in. Check the inside of your shoes with your hand to look for areas of irritation or objects that may have fallen into your shoes.       5. Keep slippers by the side of your bed for use during the night.     6.  Shoes should be fitted by a professional and " should not cause areas of irritation.  Check your feet regularly when wearing a new pair of shoes and replace them as needed.     7.  Talk to your doctor about proper exercise. Exercise and stretching stimulate blood flow to your feet and maintain proper glucose levels.     8.  Monitor your blood glucose level as instructed by your doctor. Notify your doctor immediately if your blood sugar is abnormally high or low.    9. Cut your nails straight across, but then gently round any sharp edges with a cardboard nail file. If you have neuropathy, peripheral vascular disease or cannot see that well to trim your own toenails contact [employer] ([phone]) or [employer] ([phone]).      THINGS TO AVOID DOING   1.  Do not soak your feet if you have an open sore. Use only lukewarm water and always check the temperature with your hand as hot water can easily burn your feet.       2.  Never use a hot water bottle or heating pad on your feet. Also do not apply cold compresses to your feet. With decreased sensation, you could burn or freeze your feet.       3.  Do not apply any of these to your feet:    -  Over the counter medicine for corns or warts    -  Harsh chemicals like boric acid    -  Do not self-treat corns, cuts, blisters or infections. Always consult your doctor.       4.  Do not wear sandals, slippers or walk barefoot, especially on hot sand or concrete or other harsh surfaces.     5.  If you smoke, stop!!!            Eduardo to follow up with Primary Care provider regarding elevated blood pressure. (if equal or above 140/90)

## 2021-02-21 ENCOUNTER — HEALTH MAINTENANCE LETTER (OUTPATIENT)
Age: 51
End: 2021-02-21

## 2021-02-23 ENCOUNTER — TELEPHONE (OUTPATIENT)
Dept: FAMILY MEDICINE | Facility: OTHER | Age: 51
End: 2021-02-23

## 2021-02-23 NOTE — TELEPHONE ENCOUNTER
Reason for Call:  Form, our goal is to have forms completed with 72 hours, however, some forms may require a visit or additional information.    Type of letter, form or note:  medical    Who is the form from?: [employer]    Where did the form come from: form was faxed in    What clinic location was the form placed at?: [facility] - [phone]    Where the form was placed: TEAM C FORMS BIN    What number is listed as a contact on the form?: [phone]       Additional comments: please complete forms and fax     Call taken on 2/23/2021 at 1:10 PM by Carlyn Turner

## 2021-02-24 ENCOUNTER — MEDICAL CORRESPONDENCE (OUTPATIENT)
Dept: HEALTH INFORMATION MANAGEMENT | Facility: CLINIC | Age: 51
End: 2021-02-24

## 2021-02-24 NOTE — TELEPHONE ENCOUNTER
Please send this to tammy, thanks       NATASHA Queen CNP  Questions or concerns please feel free to send me a [employer] message or call me  Phone : [phone]

## 2021-03-01 ASSESSMENT — ENCOUNTER SYMPTOMS
CARDIOVASCULAR NEGATIVE: 1
CONSTITUTIONAL NEGATIVE: 1
RESPIRATORY NEGATIVE: 1

## 2021-03-13 ENCOUNTER — MYC MEDICAL ADVICE (OUTPATIENT)
Dept: EDUCATION SERVICES | Facility: CLINIC | Age: 51
End: 2021-03-13

## 2021-03-13 DIAGNOSIS — E10.42 TYPE 1 DIABETES MELLITUS WITH DIABETIC POLYNEUROPATHY (H): Primary | ICD-10-CM

## 2021-03-15 NOTE — TELEPHONE ENCOUNTER
Sent [employer] message asking a few clarifying questions about his Drew 2 request.    Mitali Putnam RN, [facility]

## 2021-03-16 ENCOUNTER — TELEPHONE (OUTPATIENT)
Dept: FAMILY MEDICINE | Facility: OTHER | Age: 51
End: 2021-03-16

## 2021-03-16 NOTE — TELEPHONE ENCOUNTER
Eduardo Tyler would like to switch to the Drew 2 Cgm system after multiple issues with the DexCom G6. I have pended the prescriptions for you to sign if you agree with plan.    Thanks so much!    Mitali Putnam RN, [facility]

## 2021-03-16 NOTE — TELEPHONE ENCOUNTER
Reason for Call:  Form, our goal is to have forms completed with 72 hours, however, some forms may require a visit or additional information.    Type of letter, form or note:  medical    Who is the form from?: [facility] (if other please explain)    Where did the form come from: form was faxed in    What clinic location was the form placed at?: [facility] - [phone]    Where the form was placed: Team C Box/Folder    What number is listed as a contact on the form?: [phone]       Additional comments: Please complete form and return to [phone]    Call taken on 3/16/2021 at 3:01 PM by Jeny Arceo

## 2021-03-22 ENCOUNTER — TELEPHONE (OUTPATIENT)
Dept: FAMILY MEDICINE | Facility: OTHER | Age: 51
End: 2021-03-22

## 2021-03-22 ENCOUNTER — OFFICE VISIT (OUTPATIENT)
Dept: FAMILY MEDICINE | Facility: OTHER | Age: 51
End: 2021-03-22
Payer: COMMERCIAL

## 2021-03-22 VITALS
BODY MASS INDEX: 22.96 KG/M2 | OXYGEN SATURATION: 100 % | HEIGHT: 69 IN | TEMPERATURE: 97.2 F | DIASTOLIC BLOOD PRESSURE: 72 MMHG | WEIGHT: 155 LBS | RESPIRATION RATE: 16 BRPM | SYSTOLIC BLOOD PRESSURE: 122 MMHG | HEART RATE: 71 BPM

## 2021-03-22 DIAGNOSIS — Z89.512 HX OF BKA, LEFT (H): ICD-10-CM

## 2021-03-22 DIAGNOSIS — N18.31 STAGE 3A CHRONIC KIDNEY DISEASE (H): ICD-10-CM

## 2021-03-22 DIAGNOSIS — E10.42 TYPE 1 DIABETES MELLITUS WITH DIABETIC POLYNEUROPATHY (H): Primary | ICD-10-CM

## 2021-03-22 DIAGNOSIS — I25.10 CORONARY ARTERY DISEASE INVOLVING NATIVE CORONARY ARTERY OF NATIVE HEART WITHOUT ANGINA PECTORIS: ICD-10-CM

## 2021-03-22 PROCEDURE — 99215 OFFICE O/P EST HI 40 MIN: CPT | Performed by: NURSE PRACTITIONER

## 2021-03-22 RX ORDER — PREGABALIN 50 MG/1
CAPSULE ORAL
Qty: 21 CAPSULE | Refills: 0 | Status: SHIPPED | OUTPATIENT
Start: 2021-03-22 | End: 2021-05-21

## 2021-03-22 RX ORDER — NORTRIPTYLINE HCL 25 MG
25 CAPSULE ORAL AT BEDTIME
Qty: 30 CAPSULE | Refills: 0 | Status: SHIPPED | OUTPATIENT
Start: 2021-03-22 | End: 2021-04-09 | Stop reason: ALTCHOICE

## 2021-03-22 ASSESSMENT — MIFFLIN-ST. JEOR: SCORE: 1563.46

## 2021-03-22 ASSESSMENT — ENCOUNTER SYMPTOMS
CARDIOVASCULAR NEGATIVE: 1
CONSTITUTIONAL NEGATIVE: 1
RESPIRATORY NEGATIVE: 1

## 2021-03-22 NOTE — TELEPHONE ENCOUNTER
RN Triage    Patient Contact    Attempt # 1    Was call answered?  No.  Unable to leave message.  Phone number disconnected.    Hazel Solomon RN on 3/22/2021 at 12:18 PM

## 2021-03-22 NOTE — TELEPHONE ENCOUNTER
Patient scheduled with me for concerns for her lyrica medication. I do not prescribe this for him, it appears this is from another provider. I can see him today, although would encourage him to reach out to the provider that prescribes this.       NATASHA Queen CNP  Questions or concerns please feel free to send me a [employer] message or call me  Phone : [phone]

## 2021-03-22 NOTE — PROGRESS NOTES
Assessment & Plan     Type 1 diabetes mellitus with diabetic polyneuropathy (H)  - Patient with diabetic polyneuropathy  - Was initially on gabapentin through is old provider at the [facility], then was switched to lyrica. Has been on 200mg TID of lyrica. He notes he was not made aware that this is a controlled substance and has had some bowel bloating (he correlates this with the lyrica). He would like to switch off of this medication to something else of his neuropathy and phantom pain. We discussed several options including SNRI - Cymbalta, Tetracyclines and pain management referral. Of these options he had done some research on tetracyclines and would like to pursue this. We had a long discussion about tetracyclines including anticholinergenic side effects of dry mouth, mood impacts and sedation/drowsiness. He is aware of the possible symptoms. I also advised him I can't guarantee that this will have the same impacts on his pain and it will take time to get to a dose that will help him, if he has does get benefit from it. We discussed in length possible side effects, including limited data on Tetracycline use with patient who have CAD. Of note he does not want to follow up with his cardiologist due to concerns he had with them years ago. I feel that our discussion was very helpful and beneficial in him understanding his options and risks. We decided to continue his taper of lyrica, he had previously already decreased himself to 200mg daily. We will continue this taper with 100mg (two 50mg capsules) daily for 1 week, if tolerating he will continue to 50mg daily for 1 week. If tolerating he will stop this. He will take this dose in the AM. We discussed that he may need to extend the taper based on how he is doing. In the meantime he will start nortrityline 25mg nightly. As noted above he is aware of medication, side effects and dosing.   - Weekly [employer] messages to be sent by patient with updates.    - pregabalin (LYRICA) 50 MG capsule; Take 2 capsules (100 mg) by mouth daily for 7 days, THEN 1 capsule (50 mg) daily for 7 days. Start with  - nortriptyline (PAMELOR) 25 MG capsule; Take 1 capsule (25 mg) by mouth At Bedtime    Hx of BKA, left (H)  - Followed by specialist.     Coronary artery disease involving native coronary artery of native heart without angina pectoris  - As noted he declines statin and Asprin therapy  - Notes that he does not need to follow up with cardiology.     Stage 3a chronic kidney disease  - Declined recheck today  - Last checked August 2020.          The patient indicates understanding of these issues and agrees with the plan.    There are no Patient Instructions on file for this visit.    No follow-ups on file.    NATASHA Queen CNP  [facility] is a 50 year old who presents for the following health issues     HPI     Chronic Pain Follow-Up    Where in your body do you have pain? Limbs- ankles, wrist   How has your pain affected your ability to work? Unable to work and Not applicable  Which of these pain treatments have you tried since your last clinic visit? Other: lyrica  How well are you sleeping? Poor  How has your mood been since your last visit? Much worse  Have you had a significant life event? No  Other aggravating factors:  Taking medication as directed? No: trying to discontinue medication     Feeling bloated with the medication  Did not know it was a controlled substance  Was on Gabapentin previously, Weaned off of it and on Lyrica.   Tried to taper off lyrica, didn't go well. Neuropathy, wrist and ankle and phantom pain was flared because of this. Would like an alternative to treat neuropathy.     No flowsheet data found.  No flowsheet data found.  No flowsheet data found.  Encounter-Level CSA:    There are no encounter-level csa.     Patient-Level CSA:    There are no patient-level csa.           Review of Systems  "  Constitutional: Negative.    Respiratory: Negative.    Cardiovascular: Negative.             Objective    /72   Pulse 71   Temp 97.2  F (36.2  C) (Temporal)   Resp 16   Ht 1.753 m (5' 9\")   Wt 70.3 kg (155 lb)   SpO2 100%   BMI 22.89 kg/m    Body mass index is 22.89 kg/m .  Physical Exam  Neurological:      Mental Status: He is alert and oriented to person, place, and time.   Psychiatric:         Mood and Affect: Mood normal.         Behavior: Behavior normal.         Thought Content: Thought content normal.         Judgment: Judgment normal.            Diagnostic: None         A total of 40 minutes was spent with patient discussing medication options and changes.     "

## 2021-03-23 ENCOUNTER — TELEPHONE (OUTPATIENT)
Dept: FAMILY MEDICINE | Facility: OTHER | Age: 51
End: 2021-03-23

## 2021-03-23 NOTE — TELEPHONE ENCOUNTER
Emma Tatum the forms were just cleaned out from last month sent to scanning.. will fax to your [employer] to fill out and fax back to us.

## 2021-03-23 NOTE — TELEPHONE ENCOUNTER
Reason for call:  Other   Patient called regarding (reason for call): call back  Additional comments: Needing order signed that as faxed to provider 8x for patients prosthetics.    Phone number to reach patient:  Other phone number:   [phone]    Best Time: Anytime    Can we leave a detailed message on this number?  YES    Travel screening: Not Applicable

## 2021-03-24 ENCOUNTER — MEDICAL CORRESPONDENCE (OUTPATIENT)
Dept: HEALTH INFORMATION MANAGEMENT | Facility: CLINIC | Age: 51
End: 2021-03-24

## 2021-03-24 NOTE — TELEPHONE ENCOUNTER
No problem, I sent this back but I have signed it before and I believe we faxed it to them. Just signed it again and faxed it back to [phone]. Please fax to company and call them to be sure they get it and what they need.       NATASHA Queen CNP  Questions or concerns please feel free to send me a [employer] message or call me  Phone : [phone]

## 2021-04-08 ENCOUNTER — MYC MEDICAL ADVICE (OUTPATIENT)
Dept: FAMILY MEDICINE | Facility: OTHER | Age: 51
End: 2021-04-08

## 2021-04-08 DIAGNOSIS — Z89.512 S/P BKA (BELOW KNEE AMPUTATION) UNILATERAL, LEFT (H): Primary | ICD-10-CM

## 2021-04-08 DIAGNOSIS — I25.10 CORONARY ARTERY DISEASE INVOLVING NATIVE CORONARY ARTERY OF NATIVE HEART WITHOUT ANGINA PECTORIS: ICD-10-CM

## 2021-04-08 DIAGNOSIS — E10.42 TYPE 1 DIABETES MELLITUS WITH DIABETIC POLYNEUROPATHY (H): ICD-10-CM

## 2021-04-09 PROBLEM — Z53.20 STATIN DECLINED: Status: ACTIVE | Noted: 2021-04-09

## 2021-04-09 RX ORDER — PREGABALIN 200 MG/1
200 CAPSULE ORAL 3 TIMES DAILY
Qty: 90 CAPSULE | Refills: 3 | Status: SHIPPED | OUTPATIENT
Start: 2021-04-22 | End: 2021-04-13

## 2021-04-09 NOTE — TELEPHONE ENCOUNTER
This is done. Sent brand name.       NATASHA Queen CNP  Questions or concerns please feel free to send me a [employer] message or call me  Phone : [phone]

## 2021-04-09 NOTE — TELEPHONE ENCOUNTER
Does he have the 50's still? I am not sure his insurance will cover as this is an early fill.       NATASHA Queen CNP  Questions or concerns please feel free to send me a [employer] message or call me  Phone : [phone]

## 2021-04-12 ENCOUNTER — MYC MEDICAL ADVICE (OUTPATIENT)
Dept: FAMILY MEDICINE | Facility: OTHER | Age: 51
End: 2021-04-12

## 2021-04-12 DIAGNOSIS — Z89.512 S/P BKA (BELOW KNEE AMPUTATION) UNILATERAL, LEFT (H): ICD-10-CM

## 2021-04-12 DIAGNOSIS — E10.42 TYPE 1 DIABETES MELLITUS WITH DIABETIC POLYNEUROPATHY (H): ICD-10-CM

## 2021-04-13 NOTE — TELEPHONE ENCOUNTER
RX pending for BATSHEVA CALDERON unable to sign.    Dima Lizama, BSN, RN, PHN  [facility] ~ Lead Registered Nurse  Clinic Triage ~ [place] [facility]  April 13, 2021

## 2021-04-13 NOTE — TELEPHONE ENCOUNTER
I sent this RX last week for fill no sooner than 4/22.       NATASHA Queen CNP  Questions or concerns please feel free to send me a [employer] message or call me  Phone : [phone]

## 2021-04-14 DIAGNOSIS — E10.42 TYPE 1 DIABETES MELLITUS WITH DIABETIC POLYNEUROPATHY (H): ICD-10-CM

## 2021-04-14 DIAGNOSIS — Z89.512 S/P BKA (BELOW KNEE AMPUTATION) UNILATERAL, LEFT (H): ICD-10-CM

## 2021-04-14 RX ORDER — PREGABALIN 200 MG/1
200 CAPSULE ORAL 3 TIMES DAILY
Qty: 90 CAPSULE | Refills: 3 | Status: SHIPPED | OUTPATIENT
Start: 2021-04-22 | End: 2021-04-19

## 2021-04-14 RX ORDER — GABAPENTIN 600 MG/1
TABLET ORAL
Refills: 0 | OUTPATIENT
Start: 2021-04-14

## 2021-04-19 DIAGNOSIS — Z89.512 S/P BKA (BELOW KNEE AMPUTATION) UNILATERAL, LEFT (H): ICD-10-CM

## 2021-04-19 DIAGNOSIS — E10.42 TYPE 1 DIABETES MELLITUS WITH DIABETIC POLYNEUROPATHY (H): ICD-10-CM

## 2021-04-19 RX ORDER — PREGABALIN 200 MG/1
200 CAPSULE ORAL 3 TIMES DAILY
Qty: 90 CAPSULE | Refills: 3 | Status: SHIPPED | OUTPATIENT
Start: 2021-04-22 | End: 2021-12-21

## 2021-04-23 ENCOUNTER — OFFICE VISIT (OUTPATIENT)
Dept: ENDOCRINOLOGY | Facility: CLINIC | Age: 51
End: 2021-04-23
Payer: COMMERCIAL

## 2021-04-23 VITALS
BODY MASS INDEX: 23.92 KG/M2 | HEART RATE: 71 BPM | WEIGHT: 162 LBS | SYSTOLIC BLOOD PRESSURE: 158 MMHG | DIASTOLIC BLOOD PRESSURE: 83 MMHG

## 2021-04-23 DIAGNOSIS — E10.29 TYPE 1 DIABETES MELLITUS WITH MICROALBUMINURIA (H): ICD-10-CM

## 2021-04-23 DIAGNOSIS — R80.9 TYPE 1 DIABETES MELLITUS WITH MICROALBUMINURIA (H): ICD-10-CM

## 2021-04-23 DIAGNOSIS — E10.40 TYPE 1 DIABETES MELLITUS WITH DIABETIC NEUROPATHY (H): ICD-10-CM

## 2021-04-23 DIAGNOSIS — E10.59 TYPE 1 DIABETES MELLITUS WITH OTHER CIRCULATORY COMPLICATION (H): ICD-10-CM

## 2021-04-23 DIAGNOSIS — E10.319 TYPE 1 DIABETES MELLITUS WITH RETINOPATHY, MACULAR EDEMA PRESENCE UNSPECIFIED, UNSPECIFIED LATERALITY, UNSPECIFIED RETINOPATHY SEVERITY (H): ICD-10-CM

## 2021-04-23 DIAGNOSIS — E10.42 TYPE 1 DIABETES MELLITUS WITH DIABETIC POLYNEUROPATHY (H): Primary | ICD-10-CM

## 2021-04-23 PROCEDURE — 99215 OFFICE O/P EST HI 40 MIN: CPT | Performed by: INTERNAL MEDICINE

## 2021-04-23 PROCEDURE — 95251 CONT GLUC MNTR ANALYSIS I&R: CPT | Performed by: INTERNAL MEDICINE
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
## 2021-04-23 NOTE — PATIENT INSTRUCTIONS
Continue Humalog and Tresiba MDI insulin dosing:  Humalog Kwikpen                   4-6U with each meal      Sscale 1U per 50 mg/dl over 200 mg/dl  Tresiba Flextouch U100         18U each morning    Goal target BG  mg/dl   Plan lab appt soon   Discussed using the nearby [facility] lab   Lab orders placed  Continue to use the Freestyle Libre2 CGM   Try to upload the [place] with the Freestyle cable at home, prior to diabetes evaluations  Continue use of pregabalin (Lyrica DAW1) three times per day as needed  Plan to see Diabetes Educator for follow-up evaluation   He has seen GABRIELLA Blackwell   Review carb counting method, Libre2 CGM trends, future insulin pump options (OmniPod)  Continue low dose losartan medication  Recommend getting COVID-19 vaccination  Plan follow-up endocrinology evaluations every 3 months,    Next appointment 7/2021   Consider office appointments every-other-visit    [facility]- [address], [address], [place], scheduling phone number [phone].    I will be seeing patients in the clinic (or virtual) in the mornings and virtual appointments in the afternoons on Mon, Tues, Wed, and Fridays.
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
## 2021-04-24 ENCOUNTER — MYC MEDICAL ADVICE (OUTPATIENT)
Dept: FAMILY MEDICINE | Facility: OTHER | Age: 51
End: 2021-04-24

## 2021-04-26 ENCOUNTER — TELEPHONE (OUTPATIENT)
Dept: FAMILY MEDICINE | Facility: OTHER | Age: 51
End: 2021-04-26

## 2021-04-26 ENCOUNTER — TELEPHONE (OUTPATIENT)
Dept: ENDOCRINOLOGY | Facility: CLINIC | Age: 51
End: 2021-04-26

## 2021-04-26 NOTE — TELEPHONE ENCOUNTER
Diabetes Education Scheduling Outreach #1:    Call to patient to schedule. Invalid phone number.    Plan for 2nd outreach attempt within 1 week.    Kaylee Salcedo  [place] [facility]  Diabetes and Nutrition Scheduling

## 2021-04-26 NOTE — TELEPHONE ENCOUNTER
LYRICA 200 MG capsule  200 mg, 3 TIMES DAILY 3 ordered               Summary: Take 1 capsule (200 mg) by mouth 3 times daily BATSHEVA, Disp-90 capsule, R-3, BATSHEVA, E-Prescribe   Dose, Route, Frequency: 200 mg, Oral, 3 TIMES DAILY            Insurance is denying 3 per day.

## 2021-04-27 NOTE — TELEPHONE ENCOUNTER
Prior Authorization Approval    Authorization Effective Date: 3/28/2021  Authorization Expiration Date: 4/27/2022  Medication: LYRICA 200 MG capsule BATSHEVA--APPROVED  Approved Dose/Quantity:   Reference #:     Insurance Company: [employer] - Phone [phone] Fax [phone]  Expected CoPay:       CoPay Card Available:      Foundation Assistance Needed:    Which Pharmacy is filling the prescription (Not needed for infusion/clinic administered): [id number] [place], [place] - [address] [address] [place] [address]  Pharmacy Notified: Yes  Patient Notified: Yes **Instructed pharmacy to notify patient when script is ready to /ship.**

## 2021-05-03 ENCOUNTER — MYC MEDICAL ADVICE (OUTPATIENT)
Dept: ENDOCRINOLOGY | Facility: CLINIC | Age: 51
End: 2021-05-03

## 2021-05-03 NOTE — TELEPHONE ENCOUNTER
Diabetes Education Scheduling Outreach #2:    Call to patient to schedule. Invalid phone number. Sent [employer] message.    Kaylee Marquez [facility]  Diabetes and Nutrition Scheduling

## 2021-05-19 NOTE — PROGRESS NOTES
Assessment & Plan     Type 1 diabetes mellitus with diabetic polyneuropathy (H)  - Patient followed by endocrinology, here for labs for Dr. Richey   - CBC with platelets  - TSH with free T4 reflex  - ALT  - Basic metabolic panel  - Hemoglobin A1c  - PHYSICAL THERAPY REFERRAL; Future  - Albumin Random Urine Quantitative with Creat Ratio; Future    S/P BKA (below knee amputation) unilateral, left (H)  - Patient having some troubles with his current prosthetic and looking for a replacement [place] with a possible pin to help with movement. He notes that he has troubles with fit and being able to hold things in place. He is satisfied with his ankle portion. He is followed by [facility] for his prosthetic in [place], I will call his specialist to discuss and may even consider PT as well for recommendations. He does not drive and finds PT to be difficult for him, his current prosthetic does not feel stable enough for him to drive.   - PHYSICAL THERAPY REFERRAL; Future  - Albumin Random Urine Quantitative with Creat Ratio; Future    Bilateral cerumen impact  Ear flush performed, ok for PRN ear flushes.      The patient indicates understanding of these issues and agrees with the plan.    There are no Patient Instructions on file for this visit.    Return in about 4 weeks (around 6/18/2021) for recheck symptoms.    NATASHA Queen CNP  [facility]    Eva Clark is a 50 year old who presents for the following health issues     HPI     Sees Endocrinology for Diabetes. Reports that he needs paperwork for new prosthetic leg.   Can't come from the prosthetic doctor.   Feels he is not having function of the leg, has fit and form. He feels he needs to get rid of the sleeve.   Can't drive to PT   Limited resources and time.       Also reports bilateral aural fullness, he tried to irrigate ears himself but was unsuccessful.         Review of Systems   Constitutional: Negative.    Respiratory: Negative.   "  Cardiovascular: Negative.             Objective    BP (!) 164/82   Pulse 68   Temp 98.2  F (36.8  C) (Temporal)   Resp 16   Ht 1.753 m (5' 9\")   Wt 74.8 kg (165 lb)   SpO2 99%   BMI 24.37 kg/m    Body mass index is 24.37 kg/m .  Physical Exam  HENT:      Right Ear: There is impacted cerumen.      Left Ear: There is impacted cerumen.   Neurological:      Mental Status: He is alert.          Results for orders placed or performed in visit on 05/21/21 (from the past 24 hour(s))   CBC with platelets   Result Value Ref Range    WBC 6.1 4.0 - 11.0 10e9/L    RBC Count 4.26 (L) 4.4 - 5.9 10e12/L    Hemoglobin 12.1 (L) 13.3 - 17.7 g/dL    Hematocrit 36.4 (L) 40.0 - 53.0 %    MCV 85 78 - 100 fl    MCH 28.4 26.5 - 33.0 pg    MCHC 33.2 31.5 - 36.5 g/dL    RDW 13.0 10.0 - 15.0 %    Platelet Count 191 150 - 450 10e9/L   Hemoglobin A1c   Result Value Ref Range    Hemoglobin A1C 10.9 (H) 0 - 5.6 %               "

## 2021-05-21 ENCOUNTER — OFFICE VISIT (OUTPATIENT)
Dept: FAMILY MEDICINE | Facility: OTHER | Age: 51
End: 2021-05-21
Payer: COMMERCIAL

## 2021-05-21 ENCOUNTER — MYC MEDICAL ADVICE (OUTPATIENT)
Dept: FAMILY MEDICINE | Facility: OTHER | Age: 51
End: 2021-05-21

## 2021-05-21 VITALS
SYSTOLIC BLOOD PRESSURE: 172 MMHG | WEIGHT: 165 LBS | RESPIRATION RATE: 16 BRPM | HEIGHT: 69 IN | OXYGEN SATURATION: 99 % | DIASTOLIC BLOOD PRESSURE: 66 MMHG | BODY MASS INDEX: 24.44 KG/M2 | TEMPERATURE: 98.2 F | HEART RATE: 70 BPM

## 2021-05-21 DIAGNOSIS — Z89.512 S/P BKA (BELOW KNEE AMPUTATION) UNILATERAL, LEFT (H): Primary | ICD-10-CM

## 2021-05-21 DIAGNOSIS — H61.23 BILATERAL IMPACTED CERUMEN: ICD-10-CM

## 2021-05-21 DIAGNOSIS — E10.42 TYPE 1 DIABETES MELLITUS WITH DIABETIC POLYNEUROPATHY (H): Primary | ICD-10-CM

## 2021-05-21 DIAGNOSIS — Z89.512 S/P BKA (BELOW KNEE AMPUTATION) UNILATERAL, LEFT (H): ICD-10-CM

## 2021-05-21 LAB
ALT SERPL W P-5'-P-CCNC: 36 U/L (ref 0–70)
ANION GAP SERPL CALCULATED.3IONS-SCNC: <1 MMOL/L (ref 3–14)
BUN SERPL-MCNC: 26 MG/DL (ref 7–30)
CALCIUM SERPL-MCNC: 8.8 MG/DL (ref 8.5–10.1)
CHLORIDE SERPL-SCNC: 106 MMOL/L (ref 94–109)
CO2 SERPL-SCNC: 33 MMOL/L (ref 20–32)
CREAT SERPL-MCNC: 1.33 MG/DL (ref 0.66–1.25)
ERYTHROCYTE [DISTWIDTH] IN BLOOD BY AUTOMATED COUNT: 13 % (ref 10–15)
GFR SERPL CREATININE-BSD FRML MDRD: 62 ML/MIN/{1.73_M2}
GLUCOSE SERPL-MCNC: 333 MG/DL (ref 70–99)
HBA1C MFR BLD: 10.9 % (ref 0–5.6)
HCT VFR BLD AUTO: 36.4 % (ref 40–53)
HGB BLD-MCNC: 12.1 G/DL (ref 13.3–17.7)
MCH RBC QN AUTO: 28.4 PG (ref 26.5–33)
MCHC RBC AUTO-ENTMCNC: 33.2 G/DL (ref 31.5–36.5)
MCV RBC AUTO: 85 FL (ref 78–100)
PLATELET # BLD AUTO: 191 10E9/L (ref 150–450)
POTASSIUM SERPL-SCNC: 5 MMOL/L (ref 3.4–5.3)
RBC # BLD AUTO: 4.26 10E12/L (ref 4.4–5.9)
SODIUM SERPL-SCNC: 139 MMOL/L (ref 133–144)
TSH SERPL DL<=0.005 MIU/L-ACNC: 1.25 MU/L (ref 0.4–4)
WBC # BLD AUTO: 6.1 10E9/L (ref 4–11)

## 2021-05-21 PROCEDURE — 80048 BASIC METABOLIC PNL TOTAL CA: CPT | Performed by: INTERNAL MEDICINE

## 2021-05-21 PROCEDURE — 85027 COMPLETE CBC AUTOMATED: CPT | Performed by: INTERNAL MEDICINE

## 2021-05-21 PROCEDURE — 83036 HEMOGLOBIN GLYCOSYLATED A1C: CPT | Performed by: INTERNAL MEDICINE

## 2021-05-21 PROCEDURE — 84460 ALANINE AMINO (ALT) (SGPT): CPT | Performed by: INTERNAL MEDICINE

## 2021-05-21 PROCEDURE — 99214 OFFICE O/P EST MOD 30 MIN: CPT | Performed by: NURSE PRACTITIONER

## 2021-05-21 PROCEDURE — 84443 ASSAY THYROID STIM HORMONE: CPT | Performed by: INTERNAL MEDICINE

## 2021-05-21 PROCEDURE — 36415 COLL VENOUS BLD VENIPUNCTURE: CPT | Performed by: INTERNAL MEDICINE

## 2021-05-21 ASSESSMENT — ENCOUNTER SYMPTOMS
RESPIRATORY NEGATIVE: 1
CARDIOVASCULAR NEGATIVE: 1
CONSTITUTIONAL NEGATIVE: 1

## 2021-05-21 ASSESSMENT — MIFFLIN-ST. JEOR: SCORE: 1608.82

## 2021-05-21 NOTE — TELEPHONE ENCOUNTER
I do not have the card. What is his doctors name? Can we reach out to him today to see if I can get a call back?      NATASHA Queen CNP  Questions or concerns please feel free to send me a [employer] message or call me  Phone : [phone]

## 2021-05-21 NOTE — TELEPHONE ENCOUNTER
Please let patient know by [employer] I talked to Everardo at [facility] and I am going to send over a script for him to start the process.       Camille since you have the business card can you please fax NATASHA Deras CNP  Questions or concerns please feel free to send me a [employer] message or call me  Phone : [phone]

## 2021-05-24 ENCOUNTER — TELEPHONE (OUTPATIENT)
Dept: FAMILY MEDICINE | Facility: OTHER | Age: 51
End: 2021-05-24

## 2021-05-24 NOTE — TELEPHONE ENCOUNTER
Reason for Call:  Form, our goal is to have forms completed with 72 hours, however, some forms may require a visit or additional information.    Type of letter, form or note:  detailed written order    Who is the form from?: [employer] (if other please explain)    Where did the form come from: form was faxed in    What clinic location was the form placed at?: [facility] - [phone]    Where the form was placed: Team C Box/Folder    What number is listed as a contact on the form?:  [phone]       Additional comments:  Fax [phone]    Call taken on 5/24/2021 at 4:42 PM by Nat Dfufy

## 2021-05-26 NOTE — TELEPHONE ENCOUNTER
Faxed back to [phone] and completed.       NATASHA Queen CNP  Questions or concerns please feel free to send me a [id number] message or call me  Phone : [phone]

## 2021-05-27 ENCOUNTER — TELEPHONE (OUTPATIENT)
Dept: FAMILY MEDICINE | Facility: OTHER | Age: 51
End: 2021-05-27

## 2021-05-27 NOTE — TELEPHONE ENCOUNTER
Reason for Call:  Form, our goal is to have forms completed with 72 hours, however, some forms may require a visit or additional information.    Type of letter, form or note:  medical    Who is the form from?: [employer] (if other please explain)    Where did the form come from: form was faxed in    What clinic location was the form placed at?: [facility] - [phone]    Where the form was placed: Team C Box/Folder    What number is listed as a contact on the form?: [phone]       Additional comments: Please complete form and return to [phone]    Call taken on 5/27/2021 at 9:04 AM by Jeny Arceo

## 2021-05-28 ENCOUNTER — MYC MEDICAL ADVICE (OUTPATIENT)
Dept: FAMILY MEDICINE | Facility: OTHER | Age: 51
End: 2021-05-28

## 2021-05-28 NOTE — TELEPHONE ENCOUNTER
I faxed this form earlier this week is it in the fax machine? Please see other telephone encounter on this.       KV

## 2021-06-01 ENCOUNTER — TELEPHONE (OUTPATIENT)
Dept: FAMILY MEDICINE | Facility: OTHER | Age: 51
End: 2021-06-01

## 2021-06-01 NOTE — TELEPHONE ENCOUNTER
Reason for Call:  Form, our goal is to have forms completed with 72 hours, however, some forms may require a visit or additional information.    Type of letter, form or note:  Detailed written order    Who is the form from?: [facility] [employer] (if other please explain)    Where did the form come from: form was faxed in    What clinic location was the form placed at?: [facility] - [phone]    Where the form was placed: Team C Box/Folder    What number is listed as a contact on the form?:  [phone]       Additional comments:  This is a new order, it states on front page that the one they received earlier today is from months ago and they need a current order.     Call taken on 6/1/2021 at 1:48 PM by Nat Duffy

## 2021-06-02 ENCOUNTER — MYC MEDICAL ADVICE (OUTPATIENT)
Dept: FAMILY MEDICINE | Facility: OTHER | Age: 51
End: 2021-06-02

## 2021-06-07 ENCOUNTER — MYC MEDICAL ADVICE (OUTPATIENT)
Dept: PEDIATRICS | Facility: OTHER | Age: 51
End: 2021-06-07

## 2021-06-07 ENCOUNTER — TELEPHONE (OUTPATIENT)
Dept: FAMILY MEDICINE | Facility: OTHER | Age: 51
End: 2021-06-07

## 2021-06-07 NOTE — TELEPHONE ENCOUNTER
Please have patient come in for BP recheck.     BP Readings from Last 3 Encounters:   05/21/21 (!) 172/66   04/23/21 (!) 158/83   03/22/21 122/72           NATASHA Queen CNP  Questions or concerns please feel free to send me a [employer] message or call me  Phone : [phone]

## 2021-06-10 ENCOUNTER — TELEPHONE (OUTPATIENT)
Dept: ENDOCRINOLOGY | Facility: CLINIC | Age: 51
End: 2021-06-10

## 2021-06-10 DIAGNOSIS — E10.40 TYPE 1 DIABETES MELLITUS WITH DIABETIC NEUROPATHY (H): ICD-10-CM

## 2021-06-11 RX ORDER — PEN NEEDLE, DIABETIC 31 GX5/16"
NEEDLE, DISPOSABLE MISCELLANEOUS
Qty: 500 EACH | Refills: 0 | Status: SHIPPED | OUTPATIENT
Start: 2021-06-11 | End: 2022-03-30

## 2021-06-16 NOTE — TELEPHONE ENCOUNTER
Pt wrote back, he does not care to have his number on file with us. The pt states he will call when he has a date and time.

## 2021-07-12 ENCOUNTER — TELEPHONE (OUTPATIENT)
Dept: FAMILY MEDICINE | Facility: OTHER | Age: 51
End: 2021-07-12

## 2021-07-12 NOTE — TELEPHONE ENCOUNTER
Please have patient come in for BP check on nurse schedule.     BP Readings from Last 3 Encounters:   05/21/21 (!) 172/66   04/23/21 (!) 158/83   03/22/21 122/72

## 2021-07-13 ENCOUNTER — MYC MEDICAL ADVICE (OUTPATIENT)
Dept: FAMILY MEDICINE | Facility: OTHER | Age: 51
End: 2021-07-13

## 2021-07-16 ENCOUNTER — MYC MEDICAL ADVICE (OUTPATIENT)
Dept: FAMILY MEDICINE | Facility: OTHER | Age: 51
End: 2021-07-16

## 2021-07-16 NOTE — TELEPHONE ENCOUNTER
Please have patient send his BP to us so we can document control.       NATASHA Queen CNP  Questions or concerns please feel free to send me a [employer] message or call me  Phone : [phone]

## 2021-07-16 NOTE — TELEPHONE ENCOUNTER
These look great! Documented a reading for our records.             NATASHA Queen CNP  Questions or concerns please feel free to send me a [employer] message or call me  Phone : [phone]

## 2021-07-30 ENCOUNTER — VIRTUAL VISIT (OUTPATIENT)
Dept: ENDOCRINOLOGY | Facility: CLINIC | Age: 51
End: 2021-07-30
Payer: COMMERCIAL

## 2021-07-30 DIAGNOSIS — E10.40 TYPE 1 DIABETES MELLITUS WITH DIABETIC NEUROPATHY (H): Primary | ICD-10-CM

## 2021-07-30 DIAGNOSIS — E10.319 TYPE 1 DIABETES MELLITUS WITH RETINOPATHY, MACULAR EDEMA PRESENCE UNSPECIFIED, UNSPECIFIED LATERALITY, UNSPECIFIED RETINOPATHY SEVERITY (H): ICD-10-CM

## 2021-07-30 DIAGNOSIS — E10.59 TYPE 1 DIABETES MELLITUS WITH OTHER CIRCULATORY COMPLICATION (H): ICD-10-CM

## 2021-07-30 DIAGNOSIS — E10.29 TYPE 1 DIABETES MELLITUS WITH MICROALBUMINURIA (H): ICD-10-CM

## 2021-07-30 DIAGNOSIS — R80.9 TYPE 1 DIABETES MELLITUS WITH MICROALBUMINURIA (H): ICD-10-CM

## 2021-07-30 PROCEDURE — 99214 OFFICE O/P EST MOD 30 MIN: CPT | Mod: 95 | Performed by: INTERNAL MEDICINE

## 2021-07-30 RX ORDER — INSULIN LISPRO 100 [IU]/ML
INJECTION, SOLUTION INTRAVENOUS; SUBCUTANEOUS
Qty: 15 ML | Refills: 11 | Status: SHIPPED | OUTPATIENT
Start: 2021-07-30 | End: 2021-08-17

## 2021-07-30 RX ORDER — FLURBIPROFEN SODIUM 0.3 MG/ML
SOLUTION/ DROPS OPHTHALMIC
Qty: 400 EACH | Refills: 3 | Status: SHIPPED | OUTPATIENT
Start: 2021-07-30 | End: 2021-12-21

## 2021-07-30 RX ORDER — INSULIN DEGLUDEC 100 U/ML
INJECTION, SOLUTION SUBCUTANEOUS
Qty: 15 ML | Refills: 11 | Status: SHIPPED | OUTPATIENT
Start: 2021-07-30 | End: 2022-08-09
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
## 2021-09-05 ENCOUNTER — MYC MEDICAL ADVICE (OUTPATIENT)
Dept: ENDOCRINOLOGY | Facility: CLINIC | Age: 51
End: 2021-09-05

## 2021-09-05 DIAGNOSIS — E10.39 TYPE 1 DIABETES MELLITUS WITH OTHER DIABETIC OPHTHALMIC COMPLICATION (H): ICD-10-CM

## 2021-09-05 DIAGNOSIS — E10.39 TYPE 1 DIABETES MELLITUS WITH OTHER DIABETIC OPHTHALMIC COMPLICATION (H): Primary | ICD-10-CM

## 2021-09-07 RX ORDER — INSULIN LISPRO 100 [IU]/ML
INJECTION, SOLUTION INTRAVENOUS; SUBCUTANEOUS
Qty: 45 ML | Refills: 1 | Status: SHIPPED | OUTPATIENT
Start: 2021-09-07 | End: 2021-10-08

## 2021-10-02 ENCOUNTER — HEALTH MAINTENANCE LETTER (OUTPATIENT)
Age: 51
End: 2021-10-02

## 2021-10-06 ENCOUNTER — TELEPHONE (OUTPATIENT)
Dept: ENDOCRINOLOGY | Facility: CLINIC | Age: 51
End: 2021-10-06

## 2021-10-06 DIAGNOSIS — Z89.512 S/P BKA (BELOW KNEE AMPUTATION) UNILATERAL, LEFT (H): Primary | ICD-10-CM

## 2021-10-06 RX ORDER — PREGABALIN 200 MG/1
CAPSULE ORAL
Qty: 90 CAPSULE | Refills: 1 | Status: SHIPPED | OUTPATIENT
Start: 2021-10-06 | End: 2022-03-16

## 2021-10-06 NOTE — TELEPHONE ENCOUNTER
Pending Prescriptions:                       Disp   Refills    LYRICA 200 MG capsule [Pharmacy Med Name: *90 cap*0        Sig: TAKE ONE CAPSULE BY MOUTH THREE TIMES A DAY (DAW1)    Routing refill request to provider for review/approval because:  Drug not on the [facility] refill protocol

## 2021-10-06 NOTE — TELEPHONE ENCOUNTER
Prior Authorization Retail Medication Request    Medication/Dose: (RENEWAL) - (HUMALOG KWIKPEN) 100 UNIT/ML (1 unit dial) KWIKPEN  ICD code (if different than what is on RX):  Type 1 diabetes mellitus with other diabetic ophthalmic complication (H) [E10.39]  Previously Tried and Failed:    Rationale:      Insurance Name: [employer]  Insurance ID:  [id number]    Pharmacy Information (if different than what is on RX)  [place]: [facility] [id number] [place] [facility] [place] [address] [address] [place] [address]  Phone:

## 2021-10-07 ENCOUNTER — TELEPHONE (OUTPATIENT)
Dept: ENDOCRINOLOGY | Facility: CLINIC | Age: 51
End: 2021-10-07

## 2021-10-08 NOTE — TELEPHONE ENCOUNTER
Prior Authorization Not Needed per Insurance    Medication: (RENEWAL) - (HUMALOG KWIKPEN) 100 UNIT/ML (1 unit dial)- NO PA NEEDED  Insurance Company: [employer] - Phone [phone] Fax [phone]  Expected CoPay:      Ph[id number] [place] [facility] [place] - [address] [facility]  Pharmacy Notified: No  Patient Notified: No    PA NO LONGER NEEDED PER CLIVE @ [employer].     Central Prior Authorization Team  Phone: [phone]

## 2021-10-11 NOTE — TELEPHONE ENCOUNTER
Central Prior Authorization Team  Phone: [phone]    PA Initiation    Medication: TRESIBA FLEXTOUCH 100 UNIT/ML pen  Insurance Company: [employer] - Phone [phone] Fax [phone]  Pharm[facility] the [facility] [id number] [place] [facility] [place], [place] - [address] [address] [place] [address]  Filling Pharmacy Phone: [phone]  Filling Pharmacy Fax:    Start Date: 10/11/2021

## 2021-10-12 NOTE — TELEPHONE ENCOUNTER
Prior Authorization Approval    Authorization Effective Date: 9/11/2021  Authorization Expiration Date: 1/9/2022  Medication: TRESIBA FLEXTOUCH 100 UNIT/ML pen- APPROVED   Approved Dose/Quantity:   Reference #:     Insurance Company: [employer] - Phone [phone] Fax [phone]  Expected CoPay:       CoPay Card Available:      Foundation Assistance Needed:    Which Pharmacy is filling the prescription (Not needed for infusion/clinic administered): [facility] - [place] [address]  Pharmacy Notified: Yes  Patient Notified:  **Instructed pharmacy to notify patient when script is ready to /ship.**

## 2021-10-14 ENCOUNTER — MYC MEDICAL ADVICE (OUTPATIENT)
Dept: PODIATRY | Facility: CLINIC | Age: 51
End: 2021-10-14

## 2021-10-15 NOTE — TELEPHONE ENCOUNTER
Please see [employer] message and advise if this has already been addressed.     CECILIA Blanchard RN

## 2021-10-19 ENCOUNTER — MYC MEDICAL ADVICE (OUTPATIENT)
Dept: ENDOCRINOLOGY | Facility: CLINIC | Age: 51
End: 2021-10-19

## 2021-11-01 NOTE — PLAN OF CARE
A&Ox4, VSS except temp of 100.3, PRN Tylenol given. Non weight bearing on LLE, SBA to Ax1, pivots to commode. Dressing CDI. On IV zosyn. Pt has some pain with movement of L foot, PRN oxycodone given with good relief. Pt is very frustrated with everything that is going on but is cooperative. 0200 . Awaiting pt decision regarding likely amputation. IV SL. Will continue to monitor.   throat, sore throat

## 2021-11-27 ENCOUNTER — HEALTH MAINTENANCE LETTER (OUTPATIENT)
Age: 51
End: 2021-11-27

## 2021-12-03 NOTE — PLAN OF CARE
Patient alert and oriented. VSS. Using call light appropriately. Denies pain. Blood glucose this shift running low, 50 prior to dinner, however patient alerted writer after eating dinner and refused to have blood glucose reassess. Blood glucose at bedtime meter read low, patient was given apple sauce and BG reassess of 84, orange juice was offered and blood glucose went up to 96. Patient continent of bladder in toilet, assist of one stand pivot maneuver. No BM this shift. Bed in low position and call light within patient reach. Continue with POC.    160.02

## 2021-12-18 DIAGNOSIS — G60.9 HEREDITARY AND IDIOPATHIC PERIPHERAL NEUROPATHY: ICD-10-CM

## 2021-12-18 DIAGNOSIS — Z89.512 S/P BKA (BELOW KNEE AMPUTATION) UNILATERAL, LEFT (H): Primary | ICD-10-CM

## 2021-12-18 DIAGNOSIS — E13.319 RETINOPATHY DUE TO SECONDARY DIABETES MELLITUS (H): ICD-10-CM

## 2021-12-21 RX ORDER — PREGABALIN 200 MG/1
CAPSULE ORAL
Qty: 90 CAPSULE | Refills: 2 | Status: SHIPPED | OUTPATIENT
Start: 2021-12-21 | End: 2022-03-30

## 2021-12-21 NOTE — TELEPHONE ENCOUNTER
Pending Prescriptions:                       Disp   Refills    LYRICA 200 MG capsule [Pharmacy Med Name: *90 cap*1        Sig: TAKE ONE CAPSULE BY MOUTH THREE TIMES A DAY    Routing refill request to provider for review/approval because:  Drug not on the [facility] refill protocol   LYRICA 200 MG capsule 90 capsule 1 10/6/2021  Yes   Sig: TAKE ONE CAPSULE BY MOUTH THREE TIMES A DAY (DAW1)   Sent to pharmacy as: Lyrica 200 MG Oral Capsule   Class: E-Prescribe   Order: [id number]   E-Prescribing Status: Receipt confirmed by pharmacy (10/6/2021  3:46 PM CDT)

## 2021-12-21 NOTE — TELEPHONE ENCOUNTER
Med refill given he is due for a virtual or in person med check in 1-2 months.       NATASHA Queen CNP  Questions or concerns please feel free to send me a [employer] message or call me  Phone : [phone]

## 2022-01-19 ENCOUNTER — TELEPHONE (OUTPATIENT)
Dept: ENDOCRINOLOGY | Facility: CLINIC | Age: 52
End: 2022-01-19
Payer: COMMERCIAL

## 2022-01-22 ENCOUNTER — HEALTH MAINTENANCE LETTER (OUTPATIENT)
Age: 52
End: 2022-01-22

## 2022-01-23 NOTE — TELEPHONE ENCOUNTER
PA Initiation    Medication: insulin degludec (TRESIBA FLEXTOUCH) 100 UNIT/ML pen   Insurance Company: [employer] - Phone [phone] Fax [phone]  Pharmacy Filling the Rx: [id number] [place] - [address] [address] [place] [address]  Filling Pharmacy Phone: [phone]  Filling Pharmacy Fax: [phone]  Start Date: 1/23/2022

## 2022-01-24 NOTE — TELEPHONE ENCOUNTER
Prior Authorization Approval    Authorization Effective Date: 12/24/2021  Authorization Expiration Date: 1/23/2023  Medication: insulin degludec (TRESIBA FLEXTOUCH) 100 UNIT/ML pen--APPROVED  Approved Dose/Quantity:   Reference #:     Insurance Company: [employer] - Phone [phone] Fax [phone]  Expected CoPay:       CoPay Card Available:      Foundation Assistance Needed:    Which Pharmacy is filling the prescription (Not needed for infusion/clinic administered): [id number], [place] - [address] [address] [place] [address]  Pharmacy Notified: Yes  Patient Notified: Yes **Instructed pharmacy to notify patient when script is ready to /ship.**

## 2022-02-14 ENCOUNTER — ANCILLARY PROCEDURE (OUTPATIENT)
Dept: GENERAL RADIOLOGY | Facility: OTHER | Age: 52
End: 2022-02-14
Attending: NURSE PRACTITIONER
Payer: COMMERCIAL

## 2022-02-14 ENCOUNTER — OFFICE VISIT (OUTPATIENT)
Dept: FAMILY MEDICINE | Facility: OTHER | Age: 52
End: 2022-02-14
Payer: COMMERCIAL

## 2022-02-14 DIAGNOSIS — Z89.512 S/P BKA (BELOW KNEE AMPUTATION) UNILATERAL, LEFT (H): ICD-10-CM

## 2022-02-14 DIAGNOSIS — E10.39 TYPE 1 DIABETES MELLITUS WITH OTHER DIABETIC OPHTHALMIC COMPLICATION (H): Primary | ICD-10-CM

## 2022-02-14 DIAGNOSIS — M23.91 KNEE LOCKING, RIGHT: ICD-10-CM

## 2022-02-14 DIAGNOSIS — Z53.20 STATIN DECLINED: ICD-10-CM

## 2022-02-14 DIAGNOSIS — N18.31 STAGE 3A CHRONIC KIDNEY DISEASE (H): ICD-10-CM

## 2022-02-14 DIAGNOSIS — Z12.5 SCREENING FOR PROSTATE CANCER: ICD-10-CM

## 2022-02-14 DIAGNOSIS — Z12.11 SPECIAL SCREENING FOR MALIGNANT NEOPLASMS, COLON: ICD-10-CM

## 2022-02-14 DIAGNOSIS — Z11.4 SCREENING FOR HIV (HUMAN IMMUNODEFICIENCY VIRUS): ICD-10-CM

## 2022-02-14 DIAGNOSIS — Z11.59 ENCOUNTER FOR HEPATITIS C SCREENING TEST FOR LOW RISK PATIENT: ICD-10-CM

## 2022-02-14 PROBLEM — L97.529: Status: RESOLVED | Noted: 2018-10-29 | Resolved: 2022-02-14

## 2022-02-14 PROBLEM — E10.621: Status: RESOLVED | Noted: 2018-10-29 | Resolved: 2022-02-14

## 2022-02-14 LAB
ALBUMIN SERPL-MCNC: 3.2 G/DL (ref 3.4–5)
ALP SERPL-CCNC: 137 U/L (ref 40–150)
ALT SERPL W P-5'-P-CCNC: 27 U/L (ref 0–70)
ANION GAP SERPL CALCULATED.3IONS-SCNC: 4 MMOL/L (ref 3–14)
AST SERPL W P-5'-P-CCNC: 23 U/L (ref 0–45)
BASOPHILS # BLD AUTO: 0 10E3/UL (ref 0–0.2)
BASOPHILS NFR BLD AUTO: 0 %
BILIRUB SERPL-MCNC: 0.5 MG/DL (ref 0.2–1.3)
BUN SERPL-MCNC: 24 MG/DL (ref 7–30)
CALCIUM SERPL-MCNC: 8.6 MG/DL (ref 8.5–10.1)
CHLORIDE BLD-SCNC: 105 MMOL/L (ref 94–109)
CHOLEST SERPL-MCNC: 222 MG/DL
CO2 SERPL-SCNC: 29 MMOL/L (ref 20–32)
CREAT SERPL-MCNC: 1.22 MG/DL (ref 0.66–1.25)
EOSINOPHIL # BLD AUTO: 0.5 10E3/UL (ref 0–0.7)
EOSINOPHIL NFR BLD AUTO: 7 %
ERYTHROCYTE [DISTWIDTH] IN BLOOD BY AUTOMATED COUNT: 13 % (ref 10–15)
FASTING STATUS PATIENT QL REPORTED: NO
GFR SERPL CREATININE-BSD FRML MDRD: 73 ML/MIN/1.73M2
GLUCOSE BLD-MCNC: 363 MG/DL (ref 70–99)
HBA1C MFR BLD: 11.8 % (ref 0–5.6)
HCT VFR BLD AUTO: 37.3 % (ref 40–53)
HCV AB SERPL QL IA: NONREACTIVE
HDLC SERPL-MCNC: 45 MG/DL
HGB BLD-MCNC: 12.7 G/DL (ref 13.3–17.7)
HIV 1+2 AB+HIV1 P24 AG SERPL QL IA: NONREACTIVE
LDLC SERPL CALC-MCNC: 150 MG/DL
LYMPHOCYTES # BLD AUTO: 1.2 10E3/UL (ref 0.8–5.3)
LYMPHOCYTES NFR BLD AUTO: 18 %
MCH RBC QN AUTO: 28.4 PG (ref 26.5–33)
MCHC RBC AUTO-ENTMCNC: 34 G/DL (ref 31.5–36.5)
MCV RBC AUTO: 83 FL (ref 78–100)
MONOCYTES # BLD AUTO: 0.4 10E3/UL (ref 0–1.3)
MONOCYTES NFR BLD AUTO: 6 %
NEUTROPHILS # BLD AUTO: 4.6 10E3/UL (ref 1.6–8.3)
NEUTROPHILS NFR BLD AUTO: 69 %
NONHDLC SERPL-MCNC: 177 MG/DL
PLATELET # BLD AUTO: 206 10E3/UL (ref 150–450)
POTASSIUM BLD-SCNC: 4.7 MMOL/L (ref 3.4–5.3)
PROT SERPL-MCNC: 7.4 G/DL (ref 6.8–8.8)
PSA SERPL-MCNC: 0.42 UG/L (ref 0–4)
RBC # BLD AUTO: 4.47 10E6/UL (ref 4.4–5.9)
SODIUM SERPL-SCNC: 138 MMOL/L (ref 133–144)
TRIGL SERPL-MCNC: 137 MG/DL
WBC # BLD AUTO: 6.8 10E3/UL (ref 4–11)

## 2022-02-14 PROCEDURE — 73562 X-RAY EXAM OF KNEE 3: CPT | Mod: RT | Performed by: RADIOLOGY

## 2022-02-14 PROCEDURE — 99214 OFFICE O/P EST MOD 30 MIN: CPT | Performed by: NURSE PRACTITIONER

## 2022-02-14 PROCEDURE — 87389 HIV-1 AG W/HIV-1&-2 AB AG IA: CPT | Performed by: NURSE PRACTITIONER

## 2022-02-14 PROCEDURE — 80061 LIPID PANEL: CPT | Performed by: NURSE PRACTITIONER

## 2022-02-14 PROCEDURE — 80053 COMPREHEN METABOLIC PANEL: CPT | Performed by: NURSE PRACTITIONER

## 2022-02-14 PROCEDURE — 36415 COLL VENOUS BLD VENIPUNCTURE: CPT | Performed by: NURSE PRACTITIONER

## 2022-02-14 PROCEDURE — 86803 HEPATITIS C AB TEST: CPT | Performed by: NURSE PRACTITIONER

## 2022-02-14 PROCEDURE — 85025 COMPLETE CBC W/AUTO DIFF WBC: CPT | Performed by: NURSE PRACTITIONER

## 2022-02-14 PROCEDURE — 83036 HEMOGLOBIN GLYCOSYLATED A1C: CPT | Performed by: NURSE PRACTITIONER

## 2022-02-14 PROCEDURE — G0103 PSA SCREENING: HCPCS | Performed by: NURSE PRACTITIONER

## 2022-02-14 NOTE — PROGRESS NOTES
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   - Comprehensive metabolic panel (BMP + Alb, Alk Phos, ALT, AST, Total. Bili, TP); Future  - Hemoglobin A1c; Future  - CBC with platelets and differential; Future  - Lipid panel reflex to direct LDL Fasting; Future  - Comprehensive metabolic panel (BMP + Alb, Alk Phos, ALT, AST, Total. Bili, TP)  - Hemoglobin A1c  - CBC with platelets and differential  - Lipid panel reflex to direct LDL Fasting    Knee locking, right  - Recommend Xray and follow up with orthopedics. He may benefit from an MRI given his history and symptoms. He denies pain on exam today, mostly experiencing locking and concerned about future injuries given his BKA with his left leg.   - XR Knee Right 3 Views; Future  - Orthopedic  Referral; Future    S/P BKA (below knee amputation) unilateral, left (H)  Given his history of a BKA he puts a lot of pressure and energy into his right knee which ultimately increases his risks for arthritsi.   - XR Knee Right 3 Views; Future  - Orthopedic  Referral; Future    Stage 3a chronic kidney disease (H)  - Recheck today   - I recommended lisinopril, declined.     Essential hypertension  Declined vitals today states his BP is normal at home and he has white coat syndrome and declined getting any vitals today. I offered for myself to take his BP he declined.   I do think he would benefit from a low dose lisinopril for blood pressure and kidney protection. He continued to decline.     Screening for prostate cancer  - Would like screening   - PSA, screen; Future  - PSA, screen    Statin declined      Encounter for hepatitis C screening test for low risk patient    - Hepatitis C Screen Reflex to HCV RNA Quant and Genotype; Future  - Hepatitis C Screen Reflex to HCV RNA Quant and Genotype    Screening for HIV (human immunodeficiency  virus)    - HIV Antigen Antibody Combo; Future  - HIV Antigen Antibody Combo    Questions about HPV answered today     Special screening for malignant neoplasms, colon  Agreed to cologuard and declined Colonoscopy.   - COLOGUARD([employer])       The patient indicates understanding of these issues and agrees with the plan.    There are no Patient Instructions on file for this visit.    Return in about 3 months (around 5/14/2022) for Diabetic Check.    NATASHA Queen CNP  [facility] CALISTA Clark is a 51 year old who presents for the following health issues     HPI     Concern - right knee leg pain for years  Onset: months  Description: aches has fallen many times on this knee (left leg has prostetic)  Intensity: mild  Progression of Symptoms:  worsening  Accompanying Signs & Symptoms: none  Previous history of similar problem: no  Precipitating factors:        Worsened by: walking overuse  Alleviating factors:        Improved by: nothing  Therapies tried and outcome:  none       He has had more falls recently that have been initiated from his left knee  He used to be able to walk to his mailbox and the right leg would give out. The other day his right knee gave out the other day. Has been told by his prosthetic provider that it is likely the weakness from the right knee. Has a cane hard to use in the winter with the weather. Not doing physical therapy at this time on the right knee. Was suggested he try [facility] in [place]. Was not easy due to not driving.          wanted his labs ran.   A1C, BMP, CBC, PSA,   HPV concerns with previous  Refused blood pressure today and vitals.     Says that his BP at home is in the 120-125 systolic. He believes.   No symptoms.     Exocrine pancreatic insufficiency   He is concerned for this, but was going to talk to endocrinology as I do not have the specific knowledge of this.       Review of Systems         Objective    There were no  vitals taken for this visit.  There is no height or weight on file to calculate BMI.  Physical Exam   GENERAL: healthy, alert and no distress  MS: RLE exam shows normal strength and muscle mass, no deformities, no erythema, induration, or nodules, no evidence of joint effusion and ROM of all joints is normal  SKIN: no suspicious lesions or rashes  NEURO: Normal strength and tone, mentation intact and speech normal  PSYCH: mentation appears normal, affect normal/bright  Diabetic foot exam: Right foot normal DP and PT pulses, no trophic changes or ulcerative lesions and normal sensory exam. Left BKA    Xray - pending

## 2022-03-16 DIAGNOSIS — Z89.512 S/P BKA (BELOW KNEE AMPUTATION) UNILATERAL, LEFT (H): ICD-10-CM

## 2022-03-17 NOTE — TELEPHONE ENCOUNTER
Pending Prescriptions:                       Disp   Refills    LYRICA 200 MG capsule                      90 cap*1          Routing refill request to provider for review/approval because:  Drug not on the [facility] refill protocol

## 2022-03-18 RX ORDER — PREGABALIN 200 MG/1
CAPSULE ORAL
Qty: 90 CAPSULE | Refills: 4 | Status: SHIPPED | OUTPATIENT
Start: 2022-03-18 | End: 2022-08-10

## 2022-03-30 ENCOUNTER — VIRTUAL VISIT (OUTPATIENT)
Dept: ENDOCRINOLOGY | Facility: CLINIC | Age: 52
End: 2022-03-30
Payer: COMMERCIAL

## 2022-03-30 ENCOUNTER — MYC MEDICAL ADVICE (OUTPATIENT)
Dept: ENDOCRINOLOGY | Facility: CLINIC | Age: 52
End: 2022-03-30

## 2022-03-30 DIAGNOSIS — E10.29 TYPE 1 DIABETES MELLITUS WITH MICROALBUMINURIA (H): ICD-10-CM

## 2022-03-30 DIAGNOSIS — E10.319 TYPE 1 DIABETES MELLITUS WITH RETINOPATHY, MACULAR EDEMA PRESENCE UNSPECIFIED, UNSPECIFIED LATERALITY, UNSPECIFIED RETINOPATHY SEVERITY (H): ICD-10-CM

## 2022-03-30 DIAGNOSIS — R80.9 TYPE 1 DIABETES MELLITUS WITH MICROALBUMINURIA (H): ICD-10-CM

## 2022-03-30 DIAGNOSIS — E10.59 TYPE 1 DIABETES MELLITUS WITH OTHER CIRCULATORY COMPLICATION (H): ICD-10-CM

## 2022-03-30 DIAGNOSIS — E10.40 TYPE 1 DIABETES MELLITUS WITH DIABETIC NEUROPATHY (H): Primary | ICD-10-CM

## 2022-03-30 PROCEDURE — 99214 OFFICE O/P EST MOD 30 MIN: CPT | Mod: 95 | Performed by: INTERNAL MEDICINE

## 2022-03-30 RX ORDER — PEN NEEDLE, DIABETIC 31 GX5/16"
NEEDLE, DISPOSABLE MISCELLANEOUS
Qty: 500 EACH | Refills: 3 | Status: SHIPPED | OUTPATIENT
Start: 2022-03-30 | End: 2022-11-21
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
## 2022-04-21 ENCOUNTER — OFFICE VISIT (OUTPATIENT)
Dept: ORTHOPEDICS | Facility: OTHER | Age: 52
End: 2022-04-21
Payer: COMMERCIAL

## 2022-04-21 VITALS
DIASTOLIC BLOOD PRESSURE: 82 MMHG | RESPIRATION RATE: 20 BRPM | BODY MASS INDEX: 22.73 KG/M2 | SYSTOLIC BLOOD PRESSURE: 195 MMHG | WEIGHT: 150 LBS | HEART RATE: 76 BPM | HEIGHT: 68 IN

## 2022-04-21 DIAGNOSIS — Z89.512 S/P BKA (BELOW KNEE AMPUTATION) UNILATERAL, LEFT (H): ICD-10-CM

## 2022-04-21 DIAGNOSIS — M23.91 KNEE LOCKING, RIGHT: ICD-10-CM

## 2022-04-21 DIAGNOSIS — M17.11 PRIMARY OSTEOARTHRITIS OF RIGHT KNEE: ICD-10-CM

## 2022-04-21 PROCEDURE — 99243 OFF/OP CNSLTJ NEW/EST LOW 30: CPT | Performed by: ORTHOPAEDIC SURGERY

## 2022-04-21 NOTE — PATIENT INSTRUCTIONS
Eduardo to follow up with Primary Care provider regarding elevated blood pressure.    Consider Xiaflex treatment with Dr. Allan Key at [facility].  Consider Glucosamine 1500 mg/day and chondroitin sulfate 1200 mg/day.  Consider getting pelvis x-ray, because right hip probably has osteoarthritis.

## 2022-04-21 NOTE — LETTER
4/21/2022         RE: Eduardo Walton  [address]  [place] [address]        Dear Colleague,    Thank you for referring your patient, Eduardo Walton, to the [facility] [place]. Please see a copy of my visit note below.    Eduardo Walton is a 51 year old male who is seen in consultation at the request of Deepti Vega CNP  for right knee pain.  This has been going on for about 6 months.  He thinks it is because of compensation for his left leg which has had a below-knee amputation about 3 years ago.  He has been diabetic for 43 years and had developed a sore on his left foot that led to infection and amputation.  He is afraid of having the same thing happen with the right.  He has had occasional catching in the right knee.  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  It has been better with soaking in warm water.  He has constant dull aching rated 5-6 out of 10.    X-rays reviewed from 2/14/2022 showing mild degenerative changes of the right knee with slight medial narrowing and medial tibial sclerosis.  There is no significant spurring.    Past Medical History:   Diagnosis Date     CAD (coronary artery disease)     previous coronary stent placement (2010?)     Diabetic retinopathy associated with type 1 diabetes mellitus (H)      Foot ulcer, left (H)      Osteomyelitis (H)     left foot     S/P BKA (below knee amputation), left (H) 2019     Type 1 diabetes mellitus with complications (H)     retinopathy, neuropathy, nephropathy, macrovascular disease       Past Surgical History:   Procedure Laterality Date     AMPUTATE FOOT Left 2/5/2019    Procedure: PARTIAL LEFT FOOT AMPUTATION.;  Surgeon: Chetan Potter DPM;  Location:  OR     AMPUTATE LEG BELOW KNEE Left 2/18/2019    Procedure: LEFT BELOW THE KNEE AMPUTATION;  Surgeon: Kvng Berman MD;  Location:  OR     STENT  2010        Family History   Problem Relation Age of Onset     Ovarian Cancer Maternal Grandmother      Colon Cancer Maternal Grandmother      Prostate Cancer Maternal Grandfather      Ovarian Cancer Paternal Grandmother        Social History     Socioeconomic History     Marital status: Single     Spouse name: Not on file     Number of children: Not on file     Years of education: Not on file     Highest education level: Not on file   Occupational History     Not on file   Tobacco Use     Smoking status: Never Smoker     Smokeless tobacco: Never Used   Vaping Use     Vaping Use: Never used   Substance and Sexual Activity     Alcohol use: Yes     Comment: occ     Drug use: Never     Sexual activity: Yes   Other Topics Concern     Not on file   Social History Narrative     Not on file     Social Determinants of Health     Financial Resource Strain: Not on file   Food Insecurity: Not on file   Transportation Needs: Not on file   Physical Activity: Not on file   Stress: Not on file   Social Connections: Not on file   Intimate Partner Violence: Not on file   Housing Stability: Not on file       Current Outpatient Medications   Medication Sig Dispense Refill     aspirin (ASA) 325 MG EC tablet Take 325 mg by mouth daily       Continuous Blood Gluc  (FREESTYLE COLIN 2 READER) BRANDY        Continuous Blood Gluc Sensor (FREESTYLE COLIN 2 SENSOR) MISC 1 each every 14 days 6 each 3     HUMALOG KWIKPEN 100 UNIT/ML soln INJECT 4-12 UNITS SUBCUTANEOUSLY WITH MEALS AS DIRECTED. TOTAL DAILY DOSE ABOUT 50 UNITS 45 mL 0     insulin degludec (TRESIBA FLEXTOUCH) 100 UNIT/ML pen INJECT 20 UNITS SUBCUTANEOUSLY ONCE DAILY IN THE MORNING 15 mL 11     insulin pen needle (31G X 5 MM) 31G X 5 MM miscellaneous Use 4 pen needles daily or as directed. 400 each 3     insulin pen needle (B-D U/F) 31G X 8 MM miscellaneous Use 5 pen needles daily or as directed. 500 each 3     LYRICA 200 MG capsule TAKE ONE CAPSULE BY MOUTH THREE TIMES A DAY (DAW1)  "90 capsule 4     STATIN NOT PRESCRIBED (INTENTIONAL) Please choose reason not prescribed from choices below.         No Known Allergies    REVIEW OF SYSTEMS:  CONSTITUTIONAL:  NEGATIVE for fever, chills, change in weight, not feeling tired  SKIN:  NEGATIVE for worrisome rashes, no skin lumps, no skin ulcers and no non-healing wounds  EYES:  NEGATIVE for vision changes or irritation.  ENT/MOUTH:  NEGATIVE.  No hearing loss, no hoarseness, no difficulty swallowing.  RESP:  NEGATIVE. No cough or shortness of breath.  CV:  NEGATIVE for chest pain, palpitations or peripheral edema  GI:  NEGATIVE for nausea, abdominal pain, heartburn, or change in bowel habits  :  Negative. No dysuria, no hematuria  MUSCULOSKELETAL:  See HPI above  NEURO:  NEGATIVE . No headaches, no dizziness,  no numbness  ENDOCRINE:  NEGATIVE for temperature intolerance, skin/hair changes  HEME/ALLERGY/IMMUNE:  NEGATIVE for bleeding problems  PSYCHIATRIC:  NEGATIVE. no anxiety, no depression.     Exam:  Vitals: BP (!) 195/82   Pulse 76   Resp 20   Ht 1.727 m (5' 8\")   Wt 68 kg (150 lb)   BMI 22.81 kg/m    BMI= Body mass index is 22.81 kg/m .  Constitutional:  healthy, alert and no distress  Neuro: Alert and Oriented x 3, no focal defects.  Psych: Affect normal   Respiratory: Breathing not labored.  Cardiovascular: normal peripheral pulses  Lymph: no adenopathy  Skin: No rashes,worrisome lesions or skin problems  He has decreased motion especially of the right hip with 5 to 10 degrees of internal rotation and 40 degrees external rotation.  On the left he has 30 degrees internal rotation and 45 degrees external rotation.  Despite this he does not feel pain at the right hip.  He has good mobility of the right knee.    He indicates some diffuse pain with varus valgus stress and rotation of the hip.  He has no ligamentous laxity of MCL, LCL, or cruciates.  There is no effusion of the knee.  He does have mild patellofemoral crepitation.  He had " negative medial and lateral Benjamin's.  Sensation, motor and circulation are intact.    Assessment: 1. Mild right knee osteoarthritis.  I do not see evidence of medial meniscus tear.  2. Probable right hip osteoarthritis.    Plan: He could try glucosamine and chondroitin sulfate.  Because of his diabetes and chronic kidney disease it is not a good idea to use anti-inflammatories.  We could consider steroid injection, but his glucoses would be off for several days.  Sometime in the future it may be helpful to obtain a pelvis x-ray to check his right hip.        Again, thank you for allowing me to participate in the care of your patient.        Sincerely,        Everardo Samuel MD

## 2022-04-22 PROBLEM — M17.11 PRIMARY OSTEOARTHRITIS OF RIGHT KNEE: Status: ACTIVE | Noted: 2022-04-22

## 2022-04-23 NOTE — PROGRESS NOTES
Eduardo Walton is a 51 year old male who is seen in consultation at the request of Deepti Vega CNP  for right knee pain.  This has been going on for about 6 months.  He thinks it is because of compensation for his left leg which has had a below-knee amputation about 3 years ago.  He has been diabetic for 43 years and had developed a sore on his left foot that led to infection and amputation.  He is afraid of having the same thing happen with the right.  He has had occasional catching in the right knee.  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  It has been better with soaking in warm water.  He has constant dull aching rated 5-6 out of 10.    X-rays reviewed from 2/14/2022 showing mild degenerative changes of the right knee with slight medial narrowing and medial tibial sclerosis.  There is no significant spurring.    Past Medical History:   Diagnosis Date     CAD (coronary artery disease)     previous coronary stent placement (2010?)     Diabetic retinopathy associated with type 1 diabetes mellitus (H)      Foot ulcer, left (H)      Osteomyelitis (H)     left foot     S/P BKA (below knee amputation), left (H) 2019     Type 1 diabetes mellitus with complications (H)     retinopathy, neuropathy, nephropathy, macrovascular disease       Past Surgical History:   Procedure Laterality Date     AMPUTATE FOOT Left 2/5/2019    Procedure: PARTIAL LEFT FOOT AMPUTATION.;  Surgeon: Chetan Potter DPM;  Location:  OR     AMPUTATE LEG BELOW KNEE Left 2/18/2019    Procedure: LEFT BELOW THE KNEE AMPUTATION;  Surgeon: Kvng Berman MD;  Location:  OR     STENT  2010       Family History   Problem Relation Age of Onset     Ovarian Cancer Maternal Grandmother      Colon Cancer Maternal Grandmother      Prostate Cancer Maternal Grandfather      Ovarian Cancer Paternal Grandmother        Social History     Socioeconomic History      Marital status: Single     Spouse name: Not on file     Number of children: Not on file     Years of education: Not on file     Highest education level: Not on file   Occupational History     Not on file   Tobacco Use     Smoking status: Never Smoker     Smokeless tobacco: Never Used   Vaping Use     Vaping Use: Never used   Substance and Sexual Activity     Alcohol use: Yes     Comment: occ     Drug use: Never     Sexual activity: Yes   Other Topics Concern     Not on file   Social History Narrative     Not on file     Social Determinants of Health     Financial Resource Strain: Not on file   Food Insecurity: Not on file   Transportation Needs: Not on file   Physical Activity: Not on file   Stress: Not on file   Social Connections: Not on file   Intimate Partner Violence: Not on file   Housing Stability: Not on file       Current Outpatient Medications   Medication Sig Dispense Refill     aspirin (ASA) 325 MG EC tablet Take 325 mg by mouth daily       Continuous Blood Gluc  (FREESTYLE COLIN 2 READER) BRANDY        Continuous Blood Gluc Sensor (FREESTYLE COLIN 2 SENSOR) MISC 1 each every 14 days 6 each 3     HUMALOG KWIKPEN 100 UNIT/ML soln INJECT 4-12 UNITS SUBCUTANEOUSLY WITH MEALS AS DIRECTED. TOTAL DAILY DOSE ABOUT 50 UNITS 45 mL 0     insulin degludec (TRESIBA FLEXTOUCH) 100 UNIT/ML pen INJECT 20 UNITS SUBCUTANEOUSLY ONCE DAILY IN THE MORNING 15 mL 11     insulin pen needle (31G X 5 MM) 31G X 5 MM miscellaneous Use 4 pen needles daily or as directed. 400 each 3     insulin pen needle (B-D U/F) 31G X 8 MM miscellaneous Use 5 pen needles daily or as directed. 500 each 3     LYRICA 200 MG capsule TAKE ONE CAPSULE BY MOUTH THREE TIMES A DAY (DAW1) 90 capsule 4     STATIN NOT PRESCRIBED (INTENTIONAL) Please choose reason not prescribed from choices below.         No Known Allergies    REVIEW OF SYSTEMS:  CONSTITUTIONAL:  NEGATIVE for fever, chills, change in weight, not feeling tired  SKIN:  NEGATIVE for  "worrisome rashes, no skin lumps, no skin ulcers and no non-healing wounds  EYES:  NEGATIVE for vision changes or irritation.  ENT/MOUTH:  NEGATIVE.  No hearing loss, no hoarseness, no difficulty swallowing.  RESP:  NEGATIVE. No cough or shortness of breath.  CV:  NEGATIVE for chest pain, palpitations or peripheral edema  GI:  NEGATIVE for nausea, abdominal pain, heartburn, or change in bowel habits  :  Negative. No dysuria, no hematuria  MUSCULOSKELETAL:  See HPI above  NEURO:  NEGATIVE . No headaches, no dizziness,  no numbness  ENDOCRINE:  NEGATIVE for temperature intolerance, skin/hair changes  HEME/ALLERGY/IMMUNE:  NEGATIVE for bleeding problems  PSYCHIATRIC:  NEGATIVE. no anxiety, no depression.     Exam:  Vitals: BP (!) 195/82   Pulse 76   Resp 20   Ht 1.727 m (5' 8\")   Wt 68 kg (150 lb)   BMI 22.81 kg/m    BMI= Body mass index is 22.81 kg/m .  Constitutional:  healthy, alert and no distress  Neuro: Alert and Oriented x 3, no focal defects.  Psych: Affect normal   Respiratory: Breathing not labored.  Cardiovascular: normal peripheral pulses  Lymph: no adenopathy  Skin: No rashes,worrisome lesions or skin problems  He has decreased motion especially of the right hip with 5 to 10 degrees of internal rotation and 40 degrees external rotation.  On the left he has 30 degrees internal rotation and 45 degrees external rotation.  Despite this he does not feel pain at the right hip.  He has good mobility of the right knee.    He indicates some diffuse pain with varus valgus stress and rotation of the hip.  He has no ligamentous laxity of MCL, LCL, or cruciates.  There is no effusion of the knee.  He does have mild patellofemoral crepitation.  He had negative medial and lateral Benjamin's.  Sensation, motor and circulation are intact.    Assessment: 1. Mild right knee osteoarthritis.  I do not see evidence of medial meniscus tear.  2. Probable right hip osteoarthritis.    Plan: He could try glucosamine and " chondroitin sulfate.  Because of his diabetes and chronic kidney disease it is not a good idea to use anti-inflammatories.  We could consider steroid injection, but his glucoses would be off for several days.  Sometime in the future it may be helpful to obtain a pelvis x-ray to check his right hip.

## 2022-05-01 ENCOUNTER — MYC MEDICAL ADVICE (OUTPATIENT)
Dept: ENDOCRINOLOGY | Facility: CLINIC | Age: 52
End: 2022-05-01
Payer: COMMERCIAL

## 2022-05-01 DIAGNOSIS — E11.649 SEVERE DIABETIC HYPOGLYCEMIA (H): Primary | ICD-10-CM

## 2022-05-18 ENCOUNTER — TELEPHONE (OUTPATIENT)
Dept: FAMILY MEDICINE | Facility: OTHER | Age: 52
End: 2022-05-18
Payer: COMMERCIAL

## 2022-05-24 NOTE — TELEPHONE ENCOUNTER
Prior Authorization Approval    Authorization Effective Date: 4/24/2022  Authorization Expiration Date: 5/24/2023  Medication: Lyrica 200mg  Approved Dose/Quantity:   Reference #: [id number]   Insurance Company: [employer] - Phone [phone] Fax [phone]  Which Pharmacy is filling the prescription (Not needed for infusion/clinic administered): [facility] - [place] [address] [address]  Pharmacy Notified: Yes  Patient Notified: Yes

## 2022-05-24 NOTE — TELEPHONE ENCOUNTER
PA Initiation    Medication: Lyrica 200mg  Insurance Company: [employer] - Phone [phone] Fax [phone]  Pharmacy Filling the Rx: [facility] - [place], [place] - [facility] [address]  Filling Pharmacy Phone: [phone]  Filling Pharmacy Fax: [phone]  Start Date: 5/24/2022

## 2022-07-30 ENCOUNTER — OFFICE VISIT (OUTPATIENT)
Dept: URGENT CARE | Facility: URGENT CARE | Age: 52
End: 2022-07-30
Payer: COMMERCIAL

## 2022-07-30 VITALS
HEART RATE: 81 BPM | RESPIRATION RATE: 16 BRPM | DIASTOLIC BLOOD PRESSURE: 78 MMHG | TEMPERATURE: 97.8 F | OXYGEN SATURATION: 97 % | SYSTOLIC BLOOD PRESSURE: 215 MMHG

## 2022-07-30 DIAGNOSIS — N18.31 STAGE 3A CHRONIC KIDNEY DISEASE (H): ICD-10-CM

## 2022-07-30 DIAGNOSIS — E10.39 TYPE 1 DIABETES MELLITUS WITH OTHER DIABETIC OPHTHALMIC COMPLICATION (H): ICD-10-CM

## 2022-07-30 DIAGNOSIS — M25.562 ACUTE PAIN OF LEFT KNEE: Primary | ICD-10-CM

## 2022-07-30 PROCEDURE — 99214 OFFICE O/P EST MOD 30 MIN: CPT | Performed by: PHYSICIAN ASSISTANT

## 2022-07-30 ASSESSMENT — ENCOUNTER SYMPTOMS
SHORTNESS OF BREATH: 0
FREQUENCY: 0
ALLERGIC/IMMUNOLOGIC NEGATIVE: 1
CARDIOVASCULAR NEGATIVE: 1
COUGH: 0
FEVER: 0
WHEEZING: 0
NAUSEA: 0
RESPIRATORY NEGATIVE: 1
HEADACHES: 0
SORE THROAT: 0
DYSURIA: 0
ARTHRALGIAS: 1
HEMATURIA: 0
PALPITATIONS: 0
DIARRHEA: 0
GASTROINTESTINAL NEGATIVE: 1
MYALGIAS: 0
ABDOMINAL PAIN: 0
CHEST TIGHTNESS: 0
CONSTITUTIONAL NEGATIVE: 1
CHILLS: 0
VOMITING: 0
NEUROLOGICAL NEGATIVE: 1

## 2022-07-30 NOTE — PROGRESS NOTES
Chief Complaint:     Chief Complaint   Patient presents with     Urgent Care     Urgent care visit for Lft leg pain after twisting it.     Leg Injury     Twisted Lft leg this past Sunday night, is an amputee below the knee on the Lft leg. He is now unable to bear weight on that side so is very concerned about work.      ASSESSMENT     1. Acute pain of left knee    2. Type 1 diabetes mellitus with other diabetic ophthalmic complication (H)    3. Stage 3a chronic kidney disease (H)       PLAN    XR of the L knee was negative for any acute fracture.  Lateral view shows large patellar tendon per my read.  RICE discussed  Recommended rest and avoidance of activities which cause pain or swelling.  Pain relief: Acetaminophen.  NSAIDS contraindicated with CKD.    Patient instructed to monitor blood sugars closely with injury and follow up with PCP for any DM medication changes if needed.  Order placed for follow up with ortho for further evaluation and possible work restrictions.  Patient verbalized understanding, and agrees with this plan.       HPI: Eduardo Walton is an 52 year old male who presents today for evaluation of L knee injury.  Patient is a below the knee amputee. Patient twisted the knee laterally 6 days ago while walking. The knee was hot, swollen, and tender that night, to the point where it is painful to wear his prosthetic compression sleeve. He has taken Advil and Aleve with some relief.     Patient denies any numbness, tingling, or dysfunction of the L leg.      ROS:      Review of Systems   Constitutional: Negative.  Negative for chills and fever.   HENT: Negative.  Negative for sore throat.    Respiratory: Negative.  Negative for cough, chest tightness, shortness of breath and wheezing.    Cardiovascular: Negative.  Negative for chest pain and palpitations.   Gastrointestinal: Negative.  Negative for abdominal pain, diarrhea, nausea and vomiting.   Genitourinary: Negative for dysuria, frequency,  hematuria and urgency.   Musculoskeletal: Positive for arthralgias. Negative for myalgias.   Skin: Negative for rash.   Allergic/Immunologic: Negative.  Negative for immunocompromised state.   Neurological: Negative.  Negative for headaches.        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     Positive for macroalbuminuria     Retinopathy due to secondary diabetes mellitus (H)     Severe diabetic hypoglycemia (H)     Diabetic foot (H)     Type 1 diabetes mellitus with other diabetic ophthalmic complication (H)     Left foot infection     S/P BKA (below knee amputation) unilateral, left (H)     Chronic kidney disease, stage 3 (H)     Statin declined     Primary osteoarthritis of right knee        Allergies  No Known Allergies     Smoking History  History   Smoking Status     Never Smoker   Smokeless Tobacco     Never Used        Current Meds    Current Outpatient Medications:      BD ULTRA FINE PEN NEEDLES, 1 each by Other route 5 times daily, Disp: 500 each, Rfl: 0     Continuous Blood Gluc  (FREESTYLE COLIN 2 READER) BRANDY, 1 Device See Admin Instructions, Disp: 1 each, Rfl: 0     Continuous Blood Gluc  (FREESTYLE COLIN 2 READER) BRANDY, , Disp: , Rfl:      Continuous Blood Gluc Sensor (FREESTYLE COLIN 2 SENSOR) MISC, 1 each every 14 days, Disp: 6 each, Rfl: 3     HUMALOG KWIKPEN 100 UNIT/ML soln, INJECT 4-12 UNITS SUBCUTANEOUSLY WITH MEALS AS DIRECTED. TOTAL DAILY DOSE ABOUT 50 UNITS, Disp: 45 mL, Rfl: 0     insulin degludec (TRESIBA FLEXTOUCH) 100 UNIT/ML pen, INJECT 20 UNITS SUBCUTANEOUSLY ONCE DAILY IN THE MORNING, Disp: 15 mL, Rfl: 11     insulin pen needle (31G X 5 MM) 31G X 5 MM miscellaneous, Use 4 pen needles daily or  as directed., Disp: 400 each, Rfl: 3     insulin pen needle (B-D U/F) 31G X 8 MM miscellaneous, Use 5 pen needles daily or as directed., Disp: 500 each, Rfl: 3     LYRICA 200 MG capsule, TAKE ONE CAPSULE BY MOUTH THREE TIMES A DAY (DAW1), Disp: 90 capsule, Rfl: 4     aspirin (ASA) 325 MG EC tablet, Take 325 mg by mouth daily (Patient not taking: Reported on 7/30/2022), Disp: , Rfl:      STATIN NOT PRESCRIBED (INTENTIONAL), Please choose reason not prescribed from choices below. (Patient not taking: Reported on 7/30/2022), Disp:  , Rfl:         Vital signs reviewed by Wade Waite PA-C  BP (!) 215/78 (BP Location: Right arm, Patient Position: Sitting, Cuff Size: Adult Regular)   Pulse 81   Temp 97.8  F (36.6  C) (Tympanic)   Resp 16   SpO2 97%     Physical Exam     Physical Exam  Vitals and nursing note reviewed.   Constitutional:       General: He is not in acute distress.     Appearance: He is well-developed. He is not ill-appearing, toxic-appearing or diaphoretic.   HENT:      Head: Normocephalic and atraumatic.      Right Ear: Hearing normal.      Left Ear: Hearing normal.      Nose: Nose normal. No mucosal edema, congestion or rhinorrhea.      Mouth/Throat:      Pharynx: No oropharyngeal exudate or posterior oropharyngeal erythema.      Tonsils: No tonsillar exudate or tonsillar abscesses. 0 on the right. 0 on the left.   Eyes:      Pupils: Pupils are equal, round, and reactive to light.   Cardiovascular:      Heart sounds: Normal heart sounds, S1 normal and S2 normal. Heart sounds not distant.   Pulmonary:      Effort: Pulmonary effort is normal. No respiratory distress.      Breath sounds: Normal breath sounds. No decreased breath sounds.   Musculoskeletal:      Cervical back: Normal range of motion and neck supple.      Right knee: Normal.      Left knee: Swelling and erythema present. No effusion, ecchymosis or bony tenderness. Tenderness present.      Instability Tests: Anterior Lachman test  negative.        Legs:       Comments: Erythema, tenderness, and swelling over the left patella   Lymphadenopathy:      Cervical: No cervical adenopathy.   Skin:     General: Skin is warm and dry.      Findings: No rash.   Neurological:      Mental Status: He is alert.      Cranial Nerves: No cranial nerve deficit.   Psychiatric:         Attention and Perception: He is attentive.         Speech: Speech normal.         Behavior: Behavior normal. Behavior is cooperative.         Thought Content: Thought content normal.         Judgment: Judgment normal.             Wade Waite PA-C  7/30/2022, 9:11 AM

## 2022-08-01 ENCOUNTER — OFFICE VISIT (OUTPATIENT)
Dept: ORTHOPEDICS | Facility: CLINIC | Age: 52
End: 2022-08-01
Payer: COMMERCIAL

## 2022-08-01 VITALS
HEIGHT: 68 IN | DIASTOLIC BLOOD PRESSURE: 96 MMHG | WEIGHT: 182 LBS | SYSTOLIC BLOOD PRESSURE: 205 MMHG | BODY MASS INDEX: 27.58 KG/M2 | HEART RATE: 84 BPM

## 2022-08-01 DIAGNOSIS — M25.562 ACUTE PAIN OF LEFT KNEE: ICD-10-CM

## 2022-08-01 DIAGNOSIS — M70.42 PREPATELLAR BURSITIS OF LEFT KNEE: Primary | ICD-10-CM

## 2022-08-01 PROCEDURE — 99213 OFFICE O/P EST LOW 20 MIN: CPT | Performed by: ORTHOPAEDIC SURGERY

## 2022-08-01 ASSESSMENT — PAIN SCALES - GENERAL: PAINLEVEL: WORST PAIN (10)

## 2022-08-01 NOTE — PROGRESS NOTES
"CHIEF COMPLAINT:   Chief Complaint   Patient presents with     Left Knee - Pain     Onset: about a week. Patient notes he thinks that he new liner and prosthesis is the reason for his pain. Pain is over the knee area. He has issues trying to bend his knee with the prosthesis on. He thinks that his knee cap is not where is should be. His knee is compressed with the liner and prosthesis. He notes he has swelling over the knee. No tx.    .        HISTORY OF PRESENT ILLNESS    Eduardo Walton is a 52 year old male seen for evaluation of ongoing left knee pain. . He thinks may have twisted the knee related to the new inner liner for his left prosthesis, but he doesn't know for sure. He was walking in [facility] that day, doesn't recall anything specific. He didn't notice anything until the next day. He has difficulties trying to bend his knee with the prosthesis on. He thinks the knee cap is not in the right position. He thinks the left prosthesis is turned out, by design. The new liner is thicker and puts pressure on the knee, feels \"tight\".    History of left below the knee amputation 2019 for necrotic left foot ulcer and osteomyelitis.    He is type I diabetic, A1C=11.8 on 2/14/2022.    Has seen Dr Samuel for his right knee in the past.      Present symptoms: pain anteriorly, pain sharp , severe pain, mild swelling.    Pain severity: 10/10  Frequency of symptoms: are constant  Exacerbating Factors: weight bearing with prosthesis in place.  Relieving Factors: not having the prosthesis on  Night Pain: Yes  Pain while at rest: Yes   Numbness or tingling: No   Patient has tried:     NSAIDS: No      Physical Therapy: No      Activity modification: Yes      Bracing: No      Injections: No      Ice: No      Assistive device:  Yes      Other PMH:  has a past medical history of CAD (coronary artery disease), Diabetic retinopathy associated with type 1 diabetes mellitus (H), Foot ulcer, left (H), Osteomyelitis (H), S/P BKA (below " knee amputation), left (H) (2019), and Type 1 diabetes mellitus with complications (H).  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    Medications:   Current Outpatient Medications:      aspirin (ASA) 325 MG EC tablet, Take 325 mg by mouth daily (Patient not taking: Reported on 7/30/2022), Disp: , Rfl:      BD ULTRA FINE PEN NEEDLES, 1 each by Other route 5 times daily, Disp: 500 each, Rfl: 0     Continuous Blood Gluc  (FREESTYLE COLIN 2 READER) BRANDY, 1 Device See Admin Instructions, Disp: 1 each, Rfl: 0     Continuous Blood Gluc  (FREESTYLE COLIN 2 READER) BRANDY, , Disp: , Rfl:      Continuous Blood Gluc Sensor (FREESTYLE COLIN 2 SENSOR) MISC, 1 each every 14 days, Disp: 6 each, Rfl: 3     HUMALOG KWIKPEN 100 UNIT/ML soln, INJECT 4-12 UNITS SUBCUTANEOUSLY WITH MEALS AS DIRECTED. TOTAL DAILY DOSE ABOUT 50 UNITS, Disp: 45 mL, Rfl: 0     insulin degludec (TRESIBA FLEXTOUCH) 100 UNIT/ML pen, INJECT 20 UNITS SUBCUTANEOUSLY ONCE DAILY IN THE MORNING, Disp: 15 mL, Rfl: 11     insulin pen needle (31G X 5 MM) 31G X 5 MM miscellaneous, Use 4 pen needles daily or as directed., Disp: 400 each, Rfl: 3     " insulin pen needle (B-D U/F) 31G X 8 MM miscellaneous, Use 5 pen needles daily or as directed., Disp: 500 each, Rfl: 3     LYRICA 200 MG capsule, TAKE ONE CAPSULE BY MOUTH THREE TIMES A DAY (DAW1), Disp: 90 capsule, Rfl: 4     STATIN NOT PRESCRIBED (INTENTIONAL), Please choose reason not prescribed from choices below. (Patient not taking: Reported on 7/30/2022), Disp:  , Rfl:     Allergies: No Known Allergies    Social Hx: works in information technology.   reports that he has never smoked. He has never used smokeless tobacco. He reports current alcohol use. He reports that he does not use drugs.    Family Hx: family history includes Colon Cancer in his maternal grandmother; Ovarian Cancer in his maternal grandmother and paternal grandmother; Prostate Cancer in his maternal grandfather.    REVIEW OF SYSTEMS: 10 point ROS neg other than the symptoms noted above in the HPI and PMH. Notables include  CONSTITUTIONAL:NEGATIVE for fever, chills, change in weight  INTEGUMENTARY/SKIN: NEGATIVE for worrisome rashes, moles or lesions  MUSCULOSKELETAL:See HPI above  NEURO: NEGATIVE for weakness, dizziness or paresthesias    PHYSICAL EXAM:  BP (!) 205/96   Pulse 84   Ht 1.727 m (5' 8\")   Wt 82.6 kg (182 lb)   BMI 27.67 kg/m     GENERAL APPEARANCE: healthy, alert, no distress; accompanied by his father.  SKIN: no suspicious lesions or rashes  NEURO: Normal strength and tone, mentation intact and speech normal  PSYCH:  mentation appears normal and affect normal, not anxious  RESPIRATORY: No increased work of breathing.    BILATERAL LOWER EXTREMITIES:  Gait: not assessed, in wheelchair.  significant Quad atrophy, strength weak on the left.  Intact sensation deep peroneal nerve, superficial peroneal nerve, med/lat tibial nerve, sural nerve, saphenous nerve  Intact EHL, EDL, TA, FHL, GS, quadriceps hamstrings and hip flexors  Toes warm and well perfused, brisk capillary refill. Palpable 2+ dp pulses.  Bilateral calf soft and " nttp or squeeze.  DTRs: achilles 2+, patella 2+.  Edema: none    LEFT KNEE EXAM:    Skin: intact, no ecchymosis  There is notable prepatellar bursal swelling, irritation.   ROM: full extension to 90+ flexion with anterior discomfort.  Tight hamstrings on straight leg raise.  Effusion: none  Tender: anterior knee  NTTP med/lat joint line, posterior knee      X-RAY:  3 non weight bearing  views left knee from 7/30/2022 were reviewed in clinic today. On my review, no obvious fractures or dislocations. There is a below knee amputation. No cortical destruction or active periosteal reaction to suggest osteomyelitis. No fracture. Arterial calcifications. Mild patellofemoral osteoarthrosis. Mild medial compartment narrowing. Soft tissue fullness anterior to the patella and patellar tendon, nonspecific.       ASSESSMENT/PLAN: Eduardo Walton is a 52 year old male with acute left anterior knee pain, prepatellar bursitis    * exam, images reviewed  * other than a little osteoarthritis, the knee on xray looks fine.  * exam, history consistent with bursitis in front of the knee on the knee cap, likely from irritation of the prosthesis/liner.  * ice, elevation, gentle light compression, activity modification (going without prosthesis as much as possible) until better  * consider revisit with prosthetist for evaluation, adjustment of prosthesis.  * consider aspriation/injection in future, but given history of infections, uncontrolled diabetes mellitus, would want to avoid as much as possible  * return to clinic as needed.           Andres Valdovinos M.D., M.S.  Dept. of Orthopaedic Surgery  [facility]

## 2022-08-01 NOTE — LETTER
"    8/1/2022         RE: Eduardo Walton  [address] [facility] [address]        Dear Colleague,    Thank you for referring your patient, Eduardo Walton, to the [facility] [place]. Please see a copy of my visit note below.    CHIEF COMPLAINT:   Chief Complaint   Patient presents with     Left Knee - Pain     Onset: about a week. Patient notes he thinks that he new liner and prosthesis is the reason for his pain. Pain is over the knee area. He has issues trying to bend his knee with the prosthesis on. He thinks that his knee cap is not where is should be. His knee is compressed with the liner and prosthesis. He notes he has swelling over the knee. No tx.    .        HISTORY OF PRESENT ILLNESS    Eduardo Walton is a 52 year old male seen for evaluation of ongoing left knee pain. . He thinks may have twisted the knee related to the new inner liner for his left prosthesis, but he doesn't know for sure. He was walking in [facility] that day, doesn't recall anything specific. He didn't notice anything until the next day. He has difficulties trying to bend his knee with the prosthesis on. He thinks the knee cap is not in the right position. He thinks the left prosthesis is turned out, by design. The new liner is thicker and puts pressure on the knee, feels \"tight\".    History of left below the knee amputation 2019 for necrotic left foot ulcer and osteomyelitis.    He is type I diabetic, A1C=11.8 on 2/14/2022.    Has seen Dr Samuel for his right knee in the past.      Present symptoms: pain anteriorly, pain sharp , severe pain, mild swelling.    Pain severity: 10/10  Frequency of symptoms: are constant  Exacerbating Factors: weight bearing with prosthesis in place.  Relieving Factors: not having the prosthesis on  Night Pain: Yes  Pain while at rest: Yes   Numbness or tingling: No   Patient has tried:     NSAIDS: No      Physical Therapy: No      Activity modification: Yes      Bracing: No      " Injections: No      Ice: No      Assistive device:  Yes      Other PMH:  has a past medical history of CAD (coronary artery disease), Diabetic retinopathy associated with type 1 diabetes mellitus (H), Foot ulcer, left (H), Osteomyelitis (H), S/P BKA (below knee amputation), left (H) (2019), and Type 1 diabetes mellitus with complications (H).  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    Medications:   Current Outpatient Medications:      aspirin (ASA) 325 MG EC tablet, Take 325 mg by mouth daily (Patient not taking: Reported on 7/30/2022), Disp: , Rfl:      BD ULTRA FINE PEN NEEDLES, 1 each by Other route 5 times daily, Disp: 500 each, Rfl: 0     Continuous Blood Gluc  (FREESTYLE COLIN 2 READER) BRANDY, 1 Device See Admin Instructions, Disp: 1 each, Rfl: 0     Continuous Blood Gluc  (FREESTYLE COLIN 2 READER) BRANDY, , Disp: , Rfl:      Continuous Blood Gluc Sensor (FREESTYLE COLIN 2 SENSOR) [facility], 1 each every 14 days, Disp: 6 each, Rfl: 3     HUMALOG KWIKPEN 100 UNIT/ML soln, INJECT 4-12 UNITS SUBCUTANEOUSLY WITH MEALS AS DIRECTED. TOTAL DAILY DOSE ABOUT 50 UNITS, Disp: 45 mL, Rfl: 0      "insulin degludec (TRESIBA FLEXTOUCH) 100 UNIT/ML pen, INJECT 20 UNITS SUBCUTANEOUSLY ONCE DAILY IN THE MORNING, Disp: 15 mL, Rfl: 11     insulin pen needle (31G X 5 MM) 31G X 5 MM miscellaneous, Use 4 pen needles daily or as directed., Disp: 400 each, Rfl: 3     insulin pen needle (B-D U/F) 31G X 8 MM miscellaneous, Use 5 pen needles daily or as directed., Disp: 500 each, Rfl: 3     LYRICA 200 MG capsule, TAKE ONE CAPSULE BY MOUTH THREE TIMES A DAY (DAW1), Disp: 90 capsule, Rfl: 4     STATIN NOT PRESCRIBED (INTENTIONAL), Please choose reason not prescribed from choices below. (Patient not taking: Reported on 7/30/2022), Disp:  , Rfl:     Allergies: No Known Allergies    Social Hx: works in information technology.   reports that he has never smoked. He has never used smokeless tobacco. He reports current alcohol use. He reports that he does not use drugs.    Family Hx: family history includes Colon Cancer in his maternal grandmother; Ovarian Cancer in his maternal grandmother and paternal grandmother; Prostate Cancer in his maternal grandfather.    REVIEW OF SYSTEMS: 10 point ROS neg other than the symptoms noted above in the HPI and PMH. Notables include  CONSTITUTIONAL:NEGATIVE for fever, chills, change in weight  INTEGUMENTARY/SKIN: NEGATIVE for worrisome rashes, moles or lesions  MUSCULOSKELETAL:See HPI above  NEURO: NEGATIVE for weakness, dizziness or paresthesias    PHYSICAL EXAM:  BP (!) 205/96   Pulse 84   Ht 1.727 m (5' 8\")   Wt 82.6 kg (182 lb)   BMI 27.67 kg/m     GENERAL APPEARANCE: healthy, alert, no distress; accompanied by his father.  SKIN: no suspicious lesions or rashes  NEURO: Normal strength and tone, mentation intact and speech normal  PSYCH:  mentation appears normal and affect normal, not anxious  RESPIRATORY: No increased work of breathing.    BILATERAL LOWER EXTREMITIES:  Gait: not assessed, in wheelchair.  significant Quad atrophy, strength weak on the left.  Intact sensation deep " peroneal nerve, superficial peroneal nerve, med/lat tibial nerve, sural nerve, saphenous nerve  Intact EHL, EDL, TA, FHL, GS, quadriceps hamstrings and hip flexors  Toes warm and well perfused, brisk capillary refill. Palpable 2+ dp pulses.  Bilateral calf soft and nttp or squeeze.  DTRs: achilles 2+, patella 2+.  Edema: none    LEFT KNEE EXAM:    Skin: intact, no ecchymosis  There is notable prepatellar bursal swelling, irritation.   ROM: full extension to 90+ flexion with anterior discomfort.  Tight hamstrings on straight leg raise.  Effusion: none  Tender: anterior knee  NTTP med/lat joint line, posterior knee      X-RAY:  3 non weight bearing  views left knee from 7/30/2022 were reviewed in clinic today. On my review, no obvious fractures or dislocations. There is a below knee amputation. No cortical destruction or active periosteal reaction to suggest osteomyelitis. No fracture. Arterial calcifications. Mild patellofemoral osteoarthrosis. Mild medial compartment narrowing. Soft tissue fullness anterior to the patella and patellar tendon, nonspecific.       ASSESSMENT/PLAN: Eduardo Walton is a 52 year old male with acute left anterior knee pain, prepatellar bursitis    * exam, images reviewed  * other than a little osteoarthritis, the knee on xray looks fine.  * exam, history consistent with bursitis in front of the knee on the knee cap, likely from irritation of the prosthesis/liner.  * ice, elevation, gentle light compression, activity modification (going without prosthesis as much as possible) until better  * consider revisit with prosthetist for evaluation, adjustment of prosthesis.  * consider aspriation/injection in future, but given history of infections, uncontrolled diabetes mellitus, would want to avoid as much as possible  * return to clinic as needed.           Andres Valdovinos M.D., M.S.  Dept. of Orthopaedic Surgery  [facility]          Again, thank you for allowing me to participate  in the care of your patient.        Sincerely,        Andres Valdovinos MD

## 2022-08-08 ENCOUNTER — MYC MEDICAL ADVICE (OUTPATIENT)
Dept: ENDOCRINOLOGY | Facility: CLINIC | Age: 52
End: 2022-08-08

## 2022-08-08 DIAGNOSIS — E10.40 TYPE 1 DIABETES MELLITUS WITH DIABETIC NEUROPATHY (H): Primary | ICD-10-CM

## 2022-08-08 DIAGNOSIS — E10.39 TYPE 1 DIABETES MELLITUS WITH OTHER DIABETIC OPHTHALMIC COMPLICATION (H): ICD-10-CM

## 2022-08-08 NOTE — TELEPHONE ENCOUNTER
BD ULTRA FINE PEN NEEDLES  Last Written Prescription Date:  2022  Last Fill Quantity: 500,  # refills: 0     Continuous Blood Gluc Sensor (FREESTYLE COLIN 2 SENSOR) MISC  Last Written Prescription Date:  2021  Last Fill Quantity: 6,  # refills: 3     HUMALOG KWIKPEN 100 UNIT/ML soln  Last Written Prescription Date:  10/8/2021  Last Fill Quantity: 45 mL,  # refills: 0     insulin degludec (TRESIBA FLEXTOUCH) 100 UNIT/ML pen  Last Written Prescription Date:  2021  Last Fill Quantity: 15 mL,  # refills: 11   Last office visit: [id number] with prescribing provider:     Future Office Visit:      Requested Prescriptions   Pending Prescriptions Disp Refills     BD ULTRA FINE PEN NEEDLES 500 each 0     Si each by Other route 5 times daily       There is no refill protocol information for this order        insulin degludec (TRESIBA FLEXTOUCH) 100 UNIT/ML pen 15 mL 11     Sig: INJECT 20 UNITS SUBCUTANEOUSLY ONCE DAILY IN THE MORNING       There is no refill protocol information for this order        HUMALOG KWIKPEN 100 UNIT/ML soln 45 mL 0     Sig: INJECT 4-12 UNITS SUBCUTANEOUSLY WITH MEALS AS DIRECTED. TOTAL DAILY DOSE ABOUT 50 UNITS       There is no refill protocol information for this order        Continuous Blood Gluc Sensor (FREESTYLE COLIN 2 SENSOR) MISC 6 each 3     Si each every 14 days       There is no refill protocol information for this order

## 2022-08-09 RX ORDER — INSULIN LISPRO 100 [IU]/ML
INJECTION, SOLUTION INTRAVENOUS; SUBCUTANEOUS
Qty: 45 ML | Refills: 0 | OUTPATIENT
Start: 2022-08-09

## 2022-08-09 RX ORDER — INSULIN DEGLUDEC 100 U/ML
INJECTION, SOLUTION SUBCUTANEOUS
Qty: 15 ML | Refills: 11 | OUTPATIENT
Start: 2022-08-09

## 2022-08-09 RX ORDER — INSULIN LISPRO 100 [IU]/ML
INJECTION, SOLUTION INTRAVENOUS; SUBCUTANEOUS
Qty: 45 ML | Refills: 0 | Status: SHIPPED | OUTPATIENT
Start: 2022-08-09 | End: 2022-11-01

## 2022-08-09 RX ORDER — INSULIN DEGLUDEC 100 U/ML
INJECTION, SOLUTION SUBCUTANEOUS
Qty: 15 ML | Refills: 2 | Status: SHIPPED | OUTPATIENT
Start: 2022-08-09 | End: 2023-04-18

## 2022-08-09 RX ORDER — INSULIN DEGLUDEC 100 U/ML
INJECTION, SOLUTION SUBCUTANEOUS
Qty: 15 ML | Refills: 2 | Status: SHIPPED | OUTPATIENT
Start: 2022-08-09 | End: 2022-08-09

## 2022-08-28 ENCOUNTER — HEALTH MAINTENANCE LETTER (OUTPATIENT)
Age: 52
End: 2022-08-28

## 2022-09-26 ENCOUNTER — VIRTUAL VISIT (OUTPATIENT)
Dept: ENDOCRINOLOGY | Facility: CLINIC | Age: 52
End: 2022-09-26
Payer: COMMERCIAL

## 2022-09-26 DIAGNOSIS — Z53.9 ERRONEOUS ENCOUNTER--DISREGARD: Primary | ICD-10-CM

## 2022-09-26 NOTE — PROGRESS NOTES
Patient had a video visit scheduled for today but unable to do the video link.  The appointment therefore cancelled and we will offer him option of a clinic work-in appt on 10/10/22.    LEVI Palma MD, MS  Endocrinology  [facility]

## 2022-09-26 NOTE — LETTER
9/26/2022         RE: Eduardo Walton  [address]  [facility] [address]        Dear Colleague,    Thank you for referring your patient, Eduardo Walton, to the [facility] [place]. Please see a copy of my visit note below.    Patient had a video visit scheduled for today but unable to do the video link.  The appointment therefore cancelled and we will offer him option of a clinic work-in appt on 10/10/22.    LEVI Palma MD, MS  Endocrinology  [facility]            Again, thank you for allowing me to participate in the care of your patient.        Sincerely,        Jori Palma MD

## 2022-10-04 DIAGNOSIS — Z89.512 S/P BKA (BELOW KNEE AMPUTATION) UNILATERAL, LEFT (H): ICD-10-CM

## 2022-10-05 ENCOUNTER — OFFICE VISIT (OUTPATIENT)
Dept: FAMILY MEDICINE | Facility: OTHER | Age: 52
End: 2022-10-05
Payer: COMMERCIAL

## 2022-10-05 VITALS
TEMPERATURE: 98.3 F | DIASTOLIC BLOOD PRESSURE: 82 MMHG | SYSTOLIC BLOOD PRESSURE: 150 MMHG | HEART RATE: 76 BPM | RESPIRATION RATE: 16 BRPM | OXYGEN SATURATION: 96 %

## 2022-10-05 DIAGNOSIS — E10.42 TYPE 1 DIABETES MELLITUS WITH DIABETIC POLYNEUROPATHY (H): ICD-10-CM

## 2022-10-05 DIAGNOSIS — F43.29 STRESS AND ADJUSTMENT REACTION: ICD-10-CM

## 2022-10-05 DIAGNOSIS — I25.10 CORONARY ARTERY DISEASE INVOLVING NATIVE CORONARY ARTERY OF NATIVE HEART WITHOUT ANGINA PECTORIS: ICD-10-CM

## 2022-10-05 DIAGNOSIS — E10.39 TYPE 1 DIABETES MELLITUS WITH OTHER DIABETIC OPHTHALMIC COMPLICATION (H): Primary | ICD-10-CM

## 2022-10-05 DIAGNOSIS — Z12.11 SCREEN FOR COLON CANCER: ICD-10-CM

## 2022-10-05 DIAGNOSIS — E13.319 RETINOPATHY DUE TO SECONDARY DIABETES MELLITUS (H): ICD-10-CM

## 2022-10-05 DIAGNOSIS — F41.1 GAD (GENERALIZED ANXIETY DISORDER): ICD-10-CM

## 2022-10-05 DIAGNOSIS — F32.1 CURRENT MODERATE EPISODE OF MAJOR DEPRESSIVE DISORDER WITHOUT PRIOR EPISODE (H): ICD-10-CM

## 2022-10-05 DIAGNOSIS — Z53.20 STATIN DECLINED: ICD-10-CM

## 2022-10-05 DIAGNOSIS — R80.9 POSITIVE FOR MACROALBUMINURIA: ICD-10-CM

## 2022-10-05 DIAGNOSIS — N18.31 STAGE 3A CHRONIC KIDNEY DISEASE (H): ICD-10-CM

## 2022-10-05 DIAGNOSIS — N05.9 NEPHRITIS AND NEPHROPATHY, WITH PATHOLOGICAL LESION IN KIDNEY: ICD-10-CM

## 2022-10-05 DIAGNOSIS — Z89.512 S/P BKA (BELOW KNEE AMPUTATION) UNILATERAL, LEFT (H): ICD-10-CM

## 2022-10-05 LAB
ALBUMIN SERPL-MCNC: 3.2 G/DL (ref 3.4–5)
ALP SERPL-CCNC: 103 U/L (ref 40–150)
ALT SERPL W P-5'-P-CCNC: 28 U/L (ref 0–70)
ANION GAP SERPL CALCULATED.3IONS-SCNC: 2 MMOL/L (ref 3–14)
AST SERPL W P-5'-P-CCNC: 28 U/L (ref 0–45)
BILIRUB SERPL-MCNC: 0.9 MG/DL (ref 0.2–1.3)
BUN SERPL-MCNC: 24 MG/DL (ref 7–30)
CALCIUM SERPL-MCNC: 8.8 MG/DL (ref 8.5–10.1)
CHLORIDE BLD-SCNC: 106 MMOL/L (ref 94–109)
CHOLEST SERPL-MCNC: 167 MG/DL
CO2 SERPL-SCNC: 33 MMOL/L (ref 20–32)
CREAT SERPL-MCNC: 1.34 MG/DL (ref 0.66–1.25)
FASTING STATUS PATIENT QL REPORTED: NO
GFR SERPL CREATININE-BSD FRML MDRD: 65 ML/MIN/1.73M2
GLUCOSE BLD-MCNC: 144 MG/DL (ref 70–99)
HBA1C MFR BLD: 10.8 % (ref 0–5.6)
HDLC SERPL-MCNC: 40 MG/DL
LDLC SERPL CALC-MCNC: 106 MG/DL
NONHDLC SERPL-MCNC: 127 MG/DL
POTASSIUM BLD-SCNC: 4.3 MMOL/L (ref 3.4–5.3)
PROT SERPL-MCNC: 7.1 G/DL (ref 6.8–8.8)
SODIUM SERPL-SCNC: 141 MMOL/L (ref 133–144)
TRIGL SERPL-MCNC: 105 MG/DL

## 2022-10-05 PROCEDURE — 36415 COLL VENOUS BLD VENIPUNCTURE: CPT | Performed by: NURSE PRACTITIONER

## 2022-10-05 PROCEDURE — 80061 LIPID PANEL: CPT | Performed by: NURSE PRACTITIONER

## 2022-10-05 PROCEDURE — 80053 COMPREHEN METABOLIC PANEL: CPT | Performed by: NURSE PRACTITIONER

## 2022-10-05 PROCEDURE — 83036 HEMOGLOBIN GLYCOSYLATED A1C: CPT | Performed by: NURSE PRACTITIONER

## 2022-10-05 PROCEDURE — 99215 OFFICE O/P EST HI 40 MIN: CPT | Performed by: NURSE PRACTITIONER

## 2022-10-05 RX ORDER — PREGABALIN 200 MG/1
CAPSULE ORAL
Qty: 90 CAPSULE | Refills: 6 | Status: SHIPPED | OUTPATIENT
Start: 2022-10-05 | End: 2023-04-18

## 2022-10-05 RX ORDER — DULOXETIN HYDROCHLORIDE 30 MG/1
30 CAPSULE, DELAYED RELEASE ORAL DAILY
Qty: 30 CAPSULE | Refills: 0 | Status: SHIPPED | OUTPATIENT
Start: 2022-10-05 | End: 2022-11-21

## 2022-10-05 RX ORDER — PREGABALIN 200 MG/1
CAPSULE ORAL
Qty: 90 CAPSULE | Refills: 1 | OUTPATIENT
Start: 2022-10-05

## 2022-10-05 RX ORDER — LISINOPRIL 10 MG/1
10 TABLET ORAL DAILY
Qty: 90 TABLET | Refills: 0 | Status: CANCELLED | OUTPATIENT
Start: 2022-10-05

## 2022-10-05 NOTE — PROGRESS NOTES
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 Bili, TP); Future  - Lipid panel reflex to direct LDL Fasting; Future  - Albumin Random Urine Quantitative with Creat Ratio; Future  - Hemoglobin A1c  - Comprehensive metabolic panel (BMP + Alb, Alk Phos, ALT, AST, Total. Bili, TP)  - Lipid panel reflex to direct LDL Fasting  - DULoxetine (CYMBALTA) 30 MG capsule; Take 1 capsule (30 mg) by mouth daily        Stage 3a chronic kidney disease (H)  Monitoring     S/P BKA (below knee amputation) unilateral, left (H)  Pain management with lyrica and hoping to have some benefit with Cymbalta (see below).   - DULoxetine (CYMBALTA) 30 MG capsule; Take 1 capsule (30 mg) by mouth daily  - LYRICA 200 MG capsule; TAKE ONE CAPSULE BY MOUTH THREE TIMES A DAY (DAW1)        Retinopathy due to secondary diabetes mellitus (H)  Due for eye exam and will follow with eye specialist     Coronary artery disease involving native coronary artery of native heart without angina pectoris  Due to see cardiology, has declined this in the past.   Will work on BP, possibly would be willing to start ARB since he did not tolerate lisinopril.   Recommend statin therapy and Asprin which he has declined.      Statin declined  As noted above     Type 1 diabetes mellitus with diabetic polyneuropathy (H)  Continue on lyrica and DM management with endocrinology   - Albumin Random Urine Quantitative with Creat Ratio; Future          Screen for colon cancer  Declined colonoscopy at this time, will talk to endocrinology about prep and will reassess     Current moderate episode of major depressive disorder without prior episode (H)  Discussed his symptoms are consistent with depression and chronic illness stressors.   We discussed trial of Cymbalta as it may also help with some pain  management as well as mental health  I would like to start a low dose.   Will Cymbalta 30mg daily. Recommend starting in the morning and if having drowsiness switch to at night. . Side effects discussed and provided in patient instructions. Also discussed the pathophysiology of this medication and its effect on serotonin. Patient was alerted that this medication can take at least one month to go into full effect and that he should not be discouraged if he does not see his symptoms improving right away. I spent a good deal of time discussing the depression action plan including adherence to his medication, regular exercise and healthy diet, support of friends and family, and undergoing individual psychotherapy  Advised close follow up with telephone visit in 4 weeks.    - DULoxetine (CYMBALTA) 30 MG capsule; Take 1 capsule (30 mg) by mouth daily    CARLOS (generalized anxiety disorder)  As noted above   - DULoxetine (CYMBALTA) 30 MG capsule; Take 1 capsule (30 mg) by mouth daily    Stress and adjustment reaction  As noted above   - DULoxetine (CYMBALTA) 30 MG capsule; Take 1 capsule (30 mg) by mouth daily      The patient indicates understanding of these issues and agrees with the plan.      Patient Instructions   - Start Cymbalta daily in the morning.   - Monitor blood pressures and re-evaluate this at our next visit.   - Follow up with me with a telephone visit in 4 weeks.       NATASHA Queen CNP  Questions or concerns please feel free to send me a [employer] message or call me  Phone : [phone]        Return in about 4 weeks (around 11/2/2022).    NATASHA Queen CNP  [facility] CALISTA Clark is a 52 year old presenting for the following health issues:  Recheck Medication      HPI     Patient states here today to discuss more healthy ways to deal with his stress and would also like to get refills on Natasha prescription.    Bowels- having bowel frequency.     Not dealing  "with stress well, worse since his birthday.   Mad and frustrated easily.   More frustrated with life lately.   Family issues prevented him from coming in the last time he had a visit.   Feels that \" What I am doing is worse then everyone else is dealing with- DM and BKA\"   Feeling hopeless, does not drive and depending on others is hard for him. No independence.   The lyrica helps the Phantom pain and neuropathy help.   Has support groups, feels that the groups he does tells him what he wants to hear.     Gets frustrated with his prosthesis   Putting it on it works better when he is with his specialist and then this morning it was not going on smoothly which causes him anxiety and anger.     Review of Systems   Constitutional, HEENT, cardiovascular, pulmonary, gi and gu systems are negative, except as otherwise noted.       Objective    BP (!) 152/82   Pulse 76   Temp 98.3  F (36.8  C) (Temporal)   Resp 16   SpO2 96%   There is no height or weight on file to calculate BMI.  Physical Exam   GENERAL: healthy and alert  EYES: Eyes grossly normal to inspection, PERRL and conjunctivae and sclerae normal  SKIN: no suspicious lesions or rashes  NEURO: Normal strength and tone, mentation intact and speech normal  PSYCH: anxious, appearance well groomed and Frusterated    Results for orders placed or performed in visit on 10/05/22   Hemoglobin A1c     Status: Abnormal   Result Value Ref Range    Hemoglobin A1C 10.8 (H) 0.0 - 5.6 %       40 minutes spent on the date of the encounter doing chart review, history and exam, documentation and further activities per the note          "

## 2022-10-05 NOTE — PATIENT INSTRUCTIONS
- Start Cymbalta daily in the morning.   - Monitor blood pressures and re-evaluate this at our next visit.   - Follow up with me with a telephone visit in 4 weeks.       NATASHA Queen CNP  Questions or concerns please feel free to send me a [employer] message or call me  Phone : [phone]

## 2022-11-01 DIAGNOSIS — E10.39 TYPE 1 DIABETES MELLITUS WITH OTHER DIABETIC OPHTHALMIC COMPLICATION (H): ICD-10-CM

## 2022-11-01 DIAGNOSIS — E10.40 TYPE 1 DIABETES MELLITUS WITH DIABETIC NEUROPATHY (H): ICD-10-CM

## 2022-11-01 RX ORDER — INSULIN LISPRO 100 [IU]/ML
INJECTION, SOLUTION INTRAVENOUS; SUBCUTANEOUS
Qty: 45 ML | Refills: 0 | Status: SHIPPED | OUTPATIENT
Start: 2022-11-01 | End: 2023-01-25

## 2022-11-01 NOTE — TELEPHONE ENCOUNTER
Last Written Prescription Date:  22  Last Fill Quantity: 45,  # refills: 0   Last office visit: 3/30/22 with prescribing provider:  Dr. Palma   Future Office Visit: 23    Next 5 appointments (look out 90 days)    2022 10:00 AM  (Arrive by 9:40 AM)  Provider Visit with NATASHA Carreno CNP  [facility] [address] [place] [address] [id number] [phone]               Requested Prescriptions   Pending Prescriptions Disp Refills     Continuous Blood Gluc Sensor (FREESTYLE COLIN 2 SENSOR) MISC 6 each 0     Si each every 14 days       There is no refill protocol information for this order        HUMALOG KWIKPEN 100 UNIT/ML soln 45 mL 0     Sig: INJECT 4-12 UNITS SUBCUTANEOUSLY WITH MEALS AS DIRECTED. TOTAL DAILY DOSE ABOUT 50 UNITS       There is no refill protocol information for this order        Refills sent per protocol  Whitley Diego RN

## 2022-11-18 ASSESSMENT — ANXIETY QUESTIONNAIRES
GAD7 TOTAL SCORE: 0
GAD7 TOTAL SCORE: 0
5. BEING SO RESTLESS THAT IT IS HARD TO SIT STILL: NOT AT ALL
8. IF YOU CHECKED OFF ANY PROBLEMS, HOW DIFFICULT HAVE THESE MADE IT FOR YOU TO DO YOUR WORK, TAKE CARE OF THINGS AT HOME, OR GET ALONG WITH OTHER PEOPLE?: NOT DIFFICULT AT ALL
2. NOT BEING ABLE TO STOP OR CONTROL WORRYING: NOT AT ALL
4. TROUBLE RELAXING: NOT AT ALL
1. FEELING NERVOUS, ANXIOUS, OR ON EDGE: NOT AT ALL
7. FEELING AFRAID AS IF SOMETHING AWFUL MIGHT HAPPEN: NOT AT ALL
6. BECOMING EASILY ANNOYED OR IRRITABLE: NOT AT ALL
7. FEELING AFRAID AS IF SOMETHING AWFUL MIGHT HAPPEN: NOT AT ALL
3. WORRYING TOO MUCH ABOUT DIFFERENT THINGS: NOT AT ALL
GAD7 TOTAL SCORE: 0
IF YOU CHECKED OFF ANY PROBLEMS ON THIS QUESTIONNAIRE, HOW DIFFICULT HAVE THESE PROBLEMS MADE IT FOR YOU TO DO YOUR WORK, TAKE CARE OF THINGS AT HOME, OR GET ALONG WITH OTHER PEOPLE: NOT DIFFICULT AT ALL

## 2022-11-18 ASSESSMENT — PATIENT HEALTH QUESTIONNAIRE - PHQ9
SUM OF ALL RESPONSES TO PHQ QUESTIONS 1-9: 0
SUM OF ALL RESPONSES TO PHQ QUESTIONS 1-9: 0

## 2022-11-21 ENCOUNTER — VIRTUAL VISIT (OUTPATIENT)
Dept: FAMILY MEDICINE | Facility: OTHER | Age: 52
End: 2022-11-21
Payer: COMMERCIAL

## 2022-11-21 DIAGNOSIS — Z89.512 S/P BKA (BELOW KNEE AMPUTATION) UNILATERAL, LEFT (H): ICD-10-CM

## 2022-11-21 DIAGNOSIS — E10.39 TYPE 1 DIABETES MELLITUS WITH OTHER DIABETIC OPHTHALMIC COMPLICATION (H): ICD-10-CM

## 2022-11-21 DIAGNOSIS — F41.1 GAD (GENERALIZED ANXIETY DISORDER): Primary | ICD-10-CM

## 2022-11-21 PROCEDURE — 99213 OFFICE O/P EST LOW 20 MIN: CPT | Mod: 95 | Performed by: NURSE PRACTITIONER

## 2022-11-21 ASSESSMENT — PATIENT HEALTH QUESTIONNAIRE - PHQ9: SUM OF ALL RESPONSES TO PHQ QUESTIONS 1-9: 0

## 2022-11-21 ASSESSMENT — ANXIETY QUESTIONNAIRES: GAD7 TOTAL SCORE: 0

## 2022-11-21 NOTE — PATIENT INSTRUCTIONS
[place] Department of Health   Medical Marijuana   [email or web address]  Call us M-F 8 a.m. - 4:30 p.m. at [phone] (metro), toll-free at [phone] (non-metro) or email [email or web address]  Mailing address:  [facility]   [address] [address]  [place] [address]

## 2022-11-21 NOTE — Clinical Note
Can we talk about patient and possible candidate for medical marijuana.   NATASHA Queen CNP Questions or concerns please feel free to send me a [employer] message or call me Phone : [phone]

## 2022-11-21 NOTE — PROGRESS NOTES
Eduardo is a 52 year old who is being evaluated via a billable telephone visit.      What phone number would you like to be contacted at? [phone]  How would you like to obtain your AVS? MyChart    Assessment & Plan     CARLOS (generalized anxiety disorder)  Patient stopped the Cymbalta  Notes his anxiety stems from situational times with his chronic illnesses and family.   Would like to consider medical marijuana for his chronic pain vs lyrica. Would want to wean off lyrica if the medical marijuana works for him. I advised him I would review his chart with the standards for this and get back to him.     S/P BKA (below knee amputation) unilateral, left (H)  Chronic pain, phantom pain.     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     Declines ACE/ARB  Declines Statin medication     The patient indicates understanding of these issues and agrees with the plan.        Patient Instructions   [facility]   Medical Marijuana   [email or web address]  Call us M-F 8 a.m. - 4:30 p.m. at [phone] (metro), toll-free at [phone] (non-metro) or email [email or web address]  Mailing address:  [facility]   [address] [address]  [place] [address]       No follow-ups on file.    NATASHA Queen CNP  [facility]   Eduardo is a 52 year old, presenting for the following health issues:  Recheck Medication      History of Present Illness       Mental Health Follow-up:  Patient presents to follow-up on Depression & Anxiety.Patient's depression since last visit has been:  Good  The patient is not having other symptoms associated with depression.  Patient's anxiety since last visit has been:  Good  The patient is not having other symptoms associated with anxiety.  Any significant life events: No  Patient is not feeling anxious or having panic attacks.  Patient has no  concerns about alcohol or drug use.He consumes 0 sweetened beverage(s) daily. He exercises with enough effort to increase his heart rate 3 or less days per week.   He is taking medications regularly.    Today's PHQ-9         PHQ-9 Total Score: 0    PHQ-9 Q9 Thoughts of better off dead/self-harm past 2 weeks :   Not at all      Today's CARLOS-7 Score: 0     He did not like the Cymbalta. Made him feel like he was out of his own body. Did not continue this medication. Usually its immediate family issues with his own sister or stressful situations. He feels that he really needs to go into a different direction, not sure with what. Lyrica seems to help phantom pain at bay. Thinking of medical marijuana. Beside the cost he is worried about how long it would last. Has type 1 diabetes which complex with the chronic pain. Impacts his mental health.   Tries to do positivity for others, but struggles for himself.     Review of Systems         Objective    Vitals - Patient Reported  Pain Score: Moderate Pain (4)  Pain Loc:  (nerve pain)      Vitals:  No vitals were obtained today due to virtual visit.    Physical Exam   healthy and no distress  PSYCH: Alert and oriented times 3; coherent speech, normal   rate and volume, able to articulate logical thoughts, able   to abstract reason, no tangential thoughts, no hallucinations   or delusions  His affect is normal  RESP: No cough, no audible wheezing, able to talk in full sentences  Remainder of exam unable to be completed due to telephone visits    Diagnostic: None        Phone call duration 16 minutes

## 2022-11-22 ENCOUNTER — TELEPHONE (OUTPATIENT)
Dept: FAMILY MEDICINE | Facility: OTHER | Age: 52
End: 2022-11-22

## 2022-11-22 NOTE — TELEPHONE ENCOUNTER
Please call patient     I have reviewed chart and found him/her to be eligible for a consult for medical cannabis. Please assist patient in scheduling 60 minute visit -  face-to-face ONLY.     Espinoza Hubbard PA-C  [facility]       Medical Cannabis - Pre-visit charting   Established Dyad 2 patient: yes   Chart reviewed: November 22, 2022  Diagnosis for medical cannabis certification: Chronic pain due to amputation   : Reviewed November 22, 2022 - no concerns   Curbside completed with Dyad 2 provider: YES, Deepti Vega, on November 22, 2022

## 2023-01-14 ENCOUNTER — HEALTH MAINTENANCE LETTER (OUTPATIENT)
Age: 53
End: 2023-01-14

## 2023-01-24 ENCOUNTER — MYC MEDICAL ADVICE (OUTPATIENT)
Dept: ENDOCRINOLOGY | Facility: CLINIC | Age: 53
End: 2023-01-24

## 2023-01-24 DIAGNOSIS — E10.39 TYPE 1 DIABETES MELLITUS WITH OTHER DIABETIC OPHTHALMIC COMPLICATION (H): ICD-10-CM

## 2023-01-24 DIAGNOSIS — E10.40 TYPE 1 DIABETES MELLITUS WITH DIABETIC NEUROPATHY (H): ICD-10-CM

## 2023-01-25 ENCOUNTER — VIRTUAL VISIT (OUTPATIENT)
Dept: ENDOCRINOLOGY | Facility: CLINIC | Age: 53
End: 2023-01-25
Payer: COMMERCIAL

## 2023-01-25 ENCOUNTER — MYC MEDICAL ADVICE (OUTPATIENT)
Dept: ENDOCRINOLOGY | Facility: CLINIC | Age: 53
End: 2023-01-25

## 2023-01-25 DIAGNOSIS — E10.319 TYPE 1 DIABETES MELLITUS WITH RETINOPATHY, MACULAR EDEMA PRESENCE UNSPECIFIED, UNSPECIFIED LATERALITY, UNSPECIFIED RETINOPATHY SEVERITY (H): ICD-10-CM

## 2023-01-25 DIAGNOSIS — E10.59 TYPE 1 DIABETES MELLITUS WITH OTHER CIRCULATORY COMPLICATION (H): ICD-10-CM

## 2023-01-25 DIAGNOSIS — E10.40 TYPE 1 DIABETES MELLITUS WITH DIABETIC NEUROPATHY (H): ICD-10-CM

## 2023-01-25 DIAGNOSIS — E10.40 TYPE 1 DIABETES MELLITUS WITH DIABETIC NEUROPATHY (H): Primary | ICD-10-CM

## 2023-01-25 DIAGNOSIS — E10.39 TYPE 1 DIABETES MELLITUS WITH OTHER DIABETIC OPHTHALMIC COMPLICATION (H): ICD-10-CM

## 2023-01-25 PROCEDURE — 99214 OFFICE O/P EST MOD 30 MIN: CPT | Mod: 95 | Performed by: INTERNAL MEDICINE

## 2023-01-25 RX ORDER — BLOOD-GLUCOSE SENSOR
1 EACH MISCELLANEOUS CONTINUOUS PRN
Qty: 2 EACH | Refills: 5 | Status: SHIPPED | OUTPATIENT
Start: 2023-01-25 | End: 2023-06-05

## 2023-01-25 RX ORDER — INSULIN LISPRO 100 [IU]/ML
INJECTION, SOLUTION INTRAVENOUS; SUBCUTANEOUS
Qty: 45 ML | Refills: 0 | Status: SHIPPED | OUTPATIENT
Start: 2023-01-25 | End: 2023-04-18

## 2023-01-25 RX ORDER — INSULIN LISPRO 100 [IU]/ML
INJECTION, SOLUTION INTRAVENOUS; SUBCUTANEOUS
Qty: 45 ML | Refills: 0 | Status: CANCELLED | OUTPATIENT
Start: 2023-01-25

## 2023-01-25 NOTE — TELEPHONE ENCOUNTER
Last Written Prescription Date:  11/1/2022  Last Fill Quantity: 6/45 ml,  # refills: 0   Last office visit: 9/26/2022 with prescribing provider:  Dr. Palma    Future Office Visit: 1/25/2023
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
## 2023-01-26 ENCOUNTER — TELEPHONE (OUTPATIENT)
Dept: ENDOCRINOLOGY | Facility: CLINIC | Age: 53
End: 2023-01-26
Payer: COMMERCIAL

## 2023-01-26 NOTE — TELEPHONE ENCOUNTER
[facility]    Fax: [phone]    [place]: [phone]    [place]: [phone]

## 2023-01-27 ENCOUNTER — TELEPHONE (OUTPATIENT)
Dept: FAMILY MEDICINE | Facility: OTHER | Age: 53
End: 2023-01-27

## 2023-01-27 ENCOUNTER — VIRTUAL VISIT (OUTPATIENT)
Dept: FAMILY MEDICINE | Facility: OTHER | Age: 53
End: 2023-01-27
Payer: COMMERCIAL

## 2023-01-27 DIAGNOSIS — E10.39 TYPE 1 DIABETES MELLITUS WITH OTHER DIABETIC OPHTHALMIC COMPLICATION (H): ICD-10-CM

## 2023-01-27 DIAGNOSIS — Z89.512 S/P BKA (BELOW KNEE AMPUTATION) UNILATERAL, LEFT (H): Primary | ICD-10-CM

## 2023-01-27 PROCEDURE — 99213 OFFICE O/P EST LOW 20 MIN: CPT | Mod: 93 | Performed by: NURSE PRACTITIONER

## 2023-01-27 NOTE — PROGRESS NOTES
Eduardo is a 52 year old who is being evaluated via a billable video visit.      How would you like to obtain your AVS? MyChart  If the video visit is dropped, the invitation should be resent by: Text to cell phone: [phone]  Will anyone else be joining your video visit? No        Assessment & Plan     S/P BKA (below knee amputation) unilateral, left (H)  Patient having difficulties (see HPI ) with current prosthetic leg.   Discussed I will have form faxed for approval   - Miscellaneous Order for DME - ONLY FOR DME    Type 1 diabetes mellitus with other diabetic ophthalmic complication (H)  Followed by Endocrinology   Does not want to start statin at this time, he is aware of the risks and we have had conversations about this. He does continue Asprin  Does not feel he needs blood pressure medicine at this time.   Lab Results   Component Value Date    A1C 10.8 10/05/2022    A1C 11.8 02/14/2022    A1C 10.9 05/21/2021    A1C 9.0 08/20/2020    A1C 10.9 02/05/2019       The patient indicates understanding of these issues and agrees with the plan.        There are no Patient Instructions on file for this visit.    No follow-ups on file.    NATASHA Queen CNP  [facility]   Eduardo is a 52 year old, presenting for the following health issues:    Referral (New Prosthetic Leg Referral Letter./Also has another question regarding a referral to see Tabitha Becker that was cancelled and then pt got a message saying he missed the appt.)    Patient would like his new leg, without using previous ankle as he has in the past. Feels that the last leg he had to take everything off and put a top sock on at night and has to have wheelchair, harder for him. Plite socket. Allows to not have a liner on the limb. The liner makes a phantom pain worse.     - PT frustrated about having to fill out questionnaires that have nothing to do with appt.    [phone] [id number] Everardo  [place]      History of Present Illness       Reason for visit:  New Prosthetic leg    He eats 2-3 servings of fruits and vegetables daily.He consumes 2 sweetened beverage(s) daily.He exercises with enough effort to increase his heart rate 9 or less minutes per day.  He exercises with enough effort to increase his heart rate 3 or less days per week.   He is taking medications regularly.         Review of Systems         Objective           Vitals:  No vitals were obtained today due to virtual visit.    Physical Exam   GENERAL: Healthy, alert and no distress  PSYCH: Mentation appears normal, affect normal/bright, judgement and insight intact, normal speech and appearance well-groomed.    Diagnostic: None           Telephone visit: 13 minutes     Originating Location (pt. Location): Home  Distant Location (provider location):  On-site  Platform used for Video Visit: Other: telephone call

## 2023-01-27 NOTE — TELEPHONE ENCOUNTER
DME order faxed to [facility] at [phone], Attn: Everardo Oscar.     Confirmed delivery via RightFax.

## 2023-01-27 NOTE — TELEPHONE ENCOUNTER
Patient calling in to get scheduled.  Next available is 3/6.  Patient said he is going to find transportation and schedule on [employer].

## 2023-01-31 NOTE — TELEPHONE ENCOUNTER
Prior Authorization Approval    Authorization Effective Date: 1/1/2023  Authorization Expiration Date: 1/31/2024  Medication: Tresiba Flextouch 100unit/ml SOPN-APPROVED  Approved Dose/Quantity:   Reference #:     Insurance Company: [employer] - Phone [phone] Fax [phone]  Expected CoPay:       CoPay Card Available:      Foundation Assistance Needed:    Which Pharmacy is filling the prescription (Not needed for infusion/clinic administered): [facility] - [place], [place] - [address]  Pharmacy Notified: Yes  Patient Notified: No  **Instructed pharmacy to notify patient when script is ready to /ship.**

## 2023-01-31 NOTE — TELEPHONE ENCOUNTER
Central Prior Authorization Team   Phone: [phone]      PA Initiation    Medication: Tresiba Flextouch 100unit/ml SOPN  Insurance Company: [employer] - Phone [phone] Fax [phone]  Pharmacy Filling the Rx: [facility] - [place], [place] - [address]  Filling Pharmacy Phone: [phone]  Filling Pharmacy Fax:    Start Date: 1/31/2023

## 2023-03-22 ENCOUNTER — TELEPHONE (OUTPATIENT)
Dept: FAMILY MEDICINE | Facility: OTHER | Age: 53
End: 2023-03-22
Payer: COMMERCIAL

## 2023-03-22 NOTE — TELEPHONE ENCOUNTER
INCOMING FORMS    Sender: [facility]    Type of Form, letter or note (What is requested?): Detailed written order & consult packet       How was the form received?: Fax    How should forms be returned?:  Fax : [phone]     Form placed in KV bin for review/signature if appropriate.

## 2023-03-24 ENCOUNTER — MEDICAL CORRESPONDENCE (OUTPATIENT)
Dept: HEALTH INFORMATION MANAGEMENT | Facility: CLINIC | Age: 53
End: 2023-03-24

## 2023-03-24 NOTE — TELEPHONE ENCOUNTER
Signed please fax      NATASHA Queen CNP  Questions or concerns please feel free to send me a [employer] message or call me  Phone : [phone]

## 2023-03-24 NOTE — TELEPHONE ENCOUNTER
Signed forms faxed back to [facility] at [phone].    Forms placed in TC bin for scanning.

## 2023-04-18 ENCOUNTER — MYC MEDICAL ADVICE (OUTPATIENT)
Dept: ENDOCRINOLOGY | Facility: CLINIC | Age: 53
End: 2023-04-18
Payer: COMMERCIAL

## 2023-04-18 DIAGNOSIS — E10.39 TYPE 1 DIABETES MELLITUS WITH OTHER DIABETIC OPHTHALMIC COMPLICATION (H): ICD-10-CM

## 2023-04-18 DIAGNOSIS — E10.40 TYPE 1 DIABETES MELLITUS WITH DIABETIC NEUROPATHY (H): ICD-10-CM

## 2023-04-18 RX ORDER — INSULIN DEGLUDEC 100 U/ML
INJECTION, SOLUTION SUBCUTANEOUS
Qty: 15 ML | Refills: 0 | Status: SHIPPED | OUTPATIENT
Start: 2023-04-18 | End: 2023-06-14

## 2023-04-18 RX ORDER — INSULIN DEGLUDEC 100 U/ML
INJECTION, SOLUTION SUBCUTANEOUS
Qty: 15 ML | Refills: 2 | Status: CANCELLED | OUTPATIENT
Start: 2023-04-18

## 2023-04-18 RX ORDER — INSULIN LISPRO 100 [IU]/ML
INJECTION, SOLUTION INTRAVENOUS; SUBCUTANEOUS
Qty: 45 ML | Refills: 0 | Status: SHIPPED | OUTPATIENT
Start: 2023-04-18 | End: 2023-07-11

## 2023-04-18 RX ORDER — INSULIN LISPRO 100 [IU]/ML
INJECTION, SOLUTION INTRAVENOUS; SUBCUTANEOUS
Qty: 45 ML | Refills: 0 | Status: CANCELLED | OUTPATIENT
Start: 2023-04-18

## 2023-04-18 NOTE — TELEPHONE ENCOUNTER
Last Written Prescription Date:  1/25/23 and 8/9/22  Last Fill Quantity: 45 and 15/1  Last office visit: 1/25/23 with Dr. Palma  Future Office Visit:          Requested Prescriptions   Pending Prescriptions Disp Refills     HUMALOG KWIKPEN 100 UNIT/ML soln 45 mL 0     Sig: INJECT 4-12 UNITS SUBCUTANEOUSLY WITH MEALS AS DIRECTED. TOTAL DAILY DOSE ABOUT 50 UNITS       There is no refill protocol information for this order        insulin degludec (TRESIBA FLEXTOUCH) 100 UNIT/ML pen 15 mL 2     Sig: INJECT 20 UNITS SUBCUTANEOUSLY ONCE DAILY IN THE MORNING       There is no refill protocol information for this order

## 2023-04-23 ENCOUNTER — HEALTH MAINTENANCE LETTER (OUTPATIENT)
Age: 53
End: 2023-04-23

## 2023-05-05 ENCOUNTER — TELEPHONE (OUTPATIENT)
Dept: OTHER | Facility: CLINIC | Age: 53
End: 2023-05-05

## 2023-05-05 ENCOUNTER — ANCILLARY PROCEDURE (OUTPATIENT)
Dept: GENERAL RADIOLOGY | Facility: CLINIC | Age: 53
End: 2023-05-05
Attending: PODIATRIST
Payer: COMMERCIAL

## 2023-05-05 ENCOUNTER — MYC MEDICAL ADVICE (OUTPATIENT)
Dept: PODIATRY | Facility: CLINIC | Age: 53
End: 2023-05-05

## 2023-05-05 ENCOUNTER — OFFICE VISIT (OUTPATIENT)
Dept: PODIATRY | Facility: CLINIC | Age: 53
End: 2023-05-05
Payer: COMMERCIAL

## 2023-05-05 VITALS
DIASTOLIC BLOOD PRESSURE: 88 MMHG | SYSTOLIC BLOOD PRESSURE: 150 MMHG | BODY MASS INDEX: 27.58 KG/M2 | WEIGHT: 182 LBS | HEIGHT: 68 IN

## 2023-05-05 DIAGNOSIS — L97.512 DIABETIC ULCER OF OTHER PART OF RIGHT FOOT ASSOCIATED WITH TYPE 1 DIABETES MELLITUS, WITH FAT LAYER EXPOSED (H): ICD-10-CM

## 2023-05-05 DIAGNOSIS — E10.40 TYPE 1 DIABETES MELLITUS WITH DIABETIC NEUROPATHY (H): Primary | ICD-10-CM

## 2023-05-05 DIAGNOSIS — E10.621 DIABETIC ULCER OF OTHER PART OF RIGHT FOOT ASSOCIATED WITH TYPE 1 DIABETES MELLITUS, WITH FAT LAYER EXPOSED (H): ICD-10-CM

## 2023-05-05 DIAGNOSIS — E10.40 TYPE 1 DIABETES MELLITUS WITH DIABETIC NEUROPATHY (H): ICD-10-CM

## 2023-05-05 DIAGNOSIS — L03.115 CELLULITIS OF RIGHT FOOT: ICD-10-CM

## 2023-05-05 DIAGNOSIS — I73.9 PAD (PERIPHERAL ARTERY DISEASE) (H): ICD-10-CM

## 2023-05-05 PROCEDURE — 11042 DBRDMT SUBQ TIS 1ST 20SQCM/<: CPT | Performed by: PODIATRIST

## 2023-05-05 PROCEDURE — 73630 X-RAY EXAM OF FOOT: CPT | Mod: TC | Performed by: RADIOLOGY

## 2023-05-05 PROCEDURE — 99214 OFFICE O/P EST MOD 30 MIN: CPT | Mod: 25 | Performed by: PODIATRIST

## 2023-05-05 RX ORDER — DOXYCYCLINE 100 MG/1
100 CAPSULE ORAL 2 TIMES DAILY
Qty: 28 CAPSULE | Refills: 0 | Status: SHIPPED | OUTPATIENT
Start: 2023-05-05 | End: 2023-05-19

## 2023-05-05 NOTE — PATIENT INSTRUCTIONS
"Thank you for choosing [facility] / Foot & Ankle Surgery!    DR HAGEN'S CLINIC:  [facility]   [address]   [place] [address]      TRIAGE LINE: [phone]  APPOINTMENTS: [phone]  RADIOLOGY: [phone]  SET UP SURGERY: [phone]  PHYSICAL THERAPY: [phone]   FAX NUMBER: [phone]  BILLING QUESTIONS: [phone]       Follow up: 3 weeks      DIABETES AND YOUR FEET  Diabetes can result in several problems in the feet including ulcers (open sores) and amputations. Two of the most important reasons why people develop foot problems when they have diabetes is : 1. Neuropathy (loss of feeling)  2. Vascular disease (loss or decrease of blood flow).    Neuropathy is a term used to describe a loss of nerve function.  Patients with diabetes are at risk of developing neuropathy if their sugars continue to run high and are above the normal value. One theory for neuropathy is that the \"extra\" sugar in the body enters the nerves and is broken down. These by-products build up in the nerve causing it to swell and impairing nerve function. Often times, this can be prevented by controlling your sugars, dieting and exercise.    When a person develops neuropathy, they usually begin to feel numbness or tingling in their feet and sometime in their legs.  Other symptoms may include painful burning or hot feet, tingling or feeling like insects or ants are crawling on your feet or legs.  If the diabetes is sever and the sugars run high for long periods of time, neuropathy can also occur in the hands.    Vascular disease  is a term used to describe a loss or decrease in circulation (blood flow). There is a problem in getting blood and oxygen to areas that need it. Similar to neuropathy, sugars can build up in the walls of the arteries (blood vessels) and cause them to become swollen, thickened and hardened. This decreases the amount of blood that can go to an area that needs " it. Though this is common in the legs of diabetic patients, it can also affect other arteries (blood vessels) in the body such as in the heart and eyes.    In the legs, vascular disease usually results in cramping. Patients who develop leg cramps after walking the same distance every time (i.e. One block, half a mile, ect.) need to let their doctors know so that their circulation may be checked. Cramps causing severe pain in the feet and/or legs while sleeping and the cramps go away when you stand or hang your legs off the side of the bed, may also be a sign of poor blood circulation.  Occasional cramping in cold weather or on rare occasions with activity may not be due to poor circulation, but you should inform your doctor.    PREVENTION OF THESE DISEASES  The key to prevention is good blood sugar control. Poor blood sugar control is a big reason many of these problems start. Physical activity (exercise) is a very good way to help decrease your blood sugars. Exercise can lower your blood sugar, blood pressure, and cholesterol. It also reduces your risk for heart disease and stroke, relieves stress, and strengthens your heart, muscles and bones.  In addition, regular activity helps insulin work better, improves your blood circulation, and keeps your joints flexible. If you're trying to lose weight, a combination of exercise and wise food choices can help you reach your target weight and maintain it.      PAIN MANAGEMENT (**Please speak with your primary doctor about any medications**)  1.Blood Sugar Control - Most important  2. Medications such as:  Amytriptylline, duloxetine, gabapentin, lyrica, tramadol (talk with your primary care doctor about this).     NUTRITION:  Nutrition is also important to help with healing. If your body does not have what it needs, it can't heal.   Increasing your protein intake is important.  With wounds you need 60-90gm of protein a day to help with healing. Over the counter protein  "shakes such as Jelani, Glucerna, Ensure, ect... can help to supplement your daily protein intake.   It is also important to take Vitamins to help with healing.  Vitamins such as B12, B6 and Vitamin D3 are important for healing. These can be gotten over the counter at pharmacies or at stores like [employer] or the [employer].    I can also prescribe a dietary supplement called \"Rheumate\" that has a lot of essential vitamins in one capsule.  This may not be covered by insurance though.     FOOT CARE RECOMMENDATIONS   1. Wash your feet with lukewarm water and a mild soap and then dry them thoroughly, especially between the toes.     2. Examine your feet daily looking for cuts, corns, blisters, cracks, ect, especially after wearing new shoes. Make sure to look between your toes. If you cannot see the bottom of your feet, set a mirror on the floor and hold your foot over it, or ask a spouse, friend or family member to examine your feet for you. Contact your doctor immediately if new problems are noted or if sores are not healing.     3. Immediately apply moisturizer to the tops and bottoms of your feet, avoiding areas between the toes. Hand lotion (Intesive Care, Margareth, Eucerin, Neutrogena, Curel, ect) is sufficient unless your doctor prescribes a medicated lotion. Apply sunscreen to your feet when going swimming outside.     4. Use clean comfortable shoes, wear white socks (if you have any bleeding or drainage, you will see it on white socks). Socks should not have thick seams or cut off the circulation around the leg. Break in new shoes slowly and rotate with older shoes until broken in. Check the inside of your shoes with your hand to look for areas of irritation or objects that may have fallen into your shoes.       5. Keep slippers by the side of your bed for use during the night.     6.  Shoes should be fitted by a professional and should not cause areas of irritation.  Check your feet regularly when wearing a new pair " of shoes and replace them as needed.     7.  Talk to your doctor about proper exercise. Exercise and stretching stimulate blood flow to your feet and maintain proper glucose levels.     8.  Monitor your blood glucose level as instructed by your doctor. Notify your doctor immediately if your blood sugar is abnormally high or low.    9. Cut your nails straight across, but then gently round any sharp edges with a cardboard nail file. If you have neuropathy, peripheral vascular disease or cannot see that well to trim your own toenails contact [employer] ([phone]) or [employer] ([phone]).      THINGS TO AVOID DOING   1.  Do not soak your feet if you have an open sore. Use only lukewarm water and always check the temperature with your hand as hot water can easily burn your feet.       2.  Never use a hot water bottle or heating pad on your feet. Also do not apply cold compresses to your feet. With decreased sensation, you could burn or freeze your feet.       3.  Do not apply any of these to your feet:    -  Over the counter medicine for corns or warts    -  Harsh chemicals like boric acid    -  Do not self-treat corns, cuts, blisters or infections. Always consult your doctor.       4.  Do not wear sandals, slippers or walk barefoot, especially on hot sand or concrete or other harsh surfaces.     5.  If you smoke, stop!!!

## 2023-05-05 NOTE — PROGRESS NOTES
"Podiatry / Foot and Ankle Surgery Progress Note    May 5, 2023    Subject: Patient was seen for ulcer to the top of the right foot.  Notes that its been there for a few months.  He has not seen anybody for it.  He has been soaking the foot but he is concerned about the redness and the odor.  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  Currently denies fever, nausea, vomiting.  No pain but has baseline neuropathy to the foot.  He is wondering what can be done to try to help heal the wound.    Objective:  Vitals: BP (!) 150/88   Ht 1.727 m (5' 8\")   Wt 82.6 kg (182 lb)   BMI 27.67 kg/m      A1C: 10.8 (10/5/2022)    General:  Patient is alert and orientated.  NAD.    Vascular:  DP and PT pulses are palpable.  No edema or varicosities noted.  CFT's < 3secs.  Skin temp is normal.    Neuro:  Light and gross touch sensation absent to feet.    Derm: Full-thickness stage III foot over the second met cuneiform joint.  This measures approximately 0.5 cm x 0.9 0.3 cm.  Does probe to deep fascia.  Did not see tendon.  There is localized redness to the area.  No purulent drainage but clear serous drainage is noted.    Musculoskeletal:  BKA left      Imaging: right foot xray - I personally reviewed the xrays -calcified vessels noted.  No gas in the tissue noted.  No evidence of bone infection.    Assessment:    Type 1 diabetes mellitus with diabetic neuropathy (H)  Cellulitis of right foot  Diabetic ulcer of other part of right foot associated with type 1 diabetes mellitus, with fat layer exposed (H)  PAD (peripheral artery disease) (H)    Medical Decision Making/Plan: At this time the ulcer was debrided.  Please see procedure note below.  Had a long discussion with patient that I am concerned that I cannot feel pulses to the foot.  Discussed that I feel this is a big contributor to why the wound is not healing.  Recommend an urgent vascular " referral to see about an angiogram or possibly helping improve the blood flow to the foot.  This referral was placed.  We will start him on oral doxycycline to help with the redness around the ulceration.  We discussed if this does not improve in a few days would recommend the hospital for IV antibiotics although he would like to avoid that if possible.  We will also have him do daily dressing changes with Iodosorb gel, 2 x 2 gauze pads and a large bandage.  Talked about offloading the area in a boot and he does have a postop boot at home he said that he had from his other foot and recommended that he wear that on the right side.  We will have him follow-up in 3 weeks for reassessment.    Questions were answered to patient's satisfaction and he will call further questions or concerns.      Procedure: After verbal consent, excisional debridement was performed on ulcer.  #15 blade was used to debride ulcer down to and including subcutaneous tissue. Bleeding controlled with light pressure.   No drainage noted.  No anesthesia was used due to neuropathy. Dry dressing applied to foot.  Patient tolerated procedure well.      Durable Medical Equipment Wound Care Orders     Wound Care Order for DME - ONLY FOR DME   As directed      DME Provider: [facility]    Start Date: 5/5/2023    Wound Supply Order Options: Complex Wound    Optional: .dmewound can be used to pull in order specific information into documentation    Wound Number: Wound 1    Wound 1 Location: right foot    Wound 1 Dressing Change Frequency: Daily    Wound 1 Length of Need: 30 days    Wound 1 - Dressing Supplies:  Primary  Wrap/Gauze       Wound 1 - Primary Dressing Dispensing Instructions: Ok to Substitute    Wound 1 - Primary Dressing Types: Other (Comments)    Wound 1 - Other Primary Dressing Type: Idosorb Gel    Wound 1 - Iodosorb Gel Size: 10 gm    Wound 1 - Iodosorb Gel Quantity: 1 Tube    Wound 1 - Wrap/Gauze Types: Pads    Wound 1 - Pad Type:  Sterile Gauze Pads    Wound 1 - Gauze Pad Size: 4 x 4 Comment - 2 per dressing change    Wound 1 - Gauze Qty for Dressing/Month: 60            Patient Risk Factor:  Patient is a high risk factor for infection.     Kay Pleitez DPM, Podiatry/Foot and Ankle Surgery

## 2023-05-05 NOTE — LETTER
"    5/5/2023         RE: Eduardo Walton  [address]  [place] [facility] [address]        Dear Colleague,    Thank you for referring your patient, Eduardo Walton, to the [facility]. Please see a copy of my visit note below.    Podiatry / Foot and Ankle Surgery Progress Note    May 5, 2023    Subject: Patient was seen for ulcer to the top of the right foot.  Notes that its been there for a few months.  He has not seen anybody for it.  He has been soaking the foot but he is concerned about the redness and the odor.  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  Currently denies fever, nausea, vomiting.  No pain but has baseline neuropathy to the foot.  He is wondering what can be done to try to help heal the wound.    Objective:  Vitals: BP (!) 150/88   Ht 1.727 m (5' 8\")   Wt 82.6 kg (182 lb)   BMI 27.67 kg/m      A1C: 10.8 (10/5/2022)    General:  Patient is alert and orientated.  NAD.    Vascular:  DP and PT pulses are palpable.  No edema or varicosities noted.  CFT's < 3secs.  Skin temp is normal.    Neuro:  Light and gross touch sensation absent to feet.    Derm: Full-thickness stage III foot over the second met cuneiform joint.  This measures approximately 0.5 cm x 0.9 0.3 cm.  Does probe to deep fascia.  Did not see tendon.  There is localized redness to the area.  No purulent drainage but clear serous drainage is noted.    Musculoskeletal:  BKA left      Imaging: right foot xray - I personally reviewed the xrays -calcified vessels noted.  No gas in the tissue noted.  No evidence of bone infection.    Assessment:    Type 1 diabetes mellitus with diabetic neuropathy (H)  Cellulitis of right foot  Diabetic ulcer of other part of right foot associated with type 1 diabetes mellitus, with fat layer exposed (H)  PAD (peripheral artery disease) (H)    Medical Decision Making/Plan: At this time the ulcer was " debrided.  Please see procedure note below.  Had a long discussion with patient that I am concerned that I cannot feel pulses to the foot.  Discussed that I feel this is a big contributor to why the wound is not healing.  Recommend an urgent vascular referral to see about an angiogram or possibly helping improve the blood flow to the foot.  This referral was placed.  We will start him on oral doxycycline to help with the redness around the ulceration.  We discussed if this does not improve in a few days would recommend the hospital for IV antibiotics although he would like to avoid that if possible.  We will also have him do daily dressing changes with Iodosorb gel, 2 x 2 gauze pads and a large bandage.  Talked about offloading the area in a boot and he does have a postop boot at home he said that he had from his other foot and recommended that he wear that on the right side.  We will have him follow-up in 3 weeks for reassessment.    Questions were answered to patient's satisfaction and he will call further questions or concerns.      Procedure: After verbal consent, excisional debridement was performed on ulcer.  #15 blade was used to debride ulcer down to and including subcutaneous tissue. Bleeding controlled with light pressure.   No drainage noted.  No anesthesia was used due to neuropathy. Dry dressing applied to foot.  Patient tolerated procedure well.      Patient Risk Factor:  Patient is a high risk factor for infection.     Kay Pleitez DPM, Podiatry/Foot and Ankle Surgery        Again, thank you for allowing me to participate in the care of your patient.        Sincerely,        Kay Pleitez DPM, Podiatry/Foot and Ankle Surgery

## 2023-05-05 NOTE — TELEPHONE ENCOUNTER
Pt referred to [facility] by Kay Pleitez DPM for nonhealing ulceration to the top of the right foot. Patient is a type I diabetic with below-knee amputation on the left leg    Pt needs to be scheduled for JADEN w/o and NEW VASCULAR PATIENT consult with Vascular Surgery.  Will route to scheduling to coordinate an appointment next week.    Appt note: Ref by Dr. Pleitez for nonhealing ulceration of the right foot; previous left BKA; DM1; JADEN without to be scheduled prior.    CLAIRE NeriN, RN-[facility] [place]

## 2023-05-10 NOTE — TELEPHONE ENCOUNTER
Called patient to schedule ultrasound and consult appointments. Pt stated he made it very clear per his phone call yesterday that he will not be scheduling for these appts until he has his follow-up with Dr Pleitez in June. Pt stated he was told that the referral can be put on hold until he speaks with ref. Provider and is ready to reschedule.

## 2023-05-17 ENCOUNTER — TRANSFERRED RECORDS (OUTPATIENT)
Dept: HEALTH INFORMATION MANAGEMENT | Facility: CLINIC | Age: 53
End: 2023-05-17
Payer: COMMERCIAL

## 2023-05-29 ENCOUNTER — MYC MEDICAL ADVICE (OUTPATIENT)
Dept: PODIATRY | Facility: CLINIC | Age: 53
End: 2023-05-29
Payer: COMMERCIAL

## 2023-05-29 DIAGNOSIS — Z89.512 HX OF BKA, LEFT (H): ICD-10-CM

## 2023-05-29 DIAGNOSIS — L97.512 ULCER OF RIGHT FOOT WITH FAT LAYER EXPOSED (H): ICD-10-CM

## 2023-05-29 DIAGNOSIS — E10.40 TYPE 1 DIABETES MELLITUS WITH DIABETIC NEUROPATHY (H): Primary | ICD-10-CM

## 2023-05-30 RX ORDER — DOXYCYCLINE 100 MG/1
100 CAPSULE ORAL 2 TIMES DAILY
Qty: 28 CAPSULE | Refills: 0 | Status: SHIPPED | OUTPATIENT
Start: 2023-05-30 | End: 2023-06-13

## 2023-06-05 ENCOUNTER — OFFICE VISIT (OUTPATIENT)
Dept: FAMILY MEDICINE | Facility: OTHER | Age: 53
End: 2023-06-05
Payer: COMMERCIAL

## 2023-06-05 ENCOUNTER — TELEPHONE (OUTPATIENT)
Dept: FAMILY MEDICINE | Facility: OTHER | Age: 53
End: 2023-06-05

## 2023-06-05 VITALS
HEART RATE: 72 BPM | SYSTOLIC BLOOD PRESSURE: 138 MMHG | WEIGHT: 182 LBS | DIASTOLIC BLOOD PRESSURE: 64 MMHG | HEIGHT: 68 IN | BODY MASS INDEX: 27.58 KG/M2 | TEMPERATURE: 98.6 F | OXYGEN SATURATION: 94 %

## 2023-06-05 DIAGNOSIS — G60.9 HEREDITARY AND IDIOPATHIC PERIPHERAL NEUROPATHY: ICD-10-CM

## 2023-06-05 DIAGNOSIS — F32.1 CURRENT MODERATE EPISODE OF MAJOR DEPRESSIVE DISORDER WITHOUT PRIOR EPISODE (H): ICD-10-CM

## 2023-06-05 DIAGNOSIS — Z12.11 SCREEN FOR COLON CANCER: ICD-10-CM

## 2023-06-05 DIAGNOSIS — R09.81 NASAL CONGESTION WITH RHINORRHEA: ICD-10-CM

## 2023-06-05 DIAGNOSIS — D64.9 LOW HEMOGLOBIN: Primary | ICD-10-CM

## 2023-06-05 DIAGNOSIS — E10.39 TYPE 1 DIABETES MELLITUS WITH OTHER DIABETIC OPHTHALMIC COMPLICATION (H): ICD-10-CM

## 2023-06-05 DIAGNOSIS — D64.9 LOW HEMOGLOBIN: ICD-10-CM

## 2023-06-05 DIAGNOSIS — R29.898 WEAKNESS OF BOTH HANDS: ICD-10-CM

## 2023-06-05 DIAGNOSIS — N18.30 STAGE 3 CHRONIC KIDNEY DISEASE, UNSPECIFIED WHETHER STAGE 3A OR 3B CKD (H): ICD-10-CM

## 2023-06-05 DIAGNOSIS — Z89.512 S/P BKA (BELOW KNEE AMPUTATION) UNILATERAL, LEFT (H): Primary | ICD-10-CM

## 2023-06-05 DIAGNOSIS — R68.83 CHILLS: ICD-10-CM

## 2023-06-05 DIAGNOSIS — J34.89 NASAL CONGESTION WITH RHINORRHEA: ICD-10-CM

## 2023-06-05 LAB
ALBUMIN SERPL BCG-MCNC: 3 G/DL (ref 3.5–5.2)
ALP SERPL-CCNC: 108 U/L (ref 40–129)
ALT SERPL W P-5'-P-CCNC: 28 U/L (ref 10–50)
ANION GAP SERPL CALCULATED.3IONS-SCNC: 9 MMOL/L (ref 7–15)
AST SERPL W P-5'-P-CCNC: 65 U/L (ref 10–50)
BASOPHILS # BLD AUTO: 0 10E3/UL (ref 0–0.2)
BASOPHILS NFR BLD AUTO: 0 %
BILIRUB SERPL-MCNC: 0.4 MG/DL
BUN SERPL-MCNC: 27.9 MG/DL (ref 6–20)
CALCIUM SERPL-MCNC: 8.2 MG/DL (ref 8.6–10)
CHLORIDE SERPL-SCNC: 98 MMOL/L (ref 98–107)
CREAT SERPL-MCNC: 1.8 MG/DL (ref 0.67–1.17)
DEPRECATED HCO3 PLAS-SCNC: 29 MMOL/L (ref 22–29)
EOSINOPHIL # BLD AUTO: 0 10E3/UL (ref 0–0.7)
EOSINOPHIL NFR BLD AUTO: 1 %
ERYTHROCYTE [DISTWIDTH] IN BLOOD BY AUTOMATED COUNT: 12.4 % (ref 10–15)
GFR SERPL CREATININE-BSD FRML MDRD: 45 ML/MIN/1.73M2
GLUCOSE SERPL-MCNC: 281 MG/DL (ref 70–99)
HCT VFR BLD AUTO: 33.8 % (ref 40–53)
HGB BLD-MCNC: 11.5 G/DL (ref 13.3–17.7)
IMM GRANULOCYTES # BLD: 0 10E3/UL
IMM GRANULOCYTES NFR BLD: 0 %
IRON BINDING CAPACITY (ROCHE): 126 UG/DL (ref 240–430)
IRON SATN MFR SERPL: 17 % (ref 15–46)
IRON SERPL-MCNC: 22 UG/DL (ref 61–157)
LYMPHOCYTES # BLD AUTO: 0.8 10E3/UL (ref 0.8–5.3)
LYMPHOCYTES NFR BLD AUTO: 18 %
MCH RBC QN AUTO: 27.8 PG (ref 26.5–33)
MCHC RBC AUTO-ENTMCNC: 34 G/DL (ref 31.5–36.5)
MCV RBC AUTO: 82 FL (ref 78–100)
MONOCYTES # BLD AUTO: 0.5 10E3/UL (ref 0–1.3)
MONOCYTES NFR BLD AUTO: 11 %
NEUTROPHILS # BLD AUTO: 3 10E3/UL (ref 1.6–8.3)
NEUTROPHILS NFR BLD AUTO: 70 %
PLATELET # BLD AUTO: 182 10E3/UL (ref 150–450)
POTASSIUM SERPL-SCNC: 4.9 MMOL/L (ref 3.4–5.3)
PROT SERPL-MCNC: 6.5 G/DL (ref 6.4–8.3)
RBC # BLD AUTO: 4.13 10E6/UL (ref 4.4–5.9)
SODIUM SERPL-SCNC: 136 MMOL/L (ref 136–145)
WBC # BLD AUTO: 4.3 10E3/UL (ref 4–11)

## 2023-06-05 PROCEDURE — 83550 IRON BINDING TEST: CPT | Performed by: NURSE PRACTITIONER

## 2023-06-05 PROCEDURE — 80053 COMPREHEN METABOLIC PANEL: CPT | Performed by: NURSE PRACTITIONER

## 2023-06-05 PROCEDURE — 87040 BLOOD CULTURE FOR BACTERIA: CPT | Performed by: NURSE PRACTITIONER

## 2023-06-05 PROCEDURE — 83540 ASSAY OF IRON: CPT | Performed by: NURSE PRACTITIONER

## 2023-06-05 PROCEDURE — 96127 BRIEF EMOTIONAL/BEHAV ASSMT: CPT | Performed by: NURSE PRACTITIONER

## 2023-06-05 PROCEDURE — 99214 OFFICE O/P EST MOD 30 MIN: CPT | Performed by: NURSE PRACTITIONER

## 2023-06-05 PROCEDURE — 36415 COLL VENOUS BLD VENIPUNCTURE: CPT | Performed by: NURSE PRACTITIONER

## 2023-06-05 PROCEDURE — 83036 HEMOGLOBIN GLYCOSYLATED A1C: CPT | Performed by: NURSE PRACTITIONER

## 2023-06-05 PROCEDURE — 85025 COMPLETE CBC W/AUTO DIFF WBC: CPT | Performed by: NURSE PRACTITIONER

## 2023-06-05 RX ORDER — PREGABALIN 200 MG/1
CAPSULE ORAL
Qty: 90 CAPSULE | Refills: 3 | Status: CANCELLED | OUTPATIENT
Start: 2023-06-05

## 2023-06-05 RX ORDER — FLUTICASONE PROPIONATE 50 MCG
1 SPRAY, SUSPENSION (ML) NASAL DAILY
Qty: 9.9 ML | Refills: 0 | Status: SHIPPED | OUTPATIENT
Start: 2023-06-05 | End: 2023-07-28

## 2023-06-05 ASSESSMENT — PAIN SCALES - GENERAL: PAINLEVEL: MODERATE PAIN (4)

## 2023-06-05 ASSESSMENT — PATIENT HEALTH QUESTIONNAIRE - PHQ9: SUM OF ALL RESPONSES TO PHQ QUESTIONS 1-9: 11

## 2023-06-05 NOTE — LETTER
June 5, 2023      Eduardo Walton  [address]  [place] [facility] [address]        To Whom It May Concern:    Eduardo Walton  was seen on by NATASHA Queen CNP on 06/05/23. Given his history of neuropathy due to type 1 diabetes and bilateral hand weakness I recommend that he trial an Easy-Off lock for his left prosthetic leg. This would allow for quick release and less pressure on his hands and allow for easier removal.         Sincerely,        NATASHA Queen CNP

## 2023-06-05 NOTE — PROGRESS NOTES
Assessment & Plan     S/P BKA (below knee amputation) unilateral, left (H)  Has issues with current prosthetic due to his bilateral hand weakness and neuropathy  Has researched into easy off lock   I did write a letter for this given his risk for falls and qualifications .     Hereditary and idiopathic peripheral neuropathy  Impacting above medical decision making  Lyrica, ok for prn Refills X 2.    reviewed.   May need to reduce dosing given kidney function and consult with nephrology.     Current moderate episode of major depressive disorder without prior episode (H)  Stable    Weakness of both hands  As noted above and impact on medical decision making.     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     Screen for colon cancer  Recommend Fit test given drop in hemoglobin.     Nasal congestion with rhinorrhea  Has sinus congestion. Discussed antihistamine. He is on a second round of doxycycline for a right foot ulcer (followed by podiatry), Suspect this would help with the sinus pressure as well. Discussed at home care with nasal spray and Neti-pot.   - fluticasone (FLONASE) 50 MCG/ACT nasal spray; Spray 1 spray into both nostrils daily    Chills  As a precaution given his chills, no fever I am going to do a CBC and blood cultures as his ulcer has worsened and he is doing a second round of antibiotics and vascular follow up. Want to be certain no systemic concerns. His blood pressure is lower then it typically is as well, but normal HR. Advised very close monitoring and that if his WBC is up I will recommend ER and discussed warning symptoms when to go into ER.   - Blood Culture Peripheral Blood; Future  - Blood  "Culture Peripheral Blood; Future  - CBC with platelets and differential; Future  - Comprehensive metabolic panel (BMP + Alb, Alk Phos, ALT, AST, Total. Bili, TP); Future  - Comprehensive metabolic panel (BMP + Alb, Alk Phos, ALT, AST, Total. Bili, TP)  - CBC with platelets and differential  - Blood Culture Arm, Right  - Blood Culture Arm, Left    Low hemoglobin  As noted above  UMAC/FIT  Iron check  Nephrology given chronic disease history.   - Iron and iron binding capacity         BMI:   Estimated body mass index is 27.67 kg/m  as calculated from the following:    Height as of this encounter: 1.727 m (5' 8\").    Weight as of this encounter: 82.6 kg (182 lb).   Weight management plan: Discussed healthy diet and exercise guidelines    Depression Screening Follow Up        6/5/2023     1:22 PM   PHQ   PHQ-9 Total Score 11   Q9: Thoughts of better off dead/self-harm past 2 weeks Not at all         6/5/2023     1:22 PM   Last PHQ-9   1.  Little interest or pleasure in doing things 1   2.  Feeling down, depressed, or hopeless 1   3.  Trouble falling or staying asleep, or sleeping too much 3   4.  Feeling tired or having little energy 3   5.  Poor appetite or overeating 3   6.  Feeling bad about yourself 0   7.  Trouble concentrating 0   8.  Moving slowly or restless 0   Q9: Thoughts of better off dead/self-harm past 2 weeks 0   PHQ-9 Total Score 11   Difficulty at work, home, or with people Very difficult       Follow Up Actions Taken  Referred patient back to PCP     There are no Patient Instructions on file for this visit.    NATASHA Queen [facility] CALISTA Clark is a 52 year old, presenting for the following health issues:  medication        6/5/2023     1:19 PM   Additional Questions   Roomed by tre   Accompanied by alone         6/5/2023     1:19 PM   Patient Reported Additional Medications   Patient reports taking the following new medications none     History of " "Present Illness       Diabetes:   He presents for follow up of diabetes.  He is checking home blood glucose four or more times daily. He checks blood glucose after meals.  Blood glucose is sometimes over 200 and never under 70. He is aware of hypoglycemia symptoms including shakiness, dizziness and weakness. He has no concerns regarding his diabetes at this time.  He is having numbness in feet. The patient has had a diabetic eye exam in the last 12 months. Eye exam performed on once a year. Location of last eye exam [facility].        He eats 4 or more servings of fruits and vegetables daily.He consumes 0 sweetened beverage(s) daily.He exercises with enough effort to increase his heart rate 30 to 60 minutes per day.  He exercises with enough effort to increase his heart rate 5 days per week.   He is taking medications regularly.     Talk about possible allergies  Felt congested for about 2 weeks.   Thought maybe sinus infection  He is currently on a second dose for MRSA/Diabetic ulcer on right foot.   Virtual visit with Endocrinology   Podiatry for ulcer.   Patient having hand weakness and bilateral neuropathy   Easy- off lock         Review of Systems         Objective    /64   Pulse 72   Temp 98.6  F (37  C) (Temporal)   Ht 1.727 m (5' 8\")   Wt 82.6 kg (182 lb)   SpO2 94%   BMI 27.67 kg/m    Body mass index is 27.67 kg/m .  Physical Exam   GENERAL: alert, no distress, pale in appearance and fatigued  HENT: ear canals with mild cerumen and TM's normal, mouth without ulcers or lesions  RESP: lungs clear to auscultation - no rales, rhonchi or wheezes  CV: regular rate and rhythm, normal S1 S2, no S3 or S4, no murmur, click or rub, no peripheral edema and peripheral pulses strong    Results for orders placed or performed in visit on 06/05/23   Comprehensive metabolic panel (BMP + Alb, Alk Phos, ALT, AST, Total. Bili, TP)     Status: Abnormal   Result Value Ref Range    Sodium 136 136 - 145 mmol/L    Potassium " 4.9 3.4 - 5.3 mmol/L    Chloride 98 98 - 107 mmol/L    Carbon Dioxide (CO2) 29 22 - 29 mmol/L    Anion Gap 9 7 - 15 mmol/L    Urea Nitrogen 27.9 (H) 6.0 - 20.0 mg/dL    Creatinine 1.80 (H) 0.67 - 1.17 mg/dL    Calcium 8.2 (L) 8.6 - 10.0 mg/dL    Glucose 281 (H) 70 - 99 mg/dL    Alkaline Phosphatase 108 40 - 129 U/L    AST 65 (H) 10 - 50 U/L    ALT 28 10 - 50 U/L    Protein Total 6.5 6.4 - 8.3 g/dL    Albumin 3.0 (L) 3.5 - 5.2 g/dL    Bilirubin Total 0.4 <=1.2 mg/dL    GFR Estimate 45 (L) >60 mL/min/1.73m2   CBC with platelets and differential     Status: Abnormal   Result Value Ref Range    WBC Count 4.3 4.0 - 11.0 10e3/uL    RBC Count 4.13 (L) 4.40 - 5.90 10e6/uL    Hemoglobin 11.5 (L) 13.3 - 17.7 g/dL    Hematocrit 33.8 (L) 40.0 - 53.0 %    MCV 82 78 - 100 fL    MCH 27.8 26.5 - 33.0 pg    MCHC 34.0 31.5 - 36.5 g/dL    RDW 12.4 10.0 - 15.0 %    Platelet Count 182 150 - 450 10e3/uL    % Neutrophils 70 %    % Lymphocytes 18 %    % Monocytes 11 %    % Eosinophils 1 %    % Basophils 0 %    % Immature Granulocytes 0 %    Absolute Neutrophils 3.0 1.6 - 8.3 10e3/uL    Absolute Lymphocytes 0.8 0.8 - 5.3 10e3/uL    Absolute Monocytes 0.5 0.0 - 1.3 10e3/uL    Absolute Eosinophils 0.0 0.0 - 0.7 10e3/uL    Absolute Basophils 0.0 0.0 - 0.2 10e3/uL    Absolute Immature Granulocytes 0.0 <=0.4 10e3/uL   Iron and iron binding capacity     Status: Abnormal   Result Value Ref Range    Iron 22 (L) 61 - 157 ug/dL    Iron Binding Capacity 126 (L) 240 - 430 ug/dL    Iron Sat Index 17 15 - 46 %   Blood Culture Arm, Right     Status: Normal (Preliminary result)    Specimen: Arm, Right; Blood   Result Value Ref Range    Culture No growth after 1 day    Blood Culture Arm, Left     Status: Normal (Preliminary result)    Specimen: Arm, Left; Blood   Result Value Ref Range    Culture No growth after 1 day    CBC with platelets and differential     Status: Abnormal    Narrative    The following orders were created for panel order CBC with  platelets and differential.  Procedure                               Abnormality         Status                     ---------                               -----------         ------                     CBC with platelets and d...[[id number]]  Abnormal            Final result                 Please view results for these tests on the individual orders.        Patient seen in conjunction with Dima Lizama Practitioner Student, RN

## 2023-06-07 ENCOUNTER — OFFICE VISIT (OUTPATIENT)
Dept: PODIATRY | Facility: CLINIC | Age: 53
End: 2023-06-07
Payer: COMMERCIAL

## 2023-06-07 ENCOUNTER — MYC MEDICAL ADVICE (OUTPATIENT)
Dept: PODIATRY | Facility: CLINIC | Age: 53
End: 2023-06-07

## 2023-06-07 ENCOUNTER — TELEPHONE (OUTPATIENT)
Dept: FAMILY MEDICINE | Facility: OTHER | Age: 53
End: 2023-06-07

## 2023-06-07 VITALS — BODY MASS INDEX: 27.58 KG/M2 | WEIGHT: 182 LBS | HEIGHT: 68 IN

## 2023-06-07 DIAGNOSIS — E10.39 TYPE 1 DIABETES MELLITUS WITH OTHER DIABETIC OPHTHALMIC COMPLICATION (H): ICD-10-CM

## 2023-06-07 DIAGNOSIS — D64.9 ANEMIA, UNSPECIFIED TYPE: Primary | ICD-10-CM

## 2023-06-07 DIAGNOSIS — G60.9 HEREDITARY AND IDIOPATHIC PERIPHERAL NEUROPATHY: ICD-10-CM

## 2023-06-07 DIAGNOSIS — E10.39 TYPE 1 DIABETES MELLITUS WITH OTHER DIABETIC OPHTHALMIC COMPLICATION (H): Primary | ICD-10-CM

## 2023-06-07 DIAGNOSIS — I73.9 PAD (PERIPHERAL ARTERY DISEASE) (H): ICD-10-CM

## 2023-06-07 DIAGNOSIS — N18.30 STAGE 3 CHRONIC KIDNEY DISEASE, UNSPECIFIED WHETHER STAGE 3A OR 3B CKD (H): ICD-10-CM

## 2023-06-07 DIAGNOSIS — L97.513 ULCER OF RIGHT FOOT WITH NECROSIS OF MUSCLE (H): ICD-10-CM

## 2023-06-07 LAB — HBA1C MFR BLD: 11.6 % (ref 0–5.6)

## 2023-06-07 PROCEDURE — 11042 DBRDMT SUBQ TIS 1ST 20SQCM/<: CPT | Performed by: PODIATRIST

## 2023-06-07 PROCEDURE — 99214 OFFICE O/P EST MOD 30 MIN: CPT | Mod: 25 | Performed by: PODIATRIST

## 2023-06-07 NOTE — TELEPHONE ENCOUNTER
Please call patient, given his anemia and kidney function I recommend that we have him consult with a nephrologist (kidney provider) for evaluation of kidney disease. I also recommend that we do a stool and urine test to make sure not bleeding from the urine or rectal area due to the anemia. I have placed the labs and referral.     I know that the Lyrica helps his pain control, however this can impact kidney function so we may need to consider alternations in the medication if he is willing.     I also added an A1C to his labs from Monday.     Blood cultures are negative.       NATASHA Queen CNP  Questions or concerns please feel free to send me a [employer] message or call me  Phone : [phone]

## 2023-06-07 NOTE — TELEPHONE ENCOUNTER
"RN Triage    Patient Contact    Attempt # 1    Was call answered?  No.  Left message on voicemail with information to call me back.    Please advise of the below message from provider on patient call back.    \"Please call patient, given his anemia and kidney function I recommend that we have him consult with a nephrologist (kidney provider) for evaluation of kidney disease. I also recommend that we do a stool and urine test to make sure not bleeding from the urine or rectal area due to the anemia. I have placed the labs and referral.      I know that the Lyrica helps his pain control, however this can impact kidney function so we may need to consider alternations in the medication if he is willing.     I also added an A1C to his labs from Monday. Blood cultures are negative.      NATASHA Queen CNP  Questions or concerns please feel free to send me a [employer] message or call me  Phone : [phone]\"    GEOVANNY Sebastian, RN  [facility] ~ Registered Nurse  Clinic Triage ~ [facility]  June 7, 2023        "

## 2023-06-07 NOTE — TELEPHONE ENCOUNTER
Lowell Clark,     I sent a referral to vascular to the [facility] vascular clinic in [place].  They should call you for an appointment but if not, their number is [phone].        Kay Pleitez DPM

## 2023-06-07 NOTE — PROGRESS NOTES
"Podiatry / Foot and Ankle Surgery Progress Note    June 7, 2023    Subject: Patient was seen for follow-up on right foot ulceration.  Has been doing gauze over the area.  Has been taking his doxycycline.  No pain to the foot but has baseline neuropathy.  Denies fever, nausea.  Notes that he his allergies are acting up.    Objective:  Vitals: Ht 1.727 m (5' 8\")   Wt 82.6 kg (182 lb)   BMI 27.67 kg/m    BMI= Body mass index is 27.67 kg/m .    A1C: 10.8 (10/5/2022)     General:  Patient is alert and orientated.  NAD.     Vascular:  DP and PT pulses are palpable.  No edema or varicosities noted.  CFT's < 3secs.  Skin temp is normal.     Neuro:  Light and gross touch sensation absent to feet.     Derm: Full-thickness stage III foot over the second met cuneiform joint.  This measures approximately 1.1 cm x 1.3cm  0.3 cm.  Does probe to deep fascia.  Did not see tendon.  There is localized redness to the area.  No purulent drainage but clear serous drainage is noted.     Musculoskeletal:  BKA left       Imaging: right foot xray - I personally reviewed the xrays -calcified vessels noted.  No gas in the tissue noted.  No evidence of bone infection.     Assessment:    Type 1 diabetes mellitus with diabetic neuropathy (H)  Cellulitis of right foot  Diabetic ulcer of other part of right foot associated with type 1 diabetes mellitus, with fat layer exposed (H)  PAD (peripheral artery disease) (H)     Medical Decision Making/Plan: At this time the ulcer was debrided.  Please see procedure note below.  Had a long discussion with patient that I am concerned that I cannot feel pulses to the foot.  Discussed that I feel this is a big contributor to why the wound is not healing.    He is hesitant to go see the vascular doctors in [place] as he notes he did not have a great experience with them.  He would like a different place closer to [place] for vascular to be assessed.  We will refer him to the [facility] vascular " center up in [place].   We will also have him do daily dressing changes with hydraferra blue foam, 2 x 2 gauze pads and a large bandage.  Talked about offloading the area in a boot and he does have a postop boot at home he said that he had from his other foot and recommended that he wear that on the right side as he is currently in his shoe..  We will have him follow-up in 3 weeks for reassessment.     Questions were answered to patient's satisfaction and he will call further questions or concerns.        Procedure: After verbal consent, excisional debridement was performed on ulcer.  #15 blade was used to debride ulcer down to and including tendon and fibrous tissue. Bleeding controlled with light pressure.   No drainage noted.  No anesthesia was used due to neuropathy. Dry dressing applied to foot.  Patient tolerated procedure well.    Patient Risk Factor:  Patient is a high risk factor for infection    Durable Medical Equipment Wound Care Orders     Wound Care Order for DME - ONLY FOR DME   As directed      DME Provider: [facility]    Start Date: 6/7/2023    Wound Supply Order Options: Complex Wound    Optional: .dmewound can be used to pull in order specific information into documentation    Wound Number: Wound 1    Wound 1 Location: right foot    Wound 1 Dressing Change Frequency: Daily    Wound 1 Length of Need: 30 days    Wound 1 - Dressing Supplies: Primary    Wound 1 - Primary Dressing Dispensing Instructions: Ok to Substitute    Wound 1 - Primary Dressing Types: Foam    Wound 1 - Foam Types: Hydrofera Blue Ready Transfer    Wound 1 - Hydrofera Blue Ready Transfer Size: 4 x 5    Wound 1 - Hydrofera Blue Ready Transfer Quantity: 12    Hereditary and idiopathic peripheral neuropathy  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

## 2023-06-07 NOTE — PATIENT INSTRUCTIONS
Thank you for choosing [facility] Podiatry / Foot & Ankle Surgery!    DR HAGEN'S CLINIC:  [facility]   [address]   [place] [address]      TRIAGE LINE: [phone]  APPOINTMENTS: [phone]  RADIOLOGY: [phone]  SET UP SURGERY: [phone]  PHYSICAL THERAPY: [phone]   FAX NUMBER: [phone]  BILLING QUESTIONS: [phone]       Follow up: 1 month

## 2023-06-07 NOTE — LETTER
"    6/7/2023         RE: Eduardo Walton  [address] [facility] [address]        Dear Colleague,    Thank you for referring your patient, Eduardo Walton, to the [facility] PODIATRY. Please see a copy of my visit note below.    Podiatry / Foot and Ankle Surgery Progress Note    June 7, 2023    Subject: Patient was seen for follow-up on right foot ulceration.  Has been doing gauze over the area.  Has been taking his doxycycline.  No pain to the foot but has baseline neuropathy.  Denies fever, nausea.  Notes that he his allergies are acting up.    Objective:  Vitals: Ht 1.727 m (5' 8\")   Wt 82.6 kg (182 lb)   BMI 27.67 kg/m    BMI= Body mass index is 27.67 kg/m .    A1C: 10.8 (10/5/2022)     General:  Patient is alert and orientated.  NAD.     Vascular:  DP and PT pulses are palpable.  No edema or varicosities noted.  CFT's < 3secs.  Skin temp is normal.     Neuro:  Light and gross touch sensation absent to feet.     Derm: Full-thickness stage III foot over the second met cuneiform joint.  This measures approximately 1.1 cm x 1.3cm  0.3 cm.  Does probe to deep fascia.  Did not see tendon.  There is localized redness to the area.  No purulent drainage but clear serous drainage is noted.     Musculoskeletal:  BKA left       Imaging: right foot xray - I personally reviewed the xrays -calcified vessels noted.  No gas in the tissue noted.  No evidence of bone infection.     Assessment:    Type 1 diabetes mellitus with diabetic neuropathy (H)  Cellulitis of right foot  Diabetic ulcer of other part of right foot associated with type 1 diabetes mellitus, with fat layer exposed (H)  PAD (peripheral artery disease) (H)     Medical Decision Making/Plan: At this time the ulcer was debrided.  Please see procedure note below.  Had a long discussion with patient that I am concerned that I cannot feel pulses to the foot.  Discussed that I feel this is a big contributor to why the wound is not healing.    " He is hesitant to go see the vascular doctors in [place] as he notes he did not have a great experience with them.  He would like a different place closer to [place] for vascular to be assessed.  We will refer him to the [facility] vascular center up in [place].   We will also have him do daily dressing changes with hydraferra blue foam, 2 x 2 gauze pads and a large bandage.  Talked about offloading the area in a boot and he does have a postop boot at home he said that he had from his other foot and recommended that he wear that on the right side as he is currently in his shoe..  We will have him follow-up in 3 weeks for reassessment.     Questions were answered to patient's satisfaction and he will call further questions or concerns.        Procedure: After verbal consent, excisional debridement was performed on ulcer.  #15 blade was used to debride ulcer down to and including tendon and fibrous tissue. Bleeding controlled with light pressure.   No drainage noted.  No anesthesia was used due to neuropathy. Dry dressing applied to foot.  Patient tolerated procedure well.    Patient Risk Factor:  Patient is a high risk factor for infection    Durable Medical Equipment Wound Care Orders     Wound Care Order for DME - ONLY FOR DME   As directed      DME Provider: [facility]    Start Date: 6/7/2023    Wound Supply Order Options: Complex Wound    Optional: .dmewound can be used to pull in order specific information into documentation    Wound Number: Wound 1    Wound 1 Location: right foot    Wound 1 Dressing Change Frequency: Daily    Wound 1 Length of Need: 30 days    Wound 1 - Dressing Supplies: Primary    Wound 1 - Primary Dressing Dispensing Instructions: Ok to Substitute    Wound 1 - Primary Dressing Types: Foam    Wound 1 - Foam Types: Hydrofera Blue Ready Transfer    Wound 1 - Hydrofera Blue Ready Transfer Size: 4 x 5    Wound 1 - Hydrofera Blue Ready Transfer Quantity: 12    Hereditary and  idiopathic peripheral neuropathy  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        Sincerely,        Kay Pleitez DPM, Podiatry/Foot and Ankle Surgery

## 2023-06-07 NOTE — TELEPHONE ENCOUNTER
Left message to have patient call back regarding the message below:    Please call patient, given his anemia and kidney function I recommend that we have him consult with a nephrologist (kidney provider) for evaluation of kidney disease. I also recommend that we do a stool and urine test to make sure not bleeding from the urine or rectal area due to the anemia. I have placed the labs and referral.     I know that the Lyrica helps his pain control, however this can impact kidney function so we may need to consider alternations in the medication if he is willing.     I also added an A1C to his labs from Monday.     Blood cultures are negative.     Estrella Alvarez RN  [facility] ~ Registered Nurse  Clinic Triage ~ [facility]  June 7, 2023

## 2023-06-08 NOTE — TELEPHONE ENCOUNTER
"RN Triage    Patient Contact    Attempt # 2    Was call answered?  No.  Left message on voicemail with information to call me back.    Please advise of the below message from provider on patient call back.     \"Please call patient, given his anemia and kidney function I recommend that we have him consult with a nephrologist (kidney provider) for evaluation of kidney disease. I also recommend that we do a stool and urine test to make sure not bleeding from the urine or rectal area due to the anemia. I have placed the labs and referral.      I know that the Lyrica helps his pain control, however this can impact kidney function so we may need to consider alternations in the medication if he is willing.      I also added an A1C to his labs from Monday. Blood cultures are negative.      NATASHA Queen CNP  Questions or concerns please feel free to send me a [employer] message or call me  Phone : [phone]\"    GEOVANNY Sebastian, RN  [facility] ~ Registered Nurse  Clinic Triage ~ [place] [facility]  June 8, 2023    "

## 2023-06-10 LAB
BACTERIA BLD CULT: NO GROWTH
BACTERIA BLD CULT: NO GROWTH

## 2023-06-12 ENCOUNTER — TELEPHONE (OUTPATIENT)
Dept: FAMILY MEDICINE | Facility: OTHER | Age: 53
End: 2023-06-12
Payer: COMMERCIAL

## 2023-06-12 NOTE — TELEPHONE ENCOUNTER
[facility]    Fax: [phone]    Spec: [phone]    MO: [phone]

## 2023-06-14 DIAGNOSIS — E10.40 TYPE 1 DIABETES MELLITUS WITH DIABETIC NEUROPATHY (H): ICD-10-CM

## 2023-06-14 RX ORDER — INSULIN DEGLUDEC 100 U/ML
INJECTION, SOLUTION SUBCUTANEOUS
Qty: 15 ML | Refills: 0 | Status: SHIPPED | OUTPATIENT
Start: 2023-06-14 | End: 2023-07-31

## 2023-06-14 NOTE — TELEPHONE ENCOUNTER
"Last Written Prescription Date:  4/18/23  Last Fill Quantity: 15,  # refills: 0   Last office visit: 1/25/2023 with prescribing provider:  Dr. Palma  Future Office Visit:  Was due back 3/28, but canceled due to transportation        Requested Prescriptions   Pending Prescriptions Disp Refills     insulin degludec (TRESIBA FLEXTOUCH) 100 UNIT/ML pen 15 mL 0     Sig: INJECT 20 UNITS SUBCUTANEOUSLY ONCE DAILY IN THE MORNING       Long Acting Insulin Protocol Passed - 6/14/2023 11:55 AM        Passed - Serum creatinine on file in past 12 months     Recent Labs   Lab Test 06/05/23  1444   CR 1.80*       Ok to refill medication if creatinine is low          Passed - HgbA1C in past 3 or 6 months     If HgbA1C is 8 or greater, it needs to be on file within the past 3 months.  If less than 8, must be on file within the past 6 months.     Recent Labs   Lab Test 06/05/23  1444 08/20/20  1311 06/07/19  0000   A1C 11.6*   < >  --    HEMOGLOBINA1  --   --  8.1*    < > = values in this interval not displayed.             Passed - Medication is active on med list        Passed - Patient is age 18 or older        Passed - Recent (6 mo) or future (30 days) visit within the authorizing provider's specialty     Patient had office visit in the last 6 months or has a visit in the next 30 days with authorizing provider or within the authorizing provider's specialty.  See \"Patient Info\" tab in inbasket, or \"Choose Columns\" in Meds & Orders section of the refill encounter.               Will send refill with reminder to make appt  Whitley Diego RN    "

## 2023-06-15 NOTE — TELEPHONE ENCOUNTER
Prior Authorization Approval    Medication: LYRICA 200 MG PO CAPS  Authorization Effective Date: 5/16/2023  Authorization Expiration Date: 6/14/2024  Approved Dose/Quantity:   Reference #:     Insurance Company: [employer] - Phone [phone] Fax [phone]  Expected CoPay:       CoPay Card Available:      Financial Assistance Needed:   Which Pharmacy is filling the prescription: [facility] - [place] [address]  Pharmacy Notified: Yes  Patient Notified: No  **Instructed pharmacy to notify patient when script is ready to /ship.**

## 2023-06-15 NOTE — TELEPHONE ENCOUNTER
Central Prior Authorization Team   Phone: [phone]      PA Initiation    Medication: LYRICA 200 MG PO CAPS  Insurance Company: [employer] - Phone [phone] Fax [phone]  Pharmacy Filling the Rx: [facility] - [place], [place] - [address]  Filling Pharmacy Phone: [phone]  Filling Pharmacy Fax:    Start Date: 6/15/2023

## 2023-07-10 ENCOUNTER — TELEPHONE (OUTPATIENT)
Dept: FAMILY MEDICINE | Facility: OTHER | Age: 53
End: 2023-07-10
Payer: COMMERCIAL

## 2023-07-10 ENCOUNTER — MEDICAL CORRESPONDENCE (OUTPATIENT)
Dept: HEALTH INFORMATION MANAGEMENT | Facility: CLINIC | Age: 53
End: 2023-07-10
Payer: COMMERCIAL

## 2023-07-10 PROBLEM — J96.01 ACUTE HYPOXEMIC RESPIRATORY FAILURE (H): Status: ACTIVE | Noted: 2023-06-14

## 2023-07-10 PROBLEM — L02.611 ABSCESS OF RIGHT FOOT: Status: ACTIVE | Noted: 2023-07-10

## 2023-07-10 PROBLEM — G89.18 ACUTE POSTOPERATIVE PAIN: Status: ACTIVE | Noted: 2023-06-22

## 2023-07-10 PROBLEM — A41.9 SEPSIS (H): Status: ACTIVE | Noted: 2023-06-14

## 2023-07-10 PROBLEM — G54.6: Status: ACTIVE | Noted: 2023-06-22

## 2023-07-10 PROBLEM — M86.9 PYOGENIC INFLAMMATION OF BONE (H): Status: ACTIVE | Noted: 2023-07-10

## 2023-07-10 PROBLEM — J18.9 PNEUMONIA: Status: ACTIVE | Noted: 2023-06-14

## 2023-07-10 PROBLEM — M72.6: Status: ACTIVE | Noted: 2023-07-10

## 2023-07-10 RX ORDER — AMLODIPINE BESYLATE 10 MG/1
1 TABLET ORAL DAILY
COMMUNITY
Start: 2023-07-07 | End: 2023-07-31

## 2023-07-10 RX ORDER — TAMSULOSIN HYDROCHLORIDE 0.4 MG/1
0.4 CAPSULE ORAL DAILY
COMMUNITY
Start: 2023-07-07 | End: 2023-07-28

## 2023-07-10 RX ORDER — BLOOD-GLUCOSE SENSOR
1 EACH MISCELLANEOUS CONTINUOUS
COMMUNITY
Start: 2023-07-07 | End: 2023-11-13

## 2023-07-10 RX ORDER — ACETAMINOPHEN 500 MG
500-1000 TABLET ORAL EVERY 6 HOURS PRN
COMMUNITY
Start: 2023-07-05 | End: 2023-07-28

## 2023-07-10 RX ORDER — PANTOPRAZOLE SODIUM 20 MG/1
20 TABLET, DELAYED RELEASE ORAL SEE ADMIN INSTRUCTIONS
COMMUNITY
Start: 2023-07-07 | End: 2023-11-06

## 2023-07-10 NOTE — TELEPHONE ENCOUNTER
Left detailed message with verbal orders per original message.     Sent a MyChart message to patient regarding the need for hospital follow up appointment.     Ailyn Reed, CLAIREN, RN, PHN  Registered Nurse-Clinic Triage  [facility]  7/10/2023 at 1:35 PM

## 2023-07-10 NOTE — TELEPHONE ENCOUNTER
Home Care is calling regarding an established patient with [facility].       Requesting orders from: Deepti Vega  Provider is following patient: Yes  Is this a 60-day recertification request?  No    Orders Requested    Physical Therapy  Request for initial certification (first set of orders)   Frequency: 1 x/ wk for 6 weeks      Confirmed ok to leave a detailed message with call back.  Contact information confirmed and updated as needed.    Ailyn Reed, CLAIREN, RN, PHN  Registered Nurse-Clinic Triage  [facility]  7/10/2023 at 12:55 PM

## 2023-07-10 NOTE — TELEPHONE ENCOUNTER
Verbal approval. Please schedule hospital follow up with me.       NATASHA Queen CNP  Questions or concerns please feel free to send me a ALOHA message or call me  Phone : [phone]

## 2023-07-11 ENCOUNTER — MEDICAL CORRESPONDENCE (OUTPATIENT)
Dept: HEALTH INFORMATION MANAGEMENT | Facility: CLINIC | Age: 53
End: 2023-07-11

## 2023-07-11 ENCOUNTER — TELEPHONE (OUTPATIENT)
Dept: FAMILY MEDICINE | Facility: OTHER | Age: 53
End: 2023-07-11

## 2023-07-11 ENCOUNTER — VIRTUAL VISIT (OUTPATIENT)
Dept: ENDOCRINOLOGY | Facility: CLINIC | Age: 53
End: 2023-07-11
Payer: COMMERCIAL

## 2023-07-11 DIAGNOSIS — E10.21 TYPE 1 DIABETES MELLITUS WITH DIABETIC NEPHROPATHY (H): ICD-10-CM

## 2023-07-11 DIAGNOSIS — E10.40 TYPE 1 DIABETES MELLITUS WITH DIABETIC NEUROPATHY (H): Primary | ICD-10-CM

## 2023-07-11 DIAGNOSIS — E10.39 TYPE 1 DIABETES MELLITUS WITH OTHER DIABETIC OPHTHALMIC COMPLICATION (H): ICD-10-CM

## 2023-07-11 DIAGNOSIS — E10.319 TYPE 1 DIABETES MELLITUS WITH RETINOPATHY, MACULAR EDEMA PRESENCE UNSPECIFIED, UNSPECIFIED LATERALITY, UNSPECIFIED RETINOPATHY SEVERITY (H): ICD-10-CM

## 2023-07-11 DIAGNOSIS — Z89.512 S/P BKA (BELOW KNEE AMPUTATION) UNILATERAL, LEFT (H): ICD-10-CM

## 2023-07-11 PROCEDURE — 99215 OFFICE O/P EST HI 40 MIN: CPT | Mod: VID | Performed by: INTERNAL MEDICINE

## 2023-07-11 PROCEDURE — 95251 CONT GLUC MNTR ANALYSIS I&R: CPT | Performed by: INTERNAL MEDICINE

## 2023-07-11 RX ORDER — INSULIN LISPRO 100 [IU]/ML
INJECTION, SOLUTION INTRAVENOUS; SUBCUTANEOUS
Qty: 45 ML | Refills: 0 | Status: SHIPPED | OUTPATIENT
Start: 2023-07-11 | End: 2023-11-13

## 2023-07-11 NOTE — TELEPHONE ENCOUNTER
Home Care is calling regarding an established patient with [facility].     Requesting orders from: Deepti Vega  Provider is following patient: Yes  Is this a 60-day recertification request?  No    Orders Requested    Occupational Therapy  Request for initial certification (first set of orders) Frequency: 1x/wk for 4 wks  .    Information was gathered and will be sent to provider for review.  RN will contact Home Care with information after provider review.  Confirmed ok to leave a detailed message with call back.  Contact information confirmed and updated as needed.    Dima Lizama, KVNG    [phone]  [facility].

## 2023-07-11 NOTE — TELEPHONE ENCOUNTER
Patient already has hospital follow up appointment scheduled on 7/14/23.     Ana RANGELN, RN  [facility]

## 2023-07-11 NOTE — TELEPHONE ENCOUNTER
Left message on confidential voice mail and gave verbal orders per Deepti Vega for OT 1X/week for 4 weeks.    Estrella Alvarez RN  [facility] ~ Registered Nurse  Clinic Triage ~ [place] [facility] [place]  July 11, 2023
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
## 2023-07-11 NOTE — TELEPHONE ENCOUNTER
INCOMING FORMS    Sender: [facility]    Type of Form, letter or note (What is requested?): order    How was the form received?: Fax    How should forms be returned?:  Fax : [phone]    Form placed in KV bin for review/signature if appropriate.

## 2023-07-11 NOTE — TELEPHONE ENCOUNTER
"Last Written Prescription Date:  4/18/23  Last Fill Quantity: 45,  # refills: 0   Last office visit: 1/25/2023 with prescribing provider: Dr. Palma   Future Office Visit: 10/26/23  Next 5 appointments (look out 90 days)    Aug 01, 2023  9:00 AM  Telephone Visit with Park Swift RD  [facility] [address] [facility] [address]  [facility] [address] [phone]           Requested Prescriptions   Pending Prescriptions Disp Refills     HUMALOG KWIKPEN 100 UNIT/ML soln 45 mL 0     Sig: INJECT 4-12 UNITS SUBCUTANEOUSLY WITH MEALS AS DIRECTED. TOTAL DAILY DOSE ABOUT 50 UNITS       Short Acting Insulin Protocol Passed - 7/11/2023 11:59 AM        Passed - Serum creatinine on file in past 12 months     Recent Labs   Lab Test 06/05/23  1444   CR 1.80*       Ok to refill medication if creatinine is low          Passed - HgbA1C in past 3 or 6 months     If HgbA1C is 8 or greater, it needs to be on file within the past 3 months.  If less than 8, must be on file within the past 6 months.     Recent Labs   Lab Test 06/05/23  1444 08/20/20  1311 06/07/19  0000   A1C 11.6*   < >  --    HEMOGLOBINA1  --   --  8.1*    < > = values in this interval not displayed.             Passed - Medication is active on med list        Passed - Patient is age 18 or older        Passed - Recent (6 mo) or future (30 days) visit within the authorizing provider's specialty     Patient had office visit in the last 6 months or has a visit in the next 30 days with authorizing provider or within the authorizing provider's specialty.  See \"Patient Info\" tab in inbasket, or \"Choose Columns\" in Meds & Orders section of the refill encounter.               Refill sent  Whitley Diego RN    "

## 2023-07-11 NOTE — TELEPHONE ENCOUNTER
Verbal approval, please schedule hospital follow up with me as well        NATASHA Queen CNP  Questions or concerns please feel free to send me a Babyage message or call me  Phone : [phone]
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
## 2023-07-12 ENCOUNTER — TELEPHONE (OUTPATIENT)
Dept: FAMILY MEDICINE | Facility: OTHER | Age: 53
End: 2023-07-12
Payer: COMMERCIAL

## 2023-07-12 NOTE — TELEPHONE ENCOUNTER
Signed please fax      NATASHA Queen CNP  Questions or concerns please feel free to send me a [employer] message or call me  Phone : [phone]

## 2023-07-12 NOTE — TELEPHONE ENCOUNTER
INCOMING FORMS    Sender: [facility]    Type of Form, letter or note (What is requested?): order    How was the form received?: Fax    How should forms be returned?:  Fax : [phone]    Form placed in KV bin for review/signature if appropriate.

## 2023-07-12 NOTE — TELEPHONE ENCOUNTER
Signed please fax      NATASHA Queen CNP  Questions or concerns please feel free to send me a ZOZI message or call me  Phone : [phone]

## 2023-07-13 ENCOUNTER — TELEPHONE (OUTPATIENT)
Dept: FAMILY MEDICINE | Facility: OTHER | Age: 53
End: 2023-07-13
Payer: COMMERCIAL

## 2023-07-13 NOTE — TELEPHONE ENCOUNTER
INCOMING FORMS    Sender: [facility]    Type of Form, letter or note (What is requested?): order    How was the form received?: Fax    How should forms be returned?:  Fax : [phone]    Form placed in KV bin for review/signature if appropriate.

## 2023-07-14 ENCOUNTER — MEDICAL CORRESPONDENCE (OUTPATIENT)
Dept: HEALTH INFORMATION MANAGEMENT | Facility: CLINIC | Age: 53
End: 2023-07-14

## 2023-07-14 ENCOUNTER — TELEPHONE (OUTPATIENT)
Dept: FAMILY MEDICINE | Facility: OTHER | Age: 53
End: 2023-07-14

## 2023-07-14 NOTE — TELEPHONE ENCOUNTER
Left detailed message on confidential voice mail with providers verbal order approving of requested orders.     Ana RANGELN, RN  [facility]

## 2023-07-14 NOTE — TELEPHONE ENCOUNTER
Marlene from [facility] calling.   See telephone encounter 7/11/23 for initial OT orders.   Patient says patient had a F2F visit for wheelchair and she needs to add additional orders on for OT to go out and assist with this.     She would like to add the following orders:  2x/week for next 2 weeks.   This would increase 4 weeks of therapy to 6 weeks of therapy    Okay to leave detailed message with Marlene if no answer    Ana RANGELN, RN  [facility]

## 2023-07-14 NOTE — TELEPHONE ENCOUNTER
Signed please fax.       NATASHA Queen CNP  Questions or concerns please feel free to send me a [employer] message or call me  Phone : [phone]       Adequate: hears normal conversation without difficulty

## 2023-07-14 NOTE — TELEPHONE ENCOUNTER
I would assume the Face to face was from the hospital. Verbal approval and patient has an upcoming visit with me as well.       NATASHA Queen CNP  Questions or concerns please feel free to send me a [employer] message or call me  Phone : [phone]

## 2023-07-16 ENCOUNTER — MYC MEDICAL ADVICE (OUTPATIENT)
Dept: PODIATRY | Facility: CLINIC | Age: 53
End: 2023-07-16
Payer: COMMERCIAL

## 2023-07-17 ENCOUNTER — MEDICAL CORRESPONDENCE (OUTPATIENT)
Dept: HEALTH INFORMATION MANAGEMENT | Facility: CLINIC | Age: 53
End: 2023-07-17
Payer: COMMERCIAL

## 2023-07-17 ENCOUNTER — TELEPHONE (OUTPATIENT)
Dept: FAMILY MEDICINE | Facility: OTHER | Age: 53
End: 2023-07-17
Payer: COMMERCIAL

## 2023-07-17 NOTE — TELEPHONE ENCOUNTER
INCOMING FORMS    Sender: [facility]    Type of Form, letter or note (What is requested?): order    How was the form received?: Fax    How should forms be returned?:  Fax : [phone]    Form placed in OOO bin for review/signature if appropriate.

## 2023-07-18 ENCOUNTER — TELEPHONE (OUTPATIENT)
Dept: FAMILY MEDICINE | Facility: OTHER | Age: 53
End: 2023-07-18
Payer: COMMERCIAL

## 2023-07-18 NOTE — TELEPHONE ENCOUNTER
INCOMING FORMS    Sender: [facility]    Type of Form, letter or note (What is requested?): order    How was the form received?: Fax    How should forms be returned?:  Fax : [phone]    Form placed in OOO bin for review/signature if appropriate.

## 2023-07-20 ENCOUNTER — VIRTUAL VISIT (OUTPATIENT)
Dept: ENDOCRINOLOGY | Facility: CLINIC | Age: 53
End: 2023-07-20
Payer: COMMERCIAL

## 2023-07-20 ENCOUNTER — TELEPHONE (OUTPATIENT)
Dept: FAMILY MEDICINE | Facility: OTHER | Age: 53
End: 2023-07-20

## 2023-07-20 DIAGNOSIS — Z89.512 S/P BKA (BELOW KNEE AMPUTATION) UNILATERAL, LEFT (H): ICD-10-CM

## 2023-07-20 DIAGNOSIS — E10.59 TYPE 1 DIABETES MELLITUS WITH OTHER CIRCULATORY COMPLICATION (H): ICD-10-CM

## 2023-07-20 DIAGNOSIS — E10.21 TYPE 1 DIABETES MELLITUS WITH DIABETIC NEPHROPATHY (H): ICD-10-CM

## 2023-07-20 DIAGNOSIS — E10.39 TYPE 1 DIABETES MELLITUS WITH OTHER DIABETIC OPHTHALMIC COMPLICATION (H): Primary | ICD-10-CM

## 2023-07-20 DIAGNOSIS — E10.40 TYPE 1 DIABETES MELLITUS WITH DIABETIC NEUROPATHY (H): ICD-10-CM

## 2023-07-20 PROCEDURE — 99215 OFFICE O/P EST HI 40 MIN: CPT | Mod: VID | Performed by: INTERNAL MEDICINE
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
## 2023-07-20 NOTE — TELEPHONE ENCOUNTER
INCOMING FORMS    Sender: Sealy    Type of Form, letter or note (What is requested?): presciption    How was the form received?: Fax    How should forms be returned?:  Mail  [facility] [place] [address]       Form placed in KV  bin for review/signature if appropriate.

## 2023-07-24 NOTE — TELEPHONE ENCOUNTER
Order form faxed to [facility] at [phone]. Advised that supply requests should be going to surgical team, request forms be forwarded to appropriate provider.     Closing encounter.

## 2023-07-24 NOTE — TELEPHONE ENCOUNTER
I have not seen him yet since his post op. Has he been given these supplies by surgical team? If so they should be signing this paperwork.       NATASHA Queen CNP  Questions or concerns please feel free to send me a [employer] message or call me  Phone : [phone]

## 2023-07-25 ENCOUNTER — MEDICAL CORRESPONDENCE (OUTPATIENT)
Dept: HEALTH INFORMATION MANAGEMENT | Facility: CLINIC | Age: 53
End: 2023-07-25
Payer: COMMERCIAL

## 2023-07-28 ENCOUNTER — OFFICE VISIT (OUTPATIENT)
Dept: FAMILY MEDICINE | Facility: OTHER | Age: 53
End: 2023-07-28
Payer: COMMERCIAL

## 2023-07-28 VITALS
OXYGEN SATURATION: 97 % | HEART RATE: 79 BPM | RESPIRATION RATE: 16 BRPM | SYSTOLIC BLOOD PRESSURE: 140 MMHG | TEMPERATURE: 98.8 F | DIASTOLIC BLOOD PRESSURE: 74 MMHG

## 2023-07-28 DIAGNOSIS — A41.9 SEPSIS, DUE TO UNSPECIFIED ORGANISM, UNSPECIFIED WHETHER ACUTE ORGAN DYSFUNCTION PRESENT (H): ICD-10-CM

## 2023-07-28 DIAGNOSIS — E10.39 TYPE 1 DIABETES MELLITUS WITH OTHER DIABETIC OPHTHALMIC COMPLICATION (H): ICD-10-CM

## 2023-07-28 DIAGNOSIS — N18.30 STAGE 3 CHRONIC KIDNEY DISEASE, UNSPECIFIED WHETHER STAGE 3A OR 3B CKD (H): ICD-10-CM

## 2023-07-28 DIAGNOSIS — F32.1 CURRENT MODERATE EPISODE OF MAJOR DEPRESSIVE DISORDER WITHOUT PRIOR EPISODE (H): ICD-10-CM

## 2023-07-28 DIAGNOSIS — G54.6 PHANTOM PAIN FOLLOWING AMPUTATION OF LOWER LIMB (H): ICD-10-CM

## 2023-07-28 DIAGNOSIS — R33.9 URINARY RETENTION: ICD-10-CM

## 2023-07-28 DIAGNOSIS — Z89.512 S/P BKA (BELOW KNEE AMPUTATION) UNILATERAL, LEFT (H): ICD-10-CM

## 2023-07-28 DIAGNOSIS — D64.9 LOW HEMOGLOBIN: ICD-10-CM

## 2023-07-28 DIAGNOSIS — I25.10 CORONARY ARTERY DISEASE INVOLVING NATIVE CORONARY ARTERY OF NATIVE HEART WITHOUT ANGINA PECTORIS: ICD-10-CM

## 2023-07-28 DIAGNOSIS — D64.9 ANEMIA, UNSPECIFIED TYPE: ICD-10-CM

## 2023-07-28 DIAGNOSIS — Z89.511 S/P BKA (BELOW KNEE AMPUTATION), RIGHT (H): Primary | ICD-10-CM

## 2023-07-28 PROBLEM — E11.8 DIABETIC FOOT (H): Status: RESOLVED | Noted: 2018-10-29 | Resolved: 2023-07-28

## 2023-07-28 PROBLEM — M86.9 PYOGENIC INFLAMMATION OF BONE (H): Status: RESOLVED | Noted: 2023-07-10 | Resolved: 2023-07-28

## 2023-07-28 PROBLEM — L08.9 LEFT FOOT INFECTION: Status: RESOLVED | Noted: 2019-02-05 | Resolved: 2023-07-28

## 2023-07-28 PROBLEM — L02.611 ABSCESS OF RIGHT FOOT: Status: RESOLVED | Noted: 2023-07-10 | Resolved: 2023-07-28

## 2023-07-28 PROBLEM — J18.9 PNEUMONIA: Status: RESOLVED | Noted: 2023-06-14 | Resolved: 2023-07-28

## 2023-07-28 PROBLEM — M72.6: Status: RESOLVED | Noted: 2023-07-10 | Resolved: 2023-07-28

## 2023-07-28 PROBLEM — J96.01 ACUTE HYPOXEMIC RESPIRATORY FAILURE (H): Status: RESOLVED | Noted: 2023-06-14 | Resolved: 2023-07-28

## 2023-07-28 LAB
ALBUMIN SERPL BCG-MCNC: 3.7 G/DL (ref 3.5–5.2)
ALP SERPL-CCNC: 127 U/L (ref 40–129)
ALT SERPL W P-5'-P-CCNC: 30 U/L (ref 0–70)
ANION GAP SERPL CALCULATED.3IONS-SCNC: 10 MMOL/L (ref 7–15)
AST SERPL W P-5'-P-CCNC: 30 U/L (ref 0–45)
BASOPHILS # BLD AUTO: 0 10E3/UL (ref 0–0.2)
BASOPHILS NFR BLD AUTO: 0 %
BILIRUB SERPL-MCNC: 0.4 MG/DL
BUN SERPL-MCNC: 40.3 MG/DL (ref 6–20)
CALCIUM SERPL-MCNC: 9.1 MG/DL (ref 8.6–10)
CHLORIDE SERPL-SCNC: 103 MMOL/L (ref 98–107)
CREAT SERPL-MCNC: 1.38 MG/DL (ref 0.67–1.17)
DEPRECATED HCO3 PLAS-SCNC: 27 MMOL/L (ref 22–29)
EOSINOPHIL # BLD AUTO: 0.2 10E3/UL (ref 0–0.7)
EOSINOPHIL NFR BLD AUTO: 5 %
ERYTHROCYTE [DISTWIDTH] IN BLOOD BY AUTOMATED COUNT: 13.9 % (ref 10–15)
FERRITIN SERPL-MCNC: 156 NG/ML (ref 31–409)
GFR SERPL CREATININE-BSD FRML MDRD: 62 ML/MIN/1.73M2
GLUCOSE SERPL-MCNC: 264 MG/DL (ref 70–99)
HCT VFR BLD AUTO: 29.2 % (ref 40–53)
HGB BLD-MCNC: 9.6 G/DL (ref 13.3–17.7)
IMM GRANULOCYTES # BLD: 0 10E3/UL
IMM GRANULOCYTES NFR BLD: 0 %
IRON BINDING CAPACITY (ROCHE): 189 UG/DL (ref 240–430)
IRON SATN MFR SERPL: 17 % (ref 15–46)
IRON SERPL-MCNC: 32 UG/DL (ref 61–157)
LYMPHOCYTES # BLD AUTO: 0.8 10E3/UL (ref 0.8–5.3)
LYMPHOCYTES NFR BLD AUTO: 18 %
MCH RBC QN AUTO: 28.6 PG (ref 26.5–33)
MCHC RBC AUTO-ENTMCNC: 32.9 G/DL (ref 31.5–36.5)
MCV RBC AUTO: 87 FL (ref 78–100)
MONOCYTES # BLD AUTO: 0.3 10E3/UL (ref 0–1.3)
MONOCYTES NFR BLD AUTO: 6 %
NEUTROPHILS # BLD AUTO: 3.2 10E3/UL (ref 1.6–8.3)
NEUTROPHILS NFR BLD AUTO: 70 %
PLATELET # BLD AUTO: 192 10E3/UL (ref 150–450)
POTASSIUM SERPL-SCNC: 4.6 MMOL/L (ref 3.4–5.3)
PROT SERPL-MCNC: 7.2 G/DL (ref 6.4–8.3)
RBC # BLD AUTO: 3.36 10E6/UL (ref 4.4–5.9)
SODIUM SERPL-SCNC: 140 MMOL/L (ref 136–145)
WBC # BLD AUTO: 4.5 10E3/UL (ref 4–11)

## 2023-07-28 PROCEDURE — 83550 IRON BINDING TEST: CPT | Performed by: NURSE PRACTITIONER

## 2023-07-28 PROCEDURE — 83540 ASSAY OF IRON: CPT | Performed by: NURSE PRACTITIONER

## 2023-07-28 PROCEDURE — 80053 COMPREHEN METABOLIC PANEL: CPT | Performed by: NURSE PRACTITIONER

## 2023-07-28 PROCEDURE — 85025 COMPLETE CBC W/AUTO DIFF WBC: CPT | Performed by: NURSE PRACTITIONER

## 2023-07-28 PROCEDURE — 36415 COLL VENOUS BLD VENIPUNCTURE: CPT | Performed by: NURSE PRACTITIONER

## 2023-07-28 PROCEDURE — 82607 VITAMIN B-12: CPT | Performed by: NURSE PRACTITIONER

## 2023-07-28 PROCEDURE — 82728 ASSAY OF FERRITIN: CPT | Performed by: NURSE PRACTITIONER

## 2023-07-28 PROCEDURE — 99214 OFFICE O/P EST MOD 30 MIN: CPT | Performed by: NURSE PRACTITIONER

## 2023-07-28 RX ORDER — ATORVASTATIN CALCIUM 40 MG/1
40 TABLET, FILM COATED ORAL DAILY
COMMUNITY
End: 2023-11-06

## 2023-07-28 RX ORDER — LISINOPRIL 5 MG/1
5 TABLET ORAL DAILY
COMMUNITY
End: 2023-07-31

## 2023-07-28 RX ORDER — TAMSULOSIN HYDROCHLORIDE 0.4 MG/1
0.4 CAPSULE ORAL DAILY
Qty: 90 CAPSULE | Refills: 0 | Status: SHIPPED | OUTPATIENT
Start: 2023-07-28 | End: 2023-08-22

## 2023-07-28 RX ORDER — DOXYCYCLINE 100 MG/1
100 CAPSULE ORAL 2 TIMES DAILY
COMMUNITY
End: 2023-07-28

## 2023-07-28 RX ORDER — DULOXETIN HYDROCHLORIDE 30 MG/1
30 CAPSULE, DELAYED RELEASE ORAL DAILY
COMMUNITY
End: 2023-07-28

## 2023-07-28 ASSESSMENT — PAIN SCALES - GENERAL: PAINLEVEL: NO PAIN (0)

## 2023-07-28 ASSESSMENT — PATIENT HEALTH QUESTIONNAIRE - PHQ9: SUM OF ALL RESPONSES TO PHQ QUESTIONS 1-9: 0

## 2023-07-28 NOTE — PROGRESS NOTES
Assessment & Plan       S/P BKA (below knee amputation), right (H)  Patient underwent R BKA due to necrotizing fasciitis and osteomyelitis with sepsis of right foot. DOS 06/19/2023. Her participated in inpatient rehab at [facility] until discharged home on 07/05/23. Currently utilizing manual wheelchair. Martinez catheter removed per home care nurse on 07/28/2023 with good independent voiding shortly after and continues on flomax (see below). Per home care notes patient manages his medications and ADLs independently with devices. Patient initially participated in OT and PT but requested agency discharge today as patient would like to continue on his own.  Stable wound with intact healed margins.   Healing well  Plans to follow up with prosthetics once healed.   Using wheelchair at this time and working on his own therapy, stopped at home therapy per his request.   - CBC with platelets and differential; Future  - Comprehensive metabolic panel (BMP + Alb, Alk Phos, ALT, AST, Total. Bili, TP); Future  - tamsulosin (FLOMAX) 0.4 MG capsule; Take 1 capsule (0.4 mg) by mouth daily  - CBC with platelets and differential  - Comprehensive metabolic panel (BMP + Alb, Alk Phos, ALT, AST, Total. Bili, TP)  S/P BKA (below knee amputation) unilateral, left (H)  Stable     Stage 3 chronic kidney disease, unspecified whether stage 3a or 3b CKD (H)  Recheck labs  May want to consider nephrology consult in the future as prior to his BKA I had recommended this. I do recommend he decrease his Lyrica to help with this as well as encouraged bp management.   - Albumin Random Urine Quantitative with Creat Ratio; Future  - Adult Urology  Referral; Future  - Comprehensive metabolic panel (BMP + Alb, Alk Phos, ALT, AST, Total. Bili, TP); Future  - Comprehensive metabolic panel (BMP + Alb, Alk Phos, ALT, AST, Total. Bili, TP)    Type 1 diabetes mellitus with other diabetic ophthalmic complication (H)  Followed by endocrinology  - Albumin  Random Urine Quantitative with Creat Ratio; Future  - Adult Urology  Referral; Future  - Comprehensive metabolic panel (BMP + Alb, Alk Phos, ALT, AST, Total. Bili, TP); Future  - Comprehensive metabolic panel (BMP + Alb, Alk Phos, ALT, AST, Total. Bili, TP)    Coronary artery disease involving native coronary artery of native heart without angina pectoris  Saw cardiology   Encouraged Asprin and statin management.   BP control and control of risk factors.     Current moderate episode of major depressive disorder without prior episode (H)  Encouraged support with family and coping.     Sepsis, due to unspecified organism, unspecified whether acute organ dysfunction present (H)  Resolved     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)  Continues on Lyrica, reduced dose due to CKD function.     Urinary retention  Continues on Flomax and I would like him to follow up with urology given his history of urinary retention at the hospital and need for discharge with catheter.   - Albumin Random Urine Quantitative with Creat Ratio; Future  - Adult Urology  Referral; Future  - tamsulosin (FLOMAX) 0.4 MG capsule; Take 1 capsule (0.4 mg) by mouth daily    Anemia, unspecified type  Recheck today, discharge hemoglobin 9.4 and recheck today 9.6.   Recommend recheck again in 2 weeks along with kidney function monitoring.     Low hemoglobin  Evaluate for any iron deficiency although likely post surgical related as this is improving.   - CBC with platelets and differential; Future  - Iron and iron binding capacity; Future  - Vitamin B12; Future  - Ferritin; Future  - Folate; Future  - Iron and iron binding capacity  - Ferritin  - Vitamin B12     MED REC REQUIRED{  Post Medication Reconciliation Status: discharge medications reconciled and changed, per note/orders        See Patient Instructions    NATASHA Queen CNP  [facility] CALISTA Clark is a 53 year old, presenting  for the following health issues:  Hospital F/U        7/28/2023    10:44 AM   Additional Questions   Roomed by Madiha         7/28/2023    10:54 AM   Patient Reported Additional Medications   Patient reports taking the following new medications Flomax and amlodpine       HPI       Hospital Follow-up Visit:    Hospital/Nursing Home/IP Rehab Facility:  [facility]  Date of Admission: 7/1/23  Date of Discharge: 7/6/23  Reason(s) for Admission: Retaining 400 liters    Was your hospitalization related to COVID-19? No   Problems taking medications regularly:  None  Medication changes since discharge: Yes   Problems adhering to non-medication therapy:  None    Summary of hospitalization:  CareEverywhere information obtained and reviewed  Diagnostic Tests/Treatments reviewed.  Follow up needed: Specialist care and home care   Other Healthcare Providers Involved in Patient s Care:    Update since discharge: Stable      Plan of care communicated with patient    Patient in wheelchair today with right leg covered and left leg with prosthesis.      [facility]- Had catheter removed s/p hospitalization  Not sure how much he is urinating daily, urinating.  The stream is not as intense as it once was. Concentrate more to get his stream to start.     Discharged himself from PT and OT  Using Wheelchair  Asked to be discharged from the PT and OT, states he knows transfers.   Has an appointment to get prosthesis for his right side.     Medical Plan  S/p RIGHT below the knee amputation 6/19/23  S/p right I&D, partial foot amputation 6/14/23  Necrotizing fascitis with osteomyelitis of right foot with streptococcal bacteremia and sepsis  History of LEFT below the knee amputation 2019  Peripheral vascular disease  Vascular surgeon Dr. Salcedo at [facility]. Completed Keflex and flagyl course 6/26 for skin infection. Wbc 4.9 on 7/5.  - Falls precautions  - NWB (nonweight bearing) right lower extremity  - Right below the knee amputation  incision wound care as ordered  - [employer] orthotics fitted for right stump protector and sock  - PT, OT, RN, SW with homecare  - Follow up outpatient with [facility] vascular surgery NP on 7/24/23    Pulseless electrical activity arrest 6/20/23  After received IV narcotics in associated with pregabalin. Received narcan and briefly received CPR with pulse return. Pregabalin dose reduced. No further somnolence concerns. Weaning hydromorphone (on minimally at discharge, 1 mg once daily as needed, weaning off in next few days)  - Minimize narcotics  - No IV pain medications    Acute vision loss left eye/Diabetic retinopathy  Occurred in ambulance ride to [facility] per patient. Patient reported to hospital staff on 6/26. History of getting injections with ophthalmology in past for similar occurences. Ophthalmology curbsided at [facility], needs outpatient follow up.  - Follow up outpatient with [facility] Dr. Gan in [place] on 7/7/23    Acute kidney injury  Chronic kidney disease stage III  Cr up to 2.19 on 6/16. Slowly trended down after alcaraz catheter placed. Cr 1.40 on 6/29, stable. Creatinine 1.44 on 7/2, stable.  - Avoid nephrotoxic medications  - Monitor renal function    Diabetes mellitus type 1  Diagnosed since age 8. On insulin degludec (tresiba) 20 units in morning, insulin lispro sliding scale TID with meals PTA. Hemoglobin A1C 9.7% on 6/27. Insulin adjusted throughout hospitalization. Had hyperglycemia and hypoglycemia episodes while at [facility], insulin adjusted.  - Diabetes diet  - Check blood glucose three times daily before meals, at bedtime, and around 2am overnight  - Sliding scale insulin lispro three times daily with meals  - Insulin degludec 20 units every morning  - Follow up with PCP, needs close follow up for diabetes management    Acute respiratory failure due to bilateral pneumonia, resolved  COVID-19 positive 6/14/23  2 weeks history of shortness of breath/cough at admit. Required  supplemental oxygen initially. Completed antibiotic course. No longer in enhanced respiratory precautions. Weaned off oxygen. Remained stable on room air while at [facility], asymptomatic.  - Noted    Hypertension  Cardiomegaly  Coronary arteriosclerosis (history of coronary stent 2010)  Amldoipine increased on acute floor. Echocardiogram 6/15 with EF 55-60% and mild left ventricular hypertrophy otherwise grossly unremarkable. Required diuretic on acute floor for volume overload.  - 2 g sodium restricted diet  - Daily weights  - Amlodipine 10 mg daily    GI prophylaxis:  - Reduce pantoprazole to 20 mg for 4 days, then stop    VTE prophylaxis:  SCDs: None (bilateral below the knee amputation)  Medication: None at discharge (was on heparin subcutaneous for DVT prophylaxis while inpatient. Heparin stopped at [facility] discharge)    Review of Systems   Constitutional, HEENT, cardiovascular, pulmonary, gi and gu systems are negative, except as otherwise noted.      Objective    BP (!) 140/74   Pulse 79   Temp 98.8  F (37.1  C) (Temporal)   Resp 16   SpO2 97%   There is no height or weight on file to calculate BMI.  Physical Exam   GENERAL: healthy, alert and no distress  RESP: lungs clear to auscultation - no rales, rhonchi or wheezes  CV: regular rate and rhythm, normal S1 S2, no S3 or S4, no murmur, click or rub, no peripheral edema and peripheral pulses strong  SKIN: Right BKA- healing wound   NEURO: Normal strength and tone, mentation intact and speech normal  PSYCH: mentation appears normal, affect normal/bright    Office Visit on 06/05/2023   Component Date Value Ref Range Status    Sodium 06/05/2023 136  136 - 145 mmol/L Final    Potassium 06/05/2023 4.9  3.4 - 5.3 mmol/L Final    Chloride 06/05/2023 98  98 - 107 mmol/L Final    Carbon Dioxide (CO2) 06/05/2023 29  22 - 29 mmol/L Final    Anion Gap 06/05/2023 9  7 - 15 mmol/L Final    Urea Nitrogen 06/05/2023 27.9 (H)  6.0 - 20.0 mg/dL Final    Creatinine 06/05/2023 1.80  (H)  0.67 - 1.17 mg/dL Final    Calcium 06/05/2023 8.2 (L)  8.6 - 10.0 mg/dL Final    Glucose 06/05/2023 281 (H)  70 - 99 mg/dL Final    Alkaline Phosphatase 06/05/2023 108  40 - 129 U/L Final    AST 06/05/2023 65 (H)  10 - 50 U/L Final    ALT 06/05/2023 28  10 - 50 U/L Final    Protein Total 06/05/2023 6.5  6.4 - 8.3 g/dL Final    Albumin 06/05/2023 3.0 (L)  3.5 - 5.2 g/dL Final    Bilirubin Total 06/05/2023 0.4  <=1.2 mg/dL Final    GFR Estimate 06/05/2023 45 (L)  >60 mL/min/1.73m2 Final    eGFR calculated using 2021 CKD-EPI equation.    Culture 06/05/2023 No Growth   Final    Culture 06/05/2023 No Growth   Final    WBC Count 06/05/2023 4.3  4.0 - 11.0 10e3/uL Final    RBC Count 06/05/2023 4.13 (L)  4.40 - 5.90 10e6/uL Final    Hemoglobin 06/05/2023 11.5 (L)  13.3 - 17.7 g/dL Final    Hematocrit 06/05/2023 33.8 (L)  40.0 - 53.0 % Final    MCV 06/05/2023 82  78 - 100 fL Final    MCH 06/05/2023 27.8  26.5 - 33.0 pg Final    MCHC 06/05/2023 34.0  31.5 - 36.5 g/dL Final    RDW 06/05/2023 12.4  10.0 - 15.0 % Final    Platelet Count 06/05/2023 182  150 - 450 10e3/uL Final    % Neutrophils 06/05/2023 70  % Final    % Lymphocytes 06/05/2023 18  % Final    % Monocytes 06/05/2023 11  % Final    % Eosinophils 06/05/2023 1  % Final    % Basophils 06/05/2023 0  % Final    % Immature Granulocytes 06/05/2023 0  % Final    Absolute Neutrophils 06/05/2023 3.0  1.6 - 8.3 10e3/uL Final    Absolute Lymphocytes 06/05/2023 0.8  0.8 - 5.3 10e3/uL Final    Absolute Monocytes 06/05/2023 0.5  0.0 - 1.3 10e3/uL Final    Absolute Eosinophils 06/05/2023 0.0  0.0 - 0.7 10e3/uL Final    Absolute Basophils 06/05/2023 0.0  0.0 - 0.2 10e3/uL Final    Absolute Immature Granulocytes 06/05/2023 0.0  <=0.4 10e3/uL Final    Iron 06/05/2023 22 (L)  61 - 157 ug/dL Final    Iron Binding Capacity 06/05/2023 126 (L)  240 - 430 ug/dL Final    Iron Sat Index 06/05/2023 17  15 - 46 % Final    Hemoglobin A1C 06/05/2023 11.6 (H)  0.0 - 5.6 % Final    Normal  <5.7%   Prediabetes 5.7-6.4%    Diabetes 6.5% or higher     Note: Adopted from ADA consensus guidelines.     The patient indicates understanding of these issues and agrees with the plan.

## 2023-07-29 ENCOUNTER — MYC MEDICAL ADVICE (OUTPATIENT)
Dept: FAMILY MEDICINE | Facility: OTHER | Age: 53
End: 2023-07-29
Payer: COMMERCIAL

## 2023-07-29 DIAGNOSIS — Z89.511 S/P BKA (BELOW KNEE AMPUTATION), RIGHT (H): ICD-10-CM

## 2023-07-29 DIAGNOSIS — G54.6 PHANTOM PAIN FOLLOWING AMPUTATION OF LOWER LIMB (H): ICD-10-CM

## 2023-07-29 DIAGNOSIS — I15.2 HYPERTENSION DUE TO ENDOCRINE DISORDER: Primary | ICD-10-CM

## 2023-07-29 DIAGNOSIS — Z89.512 S/P BKA (BELOW KNEE AMPUTATION) UNILATERAL, LEFT (H): ICD-10-CM

## 2023-07-29 LAB — VIT B12 SERPL-MCNC: 442 PG/ML (ref 232–1245)

## 2023-07-31 ENCOUNTER — TELEPHONE (OUTPATIENT)
Dept: FAMILY MEDICINE | Facility: OTHER | Age: 53
End: 2023-07-31
Payer: COMMERCIAL

## 2023-07-31 ENCOUNTER — MEDICAL CORRESPONDENCE (OUTPATIENT)
Dept: HEALTH INFORMATION MANAGEMENT | Facility: CLINIC | Age: 53
End: 2023-07-31
Payer: COMMERCIAL

## 2023-07-31 DIAGNOSIS — E10.40 TYPE 1 DIABETES MELLITUS WITH DIABETIC NEUROPATHY (H): ICD-10-CM

## 2023-07-31 RX ORDER — LISINOPRIL 5 MG/1
5 TABLET ORAL DAILY
Qty: 90 TABLET | Refills: 1 | Status: SHIPPED | OUTPATIENT
Start: 2023-07-31 | End: 2024-01-30

## 2023-07-31 RX ORDER — AMLODIPINE BESYLATE 10 MG/1
10 TABLET ORAL DAILY
Qty: 90 TABLET | Refills: 1 | Status: SHIPPED | OUTPATIENT
Start: 2023-07-31 | End: 2023-11-06

## 2023-07-31 RX ORDER — PREGABALIN 100 MG/1
CAPSULE ORAL
Qty: 90 CAPSULE | Refills: 3 | Status: SHIPPED | OUTPATIENT
Start: 2023-08-08 | End: 2023-11-06

## 2023-07-31 NOTE — TELEPHONE ENCOUNTER
RN Triage    Patient Contact    Attempt # 1    Was call answered?  No.  Left message on voicemail with information to call me back.    Please call patient his creatinine is improving from hospitalization, I would like him to not do any more than 300mg of Lyrica a day this could mean he does 200mg in the morning and 100mg at night and vise versa. I will send this RX. As for blood pressure we can keep things where it is at, but I want to see what his blood pressure is at at our check up and his recheck of his creatinine.       Deepti Vega, APRN CNP  Questions or concerns please feel free to send me a [employer] message or call me  Phone : [phone]      GEOVANNY Kendall, RN, PHN  [place] [place]  July 31, 2023

## 2023-07-31 NOTE — TELEPHONE ENCOUNTER
Please call patient his creatinine is improving from hospitalization, I would like him to not do any more than 300mg of Lyrica a day this could mean he does 200mg in the morning and 100mg at night and vise versa. I will send this RX. As for blood pressure we can keep things where it is at, but I want to see what his blood pressure is at at our check up and his recheck of his creatinine.       NATASHA Queen CNP  Questions or concerns please feel free to send me a [employer] message or call me  Phone : [phone]

## 2023-07-31 NOTE — TELEPHONE ENCOUNTER
INCOMING FORMS    Sender: [facility]    Type of Form, letter or note (What is requested?): orders    How was the form received?: Fax    How should forms be returned?:  Fax : [phone].    Form placed in KV bin for review/signature if appropriate.

## 2023-07-31 NOTE — TELEPHONE ENCOUNTER
Patient is calling back.  Informed patient of documentation per Deepti Vega CNP as documented.  Patient stated understanding.    Zeina Christian, [place] Worrell RN Registered Nurse Signed  10:55 AM       RN Triage     Patient Contact     Attempt # 1     Was call answered?  No.  Left message on voicemail with information to call me back.     Please call patient his creatinine is improving from hospitalization, I would like him to not do any more than 300mg of Lyrica a day this could mean he does 200mg in the morning and 100mg at night and vise versa. I will send this RX. As for blood pressure we can keep things where it is at, but I want to see what his blood pressure is at at our check up and his recheck of his creatinine.         NATASHA Queen CNP  Questions or concerns please feel free to send me a [employer] message or call me  Phone : [phone]        GEOVANNY Kendall, RN, [facility]  July 31, 2023              Deepti Vega APRN CNP Nurse Practitioner Addendum  8:49 AM       Please call patient his creatinine is improving from hospitalization, I would like him to not do any more than 300mg of Lyrica a day this could mean he does 200mg in the morning and 100mg at night and vise versa. I will send this RX. As for blood pressure we can keep things where it is at, but I want to see what his blood pressure is at at our check up and his recheck of his creatinine.         NATASHA Queen CNP  Questions or concerns please feel free to send me a [employer] message or call me  Phone : [phone]

## 2023-07-31 NOTE — TELEPHONE ENCOUNTER
Signed please fax.       NATASHA Queen CNP  Questions or concerns please feel free to send me a [employer] message or call me  Phone : [phone]

## 2023-08-01 RX ORDER — INSULIN DEGLUDEC 100 U/ML
INJECTION, SOLUTION SUBCUTANEOUS
Qty: 15 ML | Refills: 0 | Status: SHIPPED | OUTPATIENT
Start: 2023-08-01 | End: 2023-11-13

## 2023-08-01 NOTE — TELEPHONE ENCOUNTER
"Last Written Prescription Date:  6/14/23  Last Fill Quantity: 15ml,  # refills: 0   Last office visit: Visit date not found ; last virtual visit: 7/20/2023 with prescribing provider:  7/20/23   Future Office Visit: 10/26/23    Requested Prescriptions   Pending Prescriptions Disp Refills    insulin degludec (TRESIBA FLEXTOUCH) 100 UNIT/ML pen 15 mL 0     Sig: INJECT 20 UNITS SUBCUTANEOUSLY ONCE DAILY IN THE MORNING       Long Acting Insulin Protocol Passed - 7/31/2023  5:51 PM        Passed - Serum creatinine on file in past 12 months     Recent Labs   Lab Test 07/28/23  1204   CR 1.38*       Ok to refill medication if creatinine is low          Passed - HgbA1C in past 3 or 6 months     If HgbA1C is 8 or greater, it needs to be on file within the past 3 months.  If less than 8, must be on file within the past 6 months.     Recent Labs   Lab Test 06/05/23  1444 08/20/20  1311 06/07/19  0000   A1C 11.6*   < >  --    HEMOGLOBINA1  --   --  8.1*    < > = values in this interval not displayed.             Passed - Medication is active on med list        Passed - Patient is age 18 or older        Passed - Recent (6 mo) or future (30 days) visit within the authorizing provider's specialty     Patient had office visit in the last 6 months or has a visit in the next 30 days with authorizing provider or within the authorizing provider's specialty.  See \"Patient Info\" tab in inbasket, or \"Choose Columns\" in Meds & Orders section of the refill encounter.                         "

## 2023-08-02 ENCOUNTER — TELEPHONE (OUTPATIENT)
Dept: FAMILY MEDICINE | Facility: OTHER | Age: 53
End: 2023-08-02
Payer: COMMERCIAL

## 2023-08-02 NOTE — TELEPHONE ENCOUNTER
INCOMING FORMS    Sender: [employer]    Type of Form, letter or note (What is requested?): Order     How was the form received?: Fax    How should forms be returned?:  Fax : [phone]    Form placed in KV bin for review/signature if appropriate.

## 2023-08-04 NOTE — TELEPHONE ENCOUNTER
Signed and agree for need for wheelchair      Deepti Vega, NATASHA CNP  Questions or concerns please feel free to send me a [employer] message or call me  Phone : [phone]

## 2023-08-14 ENCOUNTER — MEDICAL CORRESPONDENCE (OUTPATIENT)
Dept: HEALTH INFORMATION MANAGEMENT | Facility: CLINIC | Age: 53
End: 2023-08-14

## 2023-08-22 ENCOUNTER — VIRTUAL VISIT (OUTPATIENT)
Dept: UROLOGY | Facility: CLINIC | Age: 53
End: 2023-08-22
Payer: COMMERCIAL

## 2023-08-22 DIAGNOSIS — Z89.511 S/P BKA (BELOW KNEE AMPUTATION), RIGHT (H): ICD-10-CM

## 2023-08-22 DIAGNOSIS — E10.39 TYPE 1 DIABETES MELLITUS WITH OTHER DIABETIC OPHTHALMIC COMPLICATION (H): ICD-10-CM

## 2023-08-22 DIAGNOSIS — R33.9 URINARY RETENTION: ICD-10-CM

## 2023-08-22 DIAGNOSIS — N18.30 STAGE 3 CHRONIC KIDNEY DISEASE, UNSPECIFIED WHETHER STAGE 3A OR 3B CKD (H): ICD-10-CM

## 2023-08-22 PROCEDURE — 99203 OFFICE O/P NEW LOW 30 MIN: CPT | Mod: VID | Performed by: STUDENT IN AN ORGANIZED HEALTH CARE EDUCATION/TRAINING PROGRAM

## 2023-08-22 RX ORDER — TAMSULOSIN HYDROCHLORIDE 0.4 MG/1
0.8 CAPSULE ORAL DAILY
Qty: 180 CAPSULE | Refills: 3 | Status: SHIPPED | OUTPATIENT
Start: 2023-08-22

## 2023-08-22 NOTE — PROGRESS NOTES
Chief Complaint:   LUTS         History of Present Illness:   Eduardo Walton is a 53 year old male with a history of CKD stage 3, HTN, major depressive disorder, CAD, T1 DM with retinopathy and neuropathy who presents for evaluation of LUTS.    The patient has a history of a left BKA in February 2019. He had a right BKA in June 2023. During his admission, he was found to have an elevated PVR and an indwelling catheter was placed. He was started on tamsulosin 0.4 mg daily and the catheter was later removed.     He is doing well today. He feels he empties his bladder more completely and feels less bloated with tamsulosin. He reports some slowing of his urinary stream, though he previously stood to urinate, he is now sitting to urinate while he waits to get his prosthetics. He notes some changes to his ejaculate with tamsulosin.          Past Medical History:     Past Medical History:   Diagnosis Date    CAD (coronary artery disease)     previous coronary stent placement (2010?)    Diabetic foot (H) 10/29/2018    Diabetic retinopathy associated with type 1 diabetes mellitus (H)     Foot ulcer, left (H)     Osteomyelitis (H)     left foot    Pneumonia 06/14/2023    S/P BKA (below knee amputation), left (H) 2019    S/P BKA (below knee amputation), right 06/2023    Type 1 diabetes mellitus with complications (H)     retinopathy, neuropathy, nephropathy, macrovascular disease            Past Surgical History:     Past Surgical History:   Procedure Laterality Date    AMPUTATE FOOT Left 2/5/2019    Procedure: PARTIAL LEFT FOOT AMPUTATION.;  Surgeon: Chetan Potter DPM;  Location:  OR    AMPUTATE LEG BELOW KNEE Left 2/18/2019    Procedure: LEFT BELOW THE KNEE AMPUTATION;  Surgeon: Kvng Berman MD;  Location:  OR    STENT  2010            Medications     Current Outpatient Medications   Medication    amLODIPine (NORVASC) 10 MG tablet    aspirin (ASA) 325 MG EC tablet    atorvastatin (LIPITOR) 40 MG tablet     Continuous Blood Gluc Sensor (FREESTYLE COLIN 3 SENSOR) MISC    insulin aspart (NOVOLOG PEN) 100 UNIT/ML pen    insulin degludec (TRESIBA FLEXTOUCH) 100 UNIT/ML pen    lisinopril (ZESTRIL) 5 MG tablet    pantoprazole (PROTONIX) 20 MG EC tablet    pregabalin (LYRICA) 100 MG capsule    tamsulosin (FLOMAX) 0.4 MG capsule    BD ULTRA FINE PEN NEEDLES    HUMALOG KWIKPEN 100 UNIT/ML soln    insulin pen needle (31G X 5 MM) 31G X 5 MM miscellaneous     No current facility-administered medications for this visit.            Allergies:   Hydromorphone, Morphine, and Fish-derived products         Review of Systems:  From intake questionnaire   Negative 14 system review except as noted on HPI, nurse's note.         Physical Exam:   Patient is a 53 year old male evaluated via video visit.       Labs and Pathology:    I personally reviewed all applicable laboratory data and went over findings with patient  Significant for:    CBC RESULTS:  Recent Labs   Lab Test 07/28/23  1204 06/05/23  1444 02/14/22  1044 05/21/21  0744   WBC 4.5 4.3 6.8 6.1   HGB 9.6* 11.5* 12.7* 12.1*    182 206 191        BMP RESULTS:  Recent Labs   Lab Test 07/28/23  1204 06/05/23  1444 10/05/22  0808 02/14/22  1044 05/21/21  0744 08/20/20  1311 03/03/19  0746 02/28/19  1137    136 141 138 139 142  --   --    POTASSIUM 4.6 4.9 4.3 4.7 5.0 4.9  --   --    CHLORIDE 103 98 106 105 106 107  --   --    CO2 27 29 33* 29 33* 32  --   --    ANIONGAP 10 9 2* 4 <1* 3  --   --    * 281* 144* 363* 333* 140*  --   --    BUN 40.3* 27.9* 24 24 26 31*  --   --    CR 1.38* 1.80* 1.34* 1.22 1.33* 1.43* 1.15 1.05   GFRESTIMATED 62 45* 65 73 62 57* 76 84   GFRESTBLACK  --   --   --   --  72 67 88 >90   GABRIELA 9.1 8.2* 8.8 8.6 8.8 9.4  --   --        UA RESULTS:   Recent Labs   Lab Test 02/08/19  1530   SG 1.017   URINEPH 5.0   NITRITE Negative   RBCU 1   WBCU 3       PSA RESULTS  Prostate Specific Antigen Screen   Date Value Ref Range Status   02/14/2022 0.42  0.00 - 4.00 ug/L Final            Assessment and Plan:     Assessment: 53 year old male seen in evaluation for LUTS. He had a right BKA in June 2023. During his admission, he was found to have an elevated PVR and an indwelling catheter was placed. He was started on tamsulosin 0.4 mg daily and the catheter was later removed.     He is doing well today. He feels he empties his bladder more completely and feels less bloated with tamsulosin. He reports some slowing of his urinary stream, though he previously stood to urinate, he is now sitting to urinate while he waits to get his prosthetics. He notes some changes to his ejaculate with tamsulosin.     We discussed possible causes for his urinary symptoms including BPH or an acontractile bladder secondary to longstanding diabetes with neuropathy. We discussed urodynamics as an assessment tool.    We discussed that initial treatment for BPH symptoms includes alpha blockers, such as tamsulosin. We did discuss that retrograde ejaculation is a common side effect of tamsulosin. Rates are lower with alfuzosin. The patient asks about increasing the dose, which is a reasonable place to start.     Plan:  Increase tamsulosin to 0.8 mg daily.   Follow up in 1-2 months, sooner if concerns.     CHANCE WADE PA-C  Department of Urology

## 2023-08-22 NOTE — PROGRESS NOTES
Eduardo Walton is a 53 year old year old who is being evaluated via a billable video visit.      How would you like to obtain your AVS? [employer]  If the video visit is dropped, the invitation should be resent by: Text to cell phone: [phone]  Will anyone else be joining your video visit? No    Video-Visit Details    Type of service:  Video Visit   Video Start Time: 11:42 AM  Video End Time:12:06 PM    Originating Location (pt. Location): Home    Distant Location (provider location):  On-site  Platform used for Video Visit: [employer]

## 2023-09-14 ENCOUNTER — TELEPHONE (OUTPATIENT)
Dept: FAMILY MEDICINE | Facility: OTHER | Age: 53
End: 2023-09-14
Payer: COMMERCIAL

## 2023-09-14 NOTE — TELEPHONE ENCOUNTER
INCOMING FORMS    Sender: [facility]    Type of Form, letter or note (What is requested?): Order    How was the form received?: Fax    How should forms be returned?:  Fax : [phone]    Form placed in OOO bin for review/signature if appropriate.

## 2023-09-19 NOTE — TELEPHONE ENCOUNTER
Done please fax.       NATASHA Queen CNP  Questions or concerns please feel free to send me a [employer] message or call me  Phone : [phone]

## 2023-11-06 ENCOUNTER — OFFICE VISIT (OUTPATIENT)
Dept: FAMILY MEDICINE | Facility: OTHER | Age: 53
End: 2023-11-06
Payer: COMMERCIAL

## 2023-11-06 ENCOUNTER — TELEPHONE (OUTPATIENT)
Dept: FAMILY MEDICINE | Facility: OTHER | Age: 53
End: 2023-11-06

## 2023-11-06 VITALS
BODY MASS INDEX: 25.47 KG/M2 | DIASTOLIC BLOOD PRESSURE: 80 MMHG | WEIGHT: 167.5 LBS | OXYGEN SATURATION: 98 % | HEART RATE: 74 BPM | TEMPERATURE: 98.4 F | SYSTOLIC BLOOD PRESSURE: 162 MMHG | RESPIRATION RATE: 16 BRPM

## 2023-11-06 DIAGNOSIS — I15.2 HYPERTENSION DUE TO ENDOCRINE DISORDER: Primary | ICD-10-CM

## 2023-11-06 DIAGNOSIS — Z89.512 S/P BKA (BELOW KNEE AMPUTATION) UNILATERAL, LEFT (H): ICD-10-CM

## 2023-11-06 DIAGNOSIS — Z89.511 S/P BKA (BELOW KNEE AMPUTATION), RIGHT (H): ICD-10-CM

## 2023-11-06 DIAGNOSIS — Z12.11 SPECIAL SCREENING FOR MALIGNANT NEOPLASMS, COLON: ICD-10-CM

## 2023-11-06 DIAGNOSIS — E10.39 TYPE 1 DIABETES MELLITUS WITH OTHER DIABETIC OPHTHALMIC COMPLICATION (H): ICD-10-CM

## 2023-11-06 DIAGNOSIS — N18.30 STAGE 3 CHRONIC KIDNEY DISEASE, UNSPECIFIED WHETHER STAGE 3A OR 3B CKD (H): ICD-10-CM

## 2023-11-06 DIAGNOSIS — I10 WHITE COAT SYNDROME WITH DIAGNOSIS OF HYPERTENSION: ICD-10-CM

## 2023-11-06 DIAGNOSIS — K59.00 CONSTIPATION, UNSPECIFIED CONSTIPATION TYPE: ICD-10-CM

## 2023-11-06 DIAGNOSIS — G54.6 PHANTOM PAIN FOLLOWING AMPUTATION OF LOWER LIMB (H): ICD-10-CM

## 2023-11-06 DIAGNOSIS — E78.5 HYPERLIPIDEMIA LDL GOAL <70: ICD-10-CM

## 2023-11-06 PROBLEM — A41.9 SEPSIS (H): Status: RESOLVED | Noted: 2023-06-14 | Resolved: 2023-11-06

## 2023-11-06 PROBLEM — G89.18 ACUTE POSTOPERATIVE PAIN: Status: RESOLVED | Noted: 2023-06-22 | Resolved: 2023-11-06

## 2023-11-06 LAB
ALBUMIN SERPL BCG-MCNC: 3.9 G/DL (ref 3.5–5.2)
ALP SERPL-CCNC: 127 U/L (ref 40–129)
ALT SERPL W P-5'-P-CCNC: 38 U/L (ref 0–70)
ANION GAP SERPL CALCULATED.3IONS-SCNC: 9 MMOL/L (ref 7–15)
AST SERPL W P-5'-P-CCNC: 37 U/L (ref 0–45)
BASOPHILS # BLD AUTO: 0 10E3/UL (ref 0–0.2)
BASOPHILS NFR BLD AUTO: 0 %
BILIRUB SERPL-MCNC: 0.6 MG/DL
BUN SERPL-MCNC: 34.4 MG/DL (ref 6–20)
CALCIUM SERPL-MCNC: 9.1 MG/DL (ref 8.6–10)
CHLORIDE SERPL-SCNC: 103 MMOL/L (ref 98–107)
CHOLEST SERPL-MCNC: 151 MG/DL
CREAT SERPL-MCNC: 1.63 MG/DL (ref 0.67–1.17)
DEPRECATED HCO3 PLAS-SCNC: 29 MMOL/L (ref 22–29)
EGFRCR SERPLBLD CKD-EPI 2021: 51 ML/MIN/1.73M2
EOSINOPHIL # BLD AUTO: 0.3 10E3/UL (ref 0–0.7)
EOSINOPHIL NFR BLD AUTO: 6 %
ERYTHROCYTE [DISTWIDTH] IN BLOOD BY AUTOMATED COUNT: 13.4 % (ref 10–15)
GLUCOSE SERPL-MCNC: 166 MG/DL (ref 70–99)
HBA1C MFR BLD: 10.3 % (ref 0–5.6)
HCT VFR BLD AUTO: 33.6 % (ref 40–53)
HDLC SERPL-MCNC: 35 MG/DL
HGB BLD-MCNC: 11.3 G/DL (ref 13.3–17.7)
IMM GRANULOCYTES # BLD: 0 10E3/UL
IMM GRANULOCYTES NFR BLD: 0 %
LDLC SERPL CALC-MCNC: 95 MG/DL
LYMPHOCYTES # BLD AUTO: 1.4 10E3/UL (ref 0.8–5.3)
LYMPHOCYTES NFR BLD AUTO: 27 %
MCH RBC QN AUTO: 27.6 PG (ref 26.5–33)
MCHC RBC AUTO-ENTMCNC: 33.6 G/DL (ref 31.5–36.5)
MCV RBC AUTO: 82 FL (ref 78–100)
MONOCYTES # BLD AUTO: 0.4 10E3/UL (ref 0–1.3)
MONOCYTES NFR BLD AUTO: 7 %
NEUTROPHILS # BLD AUTO: 3.1 10E3/UL (ref 1.6–8.3)
NEUTROPHILS NFR BLD AUTO: 59 %
NONHDLC SERPL-MCNC: 116 MG/DL
PLATELET # BLD AUTO: 225 10E3/UL (ref 150–450)
POTASSIUM SERPL-SCNC: 6.1 MMOL/L (ref 3.4–5.3)
PROT SERPL-MCNC: 6.9 G/DL (ref 6.4–8.3)
RBC # BLD AUTO: 4.1 10E6/UL (ref 4.4–5.9)
SODIUM SERPL-SCNC: 141 MMOL/L (ref 135–145)
TRIGL SERPL-MCNC: 107 MG/DL
WBC # BLD AUTO: 5.3 10E3/UL (ref 4–11)

## 2023-11-06 PROCEDURE — 85025 COMPLETE CBC W/AUTO DIFF WBC: CPT | Performed by: NURSE PRACTITIONER

## 2023-11-06 PROCEDURE — 83036 HEMOGLOBIN GLYCOSYLATED A1C: CPT | Performed by: NURSE PRACTITIONER

## 2023-11-06 PROCEDURE — 99214 OFFICE O/P EST MOD 30 MIN: CPT | Performed by: NURSE PRACTITIONER

## 2023-11-06 PROCEDURE — 80061 LIPID PANEL: CPT | Performed by: NURSE PRACTITIONER

## 2023-11-06 PROCEDURE — 36415 COLL VENOUS BLD VENIPUNCTURE: CPT | Performed by: NURSE PRACTITIONER

## 2023-11-06 PROCEDURE — 80053 COMPREHEN METABOLIC PANEL: CPT | Performed by: NURSE PRACTITIONER

## 2023-11-06 RX ORDER — PREGABALIN 100 MG/1
CAPSULE ORAL
Qty: 90 CAPSULE | Refills: 5 | Status: SHIPPED | OUTPATIENT
Start: 2023-11-06 | End: 2023-11-30

## 2023-11-06 ASSESSMENT — PAIN SCALES - GENERAL: PAINLEVEL: NO PAIN (0)

## 2023-11-06 NOTE — PROGRESS NOTES
Assessment & Plan     Hypertension due to endocrine disorder  Will discontinue the Norvasc  He needs to monitor his blood pressure at home and we discussed the importance of this with kidney health and overall cardiovascular health  Continue Lisinopril 5mg   Encouraged him to contact me if his blood pressure is at all above goal 130/90     White coat syndrome with diagnosis of hypertension  Discussed this in detail today     S/P BKA (below knee amputation), right (H)  Doing well, feeling the Lyrica manages his pain  Discussed the impacts of lyrica with bowels as well and recommend Metamucil or Benefiber   - pregabalin (LYRICA) 100 MG capsule; Take 1 capsule (100 mg) by mouth every morning AND 2 capsules (200 mg) at bedtime.    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)    - pregabalin (LYRICA) 100 MG capsule; Take 1 capsule (100 mg) by mouth every morning AND 2 capsules (200 mg) at bedtime.    Type 1 diabetes mellitus with other diabetic ophthalmic complication (H)  Followed by endocrinology   Rechecking A1C today since he is here for his endocrinologist   - Albumin Random Urine Quantitative with Creat Ratio; Future  - Comprehensive metabolic panel (BMP + Alb, Alk Phos, ALT, AST, Total. Bili, TP); Future  - Hemoglobin A1c; Future  - Albumin Random Urine Quantitative with Creat Ratio  - Comprehensive metabolic panel (BMP + Alb, Alk Phos, ALT, AST, Total. Bili, TP)  - Hemoglobin A1c    Stage 3 chronic kidney disease, unspecified whether stage 3a or 3b CKD (H)  Recommend nephrology in the future if needed   Monitoring closely with use of Lyrica and overall kidney function for above   - Comprehensive metabolic panel (BMP + Alb, Alk Phos, ALT, AST, Total. Bili, TP); Future  - Hemoglobin A1c; Future  - Comprehensive metabolic panel (BMP + Alb, Alk Phos, ALT, AST, Total. Bili, TP)  - Hemoglobin A1c    Hyperlipidemia LDL goal <70  Declined statin  Aware of the risks with not using statin.   Declined asprin   -  Lipid panel reflex to direct LDL Non-fasting; Future  - Lipid panel reflex to direct LDL Non-fasting    Special screening for malignant neoplasms, colon  Declined at this time       Constipation, unspecified constipation type  Discussed above     See Patient Instructions    NATASHA Queen CNP  M [facility] CALISTA Clark is a 53 year old, presenting for the following health issues:  Surgical Followup      History of Present Illness       Reason for visit:  Right leg amputation in June 2023    He eats 2-3 servings of fruits and vegetables daily.He consumes 0 sweetened beverage(s) daily.He exercises with enough effort to increase his heart rate 20 to 29 minutes per day.  He exercises with enough effort to increase his heart rate 4 days per week.   He is taking medications regularly.    Discussed that he did not tolerate the Norvasc so he stopped it. His blood pressures at home have been <130/90, noticed he is more elevated when in clinic. Would not like to go back on the Amlodipine.     Working with prosthetics for his Right BKA    Feels more positive and less depressed    Having some constipation intermittently, does not want to do FIT testing.     Urology- increased Flomax and this has helped.       Review of Systems         Objective    BP (!) 162/80   Pulse 74   Temp 98.4  F (36.9  C) (Temporal)   Resp 16   Wt 76 kg (167 lb 8 oz)   SpO2 98%   BMI 25.47 kg/m    Body mass index is 25.47 kg/m .  Physical Exam   GENERAL: healthy, alert and no distress  MS: BKA bilateral with prosthetic devices in place.   SKIN: no suspicious lesions or rashes  NEURO: Normal strength and tone, mentation intact and speech normal  PSYCH: mentation appears normal, affect normal/bright    Results for orders placed or performed in visit on 11/06/23   Hemoglobin A1c     Status: Abnormal   Result Value Ref Range    Hemoglobin A1C 10.3 (H) 0.0 - 5.6 %   CBC with platelets and differential     Status:  Abnormal   Result Value Ref Range    WBC Count 5.3 4.0 - 11.0 10e3/uL    RBC Count 4.10 (L) 4.40 - 5.90 10e6/uL    Hemoglobin 11.3 (L) 13.3 - 17.7 g/dL    Hematocrit 33.6 (L) 40.0 - 53.0 %    MCV 82 78 - 100 fL    MCH 27.6 26.5 - 33.0 pg    MCHC 33.6 31.5 - 36.5 g/dL    RDW 13.4 10.0 - 15.0 %    Platelet Count 225 150 - 450 10e3/uL    % Neutrophils 59 %    % Lymphocytes 27 %    % Monocytes 7 %    % Eosinophils 6 %    % Basophils 0 %    % Immature Granulocytes 0 %    Absolute Neutrophils 3.1 1.6 - 8.3 10e3/uL    Absolute Lymphocytes 1.4 0.8 - 5.3 10e3/uL    Absolute Monocytes 0.4 0.0 - 1.3 10e3/uL    Absolute Eosinophils 0.3 0.0 - 0.7 10e3/uL    Absolute Basophils 0.0 0.0 - 0.2 10e3/uL    Absolute Immature Granulocytes 0.0 <=0.4 10e3/uL   CBC with platelets and differential     Status: Abnormal    Narrative    The following orders were created for panel order CBC with platelets and differential.  Procedure                               Abnormality         Status                     ---------                               -----------         ------                     CBC with platelets and d...[[id number]]  Abnormal            Final result                 Please view results for these tests on the individual orders.

## 2023-11-07 NOTE — TELEPHONE ENCOUNTER
"Deepti Vega APRN CNP     KV    11/7/23  7:46 AM  Note  Please call patient his potassium is critically high this could be an error in the coagulation so I would like to recheck this today and in the meantime hold his lisinopril until we recheck. Also please triage for any hyperkalemia symptoms- heart palpitations, muscle weakness, numbness and tingling. It is very important that we recheck this and monitor as high potassium can be life threatening and critical at this level.         NATASHA Queen CNP  Questions or concerns please feel free to send me a [employer] message or call me  Phone : [phone]          RN has attempted to contact this patient by phone to return their call, but there is no response.  Left message to \"call clinic asap\".  Will try again later. Also sent another MyChart to patient.     Keara Edmond, CLAIREN, RN   "

## 2023-11-07 NOTE — TELEPHONE ENCOUNTER
RN Triage    Patient Contact    Attempt # 1    Was call answered?  No.  Unable to leave message. CRESCEL message sent.  Hazel Solomon RN on 11/7/2023 at 7:29 AM

## 2023-11-07 NOTE — TELEPHONE ENCOUNTER
Was called for critical value of potassium 6.1.  patient seen in clinic today by primary care provider and does not require urgent attention tonight.      Would recommend holding is lisinopril for now and then follow-up with primary care provider per her recommendations upon her review.    Sending RN team as primary care provider appears to be out of office tomorrow.  Please contact patient on recommendation and then send message to primary care provider to address upon return.    Chetan Cornell MD

## 2023-11-07 NOTE — TELEPHONE ENCOUNTER
The patient called back.   RN had an in depth conversation as to causes for hyperkalemia and symptoms. We discussed the importance of having the lab repeated.     The patient does not drive anymore and is unable to get a ride.     Per patient he feels this lab is a fluk and doesn't feel he needs to come in for a repeat lab. He isn't having any symptoms. He states if anything happens he will call 911.       Montana Crocker, TASHIA, RN, PHN  [place]  November 7, 2023

## 2023-11-13 DIAGNOSIS — E10.40 TYPE 1 DIABETES MELLITUS WITH DIABETIC NEUROPATHY (H): ICD-10-CM

## 2023-11-13 DIAGNOSIS — E10.39 TYPE 1 DIABETES MELLITUS WITH OTHER DIABETIC OPHTHALMIC COMPLICATION (H): ICD-10-CM

## 2023-11-15 NOTE — TELEPHONE ENCOUNTER
Please forward to endocrinology      NATASHA Queen CNP  Questions or concerns please feel free to send me a [employer] message or call me  Phone : [phone]

## 2023-11-16 ENCOUNTER — MYC REFILL (OUTPATIENT)
Dept: FAMILY MEDICINE | Facility: OTHER | Age: 53
End: 2023-11-16
Payer: COMMERCIAL

## 2023-11-16 ENCOUNTER — MYC REFILL (OUTPATIENT)
Dept: ENDOCRINOLOGY | Facility: CLINIC | Age: 53
End: 2023-11-16
Payer: COMMERCIAL

## 2023-11-16 DIAGNOSIS — E10.40 TYPE 1 DIABETES MELLITUS WITH DIABETIC NEUROPATHY (H): ICD-10-CM

## 2023-11-16 DIAGNOSIS — E10.39 TYPE 1 DIABETES MELLITUS WITH OTHER DIABETIC OPHTHALMIC COMPLICATION (H): ICD-10-CM

## 2023-11-16 RX ORDER — INSULIN DEGLUDEC 100 U/ML
INJECTION, SOLUTION SUBCUTANEOUS
Qty: 15 ML | Refills: 0 | Status: SHIPPED | OUTPATIENT
Start: 2023-11-16 | End: 2023-11-17

## 2023-11-16 RX ORDER — BLOOD-GLUCOSE SENSOR
1 EACH MISCELLANEOUS CONTINUOUS
Qty: 2 EACH | Refills: 5 | Status: SHIPPED | OUTPATIENT
Start: 2023-11-16 | End: 2024-04-21

## 2023-11-16 RX ORDER — INSULIN LISPRO 100 [IU]/ML
INJECTION, SOLUTION INTRAVENOUS; SUBCUTANEOUS
Qty: 45 ML | Refills: 0 | Status: SHIPPED | OUTPATIENT
Start: 2023-11-16 | End: 2023-11-17

## 2023-11-16 RX ORDER — BLOOD-GLUCOSE SENSOR
1 EACH MISCELLANEOUS CONTINUOUS
OUTPATIENT
Start: 2023-11-16

## 2023-11-16 NOTE — TELEPHONE ENCOUNTER
Please forward to endocrinology.       NATASHA Queen CNP  Questions or concerns please feel free to send me a [employer] message or call me  Phone : [phone]

## 2023-11-17 ENCOUNTER — MYC MEDICAL ADVICE (OUTPATIENT)
Dept: ENDOCRINOLOGY | Facility: CLINIC | Age: 53
End: 2023-11-17
Payer: COMMERCIAL

## 2023-11-17 DIAGNOSIS — E10.40 TYPE 1 DIABETES MELLITUS WITH DIABETIC NEUROPATHY (H): ICD-10-CM

## 2023-11-17 DIAGNOSIS — E10.39 TYPE 1 DIABETES MELLITUS WITH OTHER DIABETIC OPHTHALMIC COMPLICATION (H): ICD-10-CM

## 2023-11-17 RX ORDER — INSULIN LISPRO 100 [IU]/ML
INJECTION, SOLUTION INTRAVENOUS; SUBCUTANEOUS
Qty: 45 ML | Refills: 0 | OUTPATIENT
Start: 2023-11-17

## 2023-11-17 RX ORDER — INSULIN DEGLUDEC 100 U/ML
INJECTION, SOLUTION SUBCUTANEOUS
Qty: 15 ML | Refills: 0 | Status: SHIPPED | OUTPATIENT
Start: 2023-11-17 | End: 2024-04-08

## 2023-11-17 RX ORDER — INSULIN DEGLUDEC 100 U/ML
INJECTION, SOLUTION SUBCUTANEOUS
Qty: 15 ML | Refills: 0 | OUTPATIENT
Start: 2023-11-17

## 2023-11-17 RX ORDER — BLOOD-GLUCOSE SENSOR
1 EACH MISCELLANEOUS
Qty: 2 EACH | Refills: 5 | Status: SHIPPED | OUTPATIENT
Start: 2023-11-17 | End: 2024-04-21

## 2023-11-17 RX ORDER — INSULIN LISPRO 100 [IU]/ML
INJECTION, SOLUTION INTRAVENOUS; SUBCUTANEOUS
Qty: 45 ML | Refills: 0 | Status: ON HOLD | OUTPATIENT
Start: 2023-11-17 | End: 2024-04-24

## 2023-11-17 NOTE — TELEPHONE ENCOUNTER
"Requested Prescriptions   Pending Prescriptions Disp Refills    BD ULTRA FINE PEN NEEDLES 500 each 0     Si each by Other route 5 times daily       There is no refill protocol information for this order       HUMALOG KWIKPEN 100 UNIT/ML soln 45 mL 0     Sig: INJECT 4-12 UNITS SUBCUTANEOUSLY WITH MEALS AS DIRECTED. TOTAL DAILY DOSE ABOUT 50 UNITS       Short Acting Insulin Protocol Passed - 2023 10:51 AM        Passed - Serum creatinine on file in past 12 months     Recent Labs   Lab Test 23  1407   CR 1.63*       Ok to refill medication if creatinine is low          Passed - HgbA1C in past 3 or 6 months     If HgbA1C is 8 or greater, it needs to be on file within the past 3 months.  If less than 8, must be on file within the past 6 months.     Recent Labs   Lab Test 23  1407 20  1311 19  0000   A1C 10.3*   < >  --    HEMOGLOBINA1  --   --  8.1*    < > = values in this interval not displayed.             Passed - Medication is active on med list        Passed - Patient is age 18 or older        Passed - Recent (6 mo) or future (30 days) visit within the authorizing provider's specialty     Patient had office visit in the last 6 months or has a visit in the next 30 days with authorizing provider or within the authorizing provider's specialty.  See \"Patient Info\" tab in inbasket, or \"Choose Columns\" in Meds & Orders section of the refill encounter.              insulin degludec (TRESIBA FLEXTOUCH) 100 UNIT/ML pen 15 mL 0     Sig: INJECT 20 UNITS SUBCUTANEOUSLY ONCE DAILY IN THE MORNING       Long Acting Insulin Protocol Passed - 2023 10:51 AM        Passed - Serum creatinine on file in past 12 months     Recent Labs   Lab Test 23  1407   CR 1.63*       Ok to refill medication if creatinine is low          Passed - HgbA1C in past 3 or 6 months     If HgbA1C is 8 or greater, it needs to be on file within the past 3 months.  If less than 8, must be on file within the past 6 " "months.     Recent Labs   Lab Test 11/06/23  1407 08/20/20  1311 06/07/19  0000   A1C 10.3*   < >  --    HEMOGLOBINA1  --   --  8.1*    < > = values in this interval not displayed.             Passed - Medication is active on med list        Passed - Patient is age 18 or older        Passed - Recent (6 mo) or future (30 days) visit within the authorizing provider's specialty     Patient had office visit in the last 6 months or has a visit in the next 30 days with authorizing provider or within the authorizing provider's specialty.  See \"Patient Info\" tab in inbasket, or \"Choose Columns\" in Meds & Orders section of the refill encounter.               Refills were sent by Dr Palma on 11/16/23. Refill declined.    Jorge Alberto Ang RN    "

## 2023-11-18 NOTE — TELEPHONE ENCOUNTER
Message reviewed.  I have now signed these Rx's to his pharmacy.    LEVI Palma MD, MS  Endocrinology  [facility]

## 2023-11-21 ENCOUNTER — APPOINTMENT (OUTPATIENT)
Dept: FAMILY MEDICINE | Facility: OTHER | Age: 53
End: 2023-11-21
Payer: COMMERCIAL

## 2023-11-21 ENCOUNTER — TELEPHONE (OUTPATIENT)
Dept: FAMILY MEDICINE | Facility: OTHER | Age: 53
End: 2023-11-21

## 2023-11-21 DIAGNOSIS — E10.65 TYPE 1 DIABETES MELLITUS WITH HYPERGLYCEMIA (H): Primary | ICD-10-CM

## 2023-11-21 PROCEDURE — 96372 THER/PROPH/DIAG INJ SC/IM: CPT | Performed by: PHYSICIAN ASSISTANT

## 2023-11-21 NOTE — TELEPHONE ENCOUNTER
RN was called to the . A member of the patient's family was up front and indicated that the patient was passed out in the front seat.     Family informed RN that around 3 pm they were taking him to get his [place] license. His phone went off indicating that his BS was 50. Patient was given yogurt and then passed out. Family drove the patient to the clinic.     RN walked out to the car. Patient open eyes and was in and out of LOC. He had a pulse.   RN called for additional help from clinical team and had someone call 911.   Patient was in and out of LOC.   1mL/1 mg of Glucagon was administered to the patient while waiting for EMS.     VITALS:   BP: 124/60  P: 53  O2: 95    Once EMS arrived BS was taken and was at 33.     EMS took over until Ambulance arrived.   Patient was administered IV glucose.     Patient was in and out of LOC.     Ambulance arrived and transported the patient to [facility].     PATIENT HEALTH HISTORY:   CKD Stage 3  CAD  Nephritis and nephropathy  HX of bilateral leg amputation  T1DM    Montana Crocker, MSN, RN, PHN  [place]  November 21, 2023

## 2023-11-22 DIAGNOSIS — E10.21 TYPE 1 DIABETES MELLITUS WITH DIABETIC NEPHROPATHY (H): ICD-10-CM

## 2023-11-22 DIAGNOSIS — E10.59 TYPE 1 DIABETES MELLITUS WITH OTHER CIRCULATORY COMPLICATION (H): ICD-10-CM

## 2023-11-22 DIAGNOSIS — E10.40 TYPE 1 DIABETES MELLITUS WITH DIABETIC NEUROPATHY (H): ICD-10-CM

## 2023-11-22 DIAGNOSIS — E10.319 TYPE 1 DIABETES MELLITUS WITH RETINOPATHY, MACULAR EDEMA PRESENCE UNSPECIFIED, UNSPECIFIED LATERALITY, UNSPECIFIED RETINOPATHY SEVERITY (H): Primary | ICD-10-CM

## 2023-11-22 NOTE — TELEPHONE ENCOUNTER
Messages reviewed. Unfortunately, I do not have any urgent work-in endocrinology appointments.  I will arrange for another [facility] diabetes education referral for him to be seen urgently, review his hypoglycemia episode, hypoglycemia management, and insulin dose adjustment.      LEVI Palma MD, MS  Endocrinology  [facility]

## 2023-11-22 NOTE — TELEPHONE ENCOUNTER
Patient was seen and discharged from [facility].  Please advise on work in for ED follow-up with you or should he seen endocrinology sooner?    Dima Lizama, MSN, APRN, FNP-C  [facility] ~ Registered Nurse  Clinic Triage ~ [place] [facility] [place]  November 22, 2023

## 2023-11-22 NOTE — TELEPHONE ENCOUNTER
I forwarded the triage note from yesterday to endocrinology please message them for ASAP work in as he may need adjustments to his insuline etc.       Deepti Vega, NATASHA CNP  Questions or concerns please feel free to send me a [employer] message or call me  Phone : [phone]

## 2023-11-28 ENCOUNTER — MYC MEDICAL ADVICE (OUTPATIENT)
Dept: FAMILY MEDICINE | Facility: OTHER | Age: 53
End: 2023-11-28
Payer: COMMERCIAL

## 2023-11-28 DIAGNOSIS — G54.6 PHANTOM PAIN FOLLOWING AMPUTATION OF LOWER LIMB (H): ICD-10-CM

## 2023-11-28 DIAGNOSIS — Z89.511 S/P BKA (BELOW KNEE AMPUTATION), RIGHT (H): ICD-10-CM

## 2023-11-28 DIAGNOSIS — Z89.512 S/P BKA (BELOW KNEE AMPUTATION) UNILATERAL, LEFT (H): ICD-10-CM

## 2023-11-29 ENCOUNTER — E-VISIT (OUTPATIENT)
Dept: FAMILY MEDICINE | Facility: OTHER | Age: 53
End: 2023-11-29
Payer: COMMERCIAL

## 2023-11-29 DIAGNOSIS — J01.90 ACUTE SINUSITIS WITH SYMPTOMS > 10 DAYS: Primary | ICD-10-CM

## 2023-11-29 PROCEDURE — 99207 PR NON-BILLABLE SERV PER CHARTING: CPT | Performed by: NURSE PRACTITIONER

## 2023-11-29 RX ORDER — PREGABALIN 100 MG
CAPSULE ORAL
Qty: 90 CAPSULE | Refills: 4 | Status: SHIPPED | OUTPATIENT
Start: 2023-12-20

## 2023-11-29 NOTE — TELEPHONE ENCOUNTER
RN's please call pharmacy and cancel current Lyrica RX. I am going to send a new one to make sure that he gets the correct. He needs to get the Brand name and not generic.          NATASHA Queen CNP  Questions or concerns please feel free to send me a [employer] message or call me  Phone : [phone]

## 2023-11-30 NOTE — PATIENT INSTRUCTIONS
Acute Sinusitis: Care Instructions  Overview     Acute sinusitis is an inflammation of the mucous membranes inside the nose and sinuses. Sinuses are the hollow spaces in your skull around the eyes and nose. Acute sinusitis often follows a cold. Acute sinusitis causes thick, discolored mucus that drains from the nose or down the back of the throat. It also can cause pain and pressure in your head and face along with a stuffy or blocked nose.  In most cases, sinusitis gets better on its own in 1 to 2 weeks. But some mild symptoms may last for several weeks. Sometimes antibiotics are needed if there is a bacterial infection.  Follow-up care is a key part of your treatment and safety. Be sure to make and go to all appointments, and call your doctor if you are having problems. It's also a good idea to know your test results and keep a list of the medicines you take.  How can you care for yourself at home?  Use saline (saltwater) nasal washes. This can help keep your nasal passages open and wash out mucus and allergens.  You can buy saline nose washes at a grocery store or drugstore. Follow the instructions on the package.  You can make your own at home. Add 1 teaspoon of non-iodized salt and 1 teaspoon of baking soda to 2 cups of distilled or boiled and cooled water. Fill a squeeze bottle or a nasal cleansing pot (such as a neti pot) with the nasal wash. Then put the tip into your nostril, and lean over the sink. With your mouth open, gently squirt the liquid. Repeat on the other side.  Try a decongestant nasal spray like oxymetazoline (Afrin). Do not use it for more than 3 days in a row. Using it for more than 3 days can make your congestion worse.  If needed, take an over-the-counter pain medicine, such as acetaminophen (Tylenol), ibuprofen (Advil, Motrin), or naproxen (Aleve). Read and follow all instructions on the label.  If the doctor prescribed antibiotics, take them as directed. Do not stop taking them just  "because you feel better. You need to take the full course of antibiotics.  Be careful when taking over-the-counter cold or flu medicines and Tylenol at the same time. Many of these medicines have acetaminophen, which is Tylenol. Read the labels to make sure that you are not taking more than the recommended dose. Too much acetaminophen (Tylenol) can be harmful.  Try a steroid nasal spray. It may help with your symptoms.  Breathe warm, moist air. You can use a steamy shower, a hot bath, or a sink filled with hot water. Avoid cold, dry air. Using a humidifier in your home may help. Follow the directions for cleaning the machine.  When should you call for help?   Call your doctor now or seek immediate medical care if:    You have new or worse swelling, redness, or pain in your face or around one or both of your eyes.     You have double vision or a change in your vision.     You have a high fever.     You have a severe headache and a stiff neck.     You have mental changes, such as feeling confused or much less alert.   Watch closely for changes in your health, and be sure to contact your doctor if:    You are not getting better as expected.   Where can you learn more?  Go to https://www.[employer].net/patiented  Enter I933 in the search box to learn more about \"Acute Sinusitis: Care Instructions.\"  Current as of: February 28, 2023               Content Version: 13.8    0648-8824 [employer].   Care instructions adapted under license by your healthcare professional. If you have questions about a medical condition or this instruction, always ask your healthcare professional. [employer] disclaims any warranty or liability for your use of this information.      Dear Eduardo Walton    After reviewing your responses, I've been able to diagnose you with Acute sinusitis with symptoms > 10 days.      Based on your responses and diagnosis, I have prescribed   Orders Placed This Encounter   Medications "     amoxicillin-clavulanate (AUGMENTIN) 875-125 MG tablet     Sig: Take 1 tablet by mouth 2 times daily for 10 days     Dispense:  20 tablet     Refill:  0      to treat your symptoms. I have sent this to your pharmacy.?     It is also important to stay well hydrated, get lots of rest and take over-the-counter decongestants,?tylenol?or ibuprofen if you?are able to?take those medications per your primary care provider to help relieve discomfort.?     It is important that you take?all of?your prescribed medication even if your symptoms are improving after a few doses.? Taking?all of?your medicine helps prevent the symptoms from returning.?     If your symptoms worsen, you develop severe headache, vomiting, high fever (>102), or are not improving in 7 days, please contact your primary care provider for an appointment or visit any of our convenient Walk-in Care or Urgent Care Centers to be seen which can be found on our website?here.?     Thanks again for choosing?us?as your health care partner,?   ?  NATASHA Queen CNP?   Thank you for choosing us for your care. I have placed an order for a prescription so that you can start treatment. View your full visit summary for details by clicking on the link below. Your pharmacist will able to address any questions you may have about the medication.     If you're not feeling better within 5-7 days, please schedule an appointment.  You can schedule an appointment right here in [facility], or call [phone]  If the visit is for the same symptoms as your eVisit, we'll refund the cost of your eVisit if seen within seven days.

## 2024-01-24 ENCOUNTER — VIRTUAL VISIT (OUTPATIENT)
Dept: ENDOCRINOLOGY | Facility: CLINIC | Age: 54
End: 2024-01-24
Attending: INTERNAL MEDICINE
Payer: COMMERCIAL

## 2024-01-24 DIAGNOSIS — E10.21 TYPE 1 DIABETES MELLITUS WITH DIABETIC NEPHROPATHY (H): ICD-10-CM

## 2024-01-24 DIAGNOSIS — E10.59 TYPE 1 DIABETES MELLITUS WITH OTHER CIRCULATORY COMPLICATION (H): ICD-10-CM

## 2024-01-24 DIAGNOSIS — E10.319 TYPE 1 DIABETES MELLITUS WITH RETINOPATHY, MACULAR EDEMA PRESENCE UNSPECIFIED, UNSPECIFIED LATERALITY, UNSPECIFIED RETINOPATHY SEVERITY (H): ICD-10-CM

## 2024-01-24 DIAGNOSIS — E10.40 TYPE 1 DIABETES MELLITUS WITH DIABETIC NEUROPATHY (H): Primary | ICD-10-CM

## 2024-01-24 PROCEDURE — 99214 OFFICE O/P EST MOD 30 MIN: CPT | Mod: 95 | Performed by: INTERNAL MEDICINE

## 2024-01-24 PROCEDURE — G2211 COMPLEX E/M VISIT ADD ON: HCPCS | Mod: 95 | Performed by: INTERNAL MEDICINE

## 2024-01-24 NOTE — Clinical Note
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 proptosis  THYROID:  no apparent nodules or goiter  LUNGS: no audible wheeze, cough or visible cyanosis, no visible retractions or increased work of breathing  ABDOMEN: abdomen not evaluated  EXTREMITIES: no hand tremors, limited exam  NEUROLOGY: CN grossly intact, mentation intact and speech normal   SKIN:  no apparent skin lesions, rash, or edema with visualized skin appearance  PSYCH: mentation appears normal, affect normal/bright, judgement and insight intact,   normal speech and appearance well groomed    LABS:     All pertinent notes, labs, and images personally reviewed by me.        A/P:  Encounter Diagnoses   Name Primary?     Type 1 diabetes mellitus with diabetic neuropathy (H) Yes     Type 1 diabetes mellitus with diabetic nephropathy (H)      Type 1 diabetes mellitus with other circulatory complication (H)      Type 1 diabetes mellitus with retinopathy, macular edema presence unspecified, unspecified laterality, unspecified retinopathy severity (H)          Comments:  Reviewed complicated  health history and complicated diabetes issues.  Recent/previous glucose trends high, especially postmeal.  Needs more aggressive mealtime, correction dose insulin injections  Reviewed and interpreted tests that I previously ordered.   Ordered appropriate tests for the endocrinology disease management.    Management options discussed and implemented after shared medical decision making with the patient.  T1DM problem is chronic-stable    Plan:  Discussed general issues with the diabetes diagnosis and management  We discussed the hgbA1c test which reflects previous overall glucose levels or control  Discussed the importance of blood glucose (BG) testing to assess glucose trends  Provided general overview of the multiple daily injection (MDI) plan using rapid acting mealtime and longacting insulin medications  Reviewed concept of using the Freestyle Libre3 or Libre2 CGM sensor     Recommend:  Although his reported Drew averages are good, I suspect his overall glycemic control not good  Needs more precision with his mealtime and correction dosing   Discussed advantages of using the carb counting (ICR) method   Take mealtime Humalog with premeal injections  Insulin dosing plan:  Humalog Kwikpen                 2U per 15 gm carb or 1U per 7 gm       Sscale 1U per 50 mg/dl over 150 mg/dl  Tresiba Flextouch U100         20U subcutaneous daily    Needs review of his CGM trends and insulin dosing, to refine his ICR calculations/method  Goal target premeal glucose  mg/dl   Reviewed options using the Drew CGM, his idea to change to Freestyle Libre2 CGM.  I agree.   New Freestyle Libre2 CGM sensor and [place] Rx's sent to [facility]   Use CGM result for Humalog correction dosing    Encouraged an appt with [facility] appt soon   Review his Drew CGM data, ICR and ISF   Confirm the new Drew (3 vs 2) CGM connected to our [employer] network   Discuss the new Tandem TSlim and Libre2 Plus CGM option   Diab Ed Referral placed  again  Continue use of pregabalin (Lyrica DAW1) TID, if available from insurance plan.  Continue low dose losartan medication  Plan non-fasting lab tests in 4/2024   Testing at nearby [facility]   Lab orders placed  Monitor BP and urine micral  Advise having fasting lipid panel testing and dilated eye examination, at least annually     Needs f/u PCP, cardiology, ophthalmology evaluations.  Addressed patient questions today    The longitudinal plan of care for the endocrine problem(s) were addressed during this visit.  Due to added complexity of care,   we will continue to support the patient and the subsequent management of this condition with ongoing continuity of care.    There are no Patient Instructions on file for this visit.    Future labs ordered today:   Orders Placed This Encounter   Procedures     Hemoglobin A1c     Basic metabolic panel     TSH     Albumin Random Urine Quantitative with Creat Ratio     Adult Diabetes Education  Referral     Radiology/Consults ordered today: ADULT DIABETES EDUCATION  REFERRAL    Total time spent on day of encounter:  38 min    Follow-up:  4/29/24 at 3pm, Dixie Palma MD, MS  Endocrinology  [facility]    CC:  Care Team                                Again, thank you for allowing me to participate in the care of your patient.        Sincerely,        Jori Palma MD
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
## 2024-01-30 DIAGNOSIS — I15.2 HYPERTENSION DUE TO ENDOCRINE DISORDER: ICD-10-CM

## 2024-01-30 RX ORDER — LISINOPRIL 5 MG/1
5 TABLET ORAL DAILY
Qty: 90 TABLET | Refills: 0 | Status: SHIPPED | OUTPATIENT
Start: 2024-01-30 | End: 2024-04-30

## 2024-02-23 ENCOUNTER — MYC MEDICAL ADVICE (OUTPATIENT)
Dept: ENDOCRINOLOGY | Facility: CLINIC | Age: 54
End: 2024-02-23

## 2024-03-11 NOTE — PLAN OF CARE
Pt progress toward plan of care. Up w/ A1 to pivot from bed to BSC. BMX1 this shift. BG monitoring via his own BGM, 129, 103, and 139- no coverage was given. Dressing CDI. Continue to monitor.    set-up required/verbal cues/nonverbal cues (demo/gestures)/1 person assist

## 2024-03-25 ENCOUNTER — TELEPHONE (OUTPATIENT)
Dept: FAMILY MEDICINE | Facility: OTHER | Age: 54
End: 2024-03-25
Payer: COMMERCIAL

## 2024-03-25 ENCOUNTER — MEDICAL CORRESPONDENCE (OUTPATIENT)
Dept: HEALTH INFORMATION MANAGEMENT | Facility: CLINIC | Age: 54
End: 2024-03-25
Payer: COMMERCIAL

## 2024-04-02 ENCOUNTER — TELEPHONE (OUTPATIENT)
Dept: FAMILY MEDICINE | Facility: OTHER | Age: 54
End: 2024-04-02
Payer: COMMERCIAL

## 2024-04-02 NOTE — TELEPHONE ENCOUNTER
Patient Quality Outreach    Patient is due for the following:   Diabetes -  A1C    Next Steps:   No follow up needed at this time.    Type of outreach:    Chart review performed, no outreach needed.      Questions for provider review:    None           Madiha Taylor MA

## 2024-04-08 DIAGNOSIS — E10.40 TYPE 1 DIABETES MELLITUS WITH DIABETIC NEUROPATHY (H): ICD-10-CM

## 2024-04-08 RX ORDER — INSULIN DEGLUDEC 100 U/ML
INJECTION, SOLUTION SUBCUTANEOUS
Qty: 15 ML | Refills: 1 | Status: SHIPPED | OUTPATIENT
Start: 2024-04-08

## 2024-04-08 NOTE — TELEPHONE ENCOUNTER
"Requested Prescriptions   Pending Prescriptions Disp Refills    insulin degludec (TRESIBA FLEXTOUCH) 100 UNIT/ML pen 15 mL 0     Sig: INJECT 20 UNITS SUBCUTANEOUSLY ONCE DAILY IN THE MORNING       Long Acting Insulin Protocol Failed - 4/8/2024 10:03 AM        Failed - HgbA1C in past 3 or 6 months     If HgbA1C is 8 or greater, it needs to be on file within the past 3 months.  If less than 8, must be on file within the past 6 months.     Recent Labs   Lab Test 11/06/23  1407 08/20/20  1311 06/07/19  0000   A1C 10.3*   < >  --    HEMOGLOBINA1  --   --  8.1*    < > = values in this interval not displayed.             Passed - Serum creatinine on file in past 12 months     Recent Labs   Lab Test 11/06/23  1407   CR 1.63*                 Passed - Medication is active on med list        Passed - Patient is age 18 or older        Passed - Recent (6 mo) or future (30 days) visit within the authorizing provider's specialty     Patient had office visit in the last 6 months or has a visit in the next 30 days with authorizing provider or within the authorizing provider's specialty.  See \"Patient Info\" tab in inbasket, or \"Choose Columns\" in Meds & Orders section of the refill encounter.           Insulin Protocol Failed - 4/8/2024 10:03 AM        Failed - Has GFR on file in past 12 months and most recent value is normal        Failed - Chart Review Required     Review Chart.    Do not approve if insulin is used in a pump.  Instead, direct refill request to the patient's endocrinologist.  If the patient doesn't have an endocrinologist, then send the refill to the patient's PCP for review            Passed - Medication is active on med list        Passed - Recent (6 mo) or future (90 days) visit within the authorizing provider's specialty     The patient must have completed an in-person or virtual visit within the past 6 months or has a future visit scheduled within the next 90 days with the authorizing provider s specialty.  " Urgent care and e-visits do not quality as an office visit for this protocol.          Passed - Medication indicated for associated diagnosis     Medication is associated with one or more of the following diagnoses:   - Type 1 diabetes mellitus  - Type 2 diabetes mellitus  - Diabetic nephropathy; Prophylaxis  - Neuropathy due to diabetes mellitus; Prophylaxis  - Retinopathy due to diabetes mellitus; Prophylaxis  - Diabetes mellitus associated with cystic fibrosis  - Disorder of cardiovascular system; Prophylaxis - Type 1 diabetes mellitus   - Disorder of cardiovascular system; Prophylaxis - Type 2 diabetes mellitus            Passed - Patient is 18 years of age or older           Last Written Prescription Date:  11/17/23  Last Fill Quantity: 15mL,  # refills: 0   Last office visit: Visit date not found ; last virtual visit: 1/24/2024 with prescribing provider:  Dr Palma   Future Office Visit:  4/29/24    Refill sent  Jorge Alberto Ang RN

## 2024-04-08 NOTE — TELEPHONE ENCOUNTER
Requested Prescriptions   Pending Prescriptions Disp Refills    insulin degludec (TRESIBA FLEXTOUCH) 100 UNIT/ML pen 15 mL 0     Sig: INJECT 20 UNITS SUBCUTANEOUSLY ONCE DAILY IN THE MORNING       There is no refill protocol information for this order        Last Written Prescription Date:  11/17/2023  Last Fill Quantity: 15 ml,  # refills: 0   Last office visit: Visit date not found ; last virtual visit: 1/24/2024 with prescribing provider:  Jori Palma MD    Future Office Visit:  04/29/24

## 2024-04-21 ENCOUNTER — HOSPITAL ENCOUNTER (INPATIENT)
Facility: CLINIC | Age: 54
LOS: 3 days | Discharge: HOME | End: 2024-04-24
Attending: EMERGENCY MEDICINE | Admitting: STUDENT IN AN ORGANIZED HEALTH CARE EDUCATION/TRAINING PROGRAM
Payer: COMMERCIAL

## 2024-04-21 ENCOUNTER — APPOINTMENT (OUTPATIENT)
Dept: GENERAL RADIOLOGY | Facility: CLINIC | Age: 54
End: 2024-04-21
Attending: INTERNAL MEDICINE
Payer: COMMERCIAL

## 2024-04-21 DIAGNOSIS — L97.919 DIABETIC ULCER OF RIGHT LOWER LEG (H): ICD-10-CM

## 2024-04-21 DIAGNOSIS — N17.9 AKI (ACUTE KIDNEY INJURY) (H): ICD-10-CM

## 2024-04-21 DIAGNOSIS — E10.39 TYPE 1 DIABETES MELLITUS WITH OTHER DIABETIC OPHTHALMIC COMPLICATION (H): ICD-10-CM

## 2024-04-21 DIAGNOSIS — L03.115 CELLULITIS OF RIGHT LOWER EXTREMITY: Primary | ICD-10-CM

## 2024-04-21 DIAGNOSIS — E10.65 TYPE 1 DIABETES MELLITUS WITH HYPERGLYCEMIA (H): ICD-10-CM

## 2024-04-21 DIAGNOSIS — E11.622 DIABETIC ULCER OF RIGHT LOWER LEG (H): ICD-10-CM

## 2024-04-21 LAB
ALBUMIN SERPL BCG-MCNC: 3.2 G/DL (ref 3.5–5.2)
ALP SERPL-CCNC: 151 U/L (ref 40–150)
ALT SERPL W P-5'-P-CCNC: 9 U/L (ref 0–70)
ANION GAP SERPL CALCULATED.3IONS-SCNC: 8 MMOL/L (ref 7–15)
AST SERPL W P-5'-P-CCNC: 18 U/L (ref 0–45)
BASOPHILS # BLD AUTO: 0 10E3/UL (ref 0–0.2)
BASOPHILS NFR BLD AUTO: 0 %
BILIRUB SERPL-MCNC: 0.5 MG/DL
BUN SERPL-MCNC: 40.5 MG/DL (ref 6–20)
CALCIUM SERPL-MCNC: 8.9 MG/DL (ref 8.6–10)
CHLORIDE SERPL-SCNC: 89 MMOL/L (ref 98–107)
CREAT SERPL-MCNC: 2.17 MG/DL (ref 0.67–1.17)
CRP SERPL-MCNC: 146.62 MG/L
DEPRECATED HCO3 PLAS-SCNC: 29 MMOL/L (ref 22–29)
EGFRCR SERPLBLD CKD-EPI 2021: 36 ML/MIN/1.73M2
EOSINOPHIL # BLD AUTO: 0.1 10E3/UL (ref 0–0.7)
EOSINOPHIL NFR BLD AUTO: 1 %
ERYTHROCYTE [DISTWIDTH] IN BLOOD BY AUTOMATED COUNT: 11.9 % (ref 10–15)
ERYTHROCYTE [SEDIMENTATION RATE] IN BLOOD BY WESTERGREN METHOD: 50 MM/HR (ref 0–20)
GLUCOSE BLDC GLUCOMTR-MCNC: 147 MG/DL (ref 70–99)
GLUCOSE BLDC GLUCOMTR-MCNC: 276 MG/DL (ref 70–99)
GLUCOSE BLDC GLUCOMTR-MCNC: 501 MG/DL (ref 70–99)
GLUCOSE SERPL-MCNC: 763 MG/DL (ref 70–99)
HCT VFR BLD AUTO: 33.4 % (ref 40–53)
HGB BLD-MCNC: 11.2 G/DL (ref 13.3–17.7)
IMM GRANULOCYTES # BLD: 0.1 10E3/UL
IMM GRANULOCYTES NFR BLD: 1 %
LYMPHOCYTES # BLD AUTO: 1.2 10E3/UL (ref 0.8–5.3)
LYMPHOCYTES NFR BLD AUTO: 11 %
MCH RBC QN AUTO: 27.9 PG (ref 26.5–33)
MCHC RBC AUTO-ENTMCNC: 33.5 G/DL (ref 31.5–36.5)
MCV RBC AUTO: 83 FL (ref 78–100)
MONOCYTES # BLD AUTO: 0.6 10E3/UL (ref 0–1.3)
MONOCYTES NFR BLD AUTO: 6 %
NEUTROPHILS # BLD AUTO: 8.2 10E3/UL (ref 1.6–8.3)
NEUTROPHILS NFR BLD AUTO: 81 %
NRBC # BLD AUTO: 0 10E3/UL
NRBC BLD AUTO-RTO: 0 /100
PLATELET # BLD AUTO: 290 10E3/UL (ref 150–450)
POTASSIUM SERPL-SCNC: 5.8 MMOL/L (ref 3.4–5.3)
PROT SERPL-MCNC: 7.2 G/DL (ref 6.4–8.3)
RBC # BLD AUTO: 4.02 10E6/UL (ref 4.4–5.9)
SODIUM SERPL-SCNC: 126 MMOL/L (ref 135–145)
WBC # BLD AUTO: 10.2 10E3/UL (ref 4–11)

## 2024-04-21 PROCEDURE — 999N000065 XR CHEST PORT 1 VIEW

## 2024-04-21 PROCEDURE — 96365 THER/PROPH/DIAG IV INF INIT: CPT | Performed by: EMERGENCY MEDICINE

## 2024-04-21 PROCEDURE — 250N000011 HC RX IP 250 OP 636: Performed by: EMERGENCY MEDICINE

## 2024-04-21 PROCEDURE — 80053 COMPREHEN METABOLIC PANEL: CPT | Performed by: EMERGENCY MEDICINE

## 2024-04-21 PROCEDURE — 85025 COMPLETE CBC W/AUTO DIFF WBC: CPT | Performed by: EMERGENCY MEDICINE

## 2024-04-21 PROCEDURE — 87205 SMEAR GRAM STAIN: CPT | Performed by: EMERGENCY MEDICINE

## 2024-04-21 PROCEDURE — 82962 GLUCOSE BLOOD TEST: CPT

## 2024-04-21 PROCEDURE — 36415 COLL VENOUS BLD VENIPUNCTURE: CPT | Performed by: EMERGENCY MEDICINE

## 2024-04-21 PROCEDURE — 99285 EMERGENCY DEPT VISIT HI MDM: CPT | Performed by: EMERGENCY MEDICINE

## 2024-04-21 PROCEDURE — 96367 TX/PROPH/DG ADDL SEQ IV INF: CPT | Performed by: EMERGENCY MEDICINE

## 2024-04-21 PROCEDURE — 120N000004 HC R&B MS OVERFLOW

## 2024-04-21 PROCEDURE — 86140 C-REACTIVE PROTEIN: CPT | Performed by: EMERGENCY MEDICINE

## 2024-04-21 PROCEDURE — 272N000102 HC TRAY VALVED 55CM DBL LUMEN

## 2024-04-21 PROCEDURE — 96366 THER/PROPH/DIAG IV INF ADDON: CPT | Performed by: EMERGENCY MEDICINE

## 2024-04-21 PROCEDURE — 258N000003 HC RX IP 258 OP 636: Performed by: EMERGENCY MEDICINE

## 2024-04-21 PROCEDURE — 250N000012 HC RX MED GY IP 250 OP 636 PS 637: Performed by: EMERGENCY MEDICINE

## 2024-04-21 PROCEDURE — 36569 INSJ PICC 5 YR+ W/O IMAGING: CPT

## 2024-04-21 PROCEDURE — 99285 EMERGENCY DEPT VISIT HI MDM: CPT | Mod: 25 | Performed by: EMERGENCY MEDICINE

## 2024-04-21 PROCEDURE — 272N000579 HC TRAY POWER PICC SOLO 4FR SINGLE LUMEN

## 2024-04-21 PROCEDURE — 85652 RBC SED RATE AUTOMATED: CPT | Performed by: EMERGENCY MEDICINE

## 2024-04-21 RX ORDER — VANCOMYCIN HYDROCHLORIDE 1 G/200ML
1000 INJECTION, SOLUTION INTRAVENOUS EVERY 24 HOURS
Status: DISCONTINUED | OUTPATIENT
Start: 2024-04-22 | End: 2024-04-22

## 2024-04-21 RX ORDER — INSULIN LISPRO 100 [IU]/ML
4-12 INJECTION, SOLUTION INTRAVENOUS; SUBCUTANEOUS
Status: DISCONTINUED | OUTPATIENT
Start: 2024-04-21 | End: 2024-04-21

## 2024-04-21 RX ORDER — LIDOCAINE 40 MG/G
CREAM TOPICAL
Status: DISCONTINUED | OUTPATIENT
Start: 2024-04-21 | End: 2024-04-24 | Stop reason: HOSPADM

## 2024-04-21 RX ORDER — NICOTINE POLACRILEX 4 MG
15-30 LOZENGE BUCCAL
Status: DISCONTINUED | OUTPATIENT
Start: 2024-04-21 | End: 2024-04-22

## 2024-04-21 RX ORDER — SODIUM CHLORIDE 450 MG/100ML
INJECTION, SOLUTION INTRAVENOUS CONTINUOUS
Status: CANCELLED | OUTPATIENT
Start: 2024-04-21

## 2024-04-21 RX ORDER — DEXTROSE MONOHYDRATE 25 G/50ML
25-50 INJECTION, SOLUTION INTRAVENOUS
Status: DISCONTINUED | OUTPATIENT
Start: 2024-04-21 | End: 2024-04-22

## 2024-04-21 RX ORDER — SODIUM CHLORIDE 9 MG/ML
INJECTION, SOLUTION INTRAVENOUS CONTINUOUS
Status: DISCONTINUED | OUTPATIENT
Start: 2024-04-21 | End: 2024-04-23

## 2024-04-21 RX ORDER — PREGABALIN 200 MG/1
200 CAPSULE ORAL EVERY EVENING
Status: DISCONTINUED | OUTPATIENT
Start: 2024-04-21 | End: 2024-04-24 | Stop reason: HOSPADM

## 2024-04-21 RX ORDER — SODIUM CHLORIDE 9 MG/ML
INJECTION, SOLUTION INTRAVENOUS CONTINUOUS
Status: DISCONTINUED | OUTPATIENT
Start: 2024-04-21 | End: 2024-04-22

## 2024-04-21 RX ORDER — ACETAMINOPHEN 325 MG/1
650 TABLET ORAL EVERY 4 HOURS PRN
Status: DISCONTINUED | OUTPATIENT
Start: 2024-04-21 | End: 2024-04-24 | Stop reason: HOSPADM

## 2024-04-21 RX ORDER — LISINOPRIL 2.5 MG/1
5 TABLET ORAL DAILY
Status: DISCONTINUED | OUTPATIENT
Start: 2024-04-22 | End: 2024-04-24 | Stop reason: HOSPADM

## 2024-04-21 RX ORDER — PIPERACILLIN SODIUM, TAZOBACTAM SODIUM 4; .5 G/20ML; G/20ML
4.5 INJECTION, POWDER, LYOPHILIZED, FOR SOLUTION INTRAVENOUS ONCE
Status: COMPLETED | OUTPATIENT
Start: 2024-04-21 | End: 2024-04-21

## 2024-04-21 RX ORDER — TAMSULOSIN HYDROCHLORIDE 0.4 MG/1
0.8 CAPSULE ORAL DAILY
Status: DISCONTINUED | OUTPATIENT
Start: 2024-04-22 | End: 2024-04-24 | Stop reason: HOSPADM

## 2024-04-21 RX ORDER — PREGABALIN 100 MG/1
100 CAPSULE ORAL EVERY MORNING
Status: DISCONTINUED | OUTPATIENT
Start: 2024-04-22 | End: 2024-04-24 | Stop reason: HOSPADM

## 2024-04-21 RX ORDER — CALCIUM CARBONATE 500 MG/1
1000 TABLET, CHEWABLE ORAL 4 TIMES DAILY PRN
Status: DISCONTINUED | OUTPATIENT
Start: 2024-04-21 | End: 2024-04-24 | Stop reason: HOSPADM

## 2024-04-21 RX ADMIN — PIPERACILLIN AND TAZOBACTAM 4.5 G: 4; .5 INJECTION, POWDER, FOR SOLUTION INTRAVENOUS at 17:57

## 2024-04-21 RX ADMIN — SODIUM CHLORIDE 1000 ML: 9 INJECTION, SOLUTION INTRAVENOUS at 18:31

## 2024-04-21 RX ADMIN — INSULIN ASPART 20 UNITS: 100 INJECTION, SOLUTION INTRAVENOUS; SUBCUTANEOUS at 18:51

## 2024-04-21 RX ADMIN — VANCOMYCIN HYDROCHLORIDE 1500 MG: 1 INJECTION, POWDER, LYOPHILIZED, FOR SOLUTION INTRAVENOUS at 18:39

## 2024-04-21 RX ADMIN — SODIUM CHLORIDE: 9 INJECTION, SOLUTION INTRAVENOUS at 20:07

## 2024-04-21 ASSESSMENT — ACTIVITIES OF DAILY LIVING (ADL)
ADLS_ACUITY_SCORE: 38
ADLS_ACUITY_SCORE: 38
ADLS_ACUITY_SCORE: 42
ADLS_ACUITY_SCORE: 38
ADLS_ACUITY_SCORE: 36
ADLS_ACUITY_SCORE: 38
ADLS_ACUITY_SCORE: 38

## 2024-04-21 NOTE — ED PROVIDER NOTES
"  History     Chief Complaint   Patient presents with    Wound Check     HPI  History per patient, medical records    This is a 53-year-old male, history of insulin-dependent diabetes mellitus since age 8, nonhealing left foot ulcer with osteomyelitis status post left BKA in 2019, nonhealing right foot ulcer with necrotizing fasciitis, septic tenosynovitis, osteomyelitis status post right BKA in 2023, CKD, peripheral neuropathy, CAD, depression, diabetes related retinopathy, other history as below presenting for wound check.  Patient had an emergent below the knee amputation on the right in June 2023.  He notes significant PTSD related to the entire process but has been trying to get back to full ambulation with bilateral prostheses.  He was testing out a prosthesis on his right lower extremity over the last couple of weeks.  He states that on 4/10/2023 he was evaluated and fitted with a temporary prosthesis.  He states that it was too big and he believes it was rubbing along the right side.  At some point he noted a lesion along the right lateral side of the lower leg, he believes it was a blister.  He had a neighbor who is a retired nurse look at it.  She states that there was a white base and some redness around the edge 2 days ago.  Today she was helping with the dressing change and the wound was noted to be much larger with drainage.  Patient denies significant pain.  No fevers or chills.  No chest pain, shortness of breath.  No new changes with bowel or bladder.  He is not currently on any antibiotics.  His blood sugars have been erratic.  He is on Tresiba but states that \"it usually lasts 18 hours\" although it is noted to be prescribed daily.  He uses Humalog \"when he feels like he needs it\".    Allergies:  Allergies   Allergen Reactions    Hydromorphone Shortness Of Breath     Respiratory arrest and PEA with IV dilaudid, tolerated oral narcotics without difficulty.    Complicated situation related to other " drugs as well, IV Dilaudid may not be completely contraindicated.    Morphine Anaphylaxis     Respiratory arrest w/ IV Morphine - tolerated oral narcotics       Problem List:    Patient Active Problem List    Diagnosis Date Noted    YESSICA (acute kidney injury) (H24) 04/21/2024     Priority: Medium    Type 1 diabetes mellitus with hyperglycemia (H) 04/21/2024     Priority: Medium    Diabetic ulcer of right lower leg (H) 04/21/2024     Priority: Medium    Hypertension 06/30/2023     Priority: Medium    S/P BKA (below knee amputation), right (H) 06/30/2023     Priority: Medium    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) 06/22/2023     Priority: Medium    Current moderate episode of major depressive disorder without prior episode (H) 06/05/2023     Priority: Medium    Primary osteoarthritis of right knee 04/22/2022     Priority: Medium    Statin declined 04/09/2021     Priority: Medium    Chronic kidney disease, stage 3 (H) 10/30/2020     Priority: Medium    S/P BKA (below knee amputation) unilateral, left (H) 02/25/2019     Priority: Medium    Severe diabetic hypoglycemia (H) 05/11/2018     Priority: Medium    Coronary artery disease involving native coronary artery of native heart without angina pectoris 02/19/2018     Priority: Medium    History of coronary artery stent placement 08/21/2017     Priority: Medium    Positive for macroalbuminuria 12/22/2016     Priority: Medium    Retinopathy due to secondary diabetes mellitus (H) 12/22/2016     Priority: Medium    Hereditary and idiopathic peripheral neuropathy 02/03/2010     Priority: Medium     Overview:   LW Modifier:  uncontrolled DM I  ; Peripheral Neuropathy  NOS  Overview:   Overview:   LW Modifier:  uncontrolled DM I  ; Peripheral Neuropathy  NOS    Formatting of this note might be different from the original.  Overview:   LW Modifier:  uncontrolled DM I  ; Peripheral Neuropathy  NOS    Formatting of this note might be different from the original.  LW  Modifier:  uncontrolled DM I  ; Peripheral Neuropathy  NOS      Nephritis and nephropathy, 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 02/03/2010     Priority: Medium     Overview:   LW Modifier:  uncontrolled DM I  ; Nephropathy  NOS    Formatting of this note might be different from the original.  LW Modifier:  uncontrolled DM I  ; Nephropathy  NOS      Type 1 diabetes mellitus with other diabetic ophthalmic complication (H) 02/03/2010     Priority: Medium     Overview:   LW Modifier:  laser surgery 2002  ; DM Type1 Retinopathy    Formatting of this note might be different from the original.  LW Modifier:  laser surgery 2002  ; DM Type1 Retinopathy          Past Medical History:    Past Medical History:   Diagnosis Date    CAD (coronary artery disease)     Diabetic foot (H) 10/29/2018    Diabetic retinopathy associated with type 1 diabetes mellitus (H)     Foot ulcer, left (H)     Osteomyelitis (H)     Pneumonia 06/14/2023    S/P BKA (below knee amputation), left (H) 2019    S/P BKA (below knee amputation), right 06/2023    Type 1 diabetes mellitus with complications (H)        Past Surgical History:    Past Surgical History:   Procedure Laterality Date    AMPUTATE FOOT Left 2/5/2019    Procedure: PARTIAL LEFT FOOT AMPUTATION.;  Surgeon: Chetan Potter DPM;  Location:  OR    AMPUTATE LEG BELOW KNEE Left 2/18/2019    Procedure: LEFT BELOW THE KNEE AMPUTATION;  Surgeon: Kvng Berman MD;  Location:  OR    Former[facility]  2010       Family History:    Family History   Problem Relation Age of Onset    Ovarian Cancer Maternal Grandmother     Colon Cancer Maternal Grandmother     Prostate Cancer Maternal Grandfather     Ovarian Cancer Paternal Grandmother        Social History:  Marital Status:  Single [1]  Social History     Tobacco Use    Smoking status: Never    Smokeless tobacco: Never   Vaping Use    Vaping status: Never Used   Substance Use Topics    Alcohol use: Yes     Comment: occ    Drug use: Never        Medications:  "   No current outpatient medications on file.        Review of Systems  All other ROS reviewed and are negative or non-contributory except as stated in HPI.     Physical Exam   BP: (!) 153/71  Pulse: 74  Temp: 98.2  F (36.8  C)  Resp: 20  Height: 175.3 cm (5' 9\")  SpO2: 97 %      Physical Exam  Vitals and nursing note reviewed.   Constitutional:       Comments: Talkative, pleasant, anxious male sitting in the bed   HENT:      Head: Normocephalic.      Nose: Nose normal.      Mouth/Throat:      Comments: Tacky mucous membranes  Eyes:      Extraocular Movements: Extraocular movements intact.      Conjunctiva/sclera: Conjunctivae normal.   Cardiovascular:      Rate and Rhythm: Normal rate and regular rhythm.   Pulmonary:      Effort: Pulmonary effort is normal.   Musculoskeletal:      Cervical back: Normal range of motion.   Skin:     Comments: Patient with large area of erythema on the lateral side of his right leg.  This extends above the knee to MCC down the stump.  There are a couple of petechial lesions around it.  In the center is a 3+ centimeter diameter circular ulceration with a white base, drainage of serosanguineous fluid noted.  Swabbed and sent for culture.  There is a small area of eschar superior to the ulceration.  The entire area is boggy.  Please see picture.   Neurological:      Mental Status: He is alert.             ED Course (with Medical Decision Making)    Pt seen and examined by me.  RN and EPIC notes reviewed.       Patient with significant wound to his right lower extremity above an area of his BKA stump.  He is type I diabetic and has had significant nonhealing wounds in the past ending in bilateral BKA.  He is nonchalant about his insulin use.    I am very concerned with this wound.  Obviously it likely started as a simple blister from pressure from the prosthesis but this now is hot, red, boggy and draining.    IV placed for fluids, labs, antibiotics.  I am going to start with Zosyn " and Nolao.  I also contacted orthopedics.    Labs show normal white count at 10.2.  Hemoglobin 11.2 which is similar to previous.  Normal platelets.  ESR is slightly elevated at 50.  CRP high at 146.62.  Patient's electrolyte panel returned very abnormal with pseudohyponatremia, sodium 126.  Potassium high at 5.8.  Elevated BUN/creatinine at 40.5/2.17.  Baseline creatinine around 1.4.  Patient's glucose is extremely high at 763 but he does not have an anion gap.  LFTs unremarkable except for very slight elevation in alk phosphatase to 151.    I spoke with Dr. Kebede, orthopedics.  He did not recommend any radiologic studies at this point, recommended admission, IV antibiotics, they would evaluate patient tomorrow and could get a wound care consult.    Patient was given Humalog 20 units.  Repeat blood sugar 501.  Fluids running also.  He was given another 10 units.    I had a prolonged conversation with the patient about his labs, the findings, the need for IV antibiotics and good glucose control.  Patient describes at length some of his experiences and anxiety and fears after his most recent hospitalization and BKA.  There were a lot of questions and demands which unfortunately I could not promise including no blood draws, discharge within 24 hours, etc.    I did offer and ordered a PICC line which would help with antibiotics and blood draws.  PICC team will come to the ED to place.    I spoke with the hospitalist.  Patient has been accepted for admission.  Transition orders written by myself.  Consults placed for wound care and orthopedics.          Procedures    Results for orders placed or performed during the hospital encounter of 04/21/24 (from the past 24 hour(s))   Wound Aerobic Bacterial Culture Routine With Gram Stain    Specimen: Leg, Right; Wound   Result Value Ref Range    Gram Stain Result 3+ Gram positive cocci (A)     Gram Stain Result 2+ Gram positive bacilli (A)     Gram Stain Result 1+ Gram negative  bacilli (A)     Gram Stain Result 4+ WBC seen (A)    Erythrocyte sedimentation rate auto   Result Value Ref Range    Erythrocyte Sedimentation Rate 50 (H) 0 - 20 mm/hr   CRP inflammation   Result Value Ref Range    CRP Inflammation 146.62 (H) <5.00 mg/L   CBC with platelets differential    Narrative    The following orders were created for panel order CBC with platelets differential.  Procedure                               Abnormality         Status                     ---------                               -----------         ------                     CBC with platelets and d...[[id number]]  Abnormal            Final result                 Please view results for these tests on the individual orders.   Comprehensive metabolic panel   Result Value Ref Range    Sodium 126 (L) 135 - 145 mmol/L    Potassium 5.8 (H) 3.4 - 5.3 mmol/L    Carbon Dioxide (CO2) 29 22 - 29 mmol/L    Anion Gap 8 7 - 15 mmol/L    Urea Nitrogen 40.5 (H) 6.0 - 20.0 mg/dL    Creatinine 2.17 (H) 0.67 - 1.17 mg/dL    GFR Estimate 36 (L) >60 mL/min/1.73m2    Calcium 8.9 8.6 - 10.0 mg/dL    Chloride 89 (L) 98 - 107 mmol/L    Glucose 763 (HH) 70 - 99 mg/dL    Alkaline Phosphatase 151 (H) 40 - 150 U/L    AST 18 0 - 45 U/L    ALT 9 0 - 70 U/L    Protein Total 7.2 6.4 - 8.3 g/dL    Albumin 3.2 (L) 3.5 - 5.2 g/dL    Bilirubin Total 0.5 <=1.2 mg/dL   CBC with platelets and differential   Result Value Ref Range    WBC Count 10.2 4.0 - 11.0 10e3/uL    RBC Count 4.02 (L) 4.40 - 5.90 10e6/uL    Hemoglobin 11.2 (L) 13.3 - 17.7 g/dL    Hematocrit 33.4 (L) 40.0 - 53.0 %    MCV 83 78 - 100 fL    MCH 27.9 26.5 - 33.0 pg    MCHC 33.5 31.5 - 36.5 g/dL    RDW 11.9 10.0 - 15.0 %    Platelet Count 290 150 - 450 10e3/uL    % Neutrophils 81 %    % Lymphocytes 11 %    % Monocytes 6 %    % Eosinophils 1 %    % Basophils 0 %    % Immature Granulocytes 1 %    NRBCs per 100 WBC 0 <1 /100    Absolute Neutrophils 8.2 1.6 - 8.3 10e3/uL    Absolute Lymphocytes 1.2 0.8 - 5.3  10e3/uL    Absolute Monocytes 0.6 0.0 - 1.3 10e3/uL    Absolute Eosinophils 0.1 0.0 - 0.7 10e3/uL    Absolute Basophils 0.0 0.0 - 0.2 10e3/uL    Absolute Immature Granulocytes 0.1 <=0.4 10e3/uL    Absolute NRBCs 0.0 10e3/uL   Glucose by meter   Result Value Ref Range    GLUCOSE BY METER POCT 501 (HH) 70 - 99 mg/dL   Glucose by meter   Result Value Ref Range    GLUCOSE BY METER POCT 276 (H) 70 - 99 mg/dL   XR Chest Port 1 View    Narrative    EXAM: CHEST SINGLE VIEW PORTABLE  LOCATION: [facility]  DATE: 4/21/2024    INDICATION: Nurse placed peripherally inserted central catheter.  COMPARISON: 06/14/2023.    FINDINGS: Right upper extremity peripherally inserted central catheter with distal catheter tip in the superior vena cava. The lungs are clear. Normal size cardiac silhouette.      Impression    IMPRESSION:   1. Right upper extremity peripherally inserted central catheter with distal tip in the superior vena cava.  2. No evidence of active cardiopulmonary disease.       Medications   sodium chloride (PF) 0.9% PF flush 3 mL (has no administration in time range)   sodium chloride 0.9 % infusion ( Intravenous $New Bag 4/21/24 2007)   vancomycin (VANCOCIN) 1,000 mg in 200 mL dextrose intermittent infusion (has no administration in time range)   lidocaine 1 % 0.1-5 mL (has no administration in time range)   lidocaine (LMX4) cream (has no administration in time range)   sodium chloride (PF) 0.9% PF flush 10-40 mL (has no administration in time range)   calcium carbonate (TUMS) chewable tablet 1,000 mg (has no administration in time range)   sodium chloride (PF) 0.9% PF flush 3 mL (has no administration in time range)   sodium chloride 0.9 % infusion (has no administration in time range)   acetaminophen (TYLENOL) tablet 650 mg (has no administration in time range)   glucose gel 15-30 g (has no administration in time range)     Or   dextrose 50 % injection 25-50 mL (has no administration in  time range)     Or   glucagon injection 1 mg (has no administration in time range)   insulin aspart (NovoLOG) injection (RAPID ACTING) (has no administration in time range)   insulin aspart (NovoLOG) injection (RAPID ACTING) (has no administration in time range)   lisinopril (ZESTRIL) tablet 5 mg (has no administration in time range)   pregabalin (LYRICA) capsule 100 mg (has no administration in time range)     And   Pregabalin (LYRICA) capsule 200 mg (has no administration in time range)   tamsulosin (FLOMAX) capsule 0.8 mg (has no administration in time range)   piperacillin-tazobactam (ZOSYN) 4.5 g vial to attach to  mL bag (has no administration in time range)   insulin degludec (TRESIBA) 100 UNIT/ML injection 20 Units (has no administration in time range)   piperacillin-tazobactam (ZOSYN) 4.5 g vial to attach to  mL bag (0 g Intravenous Stopped 4/21/24 1830)   vancomycin (VANCOCIN) 1,500 mg in sodium chloride 0.9 % 250 mL intermittent infusion (0 mg Intravenous Stopped 4/21/24 2102)   sodium chloride 0.9% BOLUS 1,000 mL (0 mLs Intravenous Stopped 4/21/24 1945)   insulin aspart (NovoLOG) injection (RAPID ACTING) (20 Units Subcutaneous $Given 4/21/24 1851)   insulin aspart (NovoLOG) injection (RAPID ACTING) (10 Units Subcutaneous $Given 4/21/24 2043)       Assessments & Plan      I have reviewed the findings, diagnosis, plan with the patient.  Current Discharge Medication List          Final diagnoses:   Diabetic ulcer of right lower leg (H)   Type 1 diabetes mellitus with hyperglycemia (H)   YESSICA (acute kidney injury) (H24)     Disposition: Patient admitted to the hospitalist service    Note: Chart documentation done in part with Dragon Voice Recognition software. Although reviewed after completion, some word and grammatical errors may remain.     4/21/2024   [facility] EMERGENCY DEPT       Fabiola Orlando MD  04/21/24 8752

## 2024-04-21 NOTE — PHARMACY-VANCOMYCIN DOSING SERVICE
Pharmacy Vancomycin Initial Note  Date of Service 2024  Patient's  1970  53 year old, male    Indication: Sepsis and Skin and Soft Tissue Infection    Current estimated CrCl = Estimated Creatinine Clearance: 43.3 mL/min (A) (based on SCr of 2.17 mg/dL (H)).    Creatinine for last 3 days  2024:  5:49 PM Creatinine 2.17 mg/dL    Recent Vancomycin Level(s) for last 3 days  No results found for requested labs within last 3 days.      Vancomycin IV Administrations (past 72 hours)        No vancomycin orders with administrations in past 72 hours.                    Nephrotoxins and other renal medications (From now, onward)      Start     Dose/Rate Route Frequency Ordered Stop    24 1200  vancomycin (VANCOCIN) 1,000 mg in 200 mL dextrose intermittent infusion         1,000 mg  200 mL/hr over 1 Hours Intravenous EVERY 24 HOURS 24 1832      24 1735  vancomycin (VANCOCIN) 1,500 mg in sodium chloride 0.9 % 250 mL intermittent infusion         1,500 mg  over 90 Minutes Intravenous ONCE 24 1734              Contrast Orders - past 72 hours (72h ago, onward)      None            [employer]ight[employer] Prediction of Planned Initial Vancomycin Regimen      Loading dose: 1500 mg at 18:00 2024.  Regimen: 1000 mg IV every 24 hours.  Start time: 12:00 on 2024  Exposure target: AUC24 (range)400-600 mg/L.hr   AUC24,ss: 491 mg/L.hr  Probability of AUC24 > 400: 72 %  Ctrough,ss: 15.6 mg/L  Probability of Ctrough,ss > 20: 28 %  Probability of nephrotoxicity (Lodise TIM ): 11 %          Plan:  Start vancomycin  1500mg x1 then 1000 mg IV q24h.   Vancomycin monitoring method: AUC  Vancomycin therapeutic monitoring goal: 400-600 mg*h/L  Pharmacy will check vancomycin levels as appropriate in 1-3 Days.    Serum creatinine levels will be ordered daily for the first week of therapy and at least twice weekly for subsequent weeks.      Sung Horton [facility]

## 2024-04-21 NOTE — ED TRIAGE NOTES
Pt report having a wound to his right knee area, states he is unsure how long he's had it but thinks sometime between 2 days ago and April 10th. Pt states it now has an odor to it.      Triage Assessment (Adult)       Row Name 04/21/24 2309          Triage Assessment    Airway WDL WDL        Respiratory WDL    Respiratory WDL WDL        Skin Circulation/Temperature WDL    Skin Circulation/Temperature WDL WDL        Cardiac WDL    Cardiac WDL WDL

## 2024-04-21 NOTE — MEDICATION SCRIBE - ADMISSION MEDICATION HISTORY
Medication Scribe Admission Medication History    Admission medication history is complete. The information provided in this note is only as accurate as the sources available at the time of the update.    Information Source(s): Patient and CareEverywhere/SureScripts via in-person    Pertinent Information: n/a    Changes made to PTA medication list:  Added: None  Deleted: novolog, freestyle Drew 3  Changed: None    Allergies reviewed with patient and updates made in EHR: yes    Medication History Completed By: HAM SAUNDERS 4/21/2024 6:16 PM    PTA Med List   Medication Sig Last Dose    BD ULTRA FINE PEN NEEDLES 1 each by Other route 5 times daily 4/20/2024 at hs    Continuous Blood Gluc  (FREESTYLE DREW 2 READER) BRANDY 1 Device continuous prn (replace annually if needed) Use to read blood glucose levels per 's instructions.  at current    Continuous Blood Gluc Sensor (FREESTYLE DREW 2 SENSOR) MISC 1 Device continuous prn (Change every 14 days) For use with Freestyle Drew 2  for continuous monitoring of blood glucose levels.  Change sensor every 14 days.  at L. Arm    HUMALOG KWIKPEN 100 UNIT/ML soln INJECT 4-12 UNITS SUBCUTANEOUSLY WITH MEALS AS DIRECTED. TOTAL DAILY DOSE ABOUT 50 UNITS (Patient taking differently: Inject 4-12 Units Subcutaneous 4 times daily (with meals and nightly) TOTAL DAILY DOSE ABOUT 50 UNITS) 4/20/2024 at hs    insulin degludec (TRESIBA FLEXTOUCH) 100 UNIT/ML pen INJECT 20 UNITS SUBCUTANEOUSLY ONCE DAILY IN THE MORNING (Patient taking differently: Inject 20 Units Subcutaneous every morning) 4/21/2024 at am    insulin pen needle (31G X 5 MM) 31G X 5 MM miscellaneous Use 4 pen needles daily or as directed. 4/21/2024 at am    lisinopril (ZESTRIL) 5 MG tablet Take 1 tablet (5 mg) by mouth daily 4/21/2024 at am    LYRICA 100 MG capsule Take 1 capsule (100 mg) by mouth every morning AND 2 capsules (200 mg) every evening. 4/21/2024 at am    tamsulosin (FLOMAX) 0.4 MG  Upper Respiratory Infection (Cold) in Children: Care Instructions  Your Care Instructions     An upper respiratory infection, also called a URI, is an infection of the nose, sinuses, or throat. URIs are spread by coughs, sneezes, and direct contact. The common cold is the most frequent kind of URI. The flu and sinus infections are other kinds of URIs. Almost all URIs are caused by viruses, so antibiotics won't cure them. But you can do things at home to help your child get better. With most URIs, your child should feel better in 4 to 10 days. The doctor has checked your child carefully, but problems can develop later. If you notice any problems or new symptoms, get medical treatment right away. Follow-up care is a key part of your child's treatment and safety. Be sure to make and go to all appointments, and call your doctor if your child is having problems. It's also a good idea to know your child's test results and keep a list of the medicines your child takes. How can you care for your child at home? · Give your child acetaminophen (Tylenol) or ibuprofen (Advil, Motrin) for fever, pain, or fussiness. Do not use ibuprofen if your child is less than 6 months old unless the doctor gave you instructions to use it. Be safe with medicines. For children 6 months and older, read and follow all instructions on the label. · Do not give aspirin to anyone younger than 20. It has been linked to Reye syndrome, a serious illness. · Be careful with cough and cold medicines. Don't give them to children younger than 6, because they don't work for children that age and can even be harmful. For children 6 and older, always follow all the instructions carefully. Make sure you know how much medicine to give and how long to use it. And use the dosing device if one is included. · Be careful when giving your child over-the-counter cold or flu medicines and Tylenol at the same time.  Many of these medicines have acetaminophen, capsule Take 2 capsules (0.8 mg) by mouth daily 4/21/2024 at am        which is Tylenol. Read the labels to make sure that you are not giving your child more than the recommended dose. Too much acetaminophen (Tylenol) can be harmful. · Make sure your child rests. Keep your child at home if he or she has a fever. · If your child has problems breathing because of a stuffy nose, squirt a few saline (saltwater) nasal drops in one nostril. Then have your child blow his or her nose. Repeat for the other nostril. Do not do this more than 5 or 6 times a day. · Place a humidifier by your child's bed or close to your child. This may make it easier for your child to breathe. Follow the directions for cleaning the machine. · Keep your child away from smoke. Do not smoke or let anyone else smoke around your child or in your house. · Wash your hands and your child's hands regularly so that you don't spread the disease. When should you call for help? Call 911 anytime you think your child may need emergency care. For example, call if:    · Your child seems very sick or is hard to wake up.     · Your child has severe trouble breathing. Symptoms may include:  ? Using the belly muscles to breathe. ? The chest sinking in or the nostrils flaring when your child struggles to breathe. Call your doctor now or seek immediate medical care if:    · Your child has new or worse trouble breathing.     · Your child has a new or higher fever.     · Your child seems to be getting much sicker.     · Your child coughs up dark brown or bloody mucus (sputum). Watch closely for changes in your child's health, and be sure to contact your doctor if:    · Your child has new symptoms, such as a rash, earache, or sore throat.     · Your child does not get better as expected. Where can you learn more? Go to http://www.gray.com/  Enter M207 in the search box to learn more about \"Upper Respiratory Infection (Cold) in Children: Care Instructions. \"  Current as of: July 6, 2021               Content Version: 13.0  © 1675-8751 [employer]. Care instructions adapted under license by [employer] (which disclaims liability or warranty for this information). If you have questions about a medical condition or this instruction, always ask your healthcare professional. [address] any warranty or liability for your use of this information.

## 2024-04-22 PROBLEM — N17.0 ACUTE RENAL FAILURE WITH ACUTE TUBULAR NECROSIS SUPERIMPOSED ON STAGE 3B CHRONIC KIDNEY DISEASE (H): Status: ACTIVE | Noted: 2024-04-22

## 2024-04-22 PROBLEM — L03.115 CELLULITIS OF RIGHT LOWER EXTREMITY: Status: ACTIVE | Noted: 2024-04-22

## 2024-04-22 PROBLEM — Z89.511 S/P BKA (BELOW KNEE AMPUTATION), RIGHT (H): Status: ACTIVE | Noted: 2023-06-30

## 2024-04-22 PROBLEM — I10 HYPERTENSION: Status: ACTIVE | Noted: 2023-06-30

## 2024-04-22 PROBLEM — N18.32 ACUTE RENAL FAILURE WITH ACUTE TUBULAR NECROSIS SUPERIMPOSED ON STAGE 3B CHRONIC KIDNEY DISEASE (H): Status: ACTIVE | Noted: 2024-04-22

## 2024-04-22 LAB
ANION GAP SERPL CALCULATED.3IONS-SCNC: 7 MMOL/L (ref 7–15)
ANION GAP SERPL CALCULATED.3IONS-SCNC: 8 MMOL/L (ref 7–15)
BUN SERPL-MCNC: 39.7 MG/DL (ref 6–20)
BUN SERPL-MCNC: 40.1 MG/DL (ref 6–20)
CALCIUM SERPL-MCNC: 8.5 MG/DL (ref 8.6–10)
CALCIUM SERPL-MCNC: 8.6 MG/DL (ref 8.6–10)
CHLORIDE SERPL-SCNC: 102 MMOL/L (ref 98–107)
CHLORIDE SERPL-SCNC: 102 MMOL/L (ref 98–107)
CREAT SERPL-MCNC: 2.48 MG/DL (ref 0.67–1.17)
CREAT SERPL-MCNC: 2.51 MG/DL (ref 0.67–1.17)
DEPRECATED HCO3 PLAS-SCNC: 28 MMOL/L (ref 22–29)
DEPRECATED HCO3 PLAS-SCNC: 29 MMOL/L (ref 22–29)
EGFRCR SERPLBLD CKD-EPI 2021: 30 ML/MIN/1.73M2
EGFRCR SERPLBLD CKD-EPI 2021: 31 ML/MIN/1.73M2
GLUCOSE BLDC GLUCOMTR-MCNC: 123 MG/DL (ref 70–99)
GLUCOSE BLDC GLUCOMTR-MCNC: 153 MG/DL (ref 70–99)
GLUCOSE BLDC GLUCOMTR-MCNC: 162 MG/DL (ref 70–99)
GLUCOSE BLDC GLUCOMTR-MCNC: 277 MG/DL (ref 70–99)
GLUCOSE BLDC GLUCOMTR-MCNC: 292 MG/DL (ref 70–99)
GLUCOSE BLDC GLUCOMTR-MCNC: 347 MG/DL (ref 70–99)
GLUCOSE BLDC GLUCOMTR-MCNC: 369 MG/DL (ref 70–99)
GLUCOSE BLDC GLUCOMTR-MCNC: 67 MG/DL (ref 70–99)
GLUCOSE BLDC GLUCOMTR-MCNC: 71 MG/DL (ref 70–99)
GLUCOSE SERPL-MCNC: 157 MG/DL (ref 70–99)
GLUCOSE SERPL-MCNC: 84 MG/DL (ref 70–99)
MRSA DNA SPEC QL NAA+PROBE: NEGATIVE
POTASSIUM SERPL-SCNC: 4.3 MMOL/L (ref 3.4–5.3)
POTASSIUM SERPL-SCNC: 4.7 MMOL/L (ref 3.4–5.3)
SA TARGET DNA: NEGATIVE
SODIUM SERPL-SCNC: 138 MMOL/L (ref 135–145)
SODIUM SERPL-SCNC: 138 MMOL/L (ref 135–145)

## 2024-04-22 PROCEDURE — 87641 MR-STAPH DNA AMP PROBE: CPT | Performed by: INTERNAL MEDICINE

## 2024-04-22 PROCEDURE — 99233 SBSQ HOSP IP/OBS HIGH 50: CPT | Performed by: INTERNAL MEDICINE

## 2024-04-22 PROCEDURE — 250N000013 HC RX MED GY IP 250 OP 250 PS 637: Performed by: INTERNAL MEDICINE

## 2024-04-22 PROCEDURE — 250N000013 HC RX MED GY IP 250 OP 250 PS 637: Performed by: EMERGENCY MEDICINE

## 2024-04-22 PROCEDURE — 82565 ASSAY OF CREATININE: CPT | Performed by: INTERNAL MEDICINE

## 2024-04-22 PROCEDURE — 250N000011 HC RX IP 250 OP 636: Performed by: EMERGENCY MEDICINE

## 2024-04-22 PROCEDURE — 97602 WOUND(S) CARE NON-SELECTIVE: CPT

## 2024-04-22 PROCEDURE — 120N000001 HC R&B MED SURG/OB

## 2024-04-22 PROCEDURE — 80048 BASIC METABOLIC PNL TOTAL CA: CPT | Performed by: EMERGENCY MEDICINE

## 2024-04-22 PROCEDURE — G0463 HOSPITAL OUTPT CLINIC VISIT: HCPCS | Mod: 25

## 2024-04-22 PROCEDURE — 258N000003 HC RX IP 258 OP 636: Performed by: EMERGENCY MEDICINE

## 2024-04-22 RX ORDER — NICOTINE POLACRILEX 4 MG
15-30 LOZENGE BUCCAL
Status: DISCONTINUED | OUTPATIENT
Start: 2024-04-22 | End: 2024-04-24 | Stop reason: HOSPADM

## 2024-04-22 RX ORDER — HYDRALAZINE HYDROCHLORIDE 20 MG/ML
10 INJECTION INTRAMUSCULAR; INTRAVENOUS EVERY 4 HOURS PRN
Status: DISCONTINUED | OUTPATIENT
Start: 2024-04-22 | End: 2024-04-23

## 2024-04-22 RX ORDER — PIPERACILLIN SODIUM, TAZOBACTAM SODIUM 2; .25 G/10ML; G/10ML
2.25 INJECTION, POWDER, LYOPHILIZED, FOR SOLUTION INTRAVENOUS EVERY 6 HOURS
Status: DISCONTINUED | OUTPATIENT
Start: 2024-04-22 | End: 2024-04-24 | Stop reason: HOSPADM

## 2024-04-22 RX ORDER — DEXTROSE MONOHYDRATE 25 G/50ML
25-50 INJECTION, SOLUTION INTRAVENOUS
Status: DISCONTINUED | OUTPATIENT
Start: 2024-04-22 | End: 2024-04-24 | Stop reason: HOSPADM

## 2024-04-22 RX ORDER — ENOXAPARIN SODIUM 100 MG/ML
40 INJECTION SUBCUTANEOUS EVERY 24 HOURS
Status: DISCONTINUED | OUTPATIENT
Start: 2024-04-22 | End: 2024-04-22

## 2024-04-22 RX ORDER — HEPARIN SODIUM 5000 [USP'U]/.5ML
5000 INJECTION, SOLUTION INTRAVENOUS; SUBCUTANEOUS EVERY 8 HOURS
Status: DISCONTINUED | OUTPATIENT
Start: 2024-04-22 | End: 2024-04-24 | Stop reason: HOSPADM

## 2024-04-22 RX ORDER — DEXTROSE MONOHYDRATE 50 MG/ML
INJECTION, SOLUTION INTRAVENOUS CONTINUOUS
Status: DISCONTINUED | OUTPATIENT
Start: 2024-04-22 | End: 2024-04-22 | Stop reason: ALTCHOICE

## 2024-04-22 RX ADMIN — DEXTROSE 15 G: 15 GEL ORAL at 03:29

## 2024-04-22 RX ADMIN — PIPERACILLIN AND TAZOBACTAM 2.25 G: 2; .25 INJECTION, POWDER, FOR SOLUTION INTRAVENOUS at 00:41

## 2024-04-22 RX ADMIN — PIPERACILLIN AND TAZOBACTAM 2.25 G: 2; .25 INJECTION, POWDER, FOR SOLUTION INTRAVENOUS at 06:27

## 2024-04-22 RX ADMIN — PIPERACILLIN AND TAZOBACTAM 2.25 G: 2; .25 INJECTION, POWDER, FOR SOLUTION INTRAVENOUS at 11:55

## 2024-04-22 RX ADMIN — PIPERACILLIN AND TAZOBACTAM 2.25 G: 2; .25 INJECTION, POWDER, FOR SOLUTION INTRAVENOUS at 18:41

## 2024-04-22 RX ADMIN — PREGABALIN 100 MG: 100 CAPSULE ORAL at 08:23

## 2024-04-22 RX ADMIN — SODIUM CHLORIDE: 9 INJECTION, SOLUTION INTRAVENOUS at 18:40

## 2024-04-22 RX ADMIN — SODIUM CHLORIDE: 9 INJECTION, SOLUTION INTRAVENOUS at 08:50

## 2024-04-22 RX ADMIN — TAMSULOSIN HYDROCHLORIDE 0.8 MG: 0.4 CAPSULE ORAL at 08:23

## 2024-04-22 RX ADMIN — PREGABALIN 200 MG: 200 CAPSULE ORAL at 20:49

## 2024-04-22 RX ADMIN — SODIUM CHLORIDE: 9 INJECTION, SOLUTION INTRAVENOUS at 00:24

## 2024-04-22 RX ADMIN — INSULIN DEGLUDEC INJECTION 20 UNITS: 100 INJECTION, SOLUTION SUBCUTANEOUS at 08:25

## 2024-04-22 RX ADMIN — PREGABALIN 200 MG: 200 CAPSULE ORAL at 00:41

## 2024-04-22 ASSESSMENT — ACTIVITIES OF DAILY LIVING (ADL)
ADLS_ACUITY_SCORE: 27
ADLS_ACUITY_SCORE: 30
ADLS_ACUITY_SCORE: 30
ADLS_ACUITY_SCORE: 27
ADLS_ACUITY_SCORE: 30
ADLS_ACUITY_SCORE: 27
ADLS_ACUITY_SCORE: 30
ADLS_ACUITY_SCORE: 42
ADLS_ACUITY_SCORE: 27
ADLS_ACUITY_SCORE: 27
ADLS_ACUITY_SCORE: 30
ADLS_ACUITY_SCORE: 30
ADLS_ACUITY_SCORE: 27
ADLS_ACUITY_SCORE: 27

## 2024-04-22 NOTE — DISCHARGE INSTRUCTIONS
[facility] nurse Pipe Leonard RN cwocn day of discharge AVS/Instructions for wound cares.  Initial inpatient follow up visit done in room #255 4/24/24.    Wound of the right lateral proximal lower leg.  Full thickness ulcer mostly yellow slough tissue covered.  Initial goal is to provide an environment for the wound that promotes debriding (cleaning out) of the bad tissue (yellow slough).  Continue to wear your protective and compressive sleeves over the right limb for protection and to control edema.    Continue to apply your Gold Bond moisturizer to the dry skin on the right limb after you apply the Mepilex foam dressing.     Wound cares:  1 - Remove the old dressing.  2 - Wash the wound and surrounding skin with soap and water (rinse with water if using soap and water), saline or the no rinse wound cleanser spray MicroKlenz or similar no rinse wound cleanser.  (Do not try to scrub off dry crusty remains of the Triad paste, the center paste will come off the moist wound bed easily, the edges may have crusty dry paste that can have the surface cleansed but not scrubed off to raw tissue).  3 Apply the Triad paste (yellow tube) to the wound bed at least a nickel thick.  Apply a thinner smear of the Triad paste around the edge of the wound and the immediate surrounding skin, moist or dry.  4 - Cover the wound and the Triad paste with a large gentle silicone adhesive foam dressing (using Mepilex 6x6 inch dressings currently).  5 - Change the dressing once daily.    I will plan to have you follow up with me weekly in the Wound and Ostomy clinic here at [facility].    We scheduled your first visit for __________________________________________.    The Wound and Ostomy outpatient clinic is located here at [facility] in the [facility].    Located on the far east end of the facility, you park in the small lot to the right as you come into the main hospital drive.    Enter through the lower  level glass doors.    For Ppie you take the elevator to the upper level and check in there, for Ly you check in at the lower leg desk.  Our contact number is [phone].    This number can be used to schedule or change the date or time of a Wound and Ostomy clinic appointment or to leave a message or question for the [facility] nurse's.    Pipe Leonard RN cwocn

## 2024-04-22 NOTE — PLAN OF CARE
Problem: Adult Inpatient Plan of Care  Goal: Plan of Care Review  Description: The Plan of Care Review/Shift note should be completed every shift.  The Outcome Evaluation is a brief statement about your assessment that the patient is improving, declining, or no change.  This information will be displayed automatically on your shift  note.  Outcome: Progressing  Goal: Absence of Hospital-Acquired Illness or Injury  Outcome: Progressing  Intervention: Identify and Manage Fall Risk  Recent Flowsheet Documentation  Taken 4/21/2024 2200 by Dionisio Arceo RN  Safety Promotion/Fall Prevention:   activity supervised   clutter free environment maintained   room near nurse's station   safety round/check completed   supervised activity  Intervention: Prevent Skin Injury  Recent Flowsheet Documentation  Taken 4/22/2024 0020 by Dionisio Arceo RN  Body Position: position changed independently  Intervention: Prevent Infection  Recent Flowsheet Documentation  Taken 4/21/2024 2200 by Dionisio Arceo RN  Infection Prevention:   rest/sleep promoted   single patient room provided  Goal: Optimal Comfort and Wellbeing  Outcome: Progressing  Goal: Readiness for Transition of Care  Outcome: Progressing  Intervention: Mutually Develop Transition Plan  Recent Flowsheet Documentation  Taken 4/22/2024 0100 by Dionisio Arceo RN  Equipment Currently Used at Home: wheelchair, manual     Problem: Wound  Goal: Optimal Coping  Outcome: Progressing  Goal: Optimal Functional Ability  Outcome: Progressing  Intervention: Optimize Functional Ability  Recent Flowsheet Documentation  Taken 4/21/2024 2200 by Dionisio Arceo RN  Activity Management: activity adjusted per tolerance  Activity Assistance Provided: assistance, 1 person  Goal: Absence of Infection Signs and Symptoms  Outcome: Progressing  Goal: Improved Oral Intake  Outcome: Progressing  Goal: Optimal Pain Control and Function  Outcome: Progressing  Goal: Skin Health and Integrity  Outcome:  "Progressing  Intervention: Optimize Skin Protection  Recent Flowsheet Documentation  Taken 4/22/2024 0020 by Dionisio Arceo RN  Head of Bed (HOB) Positioning: HOB at 20-30 degrees  Taken 4/21/2024 2200 by Dionisio Arceo RN  Activity Management: activity adjusted per tolerance  Goal: Optimal Wound Healing  Outcome: Progressing     Problem: Comorbidity Management  Goal: Blood Glucose Levels Within Targeted Range  Outcome: Progressing  Intervention: Monitor and Manage Glycemia  Recent Flowsheet Documentation  Taken 4/21/2024 2200 by Dionisio Arceo RN  Glycemic Management: blood glucose monitored  Medication Review/Management: medications reviewed       Blood sugars this morning were low and treated with orange juice and toast, Pt would not take all of the oral glucose gel and refused the next needed dose, blood sugars have since stablized, provider was updated, he refused cardiac monitoring, continuous pulse ox, and heparin. Wound remains dressed with minimal drainage.   Vitals have been stable.   BP (!) 150/62   Pulse 69   Temp 98  F (36.7  C) (Oral)   Resp 16   Ht 1.753 m (5' 9\")   Wt 64.2 kg (141 lb 8 oz)   SpO2 96%   BMI 20.90 kg/m    Will continue to monitor blood sugar, right BKA wound, and follow plan of care.  "

## 2024-04-22 NOTE — PROGRESS NOTES
Patient states he has not voided since yesterday afternoon. Patient states that he could go but is unable to use urinal but could go on the toilet. Patient refused to let nursing staff x2 attempts help him into the bathroom or to attempt urinal at bedside. Patient states that he will go when his parents get here this morning. Writer educated patient on receiving IV fluids and potential risks he has to maintaining a full bladder. Patient continues to be adamant about not wanting to try and urinate at this time.

## 2024-04-22 NOTE — PLAN OF CARE
Goal Outcome Evaluation:       Patient is A/O. VSS on room air. Denies pain. Up to bathroom with assist from self/parents. Patient refused nursing help with transferring. Voiding. IVF infusing via single-lumen PICC per MAR. IV abx administered per MAR. Wound to RLE present. WOC consulted to see patient today. Call light appropriate and able to make needs known. Plan to transfer out of ICU and to floor this shift.

## 2024-04-22 NOTE — PROCEDURES
[facility]    Single Lumen PICC Placement    Date/Time: 4/21/2024 11:39 PM    Performed by: Gustavo Estrella RN  Authorized by: Claudia Dang MD  Indications: vascular access      UNIVERSAL PROTOCOL   Site Marked: Yes  Prior Images Obtained and Reviewed:  Yes  Required items: Required blood products, implants, devices and special equipment available    Patient identity confirmed:  Verbally with patient and arm band  NA - No sedation, light sedation, or local anesthesia  Confirmation Checklist:  Patient's identity using two indicators, relevant allergies, procedure was appropriate and matched the consent or emergent situation and correct equipment/implants were available  Time out: Immediately prior to the procedure a time out was called    Universal Protocol: the Joint Commission Universal Protocol was followed    Preparation: Patient was prepped and draped in usual sterile fashion    ESBL (mL):  5     ANESTHESIA    Local Anesthetic:  Lidocaine 2% without epinephrine  Anesthetic Total (mL):  5      SEDATION    Patient Sedated: No        Preparation: skin prepped with 2% chlorhexidine  Skin prep agent: skin prep agent completely dried prior to procedure  Sterile barriers: maximum sterile barriers were used: cap, mask, sterile gown, sterile gloves, and large sterile sheet  Hand hygiene: hand hygiene performed prior to central venous catheter insertion  Type of line used: PICC  Catheter type: single lumen  Catheter size: 4 Fr  Brand: [id number]  Lot number: [id number]  Placement method: venipuncture, MST and ultrasound  Number of attempts: 2  Difficulty threading catheter: no  Successful placement: yes  Orientation: right  Catheter to Vein (%): 30  Location: basilic vein  Tip Location: SVC  Arm circumference: adults 10 cm  Extremity circumference: 30  Visible catheter length: 0  Total catheter length: 39  Internal Lumen Volume: 39 mL    Dressing and securement: statlock, site cleansed  and chlorhexidine disc applied  Post procedure assessment: blood return through all ports  PROCEDURE   Patient Tolerance:  Patient tolerated the procedure well with no immediate complications  Disposal: sharps and needle count correct at the end of procedure, needles and guidewire disposed in sharps container      First attempted to place in left arm per patient request. Unable to advance wire. Attempted aborted and switched to right arm. New kit used.

## 2024-04-22 NOTE — PROGRESS NOTES
Patient's parents are here and are assisting pt with prosthetic and wheelchair so that pt is able to go into the bathroom. Patient refuses to have nursing staff assist with transfer or bathroom cares. Refuses gaitbelt and refuses nursing assistance/help.

## 2024-04-22 NOTE — H&P
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)  -continue Zosyn and Vanco  -wound culture pending  -case discussed with Ortho by ED prior to admission; will eval in AM; no imaging recommended per ED note  -wound care consult    HHS  Type 1 DM  Diabetic neuropathy  -initial glucose 700s  -no DKA  -last A1C 10.3  -continue IVF  -glucose down to 147 now  -continue Tresiba and ISS  -continue Lyrica    YESSICA with CKD  hyperkalemia  -baseline creatinine~1.4-1.6; suspect dehydration from hyperglycemia  -continue IVF; repeat BMP    HTN  -hold ACEI for YESSICA and hyperkalemia    H/o cardiac arrest (6/2023)  -after IV opioids given    Anemia  -stable    Diet: Consistent Carbohydrate Diet Moderate Consistent Carb (60 g CHO per Meal) Diet    DVT Prophylaxis: subcut heparin   Martinez Catheter: Not present  Lines: PICC  Code Status:  DNR-DNI    Clinically Significant Risk Factors Present on Admission   # Hyperkalemia: Highest K = 5.8 mmol/L in last 2 days, will monitor as appropriate  # Hyponatremia: Lowest Na = 126 mmol/L in last 2 days, will monitor as appropriate      # Hypoalbuminemia: Lowest albumin = 3.2 g/dL at 4/21/2024  5:49 PM, will monitor as appropriate     # Hypertension: Noted on problem list     # DMII: A1C = N/A within past 6 months              Disposition Plan      Expected Discharge Date: 04/23/2024                The patient's care was discussed with the Patient.        KESHAV MORAES MD  [facility]  Securely message with the Vocera Web Console (learn more here)  Text page via [facility] Paging/Directory      Visit/Communication Style   Virtual (Video) communication was used to evaluate Eduardo.  Eduardo consented to the use of video communication: yes  Video START time: 2345, 4/21/2024  Video STOP time: 0005,  4/22/2024   Patient's location: [facility]   Provider's location during the visit: remote [place] [facility] site        ______________________________________________________________________    Chief Complaint   RLE wound    History of Present Illness   53yoM with Type I DM, neuropathy, bilateral BKAs, CKD, and HTN presented to the ED with a wound on the outer aspect of his right stump.  He thinks it started as a blister sometime in the last week or so.  He has been trying a new prosthesis sock that was not the right size and it may have been rubbing.  He was having some pain when he wore the prosthesis but he has no pain now without it on.  There is a bad odor and some clear drainage.  His neighbor who is a retired nurse has been helping him with dressing changes.  He denies fever or chills.  His sugars have been high.  He says he does not feel well when his sugar is below 180 and only uses insulin accordingly.    Review of Systems    General: negative for fever, chills, sweats, weakness  Eyes: negative for blurred vision,   Ear Nose and Throat: negative for pharyngitis, speech or swallowing difficulties  Respiratory:  negative for sputum production, wheezing, RICH, pleuritic pain, sob or cough  Cardiology:  negative for chest pain, palpitations, orthopnea, PND, edema, syncope   Gastrointestinal: negative for abdominal pain, nausea, vomiting, diarrhea, constipation, hematemesis, melena or hematochezia  Genitourinary: negative for frequency, urgency, dysuria, hematuria   Neurological: negative for focal weakness, paresthesia    Past Medical History    I have reviewed this patient's medical history and updated it with pertinent information if needed.   Past Medical History:   Diagnosis Date    CAD (coronary artery disease)     previous coronary stent placement (2010?)    Diabetic foot (H) 10/29/2018    Diabetic retinopathy associated with type 1 diabetes mellitus (H)     Foot ulcer,  left (H)     Osteomyelitis (H)     left foot    Pneumonia 2023    S/P BKA (below knee amputation), left (H) 2019    S/P BKA (below knee amputation), right 2023    Type 1 diabetes mellitus with complications (H)     retinopathy, neuropathy, nephropathy, macrovascular disease       Past Surgical History   I have reviewed this patient's surgical history and updated it with pertinent information if needed.  Past Surgical History:   Procedure Laterality Date    AMPUTATE FOOT Left 2019    Procedure: PARTIAL LEFT FOOT AMPUTATION.;  Surgeon: Chetan Potter DPM;  Location:  OR    AMPUTATE LEG BELOW KNEE Left 2019    Procedure: LEFT BELOW THE KNEE AMPUTATION;  Surgeon: Kvng Berman MD;  Location:  OR    STENT         Social History   I have reviewed this patient's social history and updated it with pertinent information if needed.  Social History     Tobacco Use    Smoking status: Never    Smokeless tobacco: Never   Vaping Use    Vaping status: Never Used   Substance Use Topics    Alcohol use: Yes     Comment: occ    Drug use: Never       Family History   I have reviewed this patient's family history and updated it with pertinent information if needed.  Family History   Problem Relation Age of Onset    Ovarian Cancer Maternal Grandmother     Colon Cancer Maternal Grandmother     Prostate Cancer Maternal Grandfather     Ovarian Cancer Paternal Grandmother        Prior to Admission Medications   Prior to Admission Medications   Prescriptions Last Dose Informant Patient Reported? Taking?   BD ULTRA FINE PEN NEEDLES 2024 at hs  No Yes   Si each by Other route 5 times daily   Continuous Blood Gluc  (FREESTYLE COLIN 2 READER) BRANDY  at current  No Yes   Si Device continuous prn (replace annually if needed) Use to read blood glucose levels per 's instructions.   Continuous Blood Gluc Sensor (FREESTYLE COLIN 2 SENSOR) MISC  at L. Arm  No Yes   Si Device continuous  prn (Change every 14 days) For use with Freestyle Drew 2  for continuous monitoring of blood glucose levels.  Change sensor every 14 days.   HUMALOG KWIKPEN 100 UNIT/ML soln 4/20/2024 at hs  No Yes   Sig: INJECT 4-12 UNITS SUBCUTANEOUSLY WITH MEALS AS DIRECTED. TOTAL DAILY DOSE ABOUT 50 UNITS   Patient taking differently: Inject 4-12 Units Subcutaneous 4 times daily (with meals and nightly) TOTAL DAILY DOSE ABOUT 50 UNITS   LYRICA 100 MG capsule 4/21/2024 at am  No Yes   Sig: Take 1 capsule (100 mg) by mouth every morning AND 2 capsules (200 mg) every evening.   insulin degludec (TRESIBA FLEXTOUCH) 100 UNIT/ML pen 4/21/2024 at am  No Yes   Sig: INJECT 20 UNITS SUBCUTANEOUSLY ONCE DAILY IN THE MORNING   Patient taking differently: Inject 20 Units Subcutaneous every morning   insulin pen needle (31G X 5 MM) 31G X 5 MM miscellaneous 4/21/2024 at am  No Yes   Sig: Use 4 pen needles daily or as directed.   lisinopril (ZESTRIL) 5 MG tablet 4/21/2024 at am  No Yes   Sig: Take 1 tablet (5 mg) by mouth daily   tamsulosin (FLOMAX) 0.4 MG capsule 4/21/2024 at am  No Yes   Sig: Take 2 capsules (0.8 mg) by mouth daily      Facility-Administered Medications: None     Allergies   Allergies   Allergen Reactions    Hydromorphone Shortness Of Breath     Respiratory arrest and PEA with IV dilaudid, tolerated oral narcotics without difficulty.    Complicated situation related to other drugs as well, IV Dilaudid may not be completely contraindicated.    Morphine Anaphylaxis     Respiratory arrest w/ IV Morphine - tolerated oral narcotics       Physical Exam   Vital Signs: Temp: 98.3  F (36.8  C) Temp src: Oral BP: 137/86 Pulse: 77   Resp: 18 SpO2: 98 % O2 Device: None (Room air)    Weight: 0 lbs 0 oz    Gen:   in no acute distress, lying semi-supine in hospital stretcher  HEENT:  Anicteric sclera, PER, hearing intact to voice  Resp:  No accessory muscle use, breath sounds clear; no wheezes no rales no rhonchi  Card:  No murmur,  normal S1, S2   Abd:  Soft per RN exam, no TTP, non-distended, normoactive bowel sounds are present  Musc:  bilat BKA; wound picture reviewed; seems to be half dollar sized blister type wound with surrounding erythema  Psych:  Good insight, oriented to person, place and time, not anxious, not agitated    Data     Recent Labs   Lab 04/21/24  2337 04/21/24  2218 04/21/24 2005 04/21/24  1749   WBC  --   --   --  10.2   HGB  --   --   --  11.2*   MCV  --   --   --  83   PLT  --   --   --  290   NA  --   --   --  126*   POTASSIUM  --   --   --  5.8*   CHLORIDE  --   --   --  89*   CO2  --   --   --  29   BUN  --   --   --  40.5*   CR  --   --   --  2.17*   ANIONGAP  --   --   --  8   GABRIELA  --   --   --  8.9   * 276* 501* 763*   ALBUMIN  --   --   --  3.2*   PROTTOTAL  --   --   --  7.2   BILITOTAL  --   --   --  0.5   ALKPHOS  --   --   --  151*   ALT  --   --   --  9   AST  --   --   --  18         Recent Results (from the past 24 hour(s))   XR Chest Port 1 View    Narrative    EXAM: CHEST SINGLE VIEW PORTABLE  LOCATION: [facility]  DATE: 4/21/2024    INDICATION: Nurse placed peripherally inserted central catheter.  COMPARISON: 06/14/2023.    FINDINGS: Right upper extremity peripherally inserted central catheter with distal catheter tip in the superior vena cava. The lungs are clear. Normal size cardiac silhouette.      Impression    IMPRESSION:   1. Right upper extremity peripherally inserted central catheter with distal tip in the superior vena cava.  2. No evidence of active cardiopulmonary disease.

## 2024-04-22 NOTE — ED NOTES
ED Nursing criteria listed below was addressed during verbal handoff:     Abnormal vitals: No  Abnormal results: Yes  Med Reconciliation completed: Yes  Meds given in ED: Yes  Any Overdue Meds: No  Core Measures: N/A  Isolation: No  Special needs: Yes-needs PICC line, VBG after PICC line is placed, blood sugar check at 2145  Skin assessment: No-patient refused full skin assessment. Right stump was assessed, LDA opened for wound, area was cleaned and re-covered.     Observation Patient  Education provided: N/A

## 2024-04-22 NOTE — PROGRESS NOTES
Reason For Visit: Eduardo Walton, 53 year old male, seen for a [facility] consultation to evaluate and treat a right lateral lower leg (residual limb) wound. Patient was admitted on 4/21/24 for a right LE residual limb wound with concerns of cellulitis.  Pt's mom and dad and his neighbor (who is a retired RN and has been assisting with the current wound) plus nursing student Yodit are also present with patient in the room.  Patient is A&Ox4, pleasant upon interaction.     Start of Care in [facility]: 4/21/2024   Referring Provider: Dr Fabiola Orlando MD ED provider  Primary Care Provider: Deepti Vega   Wound Location: Right lateral lower leg (residual limb)    Wound History:   Pt presented to the ED here at [facility] on 4/21/23 with concerns of right lateral lower leg wound.  Per admission note:  Pt has history of bilateral BKA's (left 2019 and right June of 2023).  He stated 'he thinks it started as a blister sometime in the last week or so (prior to presentation in ED).  He'd been trying a new prosthesis sock that was not the right size and it may have been rubbing.  He was having some pain when he wore the prosthesis but he has no pain now without it on.  There is a bad odor and some clear drainage.  His neighbor who is a retired nurse has been helping him with dressing changes.  He denies fever or chills.  His sugars have been high.  He stated he does not feel well when his sugar is below 180 and only uses insulin accordingly.  Wound was swab cultured in the ED, aerobic cultures pending.  At [facility] nurse initial meeting (4/22) with pt, family and neighbor gave some additional information on the origin and progression of the wound.  Appeared pretty rapidly, no sure if there was any initial blistering.  Pt believes the primary cause of the wound was friction and not direct pressure.  I was able to see photo's of the wounds development off the pt's mom's phone.  Initial photo, not a great image but as  best able to see, showed hypopigmented site with distinct boarders, no open tissue, did not appear like slough.  Second photo is few days later than first, is a better image and shows what appears to be a full thickness ulcer with mostly clean red nongranular wound bed and open wound edges.  Periwound skin is not well shown in either photo  Pain to the posterior knee and proximal periwound skin increased and per pt's neighbor, small serous clear fluid blisters appeared briefly and ruptured.  Entire surrounding skin more to the proximal than distal had corresponding erythema with no significant increased warmth to touch noted.  Pt has had conflicting recommendations on this wound and appropriate cares, most included keeping the wound dry as possible.  Pt is concerned with prolonged hospital stays as setting does not work for his self cares with family assist.  Pt would prefer to: get plan set for wound cares and follow ups, set up for needed post hospital therapies like IV antibiotics if needed, picc line was place with this in mind at admission, and then discharge home as soon as possible.       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    Positive for macroalbuminuria    Retinopathy due to secondary diabetes mellitus (H)    Severe diabetic hypoglycemia (H)    Type 1 diabetes mellitus with other diabetic ophthalmic complication (H)    S/P BKA (below knee amputation) unilateral, left (H)    Chronic kidney disease, stage 3 (H)    Statin declined    Primary osteoarthritis of right knee    Current moderate episode of major depressive disorder without prior episode (H)    Hypertension    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)     S/P BKA (below knee amputation), right (H)    YESSICA (acute kidney injury) (H24)    Type 1 diabetes mellitus with hyperglycemia (H)    Diabetic ulcer of right lower leg (H)    Acute renal failure with acute tubular necrosis superimposed on stage 3b chronic kidney disease (H)    Cellulitis of right lower extremity                  Tobacco Use:     Tobacco Use      Smoking status: Never      Smokeless tobacco: Never     Diabetic: Type 1  HgbA1C:   Hemoglobin A1C   Date Value Ref Range Status   11/06/2023 10.3 (H) 0.0 - 5.6 % Final     Comment:     Normal <5.7%   Prediabetes 5.7-6.4%    Diabetes 6.5% or higher     Note: Adopted from ADA consensus guidelines.   05/21/2021 10.9 (H) 0 - 5.6 % Final     Comment:     Normal <5.7% Prediabetes 5.7-6.4%  Diabetes 6.5% or higher - adopted from ADA   consensus guidelines.     Checks Blood Glucose?: See Hospital Flow Sheet for inpatient values.    Personal/social history:  pt lives with family independently, has had home care services, most recently with [facility].    Objective:   Current treatment plan: Covering with dry gauze, secured with Kerlix on arrival today.  Last changed: not assessed, earlier today    Wound #1 Right lateral lower leg or residual limb.  Origin is not fully clear as of initial [facility] nurse inpatient assessment.  Repetitive friction vs pressure/friction mix both would bed complicated by onset of cellulitis. see Assessment for more details.  Stage/tissue depth:  Full thickness, if solely pressure would be considered unstageable, see Assessment for details.    3.5 cm L x approximately 2 cm W x 0.1 cm D  Tunneling: none noted  Undermining: none noted  Wound bed type/amount: total site includes approximately 75 % yellow to tan semi moist slough covered wound with 10 % soft black dry eschar and 15 % red nongranular tissue; Not currently fluctuant  Wound Edges: closed with slough where there is visible depth and with eschar where there is no visible depth.  Periwound:  Erythema with no increased warmth to touch, dry flaky skin, denuded area of ruptured blisters to the proximal of the wound with scantly moist red nongranular tissue, firm edema, not indurated but firm edema present.  Drainage: moderate amounts serosanguinous when left open to air during assessments and cares.  Odor: none noted currently  Pain: tender to painful  Photo from today initial [facility] nurse inpatient consult 4/22/24.  Head is to the left and foot to the right in photo.     Photo from ED 4/21/24.    Dorsalis Pedal Pulse: NA  Posterior Tibial Pulse: NA  Hair growth: none noted in the general area of the right lateral leg wound  Capillary Refill: NA  Feet/toes color: NA  Nails: NA  R Leg residual limb: Edema plus 1 and nonpitting. Ankle circumference NA cm. Calf circumference NA cm.  L Leg: Edema Not assessed this visit. Ankle circumference NA cm. Calf circumference NA cm.    Mobility: Wheelchair use, has prosthetics but not currently wearing on right.    Current offloading/footwear: prosthetic to left LE with sneaker   Sensation: per pt normal sensation at right LE wound, hypersensitive.     Other callusing/areas of concern: Dry skin to the distal right residual limb    Diet: Carb Consistent 60 g CHO per meal ordered through hospital, note pt had [employer] for lunch today.      Goals of Wound Healing:   - Transition from slough covered to clean wound bed with new granular tissue growth stage of healing  - Maintain moist environment, with Triad paste.  - Control exudate, currently with Triad paste and Mepilex 6x6 inch gentle adhesive foam dressing, may change to different secondary dressing based on initial assessment of plan of care started today 4/22/24.  - Autolytic debridement of necrotic/sloughy tissue  - Protect wound from outside environment, with Triad paste and Mepilex 6x6  - Antimicrobial, pt is currently on IV antibiotics  - Pack open spaced, with Triad paste  - Promote healing by secondary intention  for complete closure of wound  - Offloading/pressure reduction, monitor for any direct pressure, would recommend no prosthetic use on the right but compression with current limb wear is encouraged.    Assessment:  The right lateral lower leg wound is located on a firm but not technically, bony prominence.  The wound is consistent with pressure in appearance but location less so.  Pt's description of layers of socks/sleeves could apply friction to the area which is near apex of the bulge of the muscle in this area.  The rapid deterioration and evolution could suggest the damage to the tissue at the wound site was significant prior to the cellulitis but the entire area deteriorated, with the appearance of the blisters and increased erythema and pain.  The foul odor of the drainage reported by pt and all involved in the recent wound cares has resolved and current drainage is clear serosanguinous.    Wound needs debriding.  Due to multiple concerns would recommend not performing sharp debridement initially.  Promoting autolytic debridement with primary dressing appears to be the best option as long as infection is controlled with antibiotics.        Barriers to wound healing:   Poor nutrition: inadequate supply of protein, carbohydrates, fatty acids, and trace elements essential for all phases of wound healing. Will discuss at follow up visit, not discussed today due to long discussion with teaching on subjects with related to wound healing principals.    Reduced Blood Supply: inadequate perfusion to heal wound, woc will need to eval past vascular studies related to current level of wound,   Medication: NA  Chemotherapy: suppresses the immune system and inflammatory response, NA  Radiotherapy: increases production of free radical which damage cells, NA  Psychological stress: legitimate concerns related to risk of limb threatening wound   Obesity: decreases tissue perfusion  Infection: prolongs inflammatory phase, uses  vital nutrients, impairs epithelialization and releases toxins, cultures pending as of 4/22, on IV antibiotics  Underlying Disease: diabetes mellitis   Maceration: reduces wound tensile strength and inhibits epithelial migration, none currently noted, protecting from drainage with Triad paste   Patient compliance, TBD appears motivated to heal  Unrelieved pressure, to be determined but none of current significance noted currently.  Immobility, limited but not immobile  Substance abuse: NA    Plan:  Will start use of Triad paste to wound bed and immediate surrounding skin as listed below.  To try to kick start autolytic debriding, while hospitalized and on IV antibiotics with monitoring, will use a large Mepilex dressing.  This will hold in more moisture, less breath ability of the dressing, long term likely not the best secondary dressing option.  Once daily is goal but discussed with his oncoming nurse and the pt, more frequent changes prn recommended as listed below.    Woc will re evaluate the wound and surrounding skin tomorrow while inpatient.  Pt with his neighbors assist and families assist should be able to perform wound care and evaluate changes as directed by woc nurse and providers.  If home cares for IV infusions and picc line cares are needed would welcome any wound care assist and or evaluation but home infusion and or any home care services available.  If a woc nurse is available for in home assessments through a home care agency, pt could avoid recommended Wound and Ostomy clinic post hospitalization follow up visits.  If no woc or similar wound care specialist available through home care or home infusion, would recommend approximately weekly clinic visits till debriding progressed and new granular tissue present.    Did teaching on basics of wound cares:  Keeping moist wound bed, filling dead space rational, reduction and control of peripheral edema, debriding eschar and slough with autolytic vs  sharp methods.  Cleansing wounds, rational of products recommended and used today and potential for changes of secondary dressing based on re evaluation tomorrow.  Additional teaching listed also below in printed AVS.     Below information below in italics printed and given to the pt and his family in printed AVS from today's visit.  Will need to be edited by [facility] nurse at each visit for updated discharge summary.  [facility] nurse Pipe Leonard RN cwocn initial inpatient visit AVS/Instructions for wound cares.  Initial inpatient visit done in room #255 4/22/24.    Wound of the right lateral proximal lower leg.  Full thickness ulcer mostly yellow slough tissue covered.  Initial goal is to provide an environment for the wound that promotes debriding (cleaning out) of the bad tissue (yellow slough).    Wound cares to be done once daily and as needed for high amounts of strike through drainage (visible drainage seen on the outside of the Mepilex gentle adhesive foam dressing greater than 1/4 of the total size of the foam dressing).    1 - Remove the old dressing.  2 - Wash the wound and surrounding skin with soap and water (rinse with water if using soap and water), saline or the no rinse wound cleanser spray MicroKlenz or similar no rinse wound cleanser.  (Do not try to scrub off dry crusty remains of the Triad paste, the center paste will come off the moist wound bed easily, the edges may have crusty dry paste that can have the surface cleansed but not scrubed off to raw tissue).  3 Apply the Triad paste (yellow tube) to the wound bed at least a nickel thick.  Apply a thinner smear of the Triad paste around the edge of the wound and the immediate surrounding skin, moist or dry.  4 - For now we will have you cover the wound and the Triad paste with a large gentle silicone adhesive foam dressing (we use Mepilex at this facility, other similar brands are acceptable to use and may be more cost effective).  5 - Change the  dressing once daily, and as needed for strike through drainage to the outer foam dressing 1/4 or greater the total size of the dressing.    I have you on my inpatient schedule for tomorrow Tuesday 4/23/24 at 11 am.  When you get close to discharging home we will determine when and how often you need to follow up with me or my co woc nurse Ly NELSON RN cwocn in clinic.    The Wound and Ostomy outpatient clinic is located here at [facility] in the [facility].  Located on the far east end of the facility, you park in the small lot to the right as you come into the main hospital drive.  Enter through the lower level glass doors.  For Pipe you take the elevator to the upper level and check in there, for Ly you check in at the lower leg desk.  Our contact number is [phone].  This number can be used to schedule or change the date or time of a Wound and Ostomy clinic appointment or to leave a message or question for the woc nurse's.    Interventions to Barriers:  As listed above.    Topical care to Right lateral lower leg: Cleanse with Microklenz and gauze, pat dry. Wound cares as listed above.  Additional recommendations:  Cares provided as listed above.    Discussed plan of care with patient and family and care giver neighbor as well as oncoming nurse Margoth RN. Teaching done with patient and family and care giver neighbor as well as oncoming nurse Margoth RN for dressing changes; pt with family and friend assist is able and willing to perform.    Discharge instructions updated to include:  Updated woc nurse recommendations.     Supplies:  Left in room today Triad paste tube and MicroKlenz, hospital stock of all other supplies.    WO Return visit: 4/23/24  Nursing to notify Provider and WOC Nurse if wound deteriorates.    Verbal, written, & demonstrative education provided.  Face to face time (excluding procedure): approximately 75 minutes.  Procedure: less than one minute application of Triad  paste to right lateral lower leg wound for promotion of autolytic debridement of slough.  Care plan was changed.    Pipe Leonard RN cwocn  [phone]

## 2024-04-22 NOTE — PROGRESS NOTES
"S-(situation): Patient arrives to room 215 via cart from ED    B-(background): right BKA wound    A-(assessment): Pt arrived alert and oriented in a wheel chair able to self transfer, there is one prosthetic in place on the left leg and the right lower limb is wrapped in new curlex. Initial blood sugar is elevated at 276, PICC stat is in the room and placing PICC line. Initial vitals are stable. /86   Pulse 77   Temp 98.3  F (36.8  C) (Oral)   Resp 18   Ht 1.753 m (5' 9\")   SpO2 98%   BMI 24.74 kg/m         R-(recommendations): Orders reviewed with Pt. Will monitor patient per MD orders.     Inpatient nursing criteria listed below were met:    Health care directives status obtained and documented: Yes  VTE ordered/documented: Yes  Skin issues/needs documented:Yes  Isolation addressed and Signage used: NA  Fall Prevention: Care plan updated Yes Education given and documented Yes  Care Plan initiated and Co-Morbidities added: Yes  Education Assessment documented:Yes  Admission Education Documented: Yes  If present CAUTI/CLABI Education done: NA  New medication patient education completed and documented (Possible Side Effects of Common Medications handout): Yes  Allergies Reviewed: Yes  Admission Medication Reconciliation completed: Yes  Home medications if not able to send immediately home with family stored here: NA  Reminder note placed in discharge instructions regarding home meds: NA  Individualized care needs/preferences addressed and charted: Yes  Provider Notified that patient has arrived to the unit: Yes       "

## 2024-04-22 NOTE — PLAN OF CARE
"Goal Outcome Evaluation:      Plan of Care Reviewed With: patient    Overall Patient Progress: no changeOverall Patient Progress: no change    Outcome Evaluation: Four hour shift note- Alert and oriented x 4. Vitals stable on RA. Denies pain, nausea and vomiting. No shortness of breath reported. Blood sugars elevated this evening. Waiting until patient eats dinner to give SS insulin.    BP (!) 150/64 (BP Location: Left arm)   Pulse 77   Temp 98.6  F (37  C) (Oral)   Resp 16   Ht 1.753 m (5' 9\")   Wt 64.2 kg (141 lb 8 oz)   SpO2 94%   BMI 20.90 kg/m      "

## 2024-04-22 NOTE — PHARMACY-VANCOMYCIN DOSING SERVICE
"Pharmacy Vancomycin Note  Date of Service 2024  Patient's  1970   53 year old, male    Indication: Sepsis and Skin and Soft Tissue Infection  Day of Therapy: 2  Current vancomycin regimen:  1000 mg IV q24h  Current vancomycin monitoring method: AUC  Current vancomycin therapeutic monitoring goal: 400-600 mg*h/L    InsightRX Prediction of Current Vancomycin Regimen  Regimen: 1000 mg IV every 24 hours.  Start time: 18:39 on 2024  Exposure target: AUC24 (range)400-600 mg/L.hr   AUC24,ss: 622 mg/L.hr  Probability of AUC24 > 400: 91 %  Ctrough,ss: 20.4 mg/L  Probability of Ctrough,ss > 20: 52 %  Probability of nephrotoxicity (Lodise TIM ): 18 %      Current estimated CrCl = Estimated Creatinine Clearance: 31.6 mL/min (A) (based on SCr of 2.51 mg/dL (H)).    Creatinine for last 3 days  2024:  5:49 PM Creatinine 2.17 mg/dL  2024:  1:57 AM Creatinine 2.48 mg/dL;  6:20 AM Creatinine 2.51 mg/dL    Recent Vancomycin Levels (past 3 days)  No results found for requested labs within last 3 days.    Vancomycin IV Administrations (past 72 hours)                     vancomycin (VANCOCIN) 1,500 mg in sodium chloride 0.9 % 250 mL intermittent infusion (mg) 1,500 mg New Bag 24 1839                    Nephrotoxins and other renal medications (From now, onward)      Start     Dose/Rate Route Frequency Ordered Stop    24 1800  vancomycin (VANCOCIN) 750 mg in 0.9% NaCl 250 mL intermittent infusion         750 mg  over 60 Minutes Intravenous EVERY 24 HOURS 24 1058      24 0900  [Held by provider]  lisinopril (ZESTRIL) tablet 5 mg        (On hold since today at 0017 until manually unheld; held by Payton Heaton MDHold Reason: Abnormal Electrolytes)    5 mg Oral DAILY 24 2153      24 0030  piperacillin-tazobactam (ZOSYN) 2.25 g vial to attach to  ml bag        Note to Pharmacy: For [facility], [facility] and [facility]: For Zosyn-naive patients, use the \"Zosyn initial dose + " "extended infusion\" order panel.    2.25 g  over 30 Minutes Intravenous EVERY 6 HOURS 04/22/24 0024                 Contrast Orders - past 72 hours (72h ago, onward)      None            Interpretation of levels and current regimen:  Vancomycin level is reflective of AUC greater than 600    Has serum creatinine changed greater than 50% in last 72 hours: No    Urine output:  anuric    Renal Function: Worsening    InsightRX Prediction of Planned New Vancomycin Regimen  Regimen: 750 mg IV every 24 hours.  Start time: 18:39 on 04/22/2024  Exposure target: AUC24 (range)400-600 mg/L.hr   AUC24,ss: 475 mg/L.hr  Probability of AUC24 > 400: 69 %  Ctrough,ss: 15.6 mg/L  Probability of Ctrough,ss > 20: 27 %  Probability of nephrotoxicity (Lodise TIM 2009): 11 %      Plan:  Decrease Dose to 750 mg IV Q24H  Vancomycin monitoring method: AUC  Vancomycin therapeutic monitoring goal: 400-600 mg*h/L  Pharmacy will check vancomycin levels as appropriate in 1-3 Days.  Serum creatinine levels will be ordered daily for the first week of therapy and at least twice weekly for subsequent weeks.    Ramy Torres, [facility]    "

## 2024-04-22 NOTE — PROGRESS NOTES
"S- Transfer to med surg from icu.    B- admitted with elevated BG and right leg wound    A- Brief systems assessment: A/O. VSS on room air. Denies pain. WOC to see patient for right leg wound.     R- Transfer to 255 per physician orders. Continue to monitor pt and update physician as needed.      Code status: DNR / DNI  Skin: right leg wound  Fall Risk: Yes- Department fall risk interventions implemented.  Isolation and Signage: None  Medication drips upon transfer: NS @125ml/hr  Blue Bin checked and medications transfer out with patientYesABLE\"}     "

## 2024-04-22 NOTE — PROGRESS NOTES
"[facility]    PROGRESS NOTE - Hospitalist Service    Assessment and Plan    Principal Problem:    Cellulitis of right lower extremity  Active Problems:    Coronary artery disease involving native coronary artery of native heart without angina pectoris    S/P BKA (below knee amputation) unilateral, left (H)    Hypertension    S/P BKA (below knee amputation), right (H)    Type 1 diabetes mellitus with hyperglycemia (H)    Acute renal failure with acute tubular necrosis superimposed on stage 3b chronic kidney disease (H)    Eduardo Walton is a 53 year old male with h/o DM, HTN, s/p bilateral BKA's and CKD presents with cellulitis     Right LE stump wound and cellulitis secondary to prosthetic irritation   Bilateral BKA (left 2019, right 6/2023)  -continue Zosyn and Vanco for now  - MRSA nasal swab pending if negative discharge vanco   - wound cx pending   - WOC to see  - patient might leave AMA despite risks discussed with him.  - PICC line placed in ED  - downgrade today   - patient refusing telemetry and VTE medications     HHS  Type 1 DM  Diabetic neuropathy  -initial glucose 700s  -no DKA  -last A1C 10.3  -continue IVF  -continue Tresiba and ISS  -continue Lyrica     YESSICA with CKD 3b with hyperkalemia  - continue IVF   - holding lisinopril  - Renal dose meds and avoid nephrotoxic agents   - Trend renal function  - patient refusing to have nursing assist with emptying bladder      HTN  -hold ACEI for YESSICA and hyperkalemia  -prn IV hydralazine      H/o cardiac arrest (6/2023)  -after IV opioids given no narcs      Anemia  -stable    Clinically Significant Risk Factors      # Overweight: Estimated body mass index is 28.31 kg/m  as calculated from the following:  Height as of this encounter: 1.626 m (5' 4\").  Weight as of this encounter: 74.8 kg (164 lb 14.4 oz)., PRESENT ON ADMISSION    COVID-19 PCR Results           No data to display              COVID-19 Antibody Results, Testing for Immunity " "          No data to display               Code Status: No CPR- Do NOT Intubate  VTE prophylaxis:  Heparin SQ and patient refusing   DIET: Orders Placed This Encounter      Consistent Carbohydrate Diet Moderate Consistent Carb (60 g CHO per Meal) Diet    Drains/Lines: Patient has PICC/CVC/Central line.    Martinez Catheter: Not present    Weight bearing status: NWB     Expected Discharge Date: 04/23/2024            # DMII: A1C = N/A within past 6 months    Subjective:  Long discussion with patient this morning in regards to treatment and plan. He was requesting to leave stating he informed the ED that he was only going to stay for 24hrs, reviewed with him that it takes 48-72 hrs for cultures and until then he would need IV abx and without culture results or signs of improvement plus WOC I would be unable to safely discharge him due to uncertain of infection treatment and he would need to leave AMA if this was the case. He then stated\" I will be a terrible patient and rude and be very disagreeable to the nursing staff\".  Discussed with patient that this is not appropriate treatment for the nursing staff since they are doing their job to care for him. Patient then stated to let him go home, but I re-iterated the need to treat his infection given his h/o previous infections and amputations.     PHYSICAL EXAM  Temp:  [98  F (36.7  C)-99.4  F (37.4  C)] 98.6  F (37  C)  Pulse:  [69-87] 73  Resp:  [16-20] 18  BP: (136-177)/(62-86) 142/71  SpO2:  [96 %-98 %] 96 %  Wt Readings from Last 1 Encounters:   04/22/24 64.2 kg (141 lb 8 oz)     No intake or output data in the 24 hours ending 04/22/24 0842   Body mass index is 20.9 kg/m .    Physical Exam  Vitals and nursing note reviewed.   Constitutional:       Comments: Chronically ill-appearing    HENT:      Mouth/Throat:      Mouth: Mucous membranes are dry.      Comments: Poor dentition  Cardiovascular:      Rate and Rhythm: Normal rate and regular rhythm.      Pulses: Normal " pulses.   Pulmonary:      Effort: Pulmonary effort is normal. No respiratory distress.      Breath sounds: Normal breath sounds. No wheezing or rales.   Abdominal:      General: Bowel sounds are normal. There is no distension.      Palpations: Abdomen is soft.      Tenderness: There is no abdominal tenderness. There is no guarding.   Musculoskeletal:      Comments: B/l BKA   Skin:     General: Skin is warm and dry.      Comments: Right lateral stump erythema and denuded blister, serosanguinous drainage, no fluctuance and not boggy warm to touch   Neurological:      Mental Status: He is alert and oriented to person, place, and time. Mental status is at baseline.       PERTINENT LABS/IMAGING:  Results for orders placed or performed during the hospital encounter of 04/21/24   XR Chest Port 1 View    Impression    IMPRESSION:   1. Right upper extremity peripherally inserted central catheter with distal tip in the superior vena cava.  2. No evidence of active cardiopulmonary disease.         I have personally reviewed the following data over the past 24 hrs:    10.2  \   11.2 (L)   / 290     138 102 40.1 (H) /  277 (H)   4.7 29 2.51 (H) \     ALT: 9 AST: 18 AP: 151 (H) TBILI: 0.5   ALB: 3.2 (L) TOT PROTEIN: 7.2 LIPASE: N/A     Procal: N/A CRP: 146.62 (H) Lactic Acid: N/A         Most Recent 3 CBC's:  Recent Labs   Lab Test 04/21/24  1749 11/06/23  1407 07/28/23  1204   WBC 10.2 5.3 4.5   HGB 11.2* 11.3* 9.6*   MCV 83 82 87    225 192     Most Recent 3 BMP's:  Recent Labs   Lab Test 04/22/24  1149 04/22/24  0820 04/22/24  0620 04/22/24  0326 04/22/24  0157 04/21/24 2005 04/21/24  1749   NA  --   --  138  --  138  --  126*   POTASSIUM  --   --  4.7  --  4.3  --  5.8*   CHLORIDE  --   --  102  --  102  --  89*   CO2  --   --  29  --  28  --  29   BUN  --   --  40.1*  --  39.7*  --  40.5*   CR  --   --  2.51*  --  2.48*  --  2.17*   ANIONGAP  --   --  7  --  8  --  8   GABRIELA  --   --  8.6  --  8.5*  --  8.9   *  162* 157*   < > 84   < > 763*    < > = values in this interval not displayed.     Most Recent 2 LFT's:  Recent Labs   Lab Test 04/21/24  1749 11/06/23  1407   AST 18 37   ALT 9 38   ALKPHOS 151* 127   BILITOTAL 0.5 0.6     Most Recent 3 INR's:No lab results found.  Most Recent 6 Bacteria Isolates From Any Culture (See EPIC Reports for Culture Details):  Recent Labs   Lab Test 05/03/19  1330 02/16/19  1641 02/16/19  1549 02/05/19  1848 02/05/19  1847 02/04/19  2345   CULT Heavy growth  Staphylococcus aureus  * No growth No growth Moderate growth  Bacteroides fragilis  Susceptibility testing not routinely done  *  Moderate growth  Actinomyces neuii  Susceptibility testing not routinely done  *  Moderate growth  Streptococcus agalactiae sero group B  Susceptibility testing done on previous specimen  *  Moderate growth  Staphylococcus aureus  *  Light growth  Staphylococcus lugdunensis  * Heavy growth  Bacteroides fragilis  Susceptibility testing not routinely done  *  Moderate growth  Actinomyces neuii  Susceptibility testing not routinely done  *  Heavy growth  Streptococcus agalactiae sero group B  *  Light growth  Coagulase negative Staphylococcus  Susceptibility testing not routinely done  *  These bacteria are part of normal skin michaela, but on occasion, may be true pathogens.    Clinical correlation must be applied to interpreting this microbiology result.  * No growth     Most Recent TSH and T4:  Recent Labs   Lab Test 05/21/21  0744 01/18/19  0911   TSH 1.25 0.24*   T4  --  1.38     Most Recent Hemoglobin A1c:  Recent Labs   Lab Test 11/06/23  1407   A1C 10.3*     Most Recent 6 glucoses:  Recent Labs   Lab Test 04/22/24  1149 04/22/24  0820 04/22/24  0620 04/22/24  0614 04/22/24  0442 04/22/24  0357   * 162* 157* 153* 123* 71     Most Recent ESR & CRP:  Recent Labs   Lab Test 04/21/24  1749 02/16/19  1549   SED 50*  --    CRP  --  230.0*   CRPI 146.62*  --      Most Recent CPK:No lab results  "found.  Recent Labs   Lab Test 11/06/23  1407   CHOL 151   HDL 35*   LDL 95   TRIG 107     Recent Labs   Lab Test 04/22/24  0620      POTASSIUM 4.7   CHLORIDE 102   CO2 29   *   BUN 40.1*   CR 2.51*   GFRESTIMATED 30*   GABRIELA 8.6     Recent Labs   Lab Test 11/06/23  1407 06/05/23  1444 10/05/22  0808   A1C 10.3* 11.6* 10.8*     Recent Labs   Lab Test 04/21/24  1749 11/06/23  1407 07/28/23  1204   HGB 11.2* 11.3* 9.6*     No results for input(s): \"TROPONINI\" in the last 89893 hours.  No results for input(s): \"BNP\", \"NTBNPI\", \"NTBNP\" in the last 96373 hours.  Recent Labs   Lab Test 05/21/21  0744   TSH 1.25     No results for input(s): \"INR\" in the last 59507 hours.    55 MINUTES SPENT BY ME on the date of service doing chart review, history, exam, documentation, discussion with nursing staff and specialist, & further activities per the note.  Nicole Crespo MD  [facility] Medicine Service  [phone]   "

## 2024-04-23 PROBLEM — E87.1 HYPONATREMIA: Status: ACTIVE | Noted: 2024-04-23

## 2024-04-23 PROBLEM — N18.31 STAGE 3A CHRONIC KIDNEY DISEASE (H): Status: ACTIVE | Noted: 2020-10-30

## 2024-04-23 PROBLEM — D64.9 ANEMIA, UNSPECIFIED TYPE: Status: ACTIVE | Noted: 2024-04-23

## 2024-04-23 PROBLEM — E87.5 HYPERKALEMIA: Status: ACTIVE | Noted: 2024-04-23

## 2024-04-23 PROBLEM — E88.09 HYPOALBUMINEMIA: Status: ACTIVE | Noted: 2024-04-23

## 2024-04-23 PROBLEM — N18.30 CHRONIC KIDNEY DISEASE, STAGE 3 (H): Status: ACTIVE | Noted: 2020-10-30

## 2024-04-23 LAB
ALBUMIN SERPL BCG-MCNC: 2.8 G/DL (ref 3.5–5.2)
ANION GAP SERPL CALCULATED.3IONS-SCNC: 7 MMOL/L (ref 7–15)
BACTERIA WND CULT: ABNORMAL
BACTERIA WND CULT: ABNORMAL
BUN SERPL-MCNC: 36.4 MG/DL (ref 6–20)
CALCIUM SERPL-MCNC: 8.2 MG/DL (ref 8.6–10)
CHLORIDE SERPL-SCNC: 104 MMOL/L (ref 98–107)
CREAT SERPL-MCNC: 2.19 MG/DL (ref 0.67–1.17)
DEPRECATED HCO3 PLAS-SCNC: 26 MMOL/L (ref 22–29)
EGFRCR SERPLBLD CKD-EPI 2021: 36 ML/MIN/1.73M2
ERYTHROCYTE [DISTWIDTH] IN BLOOD BY AUTOMATED COUNT: 12.4 % (ref 10–15)
GLUCOSE BLDC GLUCOMTR-MCNC: 213 MG/DL (ref 70–99)
GLUCOSE BLDC GLUCOMTR-MCNC: 216 MG/DL (ref 70–99)
GLUCOSE BLDC GLUCOMTR-MCNC: 229 MG/DL (ref 70–99)
GLUCOSE BLDC GLUCOMTR-MCNC: 240 MG/DL (ref 70–99)
GLUCOSE BLDC GLUCOMTR-MCNC: 256 MG/DL (ref 70–99)
GLUCOSE BLDC GLUCOMTR-MCNC: 277 MG/DL (ref 70–99)
GLUCOSE BLDC GLUCOMTR-MCNC: 277 MG/DL (ref 70–99)
GLUCOSE SERPL-MCNC: 307 MG/DL (ref 70–99)
GRAM STAIN RESULT: ABNORMAL
HCT VFR BLD AUTO: 27.5 % (ref 40–53)
HGB BLD-MCNC: 9.3 G/DL (ref 13.3–17.7)
MAGNESIUM SERPL-MCNC: 2.1 MG/DL (ref 1.7–2.3)
MCH RBC QN AUTO: 27.8 PG (ref 26.5–33)
MCHC RBC AUTO-ENTMCNC: 33.8 G/DL (ref 31.5–36.5)
MCV RBC AUTO: 82 FL (ref 78–100)
PHOSPHATE SERPL-MCNC: 3.2 MG/DL (ref 2.5–4.5)
PLATELET # BLD AUTO: 260 10E3/UL (ref 150–450)
POTASSIUM SERPL-SCNC: 4.8 MMOL/L (ref 3.4–5.3)
RBC # BLD AUTO: 3.34 10E6/UL (ref 4.4–5.9)
SODIUM SERPL-SCNC: 137 MMOL/L (ref 135–145)
WBC # BLD AUTO: 8.2 10E3/UL (ref 4–11)

## 2024-04-23 PROCEDURE — 250N000011 HC RX IP 250 OP 636: Performed by: PEDIATRICS

## 2024-04-23 PROCEDURE — 120N000001 HC R&B MED SURG/OB

## 2024-04-23 PROCEDURE — 250N000013 HC RX MED GY IP 250 OP 250 PS 637: Performed by: EMERGENCY MEDICINE

## 2024-04-23 PROCEDURE — 97602 WOUND(S) CARE NON-SELECTIVE: CPT

## 2024-04-23 PROCEDURE — 999N000147 HC STATISTIC PT IP EVAL DEFER: Performed by: PHYSICAL THERAPIST

## 2024-04-23 PROCEDURE — 250N000011 HC RX IP 250 OP 636: Performed by: EMERGENCY MEDICINE

## 2024-04-23 PROCEDURE — 99418 PROLNG IP/OBS E/M EA 15 MIN: CPT | Performed by: PEDIATRICS

## 2024-04-23 PROCEDURE — 250N000011 HC RX IP 250 OP 636: Performed by: INTERNAL MEDICINE

## 2024-04-23 PROCEDURE — 99233 SBSQ HOSP IP/OBS HIGH 50: CPT | Performed by: PEDIATRICS

## 2024-04-23 PROCEDURE — 85027 COMPLETE CBC AUTOMATED: CPT | Performed by: INTERNAL MEDICINE

## 2024-04-23 PROCEDURE — 83735 ASSAY OF MAGNESIUM: CPT | Performed by: INTERNAL MEDICINE

## 2024-04-23 PROCEDURE — 80069 RENAL FUNCTION PANEL: CPT | Performed by: INTERNAL MEDICINE

## 2024-04-23 PROCEDURE — 258N000003 HC RX IP 258 OP 636: Performed by: EMERGENCY MEDICINE

## 2024-04-23 PROCEDURE — 250N000013 HC RX MED GY IP 250 OP 250 PS 637: Performed by: INTERNAL MEDICINE

## 2024-04-23 RX ORDER — HYDRALAZINE HYDROCHLORIDE 20 MG/ML
10 INJECTION INTRAMUSCULAR; INTRAVENOUS EVERY 8 HOURS PRN
Status: DISCONTINUED | OUTPATIENT
Start: 2024-04-23 | End: 2024-04-24 | Stop reason: HOSPADM

## 2024-04-23 RX ADMIN — TAMSULOSIN HYDROCHLORIDE 0.8 MG: 0.4 CAPSULE ORAL at 12:30

## 2024-04-23 RX ADMIN — PREGABALIN 200 MG: 200 CAPSULE ORAL at 21:29

## 2024-04-23 RX ADMIN — INSULIN DEGLUDEC INJECTION 20 UNITS: 100 INJECTION, SOLUTION SUBCUTANEOUS at 08:31

## 2024-04-23 RX ADMIN — PIPERACILLIN AND TAZOBACTAM 2.25 G: 2; .25 INJECTION, POWDER, FOR SOLUTION INTRAVENOUS at 12:23

## 2024-04-23 RX ADMIN — PREGABALIN 100 MG: 100 CAPSULE ORAL at 12:30

## 2024-04-23 RX ADMIN — PIPERACILLIN AND TAZOBACTAM 2.25 G: 2; .25 INJECTION, POWDER, FOR SOLUTION INTRAVENOUS at 06:09

## 2024-04-23 RX ADMIN — SODIUM CHLORIDE: 9 INJECTION, SOLUTION INTRAVENOUS at 04:34

## 2024-04-23 RX ADMIN — PIPERACILLIN AND TAZOBACTAM 2.25 G: 2; .25 INJECTION, POWDER, FOR SOLUTION INTRAVENOUS at 00:34

## 2024-04-23 RX ADMIN — HYDRALAZINE HYDROCHLORIDE 10 MG: 20 INJECTION INTRAMUSCULAR; INTRAVENOUS at 01:06

## 2024-04-23 RX ADMIN — HYDRALAZINE HYDROCHLORIDE 10 MG: 20 INJECTION INTRAMUSCULAR; INTRAVENOUS at 17:20

## 2024-04-23 ASSESSMENT — ACTIVITIES OF DAILY LIVING (ADL)
ADLS_ACUITY_SCORE: 30

## 2024-04-23 NOTE — PROGRESS NOTES
Reason For Visit: Eduardo Walton, 53 year old male, seen for a [facility] consultation to re evaluate and treat a right lateral lower leg (residual limb) wound.  Patient was admitted on 4/21/24 for a right LE residual limb wound with concerns of cellulitis.  Pt's mom and dad are also present with patient in the room.  Patient is A&Ox4, pleasant upon interaction.     Start of Care in [facility]: 4/21/2024   Referring Provider: Dr Fabiola Orlando MD ED provider, initial referral 4/21/24.  Primary Care Provider: Deepti Vega   Wound Location: Right lateral lower leg (residual limb)    Wound History:   Pt presented to the ED here at [facility] on 4/21/23 with concerns of right lateral lower leg wound.  Per admission note:  Pt has history of bilateral BKA's (left 2019 and right June of 2023).  He stated 'he thinks it started as a blister sometime in the last week or so (prior to presentation in ED).  He'd been trying a new prosthesis sock that was not the right size and it may have been rubbing.  He was having some pain when he wore the prosthesis but he has no pain now without it on.  There is a bad odor and some clear drainage.  His neighbor who is a retired nurse has been helping him with dressing changes.  He denies fever or chills.  His sugars have been high.  He stated he does not feel well when his sugar is below 180 and only uses insulin accordingly.  Wound was swab cultured in the ED, aerobic cultures pending.  At [facility] nurse initial meeting (4/22) with pt, family and neighbor gave some additional information on the origin and progression of the wound.  Appeared pretty rapidly, no sure if there was any initial blistering.  Pt believes the primary cause of the wound was friction and not direct pressure.  I was able to see photo's of the wounds development off the pt's mom's phone.  Initial photo, not a great image but as best able to see, showed hypopigmented site with distinct boarders, no open tissue,  did not appear like slough.  Second photo is few days later than first, is a better image and shows what appears to be a full thickness ulcer with mostly clean red nongranular wound bed and open wound edges.  Periwound skin is not well shown in either photo  Pain to the posterior knee and proximal periwound skin increased and per pt's neighbor, small serous clear fluid blisters appeared briefly and ruptured.  Entire surrounding skin more to the proximal than distal had corresponding erythema with no significant increased warmth to touch noted.  Pt has had conflicting recommendations on this wound and appropriate cares, most included keeping the wound dry as possible.  Pt is concerned with prolonged hospital stays as setting does not work for his self cares with family assist.  Pt would prefer to: get plan set for wound cares and follow ups, set up for needed post hospital therapies like IV antibiotics if needed, picc line was place with this in mind at admission, and then discharge home as soon as possible.   Initial woc nurse inpatient visit 4/22/24, Triad paste started to promote autolytic debridement of slough and eschar and to protect dry intact periwound skin, and dry out proximal periwound moist skin, Mepilex 6x6 covered.  Recommended changing when 1/4 of the outer Mepilex was noted for strike through drainage 1/4 of the total foam surface area or greater.  Mepilex was changed again twice in 24 hours in first day.  Woc return 4/23, no debriding occurring yet, slough is slightly lighter in color, dressing in place was on for approximately 14 hours and was moist but not wet.  No change to plan of care for the wound topically on the 23rd, did instruct to leave the dressing in place till my return scheduled for tomorrow Wednesday the 24 th.  And pt started to resume wearing his protective and compressive sleeve to the right residual limb over the wound dressing.      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    Positive for macroalbuminuria    Retinopathy due to secondary diabetes mellitus (H)    Severe diabetic hypoglycemia (H)    Type 1 diabetes mellitus with other diabetic ophthalmic complication (H)    S/P BKA (below knee amputation) unilateral, left (H)    Stage 3a chronic kidney disease (H)    Statin declined    Primary osteoarthritis of right knee    Current moderate episode of major depressive disorder without prior episode (H)    Hypertension    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)    S/P BKA (below knee amputation), right (H)    YESSICA (acute kidney injury) (H24)    Type 1 diabetes mellitus with hyperglycemia (H)    Diabetic ulcer of right lower leg (H)    Cellulitis of right lower extremity    Anemia, unspecified type    Hyperkalemia    Hyponatremia    Hypoalbuminemia                  Tobacco Use:     Tobacco Use      Smoking status: Never      Smokeless tobacco: Never     Diabetic: Type 1  HgbA1C:   Hemoglobin A1C   Date Value Ref Range Status   11/06/2023 10.3 (H) 0.0 - 5.6 % Final     Comment:     Normal <5.7%   Prediabetes 5.7-6.4%    Diabetes 6.5% or higher     Note: Adopted from ADA consensus guidelines.   05/21/2021 10.9 (H) 0 - 5.6 % Final     Comment:     Normal <5.7% Prediabetes 5.7-6.4%  Diabetes 6.5% or higher - adopted from ADA   consensus guidelines.     Checks Blood Glucose?: See Hospital Flow Sheet for inpatient values.    Personal/social history:  pt lives with family independently, has had home care services, most recently with [facility].    Objective:   Current treatment plan: Covering with dry gauze, secured with Kerlix on arrival today.  Last changed: not assessed, earlier today    Wound #1 Right lateral  lower leg or residual limb.  Origin not fully clear as of initial woc nurse inpatient assessment.  With follow up feel it is most accurate to classify as a pressure injury with contributing factors.  Stage/tissue depth:  Unstageable pressure injury due to slough, complicated by prosthetic products friction and subsequent cellulitis infection.  Today 4/23/24 as periwound skin is technically part of the total wound area, will measure wound site in three segments as listed below.  - Total area of damage 7 cm L x 2.5 cm W x 0.2 cm D  - Main wound which included the partial thickness denudement to the proximal, strip of intact black eschar and main area of slough measures 3.2 cm L x 2 cm W x 0.2 cm D.  - Eschar and slough alone measures 2.5 cm L x 2 cm W x 0.2 cm D  Tunneling: none noted  Undermining: none noted  Wound bed type/amount: of the total wound area approximately 70 % white to yellow semi moist slough covered wound with 10 % soft black dry eschar, 10 % red nongranular tissue and 10 % intact fragile skin with erythema: Not currently fluctuant  Wound Edges: closed with slough where there is visible depth and with eschar where there is no visible depth.  Periwound: Erythema with no increased warmth to touch, dry flaky skin, firm edema, not indurated but firm edema present.  Drainage: moderate amounts serosanguinous.  Odor: none noted currently  Pain: tender to painful    Photo from today's inpatient wo nurse follow up visit 4/23/24.    Photo from initial wo nurse inpatient consult 4/22/24.  Head is to the left and foot to the right in photo.     Photo from ED 4/21/24.    Dorsalis Pedal Pulse: NA  Posterior Tibial Pulse: NA  Hair growth: none noted in the general area of the right lateral leg wound  Capillary Refill: NA  Feet/toes color: NA  Nails: NA  R Leg residual limb: Edema plus 1 and nonpitting. Ankle circumference NA cm. Calf circumference NA cm.  L Leg: Edema Not assessed this visit. Ankle circumference  NA cm. Calf circumference NA cm.    Mobility: Wheelchair use, has prosthetics but not currently wearing on right.    Current offloading/footwear: prosthetic to left LE with sneaker   Sensation: per pt normal sensation at right LE wound, hypersensitive.     Other callusing/areas of concern: Dry skin to the distal right residual limb    Diet: Carb Consistent 60 g CHO per meal ordered through hospital    Goals of Wound Healing:   - Transition from slough covered to clean wound bed with new granular tissue growth stage of healing  - Maintain moist environment, with Triad paste.  - Control exudate, currently with Triad paste and Mepilex 6x6 inch gentle adhesive foam dressing.  - Autolytic debridement of necrotic/sloughy tissue  - Protect wound from outside environment, with Triad paste and Mepilex 6x6  - Antimicrobial, pt is currently on IV antibiotics  - Pack open spaced, with Triad paste  - Promote healing by secondary intention for complete closure of wound  - Offloading/pressure reduction, monitor for any direct pressure, would recommend no prosthetic use on the right but compression with current limb wear is encouraged.    Assessment:  The right lateral lower leg wound area has had some changes over night.  The partial thickness areas of more dry recently ruptured blisters have flaked off dry skin and have scattered open with no depth red nongranular tissue present, some intact skin does separate the ruptured blistered area and the main wound area but is very fragile and tight in appearance.  The main wound area with slough and eschar is about the same.  No significant change in size, slough slightly lighter in color, no longer any tan slough present, only white and light yellow, remains moist but not yet liquefying.   No increased warmth to touch noted.  Triad did not cause any significant increase in place and no wound or periwound concerns noted.  Drainage appears well contained.    Barriers to wound healing:    Poor nutrition: inadequate supply of protein, carbohydrates, fatty acids, and trace elements essential for all phases of wound healing. Started teaching today 4/23 on need for increased protein in diet for healing.  Reduced Blood Supply: inadequate perfusion to heal wound, woc will need to eval past vascular studies related to current level of wound,   Medication: NA  Chemotherapy: suppresses the immune system and inflammatory response, NA  Radiotherapy: increases production of free radical which damage cells, NA  Psychological stress: legitimate concerns related to risk of limb threatening wound   Obesity: decreases tissue perfusion  Infection: prolongs inflammatory phase, uses vital nutrients, impairs epithelialization and releases toxins, cultures pending as of 4/23 at time of woc visit, on IV antibiotics  Underlying Disease: diabetes mellitis   Maceration: reduces wound tensile strength and inhibits epithelial migration, none currently noted, protecting from drainage with Triad paste   Patient compliance, TBD appears motivated to heal  Unrelieved pressure, to be determined but none of current significance noted currently.  Immobility, limited but not immobile  Substance abuse: NA    Plan:  Will continue with the use of Triad paste to wound bed and immediate surrounding skin.  We will leave the dressing in place till tomorrow when I will return to see the pt in the morning.  Will reevaluate tomorrow to see how things have progressed with leaving dressing on for longer periods of time and with pt resuming use of his protective sleeve and compression sleeve for the right residual limb.  This will provide protection from minor trauma and abrasions and will keep edema controlled.    Interventions to Barriers:  As listed above.    Topical care to Right lateral lower leg: Cleanse with Microklenz and gauze, pat dry. Wound cares as listed above.  Additional recommendations:  Cares provided as listed above.    Discussed  plan of care with patient and family and with his nurse Margoth RN. Teaching done with patient and family and his nurse Margoth RN for dressing changes; pt with family and friend assist is able and willing to perform.    Discharge instructions updated to include:  Updated woc nurse recommendations will be given at time of discharge by woc nurse.     Supplies:  Pt has Triad paste tube and MicroKlenz in the room today.    WOC Return visit: 4/24/24  Nursing to notify Provider and WOC Nurse if wound deteriorates.    Verbal, written, & demonstrative education provided.  Face to face time (excluding procedure): approximately 45 minutes.  Procedure: less than one minute application of Triad paste to right lateral lower leg wound for promotion of autolytic debridement of slough.  Care plan was not changed.    Pipe Leonard RN [facility]  [phone]

## 2024-04-23 NOTE — PLAN OF CARE
Goal Outcome Evaluation:                 Outcome Evaluation: patient a&o, requested lotion for dry right leg, refused heparin,

## 2024-04-23 NOTE — PLAN OF CARE
Goal Outcome Evaluation:      Plan of Care Reviewed With: patient    Overall Patient Progress: no change    Outcome Evaluation: Pt A&O x4. Mepilex changed at start of shift and again at end of shift. Drainage malodorous, serosanguinous.     BP elevated to 197/71 at 0100. Pt given PRN Hydralazine. BP lowered to 157/65. Pt reported feeling headache and dizziness between when Hydralazine given and 0400 when BP rechecked. Pt ate applesauce due to feeling headache and dizziness at that time. BG elevated to 307 this AM. Pt refused offers to assist with toileting overnight. Stated he would hold it until AM when parents come back. Pt also declined use of urinal in bed or at bedside.

## 2024-04-23 NOTE — PLAN OF CARE
"Goal Outcome Evaluation:      Plan of Care Reviewed With: patient    Overall Patient Progress: no changeOverall Patient Progress: no change    Outcome Evaluation: Alert and oriented x 4. Vitals stable except elevated BP. PRN hydralize given after hours of education provided. Denies nausea and vomiting. No shortness of breath reported. Blood sugars elevated, refused some SS insulin. Pt complans of abdominal discomfort. Pt states \" he is having diarrhea multiple time and would like something for it\" He only was up twice today to go to the bathroom. He sent his father to the store to get imodium, writer advised against using medications that are not prescribed in the hospital. Pt refused evening antibiotics, stated \" per hospitalist we are unsure of the sourse of infection\" \" so why do I have to take them\" education provided. Dressing changed by WOC this afternoon, leave in place until the AM. PICC dressing changed.    BP (!) 204/72 (BP Location: Right arm)   Pulse 82   Temp 97.7  F (36.5  C) (Oral)   Resp 18   Ht 1.753 m (5' 9\")   Wt 64.2 kg (141 lb 8 oz)   SpO2 95%   BMI 20.90 kg/m      "

## 2024-04-23 NOTE — PROGRESS NOTES
S:Patient evaluated yesterday.  We discussed his pathology and need for antibiotics, re evaluation to see how his wound/infection responds, and options for interventions if required, also necessity of time as he was anxious to leave hospital.  Patient has had bilateral BKAs most recent with vascular surgery.  Was recently fitted with new BKA prosthesis and he thinks that he had pressure issues which resulted in wound. Perhaps 10-12 days duration but worst in last few days.         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    Positive for macroalbuminuria    Retinopathy due to secondary diabetes mellitus (H)    Severe diabetic hypoglycemia (H)    Type 1 diabetes mellitus with other diabetic ophthalmic complication (H)    S/P BKA (below knee amputation) unilateral, left (H)    Chronic kidney disease, stage 3 (H)    Statin declined    Primary osteoarthritis of right knee    Current moderate episode of major depressive disorder without prior episode (H)    Hypertension    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)    S/P BKA (below knee amputation), right (H)    YESSICA (acute kidney injury) (H24)    Type 1 diabetes mellitus with hyperglycemia (H)    Diabetic ulcer of right lower leg (H)    Acute renal failure with acute tubular necrosis superimposed on stage 3b chronic kidney disease (H)    Cellulitis of right lower extremity            Past Medical History:   Diagnosis Date    CAD (coronary artery disease)     previous coronary stent placement (2010?)    Diabetic foot (H) 10/29/2018    Diabetic retinopathy associated with type 1 diabetes mellitus (H)     Foot ulcer, left (H)     Osteomyelitis (H)     left foot    Pneumonia 06/14/2023    S/P BKA (below knee  amputation), left (H) 2019    S/P BKA (below knee amputation), right 06/2023    Type 1 diabetes mellitus with complications (H)     retinopathy, neuropathy, nephropathy, macrovascular disease            Past Surgical History:   Procedure Laterality Date    AMPUTATE FOOT Left 2/5/2019    Procedure: PARTIAL LEFT FOOT AMPUTATION.;  Surgeon: Chetan Potter DPM;  Location: [facility] OR    AMPUTATE LEG BELOW KNEE Left 2/18/2019    Procedure: LEFT BELOW THE KNEE AMPUTATION;  Surgeon: Kvng Berman MD;  Location:  OR    STENT  2010            Social History     Tobacco Use    Smoking status: Never    Smokeless tobacco: Never   Substance Use Topics    Alcohol use: Yes     Comment: occ            Family History   Problem Relation Age of Onset    Ovarian Cancer Maternal Grandmother     Colon Cancer Maternal Grandmother     Prostate Cancer Maternal Grandfather     Ovarian Cancer Paternal Grandmother                Allergies   Allergen Reactions    Hydromorphone Shortness Of Breath     Respiratory arrest and PEA with IV dilaudid, tolerated oral narcotics without difficulty.    Complicated situation related to other drugs as well, IV Dilaudid may not be completely contraindicated.    Morphine Anaphylaxis     Respiratory arrest w/ IV Morphine - tolerated oral narcotics            No current outpatient medications on file.          Review Of Systems  Skin: negative  Eyes: negative  Ears/Nose/Throat: negative  Respiratory: No shortness of breath, dyspnea on exertion, cough, or hemoptysis    O: Physical exam:  prosthesis in place LLE for BKA, wound dressed RLE.  Patient sitting upright in bed. No acute distress.    Lab:    Gram Stain Result  Abnormal   3+ Gram positive cocci      2+ Gram positive bacilli      1+ Gram negative bacilli      4+ WBC seen   Predominantly PMNs      ESR 50, .62, WBC 10.2, Hgb 11.2, 81% neutrophils, Na 126, Cr 2.17,          A:  Infection R BKA stump after fitting with prosthesis in patient  with Diabetes      P:  24 hour antibiotics and wound care, re evaluate and follow labs/ LRINEC score.  Consider Irrigation/debridement/ wound vac.  Will discuss further with patient.             In addition to the above assessment and plan each active problem on Eduardo's problem list was evaluated today. This included the questioning of Eduardo for any medication problems. We will continue the current treatment plan for these active problems except as noted.

## 2024-04-23 NOTE — PROGRESS NOTES
[facility]    Medicine Progress Note - Hospitalist Service    Date of Admission:  4/21/2024    Assessment & Plan      53-year-old man with type 1 diabetes complicated by neuropathy, status post below the knee amputation bilaterally at different times for lower extremity infection, chronic prosthetic use of both lower limbs after previous BKA's, CAD, stage IIIa CKD, and hypertension presented with 1 week history of ulceration on the lateral right lower leg and was admitted due to concerns for right lower extremity cellulitis complicating that skin ulceration, severe hyperglycemia, and acute kidney injury.    Right lower leg ulcer with cellulitis    Status post bilateral BKA   Status post left BKA in 2019 and right BKA in 6/2023 for which he uses prostheses with new onset skin ulceration on the lateral right lower leg.  Presented with clinical findings of soft tissue infection in the right lower extremity surrounding that skin ulceration.  So far, cellulitis appears most likely with lower suspicion for deeper infection in the right lower extremity.  There is increasing concern for possible limb threatening soft tissue infection in the right lower extremity.  Signs of soft tissue infection have started to improve after initiation of empiric treatment with parenteral Zosyn and vancomycin.  Culture of drainage from the ulceration is pending.  Wound care nurse is assisting with management of ulcer.  Orthopedic surgery has not yet advised surgical intervention.  -continue empiric IV Zosyn and Vanco while awaiting culture results  -Wound care per recommendations of [facility] nurse  -Continue PICC line placed in ED until it is determined whether parenteral antibiotic therapy will be advised after discharge  -Ordered CRP for trend     Severe hyperglycemia  Type 1 DM with diabetic neuropathy  Presented with initial glucose 763 but did not have DKA and probably did not have hyperosmolar state.  Most  recent A1c 10.3 in November 2023 suggesting uncontrolled diabetes at baseline.  Glycemic control has improved during hospitalization with adjustment of insulin.  He is chronically treated with Lyrica for pain associated with neuropathy.  -continue Tresiba and ISS  -Consider adding prandial dose of NovoLog  -continue chronic dose of Lyrica     YESSICA with CKD 3a with hyperkalemia  Most recent creatinine 1.6 in November 2023 concerning for stage IIIa chronic kidney disease.  Creatinine increased as high as 2.51 during this hospitalization suspicious for YESSICA superimposed on CKD.  Also presented with initial potassium 5.8 concerning for mild hyperkalemia in the context of acute kidney injury.  However, he also takes lisinopril chronically which can cause hyperkalemia.  Renal function has trended toward improvement although remains worsened compared with previously.  Hyperkalemia has resolved.    -discontinue IVF   -Resume lisinopril  -Adjust medication dosing according to changing renal function   -avoid nephrotoxic agents   -Ordered recheck of renal function    Anemia, unspecified type  Presented with mild anemia hemoglobin 11.2, now worsened with hemoglobin 9.3.  Active bleeding not apparent.  He has been receiving IV fluids, so dilutional effect is possible.  -Ordered recheck hemoglobin tomorrow after stopping IV fluids today     HTN  Hypertension chronically treated with lisinopril which was held due to YESSICA and hyperkalemia.  He has been intermittently severely hypertensive treated with hydralazine as needed.  -Resume chronic dose of lisinopril  -Continue IV hydralazine as needed for systolic blood pressure over 180 or diastolic blood pressure over 110    Hyponatremia  Admitted with sodium 126 concerning for possible hyponatremia although blood sugar was 763, so pseudohyponatremia is possible.  Sodium has normalized.  -Stop IV fluids    Hypoalbuminemia  Admitted with low serum albumin 3.2, worsened to 2.8.  Suspect  inadequate nutritional intake although acute inflammatory and infectious illness may contribute.  -Monitor albumin as indicated    CAD  Reported history of CAD, clinically stable during this hospitalization.  -Monitor clinically     H/o cardiac arrest  Reported history of cardiac arrest June 2023 reportedly after he had been treated with IV opioids   -Avoiding opiates if possible       Principal Problem:    Cellulitis of right lower extremity  Active Problems:    Diabetic ulcer of right lower leg (H)    Coronary artery disease involving native coronary artery of native heart without angina pectoris    Stage 3a chronic kidney disease (H)    Hypertension    S/P BKA (below knee amputation), right (H)    YESSICA (acute kidney injury) (H24)    Type 1 diabetes mellitus with hyperglycemia (H)    Anemia, unspecified type    Hyperkalemia    Hyponatremia    Hypoalbuminemia    S/P BKA (below knee amputation) unilateral, left (H)              Diet: Consistent Carbohydrate Diet Moderate Consistent Carb (60 g CHO per Meal) Diet    DVT Prophylaxis: Enoxaparin (Lovenox) SQ  Martinez Catheter: Not present  Lines: PRESENT      PICC 04/21/24 Single Lumen Right Basilic-Site Assessment: WDL      Cardiac Monitoring: None  Code Status: No CPR- Do NOT Intubate      Clinically Significant Risk Factors        # Hyperkalemia: Highest K = 5.8 mmol/L in last 2 days, will monitor as appropriate  # Hyponatremia: Lowest Na = 126 mmol/L in last 2 days, will monitor as appropriate      # Hypoalbuminemia: Lowest albumin = 2.8 g/dL at 4/23/2024  4:28 AM, will monitor as appropriate     # Hypertension: Noted on problem list       # DMII: A1C = N/A within past 6 months              Disposition Plan     Medically Ready for Discharge: Anticipated in 2-4 Days             Dionisio Ardon MD  Hospitalist Service  [facility]  Securely message with [employer] (more info)  Text page via [facility] Paging/Directory    ______________________________________________________________________    Interval History   Patient had severely elevated blood pressure reading overnight.  He reports that he was sleeping at the time.  He was given a dose of IV hydralazine about 1 AM this morning after which blood pressure improved.  He remains afebrile.  Heart rate remains normal.  Oxygenation remains normal.  He is not noticing pain in his right leg at rest although he continues to have some tenderness in the crease behind his right knee and under the dressing on the lateral aspect of his right leg.  Nursing reports that he has at times refused nursing care interventions such as assistance with toileting.    Patient himself has many questions and concerns today.  He would like to leave the hospital.    Physical Exam   Vital Signs: Temp: 98.7  F (37.1  C) Temp src: Oral BP: (!) 189/73 Pulse: 80   Resp: 16 SpO2: 95 % O2 Device: None (Room air)    Patient Vitals for the past 24 hrs:   BP Temp Temp src Pulse Resp SpO2   04/23/24 0756 (!) 189/73 98.7  F (37.1  C) Oral 80 16 95 %   04/23/24 0413 (!) 157/65 98.5  F (36.9  C) Oral 80 16 96 %   04/23/24 0106 (!) 197/71 -- -- -- -- --   04/23/24 0000 (!) 195/71 99.1  F (37.3  C) Oral 81 16 95 %   04/22/24 1957 (!) 175/59 98.6  F (37  C) Oral 79 16 98 %   04/22/24 1633 (!) 150/64 98.6  F (37  C) Oral 77 16 94 %   04/22/24 1152 -- 98.6  F (37  C) Oral -- -- 96 %   04/22/24 1123 (!) 142/71 -- -- 73 18 --     Weight: 141 lbs 8 oz  Vitals:    04/21/24 2300 04/22/24 0020   Weight: 64.4 kg (142 lb) 64.2 kg (141 lb 8 oz)       Intake/Output Summary (Last 24 hours) at 4/23/2024 1040  Last data filed at 4/23/2024 0800  Gross per 24 hour   Intake 4080 ml   Output 1850 ml   Net 2230 ml       General Appearance: No acute distress sitting up in bed  Skin: Dressing overlying wound on lateral aspect of right lower leg between knee and BKA stump, no erythema surrounding that dressing  Neuro: Alert and maintains  wakefulness and attention, no confusion evident    Medical Decision Making       103 MINUTES SPENT BY ME on the date of service doing chart review, history, exam, documentation & further activities per the note.  Spent approximately 60 minutes at the patient bedside today answering his questions and addressing his concerns.  Patient appears to retain decision-making capacity.  He clearly expresses preference to discharge but does not appear medically stable for discharge, so his decision to leave the hospital today would be AGAINST MEDICAL ADVICE.  Risks of leaving the hospital at this time was discussed with the patient including worsening infection, loss of limb, and death as well as worsening renal function and/or recurrent hyperkalemia that can cause dysrhythmia and death.  Patient appeared to have a very clear understanding of these medical issues.       Data     I have personally reviewed the following data over the past 24 hrs:    8.2  \   9.3 (L)   / 260     137 104 36.4 (H) /  240 (H)   4.8 26 2.19 (H) \     ALT: N/A AST: N/A AP: N/A TBILI: N/A   ALB: 2.8 (L) TOT PROTEIN: N/A LIPASE: N/A       Blood sugars 162-369 over the last day and overnight  Magnesium 2.1, phosphorus 3.2  Nasal MRSA testing was negative  Gram stain of wound drainage demonstrated gram-positive cocci, gram-positive bacilli, and gram-negative bacilli, culture of that drainage is pending    Recent Labs   Lab 04/23/24  0753 04/23/24  0428 04/23/24  0411 04/22/24  0820 04/22/24  0620 04/22/24  0326 04/22/24  0157 04/21/24 2005 04/21/24  1749   WBC  --  8.2  --   --   --   --   --   --  10.2   HGB  --  9.3*  --   --   --   --   --   --  11.2*   MCV  --  82  --   --   --   --   --   --  83   PLT  --  260  --   --   --   --   --   --  290   NA  --  137  --   --  138  --  138  --  126*   POTASSIUM  --  4.8  --   --  4.7  --  4.3  --  5.8*   CHLORIDE  --  104  --   --  102  --  102  --  89*   CO2  --  26  --   --  29  --  28  --  29   BUN  --   36.4*  --   --  40.1*  --  39.7*  --  40.5*   CR  --  2.19*  --   --  2.51*  --  2.48*  --  2.17*   ANIONGAP  --  7  --   --  7  --  8  --  8   GABRIELA  --  8.2*  --   --  8.6  --  8.5*  --  8.9   * 307* 277*   < > 157*   < > 84   < > 763*   ALBUMIN  --  2.8*  --   --   --   --   --   --  3.2*   PROTTOTAL  --   --   --   --   --   --   --   --  7.2   BILITOTAL  --   --   --   --   --   --   --   --  0.5   ALKPHOS  --   --   --   --   --   --   --   --  151*   ALT  --   --   --   --   --   --   --   --  9   AST  --   --   --   --   --   --   --   --  18    < > = values in this interval not displayed.

## 2024-04-23 NOTE — PLAN OF CARE
Occupational Therapy: Orders received. Chart reviewed and discussed with care team.? Occupational Therapy not indicated due to patient capable of self cares, family able to assist, patient's behaviors and resistance to intervention and nursing assistance does not demonstrate skilled OT need for self care and ADL training.? Defer discharge recommendations to medical team.? Will complete orders.     Thank you for your referral.  Mariangel Benavides, PT, DPT, ATC, LAT    [facility]  O: [phone]  E: [email or web address]

## 2024-04-24 ENCOUNTER — HOME INFUSION (PRE-WILLOW HOME INFUSION) (OUTPATIENT)
Dept: PHARMACY | Facility: CLINIC | Age: 54
End: 2024-04-24
Payer: COMMERCIAL

## 2024-04-24 VITALS
RESPIRATION RATE: 16 BRPM | HEART RATE: 72 BPM | HEIGHT: 69 IN | DIASTOLIC BLOOD PRESSURE: 58 MMHG | BODY MASS INDEX: 20.96 KG/M2 | WEIGHT: 141.5 LBS | SYSTOLIC BLOOD PRESSURE: 138 MMHG | TEMPERATURE: 99.4 F | OXYGEN SATURATION: 95 %

## 2024-04-24 LAB
ANION GAP SERPL CALCULATED.3IONS-SCNC: 7 MMOL/L (ref 7–15)
BUN SERPL-MCNC: 27.9 MG/DL (ref 6–20)
CALCIUM SERPL-MCNC: 8.4 MG/DL (ref 8.6–10)
CHLORIDE SERPL-SCNC: 109 MMOL/L (ref 98–107)
CREAT SERPL-MCNC: 1.96 MG/DL (ref 0.67–1.17)
CRP SERPL-MCNC: 74.35 MG/L
DEPRECATED HCO3 PLAS-SCNC: 26 MMOL/L (ref 22–29)
EGFRCR SERPLBLD CKD-EPI 2021: 41 ML/MIN/1.73M2
GLUCOSE BLDC GLUCOMTR-MCNC: 128 MG/DL (ref 70–99)
GLUCOSE SERPL-MCNC: 173 MG/DL (ref 70–99)
HGB BLD-MCNC: 8.9 G/DL (ref 13.3–17.7)
POTASSIUM SERPL-SCNC: 4.6 MMOL/L (ref 3.4–5.3)
SODIUM SERPL-SCNC: 142 MMOL/L (ref 135–145)

## 2024-04-24 PROCEDURE — 80048 BASIC METABOLIC PNL TOTAL CA: CPT | Performed by: PEDIATRICS

## 2024-04-24 PROCEDURE — 99239 HOSP IP/OBS DSCHRG MGMT >30: CPT | Performed by: PEDIATRICS

## 2024-04-24 PROCEDURE — 250N000013 HC RX MED GY IP 250 OP 250 PS 637: Performed by: PEDIATRICS

## 2024-04-24 PROCEDURE — 85018 HEMOGLOBIN: CPT | Performed by: PEDIATRICS

## 2024-04-24 PROCEDURE — 86140 C-REACTIVE PROTEIN: CPT | Performed by: PEDIATRICS

## 2024-04-24 PROCEDURE — 250N000013 HC RX MED GY IP 250 OP 250 PS 637: Performed by: EMERGENCY MEDICINE

## 2024-04-24 PROCEDURE — 250N000011 HC RX IP 250 OP 636: Performed by: EMERGENCY MEDICINE

## 2024-04-24 PROCEDURE — 250N000013 HC RX MED GY IP 250 OP 250 PS 637: Performed by: INTERNAL MEDICINE

## 2024-04-24 PROCEDURE — 97602 WOUND(S) CARE NON-SELECTIVE: CPT

## 2024-04-24 RX ORDER — AMOXICILLIN 875 MG
875 TABLET ORAL 2 TIMES DAILY
Qty: 14 TABLET | Refills: 0 | Status: SHIPPED | OUTPATIENT
Start: 2024-04-24 | End: 2024-05-01

## 2024-04-24 RX ORDER — INSULIN LISPRO 100 [IU]/ML
4-12 INJECTION, SOLUTION INTRAVENOUS; SUBCUTANEOUS
COMMUNITY
Start: 2024-04-24 | End: 2024-04-30

## 2024-04-24 RX ADMIN — TAMSULOSIN HYDROCHLORIDE 0.8 MG: 0.4 CAPSULE ORAL at 09:08

## 2024-04-24 RX ADMIN — INSULIN DEGLUDEC INJECTION 20 UNITS: 100 INJECTION, SOLUTION SUBCUTANEOUS at 09:07

## 2024-04-24 RX ADMIN — LISINOPRIL 5 MG: 2.5 TABLET ORAL at 09:09

## 2024-04-24 RX ADMIN — PREGABALIN 100 MG: 100 CAPSULE ORAL at 09:08

## 2024-04-24 RX ADMIN — PIPERACILLIN AND TAZOBACTAM 2.25 G: 2; .25 INJECTION, POWDER, FOR SOLUTION INTRAVENOUS at 05:45

## 2024-04-24 ASSESSMENT — ACTIVITIES OF DAILY LIVING (ADL)
ADLS_ACUITY_SCORE: 30

## 2024-04-24 NOTE — PROGRESS NOTES
Pt has 100% coverage for iv abx through their [facility] plan, however there may be a copay upon dispense.      ([facility]) In reference to admission date 04/21/2024.    Please contact Intake with any questions, [phone] or In Basket pool, [facility] ([phone]).

## 2024-04-24 NOTE — PROGRESS NOTES
Antimicrobial Stewardship Team Note    Antimicrobial Stewardship Program - A joint venture between [facility] and  Physicians to optimize antibiotic management.  NOT a formal consult - Restricted Antimicrobial Review     Patient: Eduardo Walton  MRN: [id number]  Allergies: Hydromorphone and Morphine    Brief Summary: Eduardo Walton is a 53 year old male with a history of T1DM, neuropathy, bilateral BKAs, CKD, and HTN who presented on 4/21 with a wound on the outer aspect of the right BKA stump the patient attributes to a new prosthesis sock that was rubbing.    History of Present Illness: On presentation, the patient was afebrile with a WBC of 10.2, an ESR of 50, and a CRP of 146.62.  The wound had some clear drainage, a bad odor that resolved with wound care, and had some surrounding erythema and the patient denied fevers or chills.  A superficial wound swab was collected which grew 3+ Streptococcus constellatus.  Vancomycin and piperacillin/tazobactam were empirically started.  On 4/22 a MRSA nares PCR was negative and vancomycin was subsequently stopped.  Today, the patient remains afebrile and their CRP has trended down to 74.35.  There are currently no plans for the OR and the patient would like to be managed outpatient.            Active Anti-infective Medications   (From admission, onward)                 Start     Stop    04/22/24 0030  piperacillin-tazobactam  2.25 g,   Intravenous,   EVERY 6 HOURS        Skin and Soft Tissue Infection       --                  Assessment: Pressure wound with subsequent cellulitis  The erythema around the wound is suggestive of cellulitis.  In a patient with diabetes and a wound like this, the typical pathogens would be skin michaela such as Strep species, Staph species and then increased risk for Pseudomonas.  Superficial wound swabs are generally considered to be contaminated with skin michaela and are not super helpful but in this wound without purulence, a  Strep species is likely the pathogen causing the cellulitis so we favor targeting the S constellatus isolated from the wound.  We recommend discontinuing the piperacillin/tazobactam and starting amoxicillin 875mg two times daily.  Duration is difficult to determine at this time as it would be based on clinical improvement and future plans for I&D.    Recommendations:  Discontinue piperacillin/tazobactam  Start amoxicillin 875mg PO two times daily.    Pharmacy took the following actions: Called/paged provider, Electronic note created.    Discussed with ID Staff: Dr. Jose Enrique Jean MD: Keara Smith, PharmD, BCIDP  Shay Womack PharmD    Vital Signs/Clinical Features:  Vitals         04/22 0700  04/23 0659 04/23 0700  04/24 0659 04/24 0700  04/24 1141   Most Recent      Temp ( F) 98.5 -  99.1    97.7 -  100      99.4     99.4 (37.4) 04/24 0807    Pulse 73 -  87    73 -  93      72     72 04/24 0807    Resp 16 -  18    16 -  18      16     16 04/24 0807    /71 -  197/71    151/59 -  204/72      138/58     138/58 04/24 0807    SpO2 (%) 94 -  98    92 -  95      95     95 04/24 0807            Labs  Estimated Creatinine Clearance: 40.5 mL/min (A) (based on SCr of 1.96 mg/dL (H)).  Recent Labs   Lab Test 11/06/23  1407 04/21/24  1749 04/22/24  0157 04/22/24  0620 04/23/24  0428 04/24/24  0526   CR 1.63* 2.17* 2.48* 2.51* 2.19* 1.96*       Recent Labs   Lab Test 01/18/19  0911 02/04/19  2125 02/05/19  0850 02/16/19  1549 02/17/19  0627 02/14/22  1044 06/05/23  1444 07/28/23  1204 11/06/23  1407 04/21/24  1749 04/23/24  0428 04/24/24  0526   WBC 13.7* 24.7*   < > 23.5*   < > 6.8 4.3 4.5 5.3 10.2 8.2  --    ANEU 11.6* 21.2*  --  20.1*  --   --   --   --   --   --   --   --    ALYM 1.1 1.4  --  1.6  --   --   --   --   --   --   --   --    VON 0.8 2.1*  --  1.3  --   --   --   --   --   --   --   --    AEOS 0.2 0.0  --  0.4  --   --   --   --   --   --   --   --    HGB 10.8* 10.1*   < > 9.0*   < > 12.7* 11.5* 9.6*  11.3* 11.2* 9.3* 8.9*   HCT 33.2* 30.4*   < > 27.4*   < > 37.3* 33.8* 29.2* 33.6* 33.4* 27.5*  --    MCV 84 81   < > 82   < > 83 82 87 82 83 82  --     396   < > 747*   < > 206 182 192 225 290 260  --     < > = values in this interval not displayed.       Recent Labs   Lab Test 02/14/22  1044 10/05/22  0808 06/05/23  1444 07/28/23  1204 11/06/23  1407 04/21/24  1749 04/23/24  0428   BILITOTAL 0.5 0.9 0.4 0.4 0.6 0.5  --    ALKPHOS 137 103 108 127 127 151*  --    ALBUMIN 3.2* 3.2* 3.0* 3.7 3.9 3.2* 2.8*   AST 23 28 65* 30 37 18  --    ALT 27 28 28 30 38 9  --        Recent Labs   Lab Test 02/04/19 2125 02/04/19  2134 02/06/19  0925 02/07/19  0945 02/08/19  0835 02/16/19  1549 04/21/24  1749 04/24/24  0526   LACT  --  1.6  --   --   --  1.2  --   --    .0*  --  209.0* 213.0* 250.0* 230.0*  --   --    CRPI  --   --   --   --   --   --  146.62* 74.35*   SED 78*  --   --   --   --   --  50*  --        Recent Labs   Lab Test 02/06/19  1205   VANCOMYCIN 22.5       Culture Results:  7-Day Micro Results       Procedure Component Value Units Date/Time    MRSA MSSA PCR, Nasal Swab [[id number]] Collected: 04/22/24 0908    Order Status: Completed Lab Status: Final result Updated: 04/22/24 1439    Specimen: Swab from Nares, Bilateral      MRSA Target DNA Negative     SA Target DNA Negative    Narrative:      The [employer]  Xpert SA Nasal Complete assay performed in the GeneXpert  Dx System is a qualitative in vitro diagnostic test designed for rapid detection of Staphylococcus aureus (SA) and methicillin-resistant Staphylococcus aureus (MRSA) from nasal swabs in patients at risk for nasal colonization. The test utilizes automated real-time polymerase chain reaction (PCR) to detect MRSA/SA DNA. The Xpert SA Nasal Complete assay is intended to aid in the prevention and control of MRSA/SA infections in healthcare settings. The assay is not intended to diagnose, guide or monitor treatment for MRSA/SA infections, or  provide results of susceptibility to methicillin. A negative result does not preclude MRSA/SA nasal colonization.     Wound Aerobic Bacterial Culture Routine With Gram Stain [[id number]]  (Abnormal) Collected: 04/21/24 9508    Order Status: Completed Lab Status: Final result Updated: 04/23/24 1315    Specimen: Wound from Leg, Right      Culture 3+ Streptococcus constellatus     Comment: This organism is susceptible to ampicillin, penicillin, vancomycin and the cephalosporins. If treatment is required and your patient is allergic to penicillin, contact the microbiology lab within 5 days to request susceptibility testing.         4+ Normal michaela     Gram Stain Result 3+ Gram positive cocci      2+ Gram positive bacilli      1+ Gram negative bacilli      4+ WBC seen     Comment: Predominantly PMNs               Recent Labs   Lab Test 02/08/19  1530   URINEPH 5.0   NITRITE Negative   LEUKEST Negative   WBCU 3                         Imaging: XR Chest Port 1 View    Result Date: 4/21/2024  EXAM: CHEST SINGLE VIEW PORTABLE LOCATION: [facility] DATE: 4/21/2024 INDICATION: Nurse placed peripherally inserted central catheter. COMPARISON: 06/14/2023. FINDINGS: Right upper extremity peripherally inserted central catheter with distal catheter tip in the superior vena cava. The lungs are clear. Normal size cardiac silhouette.     IMPRESSION: 1. Right upper extremity peripherally inserted central catheter with distal tip in the superior vena cava. 2. No evidence of active cardiopulmonary disease.

## 2024-04-24 NOTE — PROGRESS NOTES
Pt continues to refuse interventions such as heparin, and scheduled antibiotic, he was reminded of the significance of the plan of care and the interventions in that plan such as many of the things he is refusing. Pt often responds with argumentative rambling responses that are difficult to make sense of. So far he will only take toilet help from his mother and father. Will continue with plan of care as much as Pt is agreeable to.

## 2024-04-24 NOTE — PROGRESS NOTES
Ortho Note:    1. Discussed case with Dr. Kebede.  2. Pt did not want surgery, but Dr. Kebede did discuss I&D and Vac Placement if needed.  3. Talked to Dr. Kebede about follow up with myself in one week as [place] 2 [place]. He is ok with this.  4. Discussed Ortho follow up with Dr. Ardon, he has the pt set up with PCP and Wound for follow up and PCP can refer to Ortho if any problems.  Both Dr. Kebede and I are ok with this plan.    Yang Wylie PA-C

## 2024-04-24 NOTE — PROGRESS NOTES
Reason For Visit: Eduardo Walton, 53 year old male, seen for a [facility] consultation to re evaluate and treat a right lateral lower leg (residual limb) wound.  Patient was admitted on 4/21/24 for a right LE residual limb wound with concerns of cellulitis.  Pt's mom and dad are also present with patient in the room, Dr Duglas Moody present after cares completed.  Patient is A&Ox4, pleasant upon interaction.     Start of Care in [facility]: 4/21/2024   Referring Provider: Dr Fabiola Orlando MD ED provider, initial referral 4/21/24.  Primary Care Provider: Deepti Vega   Wound Location: Right lateral lower leg (residual limb)    Wound History:   Pt presented to the ED here at [facility] on 4/21/23 with concerns of right lateral lower leg wound.  Per admission note:  Pt has history of bilateral BKA's (left 2019 and right June of 2023).  He stated 'he thinks it started as a blister sometime in the last week or so (prior to presentation in ED).  He'd been trying a new prosthesis sock that was not the right size and it may have been rubbing.  He was having some pain when he wore the prosthesis but he has no pain now without it on.  There is a bad odor and some clear drainage.  His neighbor who is a retired nurse has been helping him with dressing changes.  He denies fever or chills.  His sugars have been high.  He stated he does not feel well when his sugar is below 180 and only uses insulin accordingly.  Wound was swab cultured in the ED, aerobic cultures pending.  At [facility] nurse initial meeting (4/22) with pt, family and neighbor gave some additional information on the origin and progression of the wound.  Appeared pretty rapidly, no sure if there was any initial blistering.  Pt believes the primary cause of the wound was friction and not direct pressure.  I was able to see photo's of the wounds development off the pt's mom's phone.  Initial photo, not a great image but as best able to see, showed hypopigmented  site with distinct boarders, no open tissue, did not appear like slough.  Second photo is few days later than first, is a better image and shows what appears to be a full thickness ulcer with mostly clean red nongranular wound bed and open wound edges.  Periwound skin is not well shown in either photo  Pain to the posterior knee and proximal periwound skin increased and per pt's neighbor, small serous clear fluid blisters appeared briefly and ruptured.  Entire surrounding skin more to the proximal than distal had corresponding erythema with no significant increased warmth to touch noted.  Pt has had conflicting recommendations on this wound and appropriate cares, most included keeping the wound dry as possible.  Pt is concerned with prolonged hospital stays as setting does not work for his self cares with family assist.  Pt would prefer to: get plan set for wound cares and follow ups, set up for needed post hospital therapies like IV antibiotics if needed, picc line was place with this in mind at admission, and then discharge home as soon as possible.   Initial woc nurse inpatient visit 4/22/24, Triad paste started to promote autolytic debridement of slough and eschar and to protect dry intact periwound skin, and dry out proximal periwound moist skin, Mepilex 6x6 covered.  Recommended changing when 1/4 of the outer Mepilex was noted for strike through drainage 1/4 of the total foam surface area or greater.  Mepilex was changed again twice in 24 hours in first day.  Woc return 4/23, no debriding occurring yet, slough is slightly lighter in color, dressing in place was on for approximately 14 hours and was moist but not wet.  No change to plan of care for the wound topically on the 23rd, did instruct to leave the dressing in place till my return scheduled for Wednesday the 24 th.  And pt started to resume wearing his protective and compressive sleeve to the right residual limb over the wound dressing.  Woc returned  today 4/24, dressing held drainage well over last approximately 17 hours, wound bed slough starting to debride some.  Cultures have come back and positive for streptococcus constellatus.  Dr Ardon reports ok to discharge today, picc will be removed and going home on oral antibiotics.  See plan below for follow ups in clinic with [facility] nurse scheduled.      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    Positive for macroalbuminuria    Retinopathy due to secondary diabetes mellitus (H)    Severe diabetic hypoglycemia (H)    Type 1 diabetes mellitus with other diabetic ophthalmic complication (H)    S/P BKA (below knee amputation) unilateral, left (H)    Stage 3a chronic kidney disease (H)    Statin declined    Primary osteoarthritis of right knee    Current moderate episode of major depressive disorder without prior episode (H)    Hypertension    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)    S/P BKA (below knee amputation), right (H)    YESSICA (acute kidney injury) (H24)    Type 1 diabetes mellitus with hyperglycemia (H)    Diabetic ulcer of right lower leg (H)    Cellulitis of right lower extremity    Anemia, unspecified type    Hyperkalemia    Hyponatremia    Hypoalbuminemia                  Tobacco Use:     Tobacco Use      Smoking status: Never      Smokeless tobacco: Never     Diabetic: Type 1  HgbA1C:   Hemoglobin A1C   Date Value Ref Range Status   11/06/2023 10.3 (H) 0.0 - 5.6 % Final     Comment:     Normal <5.7%   Prediabetes 5.7-6.4%    Diabetes 6.5% or higher     Note: Adopted from ADA consensus guidelines.   05/21/2021 10.9 (H) 0 - 5.6 % Final     Comment:     Normal <5.7% Prediabetes 5.7-6.4%  Diabetes 6.5% or higher -  adopted from ADA   consensus guidelines.     Checks Blood Glucose?: See Hospital Flow Sheet for inpatient values.    Personal/social history:  pt lives with family independently, has had home care services, most recently with [facility].    Objective:   Current treatment plan: Triad paste covered with Mepilex 6x6  Last changed: at approximately 1430 yesterday by wo nurse    Wound #1 Right lateral lower leg or residual limb.  Origin not fully clear as of initial wo nurse inpatient assessment.  With follow up feel it is most accurate to classify as a pressure injury with contributing factors.  Stage/tissue depth:  Unstageable pressure injury due to slough, complicated by prosthetic products friction and subsequent cellulitis infection.  On 4/23/24 as periwound skin was technically part of the total wound area, started to measure wound site in three segments as listed below.  - Total area of damage 7 cm L x 2.5 cm W x 0.2 cm D  - Main wound which included the partial thickness denudement to the proximal, strip of intact black eschar and main area of slough measures 3.2 cm L x 2 cm W x 0.2 cm D.  - Eschar and slough alone measures 2.5 cm L x 2 cm W x 0.2 cm D  Tunneling: none noted  Undermining: none noted  Wound bed type/amount: of the total wound area approximately 70 % white to yellow moist slough covered wound now more loose and fibrinous at the most distal aspect, 10 % soft black dry eschar, 10 % red nongranular tissue and 10 % flaking dry nonviable skin with erythema: Not currently fluctuant  Wound Edges: closed with slough where there is visible depth and with eschar where there is no visible depth.  Periwound: Erythema with no increased warmth to touch, dry flaky skin, firm edema, not indurated but firm edema present.  Drainage: moderate amounts serosanguinous.  Odor: none noted currently  Pain: tender to painful    Photo's from today's visit 4/24/24.  [facility] nurse follow up visit, date of planned discharge  home.      Photo's from inpatient [facility] nurse follow up visit 4/23/24.    Photo from initial [facility] nurse inpatient consult 4/22/24.  Head is to the left and foot to the right in all photo's taken while inpatient.     Photo from ED 4/21/24.    Dorsalis Pedal Pulse: NA  Posterior Tibial Pulse: NA  Hair growth: none noted in the general area of the right lateral leg wound  Capillary Refill: NA  Feet/toes color: NA  Nails: NA  R Leg residual limb: Edema plus 1 and nonpitting. Ankle circumference NA cm. Calf circumference NA cm.  L Leg: Edema Not assessed this visit. Ankle circumference NA cm. Calf circumference NA cm.    Mobility: Wheelchair use, has prosthetics but not currently wearing on right.    Current offloading/footwear: prosthetic to left LE with sneaker   Sensation: per pt normal sensation at right LE wound, hypersensitive.     Other callusing/areas of concern: Dry skin to the distal right residual limb    Diet: Carb Consistent 60 g CHO per meal ordered through hospital    Goals of Wound Healing:   - Transition from slough covered to clean wound bed with new granular tissue growth stage of healing  - Maintain moist environment, with Triad paste.  - Control exudate, currently with Triad paste and Mepilex 6x6 inch gentle adhesive foam dressing.  - Autolytic debridement of necrotic/sloughy tissue  - Protect wound from outside environment, with Triad paste and Mepilex 6x6  - Antimicrobial, pt on IV antibiotics while inpatient, will discharge on oral antibiotics  - Pack open spaced, with Triad paste  - Promote healing by secondary intention for complete closure of wound  - Offloading/pressure reduction, monitor for any direct pressure, would recommend no prosthetic use on the right but compression with current limb wear is encouraged.    Assessment:  The right lateral leg wound area is about the same with some evolution noted.  The intact skin to the proximal of the main wound with slough and eschar, is noted today to  be dry and mostly peeling off or loose.  All tissue below is intact but fragile.  The main area of the wound with slough and eschar is very slowly starting to debride.  Was lighter in color yesterday after first approximate 24 hr use of Triad.  Today remains light in color with noted loosening and debriding of the most inferior aspect, slough is fibrinous and loose enough to lift but well adhered enough to not remove today.  No new concerns noted.  Compression with prosthetic products appeared to be a benefit as drainage present on dressing removed was less in volume that without the compression.       Barriers to wound healing:   Poor nutrition: inadequate supply of protein, carbohydrates, fatty acids, and trace elements essential for all phases of wound healing. Started teaching today 4/23 on need for increased protein in diet for healing.  Reduced Blood Supply: inadequate perfusion to heal wound, woc will need to eval past vascular studies related to current level of wound,   Medication: NA  Chemotherapy: suppresses the immune system and inflammatory response, NA  Radiotherapy: increases production of free radical which damage cells, NA  Psychological stress: legitimate concerns related to risk of limb threatening wound   Obesity: decreases tissue perfusion  Infection: prolongs inflammatory phase, uses vital nutrients, impairs epithelialization and releases toxins, cultures pending as of 4/23 at time of woc visit, on IV antibiotics  Underlying Disease: diabetes mellitis   Maceration: reduces wound tensile strength and inhibits epithelial migration, none currently noted, protecting from drainage with Triad paste   Patient compliance, TBD appears motivated to heal  Unrelieved pressure, to be determined but none of current significance noted currently.  Immobility, limited but not immobile  Substance abuse: NA    Plan:  Pt to discharge today per Dr Ardon.  Below instruction in italics given to pt in printed AVS  today, upcoming appointments written on paper given but updated in print below, will plan weekly clinic visits to monitor progress.    [facility] nurse Pipe Leonard RN cwocn day of discharge AVS/Instructions for wound cares.  Initial inpatient follow up visit done in room #255 4/24/24.    Wound of the right lateral proximal lower leg.  Full thickness ulcer mostly yellow slough tissue covered.  Initial goal is to provide an environment for the wound that promotes debriding (cleaning out) of the bad tissue (yellow slough).  Continue to wear your protective and compressive sleeves over the right limb for protection and to control edema.    Continue to apply your Gold Bond moisturizer to the dry skin on the right limb after you apply the Mepilex foam dressing.     Wound cares:  1 - Remove the old dressing.  2 - Wash the wound and surrounding skin with soap and water (rinse with water if using soap and water), saline or the no rinse wound cleanser spray MicroKlenz or similar no rinse wound cleanser.  (Do not try to scrub off dry crusty remains of the Triad paste, the center paste will come off the moist wound bed easily, the edges may have crusty dry paste that can have the surface cleansed but not scrubed off to raw tissue).  3 Apply the Triad paste (yellow tube) to the wound bed at least a nickel thick.  Apply a thinner smear of the Triad paste around the edge of the wound and the immediate surrounding skin, moist or dry.  4 - Cover the wound and the Triad paste with a large gentle silicone adhesive foam dressing (using Mepilex 6x6 inch dressings currently).  5 - Change the dressing once daily.    I will plan to have you follow up with me weekly in the Wound and Ostomy clinic here at [facility].    We scheduled your first visit for Wednesday 5/1, then weekly for the next three weeks    The Wound and Ostomy outpatient clinic is located here at [facility] in [facility].    Located on  the far east end of the facility, you park in the small lot to the right as you come into the main hospital drive.    Enter through the lower level glass doors.    For Pipe you take the elevator to the upper level and check in there, for Ly you check in at the lower leg desk.  Our contact number is [phone].    This number can be used to schedule or change the date or time of a Wound and Ostomy clinic appointment or to leave a message or question for the woc nurse's.    Pipe Leonard RN cwocn    Interventions to Barriers:  As listed above.    Topical care to Right lateral lower leg: Cleanse with Microklenz and gauze, pat dry. Wound cares as listed above.  Additional recommendations:  Cares provided as listed above.    Discussed plan of care with patient and family and with his nurse Margoth RN. Teaching done with patient and family and his nurse Margoth RN for dressing changes; pt with family and friend assist is able and willing to perform.    Discharge instructions updated to include:  Updated wo nurse recommendations will be given at time of discharge by woc nurse.     Supplies:  Pt has Triad paste tube and MicroKlenz already, will take home, gave additional tongue depressors and Mepilex 6x6 dressings to cover till follow up in clinic next week.    [facility] Return visit: 5/1/24 initial clinic visit schedule for follow up post hospital discharge  Nursing to notify Provider and WOC Nurse if wound deteriorates.    Verbal, written, & demonstrative education provided.  Face to face time (excluding procedure): approximately 35 minutes.  Procedure: less than one minute application of Triad paste to right lateral lower leg wound for promotion of autolytic debridement of slough.  Care plan was not changed.    Pipe Leonard RN cwocn  [phone]

## 2024-04-24 NOTE — PROGRESS NOTES
S:  Patient feeling usual self, doesn't feel sick.  Would like to avoid surgical intervention and treat wound/infection as an outpatient.  We've had discussions re appropriate antibiotic care, possible surgical intervention, possible wound vac placement.         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    Positive for macroalbuminuria    Retinopathy due to secondary diabetes mellitus (H)    Severe diabetic hypoglycemia (H)    Type 1 diabetes mellitus with other diabetic ophthalmic complication (H)    S/P BKA (below knee amputation) unilateral, left (H)    Stage 3a chronic kidney disease (H)    Statin declined    Primary osteoarthritis of right knee    Current moderate episode of major depressive disorder without prior episode (H)    Hypertension    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)    S/P BKA (below knee amputation), right (H)    YESSICA (acute kidney injury) (H24)    Type 1 diabetes mellitus with hyperglycemia (H)    Diabetic ulcer of right lower leg (H)    Cellulitis of right lower extremity    Anemia, unspecified type    Hyperkalemia    Hyponatremia    Hypoalbuminemia            Past Medical History:   Diagnosis Date    CAD (coronary artery disease)     previous coronary stent placement (2010?)    Diabetic foot (H) 10/29/2018    Diabetic retinopathy associated with type 1 diabetes mellitus (H)     Foot ulcer, left (H)     Osteomyelitis (H)     left foot    Pneumonia 06/14/2023    S/P BKA (below knee amputation), left (H) 2019    S/P BKA (below knee amputation), right 06/2023    Type 1 diabetes mellitus with complications (H)     retinopathy, neuropathy, nephropathy, macrovascular disease            Past Surgical History:   Procedure Laterality  Date    AMPUTATE FOOT Left 2/5/2019    Procedure: PARTIAL LEFT FOOT AMPUTATION.;  Surgeon: Chetan Potter DPM;  Location: [facility] OR    AMPUTATE LEG BELOW KNEE Left 2/18/2019    Procedure: LEFT BELOW THE KNEE AMPUTATION;  Surgeon: Kvng Berman MD;  Location: [facility] OR    STENT  2010            Social History     Tobacco Use    Smoking status: Never    Smokeless tobacco: Never   Substance Use Topics    Alcohol use: Yes     Comment: occ            Family History   Problem Relation Age of Onset    Ovarian Cancer Maternal Grandmother     Colon Cancer Maternal Grandmother     Prostate Cancer Maternal Grandfather     Ovarian Cancer Paternal Grandmother                Allergies   Allergen Reactions    Hydromorphone Shortness Of Breath     Respiratory arrest and PEA with IV dilaudid, tolerated oral narcotics without difficulty.    Complicated situation related to other drugs as well, IV Dilaudid may not be completely contraindicated.    Morphine Anaphylaxis     Respiratory arrest w/ IV Morphine - tolerated oral narcotics            No current outpatient medications on file.          Review Of Systems  Skin: negative  Eyes: negative  Ears/Nose/Throat: negative  Respiratory: No shortness of breath, dyspnea on exertion, cough, or hemoptysis    O: Physical Exam:  2.5 cm x 2.5 cm lesion over fibular head, fibrous tissue and some fluid able to be expressed from site but not significant purulence.  Some marginal erythema, some proximal cellulitis.  Stump itself not edematous and relatively supple.    Lab:, Hgb 8.9, CRP 74.35 down from 146.62, Na 142, Cr 1.96    3+ Streptococcus constellatus Abnormal           Images:  No updated R knee images         A:  Pressure necrosis with infected wound over fibular head site      P:  Labs improved, organism identified.  Continue antibiotic care and local wound care  To OR for debridement and wound vac if exacerbation             In addition to the above assessment and plan each active  problem on Eduardo's problem list was evaluated today. This included the questioning of Eduardo for any medication problems. We will continue the current treatment plan for these active problems except as noted.

## 2024-04-24 NOTE — PROGRESS NOTES
S-(situation): Patient discharged to home via wheelchair with parents    B-(background): cellulitis of rght lower extremity/wound    A-(assessment): vss, denies pain    R-(recommendations): Discharge instructions reviewed with patient. Listed belongings gathered and returned to patient.          Discharge Nursing Criteria:   Patient Education given and documented: (STROKE, CHF, Diabetes):  {Yes   Care Plan and Patient education resolved: Yes    New Medications- pt has been educated about purpose and side effects: Yes    Vaccines  Influenza status verified at discharge:  Yes      MISC  Prescriptions if needed, hard copies sent with patient  NA  Home medications returned to patient: NA  Medication Bin checked and emptied on discharge Yes  Patient reports post-discharge pain management plan is effective: Yes    Check Wall-a-trish and DELETE THIS ROW.

## 2024-04-24 NOTE — PLAN OF CARE
Problem: Adult Inpatient Plan of Care  Goal: Plan of Care Review  Description: The Plan of Care Review/Shift note should be completed every shift.  The Outcome Evaluation is a brief statement about your assessment that the patient is improving, declining, or no change.  This information will be displayed automatically on your shift  note.  Outcome: Progressing  Goal: Absence of Hospital-Acquired Illness or Injury  Outcome: Progressing  Intervention: Identify and Manage Fall Risk  Recent Flowsheet Documentation  Taken 4/23/2024 2100 by Dionisio Arceo RN  Safety Promotion/Fall Prevention:   activity supervised   clutter free environment maintained   room organization consistent   safety round/check completed   supervised activity  Intervention: Prevent Skin Injury  Recent Flowsheet Documentation  Taken 4/23/2024 2100 by Dionisio Arceo RN  Body Position: position changed independently  Device Skin Pressure Protection: positioning supports utilized  Intervention: Prevent and Manage VTE (Venous Thromboembolism) Risk  Recent Flowsheet Documentation  Taken 4/23/2024 2100 by Dionisio Arceo RN  VTE Prevention/Management: patient refused intervention  Intervention: Prevent Infection  Recent Flowsheet Documentation  Taken 4/23/2024 2100 by Dionisio Arceo RN  Infection Prevention:   rest/sleep promoted   single patient room provided  Goal: Optimal Comfort and Wellbeing  Outcome: Progressing  Goal: Readiness for Transition of Care  Outcome: Progressing     Problem: Wound  Goal: Optimal Coping  Outcome: Progressing  Goal: Optimal Functional Ability  Outcome: Progressing  Intervention: Optimize Functional Ability  Recent Flowsheet Documentation  Taken 4/23/2024 2100 by Dionisio Arceo RN  Activity Management: activity adjusted per tolerance  Activity Assistance Provided:   assistance, 1 person   assistance, stand-by  Goal: Absence of Infection Signs and Symptoms  Outcome: Progressing  Goal: Improved Oral Intake  Outcome:  Progressing  Goal: Optimal Pain Control and Function  Outcome: Progressing  Intervention: Prevent or Manage Pain  Recent Flowsheet Documentation  Taken 4/23/2024 2100 by Dionisio Arceo RN  Sleep/Rest Enhancement: awakenings minimized  Goal: Skin Health and Integrity  Outcome: Progressing  Intervention: Optimize Skin Protection  Recent Flowsheet Documentation  Taken 4/23/2024 2100 by Dionisio Arceo RN  Activity Management: activity adjusted per tolerance  Head of Bed (HOB) Positioning: HOB at 45 degrees  Goal: Optimal Wound Healing  Outcome: Progressing  Intervention: Promote Wound Healing  Recent Flowsheet Documentation  Taken 4/23/2024 2100 by Dionisio Arceo RN  Sleep/Rest Enhancement: awakenings minimized     Problem: Comorbidity Management  Goal: Blood Glucose Levels Within Targeted Range  Outcome: Progressing  Intervention: Monitor and Manage Glycemia  Recent Flowsheet Documentation  Taken 4/23/2024 2100 by Dionisio Arceo RN  Glycemic Management: blood glucose monitored  Medication Review/Management: medications reviewed       Pt did agree to take the AM dose of zosyn providing no real reason why the AM dose was acceptable but the mid night dose was not. Heparin refused again this morning.   Blood pressure is down some this morning from last night.   Right stump wound is not able to be visualized with prosthetic sleeve in place and Pt refuses to remove it.   Will continue to encourage cooperation with plan of care.

## 2024-04-24 NOTE — DISCHARGE SUMMARY
[facility]  Hospitalist Discharge Summary      Date of Admission:  4/21/2024  Date of Discharge:  4/24/2024  Discharging Provider: Dionisio Ardon MD  Discharge Service: Hospitalist Service    Discharge Diagnoses   Principal Problem:    Cellulitis of right lower extremity  Active Problems:    Diabetic ulcer of right lower leg (H)    Coronary artery disease involving native coronary artery of native heart without angina pectoris    Stage 3a chronic kidney disease (H)    Hypertension    S/P BKA (below knee amputation), right (H)    YESSICA (acute kidney injury) (H24)    Type 1 diabetes mellitus with hyperglycemia (H)    Anemia, unspecified type    Hyperkalemia    Hyponatremia    Hypoalbuminemia    S/P BKA (below knee amputation) unilateral, left (H)      Clinically Significant Risk Factors     # DMII: A1C = N/A within past 6 months       Follow-ups Needed After Discharge   Follow-up Appointments     Follow-up and recommended labs and tests       Follow up with primary care provider, Deepti Vega, within 7 days for   hospital follow- up and regarding new diagnosis.  The following labs/tests   are recommended: Hemoglobin, CRP, and basic metabolic panel.    Follow-up with [facility] nurse within 1 week to reassess skin wound            Discharge Disposition   Discharged to home  Condition at discharge: Stable    Hospital Course   53-year-old man with type 1 diabetes complicated by neuropathy, status post below the knee amputation bilaterally at different times for lower extremity infection, chronic prosthetic use of both lower limbs after previous BKA's, CAD, stage IIIa CKD, and hypertension presented with 1 week history of ulceration on the lateral right lower leg and was admitted due to concerns for right lower extremity cellulitis complicating that skin ulceration, severe hyperglycemia, and acute kidney injury.    Right lower leg ulcer with cellulitis due to Strep constellatus infection  Status  post bilateral BKA   Status post left BKA in 2019 and right BKA in 6/2023 for which he chronically uses prostheses presented with new onset skin ulceration on the lateral right lower leg as well as clinical findings of soft tissue infection in the right lower extremity surrounding that skin ulceration.  Clinically, cellulitis appeared most likely with lower suspicion for deeper infection in the right lower extremity.  Culture of initially purulent drainage from the ulceration grew strep constellatus.  Wound care nurse was consulted and provided recommendations regarding management of ulcer.  Orthopedic surgery was consulted and did not advise surgical intervention during hospitalization.  Patient was treated empirically with parenteral antibiotics and signs of cellulitis resolved.  He otherwise improved.  Once culture results were available, transition to oral antibiotic therapy to complete treatment course after discharge was recommended.  Close outpatient follow-up with PCP and [facility] nurse was advised.     Severe hyperglycemia  Type 1 DM with diabetic neuropathy  Presented with initial glucose 763 but did not have DKA and probably did not have hyperosmolar state.  Most recent A1c 10.3 in November 2023 suggesting uncontrolled diabetes at baseline.  Glycemic control improved during hospitalization with adjustment of insulin and as other acute medical problems improved.  Chronic Lyrica treatment for pain associated with neuropathy was continued.  At discharge, he was advised to resume his usual doses of Tresiba and Humalog insulin and to follow-up with his PCP regarding diabetes management.     YESSICA with CKD 3a with hyperkalemia  Most recent creatinine 1.6 in November 2023 was concerning for stage IIIa chronic kidney disease.  Creatinine increased as high as 2.51 during this hospitalization suspicious for YESSICA superimposed on CKD.  Also presented with initial potassium 5.8 concerning for mild hyperkalemia in the context  of acute kidney injury.  Chronic lisinopril was initially held due to YESSICA with hyperkalemia.  He was treated with IV fluids, and renal function improved although remained worsened compared with previously.  Hyperkalemia resolved.  Renal function continued to improve and potassium remained normal after restarting lisinopril.  Close outpatient follow-up of potassium and renal function was advised.    Anemia, unspecified type  Presented with mild anemia hemoglobin 11.2 that steadily decreased to hemoglobin 8.9 on the day of discharge.  Significant active bleeding was not apparent aside from some minor bleeding from his skin ulcer.  Recommended recheck of hemoglobin and outpatient follow-up.     HTN  Hypertension chronically treated with lisinopril which was held due to YESSICA and hyperkalemia.  He was intermittently severely hypertensive treated with hydralazine as needed.  Blood pressure improved without recurrence of severely elevated blood pressure readings after resuming chronic dose of lisinopril.    Pseudohyponatremia  Admitted with sodium 126 concerning for possible hyponatremia, but when sodium was corrected for blood sugar of 763, corrected sodium was normal at 139.  Therefore, pseudohyponatremia due to severe hyperglycemia was suspected rather than true hyponatremia.  Sodium remained normal as blood sugar improved.    Hypoalbuminemia  Admitted with low serum albumin 3.2, worsened to 2.8.  Suspect inadequate nutritional intake although acute inflammatory and infectious illness may contribute.    CAD  Reported history of CAD, clinically stable during this hospitalization.     H/o cardiac arrest  Reported history of cardiac arrest June 2023 reportedly after he had been treated with IV opioids      Consultations This Hospital Stay   PHARMACY TO DOSE VANCO  VASCULAR ACCESS ADULT IP CONSULT  ORTHOPEDIC SURGERY IP CONSULT  WOUND OSTOMY CONTINENCE NURSE  IP CONSULT  WOUND OSTOMY CONTINENCE NURSE  IP  CONSULT  OCCUPATIONAL THERAPY ADULT IP CONSULT    Code Status   No CPR- Do NOT Intubate    Time Spent on this Encounter   I, Dionisio Ardon MD, personally saw the patient today and spent greater than 30 minutes discharging this patient.       Dionisio Ardon MD  [address] MEDICAL SURGICAL  [address] [place] [address]  Phone: [phone]  ______________________________________________________________________    Physical Exam   Vital Signs: Temp: 99.4  F (37.4  C) Temp src: Oral BP: 138/58 Pulse: 72   Resp: 16 SpO2: 95 % O2 Device: None (Room air)    Weight: 141 lbs 8 oz  General Appearance: No acute distress sitting up in bed  Skin: Dry dressing overlying skin ulcer on right lateral lower leg without erythema surrounding the dressing  Other: Mild edema in the hands       Primary Care Physician   Deepti Vega    Discharge Orders      Reason for your hospital stay    Hospitalized due to skin ulcer and infection on right lower leg and due to worsening kidney function with high potassium level and improved     Follow-up and recommended labs and tests     Follow up with primary care provider, Deepti Vega, within 7 days for hospital follow- up and regarding new diagnosis.  The following labs/tests are recommended: Hemoglobin, CRP, and basic metabolic panel.    Follow-up with [facility] nurse within 1 week to reassess skin wound     Activity    Your activity upon discharge: activity as tolerated and may use your normal leg prosthesis on right leg for brief periods of time but recommend against prolonged use of right leg prosthesis until ulcer heals     Monitor and record    blood glucose according to your usual home routine     Wound care and dressings    Instructions to care for your wound at home: as directed by WO nurse:   1 - Remove the old dressing.  2 - Wash the wound and surrounding skin with soap and water (rinse with water if using soap and water), saline or the no rinse wound  cleanser spray MicroKlenz or similar no rinse wound cleanser.  (Do not try to scrub off dry crusty remains of the Triad paste, the center paste will come off the moist wound bed easily, the edges may have crusty dry paste that can have the surface cleansed but not scrubed off to raw tissue).  3 Apply the Triad paste (yellow tube) to the wound bed at least a nickel thick.  Apply a thinner smear of the Triad paste around the edge of the wound and the immediate surrounding skin, moist or dry.  4 - For now we will have you cover the wound and the Triad paste with a large gentle silicone adhesive foam dressing (we use Mepilex at this facility, other similar brands are acceptable to use and may be more cost effective).  5 - Change the dressing once daily, and as needed for strike through drainage to the outer foam dressing 1/4 or greater the total size of the dressing     Diet    Follow this diet upon discharge: Orders Placed This Encounter      Consistent Carbohydrate Diet Moderate Consistent Carb (60 g CHO per Meal) Diet       Significant Results and Procedures   Most Recent 3 CBC's:  Recent Labs   Lab Test 04/24/24  0526 04/23/24  0428 04/21/24  1749 11/06/23  1407   WBC  --  8.2 10.2 5.3   HGB 8.9* 9.3* 11.2* 11.3*   MCV  --  82 83 82   PLT  --  260 290 225     Most Recent 3 BMP's:  Recent Labs   Lab Test 04/24/24  0804 04/24/24  0526 04/23/24  2133 04/23/24  0753 04/23/24  0428 04/22/24  0820 04/22/24  0620   NA  --  142  --   --  137  --  138   POTASSIUM  --  4.6  --   --  4.8  --  4.7   CHLORIDE  --  109*  --   --  104  --  102   CO2  --  26  --   --  26  --  29   BUN  --  27.9*  --   --  36.4*  --  40.1*   CR  --  1.96*  --   --  2.19*  --  2.51*   ANIONGAP  --  7  --   --  7  --  7   GABRIELA  --  8.4*  --   --  8.2*  --  8.6   * 173* 277*   < > 307*   < > 157*    < > = values in this interval not displayed.     Most Recent 2 LFT's:  Recent Labs   Lab Test 04/21/24  1749 11/06/23  1407   AST 18 37   ALT 9 38    ALKPHOS 151* 127   BILITOTAL 0.5 0.6     7-Day Micro Results       Collected Updated Procedure Result Status      04/22/2024 0908 04/22/2024 1439 MRSA MSSA PCR, Nasal Swab [[id number]]    Swab from Nares, Bilateral    Final result Component Value   MRSA Target DNA Negative   SA Target DNA Negative            04/21/2024 1744 04/23/2024 1315 Wound Aerobic Bacterial Culture Routine With Gram Stain [[id number]]   (Abnormal)   Wound from Leg, Right    Final result Component Value   Culture 3+ Streptococcus constellatus    This organism is susceptible to ampicillin, penicillin, vancomycin and the cephalosporins. If treatment is required and your patient is allergic to penicillin, contact the microbiology lab within 5 days to request susceptibility testing.    4+ Normal michaela   Gram Stain Result 3+ Gram positive cocci    2+ Gram positive bacilli    1+ Gram negative bacilli    4+ WBC seen    Predominantly PMNs                     Most Recent Hemoglobin A1c:  Recent Labs   Lab Test 11/06/23  1407   A1C 10.3*     Most Recent 6 glucoses:  Recent Labs   Lab Test 04/24/24  0804 04/24/24  0526 04/23/24  2133 04/23/24  2111 04/23/24  1631 04/23/24  1357   * 173* 277* 256* 216* 229*   ,   Results for orders placed or performed during the hospital encounter of 04/21/24   XR Chest Port 1 View    Narrative    EXAM: CHEST SINGLE VIEW PORTABLE  LOCATION: [facility]  DATE: 4/21/2024    INDICATION: Nurse placed peripherally inserted central catheter.  COMPARISON: 06/14/2023.    FINDINGS: Right upper extremity peripherally inserted central catheter with distal catheter tip in the superior vena cava. The lungs are clear. Normal size cardiac silhouette.      Impression    IMPRESSION:   1. Right upper extremity peripherally inserted central catheter with distal tip in the superior vena cava.  2. No evidence of active cardiopulmonary disease.       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  Change sensor every 14 days.  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  Qty: 400 each, Refills: 3    Comments: Please provide pt with BD needles  Associated Diagnoses: Type 1 diabetes mellitus with other diabetic ophthalmic complication (H)      lisinopril (ZESTRIL) 5 MG tablet Take 1 tablet (5 mg) by mouth daily  Qty: 90 tablet, Refills: 0    Associated Diagnoses: Hypertension due to endocrine disorder      LYRICA 100 MG capsule Take 1 capsule (100 mg) by mouth every morning AND 2 capsules (200 mg) every evening.  Qty: 90 capsule, Refills: 4    Comments: Please discontinue previous Natasha script for generic and use this script for Brand name only from here on.  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    Complicated situation related to other drugs as well, IV Dilaudid may not be completely contraindicated.    Morphine Anaphylaxis     Respiratory arrest w/ IV Morphine - tolerated oral narcotics

## 2024-04-25 ENCOUNTER — TELEPHONE (OUTPATIENT)
Dept: ADMISSION | Facility: CLINIC | Age: 54
End: 2024-04-25

## 2024-04-25 NOTE — TELEPHONE ENCOUNTER
Reason for Call:  Other question regarding wound care    Detailed comments: neighbor who he felt more then capable to change his wound bandaging was not listening to him and he feels she did it wrong.  He is wondering if he should be seen daily if he needs wound care more frequently because he cannot do it himself and she was not following his direction.  Nothing bad came of it at this point.  Or does [facility] do this?  Would a referral be put in and by who?  Please call to discuss.    Phone Number Patient can be reached at: Cell number on file:    Telephone Information:   Mobile [phone]       Best Time: anytime    Can we leave a detailed message on this number? YES    Call taken on 4/25/2024 at 1:43 PM by Brinda Pacheco

## 2024-04-26 ENCOUNTER — TELEPHONE (OUTPATIENT)
Dept: WOUND CARE | Facility: CLINIC | Age: 54
End: 2024-04-26

## 2024-04-26 NOTE — TELEPHONE ENCOUNTER
Returned pt's call this am 0850 4/26/24.    Left voice mail name and contact information and referenced returning call concerning message of yesterday, no specifics.      Please message [facility] nurse Pipe Leonard via teams when pt calls back, if I am unavailable to take call at that time please enter encounter and let pt know I will return his call before the end of the work day today.     Pipe Leonard RN cwocn

## 2024-04-26 NOTE — TELEPHONE ENCOUNTER
Reason for Call:  Other wound changes    Detailed comments: Karma Antonio would like to speak to Pipe as soon as possible, she is redressing Eduardo's wound, she said it looks like the wound has extended and there is new drainage. Karma would like to speak to Pipe prior to dressing the wound.    Phone Number Patient can be reached at: Home number on file [phone] (home)    Best Time:     Can we leave a detailed message on this number? YES    Call taken on 4/26/2024 at 1:18 PM by Jeny Arceo

## 2024-04-29 NOTE — TELEPHONE ENCOUNTER
Pt sent photo's of his left lateral leg wound via [facility].  I was able to view these and returned his call.  His concerns centered around the periwound skin to the proximal of the main wound.  This is to the left in all photo's we have to date.  Pt was concerned about the denudement of the skin proximal to the wound.  In pictures the partial thickness moist denudement is visible.  At this time I am not concerned about the periwound skins status.  All damage appears partial thickness.  Considering the large amount of erythema and edema that was present in this area, the sloughing off of the surface layer of skin, which was stretch and damaged by the cellulitis infection, appears consistent with the wound evolving towards healing.    No recommended change in the plan of care needed at this time, Thin smear of triad to the periwound skin and nickel thick amount of triad over the main wound with slough and eschar.      Pipe Leonard RN cwocn

## 2024-04-29 NOTE — TELEPHONE ENCOUNTER
Patient calling back this morning, requesting to speak with Pipe.   See notes below regarding concerns with his wound.     He states his neighbor (who is a retired nurse) is assisting him with these dressing changes. He states she initially applied quite a bit of Triad past to the wound bed and surrounding skin, more than what was done in the hospital. The following day his skin was quite reddened and inflamed. Since then, she has been using less.     Please call patient at your earliest convenience to discuss proper use of cream. He has his first wound care appointment on 5/3/24.     Ana RANGELN, RN

## 2024-04-29 NOTE — TELEPHONE ENCOUNTER
Spoke with pt vi telephone. He has some concerns of the periwound skin of there right lateral lower leg wound breakdown and some discoloration.  No fever or chills, not noticing any increased redness or increased warmth to touch.  Not sure if the amount of Triad paste was too much to the surrounding skin, specifically the area proximal to the wound where pt had denuded tissue related to ruptured blisters prior to recent admission.  He reports he has taken pictures and will upload this to his SEC Watch page for me to see later this afternoon.    [facility] will return call after viewing images.  Note pt instructed to send the photo's to his PCP as unable to send message or picture via SEC Watch to [facility] nurse team as we are not providers.    Pipe Leonard RN cwocn

## 2024-04-30 ENCOUNTER — MYC MEDICAL ADVICE (OUTPATIENT)
Dept: ENDOCRINOLOGY | Facility: CLINIC | Age: 54
End: 2024-04-30
Payer: COMMERCIAL

## 2024-04-30 ENCOUNTER — TELEPHONE (OUTPATIENT)
Dept: ENDOCRINOLOGY | Facility: CLINIC | Age: 54
End: 2024-04-30
Payer: COMMERCIAL

## 2024-04-30 DIAGNOSIS — E10.39 TYPE 1 DIABETES MELLITUS WITH OTHER DIABETIC OPHTHALMIC COMPLICATION (H): ICD-10-CM

## 2024-04-30 DIAGNOSIS — I15.2 HYPERTENSION DUE TO ENDOCRINE DISORDER: ICD-10-CM

## 2024-04-30 RX ORDER — INSULIN LISPRO 100 [IU]/ML
4-12 INJECTION, SOLUTION INTRAVENOUS; SUBCUTANEOUS
Qty: 15 ML | Refills: 5 | Status: SHIPPED | OUTPATIENT
Start: 2024-04-30

## 2024-04-30 NOTE — TELEPHONE ENCOUNTER
[facility]    Fax: [phone]    Spec: [phone]    MO: [phone]   203

## 2024-04-30 NOTE — TELEPHONE ENCOUNTER
Listed as historical   Last office visit: 1/24/2024 with prescribing provider:  Dr. Palma   Future Office Visit: 5/22/24          Requested Prescriptions   Pending Prescriptions Disp Refills    HUMALOG KWIKPEN 100 UNIT/ML soln 15 mL      Sig: Inject 4-12 Units Subcutaneous 4 times daily (with meals and nightly) TOTAL DAILY DOSE ABOUT 50 UNITS       Insulin Protocol Failed - 4/30/2024  8:50 AM        Failed - Has GFR on file in past 12 months and most recent value is normal        Failed - Chart Review Required     Review Chart.    Do not approve if insulin is used in a pump.  Instead, direct refill request to the patient's endocrinologist.  If the patient doesn't have an endocrinologist, then send the refill to the patient's PCP for review            Passed - Medication is active on med list        Passed - Recent (6 mo) or future (90 days) visit within the authorizing provider's specialty     The patient must have completed an in-person or virtual visit within the past 6 months or has a future visit scheduled within the next 90 days with the authorizing provider s specialty.  Urgent care and e-visits do not quality as an office visit for this protocol.          Passed - Medication indicated for associated diagnosis     Medication is associated with one or more of the following diagnoses:   - Type 1 diabetes mellitus  - Type 2 diabetes mellitus  - Diabetic nephropathy; Prophylaxis  - Neuropathy due to diabetes mellitus; Prophylaxis  - Retinopathy due to diabetes mellitus; Prophylaxis  - Diabetes mellitus associated with cystic fibrosis  - Disorder of cardiovascular system; Prophylaxis - Type 1 diabetes mellitus   - Disorder of cardiovascular system; Prophylaxis - Type 2 diabetes mellitus            Passed - Patient is 18 years of age or older

## 2024-05-01 ENCOUNTER — OFFICE VISIT (OUTPATIENT)
Dept: FAMILY MEDICINE | Facility: OTHER | Age: 54
End: 2024-05-01
Payer: COMMERCIAL

## 2024-05-01 VITALS
TEMPERATURE: 97.2 F | DIASTOLIC BLOOD PRESSURE: 62 MMHG | OXYGEN SATURATION: 96 % | HEART RATE: 71 BPM | SYSTOLIC BLOOD PRESSURE: 146 MMHG

## 2024-05-01 DIAGNOSIS — E10.40 TYPE 1 DIABETES MELLITUS WITH DIABETIC NEUROPATHY (H): ICD-10-CM

## 2024-05-01 DIAGNOSIS — N18.31 STAGE 3A CHRONIC KIDNEY DISEASE (H): ICD-10-CM

## 2024-05-01 DIAGNOSIS — E10.21 TYPE 1 DIABETES MELLITUS WITH DIABETIC NEPHROPATHY (H): ICD-10-CM

## 2024-05-01 DIAGNOSIS — Z89.511 S/P BILATERAL BKA (BELOW KNEE AMPUTATION) (H): ICD-10-CM

## 2024-05-01 DIAGNOSIS — E10.59 TYPE 1 DIABETES MELLITUS WITH OTHER CIRCULATORY COMPLICATION (H): ICD-10-CM

## 2024-05-01 DIAGNOSIS — I73.9 PAD (PERIPHERAL ARTERY DISEASE) (H): Primary | ICD-10-CM

## 2024-05-01 DIAGNOSIS — E11.622 DIABETIC ULCER OF RIGHT LOWER LEG (H): ICD-10-CM

## 2024-05-01 DIAGNOSIS — L97.919 DIABETIC ULCER OF RIGHT LOWER LEG (H): ICD-10-CM

## 2024-05-01 DIAGNOSIS — E10.319 TYPE 1 DIABETES MELLITUS WITH RETINOPATHY, MACULAR EDEMA PRESENCE UNSPECIFIED, UNSPECIFIED LATERALITY, UNSPECIFIED RETINOPATHY SEVERITY (H): ICD-10-CM

## 2024-05-01 DIAGNOSIS — F32.1 CURRENT MODERATE EPISODE OF MAJOR DEPRESSIVE DISORDER WITHOUT PRIOR EPISODE (H): ICD-10-CM

## 2024-05-01 DIAGNOSIS — L03.115 CELLULITIS OF RIGHT LOWER EXTREMITY: ICD-10-CM

## 2024-05-01 DIAGNOSIS — F41.1 GAD (GENERALIZED ANXIETY DISORDER): ICD-10-CM

## 2024-05-01 DIAGNOSIS — Z89.512 S/P BILATERAL BKA (BELOW KNEE AMPUTATION) (H): ICD-10-CM

## 2024-05-01 PROBLEM — L97.513 ULCER OF RIGHT FOOT WITH NECROSIS OF MUSCLE (H): Status: ACTIVE | Noted: 2024-05-01

## 2024-05-01 PROBLEM — L97.513 ULCER OF RIGHT FOOT WITH NECROSIS OF MUSCLE (H): Status: RESOLVED | Noted: 2024-05-01 | Resolved: 2024-05-01

## 2024-05-01 LAB
ANION GAP SERPL CALCULATED.3IONS-SCNC: 6 MMOL/L (ref 7–15)
BASOPHILS # BLD AUTO: 0 10E3/UL (ref 0–0.2)
BASOPHILS NFR BLD AUTO: 0 %
BUN SERPL-MCNC: 26.2 MG/DL (ref 6–20)
CALCIUM SERPL-MCNC: 9 MG/DL (ref 8.6–10)
CHLORIDE SERPL-SCNC: 104 MMOL/L (ref 98–107)
CREAT SERPL-MCNC: 1.67 MG/DL (ref 0.67–1.17)
CRP SERPL-MCNC: 51.76 MG/L
DEPRECATED HCO3 PLAS-SCNC: 32 MMOL/L (ref 22–29)
EGFRCR SERPLBLD CKD-EPI 2021: 49 ML/MIN/1.73M2
EOSINOPHIL # BLD AUTO: 0.3 10E3/UL (ref 0–0.7)
EOSINOPHIL NFR BLD AUTO: 4 %
ERYTHROCYTE [DISTWIDTH] IN BLOOD BY AUTOMATED COUNT: 12.2 % (ref 10–15)
GLUCOSE SERPL-MCNC: 180 MG/DL (ref 70–99)
HBA1C MFR BLD: 12.7 % (ref 0–5.6)
HCT VFR BLD AUTO: 28.7 % (ref 40–53)
HGB BLD-MCNC: 9.4 G/DL (ref 13.3–17.7)
IMM GRANULOCYTES # BLD: 0.1 10E3/UL
IMM GRANULOCYTES NFR BLD: 1 %
LYMPHOCYTES # BLD AUTO: 1.1 10E3/UL (ref 0.8–5.3)
LYMPHOCYTES NFR BLD AUTO: 14 %
MCH RBC QN AUTO: 27.6 PG (ref 26.5–33)
MCHC RBC AUTO-ENTMCNC: 32.8 G/DL (ref 31.5–36.5)
MCV RBC AUTO: 84 FL (ref 78–100)
MONOCYTES # BLD AUTO: 0.4 10E3/UL (ref 0–1.3)
MONOCYTES NFR BLD AUTO: 6 %
NEUTROPHILS # BLD AUTO: 5.6 10E3/UL (ref 1.6–8.3)
NEUTROPHILS NFR BLD AUTO: 76 %
PLATELET # BLD AUTO: 329 10E3/UL (ref 150–450)
POTASSIUM SERPL-SCNC: 4.8 MMOL/L (ref 3.4–5.3)
RBC # BLD AUTO: 3.4 10E6/UL (ref 4.4–5.9)
SODIUM SERPL-SCNC: 142 MMOL/L (ref 135–145)
TSH SERPL DL<=0.005 MIU/L-ACNC: 1.15 UIU/ML (ref 0.3–4.2)
WBC # BLD AUTO: 7.4 10E3/UL (ref 4–11)

## 2024-05-01 PROCEDURE — 80048 BASIC METABOLIC PNL TOTAL CA: CPT | Performed by: NURSE PRACTITIONER

## 2024-05-01 PROCEDURE — 83036 HEMOGLOBIN GLYCOSYLATED A1C: CPT | Performed by: NURSE PRACTITIONER

## 2024-05-01 PROCEDURE — 36415 COLL VENOUS BLD VENIPUNCTURE: CPT | Performed by: NURSE PRACTITIONER

## 2024-05-01 PROCEDURE — 99214 OFFICE O/P EST MOD 30 MIN: CPT | Performed by: NURSE PRACTITIONER

## 2024-05-01 PROCEDURE — 84443 ASSAY THYROID STIM HORMONE: CPT | Performed by: NURSE PRACTITIONER

## 2024-05-01 PROCEDURE — 85025 COMPLETE CBC W/AUTO DIFF WBC: CPT | Performed by: NURSE PRACTITIONER

## 2024-05-01 PROCEDURE — 86140 C-REACTIVE PROTEIN: CPT | Performed by: NURSE PRACTITIONER

## 2024-05-01 RX ORDER — LISINOPRIL 5 MG/1
5 TABLET ORAL DAILY
Qty: 90 TABLET | Refills: 1 | Status: SHIPPED | OUTPATIENT
Start: 2024-05-01

## 2024-05-01 RX ORDER — BUPROPION HYDROCHLORIDE 150 MG/1
150 TABLET ORAL EVERY MORNING
Qty: 30 TABLET | Refills: 0 | Status: SHIPPED | OUTPATIENT
Start: 2024-05-01 | End: 2024-05-20

## 2024-05-01 ASSESSMENT — PAIN SCALES - GENERAL: PAINLEVEL: NO PAIN (0)

## 2024-05-01 NOTE — PROGRESS NOTES
Assessment & Plan     PAD (peripheral artery disease) (H24)  Management of his diabetes and risk factors encouraged.     S/P BKA (below knee amputation) unilateral, left (H)  Using prosthetic device     Diabetic ulcer of right lower leg (H)  Unable to see due to dressing application  Reviewed photos sent by patient and wound care nurse response  He did stop his antibiotics early so I would like him to monitor this very closely and I did recommend labs today to make sure his CRP and CBC are improved. Monitor closely for any infection  Advised wound care to come to home as he is considered home bound and his dressing changes are by a neighbor, which is not always the most convenient for him and his neighbor. Placed an order for home care and care coordination for assistance on this.   - CBC with platelets and differential; Future  - CRP, inflammation; Future  - Basic metabolic panel  (Ca, Cl, CO2, Creat, Gluc, K, Na, BUN); Future  - Home Care Referral  - CBC with platelets and differential  - CRP, inflammation  - Primary Care - Care Coordination Referral; Future    Current moderate episode of major depressive disorder without prior episode (H)  Increased depression and irritability per patient.   Discussed trial of Wellbutrin See below     Stage 3a chronic kidney disease (H)  Recheck today   - Basic metabolic panel  (Ca, Cl, CO2, Creat, Gluc, K, Na, BUN); Future    Cellulitis of right lower extremity  As noted above   - CBC with platelets and differential; Future  - CRP, inflammation; Future  - Basic metabolic panel  (Ca, Cl, CO2, Creat, Gluc, K, Na, BUN); Future  - Home Care Referral  - CBC with platelets and differential  - CRP, inflammation  - Primary Care - Care Coordination Referral; Future  - CBC with platelets and differential; Future  - CRP, inflammation; Future    CARLOS (generalized anxiety disorder)  Discussed wellbutrin medication and side effects   Discussed black box warning as all mental health  medications have.   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 (H)    - Hemoglobin A1c  - Basic metabolic panel  - TSH  - Albumin Random Urine Quantitative with Creat Ratio    Type 1 diabetes mellitus with other circulatory complication (H)    - Hemoglobin A1c  - Basic metabolic panel  - TSH  - Albumin Random Urine Quantitative with Creat Ratio    Type 1 diabetes mellitus with retinopathy, macular edema presence unspecified, unspecified laterality, unspecified retinopathy severity (H)    - Hemoglobin A1c  - Basic metabolic panel  - TSH  - Albumin Random Urine Quantitative with Creat Ratio    The patient indicates understanding of these issues and agrees with the plan.    MED REC REQUIRED  Post Medication Reconciliation Status: discharge medications reconciled and changed, per note/orders    See Patient Instructions    Eva Clark is a 53 year old, presenting for the following health issues:  Hospital F/U    [facility]     Hospital Follow-up Visit:    Hospital/Nursing Home/ Rehab Facility: [facility]  Date of Admission: 4/21/24  Date of Discharge: 4/24/24  Reason(s) for Admission: Cellulitis of right lower extremity   Was the patient in the ICU or did the patient experience delirium during hospitalization?  No  Do you have any other stressors you would like to discuss with your provider? No    Problems taking medications regularly:  was having side effects so stopped medication   Medication changes since discharge: Amoxicillin   Problems adhering to non-medication therapy:  Side effects     Was having side effects from the 1000mg of amoxicillin, Nauseous, restless, and diarrhea. Called and spoke with a  nurse that said he should stop the medication and he should talk to a triage nurse or his PCP.     He got half-way through 14 pills of the amoxicillin and stopped.   Had digestive related amoxicillin affects.   He is currently working on stress management and protein intake to help with healing.     He attributes some skin breakdown from his prosthetic to his current cellulitis       Summary of hospitalization:  [facility] discharge summary reviewed  Diagnostic Tests/Treatments reviewed.  Follow up needed: Wound Care and PCP   Other Healthcare Providers Involved in Patient s Care:         Homecare and Specialist appointment - Wound care  Update since discharge: stable.  Patient is having wound care completed by neighbor who is a lot older   And has a very specific schedule and would looking to have assistance with wound care at home. He does see a wound care nurse, but needs assistance with dressing changes and managing/healing.     Plan of care communicated with patient          11/18/2022     5:45 PM 6/5/2023     1:22 PM 7/28/2023    10:47 AM   PHQ   PHQ-9 Total Score 0 11 0   Q9: Thoughts of better off dead/self-harm past 2 weeks Not at all Not at all Not at all         Review of Systems  Constitutional, HEENT, cardiovascular, pulmonary, gi and gu systems are negative, except as otherwise noted.      Objective    BP (!) 146/62   Pulse 71   Temp 97.2  F (36.2  C) (Temporal)   SpO2 96%   There is no height or weight on file to calculate BMI.  Physical Exam   GENERAL: alert and no distress  MS: Bilateral BKA, dressing on right leg.   SKIN: no suspicious lesions or rashes  NEURO: Normal strength and tone, mentation intact and speech normal  PSYCH: mentation appears normal, affect normal/bright    Results for orders placed or performed in visit on 05/01/24   Hemoglobin A1c     Status: Abnormal   Result Value Ref Range    Hemoglobin A1C 12.7 (H) 0.0 - 5.6 %    Narrative    Results confirmed by repeat  test.     Basic metabolic panel     Status: Abnormal   Result Value Ref Range    Sodium 142 135 - 145 mmol/L    Potassium 4.8 3.4 - 5.3 mmol/L    Chloride 104 98 - 107 mmol/L    Carbon Dioxide (CO2) 32 (H) 22 - 29 mmol/L    Anion Gap 6 (L) 7 - 15 mmol/L    Urea Nitrogen 26.2 (H) 6.0 - 20.0 mg/dL    Creatinine 1.67 (H) 0.67 - 1.17 mg/dL    GFR Estimate 49 (L) >60 mL/min/1.73m2    Calcium 9.0 8.6 - 10.0 mg/dL    Glucose 180 (H) 70 - 99 mg/dL   TSH     Status: Normal   Result Value Ref Range    TSH 1.15 0.30 - 4.20 uIU/mL   CRP, inflammation     Status: Abnormal   Result Value Ref Range    CRP Inflammation 51.76 (H) <5.00 mg/L   CBC with platelets and differential     Status: Abnormal   Result Value Ref Range    WBC Count 7.4 4.0 - 11.0 10e3/uL    RBC Count 3.40 (L) 4.40 - 5.90 10e6/uL    Hemoglobin 9.4 (L) 13.3 - 17.7 g/dL    Hematocrit 28.7 (L) 40.0 - 53.0 %    MCV 84 78 - 100 fL    MCH 27.6 26.5 - 33.0 pg    MCHC 32.8 31.5 - 36.5 g/dL    RDW 12.2 10.0 - 15.0 %    Platelet Count 329 150 - 450 10e3/uL    % Neutrophils 76 %    % Lymphocytes 14 %    % Monocytes 6 %    % Eosinophils 4 %    % Basophils 0 %    % Immature Granulocytes 1 %    Absolute Neutrophils 5.6 1.6 - 8.3 10e3/uL    Absolute Lymphocytes 1.1 0.8 - 5.3 10e3/uL    Absolute Monocytes 0.4 0.0 - 1.3 10e3/uL    Absolute Eosinophils 0.3 0.0 - 0.7 10e3/uL    Absolute Basophils 0.0 0.0 - 0.2 10e3/uL    Absolute Immature Granulocytes 0.1 <=0.4 10e3/uL   CBC with platelets and differential     Status: Abnormal    Narrative    The following orders were created for panel order CBC with platelets and differential.  Procedure                               Abnormality         Status                     ---------                               -----------         ------                     CBC with platelets and d...[[id number]]  Abnormal            Final result                 Please view results for these tests on the individual orders.           Signed Electronically  by: NATASHA Queen CNP

## 2024-05-02 ENCOUNTER — PATIENT OUTREACH (OUTPATIENT)
Dept: CARE COORDINATION | Facility: CLINIC | Age: 54
End: 2024-05-02
Payer: COMMERCIAL

## 2024-05-03 ENCOUNTER — HOSPITAL ENCOUNTER (OUTPATIENT)
Dept: WOUND CARE | Facility: CLINIC | Age: 54
Discharge: HOME | End: 2024-05-03
Attending: NURSE PRACTITIONER | Admitting: NURSE PRACTITIONER
Payer: COMMERCIAL

## 2024-05-03 ENCOUNTER — MYC MEDICAL ADVICE (OUTPATIENT)
Dept: FAMILY MEDICINE | Facility: OTHER | Age: 54
End: 2024-05-03
Payer: COMMERCIAL

## 2024-05-03 PROBLEM — Z89.512 S/P BILATERAL BKA (BELOW KNEE AMPUTATION) (H): Status: ACTIVE | Noted: 2024-05-03

## 2024-05-03 PROBLEM — Z89.512 S/P BKA (BELOW KNEE AMPUTATION) UNILATERAL, LEFT (H): Status: RESOLVED | Noted: 2019-02-25 | Resolved: 2024-05-03

## 2024-05-03 PROBLEM — Z89.511 S/P BILATERAL BKA (BELOW KNEE AMPUTATION) (H): Status: ACTIVE | Noted: 2024-05-03

## 2024-05-03 PROBLEM — Z89.511 S/P BKA (BELOW KNEE AMPUTATION), RIGHT (H): Status: RESOLVED | Noted: 2023-06-30 | Resolved: 2024-05-03

## 2024-05-03 PROBLEM — G54.6: Status: RESOLVED | Noted: 2023-06-22 | Resolved: 2024-05-03

## 2024-05-03 PROCEDURE — 97602 WOUND(S) CARE NON-SELECTIVE: CPT

## 2024-05-03 NOTE — PATIENT INSTRUCTIONS
Today we will modify the plan of care to provide a little more absorbency.    We will add in two layers of Aquacel Ag, first to be cut to the same size and shape of the wound and the second can be cut just slightly larger than the wound itself..    Wound cares:  1 - Remove the old dressing.  2 - Wash the wound and surrounding skin with soap and water (rinse with water if using soap and water), saline or the no rinse wound cleanser spray MicroKlenz or similar no rinse wound cleanser.  (Do not try to scrub off dry crusty remains of the Triad paste, the center paste will come off the moist wound bed easily, the edges may have crusty dry paste that can have the surface cleansed but not scrubed off to raw tissue).  3 Apply the Triad paste (yellow tube) to the wound bed at least a nickel thick.  Apply a thinner smear of the Triad paste around the edge of the wound and the immediate surrounding skin, moist or dry.  You can apply the paste to go over the wound itself directly onto the the cut to fit piece of Aquacel Ag or directly to the skin, which ever works best for you.  4 - The Aquacel goes over the wound, first piece cut to size and shape or slightly smaller and the second can be a square or rectangle shape and cover the edges.  4 - Cover the wound, Aquacel Ag pieces and the Triad paste with a large gentle silicone adhesive foam dressing (using Mepilex 6x6 inch dressings currently).  5 - Change the dressing once daily.    I will see you next week Wednesday 5/8/24 at [phone].    Pipe Leonard RN [email or web address]

## 2024-05-03 NOTE — TELEPHONE ENCOUNTER
Patient Quality Outreach    Patient is due for the following:   Physical Preventive Adult Physical    Next Steps:   Schedule a Adult Preventative    Type of outreach:    Sent [employer] message.      Questions for provider review:    None           Madiha Taylor MA

## 2024-05-03 NOTE — PROGRESS NOTES
[facility]    Start of Care in [facility]: 4/21/2024 while inpatient, initial clinic visit 5/3/24.  Referring Provider: Dr Fabiola Orlando MD ED provider, initial referral 4/21/24.  Primary Care Provider: Deepti Vega   Wound Location: Right lateral lower leg (residual limb)    Wound Clinic Visit: Initial visit today 5/3/24.    Reason for Visit:  Follow up on right lateral lower leg wound from inpatient assessments.       Subjective:  On arrival today the pt reports the following:  Concerns over black tissue in wound bed, has stopped taking the oral antibiotics due to bowel and stoma upset, no signs of infection noted.     Wound History:   Pt presented to the ED here at [facility] on 4/21/23 with concerns of right lateral lower leg wound.  Per admission note:  Pt has history of bilateral BKA's (left 2019 and right June of 2023).  He stated 'he thinks it started as a blister sometime in the last week or so (prior to presentation in ED).  He'd been trying a new prosthesis sock that was not the right size and it may have been rubbing.  He was having some pain when he wore the prosthesis but he has no pain now without it on.  There is a bad odor and some clear drainage.  His neighbor who is a retired nurse has been helping him with dressing changes.  He denies fever or chills.  His sugars have been high.  He stated he does not feel well when his sugar is below 180 and only uses insulin accordingly.  Wound was swab cultured in the ED, aerobic cultures pending.  At [facility] nurse initial meeting (4/22) with pt, family and neighbor gave some additional information on the origin and progression of the wound.  Appeared pretty rapidly, no sure if there was any initial blistering.  Pt believes the primary cause of the wound was friction and not direct pressure.  I was able to see photo's of the wounds development off the pt's mom's phone.  Initial photo, not a great image but as  best able to see, showed hypopigmented site with distinct boarders, no open tissue, did not appear like slough.  Second photo is few days later than first, is a better image and shows what appears to be a full thickness ulcer with mostly clean red nongranular wound bed and open wound edges.  Periwound skin is not well shown in either photo  Pain to the posterior knee and proximal periwound skin increased and per pt's neighbor, small serous clear fluid blisters appeared briefly and ruptured.  Entire surrounding skin more to the proximal than distal had corresponding erythema with no significant increased warmth to touch noted.  Initial woc nurse inpatient visit 4/22/24, Triad paste started to promote autolytic debridement of slough and eschar and to protect dry intact periwound skin, and dry out proximal periwound moist skin, Mepilex 6x6 covered.  Woc return 4/23, no change to plan of care for the wound topically but instructed pt to resume wearing his protective and compressive sleeve to the right residual limb over the wound dressing.  Woc returned 4/24, wound bed slough started to debride some.  Cultures results returned, positive for streptococcus constellatus.  Dr Ardon approved discharge home same day 4/24/24 on oral antibiotics, picc line removed.  Pt called into clinic a few times after discharge, prior to his initial clinic visit with concerns and questions.  Receiving assist from his neighbor but both had concerns and pictures sent in for evaluation by wo, all appeared to be progressing well.      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    Positive for macroalbuminuria    Retinopathy due to secondary diabetes mellitus (H)     Severe diabetic hypoglycemia (H)    Type 1 diabetes mellitus with other diabetic ophthalmic complication (H)    Stage 3a chronic kidney disease (H)    Statin declined    Primary osteoarthritis of right knee    Current moderate episode of major depressive disorder without prior episode (H)    Hypertension    YESSICA (acute kidney injury) (H24)    Type 1 diabetes mellitus with hyperglycemia (H)    Diabetic ulcer of right lower leg (H)    Cellulitis of right lower extremity    Anemia, unspecified type    Hyperkalemia    Hyponatremia    Hypoalbuminemia    PAD (peripheral artery disease) (H24)    S/P bilateral BKA (below knee amputation) (H)             Tobacco Use:     Tobacco Use      Smoking status: Never      Smokeless tobacco: Never     Diabetic: Type 1  HgbA1C:   Hemoglobin A1C   Date Value Ref Range Status   05/01/2024 12.7 (H) 0.0 - 5.6 % Final     Comment:     Normal <5.7%   Prediabetes 5.7-6.4%    Diabetes 6.5% or higher     Note: Adopted from ADA consensus guidelines.   05/21/2021 10.9 (H) 0 - 5.6 % Final     Comment:     Normal <5.7% Prediabetes 5.7-6.4%  Diabetes 6.5% or higher - adopted from ADA   consensus guidelines.     Checks Blood Glucose?:  Not assessed this visit 5/3/24, [facility] nurse error.     Personal/social history:  pt lives with family independently, has had home care services, most recently with [facility].    Objective:   Current treatment plan: Triad paste covered with Mepilex 6x6  Last changed: yesterday afternoon.      Wound #1 Right lateral lower leg or residual limb.  Origin not fully clear as of initial wo nurse inpatient assessment.  With follow up feel it is most accurate to classify as a pressure injury with contributing factors.  Stage/tissue depth:  Unstageable pressure injury due to slough, complicated by prosthetic products friction and subsequent cellulitis infection.  Today as proximal periwound denudement has resolved, only measuring total area of damage and op wound itself as listed  below  - Main wound which includes the partial thickness damage around the deeper distinct wound and the distinct wound itself 4 cm L x 2.5 cm W x 0.5 cm D.  - Distinct full thickness open wound alone measures 3.4 cm L x 2.4 cm W x 0.5 cm D  Tunneling: none noted  Undermining: none noted  Wound bed type/amount: After sharp debulking of slough and eschar today approximately 70 % yellow moist fibrinous slough, 5 % soft black dry eschar and 25% red nongranular tissue: Not currently fluctuant  Wound Edges: open  Periwound: Erythema with no increased warmth to touch 10 cm L x 7 cm W  Drainage: moderate to large amounts serosanguinous.  Odor: none noted currently  Pain: tender to painful at times but much improved pain level.     Photo's from today's visit 5/3/24, initial Wound and Ostomy clinic appointment post hospital discharge 4/24/24.  L - R before debriding x's 2 and after debriding x's 2.        Photo's from 4/24/24.  Inpatient Woc nurse follow, date of discharge home.      Photo's from inpatient woc nurse follow up visit 4/23/24.    Photo from initial woc nurse inpatient consult 4/22/24.  Head is to the left and foot to the right in all photo's taken while inpatient.     Photo from ED 4/21/24.    Dorsalis Pedal Pulse: NA  Posterior Tibial Pulse: NA  Hair growth: none noted in the general area of the right lateral leg wound  Capillary Refill: NA  Feet/toes color: NA  Nails: NA  R Leg residual limb: Edema nonpitting. Ankle circumference NA cm. Calf circumference NA cm.  L Leg: Edema Not assessed this visit. Ankle circumference NA cm. Calf circumference NA cm.    Mobility: Wheelchair use, has prosthetics but not currently wearing on right.    Current offloading/footwear: prosthetic to left LE with sneaker   Sensation: per pt normal sensation at right LE wound, hypersensitive.     Other callusing/areas of concern: none noted, limb has better skin moisture now than while inpatient.    Diet: Regular with awareness of  effects on blood glucose levels.    Assessment:  Today on arrival the dressing in place over the right lateral leg wound was noted for a 3 cm area of strike through dry drainage.  When removed the Mepilex foam noted to have large amount of drainage within the foam and moderate amount of drainage noted pooling in the wound bed.  The wound has started to debride well.  There is more black eschar today than while hospitalized, it was noted to be loose and when palpated large amount of serosanguinous drainage was expressed and then stopped.  Black eschar was approximately 90 % loose.  The wound was recleansed with saline and then with use of sterile Iris scissors I was able to remove the majority of the eschar and a small amount of the loose fibrinous slough below.  The wound has improved in appearance but has it has debrided the open wound has become a little bit larger in length width and depth.  The wound bed remains mostly slough covered but it is very moist and breaking down slowly.   Periwound skin has also improved greatly, scattered denudement and old ruptured blisters have healed, swelling has reduced and is no longer pitting, dark erythema/hyperpigmented periwound skin present has no increased warmth to touch noted.   Appears drainage is a little to great in volume for the Mepilex and Triad to contain well so will add in more absorptive secondary dressing below the Mepilex as listed below.       Barriers to wound healing:   Poor nutrition: inadequate supply of protein, carbohydrates, fatty acids, and trace elements essential for all phases of wound healing. Started teaching today 4/23 on need for increased protein in diet for healing.  Reduced Blood Supply: inadequate perfusion to heal wound, woc will need to eval past vascular studies related to current level of wound,   Medication: NA  Chemotherapy: suppresses the immune system and inflammatory response, NA  Radiotherapy: increases production of free radical  which damage cells, NA  Psychological stress: legitimate concerns related to risk of limb threatening wound   Obesity: decreases tissue perfusion  Infection: prolongs inflammatory phase, uses vital nutrients, impairs epithelialization and releases toxins, cultures pending as of 4/23 at time of woc visit, on IV antibiotics  Underlying Disease: diabetes mellitis   Maceration: reduces wound tensile strength and inhibits epithelial migration, none currently noted, protecting from drainage with Triad paste   Patient compliance, TBD appears motivated to heal  Unrelieved pressure, to be determined but none of current significance noted currently.  Immobility, limited but not immobile  Substance abuse: NA    Plan:  Today we will modify the plan of care to provide a little more absorbency.    Recommend we add in two layers of Aquacel Ag, first to be cut to the same size and shape of the wound and the second can be cut just slightly larger than the wound itself.  Below instructions in italics given to pt in printed AVS from today's visit.  I added some specific information to assist with pt and his neighbor to follow to fine tune the already good job she is doing with the wound cares.   Will continue with weekly visits to clinic, can reduce to less frequent once mostly or fully debrided as appropriate.    Wound cares:  1 - Remove the old dressing.  2 - Wash the wound and surrounding skin with soap and water (rinse with water if using soap and water), saline or the no rinse wound cleanser spray MicroKlenz or similar no rinse wound cleanser.  (Do not try to scrub off dry crusty remains of the Triad paste, the center paste will come off the moist wound bed easily, the edges may have crusty dry paste that can have the surface cleansed but not scrubed off to raw tissue).  3 Apply the Triad paste (yellow tube) to the wound bed at least a nickel thick.  Apply a thinner smear of the Triad paste around the edge of the wound and the  immediate surrounding skin, moist or dry.  You can apply the paste to go over the wound itself directly onto the the cut to fit piece of Aquacel Ag or directly to the skin, which ever works best for you.  4 - The Aquacel goes over the wound, first piece cut to size and shape or slightly smaller and the second can be a square or rectangle shape and cover the edges.  4 - Cover the wound, Aquacel Ag pieces and the Triad paste with a large gentle silicone adhesive foam dressing (using Mepilex 6x6 inch dressings currently).  5 - Change the dressing once daily.    I will see you next week Wednesday 5/8/24 at [phone].    Discussed/Education provided: plan of care with rationale;     Topical care: Wound/surrounding skin cleansed with saline and gauze. Patted dry. Wound cares provided today same as listed in Plan above.     Pt is unable to do self cares due to wound location, has assist from his neighbor who is retired RN, she is able to perform cares/has been caring for wound.    Additional recommendations: None at this time    The following discharge instructions were reviewed with and sent home with the patient:  See AVS    The following supplies were sent home with the patient:  Additional Aqaucel Ag    Return visit: 5/8/24    Verbal, written, & demonstrative education provided.  Face to face time: approximately 50 minutes  Procedure: less than one minute application of Triad paste to promote autolytic debriding    Pipe Leonard RN, CWOCN  [phone]

## 2024-05-07 ENCOUNTER — TRANSFERRED RECORDS (OUTPATIENT)
Dept: HEALTH INFORMATION MANAGEMENT | Facility: CLINIC | Age: 54
End: 2024-05-07
Payer: COMMERCIAL

## 2024-05-07 NOTE — PROGRESS NOTES
Clinic Care Coordination Contact    The patient is looking for a home care that will provide care 6 out of 7 days a week. The patient stated that if this is not something that home care is able to do then he would not want to work with home care. The patient also wanted to know if the home care agencies would be able to provide the wound care supplies or if he would have to obtain a prescription. The patient stated that he is no interested in learning how to care for the wound due to the placement being on the outside of the leg. The patient then stated that he only really needs someone to look at the wound everyday and to redress the wound.     The CHW consulted with the [facility] RN and SW and at this time there are no home care agencies that are able to complete the asks from the patient at this time.    SELVIN Dunn  [phone]  [facility] Care Resource [facility]

## 2024-05-08 ENCOUNTER — HOSPITAL ENCOUNTER (OUTPATIENT)
Dept: WOUND CARE | Facility: CLINIC | Age: 54
Discharge: HOME | End: 2024-05-08
Attending: NURSE PRACTITIONER | Admitting: NURSE PRACTITIONER
Payer: COMMERCIAL

## 2024-05-08 PROCEDURE — G0463 HOSPITAL OUTPT CLINIC VISIT: HCPCS

## 2024-05-08 NOTE — PATIENT INSTRUCTIONS
Pleasure to meet you Eduardo,  I know the Aquacel Ag did not go well, so we'll discontinue the use.  Continue with the Triad and 6 x 6 dressing. Wound is debriding nicely with the triad paste, we are starting to see a lot more healthy tissue.  Newly noted today was the Undermining (pocket) and a tunnel (cavity with depth), they are not very deep so not super concerned at this point, but will need ongoing management.  Pipe will see you back in clinic in a week, call with any questions or concerns.       Wound cares:  1 - Remove the old dressing.  2 - Wash the wound and surrounding skin with soap and water (rinse with water if using soap and water), saline or the no rinse wound cleanser spray MicroKlenz or similar no rinse wound cleanser.  (Do not try to scrub off dry crusty remains of the Triad paste, the center paste will come off the moist wound bed easily, the edges may have crusty dry paste that can have the surface cleansed but not scrubed off to raw tissue).  3 Apply the Triad paste (yellow tube) to the wound bed at least a nickel thick.  Apply a thinner smear of the Triad paste around the edge of the wound and the immediate surrounding skin, moist or dry.  4 - Cover the wound and the Triad paste with a large gentle silicone adhesive foam dressing (using Mepilex 6x6 inch dressings currently).  5 - Change the dressing once daily.      Elizabeth Man RN [facility]  [phone]

## 2024-05-08 NOTE — PROGRESS NOTES
[facility]    Start of Care in [facility]: 4/21/2024 while inpatient, initial clinic visit 5/3/24.  Referring Provider: Dr Fabiola Orlando MD ED provider, initial referral 4/21/24.  Primary Care Provider: Deepti Vega   Wound Location: Right lateral lower leg (residual limb)    Wound Clinic Visit: Initial visit today 5/3/24.    Reason for Visit:  Follow up on right lateral lower leg wound from inpatient assessments.       Subjective:  On arrival today 5/8/24 the pt reports the following: His nurse friend (82 yr old retired nurse) has been helping with wound care, but not feel comofortable using the AquaCel Ag advance.  Patient reports he can't complete cares due to location and inability to see it.  He also reports he's working with orthotics personal to return to using to prothesis.       Wound History:   Pt presented to the ED here at [facility] on 4/21/23 with concerns of right lateral lower leg wound.  Per admission note:  Pt has history of bilateral BKA's (left 2019 and right June of 2023).  He stated 'he thinks it started as a blister sometime in the last week or so (prior to presentation in ED).  He'd been trying a new prosthesis sock that was not the right size and it may have been rubbing.  He was having some pain when he wore the prosthesis but he has no pain now without it on.  There is a bad odor and some clear drainage.  His neighbor who is a retired nurse has been helping him with dressing changes.  He denies fever or chills.  His sugars have been high.  He stated he does not feel well when his sugar is below 180 and only uses insulin accordingly.  Wound was swab cultured in the ED, aerobic cultures pending.  At [facility] nurse initial meeting (4/22) with pt, family and neighbor gave some additional information on the origin and progression of the wound.  Appeared pretty rapidly, no sure if there was any initial blistering.  Pt believes the primary cause of  the wound was friction and not direct pressure.  I was able to see photo's of the wounds development off the pt's mom's phone.  Initial photo, not a great image but as best able to see, showed hypopigmented site with distinct boarders, no open tissue, did not appear like slough.  Second photo is few days later than first, is a better image and shows what appears to be a full thickness ulcer with mostly clean red nongranular wound bed and open wound edges.  Periwound skin is not well shown in either photo  Pain to the posterior knee and proximal periwound skin increased and per pt's neighbor, small serous clear fluid blisters appeared briefly and ruptured.  Entire surrounding skin more to the proximal than distal had corresponding erythema with no significant increased warmth to touch noted.  Initial woc nurse inpatient visit 4/22/24, Triad paste started to promote autolytic debridement of slough and eschar and to protect dry intact periwound skin, and dry out proximal periwound moist skin, Mepilex 6x6 covered.  Woc return 4/23, no change to plan of care for the wound topically but instructed pt to resume wearing his protective and compressive sleeve to the right residual limb over the wound dressing.  Woc returned 4/24, wound bed slough started to debride some.  Cultures results returned, positive for streptococcus constellatus.  Dr Ardon approved discharge home same day 4/24/24 on oral antibiotics, picc line removed.  Pt called into clinic a few times after discharge, prior to his initial clinic visit with concerns and questions.  Receiving assist from his neighbor but both had concerns and pictures sent in for evaluation by woc, all appeared to be progressing well.      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    Positive for macroalbuminuria    Retinopathy due to secondary diabetes mellitus (H)    Severe diabetic hypoglycemia (H)    Type 1 diabetes mellitus with other diabetic ophthalmic complication (H)    Stage 3a chronic kidney disease (H)    Statin declined    Primary osteoarthritis of right knee    Current moderate episode of major depressive disorder without prior episode (H)    Hypertension    YESSICA (acute kidney injury) (H24)    Type 1 diabetes mellitus with hyperglycemia (H)    Diabetic ulcer of right lower leg (H)    Cellulitis of right lower extremity    Anemia, unspecified type    Hyperkalemia    Hyponatremia    Hypoalbuminemia    PAD (peripheral artery disease) (H24)    S/P bilateral BKA (below knee amputation) (H)             Tobacco Use:     Tobacco Use      Smoking status: Never      Smokeless tobacco: Never     Diabetic: Type 1  HgbA1C:   Hemoglobin A1C   Date Value Ref Range Status   05/01/2024 12.7 (H) 0.0 - 5.6 % Final     Comment:     Normal <5.7%   Prediabetes 5.7-6.4%    Diabetes 6.5% or higher     Note: Adopted from ADA consensus guidelines.   05/21/2021 10.9 (H) 0 - 5.6 % Final     Comment:     Normal <5.7% Prediabetes 5.7-6.4%  Diabetes 6.5% or higher - adopted from ADA   consensus guidelines.     Checks Blood Glucose?:  yes, but frequency was not assessed.  Average readings: 170-270    Personal/social history:  pt lives with family independently, has had home care services, most recently with [facility].     Objective:   Current treatment plan: Triad paste covered with Mepilex 6x6  Last changed: yesterday afternoon.      Wound #1 Right lateral lower leg or residual limb.  Origin not fully clear as of initial [facility] nurse inpatient assessment.  With follow up feel it is most accurate to classify as a pressure injury with contributing factors.  Stage/tissue depth:  Unstageable pressure injury due to slough, complicated by  prosthetic products friction and subsequent cellulitis infection.  Today as proximal periwound denudement has resolved, only measuring total area of damage and op wound itself as listed below  - Main wound which includes the partial thickness damage around the deeper distinct wound and the distinct wound itself 4 cm L x 2.2 cm W x 0.8 cm D.  - Distinct full thickness open wound alone measures 3.4 cm L x 2.4 cm W x 0.5 cm D, not measured 5/8/24, woc error  Tunneling: new as of 5/8/24 @ 11 o clock 0.8 cm  Undermining: new as of 5/8/24 5-10 o clock max of 0.5 cm  Wound bed type/amount: approximately 60 % yellow moist fibrinous slough, 40% red nongranular tissue: Not currently fluctuant  Wound Edges: open  Periwound: faint blanchable erythema halo, max of 1 cm wide.  Remains of triad paste  Drainage: small serosanguinous.  Odor: none noted currently  Pain: Patient reports area to be numb  Photos from today's visit 5/8/24    Photo's from 5/3/24, initial Wound and Ostomy clinic appointment post hospital discharge 4/24/24.  L - R before debriding x's 2 and after debriding x's 2.        Photo's from 4/24/24.  Inpatient Woc nurse follow, date of discharge home.      Photo's from inpatient woc nurse follow up visit 4/23/24.    Photo from initial woc nurse inpatient consult 4/22/24.  Head is to the left and foot to the right in all photo's taken while inpatient.     Photo from ED 4/21/24.    Dorsalis Pedal Pulse: NA  Posterior Tibial Pulse: NA  Hair growth: none noted in the general area of the right lateral leg wound  Capillary Refill: NA  Feet/toes color: NA  Nails: NA  R Leg residual limb: Edema nonpitting. Ankle circumference NA cm. Calf circumference NA cm.  L Leg: Edema Not assessed this visit. Ankle circumference NA cm. Calf circumference NA cm.    Mobility: Wheelchair use, has prosthetics but not currently wearing on right.    Current offloading/footwear: prosthetic to left LE with sneaker   Sensation: per pt normal  sensation at right LE wound, hypersensitive.     Other callusing/areas of concern: none noted, limb has better skin moisture now than while inpatient.    Diet: Regular with awareness of effects on blood glucose levels.    Assessment:  Mepilex dressing in place on arrival, when removed only noted to have small amount of drainage to dressing. Triad paste is debriding wound very well, larger amount of red non granular tissue and less amount of slough tissue. Slough tissue continue to have the fibrous appearance.  With the debridement of slough tissue, noted new tunnel and undermining.  Tunnel is at 11 o clock, with max depth of 0.8 cm and undermining goes from 5 o clock to 10 o clock, with max depth of 0.5 cm. Periwound has faint blanchable erythema halo, max of 1 cm wide, significantly less the previously noted/charted. Erythema is not warm to touch and no signs of active infection. Over all wound is progressing well.      Barriers to wound healing:   Poor nutrition: inadequate supply of protein, carbohydrates, fatty acids, and trace elements essential for all phases of wound healing. Teaching done 4/23 on need for increased protein in diet for healing.  Reduced Blood Supply: inadequate perfusion to heal wound, woc will need to eval past vascular studies related to current level of wound,   Medication: NA  Chemotherapy: suppresses the immune system and inflammatory response, NA  Radiotherapy: increases production of free radical which damage cells, NA  Psychological stress: legitimate concerns related to risk of limb threatening wound   Obesity: decreases tissue perfusion  Infection: prolongs inflammatory phase, uses vital nutrients, impairs epithelialization and releases toxins, cultures pending as of 4/23 at time of woc visit, on IV antibiotics  Underlying Disease: diabetes mellitis   Maceration: reduces wound tensile strength and inhibits epithelial migration, none currently noted, protecting from drainage with  Triad paste   Patient compliance, TBD appears motivated to heal  Unrelieved pressure, to be determined but none of current significance noted currently.  Immobility, limited but not immobile  Substance abuse: NA    Plan:  With Patient reporting his nurse friend not feeling comfortable with AquaCel Ag, drainage decreasing, and wound debriding well with Triad will hold off on the AquaCel Ag.  Will need close assessment of the newly noted tunnel and undermining, at this time measurements are under 1 cm, if increase to over 1 cm may need to look into packing open space. For now will have caregiver/family try fill open space with Triad paste. Patient will complete cares as listed below and follow up at clinic in 1 week with [facility] woc.       Discussed/Education provided: plan of care with rationale;     Topical cares to right lateral leg/residual limb:      1 - Remove the old dressing.  2 - Wash the wound and surrounding skin with soap and water (rinse with water if using soap and water), saline or the no rinse wound cleanser spray MicroKlenz or similar no rinse wound cleanser.  (Do not try to scrub off dry crusty remains of the Triad paste, the center paste will come off the moist wound bed easily, the edges may have crusty dry paste that can have the surface cleansed but not scrubed off to raw tissue).  3 Apply the Triad paste (yellow tube) to the wound bed at least a nickel thick.  Try to fill open space with triad paste  4 - Cover the wound and the Triad paste with a large gentle silicone adhesive foam dressing (using Mepilex 6x6 inch dressings currently).  5 - Change the dressing once daily.    Pt is unable to do self cares due to wound location, has assist from his neighbor who is retired RN, she is able to perform cares/has been caring for wound.    Additional recommendations: None at this time    The following discharge instructions were reviewed with and sent home with the patient:  See AVS    The following supplies  were sent home with the patient:  Remains of open Triad paste and few extra Mepilex     Return visit: 5/15/24    Verbal, written, & demonstrative education provided.  Face to face time: approximately 50 minutes  Procedure: less than one minute application of Triad paste to promote autolytic debriding    Elizabeth Man RN OCN  [phone]

## 2024-05-14 NOTE — TELEPHONE ENCOUNTER
PA Initiation    Medication: TRESIBA FLEXTOUCH 100 UNIT/ML SC SOPN  Insurance Company: Cm - Phone [phone] Fax [phone]  Pharmacy Filling the Rx: [facility] - [place], [place] - [facility] [address]  Filling Pharmacy Phone: [phone]  Filling Pharmacy Fax: [phone]  Start Date: 5/14/2024

## 2024-05-15 ENCOUNTER — HOSPITAL ENCOUNTER (OUTPATIENT)
Dept: WOUND CARE | Facility: CLINIC | Age: 54
Discharge: HOME | End: 2024-05-15
Attending: NURSE PRACTITIONER | Admitting: NURSE PRACTITIONER
Payer: COMMERCIAL

## 2024-05-15 PROCEDURE — 97602 WOUND(S) CARE NON-SELECTIVE: CPT

## 2024-05-15 NOTE — PROGRESS NOTES
[facility]    Start of Care in [facility]: 4/21/2024 while inpatient, initial clinic visit 5/3/24.  Referring Provider: Dr Fabiola Orlando MD ED provider, initial referral 4/21/24.  Primary Care Provider: Deepti Vega   Wound Location: Right lateral lower leg (residual limb)    Wound Clinic Visit:  Scheduled follow up: (Seen last visit 5/8 by my co wo nurse, at that visit he reported his neighbor who was assisting with wound cares did not use the recommended Aqaucel Ag, used for better absorbency of drainage, as she was afraid of packing the wound.  At the 5/8 visit the wound had progressed with debriding well but as debriding occurred tunneling and undermining was noted, drainage had reduced.  No change to the plan of care was made as tunnel and undermining both under 1 cm in depth and Triad would be able to make contact with those inner depth areas adequately.)       Reason for Visit:  Follow up on right lateral lower leg wound from inpatient assessments.       Subjective:  On arrival today 5/15/24 with his mom and dad, he reports the following:  Visit with co woc nurse was a concern as pt felt she was more aggressive in her cares, had to probe the newly found undermining and tunneling of the right lateral leg wound, this caused him pain and bleeding.  He met with his orthotist for his right prosthetic.  Provider did not feel pt should be wearing his prosthetic until the wound was healed on the right leg.  Pt wants to use the prosthetic but very limited, just to get up and down stairs 1 - 2 times daily, and when he goes to get the mail (at which time he walks out of the home and rides an ATV to get mail and return).  He estimates less than 30 minutes time daily.  He reports orthotist cut hole out of older prosthetic to off load the wound area, no pressure applied.  Orthotist recommended when out of the prosthetic to use compession sleeves over his right limb and  wound site, instead of the prosthetic rubber sleeve.  Felt the rubber sleeve may hold in too much moisture and heat while compression sleeves would breath more.  His neighbor who is doing his wound cares had concerns of periwound skin being at times more deep red and others more pink.  He noted it was more deep red when he wears his rubber sleeve and sock for his prosthetic as opposed to the compression sleeves.  His mom will be doing his cares for the next week or so with his neighbor being out of town.    Drainage from the wound has been a little less this past week.       Wound History:   Pt presented to the ED here at [facility] on 4/21/23 with concerns of right lateral lower leg wound.  Per admission note:  Pt has history of bilateral BKA's (left 2019 and right June of 2023).  He stated 'he thinks it started as a blister sometime in the last week or so (prior to presentation in ED).  He'd been trying a new prosthesis sock that was not the right size and it may have been rubbing.  He was having some pain when he wore the prosthesis but he has no pain now without it on.  There is a bad odor and some clear drainage.  His neighbor who is a retired nurse has been helping him with dressing changes.  He denies fever or chills.  His sugars have been high.  He stated he does not feel well when his sugar is below 180 and only uses insulin accordingly.  Wound was swab cultured in the ED, aerobic cultures pending.  At [facility] nurse initial meeting (4/22) with pt, family and neighbor gave some additional information on the origin and progression of the wound.  Appeared pretty rapidly, no sure if there was any initial blistering.  Pt believes the primary cause of the wound was friction and not direct pressure.  I was able to see photo's of the wounds development off the pt's mom's phone.  Initial photo, not a great image but as best able to see, showed hypopigmented site with distinct boarders, no open tissue, did not appear like  slough.  Second photo is few days later than first, is a better image and shows what appears to be a full thickness ulcer with mostly clean red nongranular wound bed and open wound edges.  Periwound skin is not well shown in either photo  Pain to the posterior knee and proximal periwound skin increased and per pt's neighbor, small serous clear fluid blisters appeared briefly and ruptured.  Entire surrounding skin more to the proximal than distal had corresponding erythema with no significant increased warmth to touch noted.  Initial woc nurse inpatient visit 4/22/24, Triad paste started to promote autolytic debridement of slough and eschar and to protect dry intact periwound skin, and dry out proximal periwound moist skin, Mepilex 6x6 covered.  Woc return 4/23, no change to plan of care for the wound topically but instructed pt to resume wearing his protective and compressive sleeve to the right residual limb over the wound dressing.  Woc returned 4/24, wound bed slough started to debride some.  Cultures results returned, positive for streptococcus constellatus.  Dr Ardon approved discharge home same day 4/24/24 on oral antibiotics, picc line removed.  Pt called into clinic a few times after discharge, prior to his initial clinic visit with concerns and questions.  Receiving assist from his neighbor but both had concerns and pictures sent in for evaluation by [facility], all appeared to be progressing well.  5/8/24 clinic visit with Ly NELSON RN cwocn, new tunneling present, 11 o'clock 0.8 cm, with small amount of undermining noted 5 - 10 o'clock 0.5 cm max.     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    Positive for macroalbuminuria     Retinopathy due to secondary diabetes mellitus (H)    Severe diabetic hypoglycemia (H)    Type 1 diabetes mellitus with other diabetic ophthalmic complication (H)    Stage 3a chronic kidney disease (H)    Statin declined    Primary osteoarthritis of right knee    Current moderate episode of major depressive disorder without prior episode (H)    Hypertension    YESSICA (acute kidney injury) (H24)    Type 1 diabetes mellitus with hyperglycemia (H)    Diabetic ulcer of right lower leg (H)    Cellulitis of right lower extremity    Anemia, unspecified type    Hyperkalemia    Hyponatremia    Hypoalbuminemia    PAD (peripheral artery disease) (H24)    S/P bilateral BKA (below knee amputation) (H)             Tobacco Use:     Tobacco Use      Smoking status: Never      Smokeless tobacco: Never     Diabetic: Type 1  HgbA1C:   Hemoglobin A1C   Date Value Ref Range Status   05/01/2024 12.7 (H) 0.0 - 5.6 % Final     Comment:     Normal <5.7%   Prediabetes 5.7-6.4%    Diabetes 6.5% or higher     Note: Adopted from ADA consensus guidelines.   05/21/2021 10.9 (H) 0 - 5.6 % Final     Comment:     Normal <5.7% Prediabetes 5.7-6.4%  Diabetes 6.5% or higher - adopted from ADA   consensus guidelines.     Checks Blood Glucose?:  yes, but frequency was not assessed.  Average readings: 170-270    Personal/social history:  pt lives with family independently, has had home care services, most recently with [facility].     Objective:   Current treatment plan: Triad paste covered with Mepilex 6x6  Last changed: yesterday afternoon     Wound #1 Right lateral lower leg or residual limb.   Stage/tissue depth:  Unstageable pressure injury due to slough, complicated by prosthetic products friction and subsequent cellulitis infection.  Now the wound site is just the open wound so only one measurement of outer size and depth at this time.  3.7 cm L x 2 cm W x 0.5 cm D  Tunneling: newly noted 5/8/24, 11 o clock 0.5 cm    Undermining: newly noted 5/8/24, 5 -10  o clock max of 0.4 cm, also noted today at 5 - 6 o'clock undermining has closed.    Wound bed type/amount: approximately 20 % granular tissue in most proximal aspect of the wound bed, 30 % red nongranular tissue and 50 % yellow to white moist fibrinous slough: Not currently fluctuant  Wound Edges: open  Periwound: faint blanchable erythema halo, max of 1 cm wide.  Remains of triad paste  Drainage: small serosanguinous.  Odor: none noted currently  Pain: less numb, more normal sensation and pain with probing today  Photo's from today's visit 5/15/24.  Pt's head is to the left and distal end of residual limb to the right, in the photo's above.    Photo's from 5/8/24.      Photo's from 5/3/24, initial Wound and Ostomy clinic appointment post hospital discharge 4/24/24.  L - R before debriding x's 2 and after debriding x's 2.        Photo's from 4/24/24.  Inpatient Woc nurse follow, date of discharge home.      Photo's from inpatient woc nurse follow up visit 4/23/24.    Photo from initial woc nurse inpatient consult 4/22/24.  Head is to the left and foot to the right in all photo's taken while inpatient.     Photo from ED 4/21/24.    Dorsalis Pedal Pulse: NA  Posterior Tibial Pulse: NA  Hair growth: none noted in the general area of the right lateral leg wound  Capillary Refill: NA  Feet/toes color: NA  Nails: NA  R Leg residual limb: Edema nonpitting. Ankle circumference NA cm. Calf circumference NA cm.  L Leg: Edema Not assessed this visit. Ankle circumference NA cm. Calf circumference NA cm.    Mobility: Wheelchair use, has prosthetics but not currently wearing on right.    Current offloading/footwear: prosthetic to left LE with sneaker   Sensation: per pt normal sensation at right LE wound, hypersensitive.     Other callusing/areas of concern: none noted, limb has better skin moisture now than while inpatient.    Diet: Regular with awareness of effects on blood glucose levels.    Assessment:  Mepilex dressing in  place on arrival, when removed only noted to have small amount of drainage to dressing and periwound skin but not pooling in the area.   Wound was cleansed with saline and dried well with gauze.  The wound has improved some since last visit, now is just the distinct full thickness wound directly up to the wounds edge.  Wound bed is debriding well, remaining slough is very light in color, more fibrinous in appearance and thinner.  Proximal aspect of the wound bed has started to grow granular tissue.  The periwound skin is all intact, some deep pigmented erythema with no increased warmth to touch noted, edema appears mostly resolved in the limb and only some localized wound edge edema present.  Tunnel is at 11 o clock, with max depth of 0.5 cm today, 0.3 cm less than noted last week.  Undermining also has improved, was from 5 - 10 o clock, with max depth of 0.5 cm at last visit's initial assessment.  Today undermining is limited to 7 - 10 o'clock with the 5 - 6 area now closed again.  Remaining undermining has reduced in depth also 0.4 cm D max.    Drainage does appear somewhat less today.  With probing of the tunnel and undermining areas the wound did have small amount of steady sanguinous drainage that stopped without any significant pressure, just dabbed with gauze and left covered for a few minutes, when removed had stopped bleeding.     Barriers to wound healing:   Poor nutrition: inadequate supply of protein, carbohydrates, fatty acids, and trace elements essential for all phases of wound healing. Teaching done 4/23 on need for increased protein in diet for healing, reminder at each visit.  Today 5/15 did teaching on continued need for protein once the wound is scar closed for remodeling.  Pt is struggling with protein intake currently but will continue to try to keep levels high.  Reduced Blood Supply: inadequate perfusion to heal wound, woc will need to eval past vascular studies related to current level of  wound,   Medication: NA  Chemotherapy: suppresses the immune system and inflammatory response, NA  Radiotherapy: increases production of free radical which damage cells, NA  Psychological stress: legitimate concerns related to risk of limb threatening wound   Obesity: decreases tissue perfusion  Infection: prolongs inflammatory phase, uses vital nutrients, impairs epithelialization and releases toxins.  Infection appears to be resolved, pt is done with all antibiotics.   Underlying Disease: diabetes mellitis   Maceration: reduces wound tensile strength and inhibits epithelial migration, none currently noted, protecting from drainage with Triad paste   Patient compliance, appears motivated to heal  Unrelieved pressure, to be determined but none of current significance noted currently.  Immobility, limited but not immobile  Substance abuse: NA    Plan:  We will continue with the same plan of care for the wound as listed below.  As long as tunneling and undermining is less than 1 cm, no need to pack with any primary dressing other than Triad paste.  Did teaching review with the pt's mom on cares today, press Triad paste with tongue depressor into tunneling and undermining.  Can cleanse by spraying wound spray on the wound directly then cleansing with gauze, or to spray on the gauze and dab or wipe the wound clean.  No problem with any small wrinkles that may occur then applying the foam dressing, outer adhesive boarder is ok with small folds or creases.  As long as there is no skin breakdown from moisture I feels its ok to use the compression sleeves for the right LE or the prosthetic sleeve, monitor erythema and any other concerns.  Be sure to limit time with prosthetic (off loaded modified prosthetic) to 30 minutes or less daily.  Woc more concerned with the potential effects on the open tissue and wound have arterial or venous flow restricted by the prosthetics hole.  Monitor before and after each use.        Discussed/Education provided: plan of care with rationale;     Topical cares to right lateral leg/residual limb:    1 - Remove the old dressing.  2 - Wash the wound and surrounding skin with soap and water (rinse with water if using soap and water), saline or the no rinse wound cleanser spray MicroKlenz or similar no rinse wound cleanser.  (Do not try to scrub off dry crusty remains of the Triad paste, the center paste will come off the moist wound bed easily, the edges may have crusty dry paste that can have the surface cleansed but not scrubed off to raw tissue).  3 Apply the Triad paste (yellow tube) to the wound bed at least a nickel thick.  Try to fill open space with triad paste  4 - Cover the wound and the Triad paste with a large gentle silicone adhesive foam dressing (using Mepilex 6x6 inch dressings currently).  5 - Change the dressing once daily.    Pt is unable to do self cares due to wound location, has assist from his neighbor who is retired RN, she is able to perform cares/has been caring for wound.    Additional recommendations: None at this time    The following discharge instructions were reviewed with and sent home with the patient:  See AVS      The following supplies were sent home with the patient:  Few extra Mepilex and tongue depressors.     Return visit: Wed 5/22 and Wed 5/29/24 both at 10 am.     Verbal, written, & demonstrative education provided.  Face to face time: approximately 45 minutes  Procedure: less than one minute application of Triad paste to promote autolytic debriding of slough    Pipe Leonard RN [facility]  [phone]

## 2024-05-15 NOTE — PATIENT INSTRUCTIONS
Wound cares:  1 - Remove the old dressing.  2 - Wash the wound and surrounding skin with soap and water (rinse with water if using soap and water), saline or the no rinse wound cleanser spray MicroKlenz or similar no rinse wound cleanser.  (Do not try to scrub off dry crusty remains of the Triad paste, the center paste will come off the moist wound bed easily, the edges may have crusty dry paste that can have the surface cleansed but not scrubed off to raw tissue).  3 Apply the Triad paste (yellow tube) to the wound bed at least a nickel thick.  Apply a thinner smear of the Triad paste around the edge of the wound and the immediate surrounding skin, moist or dry.  4 - Cover the wound and the Triad paste with a large gentle silicone adhesive foam dressing (using Mepilex 6x6 inch dressings currently).  5 - Change the dressing once daily.    I will see you again next week Wednesday and the week after.    Call with any concerns or questions [phone].    Pipe Leonard RN cwocn

## 2024-05-15 NOTE — TELEPHONE ENCOUNTER
Prior Authorization Approval    Medication: TRESIBA FLEXTOUCH 100 UNIT/ML SC SOPN  Authorization Effective Date: 5/14/2024  Authorization Expiration Date: 5/14/2025  Approved Dose/Quantity:   Reference #:     Insurance Company: [employer] - Phone [phone] Fax [phone]  Expected CoPay: $    CoPay Card Available:      Financial Assistance Needed:   Which Pharmacy is filling the prescription: [facility] - [place], [place] - [address]  Pharmacy Notified: YES  Patient Notified: **Instructed pharmacy to notify patient when script is ready to /ship.**

## 2024-05-20 ENCOUNTER — VIRTUAL VISIT (OUTPATIENT)
Dept: FAMILY MEDICINE | Facility: OTHER | Age: 54
End: 2024-05-20
Payer: COMMERCIAL

## 2024-05-20 DIAGNOSIS — F41.1 GAD (GENERALIZED ANXIETY DISORDER): Primary | ICD-10-CM

## 2024-05-20 PROCEDURE — 99443 PR PHYSICIAN TELEPHONE EVALUATION 21-30 MIN: CPT | Mod: 93 | Performed by: NURSE PRACTITIONER

## 2024-05-20 RX ORDER — BUPROPION HYDROCHLORIDE 150 MG/1
300 TABLET ORAL EVERY MORNING
Qty: 60 TABLET | Refills: 0 | Status: SHIPPED | OUTPATIENT
Start: 2024-05-20

## 2024-05-20 NOTE — PROGRESS NOTES
Eduardo is a 53 year old who is being evaluated via a billable telephone visit.    What phone number would you like to be contacted at? [phone]   How would you like to obtain your AVS? [employer]  Originating Location (pt. Location): Home  Distant Location (provider location):  Off-site    Assessment & Plan         CARLOS (generalized anxiety disorder)  Would like to trial increase dose of Wellbutrin. Discussed what to do if he feels this is too much for him.   Discussed medication and how this can be helpful including non-pharmacological ways to help with mental health  Encouraged follow up with me via [employer].   All questions and concerns answered regarding medication side effects and long term benefits.   - buPROPion (WELLBUTRIN XL) 150 MG 24 hr tablet; Take 2 tablets (300 mg) by mouth every morning (Patient not taking: Reported on 5/28/2024)          See Patient Instructions    Subjective   Eduardo is a 53 year old, presenting for the following health issues:  No chief complaint on file.        5/1/2024    10:48 AM   Additional Questions   Roomed by Madiha VALENZUELA     Medication Followup of   buPROPion (WELLBUTRIN XL) 150 MG 24 hr tablet  Taking Medication as prescribed: yes  Side Effects:  None  Medication Helping Symptoms:  Symptoms did not improve   Overall this medication he thinks could be helping.   Patient has stressors and if he feels backed into a corner he will have more frustrations.   His sister and niece stated that they could tell a difference.   Having a bit of different stools, will monitor this.           Review of Systems  Constitutional, HEENT, cardiovascular, pulmonary, gi and gu systems are negative, except as otherwise noted.      Objective           Vitals:  No vitals were obtained today due to virtual visit.    Physical Exam   General: Alert and no distress //Respiratory: No audible wheeze, cough, or shortness of breath // Psychiatric:  Appropriate affect, tone, and pace of  words      Diagnostic: None       Phone call duration: 23 minutes  Signed Electronically by: NATASHA Queen CNP

## 2024-05-22 ENCOUNTER — TELEPHONE (OUTPATIENT)
Dept: WOUND CARE | Facility: CLINIC | Age: 54
End: 2024-05-22

## 2024-05-22 NOTE — TELEPHONE ENCOUNTER
Reason for Call:  Other appointment    Detailed comments:  Patient had to cancel his appointment with Pipe today due to some stomach issues. He's wondering if he can be seen tomorrow or Friday? Please call him at [phone]    Phone Number Patient can be reached at: Home number on file [phone] (home)    Best Time:  any       Can we leave a detailed message on this number? YES    Call taken on 5/22/2024 at 9:23 AM by Mary Cabrera

## 2024-05-23 ENCOUNTER — HOSPITAL ENCOUNTER (OUTPATIENT)
Dept: WOUND CARE | Facility: CLINIC | Age: 54
Discharge: HOME | End: 2024-05-23
Attending: NURSE PRACTITIONER | Admitting: NURSE PRACTITIONER
Payer: COMMERCIAL

## 2024-05-23 PROCEDURE — 97602 WOUND(S) CARE NON-SELECTIVE: CPT

## 2024-05-23 NOTE — PATIENT INSTRUCTIONS
Wound's progressing well.  No change to the plan of care. Triad paste covered with the Mepilex dressing.  Change daily.    I will see you the next two weeks.  Wed 5/29 at 10 am  Thurs 6/1 at 10 am.    Call with any concerns or questions [phone].  Ppie Leonard RN cwocn

## 2024-05-23 NOTE — PROGRESS NOTES
[facility]    Start of Care in [facility]: 4/21/2024 while inpatient, initial clinic visit 5/3/24.  Referring Provider: Dr Fabiola Orlando MD ED provider, initial referral 4/21/24.  Primary Care Provider: Deepti Vega   Wound Location: Right lateral lower leg (residual limb)    Wound Clinic Visit:  Rescheduled follow up, moved from yesterday due to pt GI upset.      Reason for Visit:  Follow up on right lateral lower leg wound.     Subjective:  On arrival today 5/23/24 with his farther, pt reports the following:  Has not been feeling well last week, GI upset, diarrhea. Had had limited activity due to not feeling well, only using right prosthetic occasionally and only for a few minutes each time, mainly to transfer or ambulate short distance.  His mom has been doing the wound cares, no new concerns noted.  Pt's mom not with today but had question/concern about not being able to remove 100 % off the dry periwound Triad paste with each bandage change.  No odor, no increased pain, drainage appears well contained over 24 hour period with current plan of care.       Wound History:   Pt presented to the ED here at [facility] on 4/21/23 with concerns of right lateral lower leg wound.  Per admission note:  Pt has history of bilateral BKA's (left 2019 and right June of 2023).  He stated 'he thinks it started as a blister sometime in the last week or so (prior to presentation in ED).  He'd been trying a new prosthesis sock that was not the right size and it may have been rubbing.  He was having some pain when he wore the prosthesis but he has no pain now without it on.  There is a bad odor and some clear drainage.  His neighbor who is a retired nurse has been helping him with dressing changes.  He denies fever or chills.  His sugars have been high.  He stated he does not feel well when his sugar is below 180 and only uses insulin accordingly.  Wound was swab cultured in the ED,  aerobic cultures pending.  At wo nurse initial meeting (4/22) with pt, family and neighbor gave some additional information on the origin and progression of the wound.  Appeared pretty rapidly, no sure if there was any initial blistering.  Pt believes the primary cause of the wound was friction and not direct pressure.  I was able to see photo's of the wounds development off the pt's mom's phone.  Initial photo, not a great image but as best able to see, showed hypopigmented site with distinct boarders, no open tissue, did not appear like slough.  Second photo is few days later than first, is a better image and shows what appears to be a full thickness ulcer with mostly clean red nongranular wound bed and open wound edges.  Periwound skin is not well shown in either photo  Pain to the posterior knee and proximal periwound skin increased and per pt's neighbor, small serous clear fluid blisters appeared briefly and ruptured.  Entire surrounding skin more to the proximal than distal had corresponding erythema with no significant increased warmth to touch noted.  Initial wo nurse inpatient visit 4/22/24, Triad paste started to promote autolytic debridement of slough and eschar and to protect dry intact periwound skin, and dry out proximal periwound moist skin, Mepilex 6x6 covered.  Woc return 4/23, no change to plan of care for the wound topically but instructed pt to resume wearing his protective and compressive sleeve to the right residual limb over the wound dressing.  Woc returned 4/24, wound bed slough started to debride some.  Cultures results returned, positive for streptococcus constellatus.  Dr Ardon approved discharge home same day 4/24/24 on oral antibiotics, picc line removed.  Pt called into clinic a few times after discharge, prior to his initial clinic visit with concerns and questions.  Receiving assist from his neighbor but both had concerns and pictures sent in for evaluation by [facility], all appeared to  be progressing well.  5/8/24 clinic visit with Ly NELSON RN cwocn, new tunneling present, 11 o'clock 0.8 cm, with small amount of undermining noted 5 - 10 o'clock 0.5 cm max.     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    Positive for macroalbuminuria    Retinopathy due to secondary diabetes mellitus (H)    Severe diabetic hypoglycemia (H)    Type 1 diabetes mellitus with other diabetic ophthalmic complication (H)    Stage 3a chronic kidney disease (H)    Statin declined    Primary osteoarthritis of right knee    Current moderate episode of major depressive disorder without prior episode (H)    Hypertension    YESSICA (acute kidney injury) (H24)    Type 1 diabetes mellitus with hyperglycemia (H)    Diabetic ulcer of right lower leg (H)    Cellulitis of right lower extremity    Anemia, unspecified type    Hyperkalemia    Hyponatremia    Hypoalbuminemia    PAD (peripheral artery disease) (H24)    S/P bilateral BKA (below knee amputation) (H)             Tobacco Use:     Tobacco Use      Smoking status: Never      Smokeless tobacco: Never     Diabetic: Type 1  HgbA1C:   Hemoglobin A1C   Date Value Ref Range Status   05/01/2024 12.7 (H) 0.0 - 5.6 % Final     Comment:     Normal <5.7%   Prediabetes 5.7-6.4%    Diabetes 6.5% or higher     Note: Adopted from ADA consensus guidelines.   05/21/2021 10.9 (H) 0 - 5.6 % Final     Comment:     Normal <5.7% Prediabetes 5.7-6.4%  Diabetes 6.5% or higher - adopted from ADA   consensus guidelines.     Checks Blood Glucose?:  yes, but frequency was not assessed.  Average readings: high 100's to high 200's    Personal/social history:  pt lives with family independently, has had home care services, most recently  with [facility].     Objective:   Current treatment plan: Triad paste covered with Mepilex 6x6  Last changed: yesterday afternoon     Wound #1 Right lateral lower leg or residual limb.   Stage/tissue depth:  Unstageable pressure injury due to slough, complicated by prosthetic products friction and subsequent cellulitis infection.  Now the wound site is just the open wound so only one measurement of outer size and depth at this time.  3.4 cm L x 2 cm W x 0.5 cm D  Tunneling: newly noted 5/8/24, 11 o clock 0.8 cm    Undermining: newly noted 5/8/24, 5 -10 o clock max of 0.5 cm, also noted today at 5 - 6 o'clock undermining has closed.    Wound bed type/amount: approximately 20 % granular tissue in most proximal aspect of the wound bed, 40 % red nongranular tissue and 40 % yellow to white moist fibrinous slough: Not currently fluctuant  Wound Edges: open  Periwound: faint blanchable erythema halo, max of 1 cm wide, localized edema of the wound edge and periwound skin from 4 to 7 o'clock.    Drainage: small to moderate amounts serosanguinous.  Small amounts sanguinous when tunnel and undermining was probed.  Odor: none noted currently  Pain: less numb, more normal sensation and pain with probing today  Photo's from today's visit 5/23/24.  Pt's head is to the left and distal end of residual limb to the right, in the photo's above.    Photo's from 5/15/24.  Pt's head is to the left and distal end of residual limb to the right, in the photo's above.    Photo's from 5/8/24.      Photo's from 5/3/24, initial Wound and Ostomy clinic appointment post hospital discharge 4/24/24.  L - R before debriding x's 2 and after debriding x's 2.        Photo's from 4/24/24.  Inpatient Woc nurse follow, date of discharge home.      Photo's from inpatient woc nurse follow up visit 4/23/24.    Photo from initial woc nurse inpatient consult 4/22/24.  Head is to the left and foot to the right in all photo's taken while inpatient.     Photo from ED  4/21/24.    Dorsalis Pedal Pulse: NA  Posterior Tibial Pulse: NA  Hair growth: none noted in the general area of the right lateral leg wound  Capillary Refill: NA  Feet/toes color: NA  Nails: NA  R Leg residual limb: Edema nonpitting. Ankle circumference NA cm. Calf circumference NA cm.  L Leg: Edema Not assessed this visit. Ankle circumference NA cm. Calf circumference NA cm.    Mobility: Wheelchair use, has prosthetics but not currently wearing on right.    Current offloading/footwear: prosthetic to left LE with sneaker   Sensation: per pt normal sensation at right LE wound, hypersensitive.     Other callusing/areas of concern: none noted, limb has better skin moisture now than while inpatient.    Diet: Regular with awareness of effects on blood glucose levels.    Assessment:  Mepilex dressing in place on arrival, shadow of dry drainage noted on outer dressing.  When removed only noted to have small amount of drainage to dressing and periwound skin but not pooling in the area.   Wound was cleansed with saline and dried well with gauze.  The wound has improved again since last visit, less slough, more clean tissue, no significant change to tunnel or undermining, undermining improved in some areas.  Size of the wound is stable to slightly improved.  Wound depth is most at the distal end, proximal end is filling in well and nearing flush.  Periwound skin intact, deep erythema/hyperpigmentation present with no increased warmth to touch and is paling some with time.  No odor noted, no signs of infection noted.     Barriers to wound healing:   Poor nutrition: inadequate supply of protein, carbohydrates, fatty acids, and trace elements essential for all phases of wound healing. Teaching done 4/23 on need for increased protein in diet for healing, reminder at each visit.  Today 5/15 did teaching on continued need for protein once the wound is scar closed for remodeling.  Pt is struggling with protein intake currently but  will continue to try to keep levels high.  Reduced Blood Supply: inadequate perfusion to heal wound, woc will need to eval past vascular studies related to current level of wound,   Medication: NA  Chemotherapy: suppresses the immune system and inflammatory response, NA  Radiotherapy: increases production of free radical which damage cells, NA  Psychological stress: legitimate concerns related to risk of limb threatening wound   Obesity: decreases tissue perfusion  Infection: prolongs inflammatory phase, uses vital nutrients, impairs epithelialization and releases toxins.  Infection appears to be resolved, pt is done with all antibiotics.   Underlying Disease: diabetes mellitis   Maceration: reduces wound tensile strength and inhibits epithelial migration, none currently noted, protecting from drainage with Triad paste   Patient compliance, appears motivated to heal  Unrelieved pressure, to be determined but none of current significance noted currently.  Immobility, limited but not immobile  Substance abuse: NA    Plan:  We will continue with the same plan of care for the wound as listed below.  As long as tunneling and undermining is less than 1 cm, no need to pack with any primary dressing other than Triad paste.  Did teaching review with the pt's and his dad on cares today, press Triad paste with tongue depressor into tunneling and undermining.  Can cleanse by spraying wound spray on the wound directly then cleansing with gauze, or to spray on the gauze and dab or wipe the wound clean.  Ok to leave any well stuck dry Triad paste in place when cleansing, clean off surface and apply thin layer to cover.   No problem with any small wrinkles that may occur then applying the foam dressing, outer adhesive boarder is ok with small folds or creases.  As long as there is no skin breakdown from moisture I feels its ok to use the compression sleeves for the right LE or the prosthetic sleeve, monitor erythema and any other  concerns.  Be sure to limit time with prosthetic (off loaded modified prosthetic) to 30 minutes or less daily.  Woc more concerned with the potential effects on the open tissue and wound have arterial or venous flow restricted by the prosthetics hole.  Monitor before and after each use.       Discussed/Education provided: plan of care with rationale;     Topical cares to right lateral leg/residual limb:    1 - Remove the old dressing.  2 - Wash the wound and surrounding skin with soap and water (rinse with water if using soap and water), saline or the no rinse wound cleanser spray MicroKlenz or similar no rinse wound cleanser.  (Do not try to scrub off dry crusty remains of the Triad paste, the center paste will come off the moist wound bed easily, the edges may have crusty dry paste that can have the surface cleansed but not scrubed off to raw tissue).  3 Apply the Triad paste (yellow tube) to the wound bed at least a nickel thick.  Try to fill open space with triad paste  4 - Cover the wound and the Triad paste with a large gentle silicone adhesive foam dressing (using Mepilex 6x6 inch dressings currently).  5 - Change the dressing once daily.    Pt is unable to do self cares due to wound location, has assist from his neighbor who is retired RN, she is able to perform cares/has been caring for wound.    Additional recommendations: None at this time    The following discharge instructions were reviewed with and sent home with the patient:  See AVS      The following supplies were sent home with the patient:  Few extra Mepilex and tongue depressors.     Return visit: Wed 5/29 and 6/1/24, both at 10 am.     Verbal, written, & demonstrative education provided.  Face to face time: approximately 40 minutes  Procedure: less than one minute application of Triad paste to promote autolytic debriding of slough    Pipe Leonard RN [facility]  [phone]

## 2024-05-28 ENCOUNTER — OFFICE VISIT (OUTPATIENT)
Dept: ENDOCRINOLOGY | Facility: CLINIC | Age: 54
End: 2024-05-28
Payer: COMMERCIAL

## 2024-05-28 VITALS
DIASTOLIC BLOOD PRESSURE: 63 MMHG | BODY MASS INDEX: 20.67 KG/M2 | WEIGHT: 140 LBS | HEART RATE: 77 BPM | SYSTOLIC BLOOD PRESSURE: 154 MMHG

## 2024-05-28 DIAGNOSIS — E10.319 TYPE 1 DIABETES MELLITUS WITH RETINOPATHY, MACULAR EDEMA PRESENCE UNSPECIFIED, UNSPECIFIED LATERALITY, UNSPECIFIED RETINOPATHY SEVERITY (H): ICD-10-CM

## 2024-05-28 DIAGNOSIS — E10.21 TYPE 1 DIABETES MELLITUS WITH DIABETIC NEPHROPATHY (H): ICD-10-CM

## 2024-05-28 DIAGNOSIS — E10.40 TYPE 1 DIABETES MELLITUS WITH DIABETIC NEUROPATHY (H): Primary | ICD-10-CM

## 2024-05-28 DIAGNOSIS — E10.59 TYPE 1 DIABETES MELLITUS WITH OTHER CIRCULATORY COMPLICATION (H): ICD-10-CM

## 2024-05-28 DIAGNOSIS — Z89.512 S/P BKA (BELOW KNEE AMPUTATION) UNILATERAL, LEFT (H): ICD-10-CM

## 2024-05-28 PROCEDURE — G2211 COMPLEX E/M VISIT ADD ON: HCPCS | Performed by: INTERNAL MEDICINE

## 2024-05-28 PROCEDURE — 99214 OFFICE O/P EST MOD 30 MIN: CPT | Performed by: INTERNAL MEDICINE
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
## 2024-05-29 ENCOUNTER — HOSPITAL ENCOUNTER (OUTPATIENT)
Dept: WOUND CARE | Facility: CLINIC | Age: 54
Discharge: HOME | End: 2024-05-29
Attending: NURSE PRACTITIONER | Admitting: NURSE PRACTITIONER
Payer: COMMERCIAL

## 2024-05-29 PROCEDURE — 97602 WOUND(S) CARE NON-SELECTIVE: CPT

## 2024-05-29 NOTE — PROGRESS NOTES
[facility]    Start of Care in [facility]: 4/21/2024 while inpatient, initial clinic visit 5/3/24.  Referring Provider: Dr Fabiola Orlando MD ED provider, initial referral 4/21/24.  Primary Care Provider: Deepti Vega   Wound Location: Right lateral lower leg (residual limb)    Wound Clinic Visit:  Scheduled follow up.    Reason for Visit:  Follow up on right lateral lower leg wound.     Subjective:  On arrival today 5/29/24 with his farther, pt reports the following:  His mom has been doing the wound cares as listed in Current Plan of Care.  Pt has had some concerns about the pain with using the right LE prosthetic which has been noticed mostly above the wound site on the thigh.  He has been cushioning the right LE wound socks when he does wear the left prosthetic, still limiting time in prosthetic to about 1 hour or less per day.  He is concerned about what appears to be a continued build up of Triad paste on the posterior wound edge wound bed and unsure if its slough or paste or mix.  He continues to have very high blood sugars, most close to 200 but will have some over 300 and higher at times, reports per his endocrinologist he needs to continue with current insuline type and dose till they have more data to support a change that insurance will cover.  His appetite is still poor, continues to have GI upset.  Is eating eggs daily to try to counter act.  He would like to decrease frequency of visits in the next few weeks so his [place] and his neighbor will resume the wound cares.       Wound History:   Pt presented to the ED here at [facility] on 4/21/23 with concerns of right lateral lower leg wound.  Per admission note:  Pt has history of bilateral BKA's (left 2019 and right June of 2023).  He stated 'he thinks it started as a blister sometime in the last week or so (prior to presentation in ED).  He'd been trying a new  prosthesis sock that was not the right size and it may have been rubbing.  He was having some pain when he wore the prosthesis but he has no pain now without it on.  There is a bad odor and some clear drainage.  His neighbor who is a retired nurse has been helping him with dressing changes.  He denies fever or chills.  His sugars have been high.  He stated he does not feel well when his sugar is below 180 and only uses insulin accordingly.  Wound was swab cultured in the ED, aerobic cultures pending.  At wo nurse initial meeting (4/22) with pt, family and neighbor gave some additional information on the origin and progression of the wound.  Appeared pretty rapidly, no sure if there was any initial blistering.  Pt believes the primary cause of the wound was friction and not direct pressure.  I was able to see photo's of the wounds development off the pt's mom's phone.  Initial photo, not a great image but as best able to see, showed hypopigmented site with distinct boarders, no open tissue, did not appear like slough.  Second photo is few days later than first, is a better image and shows what appears to be a full thickness ulcer with mostly clean red nongranular wound bed and open wound edges.  Periwound skin is not well shown in either photo  Pain to the posterior knee and proximal periwound skin increased and per pt's neighbor, small serous clear fluid blisters appeared briefly and ruptured.  Entire surrounding skin more to the proximal than distal had corresponding erythema with no significant increased warmth to touch noted.  Initial woc nurse inpatient visit 4/22/24, Triad paste started to promote autolytic debridement of slough and eschar and to protect dry intact periwound skin, and dry out proximal periwound moist skin, Mepilex 6x6 covered.  Woc return 4/23, no change to plan of care for the wound topically but instructed pt to resume wearing his protective and compressive sleeve to the right residual limb  over the wound dressing.  Woc returned 4/24, wound bed slough started to debride some.  Cultures results returned, positive for streptococcus constellatus.  Dr Ardon approved discharge home same day 4/24/24 on oral antibiotics, picc line removed.  Pt called into clinic a few times after discharge, prior to his initial clinic visit with concerns and questions.  Receiving assist from his neighbor but both had concerns and pictures sent in for evaluation by woc, all appeared to be progressing well.  5/8/24 clinic visit with Ly NELSON RN cwocn, new tunneling present, 11 o'clock 0.8 cm, with small amount of undermining noted 5 - 10 o'clock 0.5 cm max.     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    Positive for macroalbuminuria    Retinopathy due to secondary diabetes mellitus (H)    Severe diabetic hypoglycemia (H)    Type 1 diabetes mellitus with other diabetic ophthalmic complication (H)    Stage 3a chronic kidney disease (H)    Statin declined    Primary osteoarthritis of right knee    Current moderate episode of major depressive disorder without prior episode (H)    Hypertension    YESSICA (acute kidney injury) (H24)    Type 1 diabetes mellitus with hyperglycemia (H)    Diabetic ulcer of right lower leg (H)    Cellulitis of right lower extremity    Anemia, unspecified type    Hyperkalemia    Hyponatremia    Hypoalbuminemia    PAD (peripheral artery disease) (H24)    S/P bilateral BKA (below knee amputation) (H)             Tobacco Use:     Tobacco Use      Smoking status: Never      Smokeless tobacco: Never     Diabetic: Type 1  HgbA1C:   Hemoglobin A1C   Date Value Ref Range Status   05/01/2024 12.7 (H) 0.0 - 5.6 % Final     Comment:      Normal <5.7%   Prediabetes 5.7-6.4%    Diabetes 6.5% or higher     Note: Adopted from ADA consensus guidelines.   05/21/2021 10.9 (H) 0 - 5.6 % Final     Comment:     Normal <5.7% Prediabetes 5.7-6.4%  Diabetes 6.5% or higher - adopted from ADA   consensus guidelines.     Checks Blood Glucose?:  yes, few times daily.  Average readings: around 200, some reports 300 and higher    Personal/social history:  pt lives with family independently, has had home care services, most recently with [place], no home care currently.     Objective:   Current treatment plan: Triad paste covered with Mepilex 6x6  Last changed: yesterday afternoon     Wound #1 Right lateral lower leg or residual limb.   Stage/tissue depth:  Unstageable pressure injury due to slough, complicated by prosthetic products friction and subsequent cellulitis infection.  Now the wound site is just the open wound so only one measurement of outer size and depth at this time.  3.4 cm L x 1.9 cm W x 0.5 cm D  Tunneling: newly noted 5/8/24, 11 o'clock 0.6 cm    Undermining: newly noted 5/8/24, 5 -10 o clock max of 0.5 cm, minimal depth of undermining at 9 o'clock now.  Wound bed type/amount: approximately 40 % granular tissue in most proximal aspect of the wound bed, 30 % red nongranular tissue and 30 % yellow to white moist fibrinous slough: Not currently fluctuant  Wound Edges: open  Periwound: faint blanchable erythema halo, max of 1 cm wide, localized edema of the wound edge and periwound skin from 4 to 7 o'clock.    Drainage: small to moderate amounts serosanguinous.  Small amounts sanguinous when tunnel and undermining was probed.  Odor: none noted currently  Pain: minimal at rest, some with probing, pain superior to the wound when wearing prosthetic.     Photo's from today's visit 5/29/24.  Pt's head is to the left and distal end of residual limb to the right, in the photo's above.  L - R before debulking slough x's 2, after debulking slough x's 2.       Photo's from 5/23/24.  Pt's head is to the left and distal end of residual limb to the right, in the photo's above.    Photo's from 5/8/24.      Photo's from 5/3/24, initial Wound and Ostomy clinic appointment post hospital discharge 4/24/24.  L - R before debriding x's 2 and after debriding x's 2.        Photo's from 4/24/24.  Inpatient Woc nurse follow, date of discharge home.      Photo's from inpatient woc nurse follow up visit 4/23/24.    Photo from initial wo nurse inpatient consult 4/22/24.  Head is to the left and foot to the right in all photo's taken while inpatient.     Photo from ED 4/21/24.    Dorsalis Pedal Pulse: NA  Posterior Tibial Pulse: NA  Hair growth: none noted in the general area of the right lateral leg wound  Capillary Refill: NA  Feet/toes color: NA  Nails: NA  R Leg residual limb: Edema nonpitting. Ankle circumference NA cm. Calf circumference NA cm.  L Leg: Edema Not assessed this visit. Ankle circumference NA cm. Calf circumference NA cm.    Mobility: Wheelchair use, has prosthetics but not currently wearing on right.    Current offloading/footwear: prosthetic to left LE with sneaker   Sensation: per pt normal sensation at right LE wound, hypersensitive.     Other callusing/areas of concern: none noted, limb has better skin moisture now than while inpatient.    Diet: Regular with awareness of effects on blood glucose levels.    Assessment:  Mepilex dressing in place on arrival, shadow of dry drainage noted on outer dressing.  When removed only noted to have small amount of drainage to dressing and periwound skin but not pooling in the area.   Wound was cleansed with saline and dried well with gauze.  The wound continues to improve slowly.  There remains a smaller amount of slough, increased granular tissue present.  No signs of infection.  The tunneling and undermining remains stable with slight improvements noted.  The posterior wound edge is where the undermining is present so  pooling or collection of slough and Triad paste would not be out of the ordinary and is not of concern at this time.  Pt's appetite remains poor and though getting in some protein likely not enough and could likely also use more caloric intake due to larger deep open wound.  Edema in the limb appears to be near base line with only localized edema present at the distal wound edge.     Barriers to wound healing:   Poor nutrition: inadequate supply of protein, carbohydrates, fatty acids, and trace elements essential for all phases of wound healing. Teaching done 4/23 on need for increased protein in diet for healing, reminder at each visit.  Today 5/15 did teaching on continued need for protein once the wound is scar closed for remodeling.  Pt is struggling with protein intake currently but will continue to try to keep levels high.  Recommended between 90 and 110 grams protein daily as goal  Reduced Blood Supply: inadequate perfusion to heal wound, woc will need to eval past vascular studies related to current level of wound,   Medication: NA  Chemotherapy: suppresses the immune system and inflammatory response, NA  Radiotherapy: increases production of free radical which damage cells, NA  Psychological stress: legitimate concerns related to risk of limb threatening wound   Obesity: decreases tissue perfusion  Infection: prolongs inflammatory phase, uses vital nutrients, impairs epithelialization and releases toxins.  Appears resolved, pt is done with all antibiotics.   Underlying Disease: diabetes mellitis   Maceration: reduces wound tensile strength and inhibits epithelial migration, none currently noted, protecting from drainage with Triad paste   Patient compliance, appears motivated to heal  Unrelieved pressure, to be determined but none of current significance noted currently.  Immobility, limited but not immobile  Substance abuse: NA    Plan:  We will continue with the same plan of care for the wound as listed  below.  As long as tunneling and undermining is less than 1 cm, no need to pack with any primary dressing other than Triad paste.  Did teaching review with the pt's and his dad on cares again today, press Triad paste with tongue depressor into tunneling and undermining.  Today after cleansing I did use sterile iris scissors to debulk some of the fibrinous loose slough present.     Past teaching is still relevant to the wound with its current status  - Can cleanse by spraying wound spray on the wound directly then cleansing with gauze, or to spray on the gauze and dab or wipe the wound clean.  - Ok to leave any well stuck dry Triad paste in place when cleansing, clean off surface and apply thin layer to cover.   - No problem with any small wrinkles that may occur then applying the foam dressing, outer adhesive boarder is ok with small folds or creases.  - As long as there is no skin breakdown from moisture I feels its ok to use the compression sleeves for the right LE or the prosthetic sleeve, monitor erythema and any other concerns.  - Be sure to limit time with prosthetic (off loaded modified prosthetic) to 30 minutes or less daily.  Woc more concerned with the potential effects on the open tissue and wound have arterial or venous flow restricted by the prosthetics hole.  Monitor before and after each use.       Discussed/Education provided: plan of care with rationale;     Topical cares to right lateral leg/residual limb:    1 - Remove the old dressing.  2 - Wash the wound and surrounding skin with soap and water (rinse with water if using soap and water), saline or the no rinse wound cleanser spray MicroKlenz or similar no rinse wound cleanser.  (Do not try to scrub off dry crusty remains of the Triad paste, the center paste will come off the moist wound bed easily, the edges may have crusty dry paste that can have the surface cleansed but not scrubed off to raw tissue).  3 Apply the Triad paste (yellow tube) to  the wound bed at least a nickel thick.  Try to fill open space with triad paste  4 - Cover the wound and the Triad paste with a large gentle silicone adhesive foam dressing (using Mepilex 6x6 inch dressings currently).  5 - Change the dressing once daily.    Pt is unable to do self cares due to wound location, has assist from his neighbor who is retired RN, she is able to perform cares/has been caring for wound.  Pt's mom is also doing wound cares.      Additional recommendations: None at this time    The following discharge instructions were reviewed with and sent home with the patient:  See AVS      The following supplies were sent home with the patient:  Few extra Mepilex and tongue depressors.     Return visit: 6/6 and 6/12/24 scheduled in Wound and Ostomy clinic    Verbal, written, & demonstrative education provided.  Face to face time: approximately 35 minutes  Procedure: less than one minute application of Triad paste to promote autolytic debriding of slough    Pipe Leonard RN [facility]  [phone]

## 2024-05-29 NOTE — PATIENT INSTRUCTIONS
We will have you continue with the same plan of care for the wound.  I have you schedule for the next two weeks for weekly visits then we can decrease frequency as long as the wound continues to progress.    Call with any issues or questions [phone].    Pipe Leonard RN cwocn

## 2024-06-06 ENCOUNTER — HOSPITAL ENCOUNTER (OUTPATIENT)
Dept: WOUND CARE | Facility: CLINIC | Age: 54
Discharge: HOME | End: 2024-06-06
Attending: NURSE PRACTITIONER | Admitting: NURSE PRACTITIONER
Payer: COMMERCIAL

## 2024-06-06 PROCEDURE — 97602 WOUND(S) CARE NON-SELECTIVE: CPT

## 2024-06-06 NOTE — PATIENT INSTRUCTIONS
Continue with the same plan of care for the right lower leg wound.  Monitor the area on the left leg for the redness that does not turn white when it is pressed.  The inner knee on the left is a concerning site and is at risk for becoming a pressure injury.    I have you scheduled out, see your appointment list for details.    Pipe Leonard RN cwocn

## 2024-06-06 NOTE — PROGRESS NOTES
[facility]    Start of Care in [facility]: 4/21/2024 while inpatient, initial clinic visit 5/3/24.  Referring Provider: Dr Fabiola Orlando MD ED provider, initial referral 4/21/24.  Primary Care Provider: Deepti Vega   Wound Location: Right lateral lower leg (residual limb)    Wound Clinic Visit:  Scheduled follow up.    Reason for Visit:  Follow up on right lateral lower leg wound.     Subjective:  On arrival today 6/6/24 with his farther, pt reports the following:  Right leg wound cares continue to be done by pt's mom.  His neighbor will resume cares daily when his parents return up [place] in a few weeks.  When his folks are gone he will not be able to have transport for visits so after next weeks clinic visit will not be able to return for approximately 3 weeks.  He feels the right leg wound is looking ok per his mom, no new concerns relayed.  He is using the right LE prosthetic but very limited in length of time and frequency, less than 30 minutes twice daily at most.  Drainage and pain has been minimal.  He continues to have stomach upset issues and is not eating a lot but is try to focus on protein in his dietary intake.  He has new concerns of bruising on his left lower limb, he feels they are related to using the left leg with prosthetic with greater pressure due to right leg wound and limited prosthetic use.       Wound History:   Pt presented to the ED here at [facility] on 4/21/23 with concerns of right lateral lower leg wound.  Per admission note:  Pt has history of bilateral BKA's (left 2019 and right June of 2023).  He stated 'he thinks it started as a blister sometime in the last week or so (prior to presentation in ED).  He'd been trying a new prosthesis sock that was not the right size and it may have been rubbing.  He was having some pain when he wore the prosthesis but he has no pain now without it on.  There is a bad odor and some clear drainage.   His neighbor who is a retired nurse has been helping him with dressing changes.  He denies fever or chills.  His sugars have been high.  He stated he does not feel well when his sugar is below 180 and only uses insulin accordingly.  Wound was swab cultured in the ED, aerobic cultures pending.  At [facility] nurse initial meeting (4/22) with pt, family and neighbor gave some additional information on the origin and progression of the wound.  Appeared pretty rapidly, no sure if there was any initial blistering.  Pt believes the primary cause of the wound was friction and not direct pressure.  I was able to see photo's of the wounds development off the pt's mom's phone.  Initial photo, not a great image but as best able to see, showed hypopigmented site with distinct boarders, no open tissue, did not appear like slough.  Second photo is few days later than first, is a better image and shows what appears to be a full thickness ulcer with mostly clean red nongranular wound bed and open wound edges.  Periwound skin is not well shown in either photo  Pain to the posterior knee and proximal periwound skin increased and per pt's neighbor, small serous clear fluid blisters appeared briefly and ruptured.  Entire surrounding skin more to the proximal than distal had corresponding erythema with no significant increased warmth to touch noted.  Initial wo nurse inpatient visit 4/22/24, Triad paste started to promote autolytic debridement of slough and eschar and to protect dry intact periwound skin, and dry out proximal periwound moist skin, Mepilex 6x6 covered.  Woc return 4/23, no change to plan of care for the wound topically but instructed pt to resume wearing his protective and compressive sleeve to the right residual limb over the wound dressing.  Woc returned 4/24, wound bed slough started to debride some.  Cultures results returned, positive for streptococcus constellatus.  Dr Ardon approved discharge home same day 4/24/24 on  oral antibiotics, picc line removed.  Pt called into clinic a few times after discharge, prior to his initial clinic visit with concerns and questions.  Receiving assist from his neighbor but both had concerns and pictures sent in for evaluation by [facility], all appeared to be progressing well.  5/8/24 clinic visit with Ly NELSON RN cwocn, new tunneling present, 11 o'clock 0.8 cm, with small amount of undermining noted 5 - 10 o'clock 0.5 cm max.     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    Positive for macroalbuminuria    Retinopathy due to secondary diabetes mellitus (H)    Severe diabetic hypoglycemia (H)    Type 1 diabetes mellitus with other diabetic ophthalmic complication (H)    Stage 3a chronic kidney disease (H)    Statin declined    Primary osteoarthritis of right knee    Current moderate episode of major depressive disorder without prior episode (H)    Hypertension    YESSICA (acute kidney injury) (H24)    Type 1 diabetes mellitus with hyperglycemia (H)    Diabetic ulcer of right lower leg (H)    Cellulitis of right lower extremity    Anemia, unspecified type    Hyperkalemia    Hyponatremia    Hypoalbuminemia    PAD (peripheral artery disease) (H24)    S/P bilateral BKA (below knee amputation) (H)             Tobacco Use:     Tobacco Use      Smoking status: Never      Smokeless tobacco: Never     Diabetic: Type 1  HgbA1C:   Hemoglobin A1C   Date Value Ref Range Status   05/01/2024 12.7 (H) 0.0 - 5.6 % Final     Comment:     Normal <5.7%   Prediabetes 5.7-6.4%    Diabetes 6.5% or higher     Note: Adopted from ADA consensus guidelines.   05/21/2021 10.9 (H) 0 - 5.6 % Final     Comment:     Normal <5.7% Prediabetes 5.7-6.4%  Diabetes  6.5% or higher - adopted from ADA   consensus guidelines.     Checks Blood Glucose?:  yes, few times daily.  Average readings: around 200, some reports 300 and higher    Personal/social history:  pt lives with family independently, has had home care services, most recently with [facility], no home care currently.     Objective:   Current treatment plan: Triad paste covered with Mepilex 6x6  Last changed: yesterday afternoon     Wound #1 Right lateral lower leg or residual limb.   Stage/tissue depth:  Unstageable pressure injury due to slough, complicated by prosthetic products friction and subsequent cellulitis infection.  Now the wound site is just the open wound so only one measurement of outer size and depth at this time.  3.5 cm L x 2 cm W x 0.5 cm D  Tunneling: newly noted 5/8/24, 11 o'clock 0.9 cm    Undermining: newly noted 5/8/24, now limited to 7 -10 o clock max of 0.9 cm D at 9 o'clock, 0.5 cm D at 10 o'clock, rest is 0.1 - 0.5 cm D.  Wound bed type/amount: approximately 40 % granular tissue in most proximal aspect of the wound bed, 20 % red nongranular tissue. 15 % new silver scar tissue and 25 % yellow to white moist fibrinous slough: Not currently fluctuant  Wound Edges: open  Periwound: faint blanchable erythema halo, max of 1 cm wide, localized edema of the wound edge and periwound skin from 4 to 7 o'clock.    Drainage: small to moderate amounts serosanguinous.  Small amounts sanguinous when tunnel and undermining was probed.  Odor: none noted currently  Pain: minimal at rest, some with probing, pain superior to the wound when wearing prosthetic.     Photo's from today's visit 6/6/24.  Pt's head is to the left and distal end of residual limb to the right, in the photo's above.      Photo's from 5/29/24.  Pt's head is to the left and distal end of residual limb to the right, in the photo's above.  L - R before debulking slough x's 2, after debulking slough x's 2.        Photo's from 5/3/24, initial Wound  and Ostomy clinic appointment post hospital discharge 4/24/24.  L - R before debriding x's 2 and after debriding x's 2.        Photo's from 4/24/24.  Inpatient Wo nurse follow, date of discharge home.      Photo's from inpatient wo nurse follow up visit 4/23/24.    Photo from initial [facility] nurse inpatient consult 4/22/24.  Head is to the left and foot to the right in all photo's taken while inpatient.     Photo from ED 4/21/24.    Dorsalis Pedal Pulse: NA  Posterior Tibial Pulse: NA  Hair growth: none noted in the general area of the right lateral leg wound  Capillary Refill: NA  Feet/toes color: NA  Nails: NA  R Leg residual limb: Edema nonpitting. Ankle circumference NA cm. Calf circumference NA cm.  L Leg: Edema Not assessed this visit. Ankle circumference NA cm. Calf circumference NA cm.    Mobility: Wheelchair use, has prosthetics but not currently wearing on right.    Current offloading/footwear: prosthetic to left LE with sneaker   Sensation: per pt normal sensation at right LE wound, hypersensitive.     Other callusing/areas of concern: Left residual limb, three areas of blanchable erythema present:  - medial knee (site of most concern) 4 cm L x 4 cm W x 0 cm D, distinct site, all blanches but notes some area which have appearance of chronic pressure and hyperpigmentation, not currently a wound but is a site to monitor regularly.  - lateral mid knee times two, anterior of two 1.5 cm L x 1 cm W, posterior of two 1 cm L x 1 cm W x 0 cm D, both pale and got paler as prosthetic was off.  - left anterior/medial lower limb 15 cm L x 9 cm W x 0 cm D, site is scattered pale blanchable erythema with no distinct bony prominence concerns.    Photo's from today's visit 6/6/24, initial assessment of newly noted concerns per pt.  L - R medial knee, lateral/anterior knee, lateral knee, anterior medial lower limb.    Diet: Regular with awareness of effects on blood glucose levels.    Assessment:  Mepilex dressing in place  on arrival, shadow of dry drainage noted on outer dressing.  When removed only noted to have small amount of drainage to dressing and periwound skin but not pooling in the area.   Wound was cleansed with saline and dried well with gauze.  The wound continues to improve slowly.  No change to size of significance today, slightly deeper in Tunneling as debriding has occurred.  Less slough present, new scar tissue is noted advancing down into the wound bed on the anterior aspect.    The left lower leg concerns noted above are not currently wounds.    Medial knee is site of most concern, distinct site, all blanches but note some areas have appearance of chronic pressure and hyperpigmentation.  Left lateral mid knee times two, anterior and posterior site are very pale, no distinct boarders noted  Left anterior/medial lower limb does not have the appearance of pressure, more irritation from prosthetic sleeve.    Barriers to wound healing:   Poor nutrition: inadequate supply of protein, carbohydrates, fatty acids, and trace elements essential for all phases of wound healing. Teaching has been done at past visit on need for increased protein in diet for healing, remodeling of scar tissue once closed.  Pt has struggled with protein intake but will continue to try to keep levels high.  Recommended between 90 and 110 grams protein daily as goal  Reduced Blood Supply: appears adequate  Medication: NA  Chemotherapy: suppresses the immune system and inflammatory response, NA  Radiotherapy: increases production of free radical which damage cells, NA  Psychological stress: legitimate concerns related to risk of limb threatening wound   Obesity: decreases tissue perfusion  Infection: prolongs inflammatory phase, uses vital nutrients, impairs epithelialization and releases toxins. Appears resolved, pt is done with all antibiotics.   Underlying Disease: diabetes mellitis   Maceration: reduces wound tensile strength and inhibits  epithelial migration, none currently noted, protecting from drainage with Triad paste.    Patient compliance, appears motivated to heal  Unrelieved pressure, to be determined but none of current significance noted currently.  Immobility, limited but not immobile  Substance abuse: NA    Plan:  We will continue with the same plan of care for the right lateral limb wound as listed below.   As long as tunneling and undermining is less than 1 cm, no need to pack with any primary dressing other than Triad paste.  Did teaching review with the pt's and his dad on cares again today, press Triad paste with tongue depressor into tunneling and undermining.    Pt will be meeting with his orthotist and will show them the areas of new concern on the left lower limb.  The medial knee site is the only site of significant concern as appears more chronic, has distinct boarder and is over bony site.  Instructed the pt to monitor all the left limb areas of redness daily and watch/monitor for blanchability.     Past teaching is still relevant to the wound with its current status   - Can cleanse by spraying wound spray on the wound directly then cleansing with gauze, or to spray on the gauze and dab or wipe the wound clean.  - Ok to leave any well stuck dry Triad paste in place when cleansing, clean off surface and apply thin layer to cover.   - No problem with any small wrinkles that may occur then applying the foam dressing, outer adhesive boarder is ok with small folds or creases.  - As long as there is no skin breakdown from moisture I feels its ok to use the compression sleeves for the right LE or the prosthetic sleeve, monitor erythema and any other concerns.  - Be sure to limit time with prosthetic (off loaded modified prosthetic) to 30 minutes or less daily.  Woc more concerned with the potential effects on the open tissue and wound have arterial or venous flow restricted by the prosthetics hole.  Monitor before and after each  use.       Discussed/Education provided: plan of care with rationale;     Topical cares to right lateral leg/residual limb:    1 - Remove the old dressing.  2 - Wash the wound and surrounding skin with soap and water (rinse with water if using soap and water), saline or the no rinse wound cleanser spray MicroKlenz or similar no rinse wound cleanser.  (Do not try to scrub off dry crusty remains of the Triad paste, the center paste will come off the moist wound bed easily, the edges may have crusty dry paste that can have the surface cleansed but not scrubed off to raw tissue).  3 Apply the Triad paste (yellow tube) to the wound bed at least a nickel thick.  Try to fill open space with triad paste  4 - Cover the wound and the Triad paste with a large gentle silicone adhesive foam dressing (using Mepilex 6x6 inch dressings currently).  5 - Change the dressing once daily.    Pt is unable to do self cares due to wound location, has assist from his neighbor who is retired RN, she is able to perform cares/has been caring for wound.  Pt's mom is also doing wound cares.      Additional recommendations: None at this time    The following discharge instructions were reviewed with and sent home with the patient:  See AVS      The following supplies were sent home with the patient:  Few extra Mepilex and tongue depressors.     Return visit: 6/12/24 scheduled in Wound and Ostomy clinic    Verbal, written, & demonstrative education provided.  Face to face time: approximately 45 minutes  Procedure: less than one minute application of Triad paste to promote autolytic debriding of slough of the right lateral lower limb wound.    Pipe Leonard RN [facility]  [phone]

## 2024-06-11 ENCOUNTER — MYC MEDICAL ADVICE (OUTPATIENT)
Dept: ENDOCRINOLOGY | Facility: CLINIC | Age: 54
End: 2024-06-11

## 2024-06-12 ENCOUNTER — HOSPITAL ENCOUNTER (OUTPATIENT)
Dept: WOUND CARE | Facility: CLINIC | Age: 54
Discharge: HOME | End: 2024-06-12
Attending: NURSE PRACTITIONER | Admitting: NURSE PRACTITIONER
Payer: COMMERCIAL

## 2024-06-12 PROCEDURE — 97602 WOUND(S) CARE NON-SELECTIVE: CPT

## 2024-06-12 NOTE — PATIENT INSTRUCTIONS
Today we will continue with the same plan of care with the Triad paste and Mepilex.    I moved your visit from 7/3 Wed to Friday 7/5/24 also at 10 am.    Any concerns call our scheduling line at [phone].    Send a picture via [employer] if needed for any concerns, call the scheduling line above to alert me the picture is in the system so I can review and call you back.    Pipe Leonard RN cwocn

## 2024-06-12 NOTE — PROGRESS NOTES
[facility]    Start of Care in [facility]: 4/21/2024 while inpatient, initial clinic visit 5/3/24.  Referring Provider: Dr Fabiola Orlando MD ED provider, initial referral 4/21/24.  Primary Care Provider: Deepti Vega   Wound Location: Right lateral lower leg (residual limb)    Wound Clinic Visit:  Scheduled follow up.    Reason for Visit:  Follow up on right lateral lower leg wound.     Subjective:  On arrival today 6/12/24 with his farther, pt reports the following:  Right leg wound cares continue to be done by pt's mom.  His neighbor will resume cares daily when his parents return up [place] next week.  He is frustrated with the slow healing of his right lower limb wound, is told it looks better per his caregivers but he feels it looks awful.  He spoke with his prosthetic provider concerning the areas of blanchable redness on the left left residual limb, stated provider asked to have him send a picture of the sites of concern and will follow up.  He sent over the past few weeks of blood sugar readings to his endocrinologist to see if he can have the requested change in insulin so he can try to lower his A1C.  Blood sugars remain high.  Appetite remains poor with stomach upset issues.  He is prone to having increased limb pain with weather changes and the is experiencing more right limb pain but does not think its wound related.  He has not been using the right LE prosthetic, when his folks are away the next few weeks his will be using some but will limited length of wear time and frequency, less than 30 minutes twice daily at most.       Wound History:   Pt presented to the ED here at [facility] on 4/21/23 with concerns of right lateral lower leg wound.  Per admission note:  Pt has history of bilateral BKA's (left 2019 and right June of 2023).  He stated 'he thinks it started as a blister sometime in the last week or so (prior to presentation in ED).  He'd been  trying a new prosthesis sock that was not the right size and it may have been rubbing.  He was having some pain when he wore the prosthesis but he has no pain now without it on.  There is a bad odor and some clear drainage.  His neighbor who is a retired nurse has been helping him with dressing changes.  He denies fever or chills.  His sugars have been high.  He stated he does not feel well when his sugar is below 180 and only uses insulin accordingly.  Wound was swab cultured in the ED, aerobic cultures pending.  At woc nurse initial meeting (4/22) with pt, family and neighbor gave some additional information on the origin and progression of the wound.  Appeared pretty rapidly, no sure if there was any initial blistering.  Pt believes the primary cause of the wound was friction and not direct pressure.  I was able to see photo's of the wounds development off the pt's mom's phone.  Initial photo, not a great image but as best able to see, showed hypopigmented site with distinct boarders, no open tissue, did not appear like slough.  Second photo is few days later than first, is a better image and shows what appears to be a full thickness ulcer with mostly clean red nongranular wound bed and open wound edges.  Periwound skin is not well shown in either photo  Pain to the posterior knee and proximal periwound skin increased and per pt's neighbor, small serous clear fluid blisters appeared briefly and ruptured.  Entire surrounding skin more to the proximal than distal had corresponding erythema with no significant increased warmth to touch noted.  Initial woc nurse inpatient visit 4/22/24, Triad paste started to promote autolytic debridement of slough and eschar and to protect dry intact periwound skin, and dry out proximal periwound moist skin, Mepilex 6x6 covered.  Woc return 4/23, no change to plan of care for the wound topically but instructed pt to resume wearing his protective and compressive sleeve to the right  residual limb over the wound dressing.  Woc returned 4/24, wound bed slough started to debride some.  Cultures results returned, positive for streptococcus constellatus.  Dr Ardon approved discharge home same day 4/24/24 on oral antibiotics, picc line removed.  Pt called into clinic a few times after discharge, prior to his initial clinic visit with concerns and questions.  Receiving assist from his neighbor but both had concerns and pictures sent in for evaluation by woc, all appeared to be progressing well.  5/8/24 clinic visit with Ly NELSON RN cwocn, new tunneling present, 11 o'clock 0.8 cm, with small amount of undermining noted 5 - 10 o'clock 0.5 cm max.     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    Positive for macroalbuminuria    Retinopathy due to secondary diabetes mellitus (H)    Severe diabetic hypoglycemia (H)    Type 1 diabetes mellitus with other diabetic ophthalmic complication (H)    Stage 3a chronic kidney disease (H)    Statin declined    Primary osteoarthritis of right knee    Current moderate episode of major depressive disorder without prior episode (H)    Hypertension    YESSICA (acute kidney injury) (H24)    Type 1 diabetes mellitus with hyperglycemia (H)    Diabetic ulcer of right lower leg (H)    Cellulitis of right lower extremity    Anemia, unspecified type    Hyperkalemia    Hyponatremia    Hypoalbuminemia    PAD (peripheral artery disease) (H24)    S/P bilateral BKA (below knee amputation) (H)             Tobacco Use:     Tobacco Use      Smoking status: Never      Smokeless tobacco: Never     Diabetic: Type 1  HgbA1C:   Hemoglobin A1C   Date Value Ref Range Status   05/01/2024 12.7 (H) 0.0 - 5.6 % Final      Comment:     Normal <5.7%   Prediabetes 5.7-6.4%    Diabetes 6.5% or higher     Note: Adopted from ADA consensus guidelines.   05/21/2021 10.9 (H) 0 - 5.6 % Final     Comment:     Normal <5.7% Prediabetes 5.7-6.4%  Diabetes 6.5% or higher - adopted from ADA   consensus guidelines.     Checks Blood Glucose?:  yes, few times daily.  Average readings: around 200, some reports 300 and higher    Personal/social history:  pt lives with family independently, has had home care services, most recently with [place], no home care currently.     Objective:   Current treatment plan: Triad paste covered with Mepilex 6x6  Last changed: yesterday afternoon     Wound #1 Right lateral lower leg or residual limb.   Stage/tissue depth:  Unstageable pressure injury due to slough, complicated by prosthetic products friction and subsequent cellulitis infection.  Now the wound site is just the open wound so only one measurement of outer size and depth at this time.  3.3 cm L x 1.8 cm W x 0.5 cm D  Tunneling: newly noted 5/8/24, 11 o'clock 0.9 cm    Undermining: newly noted 5/8/24, now limited to 7 -10 o clock max of 0.5 cm D at 9 o'clock and 10 o'clock.  Wound bed type/amount: approximately 20 % granular tissue, 20 % red nongranular tissue, 20 % new silver pink scar tissue and 40 % white moist fibrinous slough: Not currently fluctuant  Wound Edges: open  Periwound: faint blanchable erythema halo, max of 1 cm wide, localized edema of the wound edge and periwound skin from 4 to 7 o'clock.    Drainage: small to moderate amounts serosanguinous.  Moderate amounts sanguinous when tunnel and undermining was probed.  Odor: none noted currently  Pain: minimal at rest, some with probing, pain superior to the wound when wearing prosthetic.     Photo's from today's visit 6/12/24.  Pt's head is to the left and distal end of residual limb to the right, in the photo's above.      Photo's from 6/6/24.  Pt's head is to the left and distal end of residual  limb to the right, in the photo's above.      Photo's from 5/3/24, initial Wound and Ostomy clinic appointment post hospital discharge 4/24/24.  L - R before debriding x's 2 and after debriding x's 2.        Photo's from 4/24/24.  Inpatient Wo nurse follow, date of discharge home.      Photo's from inpatient wo nurse follow up visit 4/23/24.    Photo from initial [facility] nurse inpatient consult 4/22/24.  Head is to the left and foot to the right in all photo's taken while inpatient.     Photo from ED 4/21/24.    Dorsalis Pedal Pulse: NA  Posterior Tibial Pulse: NA  Hair growth: none noted in the general area of the right lateral leg wound  Capillary Refill: NA  Feet/toes color: NA  Nails: NA  R Leg residual limb: Edema nonpitting. Ankle circumference NA cm. Calf circumference NA cm.  L Leg: Edema Not assessed this visit. Ankle circumference NA cm. Calf circumference NA cm.    Mobility: Wheelchair use, has prosthetics but not currently wearing on right.    Current offloading/footwear: prosthetic to left LE with sneaker   Sensation: per pt normal sensation at right LE wound, hypersensitive.     Other callusing/areas of concern: Left residual limb, three areas of blanchable erythema present:  Not assessed this visit, information below in italics is from 6/6/24 clinic visit.  Per pt today 6/12 no new concerns, no changes, no areas of nonblanchable erythema.   - medial knee (site of most concern) 4 cm L x 4 cm W x 0 cm D, distinct site, all blanches but notes some area which have appearance of chronic pressure and hyperpigmentation, not currently a wound but is a site to monitor regularly.  - lateral mid knee times two, anterior of two 1.5 cm L x 1 cm W, posterior of two 1 cm L x 1 cm W x 0 cm D, both pale and got paler as prosthetic was off.  - left anterior/medial lower limb 15 cm L x 9 cm W x 0 cm D, site is scattered pale blanchable erythema with no distinct bony prominence concerns.    Photo's from 6/6/24, initial  assessment of newly noted concerns per pt.  L - R medial knee, lateral/anterior knee, lateral knee, anterior medial lower limb.    Diet: Regular with awareness of effects on blood glucose levels.    Assessment:  Mepilex dressing in place over the right lateral lower limb wound on arrival has shadow of dry drainage noted on outer dressing.  When removed only noted to have small amount of drainage to dressing and periwound skin but not pooling in the area.   Wound was cleansed with saline and dried well with gauze.  The wound continues to improve slowly.  New scar tissue is advancing down onto the wound bed in the 11 to 2 o'clock aspect.    Fibrinous slough remains present and limited granular tissue but overall appears to be improving in tissue within the wound bed.    Barriers to wound healing:   Poor nutrition: inadequate supply of protein, carbohydrates, fatty acids, and trace elements essential for all phases of wound healing. Teaching has been done at past visit on need for increased protein in diet for healing, remodeling of scar tissue once closed.  Pt has struggled with protein intake but will continue to try to keep levels high.  Recommended between 90 and 110 grams protein daily as goal  Reduced Blood Supply: appears adequate  Medication: NA  Chemotherapy: suppresses the immune system and inflammatory response, NA  Radiotherapy: increases production of free radical which damage cells, NA  Psychological stress: legitimate concerns related to risk of limb threatening wound   Obesity: decreases tissue perfusion  Infection: prolongs inflammatory phase, uses vital nutrients, impairs epithelialization and releases toxins. Appears resolved, pt is done with all antibiotics.   Underlying Disease: diabetes mellitis   Maceration: reduces wound tensile strength and inhibits epithelial migration, none currently noted, protecting from drainage with Triad paste.    Patient compliance, appears motivated to heal  Unrelieved  pressure, to be determined but none of current significance noted currently.  Immobility, limited but not immobile  Substance abuse: NA    Plan:  We will continue with the same plan of care for the right lateral limb wound as listed below.   As long as tunneling and undermining is less than 1 cm, no need to pack with any primary dressing other than Triad paste.  Did teaching review with the pt's and his dad on cares again today, press Triad paste with tongue depressor into tunneling and undermining.      Discussed/Education provided: plan of care with rationale;     Topical cares to right lateral leg/residual limb:    1 - Remove the old dressing.  2 - Wash the wound and surrounding skin with soap and water (rinse with water if using soap and water), saline or the no rinse wound cleanser spray MicroKlenz or similar no rinse wound cleanser.  (Do not try to scrub off dry crusty remains of the Triad paste, the center paste will come off the moist wound bed easily, the edges may have crusty dry paste that can have the surface cleansed but not scrubed off to raw tissue).  3 Apply the Triad paste (yellow tube) to the wound bed at least a nickel thick.  Try to fill open space with triad paste  4 - Cover the wound and the Triad paste with a large gentle silicone adhesive foam dressing (using Mepilex 6x6 inch dressings currently).  5 - Change the dressing once daily.    Pt is unable to do self cares due to wound location, has assist from his neighbor who is retired RN, she is able to perform cares/has been caring for wound.  Pt's mom is also doing wound cares.      Additional recommendations: None at this time    The following discharge instructions were reviewed with and sent home with the patient:  See AVS      The following supplies were sent home with the patient:  Few extra Mepilex    Return visit: 7/5/24 Wound and Ostomy clinic    Verbal, written, & demonstrative education provided.  Face to face time: approximately 25  minutes  Procedure: less than one minute application of Triad paste to promote autolytic debriding of slough of the right lateral lower limb wound.    Pipe Leonard RN OCN  [phone]

## 2024-06-17 NOTE — TELEPHONE ENCOUNTER
The patient was calling about getting the letter to change his medication from the fast acting insuline to the novolog, he sent 3 months worth of reading, please review and follow up thank you.

## 2024-06-19 ENCOUNTER — TELEPHONE (OUTPATIENT)
Dept: FAMILY MEDICINE | Facility: OTHER | Age: 54
End: 2024-06-19
Payer: COMMERCIAL

## 2024-06-19 NOTE — TELEPHONE ENCOUNTER
Patient Quality Outreach    Patient is due for the following:   Hypertension -  BP check    Next Steps:   Schedule a nurse only visit for Blood pressure check     Type of outreach:    Sent [employer] message.      Questions for provider review:    None           Huma Phillips, CMA

## 2024-06-24 ENCOUNTER — TELEPHONE (OUTPATIENT)
Dept: WOUND CARE | Facility: CLINIC | Age: 54
End: 2024-06-24

## 2024-06-24 NOTE — TELEPHONE ENCOUNTER
Reason for Call:  Other Photos and concerns about wound  Detailed comments: Eduardo wanted to let you know that he uploaded new photos of his wound, they were sent through his PCP, he also noted concerns he has on his wounds.    Phone Number Patient can be reached at: Home number on file [phone] (home)    Best Time:     Can we leave a detailed message on this number? YES    Call taken on 6/24/2024 at 2:00 PM by Jeny Arceo

## 2024-06-25 DIAGNOSIS — E10.21 TYPE 1 DIABETES MELLITUS WITH DIABETIC NEPHROPATHY (H): ICD-10-CM

## 2024-06-25 DIAGNOSIS — E10.319 TYPE 1 DIABETES MELLITUS WITH RETINOPATHY, MACULAR EDEMA PRESENCE UNSPECIFIED, UNSPECIFIED LATERALITY, UNSPECIFIED RETINOPATHY SEVERITY (H): ICD-10-CM

## 2024-06-25 DIAGNOSIS — E10.59 TYPE 1 DIABETES MELLITUS WITH OTHER CIRCULATORY COMPLICATION (H): ICD-10-CM

## 2024-06-25 DIAGNOSIS — E10.40 TYPE 1 DIABETES MELLITUS WITH DIABETIC NEUROPATHY (H): ICD-10-CM

## 2024-06-25 NOTE — TELEPHONE ENCOUNTER
Requested Prescriptions   Pending Prescriptions Disp Refills    Continuous Glucose Sensor (FREESTYLE COLIN 2 SENSOR) MISC 2 each 5     Si Device continuous prn (Change every 14 days) For use with Freestyle Colin 2  for continuous monitoring of blood glucose levels.  Change sensor every 14 days.       There is no refill protocol information for this order        Last Written Prescription Date:  24  Last Fill Quantity: 2 each,  # refills: 5   Last office visit: 2024 ; last virtual visit: 2024 with prescribing provider:  Dr Palma   Future Office Visit: 24    Refill sent  Jorge Alberto Ang RN

## 2024-06-28 ENCOUNTER — HOSPITAL ENCOUNTER (OUTPATIENT)
Dept: WOUND CARE | Facility: CLINIC | Age: 54
Discharge: HOME | End: 2024-06-28
Attending: NURSE PRACTITIONER | Admitting: NURSE PRACTITIONER
Payer: COMMERCIAL

## 2024-06-28 PROCEDURE — G0463 HOSPITAL OUTPT CLINIC VISIT: HCPCS

## 2024-06-28 NOTE — PROGRESS NOTES
[facility]    Start of Care in [facility]: 4/21/2024 while inpatient, initial clinic visit 5/3/24.  Referring Provider: Dr Fabiola Orlando MD ED provider, initial referral 4/21/24.  Primary Care Provider: Deepti Vega   Wound Location: Right lateral lower leg (residual limb)    Wound Clinic Visit:  Scheduled follow up.    Reason for Visit:  Follow up on right lateral lower leg wound.  New concerns or left BKA old incision site separation.       Subjective:  On arrival today 6/28/24 with his farther, pt reports the following:  Pt sent photo's 6/22 via My Chart of both his right lateral limb wound and of a skin pinch site of a lower limb (laterality not specified) I returned pt's call, left message but did not get a reply.  Today he reports the pinch of skin is on the left limb, along the scar closed surgical incision, first noted 6/18.  Occurred he assumes from forceful application of his left prosthetic, no drainage and at no time was an open wound, pt described appearance like a set up lips.  The prosthetic insert was recently discontinued per orthotist recommendations (to address the left medial and lateral knee areas of erythema) and application of the prosthetic needed more force to apply, due to extra layer of sock/sleeve.  Erythema assessed in clinic of the left limb has stabilized to improved.  The right lateral limb wound he feels is about the same, his neighbor made him concerned when describing the wound and he would like some clarifications today on skin, scar and scab concerns.  He has submitted approximately 2 months worth of blood sugar readings to his endocrinologist about two weeks ago, has not heard back on his request to change insulin with goal of lowering A1C.  His blood sugars remain very high with nearly all above 200.       Wound History:   Pt presented to the ED here at [facility] on 4/21/23 with concerns of right lateral lower leg wound.  Per  admission note:  Pt has history of bilateral BKA's (left 2019 and right June of 2023).  He stated 'he thinks it started as a blister sometime in the last week or so (prior to presentation in ED).  He'd been trying a new prosthesis sock that was not the right size and it may have been rubbing.  He was having some pain when he wore the prosthesis but he has no pain now without it on.  There is a bad odor and some clear drainage.  His neighbor who is a retired nurse has been helping him with dressing changes.  He denies fever or chills.  His sugars have been high.  He stated he does not feel well when his sugar is below 180 and only uses insulin accordingly.  Wound was swab cultured in the ED, aerobic cultures pending.  At [facility] nurse initial meeting (4/22) with pt, family and neighbor gave some additional information on the origin and progression of the wound.  Appeared pretty rapidly, no sure if there was any initial blistering.  Pt believes the primary cause of the wound was friction and not direct pressure.  I was able to see photo's of the wounds development off the pt's mom's phone.  Initial photo, not a great image but as best able to see, showed hypopigmented site with distinct boarders, no open tissue, did not appear like slough.  Second photo is few days later than first, is a better image and shows what appears to be a full thickness ulcer with mostly clean red nongranular wound bed and open wound edges.  Periwound skin is not well shown in either photo  Pain to the posterior knee and proximal periwound skin increased and per pt's neighbor, small serous clear fluid blisters appeared briefly and ruptured.  Entire surrounding skin more to the proximal than distal had corresponding erythema with no significant increased warmth to touch noted.  Initial [facility] nurse inpatient visit 4/22/24, Triad paste started to promote autolytic debridement of slough and eschar and to protect dry intact periwound skin, and dry out  proximal periwound moist skin, Mepilex 6x6 covered.  Woc return 4/23, no change to plan of care for the wound topically but instructed pt to resume wearing his protective and compressive sleeve to the right residual limb over the wound dressing.  Woc returned 4/24, wound bed slough started to debride some.  Cultures results returned, positive for streptococcus constellatus.  Dr Ardon approved discharge home same day 4/24/24 on oral antibiotics, picc line removed.  Pt called into clinic a few times after discharge, prior to his initial clinic visit with concerns and questions.  Receiving assist from his neighbor but both had concerns and pictures sent in for evaluation by woc, all appeared to be progressing well.  5/8/24 clinic visit with Ly NELSON RN cwocn, new tunneling present, 11 o'clock 0.8 cm, with small amount of undermining noted 5 - 10 o'clock 0.5 cm max.   Triad paste put on hold 6/28 and started NS damp Hydrofera Blue Classic.    In clinic we are also monitoring left lateral and medial knee skin for chronic blanchable erythema which has been deep purple and boggy in the past per pt, has not had open wounds in these sites.  The left limb scar closed surgical incision suffered a pinch 6/18, not an open wound but a crease to the incision was noted.      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    Positive for macroalbuminuria    Retinopathy due to secondary diabetes mellitus (H)    Severe diabetic hypoglycemia (H)    Type 1 diabetes mellitus with other diabetic ophthalmic complication (H)    Stage 3a chronic kidney disease (H)    Statin declined    Primary osteoarthritis of right knee    Current moderate episode of major depressive  disorder without prior episode (H)    Hypertension    YESSICA (acute kidney injury) (H24)    Type 1 diabetes mellitus with hyperglycemia (H)    Diabetic ulcer of right lower leg (H)    Cellulitis of right lower extremity    Anemia, unspecified type    Hyperkalemia    Hyponatremia    Hypoalbuminemia    PAD (peripheral artery disease) (H24)    S/P bilateral BKA (below knee amputation) (H)             Tobacco Use:     Tobacco Use      Smoking status: Never      Smokeless tobacco: Never     Diabetic: Type 1  HgbA1C:   Hemoglobin A1C   Date Value Ref Range Status   05/01/2024 12.7 (H) 0.0 - 5.6 % Final     Comment:     Normal <5.7%   Prediabetes 5.7-6.4%    Diabetes 6.5% or higher     Note: Adopted from ADA consensus guidelines.   05/21/2021 10.9 (H) 0 - 5.6 % Final     Comment:     Normal <5.7% Prediabetes 5.7-6.4%  Diabetes 6.5% or higher - adopted from ADA   consensus guidelines.     Checks Blood Glucose?:  yes, few times daily.  Average readings: around 200 to mid 400's    Personal/social history:  pt lives with family independently, has had home care services, most recently with [facility], no home care currently.     Objective:   Current treatment plan: Triad paste covered with Mepilex 6x6  Last changed: yesterday afternoon     Wound #1 Right lateral lower leg or residual limb.   Stage/tissue depth:  Unstageable pressure injury due to slough, complicated by prosthetic products friction and subsequent cellulitis infection.  Now the wound site is just the open wound so only one measurement of outer size and depth at this time.  3 cm L x 2 cm W x 0.5 cm D  Tunneling: newly noted 5/8/24, 11 o'clock was 0.9 cm on 6/12, today 6/28 too tight to probe.    Undermining: newly noted 5/8/24, 7 -10 o clock max of 0.5 cm D at 9 o'clock and 10 o'clock.  Wound bed type/amount: approximately 20 % granular tissue, 20 % red nongranular tissue, 30 % new silver pink scar tissue and 30 % white moist fibrinous slough: Not currently  fluctuant  Wound Edges: open  Periwound: faint blanchable erythema halo, max of 1 cm wide, localized edema of the wound edge and periwound skin from 4 to 7 o'clock.    Drainage: small to moderate amounts serosanguinous.  Moderate amounts sanguinous when undermining was probed.  Odor: none noted currently  Pain: minimal at rest, some with probing, pain superior to the wound when wearing prosthetic.   Photo's from today's visit 6/28/24, distal is to the right.      Photo pt uploaded via [employer] 6/22.      Photo's from 6/12/24, distal is to the right.      Photo's from 6/6/24, distal is to the right.      Photo's from 5/3/24, initial Wound and Ostomy clinic appointment post hospital discharge 4/24/24.  L - R before debriding x's 2 and after debriding x's 2, distal is to the right.      Photo's from 4/24/24.  Inpatient Woc nurse follow, date of discharge home, distal is to the right.      Photo's from inpatient woc nurse follow up visit 4/23/24, distal is to the right.    Photo from initial woc nurse inpatient consult 4/22/24, distal is to the right.    Photo from ED 4/21/24.    Dorsalis Pedal Pulse: NA  Posterior Tibial Pulse: NA  Hair growth: none noted in the general area of the right lateral leg wound  Capillary Refill: NA  Feet/toes color: NA  Nails: NA  R Leg residual limb: Edema nonpitting. Ankle circumference NA cm. Calf circumference NA cm.  L Leg: Edema Not assessed this visit. Ankle circumference NA cm. Calf circumference NA cm.    Mobility: Wheelchair use, has prosthetics but not currently wearing on right.    Current offloading/footwear: prosthetic to left LE with sneaker   Sensation: per pt normal sensation at right LE wound, hypersensitive.     Other callusing/areas of concern: Left residual limb Medial/lateral/anterior blanchable erythema noted 6/6/24.  Left lateral limb scar closed surgical site crease/fold noted 6/18.  Left residual limb, three areas of blanchable erythema present:  Medial and lateral  knee and anterior lower limb, medial knee.  All blanchable erythema, medial knee most concerning.  - Medial knee, still has pale blanchable and moderately well defined area of blanchable erythema, normal to touch, no pain, improved from initial assessment.  - Lateral mid knee times two, both very pale initially and today 6/28 very faint scattered blanchable erythema present.  - Left anterior/medial lower limb , was initially a scattered area of pale blanchable erythema, did not look pressure related, resolved as of today 6/28.    Photo's from today's visit 6/28/24.  L - R medial knee, lateral knee.      Photo's from 6/6/24, initial assessment of newly noted concerns per pt.  L - R medial knee, lateral/anterior knee, lateral knee, anterior medial lower limb.    Left limb scar closed surgical incision pinch or fold.  Pt first noted 6/18, looked odd but intact, hurt initially, no drainage, no open wound noted.  Initial assessment today appears smaller, remains intact, no open tissue, edges of the fold or pinch per pt was dry and flaky and he applied moisturizer and it improved greatly, appears less protruding/puffy. 0.8 cm L 2 cm W no open tissue, depth of fold crease is approximately 0.3 cm.    Photo's from today's visit 6/28/24, initial clinic assessment of new site of concern.    Photo pt uploaded via My Chart 6/22/24.    Diet: Regular with awareness of effects on blood glucose levels.    Assessment:  Mepilex dressing in place over the right lateral lower limb wound on arrival has shadow of dry drainage noted on outer dressing.  When removed only noted to have small amount of drainage to dressing.  Wound was cleansed with saline and dried well with gauze.  Wound bed looks better than in the picture sent via [employer] on the 22nd, less slough, more granular tissue, more scar tissue advancing from the 10 to 2 o'clock edges.  This is the area pt was concerned, though his neighbor said it was scab not scar and concerned  why as a nurse she did not know the difference, not of concern to woc, appears his neighbor and mom have been doing adequate wound cares as instructed.  Tunneling so narrow did not probe today, even with wooden end of applicator felt too tight.  Wound though improved does appear a little stagnant and we discussed options and my recommendations.     The left medial knee blanchable erythema is improved, no concerns noted, lateral knee had barely any scattered blanchable pale erythema present.  The new fold or pinch of the scar closed incision of the left lateral limb is all intact, appears less distinct, flattening out, compared to the pictures sent and pt's description.    Barriers to wound healing:   Poor nutrition: inadequate supply of protein, carbohydrates, fatty acids, and trace elements essential for all phases of wound healing. Teaching has been done at past visit on need for increased protein in diet for healing, remodeling of scar tissue once closed.  Pt has struggled with protein intake but will continue to try to keep levels high.  Recommended between 90 and 110 grams protein daily as goal  Reduced Blood Supply: appears adequate  Medication: NA  Chemotherapy: suppresses the immune system and inflammatory response, NA  Radiotherapy: increases production of free radical which damage cells, NA  Psychological stress: legitimate concerns related to risk of limb threatening wound   Obesity: decreases tissue perfusion  Infection: prolongs inflammatory phase, uses vital nutrients, impairs epithelialization and releases toxins. Appears resolved, pt is done with all antibiotics. Started an antimicrobial primary dressing to the right lateral limb wound today 6/28.   Underlying Disease: diabetes mellitis   Maceration: reduces wound tensile strength and inhibits epithelial migration, none currently noted.  Patient compliance, appears motivated to heal  Unrelieved pressure, to be determined but none of current  significance noted currently.  Immobility, limited but not immobile  Substance abuse: NA    Plan:  We will discussed change in plan of care for the right lateral limb wound today, pt and his mom in agreement and understanding on how to perform cares.  If they have any new concerns with the wounds status or pain changes with the new dressing they will resume use of Triad as primary, place Hydrofera Blue on hold, and contact the Wound and Ostomy clinic.  Discussed very seriously with the pt and his parents today the need for him to get his A1C down as soon as able.  He needs to keep in contact with his endocrinologist and push for changes as his current blood sugars do not promote healing and put him at a higher risk for infection and new wounds.  His very poor appetite continues, he estimates getting maybe 50 grams of protein daily, recommended 100 grams.  The combination of the high blood sugars and low protein intake will make healing very difficult, prolonged open wounds could lead to infection and possibly other new wounds.  Pt and his parents state understanding and pt will reach out to his endocrinologist to follow up on blood sugar control and med regime.      Discussed/Education provided: plan of care with rationale;     Topical cares to right lateral leg/residual limb:    Remove the old bandage, cleanse with wound cleanser and dry well.  Take the Hydrofera Blue sample piece and cut new piece to the same size and shape.  Dampen the Hydrofera Blue with the sterile water, ring out, then re dampen and ring out the excess so the foam is slightly moist but not wet.  Place the foam in the wound and cover with the Mepilex foam dressing and change daily.    Pt is unable to do self cares due to wound location, has assist from his neighbor who is retired RN, she is able to perform cares/has been caring for wound.  Pt's mom is also doing wound cares.      Additional recommendations: None at this time    The following  discharge instructions were reviewed with and sent home with the patient:  See AVS      The following supplies were sent home with the patient:  Few extra Mepilex and remains of the Hydrofera Blue and sterile water opened today.    Return visit: 7/5/24 Wound and Ostomy clinic    Verbal, written, & demonstrative education provided.  Face to face time: approximately 25 minutes  Pro[facility]: [facility] Leonard [facility]  [phone]

## 2024-06-28 NOTE — PATIENT INSTRUCTIONS
Today we will change the plan of care for the right lower leg.  We will change the plan of care to a different product.  Stop the Triad paste for now.    New wound cares.  Remove the old bandage, cleanse with wound cleanser and dry well.  Take the Hydrofera Blue sample piece and cut new piece to the same size and shape.  Dampen the Hydrofera Blue with the sterile water, ring out, then re dampen and ring out the excess so the foam is slightly moist but not wet.  Place the foam in the wound and cover with the Mepilex foam dressing and change daily.    I will see you again in one week.    Pipe Leonard RN cwocn

## 2024-06-30 ENCOUNTER — HEALTH MAINTENANCE LETTER (OUTPATIENT)
Age: 54
End: 2024-06-30

## 2024-06-30 ENCOUNTER — MYC MEDICAL ADVICE (OUTPATIENT)
Dept: ENDOCRINOLOGY | Facility: CLINIC | Age: 54
End: 2024-06-30
Payer: COMMERCIAL

## 2024-06-30 DIAGNOSIS — E10.40 TYPE 1 DIABETES MELLITUS WITH DIABETIC NEUROPATHY (H): Primary | ICD-10-CM

## 2024-06-30 DIAGNOSIS — E10.319 TYPE 1 DIABETES MELLITUS WITH RETINOPATHY, MACULAR EDEMA PRESENCE UNSPECIFIED, UNSPECIFIED LATERALITY, UNSPECIFIED RETINOPATHY SEVERITY (H): ICD-10-CM

## 2024-06-30 DIAGNOSIS — E10.21 TYPE 1 DIABETES MELLITUS WITH DIABETIC NEPHROPATHY (H): ICD-10-CM

## 2024-06-30 DIAGNOSIS — E10.59 TYPE 1 DIABETES MELLITUS WITH OTHER CIRCULATORY COMPLICATION (H): ICD-10-CM

## 2024-07-02 ENCOUNTER — TELEPHONE (OUTPATIENT)
Dept: FAMILY MEDICINE | Facility: OTHER | Age: 54
End: 2024-07-02
Payer: COMMERCIAL

## 2024-07-02 NOTE — TELEPHONE ENCOUNTER
Patient Quality Outreach    Patient is due for the following:   Diabetes -  A1C and Eye Exam    Next Steps:   No follow up needed at this time. Patient sees endo     Type of outreach:    Chart review performed, no outreach needed.      Questions for provider review:    None           Huma Phillips, CMA

## 2024-07-05 ENCOUNTER — HOSPITAL ENCOUNTER (OUTPATIENT)
Dept: WOUND CARE | Facility: CLINIC | Age: 54
Discharge: HOME | End: 2024-07-05
Attending: NURSE PRACTITIONER | Admitting: NURSE PRACTITIONER
Payer: COMMERCIAL

## 2024-07-05 PROCEDURE — 97602 WOUND(S) CARE NON-SELECTIVE: CPT

## 2024-07-05 NOTE — PROGRESS NOTES
[facility]    Start of Care in [facility]: 4/21/2024 while inpatient, initial clinic visit 5/3/24.  Referring Provider: Dr Fabiola Orlando MD ED provider, initial referral 4/21/24.  Primary Care Provider: Deepti Vega   Wound Location: Right lateral lower leg (residual limb)    Wound Clinic Visit:  Scheduled follow up.    Reason for Visit:  Follow up on right lateral lower leg wound.  New concerns or left BKA old incision site separation.       Subjective:  On arrival today 7/5/24 with his father (who waited in the lobby during our visit today):  See Assessment for pt concerns and updates.     Wound History:   Pt presented to the ED here at [facility] on 4/21/23 with concerns of right lateral lower leg wound.  Per admission note:  Pt has history of bilateral BKA's (left 2019 and right June of 2023).  He stated 'he thinks it started as a blister sometime in the last week or so (prior to presentation in ED).  He'd been trying a new prosthesis sock that was not the right size and it may have been rubbing.  He was having some pain when he wore the prosthesis but he has no pain now without it on.  There is a bad odor and some clear drainage.  His neighbor who is a retired nurse has been helping him with dressing changes.  He denies fever or chills.  His sugars have been high.  He stated he does not feel well when his sugar is below 180 and only uses insulin accordingly.  Wound was swab cultured in the ED, aerobic cultures pending.  At [facility] nurse initial meeting (4/22) with pt, family and neighbor gave some additional information on the origin and progression of the wound.  Appeared pretty rapidly, no sure if there was any initial blistering.  Pt believes the primary cause of the wound was friction and not direct pressure.  I was able to see photo's of the wounds development off the pt's mom's phone.  Initial photo, not a great image but as best able to see, showed  hypopigmented site with distinct boarders, no open tissue, did not appear like slough.  Second photo is few days later than first, is a better image and shows what appears to be a full thickness ulcer with mostly clean red nongranular wound bed and open wound edges.  Periwound skin is not well shown in either photo  Pain to the posterior knee and proximal periwound skin increased and per pt's neighbor, small serous clear fluid blisters appeared briefly and ruptured.  Entire surrounding skin more to the proximal than distal had corresponding erythema with no significant increased warmth to touch noted.  Initial wo nurse inpatient visit 4/22/24, Triad paste started to promote autolytic debridement of slough and eschar and to protect dry intact periwound skin, and dry out proximal periwound moist skin, Mepilex 6x6 covered.  Woc return 4/23, no change to plan of care for the wound topically but instructed pt to resume wearing his protective and compressive sleeve to the right residual limb over the wound dressing.  Woc returned 4/24, wound bed slough started to debride some.  Cultures results returned, positive for streptococcus constellatus.  Dr Ardon approved discharge home same day 4/24/24 on oral antibiotics, picc line removed.  Pt called into clinic a few times after discharge, prior to his initial clinic visit with concerns and questions.  Receiving assist from his neighbor but both had concerns and pictures sent in for evaluation by [facility], all appeared to be progressing well.  5/8/24 clinic visit with Ly NELSON RN cwocn, new tunneling present, 11 o'clock 0.8 cm, with small amount of undermining noted 5 - 10 o'clock 0.5 cm max.   Triad paste put on hold 6/28 and started NS damp Hydrofera Blue Classic.  At follow up visit 7/5 pt had stopped the Hydrofera Blue due to concerns of pain and reports it was making the wound worse.  Returned to Triad paste and will continue, see Assessment note of 7/5 for more details.  In  clinic we are also monitoring left lateral and medial knee skin for chronic blanchable erythema which has been deep purple and boggy in the past per pt, has not had open wounds in these sites.  The left limb scar closed surgical incision suffered a pinch 6/18, not an open wound but a crease to the incision was noted.      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    Positive for macroalbuminuria    Retinopathy due to secondary diabetes mellitus (H)    Severe diabetic hypoglycemia (H)    Type 1 diabetes mellitus with other diabetic ophthalmic complication (H)    Stage 3a chronic kidney disease (H)    Statin declined    Primary osteoarthritis of right knee    Current moderate episode of major depressive disorder without prior episode (H)    Hypertension    YESSICA (acute kidney injury) (H24)    Type 1 diabetes mellitus with hyperglycemia (H)    Diabetic ulcer of right lower leg (H)    Cellulitis of right lower extremity    Anemia, unspecified type    Hyperkalemia    Hyponatremia    Hypoalbuminemia    PAD (peripheral artery disease) (H24)    S/P bilateral BKA (below knee amputation) (H)             Tobacco Use:     Tobacco Use      Smoking status: Never      Smokeless tobacco: Never     Diabetic: Type 1  HgbA1C:   Hemoglobin A1C   Date Value Ref Range Status   05/01/2024 12.7 (H) 0.0 - 5.6 % Final     Comment:     Normal <5.7%   Prediabetes 5.7-6.4%    Diabetes 6.5% or higher     Note: Adopted from ADA consensus guidelines.   05/21/2021 10.9 (H) 0 - 5.6 % Final     Comment:     Normal <5.7% Prediabetes 5.7-6.4%  Diabetes 6.5% or higher - adopted from ADA   consensus guidelines.     Checks Blood Glucose?:  yes, few times daily.  Average readings:  around 200 to mid 400's    Personal/social history:  pt lives with family independently, has had home care services, most recently with [facility], no home care currently.     Objective:   Current treatment plan: At last clinic visit 6/28 wo recommended change from Triad paste as primary dressing to the use of cut fit saline dampened Hydrofera Blue Classic foam covered with Mepilex 6x6.  Pt used Hydrofera Blue for a few dressing changes but had his care givers go back to Triad, see Assessment for details.   Last changed: yesterday afternoon     Wound #1 Right lateral lower leg or residual limb.   Stage/tissue depth:  Unstageable pressure injury due to slough, complicated by prosthetic products friction and subsequent cellulitis infection.  Now the wound site is just the open wound so only one measurement of outer size and depth at this time.  3 cm L x 2 cm W x 0.5 cm D, no change  Tunneling: newly noted 5/8/24, 11 o'clock was 0.9 cm on 6/12, today 6/28 too tight to probe.  Today 7/5 did not probe per pt request, see Assessment for details.  Undermining: newly noted 5/8/24, 7 -10 o clock max of 0.5 cm D at 9 o'clock and 10 o'clock.  Today 7/5 did not probe per pt request, see Assessment for details.  Wound bed type/amount: approximately 40 % granular tissue, 20 % red nongranular tissue, 20 % silver pink scar tissue and 20 % white moist fibrinous slough: Not currently fluctuant  Wound Edges: open  Periwound: faint blanchable erythema halo, max of 1 cm wide, localized edema of the wound edge and periwound skin from 4 to 7 o'clock.    Drainage: Moderate amounts serosanguinous, per pt large amount bloody drainage after probed last visit in clinic.   Odor: none noted currently  Pain: minimal at rest, some with probing, pain worse directly at the wound site per pt when Hydrofera Blue was used, believes it was related to the increased bulk of the blue foam and pressure applied when covered with Mepilex and prosthetic  sleeves.    Photo's from today's visit 7/5/24, distal is to the right.     Photo's from 6/28/24, distal is to the right.      Photo pt uploaded via [employer] 6/22.      Photo's from 6/12/24, distal is to the right.      Photo's from 6/6/24, distal is to the right.      Photo's from 5/3/24, initial Wound and Ostomy clinic appointment post hospital discharge 4/24/24.  L - R before debriding x's 2 and after debriding x's 2, distal is to the right.      Photo's from 4/24/24.  Inpatient Woc nurse follow, date of discharge home, distal is to the right.      Photo's from inpatient woc nurse follow up visit 4/23/24, distal is to the right.    Photo from initial woc nurse inpatient consult 4/22/24, distal is to the right.    Photo from ED 4/21/24.    Dorsalis Pedal Pulse: NA  Posterior Tibial Pulse: NA  Hair growth: none noted in the general area of the right lateral leg wound  Capillary Refill: NA  Feet/toes color: NA  Nails: NA  R Leg residual limb: Edema nonpitting. Ankle circumference NA cm. Calf circumference NA cm.  L Leg: Edema Not assessed this visit. Ankle circumference NA cm. Calf circumference NA cm.    Mobility: Wheelchair use, has prosthetics but not currently wearing on right.    Current offloading/footwear: prosthetic to left LE with sneaker   Sensation: per pt normal sensation at right LE wound, hypersensitive.     Other callusing/areas of concern:   Note due to multiple questions and concerns presented by the pt today related to the right limb wound the left limb was not assessed today.  No new concerns presented by the pt concerning the left limb.  Below information in italics and pictures from prior visits as dated.    Left residual limb Medial/lateral/anterior blanchable erythema noted 6/6/24.  Left lateral limb scar closed surgical site crease/fold noted 6/18.  Left residual limb, three areas of blanchable erythema present:  Medial and lateral knee and anterior lower limb, medial knee.  All blanchable  erythema, medial knee most concerning.  - Medial knee, still has pale blanchable and moderately well defined area of blanchable erythema, normal to touch, no pain, improved from initial assessment.  - Lateral mid knee times two, both very pale initially and today 6/28 very faint scattered blanchable erythema present.  - Left anterior/medial lower limb , was initially a scattered area of pale blanchable erythema, did not look pressure related, resolved as of today 6/28.      Photo's from 6/28/24.  L - R medial knee, lateral knee.      Photo's from 6/6/24, initial assessment of newly noted concerns per pt.  L - R medial knee, lateral/anterior knee, lateral knee, anterior medial lower limb.    Left limb scar closed surgical incision pinch or fold.  Pt first noted 6/18, looked odd but intact, hurt initially, no drainage, no open wound noted.  Initial assessment today appears smaller, remains intact, no open tissue, edges of the fold or pinch per pt was dry and flaky and he applied moisturizer and it improved greatly, appears less protruding/puffy. 0.8 cm L 2 cm W no open tissue, depth of fold crease is approximately 0.3 cm.      Photo's from 6/28/24, initial clinic assessment of new site of concern.    Photo pt uploaded via My Chart 6/22/24.    Diet: Regular with awareness of effects on blood glucose levels.    Assessment:  Pt arrives today with concerns and thoughts on the wound of his right lateral lower leg.  Concerns -   He was unable to tolerate the Hydrofera Blue dressings recommended last time, partily due to pain from what he feels is pressure of the added bulk of the HFB foam.  Also had concerns of bleeding and was not sure if the Hydrofera Blue was the cause.     The entire ordeal with this right limb wound has been difficult for the pt since initial hospitalization and he just wants to be done with the cares.  He states today he wants to bash his head against a mirror he is so frustrated.  Also presents a  stress between his parents and he as they are staying in town to help with transportation and wound cares.  Specifics addressed:  1 - Pt feels the wound is worse based on the reports given by his mom and neighbor care giver.  Based on the drainage on the dressing removed.    Woc addressed - in my opinion the wound bed has continuously appeared to improve based on debriding of slough and new granular and scar tissue formation.  The amount of drainage and the character of the drainage varies based on products in use and is not the best indicator of the wounds status.  Drainage is likely to be higher in volume with use of an antimicrobial and or debriding agents, drainage goal to be more pink or bloody as indicated no more debriding needed and new granular tissue is progressing to the surface providing oxygen and nutrients from the blood stream.   2 - He does not want the wound probed any longer, referring to general depth, undermining and past know tunnel, feels that leaving it alone is best because after probed it bled a lot which worried him greatly.  Woc addressed - I try to probe as gently as able but probing for depth, tunneling and undermining is part of a thorough assessment and is needed.  We do not probe aggressively but need to know if tunneling and undermining is improving or not.  Normally any tunneling or undermining with a depth greater than 1 cm needs to be packed, which we have discussed in the past but depth did not reach that threshold level.  At last visit visit I did not probe the tunneling at 11 o'clock as was too tight but would try at each visit to assess its status.  3 - He would like to have the wound fully closed and back to normal by the end of July this year, needs to return to his full time use of prosthetic.    Woc addressed - Based on slow rate of debriding, new granulation and scar formation I do not think he will be any where near fully closed by the end of July.  With current A1C  above 12, daily blood sugar value's reported by the pt (between 200 and 400) and reported poor po intake and reluctance to date to use any protein supplements.  I would assume with increased wear time of right prosthetic the right lateral wound be under more pressure and possibly friction and shearing.  Both the pressure and the friction/shearing would be contributing factors for wound stagnation and potentially deterioration.    4 He would like a less frequent than daily options for wound cares and questions if there would be any reason to just leave alone, open to air, during rest for a few hours or just covering with gauze for absorbency and using his prosthetic sleeves.  He compared his right lateral full thickness wound with slough to a proverbial cut on his finger.  With a cut he would clean it, maybe apply an ointment at first but then in a day or two would leave open to air and it would heal, why wont the right leg wound respond the same.    Woc nurse addressed these issues:  - Rational for daily changes it to both provide new debriding agent be it paste or a type of dressing, AND for drainage absorption.  Currently there remains slough in the wound and the drainage volume appears to reach it's acceptable max within approximately 24 hours.  Less often changes would slow the debriding down some.  Bigger concern is the increased volume of drainage/moisture and associated enzymes, would increased periwound moisture and increase moisture and enzyme related tissue break down around the wound.  - Rational for keeping the wound covered in general is that full thickness wound heal best when in a moist environment and are at body temperature.  Drying out of a full thickness wound will promote new slough and eventually new eschar over prior granular and or red nongranular tissue.  Leaving open for long periods of time would likely lead to a fully eschar covered wound bed.   If from the beginning the wound was 100 %  dry stable black or tan eschar, leaving it in place with out debriding would be appropriate, but the wound was not stable from the beginning.  Ortho recommended aggressive surgical debridement when initially assessed and pt refused, he was in agreement with the plan for use of autolytic debridement offered as option per [facility] nurse to surgery.  But the slow debriding process has understandably frustrated the pt.  Under conditions of a HgbA1C closer to 7 and reports of adequate protein and caloric food intake debriding would have been faster.    Pt continues to have poorly controlled diabetes per his report today he has reached out to his endocrinologist 3 times since our last visit to address the blood sugar logs he submitted.    His endocrine provider is outside of the [facility] system but pt reports he is aware of the last high A1C and has been given recent blood sugars in goal of changing his insulin management.  He reports he feels he is being ignored by his endocrinologist, is only allowed now to do virtual visits instead of inpatient, and that money is related to the reluctance for his endocrinologist to change his insulin regime.  I recommended today to the pt to continue to reach out to endocrinology and if not satisfied with his cares to seek new diabetic educators and or endocrinologist.  I was blunt with pt today that I find it hard to believe that any endocrinologist or diabetic educator would feel an A1C greater than 12 and documentation showing blood glucose values consistently above 200 and up to 400's is acceptable.   Pt states to get his A1C lower he would need to basically stop eating all together and has been unwilling to try protein supplement drinks as he is afraid they will worsen his blood sugars.  All this said I feel he needs to follow up with endo and diabetic educators to reevaluate his nutrition and glucose management as with current status wound healing is going to be slow at best, higher  risk for infection at current wound sites, and increased risk for new LE skin breakdown.    The right lateral limb wound today is the same in outer size and direct 90 degree depth.  There is slightly less slough, slough present is pale in color and now more fibrinous as the bulk has debrided.  The pt still has visible undermining present, separation is visible with minimal lifting of the edge.  I did not probe today but will discuss ongoing with the pt that probing the wound gently is part of a full wound assessment and can not be omitted with hope that it is getting better, while it could get worse and gone un assessed could promote abscess, new wounds and infection.    The left medial knee, lateral knee and left lateral limb concerns not address or assessed today in clinic.    Barriers to wound healing:   Poor nutrition: inadequate supply of protein, carbohydrates, fatty acids, and trace elements essential for all phases of wound healing. Teaching has been done at past visit on need for increased protein in diet for healing, remodeling of scar tissue once closed.  Pt has struggled with protein intake but will continue to try to keep levels high.  Recommended between 90 and 110 grams protein daily as goal.  Discussed in depth today 7/5.  Reduced Blood Supply: appears adequate  Medication: NA  Chemotherapy: suppresses the immune system and inflammatory response, NA  Radiotherapy: increases production of free radical which damage cells, NA  Psychological stress: legitimate concerns related to risk of limb threatening wound   Obesity: decreases tissue perfusion  Infection: prolongs inflammatory phase, uses vital nutrients, impairs epithelialization and releases toxins. Appears resolved, pt is done with all antibiotics. No infection noted today, will not recommend another antimicrobial dressing unless new bio load is noted or drainage decreases and we can get to an every other day change plan of care.    Underlying  Disease: diabetes mellitis   Maceration: reduces wound tensile strength and inhibits epithelial migration, none currently noted.  Patient compliance, appears motivated to heal but is focusing on the topical interventions and not the nutritional and comorbidity effects of highly elevated blood sugars.    Unrelieved pressure, to be determined but none of current significance noted currently.  Immobility, limited but not immobile  Substance abuse: NA    Plan:  For now we will continue with weekly visits for wound assessment and cares.  Topically we can fine tune our products in use and process as debriding status improves and if wound fully debrides and drainage reduces we could change to every other day changes in time.  For now we will continue with the Triad paste use as primary dressing.  It is keeping the wound bed moist, debriding slough slowly and protectin periwound skin from moisture and enzyme related tissue irritation and or break down.    Pt states he will reach out to endocrinology again and try to his concerns addressed related to his very poorly controlled diabetes.    Discussed/Education provided: plan of care with rationale;     Topical cares to right lateral leg/residual limb:    Remove the old bandage, cleanse with wound cleanser and dry well.  Fill wound bed with Triad paste at least a nickel thick in the areas of slough and thin smear to periwound skin to protect from drainage/moisture and enzyme exposure.   Place the foam in the wound and cover with the Mepilex foam dressing and change daily.    Pt is unable to do self cares due to wound location, has assist from his neighbor who is retired RN, she is able to perform cares/has been caring for wound.  Pt's mom is also doing wound cares.      Additional recommendations: None at this time    The following discharge instructions were reviewed with and sent home with the patient:  No AVS given this visit    The following supplies were sent home with  the patient:  Few extra Mepilex 6x6     Return visit: 7/10/24 Wound and Ostomy clinic    Verbal, written, & demonstrative education provided.  Face to face time: approximately 45 minutes  Pro[facility]  [phone]

## 2024-07-10 ENCOUNTER — MYC MEDICAL ADVICE (OUTPATIENT)
Dept: ENDOCRINOLOGY | Facility: CLINIC | Age: 54
End: 2024-07-10
Payer: COMMERCIAL

## 2024-07-12 ENCOUNTER — TELEPHONE (OUTPATIENT)
Dept: ENDOCRINOLOGY | Facility: CLINIC | Age: 54
End: 2024-07-12
Payer: COMMERCIAL

## 2024-07-12 DIAGNOSIS — E10.40 TYPE 1 DIABETES MELLITUS WITH DIABETIC NEUROPATHY (H): Primary | ICD-10-CM

## 2024-07-12 NOTE — TELEPHONE ENCOUNTER
Prior Authorization Not Needed per Insurance    Medication: INSULIN ASPART  UNIT/ML SC SOPN  Insurance Company: Cm - Phone [phone] Fax [phone]  Expected CoPay: $    Pharmacy Filling the Rx: [facility] - [place], [place] - [address]  Pharmacy Notified: y  Patient Notified: Instructed pharmacy to notify patient once order is ready.     Pharmacy confirmed paid claim. PA not needed at this time.

## 2024-07-12 NOTE — TELEPHONE ENCOUNTER
Reached out to pharmacy to confirm if PA is needed. Novolog is preferred so PA should not be needed.

## 2024-07-12 NOTE — TELEPHONE ENCOUNTER
This patient has used Humalog Kwikpen insulin with for his Type 1 diabetes management.  The Humalog has not been as effective as the Novolog (Flexpen) insulin.  I am requesting a PA for the Novolog Flexpen and have written a letter to justify this medication (in his chart).  Please assist with a PA request to his [facility] insurance, high priority.  Thanks.    LEVI Palma MD, MS  Endocrinology  [facility]

## 2024-07-13 NOTE — TELEPHONE ENCOUNTER
Message reviewed, along with patient's other (similar) messages.  I sent him a MyChart reply today, indicated we have submitted a PA request appealing the insurance denial for Novolog Flexpen insulin.  We are awaiting the PA team reply.    LEVI Palma MD, MS  Endocrinology  [facility]

## 2024-07-14 NOTE — TELEPHONE ENCOUNTER
Patient update message noted.  I have sent him a separate [employer] summary message today regarding his request for (non-formulary) Novolog insulin and plan for a follow-up endocrinology appointment soon.    LEVI Palma MD, MS  Endocrinology  [facility]

## 2024-07-16 DIAGNOSIS — E10.40 TYPE 1 DIABETES MELLITUS WITH DIABETIC NEUROPATHY (H): Primary | ICD-10-CM

## 2024-07-16 RX ORDER — FLURBIPROFEN SODIUM 0.3 MG/ML
SOLUTION/ DROPS OPHTHALMIC
Qty: 150 EACH | Refills: 11 | Status: SHIPPED | OUTPATIENT
Start: 2024-07-16

## 2024-07-24 ENCOUNTER — HOSPITAL ENCOUNTER (OUTPATIENT)
Dept: WOUND CARE | Facility: CLINIC | Age: 54
Discharge: HOME | End: 2024-07-24
Attending: NURSE PRACTITIONER | Admitting: NURSE PRACTITIONER
Payer: COMMERCIAL

## 2024-07-24 PROCEDURE — G0463 HOSPITAL OUTPT CLINIC VISIT: HCPCS

## 2024-07-24 NOTE — PROGRESS NOTES
[facility]    Start of Care in [facility]: 4/21/2024 while inpatient, initial clinic visit 5/3/24.  Referring Provider: Dr Fabiola Orlando MD ED provider, initial referral 4/21/24.  Primary Care Provider: Deepti Vega   Wound Location: Right lateral thigh open wound (residual limb) and left thigh blistering.     Wound Clinic Visit:  Re scheduled follow up.    Reason for Visit:  Follow up on right lateral thigh wound, left knee areas of erythema, left distal lateral residual limb skin fold along old scar closed incision, new concerns noted today 7/24 of left thigh blistering.     Subjective:  On arrival today 7/24/24 with is mom and dad, pt report the following: Right lateral thigh wound has appears to be doing ok, remains open, no significant pain, drainage small to moderate, no concerns of bleeding.  States he accessed wound nurses on line to get their opinion on his plan of care related to probing and wound cares products in use.  Feels more comfortable with knowing probing for tunneling and undermining is needed and ration is clear, he states the on line resources disagreed with this [facility] nurse on bleeding when probing.  I find that wounds growing new granular tissue tend to bleed small amounts when probing, he states the on line resources felt bleeding was not to be expected.  He has new concerns on his left thigh today, new blistering which presented in the last week or so, started within a few days of starting a new type of prosthetic sleeve.  Blisters on the front of the thigh ruptured, family applying neosporin to the site and covering with Mepilex, leaving intact blisters of the lateral leg open to air.  See updates on diabetes management.       Wound History:   Pt presented to the ED here at [facility] on 4/21/23 with concerns of right lateral lower leg wound.  Per admission note:  Pt has history of bilateral BKA's (left 2019 and right June of 2023).  He  stated 'he thinks it started as a blister sometime in the last week or so (prior to presentation in ED).  He'd been trying a new prosthesis sock that was not the right size and it may have been rubbing.  He was having some pain when he wore the prosthesis but he has no pain now without it on.  There is a bad odor and some clear drainage.  His neighbor who is a retired nurse has been helping him with dressing changes.  He denies fever or chills.  His sugars have been high.  He stated he does not feel well when his sugar is below 180 and only uses insulin accordingly.  Wound was swab cultured in the ED, aerobic cultures pending.  At wo nurse initial meeting (4/22) with pt, family and neighbor gave some additional information on the origin and progression of the wound.  Appeared pretty rapidly, no sure if there was any initial blistering.  Pt believes the primary cause of the wound was friction and not direct pressure.  I was able to see photo's of the wounds development off the pt's mom's phone.  Initial photo, not a great image but as best able to see, showed hypopigmented site with distinct boarders, no open tissue, did not appear like slough.  Second photo is few days later than first, is a better image and shows what appears to be a full thickness ulcer with mostly clean red nongranular wound bed and open wound edges.  Periwound skin is not well shown in either photo  Pain to the posterior knee and proximal periwound skin increased and per pt's neighbor, small serous clear fluid blisters appeared briefly and ruptured.  Entire surrounding skin more to the proximal than distal had corresponding erythema with no significant increased warmth to touch noted.  Initial woc nurse inpatient visit 4/22/24, Triad paste started to promote autolytic debridement of slough and eschar and to protect dry intact periwound skin, and dry out proximal periwound moist skin, Mepilex 6x6 covered.  Woc return 4/23, no change to plan of  care for the wound topically but instructed pt to resume wearing his protective and compressive sleeve to the right residual limb over the wound dressing.  Woc returned 4/24, wound bed slough started to debride some.  Cultures results returned, positive for streptococcus constellatus.  Dr Ardon approved discharge home same day 4/24/24 on oral antibiotics, picc line removed.  Pt called into clinic a few times after discharge, prior to his initial clinic visit with concerns and questions.  Receiving assist from his neighbor but both had concerns and pictures sent in for evaluation by wo, all appeared to be progressing well.  5/8/24 clinic visit with Ly NELSON RN cwocn, new tunneling present, 11 o'clock 0.8 cm, with small amount of undermining noted 5 - 10 o'clock 0.5 cm max.   Triad paste put on hold 6/28 and started NS damp Hydrofera Blue Classic.  At follow up visit 7/5 pt had stopped the Hydrofera Blue due to concerns of pain and reports it was making the wound worse.  Returned to Triad paste and will continue, see Assessment note of 7/5 for more details.  In clinic we are also monitoring left lateral and medial knee skin for chronic blanchable erythema which has been deep purple and boggy in the past per pt, has not had open wounds in these sites.  The left limb scar closed surgical incision suffered a pinch 6/18, not an open wound but a crease to the incision was noted.  Newly noted blisters to the left thigh first assessed in clinic 7/24/24.    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    Positive for macroalbuminuria    Retinopathy due to secondary diabetes mellitus (H)    Severe diabetic hypoglycemia (H)    Type 1 diabetes mellitus  with other diabetic ophthalmic complication (H)    Stage 3a chronic kidney disease (H)    Statin declined    Primary osteoarthritis of right knee    Current moderate episode of major depressive disorder without prior episode (H)    Hypertension    YESSICA (acute kidney injury) (H24)    Type 1 diabetes mellitus with hyperglycemia (H)    Diabetic ulcer of right lower leg (H)    Cellulitis of right lower extremity    Anemia, unspecified type    Hyperkalemia    Hyponatremia    Hypoalbuminemia    PAD (peripheral artery disease) (H24)    S/P bilateral BKA (below knee amputation) (H)             Tobacco Use:     Tobacco Use      Smoking status: Never      Smokeless tobacco: Never     Diabetic: Type 1  HgbA1C:   Hemoglobin A1C   Date Value Ref Range Status   05/01/2024 12.7 (H) 0.0 - 5.6 % Final     Comment:     Normal <5.7%   Prediabetes 5.7-6.4%    Diabetes 6.5% or higher     Note: Adopted from ADA consensus guidelines.   05/21/2021 10.9 (H) 0 - 5.6 % Final     Comment:     Normal <5.7% Prediabetes 5.7-6.4%  Diabetes 6.5% or higher - adopted from ADA   consensus guidelines.     Checks Blood Glucose?:  yes, few times daily.  Average readings: remain high, no specifics discussed today, see below for updates on diabetes management changes.     Personal/social history:  pt lives with family independently, has had home care services, most recently with [facility], no home care currently.     Objective:   Current treatment plan:  Right lateral thigh wound - Triad paste as primary dressing, Mepilex 6x6 to cover.  Left thigh ruptured blister - neosporin applied to open areas, Mepilex 6x6 to cover.    Last changed: yesterday    Wound #1 Right lateral thigh of residual limb.   Stage/tissue depth:  Unstageable pressure injury due to slough, complicated by prosthetic products friction and subsequent cellulitis infection.  2.6 cm L x 2 cm W x 0.4 cm D   Tunneling: newly noted 5/8/24 at 11 o'clock, max depth of 0.9 cm on 6/12, on 6/28 too  tight to probe and today 7/24 unable to locate any opening to the site, scar closed.   Undermining: newly noted 5/8/24, 7 -10 o clock max of 0.3 cm in the center and tapering to 0 cm on the ends.   Wound bed type/amount: approximately 40 % granular tissue, 40 % red nongranular tissue, 10 % silver pink scar tissue and 10 % white moist fibrinous slough: Not currently fluctuant  Wound Edges: open  Periwound: new scar tissue fragile but intact   Drainage: moderate amounts serosanguinous on dressing removed  Odor: none noted currently   Pain: 3/10 with cares today  Photo's from today's visit 7/24/24, distal is to the right.     Photo's from 7/5/24, distal is to the right.     Photo's from 6/28/24, distal is to the right.    Photo pt uploaded via [employer] 6/22.    Photo's from 6/12/24, distal is to the right.      Photo's from 6/6/24, distal is to the right.      Photo's from 5/3/24, initial Wound and Ostomy clinic appointment post hospital discharge 4/24/24.  L - R before debriding x's 2 and after debriding x's 2, distal is to the right.      Photo's from 4/24/24.  Inpatient Woc nurse follow, date of discharge home, distal is to the right.      Photo's from inpatient woc nurse follow up visit 4/23/24, distal is to the right.    Photo from initial woc nurse inpatient consult 4/22/24, distal is to the right.    Photo from ED 4/21/24.    Wound #2 Left thigh blistering from friction irritation of new prosthetic sleeve.  Stage/tissue depth: partial thickness  Multiple sites along same horizontal ring as listed below:  - Lateral/posterior 0.7 cm L x 0.7 cm W x 0 cm D, intact soft serous filled blister, protruding approximately 2 mm, no drainage.  - Lateral 2 cm L x 7.5 cm W x 0 cm D, intact firm to palpation blister with erythema, protruding approximately 3 mm at max, no drainage.  - Anterior 2 cm Lx 10 cm W x 0 cm D, scattered blister with approximately 30 % being soft and serous filled and 70 % ruptured dry blister flat with  small scab present, no noted drainage from site today, small amounts serous on dressing removed.   - Medial/posterior 1 cm L x 10 cm W x 0 cm D, area of intact skin with blanchable erythema, no blistering, no drainage, no increased warmth to touch  Tunneling: none noted at any site  Undermining: none noted at any site  Wound bed type/amount: as listed above for each site; Not fluctuant  Wound Edges: NA no open wound  Periwound: wnl  Drainage: as listed above for each site  Odor: no  Pain: denies any current pain or any pain when first developing,       Photo's from today's initial assessment of new blistering and line of erythema 7/24/24.  L - R top to bottom rows, lateral/posterior, lateral, anterior, medial, medial/posterior.    Dorsalis Pedal Pulse: NA  Posterior Tibial Pulse: NA  Hair growth: hair is present around the wounds sites of the right and left thigh  Capillary Refill: NA  Feet/toes color: NA  Nails: NA  R Leg residual limb: Edema nonpitting. Ankle circumference NA cm. Calf circumference NA cm.  L Leg: Edema Not assessed this visit. Ankle circumference NA cm. Calf circumference NA cm.    Mobility: Wheelchair use, has prosthetics but not currently wearing on right.    Current offloading/footwear: prosthetic to left LE with sneaker   Sensation: per pt normal sensation at right LE wound, hypersensitive.     Other callusing/areas of concern:   Left residual limb, three areas we are monitoring - Medial and lateral knee first assessed per pt request 6/6/24, and Lateral distal lower limb on BKA surgical scar first assessed per pt request 6/18  - Medial knee, history of boggy discolored skin and erythema, no open wounds, at worse in clinic have noticed pale scattered blanchable erythema in localized area, scant discoloration today.   - Lateral knee, history of areas of blanchable erythema, today no erythema noted.      Photo's from today's visit 7/24/24.  L - R medial knee, lateral knee.      Photo's from  6/28/24.  L - R medial knee, lateral knee.      Photo's from 6/6/24, initial assessment of newly noted concerns per pt.  L - R medial knee, lateral/anterior knee, lateral knee, anterior medial lower limb.    Left limb scar closed surgical incision pinch or fold.  Pt first noted 6/18, looked odd but intact, hurt initially, no drainage, no open wound noted.  Applied moisturizer to the edges where where dry and more protruding.  In clinic dry edges mostly resolved as of first assessment, no open wound.  Today 7/24 remains closed with small depth present within the fold all scar covered, no drainage, is a site to monitor as could collect debris or moisture.    Photo from today's visit 7/24/24.    Photo's from 6/28/24, initial clinic assessment of new site of concern.    Photo pt uploaded via My Chart 6/22/24.    Diet: Regular with awareness of effects on blood glucose levels.    Assessment:  The right lateral thigh wound has improve a little, more granular tissue, slightly less slough, tunneling appears resolved, was too tight to probe last two visits and no opening found today, area where opening was is scar covered today.  Undermining is minimal.    The new areas of wounds about the left thigh are all dry today.  Lateral blister cluster is rather firm to the touch, more bulky, does not feel fluid filled and no visible fluid noted within.  There is another intact fluid blister to the lateral/posterior which is small but could rupture.  All the other sites are dry and appear to be resolving.  Left lateral and medial knee sites to monitor remain intact, no wound, lateral mostly resolved of past erythema.  Left lateral distal residual limb fold along the scar closed incision of past BKA, remains scar closed clean within the fold.    Barriers to wound healing:   Poor nutrition: inadequate supply of protein, carbohydrates, fatty acids, and trace elements essential for all phases of wound healing. Teaching has been done at  past visit on need for increased protein in diet for healing, remodeling of scar tissue once closed.  Pt has struggled with protein intake but will continue to try to keep levels high.  Recommended between 90 and 110 grams protein daily as goal.  Discussed in depth today 7/5.  Reduced Blood Supply: appears adequate  Medication: NA  Chemotherapy: suppresses the immune system and inflammatory response, NA  Radiotherapy: increases production of free radical which damage cells, NA  Psychological stress: None noted today, appears more positive about his healing potential and is making efforts to improve co morbidities.    Obesity: decreases tissue perfusion, NA  Infection: prolongs inflammatory phase, uses vital nutrients, impairs epithelialization and releases toxins. None noted at any sites today  Underlying Disease: diabetes mellitis, pt will meed with Diabetic ed on 7/31. Is scheduled with Endocrine MD 9/17.  MD did order pt Novolog insulin but per pt will have to change order so he gets the brand name and not the generic, has not started Novolog yet and will follow up with MD or the order.  Maceration: reduces wound tensile strength and inhibits epithelial migration, none currently noted.  Patient compliance, appears motivated to heal   Unrelieved pressure, to be determined but none of current significance noted currently.  Immobility, limited but not immobile  Substance abuse: NA    Plan:  Will have the pt continue with the Triad paste as primary dressing for the right lateral thigh, covered with Mepilex gentle adhesive foam dressing, changing once daily.   Recommended today for the left thigh blisters, stop the neosporin, ok to keep the anterior site covered with Mepilex as long as any drainage is noted on dressing removed.  Ok to apply gauze over the site under the Mepilex if it appears the adhesive is stripping off any new dermal tissue or scabs.  Once no drainage is noted for a few days, then ok to leave open  to air.  All the other areas of the left thigh can be left open to air, only need to cover if drainage is present,     Discussed/Education provided: plan of care with rationale;     Topical cares:  Right lateral thigh:  - Remove the old bandage, cleanse with wound cleanser and dry well.  - Fill wound bed with Triad paste at least a nickel thick in the areas of slough and thin smear to periwound skin to protect from drainage/moisture and enzyme exposure.   - Cover with the Mepilex foam dressing and change daily.    Left thigh:  - Remove the old bandage if any still in use, cleanse with wound cleanser and dry well.  - Cover any sites of drainage with a Mepilex gentle adhesive foam dressing, see Plan for more details on possible variances.    - Change daily till no drainage is noted on old dressing for 3 to 4 days in a row.     Pt is unable to do self cares due to wound location of the right thigh wound, has assist from his neighbor who is retired RN, she is able to perform cares/has been caring for wound.  Pt's mom is also doing wound cares.      Additional recommendations: None at this time    The following discharge instructions were reviewed with and sent home with the patient:  See AVS    The following supplies were sent home with the patient:  Remains of the wound cleanser and Triad paste opened and not used up, plus few extra Mepilex 6x6 dressings     Return visit: 7/31/24 Wound and Ostomy clinic    Verbal, written, & demonstrative education provided.  Face to face time: approximately 50 minutes  Procedure: Y[facility]ETTE Leonard RN [facility]  [phone]

## 2024-07-24 NOTE — PATIENT INSTRUCTIONS
Today do keep the left thigh covered as long as there is any drainage, once the drainage stops for a few days you can stop the Mepilex.    No change to the plan of care for the right leg.    I will see you again next week 7/31 at 11 am.  Call with any questions or concerns.    Pipe Leonard RN cwocn

## 2024-07-31 ENCOUNTER — HOSPITAL ENCOUNTER (OUTPATIENT)
Dept: WOUND CARE | Facility: CLINIC | Age: 54
Discharge: HOME | End: 2024-07-31
Attending: NURSE PRACTITIONER | Admitting: NURSE PRACTITIONER
Payer: COMMERCIAL

## 2024-07-31 PROCEDURE — G0463 HOSPITAL OUTPT CLINIC VISIT: HCPCS

## 2024-07-31 NOTE — PATIENT INSTRUCTIONS
Today we looked at the right leg wound and the left leg resolving blisters.    The right leg wound is improved again, only a scant amount of slough remaining, smaller, mostly granular tissue.  The drainage also appears to be slightly less today.   I think we can trial using the smaller foam bandages the 4x4 s, and can stop the Triad paste for the time being.  Send me a picture via My Chart once a week till you return, call [phone] to leave me a message that you have sent a message.    The remaining scabs on the left leg appear to be improving well, drying out.  Try to leave the scabs in place as long as possible.  Ok to moisturize the sites but avoid the scabs for now.    I will see you again in a few weeks on August Monday the 19 th at 10 am.  Call with any questions or concerns.    Pipe Leonard RN cwocn  [phone]

## 2024-07-31 NOTE — PROGRESS NOTES
[facility] Wound and Ostomy Clinic    Start of Care in [facility] Wound Clinic: 4/21/2024 while inpatient, initial clinic visit 5/3/24.  Referring Provider: Dr Fabiola Orlando MD ED provider, initial referral 4/21/24.  Primary Care Provider: Deepti Vega   Wound Location: Right lateral thigh open wound (residual limb) and left thigh blistering.     Wound Clinic Visit:  Scheduled follow up.    Reason for Visit:  Follow up on right lateral thigh wound, left knee areas of erythema, left distal lateral residual limb skin fold along old scar closed incision, new concerns noted today 7/24 of left thigh blistering.     Subjective:  On arrival today 7/31/24 with is mom and dad, pt report the following: No concerns with the Right lateral thigh wound has appears to be doing ok, remains open, needs to discuss options for self care today.  The left thigh blistering first assessed in clinic last week has improved and is drying out     Wound History:   Pt presented to the ED here at [facility] on 4/21/23 with concerns of right lateral lower leg wound.  Per admission note:  Pt has history of bilateral BKA's (left 2019 and right June of 2023).  He stated 'he thinks it started as a blister sometime in the last week or so (prior to presentation in ED).  He'd been trying a new prosthesis sock that was not the right size and it may have been rubbing.  He was having some pain when he wore the prosthesis but he has no pain now without it on.  There is a bad odor and some clear drainage.  His neighbor who is a retired nurse has been helping him with dressing changes.  He denies fever or chills.  His sugars have been high.  He stated he does not feel well when his sugar is below 180 and only uses insulin accordingly.  Wound was swab cultured in the ED, aerobic cultures pending.  At [facility] nurse initial meeting (4/22) with pt, family and neighbor gave some additional information on the origin and progression of the wound.   Appeared pretty rapidly, no sure if there was any initial blistering.  Pt believes the primary cause of the wound was friction and not direct pressure.  I was able to see photo's of the wounds development off the pt's mom's phone.  Initial photo, not a great image but as best able to see, showed hypopigmented site with distinct boarders, no open tissue, did not appear like slough.  Second photo is few days later than first, is a better image and shows what appears to be a full thickness ulcer with mostly clean red nongranular wound bed and open wound edges.  Periwound skin is not well shown in either photo  Pain to the posterior knee and proximal periwound skin increased and per pt's neighbor, small serous clear fluid blisters appeared briefly and ruptured.  Entire surrounding skin more to the proximal than distal had corresponding erythema with no significant increased warmth to touch noted.  Initial woc nurse inpatient visit 4/22/24, Triad paste started to promote autolytic debridement of slough and eschar and to protect dry intact periwound skin, and dry out proximal periwound moist skin, Mepilex 6x6 covered.  Woc return 4/23, no change to plan of care for the wound topically but instructed pt to resume wearing his protective and compressive sleeve to the right residual limb over the wound dressing.  Woc returned 4/24, wound bed slough started to debride some.  Cultures results returned, positive for streptococcus constellatus.  Dr Ardon approved discharge home same day 4/24/24 on oral antibiotics, picc line removed.  Pt called into clinic a few times after discharge, prior to his initial clinic visit with concerns and questions.  Receiving assist from his neighbor but both had concerns and pictures sent in for evaluation by wo, all appeared to be progressing well.  5/8/24 clinic visit with Ly NELSON RN cwocn, new tunneling present, 11 o'clock 0.8 cm, with small amount of undermining noted 5 - 10 o'clock 0.5 cm  max.   Triad paste put on hold 6/28 and started NS damp Hydrofera Blue Classic.  At follow up visit 7/5 pt had stopped the Hydrofera Blue due to concerns of pain and reports it was making the wound worse.  Returned to Triad paste and will continue, see Assessment note of 7/5 for more details.  In clinic we are also monitoring left lateral and medial knee skin for chronic blanchable erythema which has been deep purple and boggy in the past per pt, has not had open wounds in these sites.  The left limb scar closed surgical incision suffered a pinch 6/18, not an open wound but a crease to the incision was noted.  Newly noted blisters to the left thigh first assessed in clinic 7/24/24.    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    Positive for macroalbuminuria    Retinopathy due to secondary diabetes mellitus (H)    Severe diabetic hypoglycemia (H)    Type 1 diabetes mellitus with other diabetic ophthalmic complication (H)    Stage 3a chronic kidney disease (H)    Statin declined    Primary osteoarthritis of right knee    Current moderate episode of major depressive disorder without prior episode (H)    Hypertension    YESSICA (acute kidney injury) (H24)    Type 1 diabetes mellitus with hyperglycemia (H)    Diabetic ulcer of right lower leg (H)    Cellulitis of right lower extremity    Anemia, unspecified type    Hyperkalemia    Hyponatremia    Hypoalbuminemia    PAD (peripheral artery disease) (H24)    S/P bilateral BKA (below knee amputation) (H)             Tobacco Use:     Tobacco Use      Smoking status: Never      Smokeless tobacco: Never     Diabetic: Type 1  HgbA1C:   Hemoglobin A1C   Date Value Ref Range Status   05/01/2024 12.7 (H)  0.0 - 5.6 % Final     Comment:     Normal <5.7%   Prediabetes 5.7-6.4%    Diabetes 6.5% or higher     Note: Adopted from ADA consensus guidelines.   05/21/2021 10.9 (H) 0 - 5.6 % Final     Comment:     Normal <5.7% Prediabetes 5.7-6.4%  Diabetes 6.5% or higher - adopted from ADA   consensus guidelines.     Checks Blood Glucose?:  yes, few times daily.  Average readings: remain high, no specifics discussed today.    Personal/social history:  pt lives with family independently, has had home care services, most recently with [facility], no home care currently.     Objective:   Current treatment plan:  Right lateral thigh wound - Triad paste as primary dressing, Mepilex 6x6 to cover.  Left thigh ruptured blister - has been keeping covered with Mepilex 6x6 but questions if needed still.   Last changed: yesterday    Wound #1 Right lateral thigh of residual limb.   Stage/tissue depth:  Unstageable pressure injury due to slough, complicated by prosthetic products friction and subsequent cellulitis infection.  2.4 cm L x 2 cm W x 0.3 cm D   Tunneling: newly noted 5/8/24 at 11 o'clock, max depth of 0.9 cm on 6/12, on 6/28 too tight to probe , 7/24 unable to locate any opening to the site, scar closed, remains scar closed today.   Undermining: newly noted 5/8/24, 7 -10 o clock max of 0.1 cm in the center and tapering to 0 cm on the ends.   Wound bed type/amount: approximately 90 % granular tissue and 10 % white moist fibrinous slough: Not currently fluctuant  Wound Edges: open  Periwound: new scar tissue fragile but intact   Drainage: moderate amounts serosanguinous on dressing removed  Odor: none noted currently   Pain: 3/10 with cares today  Photo's from today's visit 7/31/24, distal is to the right.    Photo's from 7/24/24, distal is to the right.     Photo pt uploaded via [employer] 6/22.    Photo's from 6/12/24, distal is to the right.      Photo's from 6/6/24, distal is to the right.      Photo's from 5/3/24, initial Wound and  Ostomy clinic appointment post hospital discharge 4/24/24.  L - R before debriding x's 2 and after debriding x's 2, distal is to the right.      Photo's from 4/24/24.  Inpatient Woc nurse follow, date of discharge home, distal is to the right.      Photo's from inpatient woc nurse follow up visit 4/23/24, distal is to the right.    Photo from initial wo nurse inpatient consult 4/22/24, distal is to the right.    Photo from ED 4/21/24.    Wound #2 Left thigh blistering from friction irritation of new prosthetic sleeve.  Stage/tissue depth: partial thickness  Multiple sites along same horizontal ring as listed below:  - Lateral/posterior 0 cm L x 0 cm W x 0 cm D, resolved serous blister, all intact, only dry skin remains, no drainage.  - Lateral 2 cm L x 6.2 cm W x 0 cm D, 100 % scab covered, all dry, no fluid noted within the site, no drainage.  - Anterior 0 cm Lx 0 cm W x 0 cm D, resolved old scabs of ruptured blisters, now no scabs present, no blistering, some dry skin with faint erythema remaining, all epithelialized, no drainage.  - Medial/posterior 1 cm L x 3.6 cm W x 0 cm D, 100 % covered with thin scab, remains of the blistered roof, all dry, no drainage.    Tunneling: none noted at any site  Undermining: none noted at any site  Wound bed type/amount: as listed above for each site; Not fluctuant  Wound Edges: NA no open wound  Periwound: wnl  Drainage: as listed above for each site  Odor: no  Pain: denies any current pain or any pain when first developing,     Photo's from today's visit 7/31/24.  L - R medial, anterior, lateral.        Photo's from initial assessment of new blistering and line of erythema 7/24/24.  L - R top to bottom rows, lateral/posterior, lateral, anterior, medial, medial/posterior.    Dorsalis Pedal Pulse: NA  Posterior Tibial Pulse: NA  Hair growth: hair is present around the wounds sites of the right and left thigh  Capillary Refill: NA  Feet/toes color: NA  Nails: NA  R Leg residual  limb: Edema nonpitting. Ankle circumference NA cm. Calf circumference NA cm.  L Leg: Edema Not assessed this visit. Ankle circumference NA cm. Calf circumference NA cm.    Mobility: Wheelchair use, has prosthetics but not currently wearing on right.    Current offloading/footwear: prosthetic to left LE with sneaker   Sensation: per pt normal sensation at right LE wound, hypersensitive.     Other callusing/areas of concern:   Left residual limb, three areas we are monitoring - Medial and lateral knee first assessed per pt request 6/6/24, and Lateral distal lower limb on BKA surgical scar first assessed per pt request 6/18  - Medial knee, history of boggy discolored skin and erythema, no open wounds, at worse in clinic have noticed pale scattered blanchable erythema in localized area, small amounts of discoloration today.   - Lateral knee, history of areas of blanchable erythema, today no erythema noted.    Photo's from today's visit 7/31/24.  L - R medial knee, lateral knee.       Photo's from 7/24/24.  L - R medial knee, lateral knee.      Photo's from 6/6/24, initial assessment of newly noted concerns per pt.  L - R medial knee, lateral/anterior knee, lateral knee, anterior medial lower limb.    Left limb scar closed surgical incision pinch or fold.  Pt first noted 6/18, looked odd but intact, hurt initially, no drainage, no open wound noted.  Applied moisturizer to the edges where where dry and more protruding.  In clinic dry edges mostly resolved as of first assessment, no open wound.  Today 7/31 remains closed with 2 mm depth present within the fold, all scar covered, no drainage, is a site to monitor as could collect debris or moisture.    Photo's from today's visit 7/31/24.    Photo's from 6/28/24, initial clinic assessment of new site of concern.    Photo pt uploaded via My Chart 6/22/24.    Diet: Regular with awareness of effects on blood glucose levels.    Assessment:  The right lateral thigh wound has  improve a little, more granular tissue, slightly less slough, tunneling remains resolved.  Undermining is minimal.    Pt wishes to do self cares with the right lateral thigh wound so [place], and would prefer not to enlist his neighbors assist if possible as she is busy with her family.   Discussed today and we will try to go without Triad, just have pt cover with the Mepilex, did discuss if slough increases in wound bed that we may need to restart the Triad paste, can be applied to the foam to make easier, and can use smaller foam. Pt in agreement.  Also pt will not have transportation for follow up for a few weeks but we will have him send in pictures weekly of the right lateral thigh wound to keep monitoring.      Left thigh blisters are all improved, anterior all flaked off, lateral and medial scabs/remains of the blistered roofs present, dry and discolored.  No drainage, no concerns noted.  Left lateral and medial knee sites to monitor remain intact, no wounds.  Medial has more distinct bruise like discoloration and faint blanchable erythema.  The lateral had no noted erythema present and no other concerns present.  Left lateral distal residual limb fold along the scar closed incision of past BKA, remains scar closed clean within the fold.    Barriers to wound healing:   Poor nutrition: inadequate supply of protein, carbohydrates, fatty acids, and trace elements essential for all phases of wound healing. Teaching has been done at past visit on need for increased protein in diet for healing, remodeling of scar tissue once closed.  Pt has struggled with protein intake but will continue to try to keep levels high.  Recommended between 90 and 110 grams protein daily as goal.  Discussed in depth today 7/5.  Reduced Blood Supply: appears adequate  Medication: NA  Chemotherapy: suppresses the immune system and inflammatory response, NA  Radiotherapy: increases production of free  radical which damage cells, NA  Psychological stress: None noted today, appears more positive about his healing potential and is making efforts to improve co morbidities.    Obesity: decreases tissue perfusion, NA  Infection: prolongs inflammatory phase, uses vital nutrients, impairs epithelialization and releases toxins. None noted at any sites today  Underlying Disease: diabetes mellitis, pt will meed with Diabetic ed on 7/31. Is scheduled with Endocrine MD 9/17.  MD did order pt Novolog insulin but per pt will have to change order so he gets the brand name and not the generic, has not started Novolog yet and will follow up with MD or the order.  Status not reassessed today.   Maceration: reduces wound tensile strength and inhibits epithelial migration, none currently noted.  Patient compliance, appears motivated to heal   Unrelieved pressure, to be determined but none of current significance noted currently.  Immobility, limited but not immobile  Substance abuse: NA    Plan:  We will modify the right lateral thigh wound dressing as listed below with listed plan.  The left thigh blister sites can now be left open to air.    Discussed/Education provided: plan of care with rationale;     Topical cares:  Right lateral thigh:  - Remove the old bandage, cleanse with wound cleanser and dry well.  - Hold the Triad paste for now 7/31.  - Cover with the Mepilex 4x4 inch foam dressing and change daily.  Can use the larger 6x6 inch Mepilex is needed.    - Pt to take picture weekly, send to PCP via [employer] with not specifying the picture is for the [facility] nurse to eval, will call Specialty scheduling to leave message for [facility] that picture has been sent each time.  - If pictures show returning slough, will have pt resume the Triad paste, can apply directly to the Mepilex foam inner dressing and lay over wound as best able to visualize.    Left thigh:  - Cleanse with wound cleanser, leave scabs in place as best able when  cleansing.  - Can now leave open to air if no trauma or concerns noted from contact with prosthetic sleeve  - Assess daily and send pictures with any concerns.     Pt is doing to try self cares for the right lateral thigh wound as listed above.  Has neighbor who has helped in the past.  Pt's mom does wound cares well but will be out of the area for a few weeks.    Additional recommendations: None at this time    The following discharge instructions were reviewed with and sent home with the patient:  See AVS    The following supplies were sent home with the patient:  Remains of the wound cleanser and Triad paste opened and not used up, plus few extra Mepilex 6x6 dressings     Return visit: 7/31/24 Wound and Ostomy clinic    Verbal, written, & demonstrative education provided.  Face to face time: approximately 50 minutes  Procedure: [facility]TE Leonard RN [facility]  [phone]

## 2024-08-02 ENCOUNTER — MYC MEDICAL ADVICE (OUTPATIENT)
Dept: FAMILY MEDICINE | Facility: OTHER | Age: 54
End: 2024-08-02
Payer: COMMERCIAL

## 2024-08-02 NOTE — TELEPHONE ENCOUNTER
Patient Quality Outreach    Patient is due for the following:   Depression  -  PHQ-9 needed    Next Steps:   Patient was assigned appropriate questionnaire to complete    Type of outreach:    Sent [employer] message.      Questions for provider review:    None           Sakina Das MA

## 2024-08-19 ENCOUNTER — TELEPHONE (OUTPATIENT)
Dept: FAMILY MEDICINE | Facility: OTHER | Age: 54
End: 2024-08-19

## 2024-08-19 ENCOUNTER — HOSPITAL ENCOUNTER (OUTPATIENT)
Dept: WOUND CARE | Facility: CLINIC | Age: 54
Discharge: HOME | End: 2024-08-19
Attending: NURSE PRACTITIONER | Admitting: NURSE PRACTITIONER
Payer: COMMERCIAL

## 2024-08-19 PROCEDURE — G0463 HOSPITAL OUTPT CLINIC VISIT: HCPCS

## 2024-08-19 NOTE — TELEPHONE ENCOUNTER
[facility]  Fax:[phone]  Spec: [phone]  MO: [phone]

## 2024-08-19 NOTE — PROGRESS NOTES
[facility]    Start of Care in [facility]: 4/21/2024 while inpatient, initial clinic visit 5/3/24.  Referring Provider: Dr Fabiola Orlando MD ED provider, initial referral 4/21/24.  Primary Care Provider: Deepti Vega   Wound Location: Right lateral thigh open wound (residual limb) and left thigh blistering.     Wound Clinic Visit:  Scheduled follow up.    Reason for Visit:  Follow up on right lateral thigh wound, left knee areas of erythema, left distal lateral residual limb skin fold along old scar closed incision, new concerns noted today 7/24 of left thigh blistering.     Subjective:  On arrival today 8/19/24 with his dad, pt reports the following:  Very frustrated with the wound, can not visualize but feels like no change and concerned with the drainage varying in volume and color.  He has been doing self cares, he can not apply the Triad paste due to not being able to see, can't get mirror or his camera phone to assist well.  He could not apply the Mepilex 4x4 as too small and has solely been using the Mepilex 6x6 inch boarder flex foam dressings, changing daily.  See Assessment for discussion today on pt's concerns and what he wants ongoing.  Reports the change to the generic Novolog has not been effective, like injecting water, and is waiting to meet with diabetic educator for assistance.  Blood sugars all above 200, most between 300 to 350 in last 2.5 weeks.  Still struggling with protein intake.       Wound History:   Pt presented to the ED here at [facility] on 4/21/23 with concerns of right lateral lower leg wound.  Per admission note:  Pt has history of bilateral BKA's (left 2019 and right June of 2023).  He stated 'he thinks it started as a blister sometime in the last week or so (prior to presentation in ED).  He'd been trying a new prosthesis sock that was not the right size and it may have been rubbing.  He was having some pain when he wore the  prosthesis but he has no pain now without it on.  There is a bad odor and some clear drainage.  His neighbor who is a retired nurse has been helping him with dressing changes.  He denies fever or chills.  His sugars have been high.  He stated he does not feel well when his sugar is below 180 and only uses insulin accordingly.  Wound was swab cultured in the ED, aerobic cultures pending.  At [facility] nurse initial meeting (4/22) with pt, family and neighbor gave some additional information on the origin and progression of the wound.  Appeared pretty rapidly, no sure if there was any initial blistering.  Pt believes the primary cause of the wound was friction and not direct pressure.  I was able to see photo's of the wounds development off the pt's mom's phone.  Initial photo, not a great image but as best able to see, showed hypopigmented site with distinct boarders, no open tissue, did not appear like slough.  Second photo is few days later than first, is a better image and shows what appears to be a full thickness ulcer with mostly clean red nongranular wound bed and open wound edges.  Periwound skin is not well shown in either photo  Pain to the posterior knee and proximal periwound skin increased and per pt's neighbor, small serous clear fluid blisters appeared briefly and ruptured.  Entire surrounding skin more to the proximal than distal had corresponding erythema with no significant increased warmth to touch noted.  Initial wo nurse inpatient visit 4/22/24, Triad paste started to promote autolytic debridement of slough and eschar and to protect dry intact periwound skin, and dry out proximal periwound moist skin, Mepilex 6x6 covered.  Woc return 4/23, no change to plan of care for the wound topically but instructed pt to resume wearing his protective and compressive sleeve to the right residual limb over the wound dressing.  Woc returned 4/24, wound bed slough started to debride some.  Cultures results returned,  positive for streptococcus constellatus.  Dr Ardon approved discharge home same day 4/24/24 on oral antibiotics, picc line removed.  Pt called into clinic a few times after discharge, prior to his initial clinic visit with concerns and questions.  Receiving assist from his neighbor but both had concerns and pictures sent in for evaluation by [facility], all appeared to be progressing well.  5/8/24 clinic visit with Ly NELSON RN cwocn, new tunneling present, 11 o'clock 0.8 cm, with small amount of undermining noted 5 - 10 o'clock 0.5 cm max.   Triad paste put on hold 6/28 and started NS damp Hydrofera Blue Classic.  At follow up visit 7/5 pt had stopped the Hydrofera Blue due to concerns of pain and reports it was making the wound worse.  Returned to Triad paste and will continue, see Assessment note of 7/5 for more details.  On 7/31 due to not going to have a caregiver for the wound, pt request modification of cares so he can self perform.  As wound bed was mostly clean and pt had great concern of not being able to apply the Triad paste correctly pt was instructed to solely cover the wound with Mepilex 6x6 or 4x4 boarder flex gentle adhesive foam dressings.    In clinic we are also monitoring left lateral and medial knee skin for chronic blanchable erythema which has been deep purple and boggy in the past per pt, has not had open wounds in these sites.  The left limb scar closed surgical incision suffered a pinch 6/18, not an open wound but a crease to the incision was noted.  Newly noted blisters to the left thigh first assessed in clinic 7/24/24, mostly resolved as of 8/19, only briefly assessed 8/19.    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    Positive for macroalbuminuria    Retinopathy due to secondary diabetes mellitus (H)    Severe diabetic hypoglycemia (H)    Type 1 diabetes mellitus with other diabetic ophthalmic complication (H)    Stage 3a chronic kidney disease (H)    Statin declined    Primary osteoarthritis of right knee    Current moderate episode of major depressive disorder without prior episode (H)    Hypertension    YESSICA (acute kidney injury) (H24)    Type 1 diabetes mellitus with hyperglycemia (H)    Diabetic ulcer of right lower leg (H)    Cellulitis of right lower extremity    Anemia, unspecified type    Hyperkalemia    Hyponatremia    Hypoalbuminemia    PAD (peripheral artery disease) (H24)    S/P bilateral BKA (below knee amputation) (H)             Tobacco Use:     Tobacco Use      Smoking status: Never      Smokeless tobacco: Never     Diabetic: Type 1  HgbA1C:   Hemoglobin A1C   Date Value Ref Range Status   05/01/2024 12.7 (H) 0.0 - 5.6 % Final     Comment:     Normal <5.7%   Prediabetes 5.7-6.4%    Diabetes 6.5% or higher     Note: Adopted from ADA consensus guidelines.   05/21/2021 10.9 (H) 0 - 5.6 % Final     Comment:     Normal <5.7% Prediabetes 5.7-6.4%  Diabetes 6.5% or higher - adopted from ADA   consensus guidelines.     Checks Blood Glucose?:  yes, few times daily.  Average readings: remain high, no specifics discussed today.    Personal/social history:  pt lives with family independently, has had home care services, most recently with [facility], no home care currently.     Objective:   Current treatment plan:  Right lateral thigh wound - Mepilex 6x6 to cover.  Left thigh ruptured blister - ELIE.   Last changed: yesterday    Wound #1 Right lateral thigh of residual limb.   Stage/tissue depth:  Unstageable pressure injury due to slough, complicated by prosthetic products friction and subsequent cellulitis infection.  2.4 cm L x 2 cm W x 0.3 cm D, no change  Tunneling: none, tunnel at 11 o'clock has been healed for  last few weeks.  Underminin - 9 o'clock as of today, all less than 0.2 cm D  Wound bed type/amount: approximately 60 % granular tissue and 40 % white moist fibrinous slough: Not currently fluctuant  Wound Edges: open  Periwound: scar tissue  Drainage: small amounts serosanguinous on dressing removed  Odor: none noted currently   Pain: 3/10 with cares today  Photo's from today's visit 24, distal is to the right.    Photo's from 24, distal is to the right.    Photo's from 24, distal is to the right.     Photo pt uploaded via [employer] .    Photo's from 24, distal is to the right.      Photo's from 24, distal is to the right.      Photo's from 5/3/24, initial Wound and Ostomy clinic appointment post hospital discharge 24.  L - R before debriding x's 2 and after debriding x's 2, distal is to the right.      Photo's from 24.  Inpatient Woc nurse follow, date of discharge home, distal is to the right.      Photo's from inpatient woc nurse follow up visit 24, distal is to the right.    Photo from initial woc nurse inpatient consult 24, distal is to the right.    Photo from ED 24.    Wound #2 Left thigh blistering from friction irritation of new prosthetic sleeve.  Stage/tissue depth: partial thickness  Not assessed this visit 24 see Assessment for details.  Multiple sites along same horizontal ring as listed below:  - Lateral/posterior 0 cm L x 0 cm W x 0 cm D, resolved serous blister, all intact, only dry skin remains, no drainage.  - Lateral 2 cm L x 6.2 cm W x 0 cm D, 100 % scab covered, all dry, no fluid noted within the site, no drainage.  - Anterior 0 cm Lx 0 cm W x 0 cm D, resolved old scabs of ruptured blisters, now no scabs present, no blistering, some dry skin with faint erythema remaining, all epithelialized, no drainage.  - Medial/posterior 1 cm L x 3.6 cm W x 0 cm D, 100 % covered with thin scab, remains of the blistered roof, all dry, no drainage.     Tunneling: none noted at any site  Undermining: none noted at any site  Wound bed type/amount: as listed above for each site; Not fluctuant  Wound Edges: NA no open wound  Periwound: wnl  Drainage: as listed above for each site  Odor: no  Pain: denies any current pain or any pain when first developing,       Photo's from 7/31/24.  L - R medial, anterior, lateral.        Photo's from initial assessment of new blistering and line of erythema 7/24/24.  L - R top to bottom rows, lateral/posterior, lateral, anterior, medial, medial/posterior.    Dorsalis Pedal Pulse: NA  Posterior Tibial Pulse: NA  Hair growth: hair is present around the wounds sites of the right and left thigh  Capillary Refill: NA  Feet/toes color: NA  Nails: NA  R Leg residual limb: Edema nonpitting. Ankle circumference NA cm. Calf circumference NA cm.  L Leg: Edema Not assessed this visit. Ankle circumference NA cm. Calf circumference NA cm.    Mobility: Wheelchair use, has prosthetics but not currently wearing on right.    Current offloading/footwear: prosthetic to left LE with sneaker   Sensation: per pt normal sensation at right LE wound, hypersensitive.     Other callusing/areas of concern:   Left residual limb, three areas we are monitoring - Medial and lateral knee first assessed per pt request 6/6/24, and Lateral distal lower limb on BKA surgical scar first assessed per pt request 6/18.  Not assessed this visit 8/19/24 see Assessment for details.  - Medial knee, history of boggy discolored skin and erythema, no open wounds, at worse in clinic have noticed pale scattered blanchable erythema in localized area, small amounts of discoloration today.   - Lateral knee, history of areas of blanchable erythema, today no erythema noted.      Photo's from 7/31/24.  L - R medial knee, lateral knee.       Photo's from 7/24/24.  L - R medial knee, lateral knee.      Photo's from 6/6/24, initial assessment of newly noted concerns per pt.  L - R medial  knee, lateral/anterior knee, lateral knee, anterior medial lower limb.    Left limb scar closed surgical incision pinch or fold.  Pt first noted 6/18, looked odd but intact, hurt initially, no drainage, no open wound noted.  Applied moisturizer to the edges where where dry and more protruding.  In clinic dry edges mostly resolved as of first assessment, no open wound.    Not assessed this visit 8/19/24 see Assessment for details.  As of 7/31 remains closed with 2 mm depth present within the fold, all scar covered, no drainage, is a site to monitor as could collect debris or moisture.      Photo from 7/31/24.    Photo's from 6/28/24, initial clinic assessment of new site of concern.    Photo pt uploaded via My Chart 6/22/24.    Diet: Regular with awareness of effects on blood glucose levels.    Assessment:  On arrival today the pt started our discussion with what he needs to occur in the next few weeks.  He is planning to move closer to his parents and is looking into seeking PCP and wound care through [facility] where his pt's have established care.  No firm date but goal to move by early November.  His parents are in town currently but he will soon be without a care giver again so needs to go with either no bandage or the 6x6 Mepilex alone with no paste or other primary dressing.      - He needs the wound to the right lateral thigh to be healed with no wound cares needed and no restrictions to being in his prosthetic and being up and active.  I have told the pt at all past visit that the debriding of the wound, the production of new granular tissue and the advancing of scar tissue over the wound needs better blood sugar control and more protein and calories in general.  In my opinion, what is applied to the wound topically or not, is only a small part of the wounds potential to heal.  With blood sugars above 200 and poor protein and total caloric intake low healing is delayed at best.  I can almost guarantee his  wound will not be closed in the new few weeks or likely months unless changes to glucose levels and nutrient intake greatly improves.  Healing is not impossible with elevated glucose and poor protein intake but normally takes much longer.    I do not believe pressure is as much a concern with his prosthetic, as long as he monitors for signs of pressure (erythema around the site that does not tawnya).  I am fine with him increasing activity though I can not guarantee that the prosthetic will not cause any delays in healing or potentially can cause deterioration.     - Pt request we review the plan of care goal of maintaining a moist wound environment.  He states the wounds he has had in the past he just let dry up and they healed.  He notes the near healed partial thickness blisters on his left thigh which appeared rapidly and were allowed to dry out and heal.  He questions why we are keeping the right leg wound moist, and if this is the cause it has not healed.  He would like to let it dry out, leave open to air when not in his prosthetic, and allow to scab over so he does not need to do any wound cares any longer.    Since the beginning of our working relationship I have detailed the general process wound healing for full and partial thickness wounds.  Full thickness wounds need moisture to debride and allow for granular tissue to form to fill in the depth, then scar tissue needs a slightly moist environment to advance over the granular tissue to close the wound.  I described today the risk of letting this wound dry out, would not scab over it wound form eschar, when left open to air also the wound temp decreases to room temp, and healing only occurs effectively when at body temp.  Risk for infection and or abscess with an eschar covered wound is high.  If a LE diabetic wound has stable eschar from the beginning, leaving stable and dry is acceptable but his wound was never stable with dry eschar, was from my first  assessment slough and eschar covered with drainage and signs of infection.    With the lengthy discussion today we only had time to briefly assess the left thigh blisters, these appear mostly resolved, ELIE, some scattered dry hyperpigmentation present.  We did not assess the left lower limb scar fold to the lateral today nor the medial/lateral knee for presence of erythema.      The right lateral thigh wound is the same in size in all aspects today, undermining is nearly resolved and only noted today from 7 - 9 o'clock evenly 0.1 cm D.  The wound bed appears adequately moist but less granular tissue and slightly more slough.  Wound edges have slight localized edge and are pale, the periwound skin has blanchable erythema that blanches immediately and pales with prosthetic off during assessment.  Edema in the right lower limb has been mostly resolved since initial infection was resolved, does not appear the pt wound benefit from multilayered compression, sleeves do good job to control and keep resolved.  Wound appears infection free but stagnant.      Barriers to wound healing:   Poor nutrition: inadequate supply of protein, carbohydrates, fatty acids, and trace elements essential for all phases of wound healing. Teaching has been done at past visit on need for increased protein in diet for healing, remodeling of scar tissue once closed.  Pt has struggled with protein intake but will continue to try to keep levels high.  Recommended between 90 and 110 grams protein daily as goal.  Discussed in depth today 7/5.  Reduced Blood Supply: appears adequate, no concerns to the distal LE limbs bilaterally, hair present above the knee's, no dependent rubor.   Both warm with normal skin tone and texture aside from wound sites.  Medication: NA  Chemotherapy: suppresses the immune system and inflammatory response, NA  Radiotherapy: increases production of free radical which damage cells, NA  Psychological stress: None noted today,  appears more positive about his healing potential and is making efforts to improve co morbidities.    Obesity: decreases tissue perfusion, NA  Infection: prolongs inflammatory phase, uses vital nutrients, impairs epithelialization and releases toxins. None noted at any sites today  Underlying Disease: diabetes mellitis, pt will meed with Diabetic ed on 7/31. Is scheduled with Endocrine MD 9/17.  MD did order pt Novolog insulin but per pt will have to change order so he gets the brand name and not the generic, has not started Novolog yet and will follow up with MD or the order.  Status not reassessed today.   Maceration: reduces wound tensile strength and inhibits epithelial migration, none currently noted.  Patient compliance, appears motivated to heal   Unrelieved pressure, to be determined but none of current significance noted currently.  Immobility, limited but not immobile  Substance abuse: NA    Plan:  We will continue with the Mepilex dressing as primary.  Additional paste or primary dressings were not discussed today as pt is sure he will not be able to complete more complex cares.  We discussed today if any concerns to take a picture and send it via [employer] to his pcp with note saying the picture is for woc nurse review and he will call the Specialty Scheduling line to notify woc nurse services that there is a picture for woc nurse review.  Also encouraged the pt to use his phone's camera to take pictures so he can assess the wound and periwound site himself daily.  Will take practice, trial and error, but is a good way to assess for wound changes and erythema concerns.  Especially with his plan to work with his prosthetist on returning to full mobility and need to monitor often for any pressure or friction related concerns.       With the plan to move out of the area and need for new PCP and some sort of wound care management encouraged the pt and his dad to start the process now.  Seek PCP that can manage  his complex medical history, through the provider they can be given options for wound and diabetes management/education and other potential needed services.       Discussed/Education provided: plan of care with rationale;     Topical cares:  Right lateral thigh:  - Remove the old bandage, cleanse with wound cleanser and dry well.  - Cover with the Mepilex 6x6    Left thigh:  - Cleanse with wound cleanser, leave scabs in place as best able when cleansing.  - Can now leave open to air if no trauma or concerns noted from contact with prosthetic sleeve  - Assess daily and send pictures with any concerns.     Pt is trying to do self cares for the right lateral thigh wound as listed above.      Additional recommendations: None at this time    The following discharge instructions were reviewed with and sent home with the patient:  See AVS    The following supplies were sent home with the patient:  Few extra Mepilex 6x6 dressings     Return visit: 9/16/24 Wound and Ostomy clinic    Verbal, written, & demonstrative education provided.  Face to face time: approximately 55 minutes  Procedure: [facility]  [phone]

## 2024-08-20 ENCOUNTER — TELEPHONE (OUTPATIENT)
Dept: OTHER | Facility: CLINIC | Age: 54
End: 2024-08-20
Payer: COMMERCIAL

## 2024-08-20 NOTE — TELEPHONE ENCOUNTER
[facility]    Who is the name of the provider? New Referral to Dr Candelario    What is the location you see this provider at/preferred location? [place]    Person calling / Facility: Edu[facility]    Phone number: [phone]    Nurse call back needed:     Reason for call:  Yesterday his Prothesiste (Everardo WHITNEY [place]) and Wound Care nurse (Pipe Leonard) both suggested that Eduardo see Dr Candelario for  a wound he size of a quarter on his R stump and they are not sure if there's enough skin to cover it to heal.    Pharmacy location: [facility] - [address] - [place]    Outside Imaging: N/A    Can we leave a detailed message on this number? Y    Additional Info:

## 2024-08-20 NOTE — TELEPHONE ENCOUNTER
Routing to Wound Care RN pool.  Ivette De Los Santos, BSN, RN, CV-[facility] [place]

## 2024-08-20 NOTE — TELEPHONE ENCOUNTER
Consult received via Workqueue from Vascular Surgery nurses for wound of the stump    Does the patient have an open and draining wound? Yes per Home Care RN     Please schedule with Perry Candelario M.D. at [facility] [place] for next available appointment.    **For all providers, Hallie Day PA-C, Dr. Nunn, Merry Conklin NP or Dr. Sorto, please schedule a follow up 4 weeks after initial appointment.**  --If unable to schedule within 2-3 weeks then please place on cancellation list--    Is the patient able to make their own medical decisions? Yes    Can the patient be scheduled on a Wednesday (Macario) or Thursday (Rubin)?  [Patient recommended to see Dr Candelario    Is patient a BANDAR lift? PLEASE INQUIRE WHEN MAKING THE APPOINTMENT AND PUT IN APPOINTMENT NOTES    Routing to  Wound Healing Scheduling.

## 2024-08-20 NOTE — TELEPHONE ENCOUNTER
Central Prior Authorization Team  Phone: [phone]    PA Initiation    Medication: LYRICA 100 MG PO CAPS  Insurance Company: [employer] - Phone [phone] Fax [phone]  Pharmacy Filling the Rx: [facility] - [place], [place] - [address]  Filling Pharmacy Phone: [phone]  Filling Pharmacy Fax:    Start Date: 8/20/2024

## 2024-08-21 ENCOUNTER — OFFICE VISIT (OUTPATIENT)
Dept: EDUCATION SERVICES | Facility: OTHER | Age: 54
End: 2024-08-21
Attending: INTERNAL MEDICINE
Payer: COMMERCIAL

## 2024-08-21 DIAGNOSIS — E10.59 TYPE 1 DIABETES MELLITUS WITH OTHER CIRCULATORY COMPLICATION (H): ICD-10-CM

## 2024-08-21 DIAGNOSIS — E10.21 TYPE 1 DIABETES MELLITUS WITH DIABETIC NEPHROPATHY (H): ICD-10-CM

## 2024-08-21 DIAGNOSIS — E10.40 TYPE 1 DIABETES MELLITUS WITH DIABETIC NEUROPATHY (H): ICD-10-CM

## 2024-08-21 DIAGNOSIS — E10.319 TYPE 1 DIABETES MELLITUS WITH RETINOPATHY, MACULAR EDEMA PRESENCE UNSPECIFIED, UNSPECIFIED LATERALITY, UNSPECIFIED RETINOPATHY SEVERITY (H): ICD-10-CM

## 2024-08-21 PROCEDURE — G0108 DIAB MANAGE TRN  PER INDIV: HCPCS | Mod: AE | Performed by: PHARMACIST

## 2024-08-21 NOTE — PATIENT INSTRUCTIONS
Following Insulin Pump 101, if you are ready to move to an insulin pump, please schedule a second appointment with a [place] Diabetes Educator to review more in-depth insulin pump information and prepare paperwork and other needed prescriptions.    Needed appointments for any patient starting pump therapy:  Insulin pump 101 (completed today)  Pre- Pump Diabetes Education Visit  Pump training appointment with pump  (will need to do pre-work on your own prior to this appt)  Post-Pump training appt with Diabetes Educator 2 to 4 weeks after pump training.  Additional follow up appts as recommended by your Diabetes Educator, if needed.  Continue same routine follow ups with your diabetes provider.      If you are not yet ready to move to pump therapy and you want to do some more investigation/research:  Discuss pump therapy with your insurance company to see what your costs will be.  Understand your coverage (deductible, co- insurance, maximum out of pocket costs) and any additional information that may be needed for coverage.  Research your pump options  A. Company websites  https://www.Park.com.Who What Wear/products/minimed-780g-insulin-pump-system  https://www.[place] [employer]Who What Wear/  https://www.Kink[employer] Search [employer].Who What Wear/  https://www.FamilyApp.Who What Wear/  B.  Pump comparison websites   I.   https://www.pantherprogram.org/device-comparison   II.  https://www.adces.org/danatech/insulin-pumps  C. Simulator Apps/Websites   I. Medtronic 780G: https://www.OnDecktronicBrightstar.com/minimed-virtual-pump   II. Spredfashion Bionics ilet: https://www.FamilyApp.com/hcp/hcp-resources/   III. Omnipod Simulator Ramon (download from ramon store): Omnipod         IV. Tandem Simulator Ramon (download from ramon store): t:simulator        3.  If you decide to move forward with pump therapy, please schedule a second appointment with a [place] Diabetes Educator to review more in-depth insulin pump information and prepare paperwork and other needed  prescriptions.        [place] [facility] the [facility]:  For Your Diabetes Education and Nutrition Appointments Call:  [phone]   For Diabetes Education or Nutrition Related Questions:   Phone: [phone]  Send [employer] Message   If you need a medication refill please contact your pharmacy. Please allow 3 business days for your refills to be completed.

## 2024-08-21 NOTE — LETTER
8/21/2024         RE: Eduardo Walton  [address]  [place] [address]        Dear Colleague,    Thank you for referring your patient, Eduardo Walton, to the [facility]. Please see a copy of my visit note below.    Diabetes Self-Management Education & Support    Presents for: Insulin Pump Pre-Start    Type of Service: In Person Visit         Insulin Pump Concepts:  Reviewed insulin pump 101 presentation covering general pump therapy concepts, the different options available and next steps.     ASSESSMENT:  Eduardo has had type 1 diabetes since the late 80s.  He follows with Dr. Palma in endocrinology.  He states he has met with a lot of diabetes educators in the past.  He has a referral to explore the possibility of an insulin pump.  He has a lot of questions about this today.  He is motivated by a non healing wound (since April) on his right leg.  He is being told to keep blood sugars under 140 by his wound care team.  He states he feels poorly when his blood sugars are even higher than this (150-170) due to being high so long.  He already has bilateral below the knee amputations and wants to prevent further loss.  Today, he is in a wheelchair as he is supposed to limit pressure on his prosthesis to aid in wound healing.    Eduardo works nights, sleeps during the day.  Eats supper at 2-3am before bed.  He lives alone 3 out of 4 weeks in the month.  His parents come down on the 4th week and stay with him.  They live in [place].  Eduardo is considering moving there in the future to help take care of his parents as his dad has dementia.  Eduardo has had a lot of stress in the last 5 years with losing his wife (left him) and the loss of both of his lower legs since 2019.    Eduardo has a lot of questions on pump therapy and those were addressed.  He initially stated he would only consider a tubeless pump so we focused on Tandem Mobi vs Omnipod.  We discussed how he would need to  switch to Dexcom for Omnipod and he claims a Dexcom ruined his $1500 phone due to the rere running all the time so he was initially against Dexcom.  I told him I have never heard of this happening in over 10 years of working in diabetes so not sure what happened there.  As we further discussed Omnipod specifically, I told him about the need to bolus before meals with Omnipod or he would have extended highs that would cause automation exits/alarms.  I told him I assumed he wasn't a good boluser based on his extremely elevated A1Cs and he admitted that he always takes his insulin 1.5 hours after eating- has always done this ever since he was a kid.  He says he is unsure how much he is going to eat so he will not take the full  dose before meals (if any).  He wants a system that will do most of the work for him and he wants to be hands off for a lot of it.  He would also prefer to not have anything on his phone.  He decided against Omnipod and we began to discuss more about Tandem and Medtronic.  Pili also requires meal announcements- which he was not comfortable with either.    We discussed Tandem's algorithm, and how it works well for smaller carb meals and/or grazers without bolusing.  He said he eats a lot of chips and bread (toast/sandwiches) as he lives alone and doesn't cook much.  Due to these larger carb meals, Tandem would likely not give him ideal control if he wouldn't do a good job prebolusing (even if he only bolused a small amount ahead of time).  We changed our focus to Medtronic as it has the most robust algorithm for missed boluses.  He doesn't like the tubing option but we discussed tubing length options and tucking tubing in the pants pocket and wearing the site on the abdomen to avoid dangling tubing.  He had questions on where to wear pump/CGM and site rotation and we discussed all of this.    He may benefit from a live pump demo and I told him that could be arranged- just send me a message and I  can have the reps meet with him to discuss products in more detail.    I do think algorithm-wise and learning of his bolusing habits today, that the Medtronic 780G system would be the best option for Eduardo.   He will go home and do more research (AVS full of information to investigate and compare pump options) and come back for pre-pump education in a month.  If he is ready to move forward, I will gather current MDI dosing information and prepare pump start orders as he sees Dr. Palma the following day virtually.      PLAN    Following Insulin Pump 101, if you are ready to move to an insulin pump, please schedule a second appointment with a [place] Diabetes Educator to review more in-depth insulin pump information and prepare paperwork and other needed prescriptions.    Needed appointments for any patient starting pump therapy:  Insulin pump 101 (completed today)  Pre- Pump Diabetes Education Visit  Pump training appointment with pump  (will need to do pre-work on your own prior to this appt)  Post-Pump training appt with Diabetes Educator 2 to 4 weeks after pump training.  Additional follow up appts as recommended by your Diabetes Educator, if needed.  Continue same routine follow ups with your diabetes provider.      If you are not yet ready to move to pump therapy and you want to do some more investigation/research:  Discuss pump therapy with your insurance company to see what your costs will be.  Understand your coverage (deductible, co- insurance, maximum out of pocket costs) and any additional information that may be needed for coverage.  Research your pump options  A. Company websites  https://www.CyberVision TexttronicGFI Software.com/products/minimed-780g-insulin-pump-system  https://www.[employer]ato[employer] Nutrition [employer] CardiometabolismeabeTaggled.com/  https://www.omnipod.com/  https://www.Saqinanics.com/  B.  Pump comparison websites   I.   https://www.pantherprogram.org/device-comparison   II.  https://www.adces.org/danatech/insulin-pumps  C. Simulator  Apps/Websites   I. Medtronic 780G: https://www.medtronicdiabetes.com/minimed-virtual-pump   II. Beta Bionics ilet: https://www.Scoutforcenics.com/hcp/hcp-resources/   III. Omnipod Simulator Ramon (download from ramon store): Omnipod         IV. Tandem Simulator Ramon (download from ramon store): t:simulator        3.  If you decide to move forward with pump therapy, please schedule a second appointment with a [place] Diabetes Educator to review more in-depth insulin pump information and prepare paperwork and other needed prescriptions.        [place] [facility] the [facility]:  For Your Diabetes Education and Nutrition Appointments Call:  [phone]   For Diabetes Education or Nutrition Related Questions:   Phone: [phone]  Send [employer] Message   If you need a medication refill please contact your pharmacy. Please allow 3 business days for your refills to be completed.      Topics to cover at upcoming visits: Healthy Eating, Being Active, Monitoring, Taking Medication, Problem Solving, Reducing Risks, and Healthy Coping    Follow-up: 1 month - scheduled for pre pump discussion    See Care Plan for co-developed, patient-state behavior change goals.  AVS provided for patient today.    Education Materials Provided:  No new materials provided today      SUBJECTIVE/OBJECTIVE:  Presents for: Insulin Pump Pre-Start  Accompanied by: Self  Focus of Visit: Reducing Risks, Insulin Pump  Type of Pump visit: Pre-pump  Diabetes type: Type 1  Date of diagnosis: late 80's  Disease course: Getting harder to manage  Transportation concerns: Yes  Difficulty affording diabetes medication?: No  Difficulty affording diabetes testing supplies?: No  Other concerns:: Physical impairment  Cultural Influences/Ethnic Background:  Choose not to answer      Diabetes Symptoms & Complications:  Disease course: Getting harder to manage  Complications assessed today?: No    Patient Problem List and Family  "Medical History reviewed for relevant medical history, current medical status, and diabetes risk factors.    Vitals:  There were no vitals taken for this visit.  Estimated body mass index is 20.67 kg/m  as calculated from the following:    Height as of 4/22/24: 1.753 m (5' 9\").    Weight as of 5/28/24: 63.5 kg (140 lb).   Last 3 BP:   BP Readings from Last 3 Encounters:   05/28/24 (!) 154/63   05/01/24 (!) 146/62   04/24/24 138/58       History   Smoking Status     Never   Smokeless Tobacco     Never       Labs:  Lab Results   Component Value Date    A1C 12.7 05/01/2024    A1C 10.9 05/21/2021    HEMOGLOBINA1 8.1 06/07/2019     Lab Results   Component Value Date     05/01/2024     04/24/2024     10/05/2022     05/21/2021     Lab Results   Component Value Date    LDL 95 11/06/2023    LDL 78 08/20/2020     HDL Cholesterol   Date Value Ref Range Status   08/20/2020 39 (L) >39 mg/dL Final     Direct Measure HDL   Date Value Ref Range Status   11/06/2023 35 (L) >=40 mg/dL Final   ]  GFR Estimate   Date Value Ref Range Status   05/01/2024 49 (L) >60 mL/min/1.73m2 Final   05/21/2021 62 >60 mL/min/[1.73_m2] Final     Comment:     Non  GFR Calc  Starting 12/18/2018, serum creatinine based estimated GFR (eGFR) will be   calculated using the Chronic Kidney Disease Epidemiology Collaboration   (CKD-EPI) equation.       GFR Estimate If Black   Date Value Ref Range Status   05/21/2021 72 >60 mL/min/[1.73_m2] Final     Comment:      GFR Calc  Starting 12/18/2018, serum creatinine based estimated GFR (eGFR) will be   calculated using the Chronic Kidney Disease Epidemiology Collaboration   (CKD-EPI) equation.       Lab Results   Component Value Date    CR 1.67 05/01/2024    CR 1.33 05/21/2021     No results found for: \"MICROALBUMIN\"    Healthy Eating:  Healthy Eating Assessed Today: No  Breakfast: toast  Lunch: sandwiches, chips  Snacks: chips    Being Active:  Being Active " Assessed Today: No    Monitoring:  Monitoring Assessed Today: Yes  Did patient bring glucose meter to appointment? : Yes  Blood Glucose Meter: CGM        Taking Medications:  Diabetes Medication(s)       Insulin       HUMALOG KWIKPEN 100 UNIT/ML soln Inject 4-12 Units Subcutaneous 4 times daily (with meals and nightly) , total daily dose approx 50U     insulin aspart (NOVOLOG PEN) 100 UNIT/ML pen Inject 4-12 units subcutaneous 4 times daily for mealtime and correction scale dosing as directed, total daily dose approx 50U     insulin degludec (TRESIBA FLEXTOUCH) 100 UNIT/ML pen INJECT 20 UNITS SUBCUTANEOUSLY ONCE DAILY IN THE MORNING     Patient taking differently: Inject 20 Units Subcutaneous every morning            Taking Medication Assessed Today: Yes  Current Treatments: Insulin Injections  Dose schedule: Pre-breakfast, Pre-lunch, Pre-dinner, At bedtime  Given by: Patient  Injection/Infusion sites: Abdomen  Problems taking diabetes medications regularly?: Yes  Diabetes medication side effects?: No    Problem Solving:  Problem Solving Assessed Today: Yes  Is the patient at risk for hypoglycemia?: Yes  Is the patient at risk for DKA?: Yes  Does patient have severe weather/disaster plan for diabetes management?: Not Needed              Reducing Risks:  Reducing Risks Assessed Today: No    Healthy Coping:  Healthy Coping Assessed Today: No  Patient Activation Measure Survey Score:       No data to display                  Care Plan and Education Provided:  importance of changing infusion set every 3 days, integrated continuous glucose monitor function/features, importance of accurate carbohydrate counting, how to use the bolus calculator, importance of bolusing before meals, and multiple daily injection back-up plan in case of pump failure- just touched on all these topics in general    Aditi Bradford, PharmD, CDCES, BC-[facility]      Time Spent: 75 minutes  Encounter Type:  Individual    Any diabetes medication dose changes were made via the CDE Protocol per the patient's referring provider. A copy of this encounter was shared with the provider.

## 2024-08-21 NOTE — PROGRESS NOTES
Diabetes Self-Management Education & Support    Presents for: Insulin Pump Pre-Start    Type of Service: In Person Visit         Insulin Pump Concepts:  Reviewed insulin pump 101 presentation covering general pump therapy concepts, the different options available and next steps.     ASSESSMENT:  Eduardo has had type 1 diabetes since the late 80s.  He follows with Dr. Palma in endocrinology.  He states he has met with a lot of diabetes educators in the past.  He has a referral to explore the possibility of an insulin pump.  He has a lot of questions about this today.  He is motivated by a non healing wound (since April) on his right leg.  He is being told to keep blood sugars under 140 by his wound care team.  He states he feels poorly when his blood sugars are even higher than this (150-170) due to being high so long.  He already has bilateral below the knee amputations and wants to prevent further loss.  Today, he is in a wheelchair as he is supposed to limit pressure on his prosthesis to aid in wound healing.    Eduardo works nights, sleeps during the day.  Eats supper at 2-3am before bed.  He lives alone 3 out of 4 weeks in the month.  His parents come down on the 4th week and stay with him.  They live in [place].  Eduardo is considering moving there in the future to help take care of his parents as his dad has dementia.  Eduardo has had a lot of stress in the last 5 years with losing his wife (left him) and the loss of both of his lower legs since 2019.    Eduardo has a lot of questions on pump therapy and those were addressed.  He initially stated he would only consider a tubeless pump so we focused on Tandem Mobi vs Omnipod.  We discussed how he would need to switch to Dexcom for Omnipod and he claims a Dexcom ruined his $1500 phone due to the rere running all the time so he was initially against Dexcom.  I told him I have never heard of this happening in over 10 years of working in diabetes so not sure what  happened there.  As we further discussed Omnipod specifically, I told him about the need to bolus before meals with Omnipod or he would have extended highs that would cause automation exits/alarms.  I told him I assumed he wasn't a good boluser based on his extremely elevated A1Cs and he admitted that he always takes his insulin 1.5 hours after eating- has always done this ever since he was a kid.  He says he is unsure how much he is going to eat so he will not take the full  dose before meals (if any).  He wants a system that will do most of the work for him and he wants to be hands off for a lot of it.  He would also prefer to not have anything on his phone.  He decided against Omnipod and we began to discuss more about Tandem and Medtronic.  Pili also requires meal announcements- which he was not comfortable with either.    We discussed Tandem's algorithm, and how it works well for smaller carb meals and/or grazers without bolusing.  He said he eats a lot of chips and bread (toast/sandwiches) as he lives alone and doesn't cook much.  Due to these larger carb meals, Tandem would likely not give him ideal control if he wouldn't do a good job prebolusing (even if he only bolused a small amount ahead of time).  We changed our focus to Medtronic as it has the most robust algorithm for missed boluses.  He doesn't like the tubing option but we discussed tubing length options and tucking tubing in the pants pocket and wearing the site on the abdomen to avoid dangling tubing.  He had questions on where to wear pump/CGM and site rotation and we discussed all of this.    He may benefit from a live pump demo and I told him that could be arranged- just send me a message and I can have the reps meet with him to discuss products in more detail.    I do think algorithm-wise and learning of his bolusing habits today, that the Medtronic 780G system would be the best option for Eduardo.   He will go home and do more research (AVS  full of information to investigate and compare pump options) and come back for pre-pump education in a month.  If he is ready to move forward, I will gather current MDI dosing information and prepare pump start orders as he sees Dr. Palma the following day virtually.      PLAN    Following Insulin Pump 101, if you are ready to move to an insulin pump, please schedule a second appointment with a [facility] Diabetes Educator to review more in-depth insulin pump information and prepare paperwork and other needed prescriptions.    Needed appointments for any patient starting pump therapy:  Insulin pump 101 (completed today)  Pre- Pump Diabetes Education Visit  Pump training appointment with pump  (will need to do pre-work on your own prior to this appt)  Post-Pump training appt with Diabetes Educator 2 to 4 weeks after pump training.  Additional follow up appts as recommended by your Diabetes Educator, if needed.  Continue same routine follow ups with your diabetes provider.      If you are not yet ready to move to pump therapy and you want to do some more investigation/research:  Discuss pump therapy with your insurance company to see what your costs will be.  Understand your coverage (deductible, co- insurance, maximum out of pocket costs) and any additional information that may be needed for coverage.  Research your pump options  A. Company websites  https://www.ReferrontronicWorld of Good.Clear Water Outdoor/products/minimed-780g-insulin-pump-system  https://www.Simmr.Clear Water Outdoor/  https://www.ByRead.Clear Water Outdoor/  https://www.Culture Machine.Clear Water Outdoor/  B.  Pump comparison websites   I.   https://www.pantherprogram.org/device-comparison   II.  https://www.adces.org/danatech/insulin-pumps  C. Simulator Apps/Websites   I. Medtronic 780G: https://www.ReferrontronicWorld of Good.com/minimed-virtual-pump   II. [employer] Bionics ilet: https://www.Culture Machine.Clear Water Outdoor/hcp/hcp-resources/   III. Omnipod Simulator Ramon (download from ramon store): Omnipod         IV. Tandem  Simulator Ramon (download from ramon store): t:simulator        3.  If you decide to move forward with pump therapy, please schedule a second appointment with a [facility] Diabetes Educator to review more in-depth insulin pump information and prepare paperwork and other needed prescriptions.        [facility] for the [facility]:  For Your Diabetes Education and Nutrition Appointments Call:  [phone]   For Diabetes Education or Nutrition Related Questions:   Phone: [phone]  Send Inkomerce Message   If you need a medication refill please contact your pharmacy. Please allow 3 business days for your refills to be completed.      Topics to cover at upcoming visits: Healthy Eating, Being Active, Monitoring, Taking Medication, Problem Solving, Reducing Risks, and Healthy Coping    Follow-up: 1 month - scheduled for pre pump discussion    See Care Plan for co-developed, patient-state behavior change goals.  AVS provided for patient today.    Education Materials Provided:  No new materials provided today      SUBJECTIVE/OBJECTIVE:  Presents for: Insulin Pump Pre-Start  Accompanied by: Self  Focus of Visit: Reducing Risks, Insulin Pump  Type of Pump visit: Pre-pump  Diabetes type: Type 1  Date of diagnosis: late 80's  Disease course: Getting harder to manage  Transportation concerns: Yes  Difficulty affording diabetes medication?: No  Difficulty affording diabetes testing supplies?: No  Other concerns:: Physical impairment  Cultural Influences/Ethnic Background:  Choose not to answer      Diabetes Symptoms & Complications:  Disease course: Getting harder to manage  Complications assessed today?: No    Patient Problem List and Family Medical History reviewed for relevant medical history, current medical status, and diabetes risk factors.    Vitals:  There were no vitals taken for this visit.  Estimated body mass index is 20.67 kg/m  as calculated from the following:    Height as  "of 4/22/24: 1.753 m (5' 9\").    Weight as of 5/28/24: 63.5 kg (140 lb).   Last 3 BP:   BP Readings from Last 3 Encounters:   05/28/24 (!) 154/63   05/01/24 (!) 146/62   04/24/24 138/58       History   Smoking Status    Never   Smokeless Tobacco    Never       Labs:  Lab Results   Component Value Date    A1C 12.7 05/01/2024    A1C 10.9 05/21/2021    HEMOGLOBINA1 8.1 06/07/2019     Lab Results   Component Value Date     05/01/2024     04/24/2024     10/05/2022     05/21/2021     Lab Results   Component Value Date    LDL 95 11/06/2023    LDL 78 08/20/2020     HDL Cholesterol   Date Value Ref Range Status   08/20/2020 39 (L) >39 mg/dL Final     Direct Measure HDL   Date Value Ref Range Status   11/06/2023 35 (L) >=40 mg/dL Final   ]  GFR Estimate   Date Value Ref Range Status   05/01/2024 49 (L) >60 mL/min/1.73m2 Final   05/21/2021 62 >60 mL/min/[1.73_m2] Final     Comment:     Non  GFR Calc  Starting 12/18/2018, serum creatinine based estimated GFR (eGFR) will be   calculated using the Chronic Kidney Disease Epidemiology Collaboration   (CKD-EPI) equation.       GFR Estimate If Black   Date Value Ref Range Status   05/21/2021 72 >60 mL/min/[1.73_m2] Final     Comment:      GFR Calc  Starting 12/18/2018, serum creatinine based estimated GFR (eGFR) will be   calculated using the Chronic Kidney Disease Epidemiology Collaboration   (CKD-EPI) equation.       Lab Results   Component Value Date    CR 1.67 05/01/2024    CR 1.33 05/21/2021     No results found for: \"MICROALBUMIN\"    Healthy Eating:  Healthy Eating Assessed Today: No  Breakfast: toast  Lunch: sandwiches, chips  Snacks: chips    Being Active:  Being Active Assessed Today: No    Monitoring:  Monitoring Assessed Today: Yes  Did patient bring glucose meter to appointment? : Yes  Blood Glucose Meter: CGM        Taking Medications:  Diabetes Medication(s)       Insulin       HUMALOG KWIKPEN 100 UNIT/ML soln " Inject 4-12 Units Subcutaneous 4 times daily (with meals and nightly) , total daily dose approx 50U     insulin aspart (NOVOLOG PEN) 100 UNIT/ML pen Inject 4-12 units subcutaneous 4 times daily for mealtime and correction scale dosing as directed, total daily dose approx 50U     insulin degludec (TRESIBA FLEXTOUCH) 100 UNIT/ML pen INJECT 20 UNITS SUBCUTANEOUSLY ONCE DAILY IN THE MORNING     Patient taking differently: Inject 20 Units Subcutaneous every morning            Taking Medication Assessed Today: Yes  Current Treatments: Insulin Injections  Dose schedule: Pre-breakfast, Pre-lunch, Pre-dinner, At bedtime  Given by: Patient  Injection/Infusion sites: Abdomen  Problems taking diabetes medications regularly?: Yes  Diabetes medication side effects?: No    Problem Solving:  Problem Solving Assessed Today: Yes  Is the patient at risk for hypoglycemia?: Yes  Is the patient at risk for DKA?: Yes  Does patient have severe weather/disaster plan for diabetes management?: Not Needed              Reducing Risks:  Reducing Risks Assessed Today: No    Healthy Coping:  Healthy Coping Assessed Today: No  Patient Activation Measure Survey Score:       No data to display                  Care Plan and Education Provided:  importance of changing infusion set every 3 days, integrated continuous glucose monitor function/features, importance of accurate carbohydrate counting, how to use the bolus calculator, importance of bolusing before meals, and multiple daily injection back-up plan in case of pump failure- just touched on all these topics in general    Aditi Bradford, PharmD, [facility], [facility] and [facility]      Time Spent: 75 minutes  Encounter Type: Individual    Any diabetes medication dose changes were made via the CDE Protocol per the patient's referring provider. A copy of this encounter was shared with the provider.

## 2024-08-23 ENCOUNTER — MYC MEDICAL ADVICE (OUTPATIENT)
Dept: FAMILY MEDICINE | Facility: OTHER | Age: 54
End: 2024-08-23
Payer: COMMERCIAL

## 2024-08-23 NOTE — TELEPHONE ENCOUNTER
Central Prior Authorization Team  Phone: [phone]    PRIOR AUTHORIZATION DENIED    Medication: LYRICA 100 MG PO CAPS  Insurance Company: [employer] - Phone [phone] Fax [phone]  Denial Date: 8/21/2024  Denial Reason(s):         Appeal Information:         Patient Notified: NO- Unfortunately, we cannot call the patient with denials because we do not know what next steps the MD will take nor can we give medical advice, please notify the patient of what they are to expect for the continuation of their therapy from the provider.

## 2024-08-23 NOTE — LETTER
August 26, 2024      Eduardo Walton  [address]  [facility] [address]        To Whom It May Concern:    Eduardo Walton is under my direct care. He has been on Lyrica (Brand name) for his chronic neuropathic and phantom pain. He has been on the medication dating back to 2019. He has previously been on Cymbalta as well and ended up stopping this as it did not help his mood or pain. He has also previously been on high doses of gabapentin without effective pain control. I have attached the following to help with records.     Sincerely,        Deepti Vega, NATASHA CNP          
Provider pre-printed instructions given

## 2024-08-26 NOTE — TELEPHONE ENCOUNTER
Central Prior Authorization Team  Phone: [phone]    PA Initiation    Medication: LYRICA 100 MG PO CAPS  Insurance Company: [employer] - Phone [phone] Fax [phone]  Pharmacy Filling the Rx: [facility] - [place], [place] - [address]  Filling Pharmacy Phone: [phone]  Filling Pharmacy Fax:    Start Date: 8/20/2024    Sent lmn and chart notes to insurance via fax.

## 2024-08-26 NOTE — TELEPHONE ENCOUNTER
Please print out PA and I will do a letter.       NATASHA Queen CNP  Questions or concerns please feel free to send me a [employer] message or call me  Phone : [phone]

## 2024-08-26 NOTE — TELEPHONE ENCOUNTER
Please print out OV note from 11/21/2022 and 03/22/2021 and fax letter that is in MA basket.       NATASHA Queen CNP  Questions or concerns please feel free to send me a [employer] message or call me  Phone : [phone]

## 2024-09-04 NOTE — TELEPHONE ENCOUNTER
Central Prior Authorization Team  Phone: [phone]    Called fahad and spoke to Vasquez and she stated that it is showing that the appeal is currently in process for the lyrica. PA number [id number]. She stated that the turn around time is 10 days, she stated that they received it on 9/3 so it will have an outcome by 9/13

## 2024-09-10 ENCOUNTER — TELEPHONE (OUTPATIENT)
Dept: FAMILY MEDICINE | Facility: OTHER | Age: 54
End: 2024-09-10
Payer: COMMERCIAL

## 2024-09-10 ENCOUNTER — MYC MEDICAL ADVICE (OUTPATIENT)
Dept: FAMILY MEDICINE | Facility: OTHER | Age: 54
End: 2024-09-10
Payer: COMMERCIAL

## 2024-09-10 DIAGNOSIS — G54.8 PHANTOM PAIN: ICD-10-CM

## 2024-09-10 DIAGNOSIS — Z89.511 S/P BILATERAL BKA (BELOW KNEE AMPUTATION) (H): Primary | ICD-10-CM

## 2024-09-10 DIAGNOSIS — R52 PHANTOM PAIN: ICD-10-CM

## 2024-09-10 DIAGNOSIS — Z89.512 S/P BILATERAL BKA (BELOW KNEE AMPUTATION) (H): Primary | ICD-10-CM

## 2024-09-10 NOTE — TELEPHONE ENCOUNTER
Patient Quality Outreach    Patient is due for the following:   Diabetes -  Diabetic Follow-Up Visit    Next Steps:   Patient has upcoming appointment, these items will be addressed at that time.    Type of outreach:    Chart review performed, no outreach needed.      Questions for provider review:    None           Huma Phillips, [facility]

## 2024-09-10 NOTE — TELEPHONE ENCOUNTER
Pended below for script to be filled as generic for a 90-day supply. Routing to provider to review and sign. Please discontinue previous Lyrica prescription from med list.    Ana RANGELN, RN

## 2024-09-11 RX ORDER — PREGABALIN 100 MG/1
CAPSULE ORAL
Qty: 90 CAPSULE | Refills: 5 | Status: SHIPPED | OUTPATIENT
Start: 2024-09-11

## 2024-09-16 ENCOUNTER — HOSPITAL ENCOUNTER (OUTPATIENT)
Dept: WOUND CARE | Facility: CLINIC | Age: 54
Discharge: HOME | End: 2024-09-16
Attending: NURSE PRACTITIONER | Admitting: NURSE PRACTITIONER
Payer: COMMERCIAL

## 2024-09-16 PROCEDURE — G0463 HOSPITAL OUTPT CLINIC VISIT: HCPCS

## 2024-09-16 NOTE — PATIENT INSTRUCTIONS
Today the wound on your right leg is a little better in coloring, more good tissue.     We tried today an additional product to the wound called Promogran Brenna, this is a collagen based dressing that dissolves.  It aids in promoting new granular tissue growth to fill the wound bed in.    Take a small pice of the Promogran Brenna and place it in the center of the wound.  Hold it for a few seconds and then put your Mepilex dressing on over the top.    Try it and see if the Promogran Brenna causes any increased pain.  Call with any concerns or questions.  [phone]    I will see you again in about 4 weeks on October the 16 th at 10 am.    Pipe Leonard RN [email or web address]

## 2024-09-16 NOTE — PROGRESS NOTES
[facility]    Start of Care in [facility]: 4/21/2024 while inpatient, initial clinic visit 5/3/24.  Referring Provider: Dr Fabiola Orlando MD ED provider, initial referral 4/21/24.  Primary Care Provider: Deepti Vega   Wound Location: Right lateral thigh open wound (residual limb) and left thigh blistering.     Wound Clinic Visit:  Scheduled follow up.    Reason for Visit:  Follow up on right lateral thigh wound, left knee areas of erythema, left distal lateral residual limb skin fold along old scar closed incision, new concerns noted 7/24 of left thigh blistering.     Subjective:  On arrival today 9/16/24 with his dad, pt reports the following:    - He has been doing his own wound cares, applying the Mepilex 6x6 gentle adhesive foam dressings daily.  He feels the wound is maybe improving but not sure.    - The left LE amputation site scar, medial and lateral knee's and thigh where blistering was an issue have all resolved.    - For the right leg he was referred to the [facility] at [facility] with Dr Candelario by his prosthetist, was told in their opinion he may need skin graft to heal/close.  Pt was scheduled to see the MD but subsequently canceled the appointment.  He states does not want to prolong the cares needed for the right lateral lower leg wound.  He questioned if the referral was a paid referral and unsure if truly appropriate.  He reiterates today that he wants to be done with the cares for his right lateral thigh wound ASAP and wants to just continue with his normal activities and schedule.  See Assessment for discussion and teaching on [facility] and basics of skin grafting vs muscle flap surgery and role of Wound Specialist providers like Dr Candelario, benefits of their involvement.  - He met with diabetic educators and states they want him to be on an insulin pump, but due to multiple reason he does not wish to pursue their  recommendations. His blood glucose management with medications was modified and he made some adjustments in his process of use of insulin pen and feels this will improve his A1C.  Blood sugars remain very high mostly but had one significant low since last visit.    - Appetite remains poor, protein and caloric intake likely sub optimal for healing.   - No new updates on moving out the area, would like to continue with monthly follow up visits in clinic. He feels he can now manage taking pictures and sending weekly via [employer] to view and call if needed with any concerns.      Wound History:   Pt presented to the ED here at [facility] on 4/21/23 with concerns of right lateral lower leg wound.  Per admission note:  Pt has history of bilateral BKA's (left 2019 and right June of 2023).  He stated 'he thinks it started as a blister sometime in the last week or so (prior to presentation in ED).  He'd been trying a new prosthesis sock that was not the right size and it may have been rubbing.  He was having some pain when he wore the prosthesis but he has no pain now without it on.  There is a bad odor and some clear drainage.  His neighbor who is a retired nurse has been helping him with dressing changes.  He denies fever or chills.  His sugars have been high.  He stated he does not feel well when his sugar is below 180 and only uses insulin accordingly.  Wound was swab cultured in the ED, aerobic cultures pending.  At [facility] nurse initial meeting (4/22) with pt, family and neighbor gave some additional information on the origin and progression of the wound.  Appeared pretty rapidly, no sure if there was any initial blistering.  Pt believes the primary cause of the wound was friction and not direct pressure.  I was able to see photo's of the wounds development off the pt's mom's phone.  Initial photo, not a great image but as best able to see, showed hypopigmented site with distinct boarders, no open tissue, did not appear like  slough.  Second photo is few days later than first, is a better image and shows what appears to be a full thickness ulcer with mostly clean red nongranular wound bed and open wound edges.  Periwound skin is not well shown in either photo  Pain to the posterior knee and proximal periwound skin increased and per pt's neighbor, small serous clear fluid blisters appeared briefly and ruptured.  Entire surrounding skin more to the proximal than distal had corresponding erythema with no significant increased warmth to touch noted.  Initial woc nurse inpatient visit 4/22/24, Triad paste started to promote autolytic debridement of slough and eschar and to protect dry intact periwound skin, and dry out proximal periwound moist skin, Mepilex 6x6 covered.  Woc return 4/23, no change to plan of care for the wound topically but instructed pt to resume wearing his protective and compressive sleeve to the right residual limb over the wound dressing.  Woc returned 4/24, wound bed slough started to debride some.  Cultures results returned, positive for streptococcus constellatus.  Dr Ardon approved discharge home same day 4/24/24 on oral antibiotics, picc line removed.  Pt called into clinic a few times after discharge, prior to his initial clinic visit with concerns and questions.  Receiving assist from his neighbor but both had concerns and pictures sent in for evaluation by [facility], all appeared to be progressing well.  5/8/24 clinic visit with Ly NELOSN RN cwocn, new tunneling present, 11 o'clock 0.8 cm, with small amount of undermining noted 5 - 10 o'clock 0.5 cm max.   Triad paste put on hold 6/28 and started NS damp Hydrofera Blue Classic.  At follow up visit 7/5 pt had stopped the Hydrofera Blue due to concerns of pain and reports it was making the wound worse.  Returned to Triad paste and will continue, see Assessment note of 7/5 for more details.  On 7/31 due to not going to have a caregiver for the wound, pt request  modification of cares so he can self perform.  As wound bed was mostly clean and pt had great concern of not being able to apply the Triad paste correctly pt was instructed to solely cover the wound with Mepilex 6x6 or 4x4 boarder flex gentle adhesive foam dressings.    In clinic we are also monitoring left lateral and medial knee skin for chronic blanchable erythema which has been deep purple and boggy in the past per pt, has not had open wounds in these sites.  The left limb scar closed surgical incision suffered a pinch 6/18, not an open wound but a crease to the incision was noted.  Newly noted blisters to the left thigh first assessed in clinic 7/24/24, mostly resolved as of 8/19, only briefly assessed 8/19, pt reports fully healed as of 9/16 appointment.    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    Positive for macroalbuminuria    Retinopathy due to secondary diabetes mellitus (H)    Severe diabetic hypoglycemia (H)    Type 1 diabetes mellitus with other diabetic ophthalmic complication (H)    Stage 3a chronic kidney disease (H)    Statin declined    Primary osteoarthritis of right knee    Current moderate episode of major depressive disorder without prior episode (H)    Hypertension    YESSICA (acute kidney injury) (H24)    Type 1 diabetes mellitus with hyperglycemia (H)    Diabetic ulcer of right lower leg (H)    Cellulitis of right lower extremity    Anemia, unspecified type    Hyperkalemia    Hyponatremia    Hypoalbuminemia    PAD (peripheral artery disease) (H24)    S/P bilateral BKA (below knee amputation) (H)             Tobacco Use:     Tobacco Use      Smoking status: Never      Smokeless tobacco: Never     Diabetic: Type  1  HgbA1C:   Hemoglobin A1C   Date Value Ref Range Status   2024 12.7 (H) 0.0 - 5.6 % Final     Comment:     Normal <5.7%   Prediabetes 5.7-6.4%    Diabetes 6.5% or higher     Note: Adopted from ADA consensus guidelines.   2021 10.9 (H) 0 - 5.6 % Final     Comment:     Normal <5.7% Prediabetes 5.7-6.4%  Diabetes 6.5% or higher - adopted from ADA   consensus guidelines.     Checks Blood Glucose?:  yes, few times daily.  Average readings: remain high, no specifics discussed today.    Personal/social history:  pt lives with family independently, has had home care services, most recently with [facility], no home care currently.     Objective:   Current treatment plan:  Right lateral thigh wound - Mepilex 6x6 to cover.  Left thigh ruptured blister - ELIE.   Last changed: yesterday    Wound #1 Right lateral thigh of residual limb.   Stage/tissue depth:  Unstageable pressure injury due to slough, complicated by prosthetic products friction and subsequent cellulitis infection.  2.4 cm L x 2 cm W x 0.3 cm D, no change  Tunneling: none, tunnel at 11 o'clock has been healed for last few weeks.  Underminin - 9 o'clock as of today, all less than 0.2 cm D  Wound bed type/amount: approximately 80 % granular tissue and 20 % white moist fibrinous slough: Not currently fluctuant  Wound Edges: open  Periwound: scar tissue  Drainage: small amounts serosanguinous on dressing removed  Odor: none noted currently   Pain: 3/10 with cares today  Photo from today's visit 24, distal is to the right.    Photo's from 24, distal is to the right.    Photo's from 24, distal is to the right.     Photo pt uploaded via [employer] .    Photo's from 24, distal is to the right.      Photo's from 24, distal is to the right.      Photo's from 5/3/24, initial Wound and Ostomy clinic appointment post hospital discharge 24.  L - R before debriding x's 2 and after debriding x's 2, distal is to the right.      Photo's  from 4/24/24.  Inpatient Woc nurse follow, date of discharge home, distal is to the right.      Photo's from inpatient woc nurse follow up visit 4/23/24, distal is to the right.    Photo from initial wo nurse inpatient consult 4/22/24, distal is to the right.    Photo from ED 4/21/24.    Wound #2 Left thigh blistering from friction irritation of new prosthetic sleeve.  Stage/tissue depth: partial thickness  Healed 9/1    Dorsalis Pedal Pulse: NA  Posterior Tibial Pulse: NA  Hair growth: hair is present around the wounds sites of the right and left thigh  Capillary Refill: NA  Feet/toes color: NA  Nails: NA  R Leg residual limb: Edema nonpitting. Ankle circumference NA cm. Calf circumference NA cm.  L Leg: Edema Not assessed this visit. Ankle circumference NA cm. Calf circumference NA cm.    Mobility: Wheelchair use, has prosthetics but not currently wearing on right.    Current offloading/footwear: prosthetic to left LE with sneaker   Sensation: per pt normal sensation at right LE wound, hypersensitive.     Other callusing/areas of concern:   Left residual limb, three areas we are monitoring, per pt these are all intact with no concerns as of today's 9/16 revisit, not assessed this visit.  - Medial and lateral knee blanchable erythema.  - Lateral distal lower limb on BKA surgical scar intact but puckering edges of dry callousing scar, now resolved.  - Left thigh blistering partial thickness serous blisters all reabsorbed and dry remains have flaked off per pt.    Diet: Regular with awareness of effects on blood glucose levels.    Assessment:  The right lateral thigh wound is clean but progress is slow to none.  Small amount of new red tissue present, little slough in wound noted today initially in the inferior aspect was moist, mucous portions of the slough came off with cotton tipped applicator leaving only white fibrinous slough in smaller amounts.  Perwiound skin is intact, no erythema, prosthetic does not  appear to be causing any significant pressure to the site and pt continues to limit time in right prosthetic to about 1/2 hour daily.  No signs of infection noted.      The pt and I discussed the following.   - Pt canceled appointment scheduled at [facility] with Dr Andree REBOLLEDO assessment for potential skin graft procedure as does not want a surgery or anything that prolongs how long he has to modify his life to care for the right lateral thigh wound:   I discussed the difference between the [facility] and a normal [facility] nurse run wound clinic today.  The providers are wound specialist and make recommendations based on wound status, pt overall health condition and co morbidities.  Surgery is not a given solely based on the referral to be assessed.  There is no monitory benefit to the provider making the referral.  Dr Candelario is a well known, well regarded Vascular surgeon and wound specialist.  Even if surgery for a skin graft or flap procedure is recommended, the [facility] would also make orders for wound cares that could alone benefit the wound and potentially progress its status beyond the need for surgery.  The teaching for the pt and recommendations by the MD may also get through to the pt better than I have been able regarding wound cares and effects of poor nutrition and blood glucose control on healing vs nonhealing.  I encouraged the pt to reconsider his decision on not seeking the MD's assessment the [facility]I.  At this time he declines.    - Pt's wound is clean, has red tissue, but not the beef red meme granular tissue we would hope for.  He does not want to do any 'complex cares' for the wound as I have recommended in the past.  He feels trying to cover the wound bed with an ointment or other type of primary dressing was too difficult (we have tried Triad paste, Aquacel Ag and Hydrofera Blue, each of which pt stopped using for stated reasons):  I feel the current wound cares are about the bare  minimum of what is acceptable and do not do anything to provide an antimicrobial or advanced healing element (like Collagen with growth factors or NPWT).  With the poor healing potential due to poor nutrient and protein intake and highly elevated blood glucose levels, that the wound needs everything it can to promote healing.  We discussed NPWT, basics of cares with a wound vac, filling wound bed with black foam, negative pressure applied to encourage granular tissue proliferation.  Pt firmly states he is not interested in NPWT.  With further discussion he was willing to try the use of  Promogran Brenna as primary dressing then the Mepilex. See Plan for details.      Barriers to wound healing:   Poor nutrition: inadequate supply of protein, carbohydrates, fatty acids, and trace elements essential for all phases of wound healing. Teaching has been done at past visit on need for increased protein in diet for healing, remodeling of scar tissue once closed.  Pt has struggled with protein intake but will continue to try to keep levels high.  Recommended between 90 and 110 grams protein daily as goal.    Reduced Blood Supply: appears adequate, no concerns to the distal LE limbs bilaterally, hair present above the knee's, no dependent rubor.   Both warm with normal skin tone and texture aside from wound sites.  Medication: NA  Chemotherapy: suppresses the immune system and inflammatory response, NA  Radiotherapy: increases production of free radical which damage cells, NA  Psychological stress: None noted today, appears more positive about his healing potential and is making efforts to improve co morbidities.    Obesity: decreases tissue perfusion, NA  Infection: prolongs inflammatory phase, uses vital nutrients, impairs epithelialization and releases toxins. None noted at any sites today  Underlying Disease: diabetes mellitis, pt will meed with Diabetic ed on 7/31. Declined insulin pump, is working with diabetic ed and  endocrine to improve A1C.  Maceration: reduces wound tensile strength and inhibits epithelial migration, none currently noted.  Patient compliance, appears motivated to heal   Unrelieved pressure, to be determined but none of current significance noted currently.  Immobility, limited but not immobile  Substance abuse: NA    Plan:  After discussion and demonstration the pt did agree to try the use of Promogran Brenna as described below.  He feels this is something he can do on his own and states understanding of the rational for use.  We will continue with follow up visits every month and pt will send in pictures via [employer] for woc to view and will call with any concerns.     Discussed/Education provided: plan of care with rationale;     Topical cares:  Right lateral thigh:  - Remove the old bandage, cleanse with wound cleanser and dry well.  - Apply a small piece of the Promogran Brenna dressing directly to the wound bed, aim for the center, if he is able to cover approximately 50 % of the total wound bed this will be sufficient to benefit the wound.  - Cover with the Mepilex 6x6.  - Change daily  Pt is doing self cares for the right lateral thigh wound.      Additional recommendations: None at this time    The following discharge instructions were reviewed with and sent home with the patient:  See AVS    The following supplies were sent home with the patient:  Few extra Mepilex 6x6 dressings and Promogran Brenna dressings     Return visit: 10/16/24 Wound and Ostomy clinic    Verbal, written, & demonstrative education provided.  Face to face time: approximately 60 minutes  Procedure: [facility]  [phone]

## 2024-10-03 NOTE — TELEPHONE ENCOUNTER
MEDICATION APPEAL APPROVED    Medication: LYRICA 100 MG PO CAPS  Authorization Effective Date: 9/9/2024  Authorization Expiration Date: 9/9/2025  Approved Dose/Quantity: 90/30  Reference #: [id number]   [employer] Company: [employer]  Expected CoPay: $       CoPay Card Available:    Financial Assistance Needed:   Filling Pharmacy: [facility] - [place] [id number]  Patient Notified: Yes  Comments:

## 2024-10-13 ENCOUNTER — MYC MEDICAL ADVICE (OUTPATIENT)
Dept: ENDOCRINOLOGY | Facility: CLINIC | Age: 54
End: 2024-10-13
Payer: COMMERCIAL

## 2024-10-14 ENCOUNTER — TELEPHONE (OUTPATIENT)
Dept: FAMILY MEDICINE | Facility: OTHER | Age: 54
End: 2024-10-14

## 2024-10-14 NOTE — TELEPHONE ENCOUNTER
Per provider, patient's appointment needs to be rescheduled due to provider out of clinic.  Please reschedule patient in the next available  in clinic  slot.    Cancelled appointment with Mary Hubbard PA-C on 10/14/2024 at 1:40pm.     Patient was called and phone number is out of service  Appointment was cancelled and encounter created for message trail.    MyChart was also sent to inform patient of this. Kimberli Miramontes

## 2024-10-22 ENCOUNTER — HOSPITAL ENCOUNTER (OUTPATIENT)
Dept: WOUND CARE | Facility: CLINIC | Age: 54
Discharge: HOME | End: 2024-10-22
Attending: NURSE PRACTITIONER | Admitting: NURSE PRACTITIONER
Payer: COMMERCIAL

## 2024-10-22 PROCEDURE — G0463 HOSPITAL OUTPT CLINIC VISIT: HCPCS

## 2024-10-22 NOTE — PROGRESS NOTES
[facility]    Start of Care in [facility]: 4/21/2024 while inpatient, initial clinic visit 5/3/24.  Referring Provider: Dr Fabiola Orlando MD ED provider, initial referral 4/21/24.  Primary Care Provider: Deepti Vega   Wound Location: Right lateral thigh open wound (residual limb) and left thigh blistering.     Wound Clinic Visit:  Re scheduled follow up.    Reason for Visit:  Follow up on right lateral thigh wound.     Subjective:  On arrival today 10/22/24 with his dad the pt reports the following:  Is frustrated with the right lateral leg wound, does not appear to be improving, concerns with occasionally more dark red bloody drainage but mostly clear to pink drainage present.  No signs of infection.  He is scheduled to move from this area within the week.  Moving to be closer to his parent and will need to set up cares in that community, believes it will be within the [facility] who his parents are affiliated with.  He states he is looking into a PCP but does not want to have see a wound specialist immediately, will do so after he gets settled in.  Last visit woc nurse recommended trying topical collagen with Brenna Promogran, he reports it caused to much pain.  He did however start to take oral collagen which was recommended by someone and he feels this has benefited the wound.       Wound History:   Pt presented to the ED here at [facility] on 4/21/23 with concerns of right lateral lower leg wound.  Per admission note:  Pt has history of bilateral BKA's (left 2019 and right June of 2023).  He stated 'he thinks it started as a blister sometime in the last week or so (prior to presentation in ED).  He'd been trying a new prosthesis sock that was not the right size and it may have been rubbing.  He was having some pain when he wore the prosthesis but he has no pain now without it on.  There is a bad odor and some clear drainage.  His neighbor who is a  retired nurse has been helping him with dressing changes.  He denies fever or chills.  His sugars have been high.  He stated he does not feel well when his sugar is below 180 and only uses insulin accordingly.  Wound was swab cultured in the ED, aerobic cultures pending.  At [facility] nurse initial meeting (4/22) with pt, family and neighbor gave some additional information on the origin and progression of the wound.  Appeared pretty rapidly, no sure if there was any initial blistering.  Pt believes the primary cause of the wound was friction and not direct pressure.  I was able to see photo's of the wounds development off the pt's mom's phone.  Initial photo, not a great image but as best able to see, showed hypopigmented site with distinct boarders, no open tissue, did not appear like slough.  Second photo is few days later than first, is a better image and shows what appears to be a full thickness ulcer with mostly clean red nongranular wound bed and open wound edges.  Periwound skin is not well shown in either photo  Pain to the posterior knee and proximal periwound skin increased and per pt's neighbor, small serous clear fluid blisters appeared briefly and ruptured.  Entire surrounding skin more to the proximal than distal had corresponding erythema with no significant increased warmth to touch noted.  Initial [facility] nurse inpatient visit 4/22/24, Triad paste started to promote autolytic debridement of slough and eschar and to protect dry intact periwound skin, and dry out proximal periwound moist skin, Mepilex 6x6 covered.  Woc return 4/23, no change to plan of care for the wound topically but instructed pt to resume wearing his protective and compressive sleeve to the right residual limb over the wound dressing.  Woc returned 4/24, wound bed slough started to debride some.  Cultures results returned, positive for streptococcus constellatus.  Dr Ardon approved discharge home same day 4/24/24 on oral antibiotics, picc  line removed.  Pt called into clinic a few times after discharge, prior to his initial clinic visit with concerns and questions.  Receiving assist from his neighbor but both had concerns and pictures sent in for evaluation by [facility], all appeared to be progressing well.  5/8/24 clinic visit with Ly NELSON RN cwocn, new tunneling present, 11 o'clock 0.8 cm, with small amount of undermining noted 5 - 10 o'clock 0.5 cm max.   Triad paste put on hold 6/28 and started NS damp Hydrofera Blue Classic.  At follow up visit 7/5 pt had stopped the Hydrofera Blue due to concerns of pain and reports it was making the wound worse.  Returned to Triad paste and will continue, see Assessment note of 7/5 for more details.  On 7/31 due to not going to have a caregiver for the wound, pt request modification of cares so he can self perform.  As wound bed was mostly clean and pt had great concern of not being able to apply the Triad paste correctly pt was instructed to solely cover the wound with Mepilex 6x6 or 4x4 boarder flex gentle adhesive foam dressings.    In clinic we are also monitoring left lateral and medial knee skin for chronic blanchable erythema which has been deep purple and boggy in the past per pt, has not had open wounds in these sites.  The left limb scar closed surgical incision suffered a pinch 6/18, not an open wound but a crease to the incision was noted.  Newly noted blisters to the left thigh first assessed in clinic 7/24/24, mostly resolved as of 8/19, only briefly assessed 8/19, pt reports fully healed as of 9/16 appointment.    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)    Severe diabetic hypoglycemia (H)    Type 1 diabetes mellitus with other diabetic ophthalmic complication (H)    Stage 3a chronic kidney disease (H)    Statin declined    Primary osteoarthritis of right knee    Current moderate episode of major depressive disorder without prior episode (H)    Hypertension    YESSICA (acute kidney injury) (H)    Type 1 diabetes mellitus with hyperglycemia (H)    Diabetic ulcer of right lower leg (H)    Cellulitis of right lower extremity    Anemia, unspecified type    Hyperkalemia    Hyponatremia    Hypoalbuminemia    PAD (peripheral artery disease) (H)    S/P bilateral BKA (below knee amputation) (H)             Tobacco Use:     Tobacco Use      Smoking status: Never      Smokeless tobacco: Never     Diabetic: Type 1  HgbA1C:   Hemoglobin A1C   Date Value Ref Range Status   05/01/2024 12.7 (H) 0.0 - 5.6 % Final     Comment:     Normal <5.7%   Prediabetes 5.7-6.4%    Diabetes 6.5% or higher     Note: Adopted from ADA consensus guidelines.   05/21/2021 10.9 (H) 0 - 5.6 % Final     Comment:     Normal <5.7% Prediabetes 5.7-6.4%  Diabetes 6.5% or higher - adopted from ADA   consensus guidelines.     Checks Blood Glucose?:  yes, few times daily.  Average readings: remain high, no specifics discussed today.    Personal/social history:  pt lives independently, no home care services, will be moving to [facility] within the next week.      Objective:   Current treatment plan:  Right lateral thigh wound - Mepilex 6x6 to cover.    Last changed: yesterday    Wound #1 Right lateral thigh of residual limb.   Stage/tissue depth:  Unstageable pressure injury due to slough, complicated by prosthetic products friction and subsequent cellulitis infection.  2.8 cm L x 2.2 cm W x 0.3 cm D, no change  Tunneling: none, former tunnel at 11 o'clock has been closed many visits.    Undermining: appears resolved  Wound bed type/amount: approximately 70 % granular tissue and 20 % white  moist fibrinous slough: Not currently fluctuant  Wound Edges: open  Periwound: scar tissue  Drainage: moderate amounts sanguinous drainage dripping from wound and staining the inner foam dressing.  Odor: none noted currently   Pain: 3/10 with cares today  Photo from today's visit 10/22/24, distal is to the right.    Photo from 9/16/24, distal is to the right.    Photo's from 8/19/24, distal is to the right.    Photo's from 7/24/24, distal is to the right.     Photo pt uploaded via [employer] 6/22.    Photo's from 6/12/24, distal is to the right.      Photo's from 6/6/24, distal is to the right.      Photo's from 5/3/24, initial Wound and Ostomy clinic appointment post hospital discharge 4/24/24.  L - R before debriding x's 2 and after debriding x's 2, distal is to the right.      Photo's from 4/24/24.  Inpatient Woc nurse follow, date of discharge home, distal is to the right.      Photo's from inpatient woc nurse follow up visit 4/23/24, distal is to the right.    Photo from initial woc nurse inpatient consult 4/22/24, distal is to the right.    Photo from ED 4/21/24.    Dorsalis Pedal Pulse: NA  Posterior Tibial Pulse: NA  Hair growth: hair is present around the wounds sites of the right and left thigh  Capillary Refill: NA  Feet/toes color: NA  Nails: NA  R Leg residual limb: Edema nonpitting. Ankle circumference NA cm. Calf circumference NA cm.  L Leg: Edema Not assessed this visit. Ankle circumference NA cm. Calf circumference NA cm.    Mobility: Wheelchair use, has prosthetics but not currently wearing on right.    Current offloading/footwear: prosthetic to left LE with sneaker   Sensation: per pt normal sensation at right LE wound, hypersensitive.     Other callusing/areas of concern:   Per pt all sites of concern on the left limb have resolved.     Diet: Regular with awareness of effects on blood glucose levels.    Assessment:  The right lateral thigh wound is about the same overall with slightly more bleeding  present from center of the wound bed bleb of granular tissue.  In the wound picture submitted by the pt 9/25, the wound appeared more round where as today it appears more oblong than in his picture and slightly more than last clinic visit.  No tunneling or undermining noted.  No changes to diet, nutrient intake or diabetes control at this time.    Barriers to wound healing:   Poor nutrition: inadequate supply of protein, carbohydrates, fatty acids, and trace elements essential for all phases of wound healing. Teaching has been done at past visit on need for increased protein in diet for healing, remodeling of scar tissue once closed.  Pt has struggled with protein intake but will continue to try to keep levels high.  Recommended between 90 and 110 grams protein daily as goal.    Reduced Blood Supply: appears adequate, no concerns to the distal LE limbs bilaterally, hair present above the knee's, no dependent rubor.   Both warm with normal skin tone and texture aside from wound sites.  Medication: NA  Chemotherapy: suppresses the immune system and inflammatory response, NA  Radiotherapy: increases production of free radical which damage cells, NA  Psychological stress: None noted today, appears more positive about his healing potential and is making efforts to improve co morbidities.    Obesity: decreases tissue perfusion, NA  Infection: prolongs inflammatory phase, uses vital nutrients, impairs epithelialization and releases toxins. None noted at any sites today  Underlying Disease: diabetes mellitis, pt will meed with Diabetic ed on 7/31. Declined insulin pump, is working with diabetic ed and endocrine to improve A1C.  Maceration: reduces wound tensile strength and inhibits epithelial migration, none currently noted.  Patient compliance, appears motivated to heal   Unrelieved pressure, to be determined but none of current significance noted currently.  Immobility, limited but not immobile  Substance abuse:  NA    Plan:  Pt will continue to keep the wound covered and change daily.  I encouraged him to look into a wound specialist as soon as able within the health care system he will be moving into.  Pt would prefer not to address the wound any more than absolutely needed.  No further test or trials of different bandaging, limiting wound cares frequency and complexity.   Does not plan to see wound care specialist appointments until he is settled in the new community.    Discussed/Education provided: plan of care with rationale;     Topical cares:  Right lateral thigh:  - Remove the old bandage, cleanse with wound cleanser and dry well.  - Cover with the Mepilex 6x6.  - Change daily  Pt is doing self cares for the right lateral thigh wound.      Additional recommendations: None at this time    The following discharge instructions were reviewed with and sent home with the patient:  See AVS    The following supplies were sent home with the patient:  Few extra Mepilex 6x6 dressings.    Return visit: None scheduled at this time.  Pt will be moving to [facility] within the week, outside the  system and will not be returning for any follow up visits.     Verbal, written, & demonstrative education provided.  Face to face time: approximately 35 minutes  Procedure: NA    Pipe Leonard RN CWOCN  [phone]

## 2024-10-22 NOTE — PATIENT INSTRUCTIONS
Today the wound looks about the same on your right outer leg.  More red tissue present and more bloody drainage in small amounts present.  The blood is a good indicator that the nutrients and oxygen are making their way to the surface of the wound to help continue to fill the depth in.    [place].  You in time would benefit from a wound specialist.  Can be a c nurse, a physical therapist who is trained in wound care, or a provider (NP, PA or MD).    For your medical records check with Alfredo of what ever group you get set up with for primary care, check to see if they are on Epic and then you can give them verbal consent so they may not need to have faxed information.    Pipe Leonard RN [facility]  [phone]

## 2024-10-23 ENCOUNTER — OFFICE VISIT (OUTPATIENT)
Dept: FAMILY MEDICINE | Facility: OTHER | Age: 54
End: 2024-10-23
Payer: COMMERCIAL

## 2024-10-23 VITALS
WEIGHT: 140 LBS | HEART RATE: 74 BPM | TEMPERATURE: 96.9 F | RESPIRATION RATE: 16 BRPM | DIASTOLIC BLOOD PRESSURE: 62 MMHG | SYSTOLIC BLOOD PRESSURE: 158 MMHG | BODY MASS INDEX: 20.73 KG/M2 | HEIGHT: 69 IN | OXYGEN SATURATION: 98 %

## 2024-10-23 DIAGNOSIS — R33.9 URINARY RETENTION: ICD-10-CM

## 2024-10-23 DIAGNOSIS — E10.65 TYPE 1 DIABETES MELLITUS WITH HYPERGLYCEMIA (H): ICD-10-CM

## 2024-10-23 DIAGNOSIS — N18.31 STAGE 3A CHRONIC KIDNEY DISEASE (H): Primary | ICD-10-CM

## 2024-10-23 DIAGNOSIS — I10 HYPERTENSION, UNSPECIFIED TYPE: ICD-10-CM

## 2024-10-23 DIAGNOSIS — Z89.511 S/P BILATERAL BKA (BELOW KNEE AMPUTATION) (H): ICD-10-CM

## 2024-10-23 DIAGNOSIS — Z89.512 S/P BILATERAL BKA (BELOW KNEE AMPUTATION) (H): ICD-10-CM

## 2024-10-23 DIAGNOSIS — E13.319 RETINOPATHY DUE TO SECONDARY DIABETES MELLITUS (H): ICD-10-CM

## 2024-10-23 PROBLEM — N17.9 AKI (ACUTE KIDNEY INJURY) (H): Status: RESOLVED | Noted: 2024-04-21 | Resolved: 2024-10-23

## 2024-10-23 PROCEDURE — 99214 OFFICE O/P EST MOD 30 MIN: CPT | Performed by: NURSE PRACTITIONER

## 2024-10-23 RX ORDER — LISINOPRIL 10 MG/1
10 TABLET ORAL DAILY
Qty: 90 TABLET | Refills: 0 | Status: SHIPPED | OUTPATIENT
Start: 2024-10-23

## 2024-10-23 NOTE — PROGRESS NOTES
Assessment & Plan     Stage 3a chronic kidney disease (H)  Recommend recheck today  May want to consider nephrology consult in the future when he establishes with new provider     Type 1 diabetes mellitus with hyperglycemia (H)  Followed by endocrinology   Not on statin or Asprin per patient request   Kidney protection and blood pressure control with lisinopril.   Considering a pump.   - lisinopril (ZESTRIL) 10 MG tablet; Take 1 tablet (10 mg) by mouth daily.    Retinopathy due to secondary diabetes mellitus (H)  Encouraged eye exam, recommend getting this done this week with the floater he had yesterday he declined and stated that he is going to get this done when he moves.     Urinary retention  On flomax.   Discussed his ejaculation concerns.   Discussed balancing this and urinary retention.   Recommend consult with urology and do not recommend that he stop the flomax.     S/P bilateral BKA (below knee amputation) (H)  Follow up with a new primary and get referral for wound care and prosthesis management   Continue on Lyrica and advised I did send him refills to get him time to establish with another provider when he moves.     Hypertension, unspecified type  Elevated  Increased to 10mg daily  Monitor BP   Denies any symptoms of his BP today   - lisinopril (ZESTRIL) 10 MG tablet; Take 1 tablet (10 mg) by mouth daily.            See Patient Instructions    Subjective   Eduardo is a 54 year old, presenting for the following health issues:  Recheck Medication        10/23/2024     9:35 AM   Additional Questions   Roomed by Huma NERI   Accompanied by Self     HPI       Patient is moving  And wants your opinion on things before he moves and go over the medications.       Patient has changed everything on Mychart to not get notifications.     Before he had surgery to lose his right leg. He notes that he was doing well with his bladder and not having issues. Could be sexually active. Since he started the Flomax this is  "impacting his erection. Was told by urology if he stopped it then he would retain the urine like he had in the past. Patient notes that his ejaculation fluid has changed appearance. Went down on the Flomax down to 1 tablet. He cut the time he had to go to the bathroom in half. Started on Flomax due to BPH or an acontractile bladder secondary to longstanding diabetes with neuropathy. Dating a girl half his age and wants to be able to have intercourse without having issues.   Moving because father has short term memory loss and has dementia and moving home to help out with his family. Helping out his parents.     Right eye retinopathy. Left eye has been good up until awhile ago. Glasses he has right now are not strong enough. He denies any retinal detachment symptoms- No peripheral vison concerns or eye pain. Has some floaters. Noticed it when he was working on the computer for a long time and straining. The floater went away.     Pregabalin working well better than he thought it would. He Was previously on Lyrica brand name and insurance would not cover so changed over to Pregabalin and this is working well for his BKA phantom pain.     Endocrinologist would like him to change over to a pump, he is not sure if he wants to do this yet. He was interested in Medtronic pump as it does not require as much adjusting. Not sure about coverage for this.     Will need wound care as well along with a prosthesis care.     Review of Systems  Constitutional, HEENT, cardiovascular, pulmonary, gi and gu systems are negative, except as otherwise noted.      Objective    BP (!) 164/80   Pulse 74   Temp 96.9  F (36.1  C) (Temporal)   Resp 16   Ht 1.753 m (5' 9\")   Wt 63.5 kg (140 lb)   SpO2 98%   BMI 20.67 kg/m    Body mass index is 20.67 kg/m .  Physical Exam   GENERAL: alert and no distress  EYES: Had sunglasses on, notes that it helps with his sensitivity   MS: JOHNNY, in wheelchair   PSYCH: mentation appears normal, affect " normal/bright    No results found for any visits on 10/23/24.    Patient left without doing labs     Signed Electronically by: NATASHA Queen CNP

## 2024-11-17 ENCOUNTER — HEALTH MAINTENANCE LETTER (OUTPATIENT)
Age: 54
End: 2024-11-17

## 2025-06-01 ENCOUNTER — HEALTH MAINTENANCE LETTER (OUTPATIENT)
Age: 55
End: 2025-06-01

## 2025-07-13 ENCOUNTER — HEALTH MAINTENANCE LETTER (OUTPATIENT)
Age: 55
End: 2025-07-13

## (undated) DEVICE — BLADE KNIFE SURG 15 371115

## (undated) DEVICE — DRAPE STERI TOWEL LG 1010

## (undated) DEVICE — DRSG GAUZE 4X4" 3033

## (undated) DEVICE — DRSG KERLIX FLUFFS X5

## (undated) DEVICE — GLOVE PROTEXIS POWDER FREE 7.5 ORTHOPEDIC 2D73ET75

## (undated) DEVICE — BNDG ELASTIC 4" DBL LENGTH UNSTERILE 6611-14

## (undated) DEVICE — SU ETHILON 4-0 FS-2 18" 662H

## (undated) DEVICE — SU SILK 0 24" TIE SA76G

## (undated) DEVICE — DRAPE STOCKINETTE IMPERVIOUS 12" 1587

## (undated) DEVICE — PACK EXTREMITY SOP15EXFSD

## (undated) DEVICE — CAST PLASTER SPLINT 5X30" 7395

## (undated) DEVICE — DRAPE POUCH IRR 1016

## (undated) DEVICE — Device

## (undated) DEVICE — BLADE SAW SAGITTAL STRK 19.5X90X1.20MM 2108-109-000S17

## (undated) DEVICE — CAST PADDING 4" STERILE 9044S

## (undated) DEVICE — PREP SKIN SCRUB TRAY 4461A

## (undated) DEVICE — ESU GROUND PAD UNIVERSAL W/O CORD

## (undated) DEVICE — DRAIN HEMOVAC RESERVOIR KIT 10FR 1/8" MED 00-2550-002-10

## (undated) DEVICE — DRSG KERLIX 4 1/2"X4YDS ROLL 6715

## (undated) DEVICE — GLOVE PROTEXIS POWDER FREE 8.0 ORTHOPEDIC 2D73ET80

## (undated) DEVICE — DRSG ADAPTIC 3X8" 6113

## (undated) DEVICE — LINEN TOWEL PACK X5 5464

## (undated) DEVICE — NDL 25GA 1.5" 305127

## (undated) DEVICE — GLOVE PROTEXIS BLUE W/NEU-THERA 7.0  2D73EB70

## (undated) DEVICE — CAST PADDING 6" STERILE 9046S

## (undated) DEVICE — SU ETHILON 2-0 FS 18" 664H

## (undated) DEVICE — SPONGE LAP 18X18" X8435

## (undated) DEVICE — SU VICRYL 2-0 CT-1 27" UND J259H

## (undated) DEVICE — SU PROLENE 3-0 SHDA 36" 8522H

## (undated) DEVICE — SU VICRYL 1 MO-4 18" J702D

## (undated) DEVICE — SOL WATER IRRIG 1000ML BOTTLE 2F7114

## (undated) DEVICE — GLOVE PROTEXIS POWDER FREE 6.5 ORTHOPEDIC 2D73ET65

## (undated) DEVICE — GLOVE PROTEXIS BLUE W/NEU-THERA 8.0  2D73EB80

## (undated) DEVICE — BLADE KNIFE SURG 10 371110

## (undated) DEVICE — CAST PADDING 4" UNSTERILE 9044

## (undated) DEVICE — NDL 19GA 1.5"

## (undated) RX ORDER — BUPIVACAINE HYDROCHLORIDE 2.5 MG/ML
INJECTION, SOLUTION EPIDURAL; INFILTRATION; INTRACAUDAL
Status: DISPENSED
Start: 2019-02-05

## (undated) RX ORDER — CEFAZOLIN SODIUM 2 G/100ML
INJECTION, SOLUTION INTRAVENOUS
Status: DISPENSED
Start: 2019-02-18

## (undated) RX ORDER — ROPIVACAINE HYDROCHLORIDE 5 MG/ML
INJECTION, SOLUTION EPIDURAL; INFILTRATION; PERINEURAL
Status: DISPENSED
Start: 2019-02-05

## (undated) RX ORDER — LIDOCAINE HYDROCHLORIDE 10 MG/ML
INJECTION, SOLUTION INFILTRATION; PERINEURAL
Status: DISPENSED
Start: 2019-02-05

## (undated) RX ORDER — HYDROMORPHONE HYDROCHLORIDE 1 MG/ML
INJECTION, SOLUTION INTRAMUSCULAR; INTRAVENOUS; SUBCUTANEOUS
Status: DISPENSED
Start: 2019-02-18

## (undated) RX ORDER — FENTANYL CITRATE 50 UG/ML
INJECTION, SOLUTION INTRAMUSCULAR; INTRAVENOUS
Status: DISPENSED
Start: 2019-02-18

## (undated) RX ORDER — PROPOFOL 10 MG/ML
INJECTION, EMULSION INTRAVENOUS
Status: DISPENSED
Start: 2019-02-05

## (undated) RX ORDER — FENTANYL CITRATE 50 UG/ML
INJECTION, SOLUTION INTRAMUSCULAR; INTRAVENOUS
Status: DISPENSED
Start: 2019-02-05